# Patient Record
Sex: MALE | Race: WHITE | HISPANIC OR LATINO | Employment: OTHER | URBAN - METROPOLITAN AREA
[De-identification: names, ages, dates, MRNs, and addresses within clinical notes are randomized per-mention and may not be internally consistent; named-entity substitution may affect disease eponyms.]

---

## 2017-01-04 ENCOUNTER — ALLSCRIPTS OFFICE VISIT (OUTPATIENT)
Dept: OTHER | Facility: OTHER | Age: 82
End: 2017-01-04

## 2017-01-18 ENCOUNTER — GENERIC CONVERSION - ENCOUNTER (OUTPATIENT)
Dept: OTHER | Facility: OTHER | Age: 82
End: 2017-01-18

## 2017-01-24 ENCOUNTER — GENERIC CONVERSION - ENCOUNTER (OUTPATIENT)
Dept: OTHER | Facility: OTHER | Age: 82
End: 2017-01-24

## 2017-02-01 ENCOUNTER — GENERIC CONVERSION - ENCOUNTER (OUTPATIENT)
Dept: OTHER | Facility: OTHER | Age: 82
End: 2017-02-01

## 2017-02-24 ENCOUNTER — ALLSCRIPTS OFFICE VISIT (OUTPATIENT)
Dept: OTHER | Facility: OTHER | Age: 82
End: 2017-02-24

## 2017-03-24 ENCOUNTER — ALLSCRIPTS OFFICE VISIT (OUTPATIENT)
Dept: OTHER | Facility: OTHER | Age: 82
End: 2017-03-24

## 2017-04-27 ENCOUNTER — ALLSCRIPTS OFFICE VISIT (OUTPATIENT)
Dept: OTHER | Facility: OTHER | Age: 82
End: 2017-04-27

## 2017-06-01 ENCOUNTER — APPOINTMENT (OUTPATIENT)
Dept: LAB | Facility: CLINIC | Age: 82
End: 2017-06-01
Payer: COMMERCIAL

## 2017-06-01 DIAGNOSIS — E11.9 TYPE 2 DIABETES MELLITUS WITHOUT COMPLICATIONS (HCC): ICD-10-CM

## 2017-06-01 DIAGNOSIS — I10 ESSENTIAL (PRIMARY) HYPERTENSION: ICD-10-CM

## 2017-06-01 DIAGNOSIS — E78.5 HYPERLIPIDEMIA: ICD-10-CM

## 2017-06-01 LAB
ALBUMIN SERPL BCP-MCNC: 3.3 G/DL (ref 3.5–5)
ALP SERPL-CCNC: 92 U/L (ref 46–116)
ALT SERPL W P-5'-P-CCNC: 38 U/L (ref 12–78)
ANION GAP SERPL CALCULATED.3IONS-SCNC: 5 MMOL/L (ref 4–13)
AST SERPL W P-5'-P-CCNC: 20 U/L (ref 5–45)
BASOPHILS # BLD AUTO: 0.02 THOUSANDS/ΜL (ref 0–0.1)
BASOPHILS NFR BLD AUTO: 0 % (ref 0–1)
BILIRUB DIRECT SERPL-MCNC: 0.14 MG/DL (ref 0–0.2)
BILIRUB SERPL-MCNC: 0.49 MG/DL (ref 0.2–1)
BUN SERPL-MCNC: 22 MG/DL (ref 5–25)
CALCIUM SERPL-MCNC: 9.3 MG/DL (ref 8.3–10.1)
CHLORIDE SERPL-SCNC: 104 MMOL/L (ref 100–108)
CHOLEST SERPL-MCNC: 189 MG/DL (ref 50–200)
CO2 SERPL-SCNC: 30 MMOL/L (ref 21–32)
CREAT SERPL-MCNC: 0.95 MG/DL (ref 0.6–1.3)
CREAT UR-MCNC: 140 MG/DL
EOSINOPHIL # BLD AUTO: 0.22 THOUSAND/ΜL (ref 0–0.61)
EOSINOPHIL NFR BLD AUTO: 2 % (ref 0–6)
ERYTHROCYTE [DISTWIDTH] IN BLOOD BY AUTOMATED COUNT: 13.3 % (ref 11.6–15.1)
EST. AVERAGE GLUCOSE BLD GHB EST-MCNC: 163 MG/DL
GFR SERPL CREATININE-BSD FRML MDRD: >60 ML/MIN/1.73SQ M
GLUCOSE P FAST SERPL-MCNC: 97 MG/DL (ref 65–99)
HBA1C MFR BLD: 7.3 % (ref 4.2–6.3)
HCT VFR BLD AUTO: 44.3 % (ref 36.5–49.3)
HDLC SERPL-MCNC: 93 MG/DL (ref 40–60)
HGB BLD-MCNC: 14.8 G/DL (ref 12–17)
LDLC SERPL CALC-MCNC: 74 MG/DL (ref 0–100)
LYMPHOCYTES # BLD AUTO: 2.76 THOUSANDS/ΜL (ref 0.6–4.47)
LYMPHOCYTES NFR BLD AUTO: 30 % (ref 14–44)
MCH RBC QN AUTO: 31 PG (ref 26.8–34.3)
MCHC RBC AUTO-ENTMCNC: 33.4 G/DL (ref 31.4–37.4)
MCV RBC AUTO: 93 FL (ref 82–98)
MICROALBUMIN UR-MCNC: 693 MG/L (ref 0–20)
MICROALBUMIN/CREAT 24H UR: 495 MG/G CREATININE (ref 0–30)
MONOCYTES # BLD AUTO: 0.84 THOUSAND/ΜL (ref 0.17–1.22)
MONOCYTES NFR BLD AUTO: 9 % (ref 4–12)
NEUTROPHILS # BLD AUTO: 5.28 THOUSANDS/ΜL (ref 1.85–7.62)
NEUTS SEG NFR BLD AUTO: 59 % (ref 43–75)
NRBC BLD AUTO-RTO: 0 /100 WBCS
PLATELET # BLD AUTO: 203 THOUSANDS/UL (ref 149–390)
PMV BLD AUTO: 10.2 FL (ref 8.9–12.7)
POTASSIUM SERPL-SCNC: 3.9 MMOL/L (ref 3.5–5.3)
PROT SERPL-MCNC: 7.1 G/DL (ref 6.4–8.2)
RBC # BLD AUTO: 4.77 MILLION/UL (ref 3.88–5.62)
SODIUM SERPL-SCNC: 139 MMOL/L (ref 136–145)
TRIGL SERPL-MCNC: 112 MG/DL
WBC # BLD AUTO: 9.13 THOUSAND/UL (ref 4.31–10.16)

## 2017-06-01 PROCEDURE — 80061 LIPID PANEL: CPT

## 2017-06-01 PROCEDURE — 80048 BASIC METABOLIC PNL TOTAL CA: CPT

## 2017-06-01 PROCEDURE — 36415 COLL VENOUS BLD VENIPUNCTURE: CPT

## 2017-06-01 PROCEDURE — 82043 UR ALBUMIN QUANTITATIVE: CPT

## 2017-06-01 PROCEDURE — 85025 COMPLETE CBC W/AUTO DIFF WBC: CPT

## 2017-06-01 PROCEDURE — 82570 ASSAY OF URINE CREATININE: CPT

## 2017-06-01 PROCEDURE — 80076 HEPATIC FUNCTION PANEL: CPT

## 2017-06-01 PROCEDURE — 83036 HEMOGLOBIN GLYCOSYLATED A1C: CPT

## 2017-06-09 ENCOUNTER — ALLSCRIPTS OFFICE VISIT (OUTPATIENT)
Dept: OTHER | Facility: OTHER | Age: 82
End: 2017-06-09

## 2017-06-26 ENCOUNTER — ALLSCRIPTS OFFICE VISIT (OUTPATIENT)
Dept: OTHER | Facility: OTHER | Age: 82
End: 2017-06-26

## 2017-08-15 ENCOUNTER — ALLSCRIPTS OFFICE VISIT (OUTPATIENT)
Dept: OTHER | Facility: OTHER | Age: 82
End: 2017-08-15

## 2017-11-06 ENCOUNTER — APPOINTMENT (OUTPATIENT)
Dept: LAB | Facility: CLINIC | Age: 82
End: 2017-11-06
Payer: COMMERCIAL

## 2017-11-06 DIAGNOSIS — E78.5 HYPERLIPIDEMIA: ICD-10-CM

## 2017-11-06 DIAGNOSIS — I10 ESSENTIAL (PRIMARY) HYPERTENSION: ICD-10-CM

## 2017-11-06 DIAGNOSIS — E11.9 TYPE 2 DIABETES MELLITUS WITHOUT COMPLICATIONS (HCC): ICD-10-CM

## 2017-11-06 LAB
ALBUMIN SERPL BCP-MCNC: 3.2 G/DL (ref 3.5–5)
ALP SERPL-CCNC: 105 U/L (ref 46–116)
ALT SERPL W P-5'-P-CCNC: 32 U/L (ref 12–78)
ANION GAP SERPL CALCULATED.3IONS-SCNC: 4 MMOL/L (ref 4–13)
AST SERPL W P-5'-P-CCNC: 19 U/L (ref 5–45)
BILIRUB SERPL-MCNC: 0.48 MG/DL (ref 0.2–1)
BUN SERPL-MCNC: 23 MG/DL (ref 5–25)
CALCIUM SERPL-MCNC: 9.4 MG/DL (ref 8.3–10.1)
CHLORIDE SERPL-SCNC: 104 MMOL/L (ref 100–108)
CHOLEST SERPL-MCNC: 173 MG/DL (ref 50–200)
CO2 SERPL-SCNC: 29 MMOL/L (ref 21–32)
CREAT SERPL-MCNC: 1.02 MG/DL (ref 0.6–1.3)
ERYTHROCYTE [DISTWIDTH] IN BLOOD BY AUTOMATED COUNT: 13.1 % (ref 11.6–15.1)
EST. AVERAGE GLUCOSE BLD GHB EST-MCNC: 163 MG/DL
GFR SERPL CREATININE-BSD FRML MDRD: 69 ML/MIN/1.73SQ M
GLUCOSE P FAST SERPL-MCNC: 120 MG/DL (ref 65–99)
HBA1C MFR BLD: 7.3 % (ref 4.2–6.3)
HCT VFR BLD AUTO: 44 % (ref 36.5–49.3)
HDLC SERPL-MCNC: 80 MG/DL (ref 40–60)
HGB BLD-MCNC: 14.8 G/DL (ref 12–17)
LDLC SERPL CALC-MCNC: 62 MG/DL (ref 0–100)
MCH RBC QN AUTO: 31.3 PG (ref 26.8–34.3)
MCHC RBC AUTO-ENTMCNC: 33.6 G/DL (ref 31.4–37.4)
MCV RBC AUTO: 93 FL (ref 82–98)
PLATELET # BLD AUTO: 211 THOUSANDS/UL (ref 149–390)
PMV BLD AUTO: 9.7 FL (ref 8.9–12.7)
POTASSIUM SERPL-SCNC: 3.9 MMOL/L (ref 3.5–5.3)
PROT SERPL-MCNC: 7 G/DL (ref 6.4–8.2)
RBC # BLD AUTO: 4.73 MILLION/UL (ref 3.88–5.62)
SODIUM SERPL-SCNC: 137 MMOL/L (ref 136–145)
TRIGL SERPL-MCNC: 153 MG/DL
TSH SERPL DL<=0.05 MIU/L-ACNC: 2.36 UIU/ML (ref 0.36–3.74)
WBC # BLD AUTO: 9.82 THOUSAND/UL (ref 4.31–10.16)

## 2017-11-06 PROCEDURE — 85027 COMPLETE CBC AUTOMATED: CPT

## 2017-11-06 PROCEDURE — 80061 LIPID PANEL: CPT

## 2017-11-06 PROCEDURE — 80053 COMPREHEN METABOLIC PANEL: CPT

## 2017-11-06 PROCEDURE — 36415 COLL VENOUS BLD VENIPUNCTURE: CPT

## 2017-11-06 PROCEDURE — 84443 ASSAY THYROID STIM HORMONE: CPT

## 2017-11-06 PROCEDURE — 83036 HEMOGLOBIN GLYCOSYLATED A1C: CPT

## 2017-11-21 ENCOUNTER — ALLSCRIPTS OFFICE VISIT (OUTPATIENT)
Dept: OTHER | Facility: OTHER | Age: 82
End: 2017-11-21

## 2017-11-22 NOTE — PROGRESS NOTES
Assessment    1  Chronic bronchitis (491 9) (J42)   2  Hyperlipidemia (272 4) (E78 5)   3  Hypertension (401 9) (I10)   4  Parkinson's disease (332 0) (G20)   5  Type 2 diabetes mellitus (250 00) (E11 9)   6  Cerebrovascular disease (437 9) (I67 9)    Plan  Hyperlipidemia    · (1) LIPID PANEL, FASTING; Status:Active; Requested for:06Mar2018;   Hypertension    · (1) BASIC METABOLIC PROFILE; Status:Active; Requested for:06Mar2018;    · (1) CBC/PLT/DIFF; Status:Active; Requested for:06Mar2018;    · (1) HEPATIC FUNCTION PANEL; Status:Active; Requested for:06Mar2018;   Type 2 diabetes mellitus    · (1) HEMOGLOBIN A1C; Status:Active; Requested for:06Mar2018; Discussion/Summary  Discussion Summary:   Lab data reviewed in detail and compared prior  bronchitis/COPD appears clinically stable with daily prednisone as well as bronchodilators following closely with pulmonology, IgE levels greater than 5000  2 diabetes mellitus-stable with diet control, I have encouraged weight loss and stricter dietary compliance to try to bring the A1c below 7, consider medicating for greater than 7 5  and hyperlipidemia remained stable on present regimen  of CVA-modify risk factors as above  disease-improved on medical therapy following with Neurology next month  maintenance-flu shot today, up-to-date with pneumonia vaccination, scheduled to see Ophthalmology at [address] [facility] however will be changing providers  follow-up after labs in 4 months, sooner as needed  Chief Complaint  Chief Complaint Chronic Condition [facility]: Patient is here today for follow up of chronic conditions described in HPI  History of Present Illness  HPI: [place]elling generally well  by neuro and dx'd PD last Jan, hasn't had f/u but sched 12/18  He feels better on rx   been better, he denies cough and wheeze  Seeing pulm regularly  stable  Using fluticasone and saline irrigation, zyrtec and singulair  Allergy testing ordered by pulm    dm diet loosely  lipitor  exercising regularly, but trying to keep active  Review of Systems  Complete-Male:  Constitutional: No fever or chills, feels well, no tiredness, no recent weight gain or weight loss  Eyes: No complaints of eye pain, no red eyes, no discharge from eyes, no itchy eyes  ENT: no complaints of earache, no hearing loss, no nosebleeds, no nasal discharge, no sore throat, no hoarseness  Cardiovascular: No complaints of slow heart rate, no fast heart rate, no chest pain, no palpitations, no leg claudication, no lower extremity  Respiratory: as noted in HPI  Gastrointestinal: No complaints of abdominal pain, no constipation, no nausea or vomiting, no diarrhea or bloody stools  Genitourinary: No complaints of dysuria, no incontinence, no hesitancy, no nocturia, no genital lesion, no testicular pain  Musculoskeletal: No complaints of arthralgia, no myalgias, no joint swelling or stiffness, no limb pain or swelling  Integumentary: No complaints of skin rash or skin lesions, no itching, no skin wound, no dry skin  Neurological: as noted in HPI  Psychiatric: Is not suicidal, no sleep disturbances, no anxiety or depression, no change in personality, no emotional problems  Endocrine: No complaints of proptosis, no hot flashes, no muscle weakness, no erectile dysfunction, no deepening of the voice, no feelings of weakness  Hematologic/Lymphatic: No complaints of swollen glands, no swollen glands in the neck, does not bleed easily, no easy bruising  Active Problems  1  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)   2  Allergic rhinitis (477 9) (J30 9)   3  Aortic regurgitation (424 1) (I35 1)   4  Arm laceration (884 0) (S41 119A)   5  Asthma (493 90) (J45 909)   6  Bronchitis, chronic obstructive, with exacerbation (491 21) (J44 1)   7  Candidal intertrigo (112 3) (B37 2)   8  Cerebrovascular disease (437 9) (I67 9)   9  Chronic bronchitis (491 9) (J42)   10   Chronic obstructive pulmonary disease (496) (J44 9)   11  Cough (786 2) (R05)   12  Dermatitis (692 9) (L30 9)   13  Dysthymic disorder (300 4) (F34 1)   14  Elevated IgE level (795 79) (R76 8)   15  Fatigue (780 79) (R53 83)   16  Flu vaccine need (V04 81) (Z23)   17  Hyperlipidemia (272 4) (E78 5)   18  Hypertension (401 9) (I10)   19  Irritant dermatitis (692 9) (L24 9)   20  Need for pneumococcal vaccination (V03 82) (Z23)   21  Need for pneumococcal vaccine (V03 82) (Z23)   22  Need for vaccination (V05 9) (Z23)   23  Neurologic gait dysfunction (781 2) (R26 9)   24  No advance directives (V49 89) (Z78 9)   25  Parkinson's disease (332 0) (G20)   26  Peripheral vascular disease (443 9) (I73 9)   27  Pneumonia (486) (J18 9)   28  Preop examination (V72 84) (Z01 818)   29  Screening for neurological condition (V80 09) (Z13 89)   30  Screening for skin condition (V82 0) (Z13 89)   31  Seborrheic keratosis (702 19) (L82 1)   32  Skin rash (782 1) (R21)   33  Tinea corporis (110 5) (B35 4)   34  Tinea pedis of both feet (110 4) (B35 3)   35  Tremor (781 0) (R25 1)   36  Type 2 diabetes mellitus (250 00) (E11 9)    Past Medical History  1  History of Benign essential hypertension (401 1) (I10)   2  History of Bronchiectasis with acute exacerbation (494 1) (J47 1)   3  History of Bronchiectasis without acute exacerbation (494 0) (J47 9)   4  History of Bronchitis, chronic obstructive, with exacerbation (491 21) (J44 1)   5  History of Difficulty breathing (786 09) (R06 89)   6  History of Flu vaccine need (V04 81) (Z23)   7  History of acute bronchitis (V12 69) (Z87 09)   8  History of allergic rhinitis (V12 69) (Z87 09)   9  History of chronic obstructive lung disease (V12 69) (Z87 09)   10  History of hyperlipidemia (V12 29) (Z86 39)   11  History of hypertension (V12 59) (Z86 79)   12  History of low back pain (V13 59) (Z87 39)   13  History of Simple chronic bronchitis (491 0) (J41 0)   14   History of Type 2 Diabetes Mellitus - Uncomplicated, Controlled (250 00)  Active Problems And Past Medical History Reviewed: The active problems and past medical history were reviewed and updated today  Surgical History  1  History of Rhinologic Surgery  Surgical History Reviewed: The surgical history was reviewed and updated today  Family History  Mother    1  Family history of   Father    2  Family history of   Sister    3  Family history of chronic obstructive pulmonary disease (V17 6) (Z82 5)   4  Family history of Lung Cancer (V16 1)  Family History    5  Family history of Asthma (V17 5)  Family History Reviewed: The family history was reviewed and updated today  Social History     · Former cigarette smoker (D92 86) (J93 738)   · Former smoker (T95 02) (N53 785)   · Living Independently With Spouse   ·    · No advance directives (V49 89) (Z78 9)   · Occupation: Retired  Social History Reviewed: The social history was reviewed and updated today  Current Meds   1  Advair Diskus 250-50 MCG/DOSE Inhalation Aerosol Powder Breath Activated; INHALE 1 DOSE BY MOUTH TWICE DAILY  RINSE MOUTH AFTER USE; Therapy: 98BNO8191 to ([facility])  Requested for: 02Pdj4583; Last Rx:21Wrk6455 Ordered   2  Albuterol Sulfate (2 5 MG/3ML) 0 083% Inhalation Nebulization Solution; INHALE CONTENETS OF 1 VIAL VIA NEBULIZER EVERY 4 HOURS AS NEEDED; Therapy: 97ILK3882 to ([facility])  Requested for: 37OFY2669; Last Rx:2014 Ordered   3  Atorvastatin Calcium 10 MG Oral Tablet; take 1 tablet by mouth every day; Therapy: 47Sik4559 to (Evaluate:2018)  Requested for: 38YLU4004; Last Rx:73Ywg8169 Ordered   4  Carbidopa-Levodopa  MG Oral Tablet; TAKE 1 TABLET 3 TIMES DAILY; [place] 97Wny5114 [place]  Requested for: 65IXV3153; Last Rx:2017 Ordered   5  Clopidogrel Bisulfate 75 MG Oral Tablet (Plavix); take 1 tablet every day;  Therapy: 62CBL1797 to (Evaluate:38Gzk3067) Requested for: 20Jun2017; Last Rx:20Jun2017 Ordered   6  Fluticasone Propionate 50 MCG/ACT Nasal Suspension; instill 2 sprays into each nostril at bedtime; Therapy: 84NKN0343 to (Evaluate:11Fsi6318)  Requested for: 02ARB2821; Last Rx:09Jun2016 Ordered   7  Montelukast Sodium 10 MG Oral Tablet; Take 1 tablet by mouth at bedtime; Therapy: 08CJM0949 to (Evaluate:12Dec2017)  Requested for: 86Bcp5781; Last Rx:91Xzj5413 Ordered   8  Pramipexole Dihydrochloride 0 25 MG Oral Tablet; TAKE 1 TABLET BY MOUTH 3 TIMES A DAY; Therapy: 44MVX0228 to (Evaluate:20Mar2018)  Requested for: 79Nwa6821; Last Rx:34Oda8568 Ordered   9  PredniSONE 10 MG Oral Tablet; take 1 tablet by mouth every day; Therapy: 90Sgm2185 to (96 371510)  Requested for: 75WEH2890; Last IM:85IEN4040 Ordered   10  ProAir  (90 Base) MCG/ACT Inhalation Aerosol Solution; INHALE 2 PUFFS EVERY 4 HOURS  AS NEEDED; Therapy: 33SQZ8604 to (Last Rx:09Jun2017)  Requested for: 57KTS3102 Ordered   11  Saline 0 9 % Solution (XX); use one vial via nebulizer twice daily with albuterol; Therapy: 48XOC5199 to (Last Rx:12Jun2015)  Requested for: 12Jun2015 Ordered   12  Spiriva HandiHaler 18 MCG Inhalation Capsule; INHALE 1 CAPSULE VIA HANDIHALER ONCE DAILY  AT THE SAME TIME EVERY DAY; Therapy: 17QDF4545 to (Evaluate:11Mar2018)  Requested for: 31VFZ6663; Last Rx:11Nov2017  Ordered   13  Terbinafine HCl - 1 % External Cream; APPLY 2-3 TIMES DAILY TO AFFECTED AREA(S); Therapy: 41IBY1155 to (Last Rx:24Mar2017)  Requested for: 24Mar2017 Ordered   14  Triamcinolone Acetonide 0 5 % External Cream; APPLY SPARINGLY AND MASSAGE IN TWICE DAILY; Therapy: 12Wwp5999 to (Last Rx:54Zit7392)  Requested for: 98Ahn5759 Ordered   15  Valsartan-Hydrochlorothiazide 80-12 5 MG Oral Tablet; TAKE 1 TABLET BY MOUTH IN THE  MORNING; Therapy: 08Apr2014 to (96 464860)  Requested for: 34QNU6411; Last Rx:02Nov2017  Ordered  Medication List Reviewed:    The medication list was reviewed and updated today  Allergies  1  Penicillins  2  No Known Food Allergies   3  Seasonal    Vitals  Vital Signs    Recorded: 21Nov2017 12:40PM   Heart Rate 91   Systolic 215   Diastolic 66   Height 5 ft 2 5 in   Weight 198 lb 4 oz   BMI Calculated 35 68   BSA Calculated 1 92   O2 Saturation 95       Physical Exam   Constitutional  General appearance: No acute distress, well appearing and well nourished  Eyes  Conjunctiva and lids: No swelling, erythema, or discharge  Pupils and irises: Equal, round and reactive to light  Ears, Nose, Mouth, and Throat  External inspection of ears and nose: Normal    Oropharynx: Normal with no erythema, edema, exudate or lesions  Pulmonary  Respiratory effort: No increased work of breathing or signs of respiratory distress  Auscultation of lungs: Clear to auscultation, equal breath sounds bilaterally, no wheezes, no rales, no rhonci  -- prolonged exp  phase  Cardiovascular  Auscultation of heart: Abnormal  -- 2/6 massimo  Examination of extremities for edema and/or varicosities: Normal    Carotid pulses: Normal    Abdomen  Abdomen: Non-tender, no masses  Liver and spleen: No hepatomegaly or splenomegaly  Lymphatic  Palpation of lymph nodes in neck: No lymphadenopathy  Musculoskeletal  Gait and station: Normal    Neurologic  Cranial nerves: Cranial nerves 2-12 intact     Psychiatric  Orientation to person, place and time: Normal    Mood and affect: Normal          Results/Data  Diabetes Flow Sheet 21Nov2017 12:47PM      Test Name Result Flag Reference   Eye Exam      Last exam was 6/9/2016     (1) CBC/ PLT (NO DIFF) 96OHM8265 10:06AM Ayla Order Number: [id number]     Test Name Result Flag Reference   HEMATOCRIT 44 0 %  36 5-49 3   HEMOGLOBIN 14 8 g/dL  12 0-17 0   MCHC 33 6 g/dL  31 4-37 4   MCH 31 3 pg  26 8-34 3   MCV 93 fL  82-98   PLATELET COUNT 494 Thousands/uL  149-390   RBC COUNT 4 73 Million/uL  3 88-5 62   RDW 13 1 %  11 6-15 1   WBC COUNT 9 82 Thousand/uL  4 31-10 16   MPV 9 7 fL  8 9-12 7     (1) COMPREHENSIVE METABOLIC PANEL 12LWZ0855 29:36IV [employer] Order Number: [id number]     Test Name Result Flag Reference   SODIUM 137 mmol/L  136-145   POTASSIUM 3 9 mmol/L  3 5-5 3   CHLORIDE 104 mmol/L  100-108   CARBON DIOXIDE 29 mmol/L  21-32   ANION GAP (CALC) 4 mmol/L  4-13   BLOOD UREA NITROGEN 23 mg/dL  5-25   CREATININE 1 02 mg/dL  0 60-1 30   Standardized to IDMS reference method   CALCIUM 9 4 mg/dL  8 3-10 1   BILI, TOTAL 0 48 mg/dL  0 20-1 00   ALK PHOSPHATAS 105 U/L     ALT (SGPT) 32 U/L  12-78   Specimen collection should occur prior to Sulfasalazine and/or Sulfapyridine administration due to the potential for falsely depressed results  AST(SGOT) 19 U/L  5-45   Specimen collection should occur prior to Sulfasalazine administration due to the potential for falsely depressed results  ALBUMIN 3 2 g/dL L 3 5-5 0   TOTAL PROTEIN 7 0 g/dL  6 4-8 2   eGFR 69 ml/min/1 73sq m       National Kidney Disease Education Program recommendations are as follows: GFR calculation is accurate only with a steady state creatinine Chronic Kidney disease less than 60 ml/min/1 73 sq  meters Kidney failure less than 15 ml/min/1 73 sq  meters  GLUCOSE FASTING 120 mg/dL H 65-99   Specimen collection should occur prior to Sulfasalazine administration due to the potential for falsely depressed results  Specimen collection should occur prior to Sulfapyridine administration due to the potential for falsely elevated results  (1) HEMOGLOBIN A1C 49LKV5723 10:06AM [employer] Order Number: [id number]     Test Name Result Flag Reference   HEMOGLOBIN A1C 7 3 % H 4 2-6 3   EST  AVG   GLUCOSE 163 mg/dl       (1) LIPID PANEL, FASTING 62RGZ0053 10:06AM [employer] Order Number: [id number]     Test Name Result Flag Reference   CHOLESTEROL 173 mg/dL     HDL,DIRECT 80 mg/dL H 40-60   Specimen collection should occur prior to Metamizole administration due to the potential for falsley depressed results  LDL CHOLESTEROL CALCULATED 62 mg/dL  0-100     Triglyceride:       Normal <150 mg/dl  Borderline High 150-199 mg/dl  High 200-499 mg/dl  Very High >499 mg/dl   Cholesterol:      Desirable <200 mg/dl   Borderline High 200-239 mg/dl   High >239 mg/dl   HDL Cholesterol:      High>59 mg/dL   Low <41 mg/dL   This screening LDL is a calculated result  It does not have the accuracy of the Direct Measured LDL in the monitoring of patients with hyperlipidemia and/or statin therapy  Direct Measure LDL (GSL917) must be ordered separately in these patients  TRIGLYCERIDES 153 mg/dL H <=150   Specimen collection should occur prior to N-Acetylcysteine or Metamizole administration due to the potential for falsely depressed results  (1) TSH 00CHA8661 10:06AM Shelby Magaña Order Number: [id number]     Test Name Result Flag Reference   TSH 2 360 uIU/mL  0 358-3 740   Patients undergoing fluorescein dye angiography may retain small amounts of fluorescein in the body for 48-72 hours post procedure  Samples containing fluorescein can produce falsely depressed TSH values  If the patient had this procedure,a specimen should be resubmitted post fluorescein clearance       Future Appointments    Date/Time Provider Specialty Site   12/18/2017 09:40 AM Trang Xie MD Neurology [facility] [address] [place] [address]   12/26/2017 11:00 AM Basilio Stephens [facility] Pulmonary Medicine [address]       Signatures   Electronically signed by : Kathyleen Harada, M D ; Nov 21 2017  1:10PM EST                       (Author)

## 2017-12-18 ENCOUNTER — ALLSCRIPTS OFFICE VISIT (OUTPATIENT)
Dept: OTHER | Facility: OTHER | Age: 82
End: 2017-12-18

## 2017-12-18 DIAGNOSIS — R26.9 ABNORMALITY OF GAIT AND MOBILITY: ICD-10-CM

## 2017-12-19 NOTE — PROGRESS NOTES
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)    Plan  Neurologic gait dysfunction    · Continue with our present treatment plan ; Status:Complete;   Done: [id number]   Ordered;For:Neurologic gait dysfunction; Ordered By:Aayush Green;   · *1 - [facility]  *  Status: Active  Requested for: 40GGG6611   Ordered; For: Neurologic gait dysfunction; Ordered By: Rendall Eisenmenger Performed:  Due: 94NPZ3061  Care Summary provided  : Yes   · Follow-up visit in 3 months Evaluation and Treatment  Follow-up  Status: Complete Done: 39UGC4801   Ordered; For: Neurologic gait dysfunction; Ordered By: Rendall Eisenmenger Performed:  Due: 73NKN1575; Last Updated By: Ozzie Maldonado; 12/18/2017 10:30:11 AM  Parkinson's disease    · Carbidopa-Levodopa  MG Oral Tablet; TAKE 1 TABLET 3 TIMES DAILY   Rx By: Rendall Eisenmenger; Dispense: 30 Days ; #:90 Tablet; Refill: 6;For: Parkinson's disease; FLORENTINO = N; Verified Transmission to [facility] Last Updated By: System, SureScript[employer]; 12/18/2017 10:27:55 AM   · Pramipexole Dihydrochloride 0 5 MG Oral Tablet; TAKE 1 TABLET BY MOUTH 3TIMES DAILY   Rx By: Rendall Eisenmenger; Dispense: 30 Days ; #:90 Tablet; Refill: 6;For: Parkinson's disease; FLORENTINO = N; Verified Transmission to [facility] Last Updated By: System, SureScripts; 12/18/2017 10:27:54 AM    Discussion/Summary  Discussion Summary:   Patient with a history of Parkinson's disease, gait dysfunction is advised to increase the dose of Mirapex to 0 5 mg 3 times a day in addition to the Sinemet 25/100, 1 tablets p o  3 times a day  Patient will also be referred for a short course of physical therapy to further assist with improving his gait  Fall precautions were also discussed  Patient is advised to return back to see me in 2-3 months        Chief Complaint  Chief Complaint Free Text Note Form: Patient is here for follow up visit for his history of neurologic gait dysfunction and Parkinson's disease  History of Present Illness  HPI: Patient is here for a follow-up visit accompanied with his daughter with a history of Parkinson's disease and was last seen by me almost a year ago  He continues to have significant gait difficulty and has had recurrent falls  Patient describes tremors of both lower extremities as well as the right upper extremity, ambulates with the help of a cane and has been maintained on Sinemet 3 times a day and was last started on Mirapex 0 25 mg 3 times a day  Unfortunately was lost to follow-up  As per his daughter he also has been having difficulty with his memory but denies any confusion or hallucinations  Review of Systems  Neurological ROS:  Constitutional: no fever, no chills, no recent weight gain, no recent weight loss, no complaints of feeling tired, no changes in appetite  HEENT:  no sinus problems, not feeling congested, no blurred vision, no dryness of the eyes, no eye pain, no hearing loss, no tinnitus, no mouth sores, no sore throat, no hoarseness, no dysphagia, no masses, no bleeding  Cardiovascular:  no chest pain or pressure, no palpitations present, the heart rate was not rapid or irregular, no swelling in the arms or legs, no poor circulation  Respiratory: unusual or persistant cough  Gastrointestinal:  no nausea, no vomiting, no diarrhea, no abdominal pain, no changes in bowel habits, no melena, no loss of bowel control  Genitourinary:  no incontinence, no feelings of urinary urgency, no increase in frequency, no urinary hesitancy, no dysuria, no hematuria  Musculoskeletal: arthralgias-- and-- pain while walking  Integumentary  no masses, no rash, no skin lesions, no livedo reticularis  Psychiatric: mood swings  Endocrine   no unusual weight loss or gain, no excessive urination, no excessive thirst, no hair loss or gain, no hot or cold intolerance, no menstrual period change or irregularity, no loss of sexual ability or drive, no erection difficulty, no nipple discharge  Hematologic/Lymphatic: a tendency for easy bruising  Neurological General:  no headache, no nausea or vomiting, no lightheadedness, no convulsions, no blackouts, no syncope, no trauma, no photopsia, no increased sleepiness, no trouble falling asleep, no snoring, no awakening at night  Neurological Mental Status: confusion,-- alteration or loss of consciousness-- and-- memory problems  Neurological Cranial Nerves: taste or smell loss/changes,-- vertigo or dizziness-- and-- slurred speech  Neurological Motor findings include: tremor  Neurological Coordination: balance difficulties  Neurological Sensory:  no numbness, no pain, no tingling, does not fall when eyes closed or taking a shower  Neurological Gait: difficulty walking-- and-- has had falls  ROS Reviewed:   ROS reviewed  Active Problems  1  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)   2  Allergic rhinitis (477 9) (J30 9)   3  Aortic regurgitation (424 1) (I35 1)   4  Arm laceration (884 0) (S41 119A)   5  Asthma (493 90) (J45 909)   6  Bronchitis, chronic obstructive, with exacerbation (491 21) (J44 1)   7  Candidal intertrigo (112 3) (B37 2)   8  Cerebrovascular disease (437 9) (I67 9)   9  Chronic bronchitis (491 9) (J42)   10  Chronic obstructive pulmonary disease (496) (J44 9)   11  Cough (786 2) (R05)   12  Dermatitis (692 9) (L30 9)   13  Dysthymic disorder (300 4) (F34 1)   14  Elevated IgE level (795 79) (R76 8)   15  Fatigue (780 79) (R53 83)   16  Flu vaccine need (V04 81) (Z23)   17  Hyperlipidemia (272 4) (E78 5)   18  Hypertension (401 9) (I10)   19  Irritant dermatitis (692 9) (L24 9)   20  Need for pneumococcal vaccination (V03 82) (Z23)   21  Need for pneumococcal vaccine (V03 82) (Z23)   22  Need for vaccination (V05 9) (Z23)   23  Neurologic gait dysfunction (781 2) (R26 9)   24  No advance directives (V49 89) (Z78 9)   25   Parkinson's disease (332 0) (Red Bud Inches)   26  Peripheral vascular disease (443 9) (I73 9)   27  Pneumonia (486) (J18 9)   28  Preop examination (V72 84) (Z01 818)   29  Screening for neurological condition (V80 09) (Z13 89)   30  Screening for skin condition (V82 0) (Z13 89)   31  Seborrheic keratosis (702 19) (L82 1)   32  Skin rash (782 1) (R21)   33  Tinea corporis (110 5) (B35 4)   34  Tinea pedis of both feet (110 4) (B35 3)   35  Tremor (781 0) (R25 1)   36  Type 2 diabetes mellitus (250 00) (E11 9)    Past Medical History  1  History of Benign essential hypertension (401 1) (I10)   2  History of Bronchiectasis with acute exacerbation (494 1) (J47 1)   3  History of Bronchiectasis without acute exacerbation (494 0) (J47 9)   4  History of Bronchitis, chronic obstructive, with exacerbation (491 21) (J44 1)   5  History of Difficulty breathing (786 09) (R06 89)   6  History of Flu vaccine need (V04 81) (Z23)   7  History of acute bronchitis (V12 69) (Z87 09)   8  History of allergic rhinitis (V12 69) (Z87 09)   9  History of chronic obstructive lung disease (V12 69) (Z87 09)   10  History of hyperlipidemia (V12 29) (Z86 39)   11  History of hypertension (V12 59) (Z86 79)   12  History of low back pain (V13 59) (Z87 39)   13  History of Simple chronic bronchitis (491 0) (J41 0)   14  History of Type 2 Diabetes Mellitus - Uncomplicated, Controlled (250 00)    Surgical History  1  History of Rhinologic Surgery    Family History  Mother    1  Family history of   Father    2  Family history of   Sister    3  Family history of chronic obstructive pulmonary disease (V17 6) (Z82 5)   4  Family history of Lung Cancer (V16 1)  Family History    5  Family history of Asthma (V17 5)    Social History   · Former cigarette smoker (O60 71) (J66 303)   · Former smoker (V15 82) (P05 917)   · Living Independently With Spouse   ·    · No advance directives (V49 89) (Z78 9)   · Occupation: Retired  Social History Reviewed:  The social history was reviewed and updated today  Current Meds   1  Advair Diskus 250-50 MCG/DOSE Inhalation Aerosol Powder Breath Activated; INHALE 1 DOSE BY MOUTH TWICE DAILY  RINSE MOUTH AFTER USE; Therapy: 66HST2345 to (Maia Humphrey)  Requested for: 72Dai7247; Last Rx:75Qxo7890 Ordered   2  Albuterol Sulfate (2 5 MG/3ML) 0 083% Inhalation Nebulization Solution; INHALE CONTENETS OF 1 VIAL VIA NEBULIZER EVERY 4 HOURS AS NEEDED; Therapy: 31AJF8319 to (Genevaruth Antunez)  Requested for: 03HHI4480; Last Rx:27Oct2014 Ordered   3  Atorvastatin Calcium 10 MG Oral Tablet; take 1 tablet by mouth every day; Therapy: 75Ibt2767 to (Evaluate:27Jan2018)  Requested for: 53DJW8732; Last Rx:00Nzo7285 Ordered   4  Carbidopa-Levodopa  MG Oral Tablet; TAKE 1 TABLET 3 TIMES DAILY; Therapy: 38Dwj5439 to (Angi Hernández)  Requested for: 41DMB8598; Last Rx:04Oct2017 Ordered   5  Clopidogrel Bisulfate 75 MG Oral Tablet; take 1 tablet by mouth every day; Therapy: 13BHA0968 to (Evaluate:10Jun2018)  Requested for: 85Fgv7051; Last Rx:80Qip2297 Ordered   6  Montelukast Sodium 10 MG Oral Tablet; Take 1 tablet by mouth at bedtime; Therapy: 42ODR5726 to (Evaluate:06Jun2018)  Requested for: 38ZJW0310; Last Rx:33Awl0395 Ordered   7  Pramipexole Dihydrochloride 0 25 MG Oral Tablet; TAKE 1 TABLET BY MOUTH 3 TIMES A DAY; Therapy: 79GCV5200 to (Evaluate:20Mar2018)  Requested for: 68Hdd3815; Last Rx:25Ola9337 Ordered   8  PredniSONE 10 MG Oral Tablet; take 1 tablet by mouth every day; Therapy: 71Vlq6197 to (Evaluate:04Jun2018)  Requested for: 03VDK4005; Last Rx:92Jvl2647 Ordered   9  ProAir  (90 Base) MCG/ACT Inhalation Aerosol Solution; INHALE 2 PUFFS EVERY 4 HOURS AS NEEDED; Therapy: 82PRX8563 to (Last Rx:55Yuq9563)  Requested for: 99RZW7335 Ordered   10  Saline 0 9 % Solution (XX); use one vial via nebulizer twice daily with albuterol; Therapy: 53SVG2806 to (Last Rx:12Jun2015)  Requested for: 12Jun2015 Ordered   11   Spiriva HandiHaler 18 MCG Inhalation Capsule; INHALE 1 CAPSULE VIA HANDIHALER  ONCE DAILY AT THE SAME TIME EVERY DAY; Therapy: 02UIN2680 to (Evaluate:11Mar2018)  Requested for: 93PTD8274; Last  Rx:11Nov2017 Ordered   12  Terbinafine HCl - 1 % External Cream; APPLY 2-3 TIMES DAILY TO AFFECTED AREA(S); Therapy: 59HAG8358 to (Last Rx:24Mar2017)  Requested for: 24Mar2017 Ordered   13  Valsartan-Hydrochlorothiazide 80-12 5 MG Oral Tablet; TAKE 1 TABLET BY MOUTH IN  THE MORNING; Therapy: 02Fot4657 to [phone])  Requested for: 59YKQ1604; Last  Rx:02Nov2017 Ordered  Medication List Reviewed: The medication list was reviewed and updated today  Allergies  1  Penicillins  2  No Known Food Allergies   3  Seasonal    Vitals  Signs   Recorded: 41PLR5929 09:46AM   Heart Rate: 66  Systolic: 442  Diastolic: 68  Height: 5 ft 2 in  Weight: 196 lb   BMI Calculated: 35 85  BSA Calculated: 1 9    Physical Exam   Constitutional  General appearance: Abnormal  -- Overall bradykinesia with poor expression noted  Musculoskeletal  Gait and station: Abnormal  -- Patient ambulates with the help of a cane, and has difficulty standing from the sitting position unassisted  Muscle strength: Normal strength throughout  Muscle tone: No atrophy, abnormal movements, flaccidity, cogwheeling or spasticity  -- No cogwheeling rigidity was noted at this time  Involuntary movements: Abnormal involuntary movements were observed  -- Patient has evidence of a resting tremor in the right upper extremity  Neurologic  Orientation to person, place, and time: Normal    Language: Names objects, able to repeat phrases and speaks spontaneously     3rd, 4th, and 6th cranial nerves: Normal    7th cranial nerve: Normal    Sensation: Normal    Reflexes: Normal    Coordination: Normal    Cortical function: Normal        Future Appointments    Date/Time Provider Specialty Site   03/28/2018 01:40 PM Ana Maloney MD Neurology [facility]   12/26/2017 11:00 AM Rick Curtis [facility] Pulmonary Medicine [facility]   03/19/2018 12:30 PM KIMI Jordan  Internal Medicine [facility]     Signatures   Electronically signed by :  Josesito Caban MD; Dec 18 2017 12:06PM EST                       (Author)

## 2017-12-20 ENCOUNTER — APPOINTMENT (EMERGENCY)
Dept: RADIOLOGY | Facility: HOSPITAL | Age: 82
End: 2017-12-20
Payer: COMMERCIAL

## 2017-12-20 ENCOUNTER — HOSPITAL ENCOUNTER (EMERGENCY)
Facility: HOSPITAL | Age: 82
Discharge: HOME | End: 2017-12-20
Attending: EMERGENCY MEDICINE | Admitting: EMERGENCY MEDICINE
Payer: COMMERCIAL

## 2017-12-20 VITALS
TEMPERATURE: 98.4 F | HEIGHT: 62 IN | RESPIRATION RATE: 20 BRPM | WEIGHT: 196 LBS | OXYGEN SATURATION: 96 % | BODY MASS INDEX: 36.07 KG/M2 | HEART RATE: 73 BPM | SYSTOLIC BLOOD PRESSURE: 135 MMHG | DIASTOLIC BLOOD PRESSURE: 65 MMHG

## 2017-12-20 DIAGNOSIS — J40 BRONCHITIS: Primary | ICD-10-CM

## 2017-12-20 LAB
ALBUMIN SERPL BCP-MCNC: 2.8 G/DL (ref 3.5–5)
ALP SERPL-CCNC: 114 U/L (ref 46–116)
ALT SERPL W P-5'-P-CCNC: 12 U/L (ref 12–78)
ANION GAP SERPL CALCULATED.3IONS-SCNC: 9 MMOL/L (ref 4–13)
APTT PPP: 26 SECONDS (ref 23–35)
AST SERPL W P-5'-P-CCNC: 25 U/L (ref 5–45)
BASOPHILS # BLD AUTO: 0.03 THOUSANDS/ΜL (ref 0–0.1)
BASOPHILS NFR BLD AUTO: 0 % (ref 0–1)
BILIRUB SERPL-MCNC: 0.2 MG/DL (ref 0.2–1)
BUN SERPL-MCNC: 22 MG/DL (ref 5–25)
CALCIUM SERPL-MCNC: 9.3 MG/DL (ref 8.3–10.1)
CHLORIDE SERPL-SCNC: 104 MMOL/L (ref 100–108)
CO2 SERPL-SCNC: 26 MMOL/L (ref 21–32)
CREAT SERPL-MCNC: 1.06 MG/DL (ref 0.6–1.3)
EOSINOPHIL # BLD AUTO: 0.06 THOUSAND/ΜL (ref 0–0.61)
EOSINOPHIL NFR BLD AUTO: 1 % (ref 0–6)
ERYTHROCYTE [DISTWIDTH] IN BLOOD BY AUTOMATED COUNT: 12.7 % (ref 11.6–15.1)
GFR SERPL CREATININE-BSD FRML MDRD: 65 ML/MIN/1.73SQ M
GLUCOSE SERPL-MCNC: 156 MG/DL (ref 65–140)
HCT VFR BLD AUTO: 40.6 % (ref 36.5–49.3)
HGB BLD-MCNC: 13.5 G/DL (ref 12–17)
INR PPP: 1.06 (ref 0.86–1.16)
LACTATE SERPL-SCNC: 1.6 MMOL/L (ref 0.5–2)
LYMPHOCYTES # BLD AUTO: 1.75 THOUSANDS/ΜL (ref 0.6–4.47)
LYMPHOCYTES NFR BLD AUTO: 14 % (ref 14–44)
MCH RBC QN AUTO: 30.6 PG (ref 26.8–34.3)
MCHC RBC AUTO-ENTMCNC: 33.3 G/DL (ref 31.4–37.4)
MCV RBC AUTO: 92 FL (ref 82–98)
MONOCYTES # BLD AUTO: 0.78 THOUSAND/ΜL (ref 0.17–1.22)
MONOCYTES NFR BLD AUTO: 6 % (ref 4–12)
NEUTROPHILS # BLD AUTO: 9.53 THOUSANDS/ΜL (ref 1.85–7.62)
NEUTS SEG NFR BLD AUTO: 78 % (ref 43–75)
NRBC BLD AUTO-RTO: 0 /100 WBCS
NT-PROBNP SERPL-MCNC: 350 PG/ML
PLATELET # BLD AUTO: 335 THOUSANDS/UL (ref 149–390)
PMV BLD AUTO: 8.8 FL (ref 8.9–12.7)
POTASSIUM SERPL-SCNC: 3.7 MMOL/L (ref 3.5–5.3)
PROT SERPL-MCNC: 7.4 G/DL (ref 6.4–8.2)
PROTHROMBIN TIME: 14.1 SECONDS (ref 12.1–14.4)
RBC # BLD AUTO: 4.41 MILLION/UL (ref 3.88–5.62)
SODIUM SERPL-SCNC: 139 MMOL/L (ref 136–145)
WBC # BLD AUTO: 12.28 THOUSAND/UL (ref 4.31–10.16)

## 2017-12-20 PROCEDURE — 96365 THER/PROPH/DIAG IV INF INIT: CPT

## 2017-12-20 PROCEDURE — 99283 EMERGENCY DEPT VISIT LOW MDM: CPT

## 2017-12-20 PROCEDURE — 71020 HB CHEST X-RAY 2VW FRONTAL&LATL: CPT

## 2017-12-20 PROCEDURE — 85025 COMPLETE CBC W/AUTO DIFF WBC: CPT | Performed by: EMERGENCY MEDICINE

## 2017-12-20 PROCEDURE — 87040 BLOOD CULTURE FOR BACTERIA: CPT | Performed by: EMERGENCY MEDICINE

## 2017-12-20 PROCEDURE — 80053 COMPREHEN METABOLIC PANEL: CPT | Performed by: EMERGENCY MEDICINE

## 2017-12-20 PROCEDURE — 83605 ASSAY OF LACTIC ACID: CPT | Performed by: EMERGENCY MEDICINE

## 2017-12-20 PROCEDURE — 96361 HYDRATE IV INFUSION ADD-ON: CPT

## 2017-12-20 PROCEDURE — 36415 COLL VENOUS BLD VENIPUNCTURE: CPT | Performed by: EMERGENCY MEDICINE

## 2017-12-20 PROCEDURE — 94640 AIRWAY INHALATION TREATMENT: CPT

## 2017-12-20 PROCEDURE — 83880 ASSAY OF NATRIURETIC PEPTIDE: CPT | Performed by: EMERGENCY MEDICINE

## 2017-12-20 PROCEDURE — 96375 TX/PRO/DX INJ NEW DRUG ADDON: CPT

## 2017-12-20 PROCEDURE — 85730 THROMBOPLASTIN TIME PARTIAL: CPT | Performed by: EMERGENCY MEDICINE

## 2017-12-20 PROCEDURE — 85610 PROTHROMBIN TIME: CPT | Performed by: EMERGENCY MEDICINE

## 2017-12-20 RX ORDER — PREDNISONE 10 MG/1
TABLET ORAL
Qty: 40 TABLET | Refills: 0 | Status: SHIPPED | OUTPATIENT
Start: 2017-12-20 | End: 2018-02-08 | Stop reason: SDUPTHER

## 2017-12-20 RX ORDER — PRAMIPEXOLE DIHYDROCHLORIDE 0.5 MG/1
0.25 TABLET ORAL 3 TIMES DAILY
COMMUNITY
End: 2018-07-01 | Stop reason: SDUPTHER

## 2017-12-20 RX ORDER — SODIUM CHLORIDE 9 MG/ML
125 INJECTION, SOLUTION INTRAVENOUS CONTINUOUS
Status: DISCONTINUED | OUTPATIENT
Start: 2017-12-20 | End: 2017-12-20 | Stop reason: HOSPADM

## 2017-12-20 RX ORDER — ALBUTEROL SULFATE 2.5 MG/3ML
2.5 SOLUTION RESPIRATORY (INHALATION) EVERY 6 HOURS PRN
Qty: 75 ML | Refills: 0 | Status: SHIPPED | OUTPATIENT
Start: 2017-12-20 | End: 2018-01-19

## 2017-12-20 RX ORDER — AZITHROMYCIN 250 MG/1
500 TABLET, FILM COATED ORAL ONCE
Status: COMPLETED | OUTPATIENT
Start: 2017-12-20 | End: 2017-12-20

## 2017-12-20 RX ORDER — AZITHROMYCIN 250 MG/1
250 TABLET, FILM COATED ORAL DAILY
Qty: 4 TABLET | Refills: 0 | Status: SHIPPED | OUTPATIENT
Start: 2017-12-20 | End: 2017-12-24

## 2017-12-20 RX ORDER — ALBUTEROL SULFATE 2.5 MG/3ML
2.5 SOLUTION RESPIRATORY (INHALATION) ONCE
Status: COMPLETED | OUTPATIENT
Start: 2017-12-20 | End: 2017-12-20

## 2017-12-20 RX ORDER — METHYLPREDNISOLONE SODIUM SUCCINATE 125 MG/2ML
125 INJECTION, POWDER, LYOPHILIZED, FOR SOLUTION INTRAMUSCULAR; INTRAVENOUS ONCE
Status: COMPLETED | OUTPATIENT
Start: 2017-12-20 | End: 2017-12-20

## 2017-12-20 RX ADMIN — METHYLPREDNISOLONE SODIUM SUCCINATE 125 MG: 125 INJECTION, POWDER, FOR SOLUTION INTRAMUSCULAR; INTRAVENOUS at 21:12

## 2017-12-20 RX ADMIN — SODIUM CHLORIDE 125 ML/HR: 0.9 INJECTION, SOLUTION INTRAVENOUS at 19:50

## 2017-12-20 RX ADMIN — ALBUTEROL SULFATE 2.5 MG: 2.5 SOLUTION RESPIRATORY (INHALATION) at 19:40

## 2017-12-20 RX ADMIN — IPRATROPIUM BROMIDE 0.5 MG: 0.5 SOLUTION RESPIRATORY (INHALATION) at 19:40

## 2017-12-20 RX ADMIN — AZITHROMYCIN MONOHYDRATE 500 MG: 250 TABLET ORAL at 21:16

## 2017-12-20 RX ADMIN — CEFTRIAXONE 2000 MG: 2 INJECTION, SOLUTION INTRAVENOUS at 21:11

## 2017-12-20 NOTE — ED PROVIDER NOTES
History  Chief Complaint   Patient presents with    Cough     pt with a cough for 1 week, wants to make sure he doesnt have pneumonia      HPI  60-year-old male with a chief complaint having cough for over a week  Patient states that he denies any fever or chills however he has a persisting cough that will go away  Patient is a former smoker and quit in  and has a history of COPD and is supposed to be taking his inhalers daily does not do that regularly  Patient just started back on his inhalers today because of his shortness of breath  Patient's sister is currently being treated for pneumonia here in the department as well  Prior to Admission Medications   Prescriptions Last Dose Informant Patient Reported? Taking? albuterol (2 5 mg/3 mL) 0 083 % nebulizer solution   Yes Yes   Sig: Take 2 5 mg by nebulization every 6 (six) hours as needed for wheezing  albuterol (VENTOLIN HFA) 90 mcg/act inhaler   Yes Yes   Sig: Inhale 2 puffs every 6 (six) hours as needed for wheezing  atorvastatin (LIPITOR) 10 mg tablet   Yes Yes   Sig: Take 10 mg by mouth daily  carbidopa-levodopa (SINEMET)  mg per tablet   Yes Yes   Sig: Take 1 tablet by mouth 3 (three) times a day   clopidogrel (PLAVIX) 75 mg tablet   Yes Yes   Sig: Take 75 mg by mouth daily  fluticasone (FLONASE) 50 mcg/act nasal spray   Yes Yes   Si spray into each nostril daily  fluticasone (VERAMYST) 27 5 MCG/SPRAY nasal spray   Yes Yes   Si sprays into each nostril daily  fluticasone-salmeterol (ADVAIR) 250-50 mcg/dose inhaler   Yes Yes   Sig: Inhale 1 puff every 12 (twelve) hours  montelukast (SINGULAIR) 10 mg tablet   Yes No   Sig: Take 10 mg by mouth daily at bedtime  pramipexole (MIRAPEX) 0 5 mg tablet   Yes Yes   Sig: Take 0 25 mg by mouth 3 (three) times a day   tiotropium (SPIRIVA) 18 mcg inhalation capsule   Yes No   Sig: Place 18 mcg into inhaler and inhale daily     valsartan-hydrochlorothiazide (DIOVAN-HCT) 80-12 5 MG per tablet   Yes Yes   Sig: Take 1 tablet by mouth daily  Facility-Administered Medications: None       Past Medical History:   Diagnosis Date    COPD [facility]chronic obstructive pulmonary disease) ([facility] [address] )     Coronary artery disease     carotid stents    Cough     Hypertension     Pneumonia     TIA (transient ischemic attack)        Past Surgical History:   Procedure Laterality Date    NASAL SINUS SURGERY      SD [address] INCL FLUOR GDNCE DX W/CELL WASHG SPX N/A 7/27/2016    Procedure: BRONCHOSCOPY;  Surgeon: Cathy Ventura MD;  Location: [facility]; Service: Pulmonary       History reviewed  No pertinent family history  I have reviewed and agree with the history as documented  Social History   Substance Use Topics    Smoking status: Former Smoker    Smokeless tobacco: Never Used    Alcohol use No        Review of Systems   Constitutional: Positive for fatigue  Negative for chills and fever  HENT: Negative for congestion and rhinorrhea  Eyes: Negative for discharge and visual disturbance  Respiratory: Positive for cough, choking, shortness of breath and wheezing  Cardiovascular: Negative for chest pain and palpitations  Gastrointestinal: Negative for abdominal pain and vomiting  Endocrine: Negative for polydipsia and polyuria  Genitourinary: Negative for dysuria and hematuria  Musculoskeletal: Negative for arthralgias, gait problem and neck stiffness  Skin: Negative for rash and wound  Neurological: Negative for dizziness and headaches  Psychiatric/Behavioral: Negative for confusion and suicidal ideas         Physical Exam  ED Triage Vitals [12/20/17 1750]   Temperature Pulse Respirations Blood Pressure SpO2   98 4 °F (36 9 °C) 97 18 158/75 95 %      Temp Source Heart Rate Source Patient Position - Orthostatic VS BP Location FiO2 (%)   Oral Monitor Sitting Left arm --      Pain Score       No Pain           Orthostatic Vital Signs  Vitals:    12/20/17 1950 12/20/17 2000 12/20/17 2100 12/20/17 2130   BP: 163/67 146/63 167/75 135/65   Pulse: 74 74 83 73   Patient Position - Orthostatic VS: Lying          Physical Exam   Constitutional: He is oriented to person, place, and time  He appears well-developed and well-nourished  HENT:   Head: Normocephalic and atraumatic  Mouth/Throat: Oropharynx is clear and moist    Eyes: EOM are normal  Pupils are equal, round, and reactive to light  Neck: Normal range of motion  Neck supple  Cardiovascular: Normal rate, regular rhythm and normal heart sounds  Pulmonary/Chest: No respiratory distress  He has wheezes  He has no rales  Abdominal: Soft  Bowel sounds are normal  There is no tenderness  Musculoskeletal: Normal range of motion  Neurological: He is alert and oriented to person, place, and time  No cranial nerve deficit  He exhibits normal muscle tone  Coordination normal    Skin: Skin is warm and dry  Psychiatric: He has a normal mood and affect  Nursing note and vitals reviewed  ED Medications  Medications   albuterol inhalation solution 2 5 mg (2 5 mg Nebulization Given 12/20/17 1940)   ipratropium (ATROVENT) 0 02 % inhalation solution 0 5 mg (0 5 mg Nebulization Given 12/20/17 1940)   methylPREDNISolone sodium succinate (Solu-MEDROL) injection 125 mg (125 mg Intravenous Given 12/20/17 2112)   azithromycin (ZITHROMAX) tablet 500 mg (500 mg Oral Given 12/20/17 2116)       Diagnostic Studies  Results Reviewed     Procedure Component Value Units Date/Time    Protime-INR [[id number]]  (Normal) Collected:  12/20/17 1939    Lab Status:  Final result Specimen:  Blood from Arm, Right Updated:  12/20/17 2026     Protime 14 1 seconds      INR 1 06    APTT [[id number]]  (Normal) Collected:  12/20/17 1939    Lab Status:  Final result Specimen:  Blood from Arm, Right Updated:  12/20/17 2026     PTT 26 seconds     Narrative:          Therapeutic Heparin Range = 60-90 seconds    B-type natriuretic peptide [[id number]]  (Normal) Collected: 12/20/17 1939    Lab Status:  Final result Specimen:  Blood from Arm, Right Updated:  12/20/17 2017     NT-proBNP 350 pg/mL     Lactic acid, plasma [[id number]]  (Normal) Collected:  12/20/17 1939    Lab Status:  Final result Specimen:  Blood from Arm, Right Updated:  12/20/17 2016     LACTIC ACID 1 6 mmol/L     Narrative:         Result may be elevated if tourniquet was used during collection  Comprehensive metabolic panel [[id number]]  (Abnormal) Collected:  12/20/17 1939    Lab Status:  Final result Specimen:  Blood from Arm, Right Updated:  12/20/17 2011     Sodium 139 mmol/L      Potassium 3 7 mmol/L      Chloride 104 mmol/L      CO2 26 mmol/L      Anion Gap 9 mmol/L      BUN 22 mg/dL      Creatinine 1 06 mg/dL      Glucose 156 (H) mg/dL      Calcium 9 3 mg/dL      AST 25 U/L      ALT 12 U/L      Alkaline Phosphatase 114 U/L      Total Protein 7 4 g/dL      Albumin 2 8 (L) g/dL      Total Bilirubin 0 20 mg/dL      eGFR 65 ml/min/1 73sq m     Narrative:         National Kidney Disease Education Program recommendations are as follows:  GFR calculation is accurate only with a steady state creatinine  Chronic Kidney disease less than 60 ml/min/1 73 sq  meters  Kidney failure less than 15 ml/min/1 73 sq  meters  Blood culture #1 [[id number]] Collected:  12/20/17 1949    Lab Status:   In process Specimen:  Blood from Hand, Right Updated:  12/20/17 1955    CBC and differential [[id number]]  (Abnormal) Collected:  12/20/17 1939    Lab Status:  Final result Specimen:  Blood from Arm, Right Updated:  12/20/17 1949     WBC 12 28 (H) Thousand/uL      RBC 4 41 Million/uL      Hemoglobin 13 5 g/dL      Hematocrit 40 6 %      MCV 92 fL      MCH 30 6 pg      MCHC 33 3 g/dL      RDW 12 7 %      MPV 8 8 (L) fL      Platelets 883 Thousands/uL      nRBC 0 /100 WBCs      Neutrophils Relative 78 (H) %      Lymphocytes Relative 14 %      Monocytes Relative 6 %      Eosinophils Relative 1 %      Basophils Relative 0 %      Neutrophils Absolute 9 53 (H) Thousands/µL      Lymphocytes Absolute 1 75 Thousands/µL      Monocytes Absolute 0 78 Thousand/µL      Eosinophils Absolute 0 06 Thousand/µL      Basophils Absolute 0 03 Thousands/µL     Blood culture #2 [[id number]] Collected:  12/20/17 1939    Lab Status: In process Specimen:  Blood from Arm, Right Updated:  12/20/17 1946                 XR chest 2 views   ED Interpretation by Fadia Canseco DO (12/20 2057)   Some hazziness LL lobe      Final Result by Danisha Lizarraga DO (12/21 5617)      Right apical opacity appears chronic  There is a left basilar opacity which is nonspecific and may represent atelectasis or infection  Workstation performed: [id number]                    Procedures  Procedures       Phone Contacts  ED Phone Contact    ED Course  ED Course       I reviewed the patient's labs and x-rays with the patient  I told patient that he had a little bit of a high white count and a possible early left lower lobe pneumonia  I offered patient admission to the hospital   However, patient declined at this time and would like to try to go home on antibiotics  I gave him 1 dose of Rocephin prior to discharge IV  MDM  CritCare Time    Differential diagnosis includes:  1  Cough  2  Bronchitis  3  Rule out pneumonia  4  Exacerbation of COPD  5  Bronchospasm  6  Viral syndrome  Disposition  Final diagnoses:   Bronchitis - Possible Left Lower Lobe Pneumonia     Time reflects when diagnosis was documented in both MDM as applicable and the Disposition within this note     Time User Action Codes Description Comment    12/20/2017  9:10 PM Darinel Torres Add [J40] Bronchitis     12/20/2017  9:10 PM Darinel Torres Modify [J40] Bronchitis Possible Left Lower Lobe Pneumonia      ED Disposition     ED Disposition Condition Comment    Discharge  [address] discharge to home/self care      Condition at discharge: Good        Follow-up Information     Follow up With Specialties Details Why Contact Info    Denise Syed MD Internal Medicine In 2 days  [address],  [place] [address]  [phone]          Discharge Medication List as of 12/20/2017  9:18 PM      START taking these medications    Details   !! albuterol (2 5 mg/3 mL) 0 083 % nebulizer solution Take 3 mL by nebulization every 6 (six) hours as needed for wheezing for up to 30 days, Starting Wed 12/20/2017, Until Fri 1/19/2018, Print      azithromycin (ZITHROMAX) 250 mg tablet Take 1 tablet by mouth daily for 4 days, Starting Wed 12/20/2017, Until Sun 12/24/2017, Print      predniSONE 10 mg tablet Take 4 pills x4 days, then 3 pills x4 days, then 2 pills x4 days, and then 1 pill x4 days, Print       !! - Potential duplicate medications found  Please discuss with provider  CONTINUE these medications which have NOT CHANGED    Details   !! albuterol (2 5 mg/3 mL) 0 083 % nebulizer solution Take 2 5 mg by nebulization every 6 (six) hours as needed for wheezing , Until Discontinued, Historical Med      albuterol (VENTOLIN HFA) 90 mcg/act inhaler Inhale 2 puffs every 6 (six) hours as needed for wheezing , Until Discontinued, Historical Med      atorvastatin (LIPITOR) 10 mg tablet Take 10 mg by mouth daily  , Until Discontinued, Historical Med      carbidopa-levodopa (SINEMET)  mg per tablet Take 1 tablet by mouth 3 (three) times a day, Historical Med      clopidogrel (PLAVIX) 75 mg tablet Take 75 mg by mouth daily  , Until Discontinued, Historical Med      fluticasone (FLONASE) 50 mcg/act nasal spray 1 spray into each nostril daily  , Until Discontinued, Historical Med      fluticasone (VERAMYST) 27 5 MCG/SPRAY nasal spray 2 sprays into each nostril daily  , Until Discontinued, Historical Med      fluticasone-salmeterol (ADVAIR) 250-50 mcg/dose inhaler Inhale 1 puff every 12 (twelve) hours  , Until Discontinued, Historical Med      pramipexole (MIRAPEX) 0 5 mg tablet Take 0 25 mg by mouth 3 (three) times a day, Historical Med      valsartan-hydrochlorothiazide (DIOVAN-HCT) 80-12 5 MG per tablet Take 1 tablet by mouth daily  , Until Discontinued, Historical Med      montelukast (SINGULAIR) 10 mg tablet Take 10 mg by mouth daily at bedtime  , Until Discontinued, Historical Med      tiotropium (SPIRIVA) 18 mcg inhalation capsule Place 18 mcg into inhaler and inhale daily  , Until Discontinued, Historical Med       !! - Potential duplicate medications found  Please discuss with provider  No discharge procedures on file      ED Provider  Electronically Signed by           Brandie Bae DO  12/21/17 1784

## 2017-12-21 NOTE — DISCHARGE INSTRUCTIONS
Acute Bronchitis   WHAT YOU NEED TO KNOW:   Acute bronchitis is swelling and irritation in the air passages of your lungs  This irritation may cause you to cough or have other breathing problems  Acute bronchitis often starts because of another illness, such as a cold or the flu  The illness spreads from your nose and throat to your windpipe and airways  Bronchitis is often called a chest cold  Acute bronchitis lasts about 3 to 6 weeks and is usually not a serious illness  Your cough can last for several weeks  DISCHARGE INSTRUCTIONS:   Return to the emergency department if:   · You cough up blood  · Your lips or fingernails turn blue  · You feel like you are not getting enough air when you breathe  Contact your healthcare provider if:   · You have a fever  · Your breathing problems do not go away or get worse  · Your cough does not get better within 4 weeks  · You have questions or concerns about your condition or care  Self-care:   · Get more rest   Rest helps your body to heal  Slowly start to do more each day  Rest when you feel it is needed  · Avoid irritants in the air  Avoid chemicals, fumes, and dust  Wear a face mask if you must work around dust or fumes  Stay inside on days when air pollution levels are high  If you have allergies, stay inside when pollen counts are high  Do not use aerosol products, such as spray-on deodorant, bug spray, and hair spray  · Do not smoke or be around others who smoke  Nicotine and other chemicals in cigarettes and cigars damages the cilia that move mucus out of your lungs  Ask your healthcare provider for information if you currently smoke and need help to quit  E-cigarettes or smokeless tobacco still contain nicotine  Talk to your healthcare provider before you use these products  · Drink liquids as directed  Liquids help keep your air passages moist and help you cough up mucus   You may need to drink more liquids when you have acute bronchitis  Ask how much liquid to drink each day and which liquids are best for you  · Use a humidifier or vaporizer  Use a cool mist humidifier or a vaporizer to increase air moisture in your home  This may make it easier for you to breathe and help decrease your cough  Decrease risk for acute bronchitis:   · Get the vaccinations you need  Ask your healthcare provider if you should get vaccinated against the flu or pneumonia  · Prevent the spread of germs  You can decrease your risk of acute bronchitis and other illnesses by doing the following:     [facility] your hands often with soap and water  Carry germ-killing hand lotion or gel with you  You can use the lotion or gel to clean your hands when soap and water are not available  ¨ Do not touch your eyes, nose, or mouth unless you have washed your hands first     ¨ Always cover your mouth when you cough to prevent the spread of germs  It is best to cough into a tissue or your shirt sleeve instead of into your hand  Ask those around you cover their mouths when they cough  ¨ Try to avoid people who have a cold or the flu  If you are sick, stay away from others as much as possible  Medicines: Your healthcare provider may  give you any of the following:  · Ibuprofen or acetaminophen  are medicines that help lower your fever  They are available without a doctor's order  Ask your healthcare provider which medicine is right for you  Ask how much to take and how often to take it  Follow directions  These medicines can cause stomach bleeding if not taken correctly  Ibuprofen can cause kidney damage  Do not take ibuprofen if you have kidney disease, an ulcer, or allergies to aspirin  Acetaminophen can cause liver damage  Do not take more than 4,000 milligrams in 24 hours  · Decongestants  help loosen mucus in your lungs and make it easier to cough up  This can help you breathe easier  · Cough suppressants  decrease your urge to cough   If your cough produces mucus, do not take a cough suppressant unless your healthcare provider tells you to  Your healthcare provider may suggest that you take a cough suppressant at night so you can rest     · Inhalers  may be given  Your healthcare provider may give you one or more inhalers to help you breathe easier and cough less  An inhaler gives your medicine to open your airways  Ask your healthcare provider to show you how to use your inhaler correctly  · Take your medicine as directed  Contact your healthcare provider if you think your medicine is not helping or if you have side effects  Tell him of her if you are allergic to any medicine  Keep a list of the medicines, vitamins, and herbs you take  Include the amounts, and when and why you take them  Bring the list or the pill bottles to follow-up visits  Carry your medicine list with you in case of an emergency  Follow up with your healthcare provider as directed:  Write down questions you have so you will remember to ask them during your follow-up visits  © 2017 [address] Information is for End User's use only and may not be sold, redistributed or otherwise used for commercial purposes  All illustrations and images included in CareNotes® are the copyrighted property of [employer]  or José Antonio Rubalcava  The above information is an  only  It is not intended as medical advice for individual conditions or treatments  Talk to your doctor, nurse or pharmacist before following any medical regimen to see if it is safe and effective for you  COPD Exacerbation, Ambulatory Care   GENERAL INFORMATION:   A COPD (chronic obstructive pulmonary disease ) exacerbation  is a flare up or worsening of COPD    Common symptoms include the following:   · Shortness of breath     · A dry cough     · Coughing fits that bring up mucus from your lungs     · Wheezing and chest tightness  Seek immediate care for the following symptoms: · Confusion, dizziness, or lightheadedness    · Red, swollen, warm arm or leg    · Shortness of breath or chest pain    · Coughing up blood  Treatment for a COPD exacerbation  may include medicines to help decrease swelling and inflammation in your lungs  Medicines may also help open your airways or treat and infection  You may need pulmonary rehabilitation to help you manage your symptoms and improve your quality of life  You may need extra oxygen to help you breathe easier  Prevent another exacerbation:   · Do not smoke, and avoid others who smoke  If you smoke, it is never too late to quit  You may have fewer exacerbations  Ask for information about medicines and support programs that can help you quit  · Avoid triggers that make your symptoms worse  Cold weather and sudden temperature changes can trigger an exacerbation  Fumes from cars and chemicals, air pollution, and perfume can also increase your symptoms  · Use pursed-lip breathing when you feel short of breath  Take a deep breath in through your nose  Slowly breathe out through your mouth with your lips pursed for twice as long as you inhaled  You can also practice this breathing pattern while you bend, lift, climb stairs, or exercise  Pursed-lip breathing slows down your breathing and helps move more air in and out of your lungs  · Exercise for at least 20 minutes each day  Exercise can help increase your energy and decrease shortness of breath  Ask about the best exercise plan for you  · Prevent infections that can be dangerous when you have COPD  Get a flu vaccine every year as soon as it becomes available  Ask if you should also get other vaccines, such as those given to prevent pneumonia and tetanus  Avoid people who are sick, and wash your hands often  Follow up with your healthcare provider as directed:  Write down your questions so you remember to ask them during your visits    CARE AGREEMENT:   You have the right to help plan your care  Learn about your health condition and how it may be treated  Discuss treatment options with your caregivers to decide what care you want to receive  You always have the right to refuse treatment  The above information is an  only  It is not intended as medical advice for individual conditions or treatments  Talk to your doctor, nurse or pharmacist before following any medical regimen to see if it is safe and effective for you  © 2014 [address] is for End User's use only and may not be sold, redistributed or otherwise used for commercial purposes  All illustrations and images included in CareNotes® are the copyrighted property of [employer]  or [employer]  Acute Bronchitis   WHAT YOU NEED TO KNOW:   Acute bronchitis is swelling and irritation in the air passages of your lungs  This irritation may cause you to cough or have other breathing problems  Acute bronchitis often starts because of another illness, such as a cold or the flu  The illness spreads from your nose and throat to your windpipe and airways  Bronchitis is often called a chest cold  Acute bronchitis lasts about 3 to 6 weeks and is usually not a serious illness  Your cough can last for several weeks  DISCHARGE INSTRUCTIONS:   Return to the emergency department if:   · You cough up blood  · Your lips or fingernails turn blue  · You feel like you are not getting enough air when you breathe  Contact your healthcare provider if:   · You have a fever  · Your breathing problems do not go away or get worse  · Your cough does not get better within 4 weeks  · You have questions or concerns about your condition or care  Self-care:   · Get more rest   Rest helps your body to heal  Slowly start to do more each day  Rest when you feel it is needed  · Avoid irritants in the air    Avoid chemicals, fumes, and dust  Wear a face mask if you must work around dust or fumes  Stay inside on days when air pollution levels are high  If you have allergies, stay inside when pollen counts are high  Do not use aerosol products, such as spray-on deodorant, bug spray, and hair spray  · Do not smoke or be around others who smoke  Nicotine and other chemicals in cigarettes and cigars damages the cilia that move mucus out of your lungs  Ask your healthcare provider for information if you currently smoke and need help to quit  E-cigarettes or smokeless tobacco still contain nicotine  Talk to your healthcare provider before you use these products  · Drink liquids as directed  Liquids help keep your air passages moist and help you cough up mucus  You may need to drink more liquids when you have acute bronchitis  Ask how much liquid to drink each day and which liquids are best for you  · Use a humidifier or vaporizer  Use a cool mist humidifier or a vaporizer to increase air moisture in your home  This may make it easier for you to breathe and help decrease your cough  Decrease risk for acute bronchitis:   · Get the vaccinations you need  Ask your healthcare provider if you should get vaccinated against the flu or pneumonia  · Prevent the spread of germs  You can decrease your risk of acute bronchitis and other illnesses by doing the following:     [facility] your hands often with soap and water  Carry germ-killing hand lotion or gel with you  You can use the lotion or gel to clean your hands when soap and water are not available  ¨ Do not touch your eyes, nose, or mouth unless you have washed your hands first     ¨ Always cover your mouth when you cough to prevent the spread of germs  It is best to cough into a tissue or your shirt sleeve instead of into your hand  Ask those around you cover their mouths when they cough  ¨ Try to avoid people who have a cold or the flu  If you are sick, stay away from others as much as possible  Medicines:   Your healthcare provider may  give you any of the following:  · Ibuprofen or acetaminophen  are medicines that help lower your fever  They are available without a doctor's order  Ask your healthcare provider which medicine is right for you  Ask how much to take and how often to take it  Follow directions  These medicines can cause stomach bleeding if not taken correctly  Ibuprofen can cause kidney damage  Do not take ibuprofen if you have kidney disease, an ulcer, or allergies to aspirin  Acetaminophen can cause liver damage  Do not take more than 4,000 milligrams in 24 hours  · Decongestants  help loosen mucus in your lungs and make it easier to cough up  This can help you breathe easier  · Cough suppressants  decrease your urge to cough  If your cough produces mucus, do not take a cough suppressant unless your healthcare provider tells you to  Your healthcare provider may suggest that you take a cough suppressant at night so you can rest     · Inhalers  may be given  Your healthcare provider may give you one or more inhalers to help you breathe easier and cough less  An inhaler gives your medicine to open your airways  Ask your healthcare provider to show you how to use your inhaler correctly  · Take your medicine as directed  Contact your healthcare provider if you think your medicine is not helping or if you have side effects  Tell him of her if you are allergic to any medicine  Keep a list of the medicines, vitamins, and herbs you take  Include the amounts, and when and why you take them  Bring the list or the pill bottles to follow-up visits  Carry your medicine list with you in case of an emergency  Follow up with your healthcare provider as directed:  Write down questions you have so you will remember to ask them during your follow-up visits  © 2017 [address] Information is for End User's use only and may not be sold, redistributed or otherwise used for commercial purposes   All illustrations and images included in CareNotes® are the copyrighted property of [employer]  or José Antonio Rubalcava  The above information is an  only  It is not intended as medical advice for individual conditions or treatments  Talk to your doctor, nurse or pharmacist before following any medical regimen to see if it is safe and effective for you  Bacterial Pneumonia   WHAT YOU NEED TO KNOW:   Bacterial pneumonia is a lung infection caused by bacteria  It makes your lungs inflamed, which means they cannot work well  Bacterial pneumonia germs are easily spread when an infected person coughs, sneezes, or has close contact with others  DISCHARGE INSTRUCTIONS:   Return to the emergency department if:   · You are confused and cannot think clearly  · You are urinating less or not at all  · You cough up blood  · You have more trouble breathing, or your breathing seems faster than normal     · Your heart or pulse beats more than 100 times in 1 minute  · Your lips or fingernails turn blue  Contact your healthcare provider if:   · Your symptoms are the same or get worse 48 hours after you start antibiotics  · You cannot eat or have loss of appetite, nausea, or are vomiting  · You have questions or concerns about your condition or care  Medicines:   · Antibiotics  treat pneumonia caused by bacteria  · Acetaminophen  decreases pain and fever  It is available without a doctor's order  Ask how much to take and how often to take it  Follow directions  Read the labels of all other medicines you are using to see if they also contain acetaminophen, or ask your doctor or pharmacist  Acetaminophen can cause liver damage if not taken correctly  Do not use more than 4 grams (4,000 milligrams) total of acetaminophen in one day  · NSAIDs , such as ibuprofen, help decrease swelling, pain, and fever  This medicine is available with or without a doctor's order   NSAIDs can cause stomach bleeding or kidney problems in certain people  If you take blood thinner medicine, always ask your healthcare provider if NSAIDs are safe for you  Always read the medicine label and follow directions  · Take your medicine as directed  Contact your healthcare provider if you think your medicine is not helping or if you have side effects  Tell him or her if you are allergic to any medicine  Keep a list of the medicines, vitamins, and herbs you take  Include the amounts, and when and why you take them  Bring the list or the pill bottles to follow-up visits  Carry your medicine list with you in case of an emergency  Follow up with your healthcare provider as directed:  Write down your questions so you remember to ask them during your visits  Manage your symptoms:   · Rest as needed  Rest often while you recover  Slowly start to do more each day  · Drink liquids as directed  Ask how much liquid to drink each day and which liquids are best for you  Liquids help thin your mucus, which may make it easier for you to cough it up  · Do not smoke  Avoid secondhand smoke  Smoking increases your risk for pneumonia  Smoking also makes it harder for you to get better after you have had pneumonia  Ask your healthcare provider for information if you currently smoke and need help to quit  E-cigarettes or smokeless tobacco still contain nicotine  Talk to your healthcare provider before you use these products  · Use a cool mist humidifier  to increase air moisture in your home  This may make it easier for you to breathe and help decrease your cough  · Keep your head elevated  You may be able to breathe better if you lie down with the head of your bed up  Prevent bacterial pneumonia:   · Prevent the spread of germs  Wash your hands often with soap and water  Use gel hand cleanser when there is no soap and water available   Do not touch your eyes, nose, or mouth unless you have washed your hands first  Cover your mouth when you cough  Cough into a tissue or your shirtsleeve so you do not spread germs from your hands  If you are sick, stay away from others as much as possible  · Limit alcohol  Women should limit alcohol to 1 drink a day  Men should limit alcohol to 2 drinks a day  A drink of alcohol is 12 ounces of beer, 5 ounces of wine, or 1½ ounces of liquor  · Ask about vaccines  You may need a vaccine to help prevent pneumonia  Get an influenza (flu) vaccine every year as soon as it becomes available  © 2017 [address] [facility] Information is for End User's use only and may not be sold, redistributed or otherwise used for commercial purposes  All illustrations and images included in CareNotes® are the copyrighted property of [employer] KJ [employer]OLD[employer] , Nancy  or José Antonio Rubalcava  The above information is an  only  It is not intended as medical advice for individual conditions or treatments  Talk to your doctor, nurse or pharmacist before following any medical regimen to see if it is safe and effective for you

## 2017-12-25 LAB
BACTERIA BLD CULT: NORMAL
BACTERIA BLD CULT: NORMAL

## 2017-12-28 ENCOUNTER — GENERIC CONVERSION - ENCOUNTER (OUTPATIENT)
Dept: OTHER | Facility: OTHER | Age: 82
End: 2017-12-28

## 2018-01-03 ENCOUNTER — APPOINTMENT (OUTPATIENT)
Dept: PHYSICAL THERAPY | Facility: CLINIC | Age: 83
End: 2018-01-03
Payer: COMMERCIAL

## 2018-01-03 DIAGNOSIS — R26.9 ABNORMALITY OF GAIT AND MOBILITY: ICD-10-CM

## 2018-01-03 PROCEDURE — G8979 MOBILITY GOAL STATUS: HCPCS

## 2018-01-03 PROCEDURE — 97110 THERAPEUTIC EXERCISES: CPT

## 2018-01-03 PROCEDURE — 97162 PT EVAL MOD COMPLEX 30 MIN: CPT

## 2018-01-03 PROCEDURE — G8978 MOBILITY CURRENT STATUS: HCPCS

## 2018-01-08 ENCOUNTER — APPOINTMENT (OUTPATIENT)
Dept: PHYSICAL THERAPY | Facility: CLINIC | Age: 83
End: 2018-01-08
Payer: COMMERCIAL

## 2018-01-09 NOTE — MISCELLANEOUS
Message  Spoke with pt about his CT scan results  Told him he will likely require another bronchoscopy  He is going to make a follow up appt to discuss further treatment        Signatures   Electronically signed by : Brandon Fishman, [facility]; Jul 7 2016  3:43PM EST                       (Author)

## 2018-01-10 ENCOUNTER — GENERIC CONVERSION - ENCOUNTER (OUTPATIENT)
Dept: OTHER | Facility: OTHER | Age: 83
End: 2018-01-10

## 2018-01-11 ENCOUNTER — GENERIC CONVERSION - ENCOUNTER (OUTPATIENT)
Dept: NEUROLOGY | Facility: CLINIC | Age: 83
End: 2018-01-11

## 2018-01-11 ENCOUNTER — APPOINTMENT (OUTPATIENT)
Dept: PHYSICAL THERAPY | Facility: CLINIC | Age: 83
End: 2018-01-11
Payer: COMMERCIAL

## 2018-01-12 VITALS
WEIGHT: 198.25 LBS | SYSTOLIC BLOOD PRESSURE: 124 MMHG | OXYGEN SATURATION: 95 % | HEART RATE: 91 BPM | BODY MASS INDEX: 35.12 KG/M2 | DIASTOLIC BLOOD PRESSURE: 66 MMHG | HEIGHT: 63 IN

## 2018-01-12 VITALS
BODY MASS INDEX: 35.64 KG/M2 | WEIGHT: 198 LBS | DIASTOLIC BLOOD PRESSURE: 70 MMHG | SYSTOLIC BLOOD PRESSURE: 132 MMHG | TEMPERATURE: 98.4 F | HEART RATE: 68 BPM

## 2018-01-12 VITALS
DIASTOLIC BLOOD PRESSURE: 84 MMHG | BODY MASS INDEX: 34.59 KG/M2 | SYSTOLIC BLOOD PRESSURE: 140 MMHG | HEIGHT: 63 IN | WEIGHT: 195.25 LBS | HEART RATE: 65 BPM | OXYGEN SATURATION: 96 %

## 2018-01-14 VITALS
WEIGHT: 189 LBS | SYSTOLIC BLOOD PRESSURE: 128 MMHG | DIASTOLIC BLOOD PRESSURE: 68 MMHG | HEART RATE: 76 BPM | BODY MASS INDEX: 33.49 KG/M2 | HEIGHT: 63 IN

## 2018-01-14 VITALS
HEIGHT: 63 IN | BODY MASS INDEX: 34.55 KG/M2 | DIASTOLIC BLOOD PRESSURE: 80 MMHG | OXYGEN SATURATION: 96 % | HEART RATE: 72 BPM | WEIGHT: 195 LBS | SYSTOLIC BLOOD PRESSURE: 140 MMHG

## 2018-01-14 VITALS
DIASTOLIC BLOOD PRESSURE: 68 MMHG | HEART RATE: 77 BPM | WEIGHT: 194.38 LBS | HEIGHT: 63 IN | BODY MASS INDEX: 34.44 KG/M2 | SYSTOLIC BLOOD PRESSURE: 138 MMHG

## 2018-01-15 NOTE — MISCELLANEOUS
Message  Spoke with patient this morning about his CXR results - it does show a new infiltrate  WIll give levaquin and re-order the chest CT scan  He will follow up 1-2 days after his CT scan is done  Plan  Chronic bronchitis    · Levofloxacin 500 MG Oral Tablet; Take 1 tablet daily  Pneumonia    · * CT CHEST WO CONTRAST; Status:Need Information - Financial Authorization;   Requested [id number];     Signatures   Electronically signed by : Javon Isabel, [facility]; 2016 11:33AM EST                       (Author)

## 2018-01-16 ENCOUNTER — APPOINTMENT (OUTPATIENT)
Dept: PHYSICAL THERAPY | Facility: CLINIC | Age: 83
End: 2018-01-16
Payer: COMMERCIAL

## 2018-01-16 NOTE — RESULT NOTES
PFT Results v2:   Diagnosis/Reason For Study: COPD   Referring Provider: Dr Favio Xiong   Spirometry: Forced vital capacity: 2 59L and 92% Predicted Values  Forced expiratory volume in one second: 1 88L and 90% Predicted Value  FEV1/FVC ratio is 100% Predicted Values  Post Bronchodilator Spirometry: Forced vital capacity : 2 69L and 95% Predicted Values  Forced expiratory volume in one second : 1 97L and 95% Predicted Value  FEV1/FVC ratio is 101% Predicted Values  Lung Volumes: Total lung capacity : 4 62L and 83% Predicted Values  RV: 70% Predicted Values  RV/T% Predicted Values  DLCO:   DLCO 125% Predicted Values  PFT Interpretation:   Patient had a full lung function testing with spirometry lung volumes and DLCO  Patient gave a good effort  The flow volume curve is normal   There is no obstructive or restrictive ventilatory limitation  The lung volumes and DLCO are normal   Clinical correlation is required  Future Appointments    Date/Time Provider Specialty Site   2017 02:55 PM Abisai Vargas MD [facility]   2017 11:00 AM [facility] Pulmonary Medicine [facility]   2017 01:15 PM KIMI Murillo  Internal Medicine [facility] [place]   2017 09:30 AM KIMI Mclain   Dermatology [facility]      Electronically signed by : KIMI Reyes ; 2017  1:59PM EST                       (Author)

## 2018-01-23 VITALS
SYSTOLIC BLOOD PRESSURE: 134 MMHG | HEIGHT: 62 IN | HEART RATE: 66 BPM | BODY MASS INDEX: 36.07 KG/M2 | WEIGHT: 196 LBS | DIASTOLIC BLOOD PRESSURE: 68 MMHG

## 2018-01-24 VITALS
SYSTOLIC BLOOD PRESSURE: 136 MMHG | OXYGEN SATURATION: 97 % | WEIGHT: 198 LBS | DIASTOLIC BLOOD PRESSURE: 68 MMHG | BODY MASS INDEX: 36.44 KG/M2 | HEART RATE: 67 BPM | HEIGHT: 62 IN

## 2018-01-31 DIAGNOSIS — J18.9 PNEUMONIA: ICD-10-CM

## 2018-02-01 ENCOUNTER — HOSPITAL ENCOUNTER (OUTPATIENT)
Dept: RADIOLOGY | Facility: HOSPITAL | Age: 83
Discharge: HOME | End: 2018-02-01
Payer: COMMERCIAL

## 2018-02-01 DIAGNOSIS — J18.9 PNEUMONIA: ICD-10-CM

## 2018-02-01 PROCEDURE — 71046 X-RAY EXAM CHEST 2 VIEWS: CPT

## 2018-02-08 ENCOUNTER — OFFICE VISIT (OUTPATIENT)
Dept: PULMONOLOGY | Facility: CLINIC | Age: 83
End: 2018-02-08
Payer: COMMERCIAL

## 2018-02-08 VITALS
WEIGHT: 196 LBS | HEART RATE: 72 BPM | OXYGEN SATURATION: 96 % | DIASTOLIC BLOOD PRESSURE: 78 MMHG | HEIGHT: 64 IN | BODY MASS INDEX: 33.46 KG/M2 | SYSTOLIC BLOOD PRESSURE: 142 MMHG

## 2018-02-08 DIAGNOSIS — J47.9 BRONCHIECTASIS WITHOUT COMPLICATION (HCC): ICD-10-CM

## 2018-02-08 DIAGNOSIS — Z79.52 LONG TERM (CURRENT) USE OF SYSTEMIC STEROIDS: ICD-10-CM

## 2018-02-08 DIAGNOSIS — J45.40 MODERATE PERSISTENT EXTRINSIC ASTHMA WITHOUT COMPLICATION: Primary | ICD-10-CM

## 2018-02-08 DIAGNOSIS — B44.81 ABPA (ALLERGIC BRONCHOPULMONARY ASPERGILLOSIS) (HCC): ICD-10-CM

## 2018-02-08 PROCEDURE — 99214 OFFICE O/P EST MOD 30 MIN: CPT | Performed by: PHYSICIAN ASSISTANT

## 2018-02-08 RX ORDER — PREDNISONE 10 MG/1
TABLET ORAL
Qty: 40 TABLET | Refills: 0 | Status: SHIPPED | OUTPATIENT
Start: 2018-02-08 | End: 2018-02-08 | Stop reason: SDUPTHER

## 2018-02-08 RX ORDER — PREDNISONE 10 MG/1
TABLET ORAL
Qty: 30 TABLET | Refills: 3 | Status: SHIPPED | OUTPATIENT
Start: 2018-02-08 | End: 2018-05-30

## 2018-02-08 NOTE — PROGRESS NOTES
Assessment:    1  Moderate persistent extrinsic asthma without complication  predniSONE 10 mg tablet   2  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) ([facility])  predniSONE 10 mg tablet   3  Bronchiectasis without complication ([address] )     4  Long term (current) use of systemic steroids         Plan: 81-year-old male former smoker with significant past medical history of bronchiectasis and history of mucus plugging requiring bronchoscopy in 2013 and 2016, allergic asthma with IgE level greater than 5000, Parkinson's who presents for follow-up CXR  Patient had a recent visit to the ED 2/2 CAP and asthma exacerbation in 12/20/17  1  Allergic Asthma with IgE > 5000 in the setting of ABPA, stable - Continue Advair and Spiriva as instructed  Rescue inhaler as needed  Continue 10 mg of prednisone, can consider decrease to 7 5 mg in the future, would be helpful to repeat IgE level to assess for any improvement since being on chronic prednisone therapy? Will repeat at next visit  Continue to f/u with PCP for elevated HgbA1c being on chronic prednisone therapy, last 7 3%  Attempting to manage with diet  No evidence of peripheral eosinophils on most recent CBC  Discussed using Incentive Spirometer at home on a regular basis  Patient and daughter are in agreement  Patient denies coughing up an mucus or having any mucus congestion, no need for flutter valve at this time  2  CAP - resolved  Repeat CXR with no evidence of acute infiltrate     Subjective:     Patient ID: 81year old male  Chief Complaint: cough    HPI  81-year-old male former smoker with significant past medical history of bronchiectasis and history of mucus plugging requiring bronchoscopy in 2013 and 2016, allergic asthma with IgE level greater than 5000, Parkinson's who presents for follow-up CXR  Patient had a recent visit to the ED 2/2 CAP and asthma exacerbation in 12/20/17   He was given a course of azithromycin and prednisone with improvement in his symptoms  Overall, patient has been feeling well with his breathing  He has not had to use his rescue inhaler much  Over the last few days, his daughter has prompted him to use it more 2/2 worsening cough  Patient denies mucus production, worsening allergies, post nasal drip, cough related to food intake, sick contacts, fever, chills, or shortness of breath  Patient was diagnosed with ABPA given elevated IgE level, evidence of B/L saccular bronchiectasis with concurrent asthma  He is currently taking Advair, Spiriva, Singulair, and prednisone 10 mg daily and is stable on this regimen  Review of Systems  Review of Systems   Constitution: Negative for chills, decreased appetite, fever, malaise/fatigue and weight gain  HENT: Negative for congestion  Eyes: Negative for visual disturbance  Cardiovascular: Negative for dyspnea on exertion, leg swelling and orthopnea  Respiratory: Positive for cough  Negative for shortness of breath, sleep disturbances due to breathing, sputum production and wheezing  Hematologic/Lymphatic: Negative for adenopathy  Skin: Negative for rash  Musculoskeletal: Negative for muscle weakness  Gastrointestinal: Negative for abdominal pain, diarrhea, nausea and vomiting  Neurological: Negative for excessive daytime sleepiness  Psychiatric/Behavioral: Negative for altered mental status and hallucinations  Allergic/Immunologic: Positive for environmental allergies  Objective:  /78   Pulse 72   Ht 5' 4" (1 626 m)   Wt 88 9 kg (196 lb)   SpO2 96%   BMI 33 64 kg/m²     Physical Exam   Constitutional: He is oriented to person, place, and time  He appears well-developed and well-nourished  No distress  HENT:   Head: Normocephalic and atraumatic  Neck: Normal range of motion  Cardiovascular: Normal rate and regular rhythm  Pulmonary/Chest: Effort normal    Right Basilar inspiratory rales;  Otherwise CTA B/L with no wheezes or rhonchi   Abdominal: Soft  There is no tenderness  Musculoskeletal: Normal range of motion  He exhibits no edema or tenderness  Lymphadenopathy:     He has no cervical adenopathy  Neurological: He is alert and oriented to person, place, and time  Skin: Skin is warm and dry  He is not diaphoretic  Psychiatric: He has a normal mood and affect  His behavior is normal        Lab/Image Review: Reviewed all pertinent labs/radiology results  Past Medical History:   Diagnosis Date    COPD (chronic obstructive pulmonary disease) ([facility] )     Coronary artery disease     carotid stents    Cough     Hypertension     Pneumonia     TIA (transient ischemic attack)        Social History   Substance Use Topics    Smoking status: Former Smoker    Smokeless tobacco: Never Used    Alcohol use No       No family history on file      Patient Active Problem List   Diagnosis    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[facility])    Allergic asthma    Allergic rhinitis    Aortic regurgitation    Bronchiectasis without complication ([facility] )    Long term (current) use of systemic steroids

## 2018-02-12 DIAGNOSIS — E78.2 MIXED HYPERLIPIDEMIA: Primary | ICD-10-CM

## 2018-02-12 RX ORDER — ATORVASTATIN CALCIUM 10 MG/1
TABLET, FILM COATED ORAL
Qty: 30 TABLET | Refills: 5 | Status: SHIPPED | OUTPATIENT
Start: 2018-02-12 | End: 2018-03-20

## 2018-03-05 ENCOUNTER — TELEPHONE (OUTPATIENT)
Dept: NEUROLOGY | Facility: CLINIC | Age: 83
End: 2018-03-05

## 2018-03-06 ENCOUNTER — APPOINTMENT (OUTPATIENT)
Dept: LAB | Facility: CLINIC | Age: 83
End: 2018-03-06
Payer: COMMERCIAL

## 2018-03-06 DIAGNOSIS — E11.9 TYPE 2 DIABETES MELLITUS WITHOUT COMPLICATIONS (HCC): ICD-10-CM

## 2018-03-06 DIAGNOSIS — E78.5 HYPERLIPIDEMIA: ICD-10-CM

## 2018-03-06 DIAGNOSIS — I10 ESSENTIAL (PRIMARY) HYPERTENSION: ICD-10-CM

## 2018-03-06 LAB
ALBUMIN SERPL BCP-MCNC: 3.8 G/DL (ref 3.5–5)
ALP SERPL-CCNC: 99 U/L (ref 46–116)
ALT SERPL W P-5'-P-CCNC: 13 U/L (ref 12–78)
ANION GAP SERPL CALCULATED.3IONS-SCNC: 5 MMOL/L (ref 4–13)
AST SERPL W P-5'-P-CCNC: 28 U/L (ref 5–45)
BASOPHILS # BLD AUTO: 0.03 THOUSANDS/ΜL (ref 0–0.1)
BASOPHILS NFR BLD AUTO: 0 % (ref 0–1)
BILIRUB DIRECT SERPL-MCNC: 0.16 MG/DL (ref 0–0.2)
BILIRUB SERPL-MCNC: 0.67 MG/DL (ref 0.2–1)
BUN SERPL-MCNC: 24 MG/DL (ref 5–25)
CALCIUM SERPL-MCNC: 9.8 MG/DL (ref 8.3–10.1)
CHLORIDE SERPL-SCNC: 105 MMOL/L (ref 100–108)
CHOLEST SERPL-MCNC: 179 MG/DL (ref 50–200)
CO2 SERPL-SCNC: 29 MMOL/L (ref 21–32)
CREAT SERPL-MCNC: 1.01 MG/DL (ref 0.6–1.3)
EOSINOPHIL # BLD AUTO: 0.36 THOUSAND/ΜL (ref 0–0.61)
EOSINOPHIL NFR BLD AUTO: 4 % (ref 0–6)
ERYTHROCYTE [DISTWIDTH] IN BLOOD BY AUTOMATED COUNT: 13.5 % (ref 11.6–15.1)
EST. AVERAGE GLUCOSE BLD GHB EST-MCNC: 154 MG/DL
GFR SERPL CREATININE-BSD FRML MDRD: 69 ML/MIN/1.73SQ M
GLUCOSE P FAST SERPL-MCNC: 127 MG/DL (ref 65–99)
HBA1C MFR BLD: 7 % (ref 4.2–6.3)
HCT VFR BLD AUTO: 43.1 % (ref 36.5–49.3)
HDLC SERPL-MCNC: 83 MG/DL (ref 40–60)
HGB BLD-MCNC: 14.6 G/DL (ref 12–17)
LDLC SERPL CALC-MCNC: 62 MG/DL (ref 0–100)
LYMPHOCYTES # BLD AUTO: 2.25 THOUSANDS/ΜL (ref 0.6–4.47)
LYMPHOCYTES NFR BLD AUTO: 22 % (ref 14–44)
MCH RBC QN AUTO: 31.7 PG (ref 26.8–34.3)
MCHC RBC AUTO-ENTMCNC: 33.9 G/DL (ref 31.4–37.4)
MCV RBC AUTO: 94 FL (ref 82–98)
MONOCYTES # BLD AUTO: 0.79 THOUSAND/ΜL (ref 0.17–1.22)
MONOCYTES NFR BLD AUTO: 8 % (ref 4–12)
NEUTROPHILS # BLD AUTO: 6.75 THOUSANDS/ΜL (ref 1.85–7.62)
NEUTS SEG NFR BLD AUTO: 66 % (ref 43–75)
NRBC BLD AUTO-RTO: 0 /100 WBCS
PLATELET # BLD AUTO: 235 THOUSANDS/UL (ref 149–390)
PMV BLD AUTO: 10.1 FL (ref 8.9–12.7)
POTASSIUM SERPL-SCNC: 3.7 MMOL/L (ref 3.5–5.3)
PROT SERPL-MCNC: 7.2 G/DL (ref 6.4–8.2)
RBC # BLD AUTO: 4.61 MILLION/UL (ref 3.88–5.62)
SODIUM SERPL-SCNC: 139 MMOL/L (ref 136–145)
TRIGL SERPL-MCNC: 168 MG/DL
WBC # BLD AUTO: 10.22 THOUSAND/UL (ref 4.31–10.16)

## 2018-03-06 PROCEDURE — 36415 COLL VENOUS BLD VENIPUNCTURE: CPT

## 2018-03-06 PROCEDURE — 80061 LIPID PANEL: CPT

## 2018-03-06 PROCEDURE — 85025 COMPLETE CBC W/AUTO DIFF WBC: CPT

## 2018-03-06 PROCEDURE — 83036 HEMOGLOBIN GLYCOSYLATED A1C: CPT

## 2018-03-06 PROCEDURE — 80048 BASIC METABOLIC PNL TOTAL CA: CPT

## 2018-03-06 PROCEDURE — 80076 HEPATIC FUNCTION PANEL: CPT

## 2018-03-08 ENCOUNTER — TELEPHONE (OUTPATIENT)
Dept: NEUROLOGY | Facility: CLINIC | Age: 83
End: 2018-03-08

## 2018-03-20 ENCOUNTER — OFFICE VISIT (OUTPATIENT)
Dept: INTERNAL MEDICINE CLINIC | Facility: CLINIC | Age: 83
End: 2018-03-20
Payer: COMMERCIAL

## 2018-03-20 VITALS
WEIGHT: 191.4 LBS | DIASTOLIC BLOOD PRESSURE: 70 MMHG | BODY MASS INDEX: 32.68 KG/M2 | SYSTOLIC BLOOD PRESSURE: 124 MMHG | HEIGHT: 64 IN | HEART RATE: 80 BPM | RESPIRATION RATE: 14 BRPM

## 2018-03-20 DIAGNOSIS — E11.9 TYPE 2 DIABETES MELLITUS WITHOUT COMPLICATION, WITHOUT LONG-TERM CURRENT USE OF INSULIN (HCC): Primary | ICD-10-CM

## 2018-03-20 DIAGNOSIS — J30.89 CHRONIC NON-SEASONAL ALLERGIC RHINITIS, UNSPECIFIED TRIGGER: ICD-10-CM

## 2018-03-20 DIAGNOSIS — I10 ESSENTIAL HYPERTENSION: ICD-10-CM

## 2018-03-20 DIAGNOSIS — R26.9 NEUROLOGIC GAIT DYSFUNCTION: ICD-10-CM

## 2018-03-20 DIAGNOSIS — G20 PARKINSON'S DISEASE (HCC): ICD-10-CM

## 2018-03-20 DIAGNOSIS — E78.2 MIXED HYPERLIPIDEMIA: ICD-10-CM

## 2018-03-20 DIAGNOSIS — J47.9 BRONCHIECTASIS WITHOUT COMPLICATION (HCC): ICD-10-CM

## 2018-03-20 DIAGNOSIS — J45.40 MODERATE PERSISTENT EXTRINSIC ASTHMA WITHOUT COMPLICATION: ICD-10-CM

## 2018-03-20 PROCEDURE — 99214 OFFICE O/P EST MOD 30 MIN: CPT | Performed by: INTERNAL MEDICINE

## 2018-03-20 PROCEDURE — G0439 PPPS, SUBSEQ VISIT: HCPCS | Performed by: INTERNAL MEDICINE

## 2018-03-20 PROCEDURE — 3725F SCREEN DEPRESSION PERFORMED: CPT | Performed by: INTERNAL MEDICINE

## 2018-03-20 RX ORDER — ACETAMINOPHEN 325 MG/1
650 TABLET ORAL EVERY 6 HOURS PRN
COMMUNITY
Start: 2022-06-24

## 2018-03-20 RX ORDER — PRENATAL VIT 91/IRON/FOLIC/DHA 28-975-200
COMBINATION PACKAGE (EA) ORAL 3 TIMES DAILY
COMMUNITY
Start: 2016-12-28 | End: 2018-03-20

## 2018-03-20 RX ORDER — TRIAMCINOLONE ACETONIDE 5 MG/G
CREAM TOPICAL 2 TIMES DAILY PRN
COMMUNITY
Start: 2016-08-29 | End: 2019-04-15 | Stop reason: SDUPTHER

## 2018-03-20 NOTE — PATIENT INSTRUCTIONS
Lab data reviewed in detail and compared prior     Type 2 diabetes mellitus is stable with diet alone    Generalized weakness, falls, Parkinson's disease - I recommend getting back to physical therapy in addition to continuation of home physical therapy exercises  Stop atorvastatin for 1 month to see if this improves strength and symptoms, if so continue off of the medication indefinitely otherwise   Resume atorvastatin after 1 month        Hypertension is stable on present regimen    Hyperlipidemia is at goal -hold atorvastatin as above    Health maintenance- advanced directives discussed and 5 wishes given, immunizations up-to-date     Routine follow-up after labs in 4 months, sooner as needed

## 2018-03-20 NOTE — PROGRESS NOTES
HPI:  Moreno Maxwell is a 81 y o  male here for his Initial Wellness Visit  Patient Active Problem List   Diagnosis    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) ([facility])    Allergic asthma    Allergic rhinitis    Aortic regurgitation    [facility]    Long term (current) use of systemic steroids    Hyperlipidemia    Hypertension    Parkinson's disease ([facility] )    Neurologic gait dysfunction    Type 2 diabetes mellitus ([facility] )     Past Medical History:   Diagnosis Date    Allergic rhinitis     last assessed [id number]    Bronchitis, chronic obstructive, with exacerbation ([facility] )     last assessed [id number]    Chronic obstructive lung disease ([facility] )     last assessed [id number]    COPD (chronic obstructive pulmonary disease) ([facility] )     Coronary artery disease     carotid stents    Cough     Diabetes mellitus type 2, uncomplicated ([facility])     controlled    Hyperlipidemia     Hypertension     Pneumonia     TIA (transient ischemic attack)      Past Surgical History:   Procedure Laterality Date    NASAL SINUS SURGERY      WA BRONCHOSCOPY,DIAGNOSTIC N/A 7/27/2016    Procedure: BRONCHOSCOPY;  Surgeon: Della De Los Santos MD;  Location: [facility]; Service: Pulmonary     Family History   Problem Relation Age of Onset    COPD Sister    Juana Fallon Lung cancer Sister     Asthma Family      History   Smoking Status    Former Smoker   Smokeless Tobacco    Never Used     History   Alcohol Use No      History   Drug Use No     /70 (BP Location: Left arm, Patient Position: Sitting)   Pulse 80   Resp 14   Ht 5' 4" (1 626 m)   Wt 86 8 kg (191 lb 6 4 oz)   BMI 32 85 kg/m²       Current Outpatient Prescriptions   Medication Sig Dispense Refill    acetaminophen (TYLENOL) 325 mg tablet Take 650 mg by mouth every 6 (six) hours as needed for mild pain      albuterol (2 5 mg/3 mL) 0 083 % nebulizer solution Take 2 5 mg by nebulization every 6 (six) hours as needed for wheezing        albuterol (VENTOLIN HFA) 90 mcg/act inhaler Inhale 2 puffs every 6 (six) hours as needed for wheezing   carbidopa-levodopa (SINEMET)  mg per tablet Take 1 tablet by mouth 3 (three) times a day      clopidogrel (PLAVIX) 75 mg tablet Take 75 mg by mouth daily   fluticasone (FLONASE) 50 mcg/act nasal spray 1 spray into each nostril daily   fluticasone (VERAMYST) 27 5 MCG/SPRAY nasal spray 2 sprays into each nostril daily   fluticasone-salmeterol (ADVAIR) 250-50 mcg/dose inhaler Inhale 1 puff every 12 (twelve) hours   montelukast (SINGULAIR) 10 mg tablet Take 10 mg by mouth daily at bedtime   pramipexole (MIRAPEX) 0 5 mg tablet Take 0 25 mg by mouth 3 (three) times a day      predniSONE 10 mg tablet Take 1 tablet daily 30 tablet 3    tiotropium (SPIRIVA) 18 mcg inhalation capsule Place 18 mcg into inhaler and inhale daily   triamcinolone (KENALOG) 0 5 % cream Apply topically 2 (two) times a day      valsartan-hydrochlorothiazide (DIOVAN-HCT) 80-12 5 MG per tablet Take 1 tablet by mouth daily  No current facility-administered medications for this visit        Allergies   Allergen Reactions    Penicillins      Immunization History   Administered Date(s) Administered    Influenza 11/19/2007, 09/22/2009, 12/02/2011, 10/11/2013, 10/23/2015, 11/21/2017    Influenza Split High Dose Preservative Free IM 10/21/2014, 10/23/2015, 11/21/2017    Influenza TIV (IM) 11/19/2007, 11/10/2008, 09/22/2009, 12/02/2011, 10/11/2013    Pneumococcal Conjugate 13-Valent 04/27/2016    Pneumococcal Polysaccharide PPV23 1936, 06/09/2017    Tdap 1935, 04/27/2016    Zoster 1935, 09/23/2015       Patient Care Team:  Shamir Brown MD as PCP - General  MD Shamir Maynard MD Lenell Lew, MD Ethyl Ben, PA-C Celina Pare, PA-C Thurmond Lake, MD      Medicare Screening Tests and Risk Assessments:  AGNES Clinical     ISAR:   Previous hospitalizations?:  No       Once in a Lifetime Medicare Screening:   AAA screening performed? (if performed, please add date to Health Maintenance):  No       Medicare Screening Tests and Risk Assessment:   AAA Risk Assessment    Osteoporosis Risk Assessment    None indicated:  Yes    HIV Risk Assessment        Drug and Alcohol Use:   Tobacco use    Cigarettes:  former smoker    Quit date:  11/6/1996   Tobacco use duration    Tobacco Cessation Readiness    Alcohol use    Alcohol use:  never    Alcohol Treatment Readiness   Illicit Drug Use    Drug use:  never        Diet & Exercise:   Diet   What is your diet?:  Limited junk food   How many servings a day of the following:   Fruits and Vegetables:  1-2 Meat:  1-2   Whole Grains:  1 Simple Carbs:  1   Dairy:  1    Coffee:  2 Tea:  1   Exercise    Do you currently exercise?:  yes    Minutes per day:  5   Times per week:  2     Type of exercise:  walking   stretching         Cognitive Impairment Screening:   Cognitive Impairment Screening        Functional Ability/Level of Safety:   Hearing     Bilateral:  normal   Hearing aid:  No    Hearing Impairment Assessment    Current Activities    Status:  limited ADL's   Help needed with the folllowing:     Preparing Meals:  Yes   Managing Medications:  Yes Managing Money:  Yes   ADL    Fall Risk   Have you fallen in the last 12 months?:  Yes    How many times?:  4    Injury History       Home Safety:   Home Safety Risk Factors       Advanced Directives:   Advanced Directives    Living Will:  No Durable POA for healthcare:  No   Advanced directive:  No    Patient's End of Life Decisions        Urinary Incontinence:       Glaucoma:            Provider Screening    No data filed        No exam data present  Reviewed Updated [facility] Prior Wellness Visits:   Last Medicare wellness visit information was reviewed, patient interviewed , no change since last AWVno  Last Medicare wellness visit information was reviewed, patient interviewed and updates made to the record today no    Assessment and Plan:  1  Type 2 diabetes mellitus without complication, without long-term current use of insulin ([facility])  Hemoglobin A1c   2  Moderate persistent extrinsic asthma without complication     3  Chronic non-seasonal allergic rhinitis, unspecified trigger     4  Bronchiectasis without complication ([facility] )     5  Essential hypertension  CBC    Comprehensive metabolic panel    TSH, 3rd generation with T4 reflex   6  Park[facility]'s disease ([facility] )     7  Mixed hyperlipidemia  Lipid panel   8   Neurologic gait dysfunction         Health Maintenance Due   Topic Date Due    Depression Screening PHQ-9  02/01/1947    GLAUCOMA SCREENING 67+ YR  02/01/2003    Diabetic Foot Exam  08/29/2015    OPHTHALMOLOGY EXAM  06/09/2017

## 2018-03-20 NOTE — PROGRESS NOTES
Assessment/Plan:    No problem-specific Assessment & Plan notes found for this encounter  Diagnoses and all orders for this visit:    Type 2 diabetes mellitus without complication, without long-term current use of insulin (HCC)  -     Hemoglobin A1c; Future    Moderate persistent extrinsic asthma without complication    Chronic non-seasonal allergic rhinitis, unspecified trigger    Bronchiectasis without complication (HCC)    Essential hypertension  -     CBC; Future  -     Comprehensive metabolic panel; Future  -     TSH, 3rd generation with T4 reflex; Future    Parkinson's disease ([facility] )    Mixed hyperlipidemia  -     Lipid panel; Future    Neurologic gait dysfunction    Other orders  -     Discontinue: terbinafine (LamISIL) 1 % cream; Apply topically 3 (three) times a day  -     triamcinolone (KENALOG) 0 5 % cream; Apply topically 2 (two) times a day  -     acetaminophen (TYLENOL) 325 mg tablet; Take 650 mg by mouth every 6 (six) hours as needed for mild pain        Patient Instructions   Lab data reviewed in detail and compared prior     Type 2 diabetes mellitus is stable with diet alone    Generalized weakness, falls, Parkinson's disease - I recommend getting back to physical therapy in addition to continuation of home physical therapy exercises  Stop atorvastatin for 1 month to see if this improves strength and symptoms, if so continue off of the medication indefinitely otherwise   Resume atorvastatin after 1 month  Hypertension is stable on present regimen    Hyperlipidemia is at goal -hold atorvastatin as above    Health maintenance- advanced directives discussed and 5 wishes given, immunizations up-to-date     Routine follow-up after labs in 4 months, sooner as needed    Subjective:      Patient ID: Rooney Hatchet is a 81 y o  male  Feeling lousy  Very weak, 4 falls in past yr  Seen by Neuro in Dec, sent to PT, only went once, but doing exercises at home off and on      PD-taking rx as directed  COPD-breathing stable, taking bd's as directed  Still on prednisone daily  HTN/HPL-taking rx as directed  The following portions of the patient's history were reviewed and updated as appropriate: allergies, current medications, past family history, past medical history, past social history, past surgical history and problem list     Review of Systems   Constitutional: Negative for appetite change, chills, diaphoresis, fatigue, fever and unexpected weight change  HENT: Negative for congestion, hearing loss and rhinorrhea  Eyes: Negative for visual disturbance  Respiratory: Positive for cough and wheezing  Negative for chest tightness and shortness of breath  Cardiovascular: Negative for chest pain, palpitations and leg swelling  Gastrointestinal: Negative for abdominal pain and blood in stool  Endocrine: Negative for cold intolerance, heat intolerance, polydipsia and polyuria  Genitourinary: Negative for difficulty urinating, dysuria, frequency and urgency  Musculoskeletal: Negative for arthralgias and myalgias  Skin: Negative for rash  Neurological: Positive for tremors and weakness  Negative for dizziness, light-headedness and headaches  Hematological: Does not bruise/bleed easily  Psychiatric/Behavioral: Negative for dysphoric mood and sleep disturbance  Objective:      /70 (BP Location: Left arm, Patient Position: Sitting)   Pulse 80   Resp 14   Ht 5' 4" (1 626 m)   Wt 86 8 kg (191 lb 6 4 oz)   BMI 32 85 kg/m²          Physical Exam   Constitutional: He is oriented to person, place, and time  He appears well-developed and well-nourished  HENT:   Head: Normocephalic and atraumatic  Nose: Nose normal    Mouth/Throat: Oropharynx is clear and moist    Eyes: Conjunctivae and EOM are normal  Pupils are equal, round, and reactive to light  No scleral icterus  Neck: Normal range of motion  Neck supple  No JVD present  No tracheal deviation present  No thyromegaly present  Cardiovascular: Normal rate, regular rhythm and intact distal pulses  Exam reveals no gallop and no friction rub  No murmur heard  Pulmonary/Chest: Effort normal  No respiratory distress  He has no wheezes  He has no rales  Faint left basilar wheezes, poor air exchange, prolonged expiratory phase   Abdominal: Soft  Bowel sounds are normal    Musculoskeletal: He exhibits no edema or tenderness  Left greater than right varicose veins below the knee   Lymphadenopathy:     He has no cervical adenopathy  Neurological: He is alert and oriented to person, place, and time  No cranial nerve deficit  Skin: Skin is warm and dry  No rash noted  No erythema  Psychiatric: He has a normal mood and affect   His behavior is normal  Judgment and thought content normal

## 2018-03-30 ENCOUNTER — TELEPHONE (OUTPATIENT)
Dept: NEUROLOGY | Facility: CLINIC | Age: 83
End: 2018-03-30

## 2018-03-31 DIAGNOSIS — J44.9 CHRONIC OBSTRUCTIVE PULMONARY DISEASE, UNSPECIFIED COPD TYPE (HCC): Primary | ICD-10-CM

## 2018-04-02 ENCOUNTER — TELEPHONE (OUTPATIENT)
Dept: NEUROLOGY | Facility: CLINIC | Age: 83
End: 2018-04-02

## 2018-04-02 RX ORDER — TIOTROPIUM BROMIDE 18 UG/1
CAPSULE ORAL; RESPIRATORY (INHALATION)
Qty: 30 CAPSULE | Refills: 3 | Status: SHIPPED | OUTPATIENT
Start: 2018-04-02 | End: 2018-07-24 | Stop reason: SDUPTHER

## 2018-05-17 ENCOUNTER — OFFICE VISIT (OUTPATIENT)
Dept: NEUROLOGY | Facility: CLINIC | Age: 83
End: 2018-05-17
Payer: COMMERCIAL

## 2018-05-17 VITALS
BODY MASS INDEX: 34.73 KG/M2 | DIASTOLIC BLOOD PRESSURE: 72 MMHG | WEIGHT: 196 LBS | HEART RATE: 70 BPM | HEIGHT: 63 IN | SYSTOLIC BLOOD PRESSURE: 132 MMHG

## 2018-05-17 DIAGNOSIS — I83.813 VARICOSE VEINS OF BILATERAL LOWER EXTREMITIES WITH PAIN: ICD-10-CM

## 2018-05-17 DIAGNOSIS — G20 PARKINSON DISEASE (HCC): ICD-10-CM

## 2018-05-17 DIAGNOSIS — R26.9 NEUROLOGIC GAIT DYSFUNCTION: Primary | ICD-10-CM

## 2018-05-17 PROCEDURE — 99214 OFFICE O/P EST MOD 30 MIN: CPT | Performed by: PSYCHIATRY & NEUROLOGY

## 2018-05-17 NOTE — PROGRESS NOTES
Progress Note - Neurology   [place] [facility] 81 y o  male MRN: [id number]  Unit/Bed#:  Encounter: [id number]      Subjective:   Patient is here for a follow-up visit accompanied with his daughter, and remains on Sinemet and Mirapex for the Parkinson's disease  He has noticed significant improvement as far as his tremor is concerned but has not been ambulating much due to pain in his left leg  He describes pain below the knee along the entire leg and generally takes 2 Tylenols to get relief  Patient denies any back pain or any tingling numbness in the lower extremities  He ambulates with the help of a cane  ROS:   Review of Systems   Constitutional: Negative  HENT: Negative  Eyes: Negative  Respiratory: Negative  Cardiovascular: Negative  Gastrointestinal: Negative  Endocrine: Negative  Genitourinary: Negative  Musculoskeletal: Negative  Skin: Negative  Allergic/Immunologic: Negative  Neurological: Positive for speech difficulty  Hematological: Bruises/bleeds easily  Psychiatric/Behavioral: Positive for confusion  Vitals:   Vitals:    05/17/18 1423   BP: 132/72   Pulse: 70   ,Body mass index is 34 72 kg/m²  MEDS:      Current Outpatient Prescriptions:     acetaminophen (TYLENOL) 325 mg tablet, Take 650 mg by mouth every 6 (six) hours as needed for mild pain, Disp: , Rfl:     albuterol (2 5 mg/3 mL) 0 083 % nebulizer solution, Take 2 5 mg by nebulization every 6 (six) hours as needed for wheezing , Disp: , Rfl:     albuterol (VENTOLIN HFA) 90 mcg/act inhaler, Inhale 2 puffs every 6 (six) hours as needed for wheezing , Disp: , Rfl:     carbidopa-levodopa (SINEMET)  mg per tablet, Take 1 tablet by mouth 3 (three) times a day, Disp: , Rfl:     clopidogrel (PLAVIX) 75 mg tablet, Take 75 mg by mouth daily  , Disp: , Rfl:     fluticasone (FLONASE) 50 mcg/act nasal spray, 1 spray into each nostril daily  , Disp: , Rfl:     fluticasone (VERAMYST) 27 5 MCG/SPRAY nasal spray, 2 sprays into each nostril daily  , Disp: , Rfl:     fluticasone-salmeterol (ADVAIR) 250-50 mcg/dose inhaler, Inhale 1 puff every 12 (twelve) hours  , Disp: , Rfl:     montelukast (SINGULAIR) 10 mg tablet, Take 10 mg by mouth daily at bedtime  , Disp: , Rfl:     pramipexole (MIRAPEX) 0 5 mg tablet, Take 0 25 mg by mouth 3 (three) times a day, Disp: , Rfl:     predniSONE 10 mg tablet, Take 1 tablet daily, Disp: 30 tablet, Rfl: 3    SPIRIVA HANDIHALER 18 MCG inhalation capsule, INHALE 1 CAPSULE VIA HANDIHALER ONCE DAILY AT THE SAME TIME EVERY DAY, Disp: 30 capsule, Rfl: 3    triamcinolone (KENALOG) 0 5 % cream, Apply topically 2 (two) times a day, Disp: , Rfl:     valsartan-hydrochlorothiazide (DIOVAN-HCT) 80-12 5 MG per tablet, Take 1 tablet by mouth daily  , Disp: , Rfl:   :    Physical Exam:  General appearance: alert, appears stated age and cooperative  Head: Normocephalic, without obvious abnormality, atraumatic    Neurologic:  On neurological examination patient has evidence of mild cogwheeling rigidity affecting the right upper extremity, with overall gait slowing, no tremor was noted on today's exam   In the lower extremities the patient has no evidence of any focal motor weakness, deep tendon reflexes are hypoactive bilaterally, patient also has significant varicosities noted in the left leg as compared to the right  No sensory loss was noted in both lower extremities  There is also no evidence of any lumbosacral tenderness  Lab Results: I have personally reviewed pertinent reports  Imaging Studies: I have personally reviewed pertinent reports  Assessment:  1  Parkinson's disease with gait dysfunction  2  Left leg pain most likely secondary to varicose veins  Plan:  Patient is advised consultation with vascular surgery, he has been using Lawrence stockings with no relief of symptoms    Home exercise program is also encouraged, at this time is advised to continue present dopaminergic medications, patient denies any side effects  He will return back to see me in 4 months  Patient also has severe onychomycosis affecting both feet is advised to discuss the same with his primary physician  5/17/2018,2:31 PM    Dictation voice to text software has been used in the creation of this document  Please consider this in light of any contextual or grammatical errors

## 2018-05-25 DIAGNOSIS — J45.909 UNCOMPLICATED ASTHMA, UNSPECIFIED ASTHMA SEVERITY, UNSPECIFIED WHETHER PERSISTENT: Primary | ICD-10-CM

## 2018-05-25 RX ORDER — MONTELUKAST SODIUM 10 MG/1
TABLET ORAL
Qty: 30 TABLET | Refills: 5 | Status: SHIPPED | OUTPATIENT
Start: 2018-05-25 | End: 2018-11-13 | Stop reason: SDUPTHER

## 2018-05-30 ENCOUNTER — OFFICE VISIT (OUTPATIENT)
Dept: PULMONOLOGY | Facility: CLINIC | Age: 83
End: 2018-05-30
Payer: COMMERCIAL

## 2018-05-30 VITALS
DIASTOLIC BLOOD PRESSURE: 70 MMHG | OXYGEN SATURATION: 96 % | BODY MASS INDEX: 32.6 KG/M2 | WEIGHT: 184 LBS | HEIGHT: 63 IN | HEART RATE: 74 BPM | SYSTOLIC BLOOD PRESSURE: 120 MMHG

## 2018-05-30 DIAGNOSIS — J47.9 BRONCHIECTASIS WITHOUT COMPLICATION (HCC): ICD-10-CM

## 2018-05-30 DIAGNOSIS — B44.81 ABPA (ALLERGIC BRONCHOPULMONARY ASPERGILLOSIS) (HCC): Primary | ICD-10-CM

## 2018-05-30 DIAGNOSIS — J45.30 MILD PERSISTENT EXTRINSIC ASTHMA WITHOUT COMPLICATION: ICD-10-CM

## 2018-05-30 PROCEDURE — 99213 OFFICE O/P EST LOW 20 MIN: CPT | Performed by: PHYSICIAN ASSISTANT

## 2018-05-30 RX ORDER — PREDNISONE 2.5 MG
7 TABLET ORAL DAILY
Qty: 90 TABLET | Refills: 3 | Status: SHIPPED | OUTPATIENT
Start: 2018-05-30 | End: 2018-09-21 | Stop reason: ALTCHOICE

## 2018-05-30 NOTE — PROGRESS NOTES
Assessment:    1  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) ([facility])  predniSONE 2 5 mg tablet   2  Mild persistent extrinsic asthma without complication     3  Bronchiectasis without complication [facility])         Plan: 59-year-old male former smoker with significant past medical history of bronchiectasis and history of mucus plugging requiring bronchoscopy in 2013 and 2016, allergic asthma/ABPA with IgE level greater than 5000, Parkinson's who presents for follow-up of his asthma  Last visit to the ED 2/2 CAP and asthma exacerbation in 12/20/17  1  Allergic Asthma with IgE > 5000 in the setting of ABPA, stable - Continue Advair and Spiriva as instructed  Rescue inhaler as needed  Will attempt decrease of prednisone to 7 5 mg  Discussed contacting office with any worsening in symptoms with decrease  Will need repeat spirometry at next visit  CBC with diff with absolute eosinophil count of 360 cells/uL, WNL  Discussed using Incentive Spirometer at home on a regular basis  Patient and daughter are in agreement  Flutter valve as needed, currently denies any chest congestion of mucus production  Follow-up in 1 month or sooner if needed    Subjective:     Patient ID: Judd Beltrán is a 81 y o  male  Chief Complaint:  Left leg pain    HPI  59-year-old male former smoker with significant past medical history of bronchiectasis and history of mucus plugging requiring bronchoscopy in 2013 and 2016, allergic asthma/ABPA with IgE level greater than 5000, Parkinson's with gait dysfunction who presents for follow-up of his asthma  Last visit to the ED 2/2 CAP and asthma exacerbation in 12/20/17  Patient states his shortness of breath is well controlled  He has not had to use his rescue inhaler at all  He continues to take the Advair and the prednisone as instructed  He complains most about left lower extremity pain  He states he is afraid to walk because of the pain    He was recently referred to see vascular surgery by his neurologist as it was thought this pain is secondary to his varicosity  Most recent fall was 1-2 months ago  Review of Systems  Review of Systems   Constitution: Negative for chills, decreased appetite, fever, malaise/fatigue and weight gain  HENT: Negative for congestion  Eyes: Negative for visual disturbance  Cardiovascular: Positive for leg swelling  Negative for dyspnea on exertion and orthopnea  Respiratory: Negative for cough, shortness of breath, sleep disturbances due to breathing and wheezing  Hematologic/Lymphatic: Negative for adenopathy  Skin: Negative for rash  Musculoskeletal: Negative for muscle weakness  Leg pain   Gastrointestinal: Negative for abdominal pain, diarrhea, nausea and vomiting  Neurological: Negative for excessive daytime sleepiness  Psychiatric/Behavioral: Negative for altered mental status and hallucinations  Allergic/Immunologic: Negative for environmental allergies  Objective:  /70   Pulse 74   Ht 5' 3" (1 6 m)   Wt 83 5 kg (184 lb)   SpO2 96%   BMI 32 59 kg/m²     Physical Exam   Constitutional: He is oriented to person, place, and time  He appears well-developed and well-nourished  No distress  HENT:   Head: Normocephalic and atraumatic  Neck: Normal range of motion  Cardiovascular: Normal rate and regular rhythm  Murmur heard  Pulmonary/Chest: Effort normal and breath sounds normal    Abdominal: Soft  There is no tenderness  Musculoskeletal: Normal range of motion  He exhibits no edema or tenderness  Left lower extremity varicosities   Lymphadenopathy:     He has no cervical adenopathy  Neurological: He is alert and oriented to person, place, and time  Skin: Skin is warm and dry  He is not diaphoretic  Psychiatric: He has a normal mood and affect  His behavior is normal    Nursing note and vitals reviewed  Lab/Image Review: Reviewed all pertinent labs/radiology results       Past Medical History:   Diagnosis Date    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) ([facility])     Allergic rhinitis     last assessed 17Dec2013    Aortic regurgitation     Arm laceration     Asthma     Bronchitis, chronic obstructive, with exacerbation ([facility])     last assessed 12Jun2015    Candidal intertrigo     [facility]     last assessed 17Dec2013    COPD (chronic obstructive pulmonary disease) ([facility])     Coronary artery disease     carotid stents    Cough     CVA [facility]cerebral vascular accident) [facility]     Dermatitis     Diabetes mellitus type 2, uncomplicated ([facility] )     controlled    Dysthymic disorder     Fatigue     Hyperlipidemia     Hypertension     Neurologic gait dysfunction     Parkinson's disease [facility]     Pneumonia     PVD (peripheral vascular disease) [facility]     Seborrheic keratosis     TIA (transient ischemic attack)     Tinea corporis     Tinea pedis of both feet     Tremor        Social History   Substance Use Topics    Smoking status: Former Smoker    Smokeless tobacco: Never Used    Alcohol use No       Family History   Problem Relation Age of Onset    COPD Sister     Lung cancer Sister     Asthma Family        Patient Active Problem List   Diagnosis    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) ([facility])    Allergic asthma    Allergic rhinitis    Aortic regurgitation    Bronchiectasis without complication [facility]    Long term (current) use of systemic steroids    Hyperlipidemia    Hypertension    Parkinson's disease ([facility]    Neurologic gait dysfunction    Type 2 diabetes mellitus ([facility] )

## 2018-06-04 RX ORDER — PREDNISONE 10 MG/1
TABLET ORAL
Qty: 90 TABLET | Refills: 1 | OUTPATIENT
Start: 2018-06-04

## 2018-06-04 NOTE — TELEPHONE ENCOUNTER
Susanne Belia recently decreased his Prednisone to 7 5 mg  Can you confirm with him that he decreased it? This may be an automatic refill request for 10 mg

## 2018-06-24 DIAGNOSIS — I63.9 CEREBROVASCULAR ACCIDENT (CVA), UNSPECIFIED MECHANISM (HCC): Primary | ICD-10-CM

## 2018-06-25 RX ORDER — CLOPIDOGREL BISULFATE 75 MG/1
TABLET ORAL
Qty: 30 TABLET | Refills: 5 | Status: SHIPPED | OUTPATIENT
Start: 2018-06-25 | End: 2018-11-15 | Stop reason: SDUPTHER

## 2018-06-28 ENCOUNTER — TELEPHONE (OUTPATIENT)
Dept: INTERNAL MEDICINE CLINIC | Facility: CLINIC | Age: 83
End: 2018-06-28

## 2018-07-01 DIAGNOSIS — G20 PD (PARKINSON'S DISEASE) (HCC): Primary | ICD-10-CM

## 2018-07-02 RX ORDER — PRAMIPEXOLE DIHYDROCHLORIDE 0.5 MG/1
TABLET ORAL
Qty: 90 TABLET | Refills: 6 | Status: SHIPPED | OUTPATIENT
Start: 2018-07-02 | End: 2019-02-14 | Stop reason: SDUPTHER

## 2018-07-16 DIAGNOSIS — G20 PD (PARKINSON'S DISEASE) (HCC): Primary | ICD-10-CM

## 2018-07-20 ENCOUNTER — APPOINTMENT (OUTPATIENT)
Dept: LAB | Facility: CLINIC | Age: 83
End: 2018-07-20
Payer: COMMERCIAL

## 2018-07-20 DIAGNOSIS — E78.2 MIXED HYPERLIPIDEMIA: ICD-10-CM

## 2018-07-20 DIAGNOSIS — E11.9 TYPE 2 DIABETES MELLITUS WITHOUT COMPLICATION, WITHOUT LONG-TERM CURRENT USE OF INSULIN (HCC): ICD-10-CM

## 2018-07-20 DIAGNOSIS — I10 ESSENTIAL HYPERTENSION: ICD-10-CM

## 2018-07-20 LAB
ALBUMIN SERPL BCP-MCNC: 3.5 G/DL (ref 3.5–5)
ALP SERPL-CCNC: 104 U/L (ref 46–116)
ALT SERPL W P-5'-P-CCNC: 21 U/L (ref 12–78)
ANION GAP SERPL CALCULATED.3IONS-SCNC: 7 MMOL/L (ref 4–13)
AST SERPL W P-5'-P-CCNC: 18 U/L (ref 5–45)
BILIRUB SERPL-MCNC: 0.43 MG/DL (ref 0.2–1)
BUN SERPL-MCNC: 28 MG/DL (ref 5–25)
CALCIUM SERPL-MCNC: 9.2 MG/DL (ref 8.3–10.1)
CHLORIDE SERPL-SCNC: 106 MMOL/L (ref 100–108)
CHOLEST SERPL-MCNC: 232 MG/DL (ref 50–200)
CO2 SERPL-SCNC: 28 MMOL/L (ref 21–32)
CREAT SERPL-MCNC: 1.15 MG/DL (ref 0.6–1.3)
ERYTHROCYTE [DISTWIDTH] IN BLOOD BY AUTOMATED COUNT: 13.2 % (ref 11.6–15.1)
EST. AVERAGE GLUCOSE BLD GHB EST-MCNC: 131 MG/DL
GFR SERPL CREATININE-BSD FRML MDRD: 59 ML/MIN/1.73SQ M
GLUCOSE P FAST SERPL-MCNC: 96 MG/DL (ref 65–99)
HBA1C MFR BLD: 6.2 % (ref 4.2–6.3)
HCT VFR BLD AUTO: 44.1 % (ref 36.5–49.3)
HDLC SERPL-MCNC: 67 MG/DL (ref 40–60)
HGB BLD-MCNC: 14.4 G/DL (ref 12–17)
LDLC SERPL CALC-MCNC: 134 MG/DL (ref 0–100)
MCH RBC QN AUTO: 31.3 PG (ref 26.8–34.3)
MCHC RBC AUTO-ENTMCNC: 32.7 G/DL (ref 31.4–37.4)
MCV RBC AUTO: 96 FL (ref 82–98)
NONHDLC SERPL-MCNC: 165 MG/DL
PLATELET # BLD AUTO: 220 THOUSANDS/UL (ref 149–390)
PMV BLD AUTO: 9.8 FL (ref 8.9–12.7)
POTASSIUM SERPL-SCNC: 3.8 MMOL/L (ref 3.5–5.3)
PROT SERPL-MCNC: 7.4 G/DL (ref 6.4–8.2)
RBC # BLD AUTO: 4.6 MILLION/UL (ref 3.88–5.62)
SODIUM SERPL-SCNC: 141 MMOL/L (ref 136–145)
TRIGL SERPL-MCNC: 156 MG/DL
TSH SERPL DL<=0.05 MIU/L-ACNC: 3.27 UIU/ML (ref 0.36–3.74)
WBC # BLD AUTO: 8.1 THOUSAND/UL (ref 4.31–10.16)

## 2018-07-20 PROCEDURE — 80053 COMPREHEN METABOLIC PANEL: CPT

## 2018-07-20 PROCEDURE — 84443 ASSAY THYROID STIM HORMONE: CPT

## 2018-07-20 PROCEDURE — 85027 COMPLETE CBC AUTOMATED: CPT

## 2018-07-20 PROCEDURE — 36415 COLL VENOUS BLD VENIPUNCTURE: CPT

## 2018-07-20 PROCEDURE — 80061 LIPID PANEL: CPT

## 2018-07-20 PROCEDURE — 83036 HEMOGLOBIN GLYCOSYLATED A1C: CPT

## 2018-07-24 DIAGNOSIS — J44.9 CHRONIC OBSTRUCTIVE PULMONARY DISEASE, UNSPECIFIED COPD TYPE (HCC): ICD-10-CM

## 2018-07-24 RX ORDER — TIOTROPIUM BROMIDE 18 UG/1
CAPSULE ORAL; RESPIRATORY (INHALATION)
Qty: 30 CAPSULE | Refills: 3 | Status: SHIPPED | OUTPATIENT
Start: 2018-07-24 | End: 2018-11-15 | Stop reason: SDUPTHER

## 2018-07-31 ENCOUNTER — OFFICE VISIT (OUTPATIENT)
Dept: INTERNAL MEDICINE CLINIC | Facility: CLINIC | Age: 83
End: 2018-07-31
Payer: COMMERCIAL

## 2018-07-31 VITALS
BODY MASS INDEX: 33.31 KG/M2 | DIASTOLIC BLOOD PRESSURE: 72 MMHG | HEIGHT: 63 IN | WEIGHT: 188 LBS | SYSTOLIC BLOOD PRESSURE: 124 MMHG | RESPIRATION RATE: 16 BRPM | OXYGEN SATURATION: 97 % | HEART RATE: 91 BPM

## 2018-07-31 DIAGNOSIS — J45.30 MILD PERSISTENT EXTRINSIC ASTHMA WITHOUT COMPLICATION: ICD-10-CM

## 2018-07-31 DIAGNOSIS — R26.9 NEUROLOGIC GAIT DYSFUNCTION: ICD-10-CM

## 2018-07-31 DIAGNOSIS — I10 ESSENTIAL HYPERTENSION: ICD-10-CM

## 2018-07-31 DIAGNOSIS — G20 PARKINSON'S DISEASE (HCC): ICD-10-CM

## 2018-07-31 DIAGNOSIS — E11.9 TYPE 2 DIABETES MELLITUS WITHOUT COMPLICATION, WITHOUT LONG-TERM CURRENT USE OF INSULIN (HCC): Primary | ICD-10-CM

## 2018-07-31 DIAGNOSIS — E78.2 MIXED HYPERLIPIDEMIA: ICD-10-CM
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
## 2018-07-31 LAB
LEFT EYE DIABETIC RETINOPATHY: NORMAL
RIGHT EYE DIABETIC RETINOPATHY: NORMAL
SEVERITY (EYE EXAM): NORMAL

## 2018-07-31 PROCEDURE — 3078F DIAST BP <80 MM HG: CPT | Performed by: INTERNAL MEDICINE

## 2018-07-31 PROCEDURE — 3074F SYST BP LT 130 MM HG: CPT | Performed by: INTERNAL MEDICINE

## 2018-07-31 PROCEDURE — 99214 OFFICE O/P EST MOD 30 MIN: CPT | Performed by: INTERNAL MEDICINE

## 2018-07-31 RX ORDER — ATORVASTATIN CALCIUM 10 MG/1
10 TABLET, FILM COATED ORAL DAILY
Qty: 30 TABLET | Refills: 5 | Status: SHIPPED | OUTPATIENT
Start: 2018-07-31 | End: 2019-03-24 | Stop reason: SDUPTHER

## 2018-07-31 RX ORDER — LOSARTAN POTASSIUM AND HYDROCHLOROTHIAZIDE 12.5; 5 MG/1; MG/1
1 TABLET ORAL DAILY
Qty: 30 TABLET | Refills: 5 | Status: SHIPPED | OUTPATIENT
Start: 2018-07-31 | End: 2019-02-05

## 2018-07-31 RX ORDER — DIPYRIDAMOLE 25 MG
25 TABLET ORAL 2 TIMES DAILY
COMMUNITY
End: 2018-09-21 | Stop reason: ALTCHOICE

## 2018-07-31 NOTE — PROGRESS NOTES
Assessment/Plan:    Patient Instructions   Lab data reviewed and compared prior    Parkinson's disease with gait dysfunction and frequent falls-I have recommended 100% use of walker, patient encouraged to slow down with turns and transfers, I have recommended getting back to physical therapy    Type 2 diabetes mellitus remained stable without medication    Hypertension has been stable, change from valsartan hydrochlorothiazide to losartan hydrochlorothiazide based upon recent recall    Hyperlipidemia-patient has history of significant atherosclerotic cerebral vascular disease status post CEA and would benefit from statin  There has been no improvement in his strength or gait since discontinuing atorvastatin so will resume atorvastatin 10 mg daily  COPD with bronchiectasis and history of Aspergillus is stable under care of Pulmonary  Routine follow-up after labs in 6 months, sooner as needed  Diagnoses and all orders for this visit:    Type 2 diabetes mellitus without complication, without long-term current use of insulin ([facility])  -     Hemoglobin A1c (w/out EAG); Future    Mild persistent extrinsic asthma without complication    Essential hypertension  -     losartan-hydrochlorothiazide (HYZAAR) 50-12 5 mg per tablet; Take 1 tablet by mouth daily  -     CBC; Future  -     Comprehensive metabolic panel; Future  -     TSH, 3rd generation with Free T4 reflex; Future    Parkinson's disease [facility])  -     Ambulatory referral to Physical Therapy; Future    Mixed hyperlipidemia  -     atorvastatin (LIPITOR) 10 mg tablet; Take 1 tablet (10 mg total) by mouth daily  -     Lipid panel; Future    Neurologic gait dysfunction  -     Ambulatory referral to Physical Therapy; Future    Fall, subsequent encounter  -     Ambulatory referral to Physical Therapy; Future    Other orders  -     dipyridamole (PERSANTINE) 25 mg tablet;  Take 25 mg by mouth 2 (two) times a day         Subjective:      Patient ID: Sriram Nguyen is a 81 y o  male    Here w/ 2 dgtrs  Had a fall last weekend on stairs, fell down multiple steps w/ bruising on back and arms, no head injury  Dgtr states he goes too fast   He uses walker or cane, but not on stairs  Only did 2 sessions PT and occasional exercises at home  Several other falls since last visit  Usually about 1 x per mo  PD-seen by neuro in May, no changes in rx  HPL-stopped atorvastatin in Mar w/o any trouble  Recently moved to N[place]'s house, but bathroom and BR are upstairs  HTN-taking rx as directed, no home bp's  Was unaware of recall Valsartan  COPD has been stable, f/b pulm  It in detail and compared prior              Current Outpatient Prescriptions:     acetaminophen (TYLENOL) 325 mg tablet, Take 650 mg by mouth every 6 (six) hours as needed for mild pain, Disp: , Rfl:     albuterol (2 5 mg/3 mL) 0 083 % nebulizer solution, Take 2 5 mg by nebulization every 6 (six) hours as needed for wheezing , Disp: , Rfl:     albuterol (VENTOLIN HFA) 90 mcg/act inhaler, Inhale 2 puffs every 6 (six) hours as needed for wheezing , Disp: , Rfl:     carbidopa-levodopa (SINEMET)  mg per tablet, Take 1 tablet by mouth 3 (three) times a day, Disp: 90 tablet, Rfl: 2    clopidogrel (PLAVIX) 75 mg tablet, TAKE 1 TABLET BY MOUTH EVERY DAY, Disp: 30 tablet, Rfl: 5    dipyridamole (PERSANTINE) 25 mg tablet, Take 25 mg by mouth 2 (two) times a day, Disp: , Rfl:     fluticasone (FLONASE) 50 mcg/act nasal spray, 1 spray into each nostril daily  , Disp: , Rfl:     fluticasone (VERAMYST) 27 5 MCG/SPRAY nasal spray, 2 sprays into each nostril daily  , Disp: , Rfl:     fluticasone-salmeterol (ADVAIR) 250-50 mcg/dose inhaler, Inhale 1 puff every 12 (twelve) hours  , Disp: , Rfl:     montelukast (SINGULAIR) 10 mg tablet, TAKE 1 TABLET BY MOUTH AT BEDTIME, Disp: 30 tablet, Rfl: 5    pramipexole (MIRAPEX) 0 5 mg tablet, TAKE 1 TABLET BY MOUTH 3 TIMES DAILY, Disp: 90 tablet, Rfl: 6    predniSONE 2 5 mg tablet, Take 3 tablets (7 5 mg total) by mouth daily, Disp: 90 tablet, Rfl: 3    SPIRIVA HANDIHALER 18 MCG inhalation capsule, INHALE 1 CAPSULE VIA HANDIHALER ONCE DAILY AT THE SAME TIME EVERY DAY, Disp: 30 capsule, Rfl: 3    triamcinolone (KENALOG) 0 5 % cream, Apply topically 2 (two) times a day, Disp: , Rfl:     atorvastatin (LIPITOR) 10 mg tablet, Take 1 tablet (10 mg total) by mouth daily, Disp: 30 tablet, Rfl: 5    losartan-hydrochlorothiazide (HYZAAR) 50-12 5 mg per tablet, Take 1 tablet by mouth daily, Disp: 30 tablet, Rfl: 5    Recent Results (from the past 1008 hour(s))   CBC    Collection Time: 07/20/18  9:39 AM   Result Value Ref Range    WBC 8 10 4 31 - 10 16 Thousand/uL    RBC 4 60 3 88 - 5 62 Million/uL    Hemoglobin 14 4 12 0 - 17 0 g/dL    Hematocrit 44 1 36 5 - 49 3 %    MCV 96 82 - 98 fL    MCH 31 3 26 8 - 34 3 pg    MCHC 32 7 31 4 - 37 4 g/dL    RDW 13 2 11 6 - 15 1 %    Platelets 019 954 - 677 Thousands/uL    MPV 9 8 8 9 - 12 7 fL   Comprehensive metabolic panel    Collection Time: 07/20/18  9:39 AM   Result Value Ref Range    Sodium 141 136 - 145 mmol/L    Potassium 3 8 3 5 - 5 3 mmol/L    Chloride 106 100 - 108 mmol/L    CO2 28 21 - 32 mmol/L    Anion Gap 7 4 - 13 mmol/L    BUN 28 (H) 5 - 25 mg/dL    Creatinine 1 15 0 60 - 1 30 mg/dL    Glucose, Fasting 96 65 - 99 mg/dL    Calcium 9 2 8 3 - 10 1 mg/dL    AST 18 5 - 45 U/L    ALT 21 12 - 78 U/L    Alkaline Phosphatase 104 46 - 116 U/L    Total Protein 7 4 6 4 - 8 2 g/dL    Albumin 3 5 3 5 - 5 0 g/dL    Total Bilirubin 0 43 0 20 - 1 00 mg/dL    eGFR 59 ml/min/1 73sq m   Lipid panel    Collection Time: 07/20/18  9:39 AM   Result Value Ref Range    Cholesterol 232 (H) 50 - 200 mg/dL    Triglycerides 156 (H) <=150 mg/dL    HDL, Direct 67 (H) 40 - 60 mg/dL    LDL Calculated 134 (H) 0 - 100 mg/dL    Non-HDL-Chol (CHOL-HDL) 165 mg/dl   TSH, 3rd generation with T4 reflex    Collection Time: 07/20/18  9:39 AM   Result Value Ref Range    TSH 3RD [facility] 3 270 0 358 - 3 740 uIU/mL   Hemoglobin A1C    Collection Time: 07/20/18  9:39 AM   Result Value Ref Range    Hemoglobin A1C 6 2 4 2 - 6 3 %     mg/dl       The following portions of the patient's history were reviewed and updated as appropriate: allergies, current medications, past family history, past medical history, past social history, past surgical history and problem list      Review of Systems   Constitutional: Positive for fatigue  Negative for appetite change, chills, diaphoresis, fever and unexpected weight change  HENT: Negative for congestion, hearing loss and rhinorrhea  Eyes: Negative for visual disturbance  Respiratory: Negative for cough, chest tightness, shortness of breath and wheezing  Cardiovascular: Negative for chest pain, palpitations and leg swelling  Gastrointestinal: Negative for abdominal pain and blood in stool  Endocrine: Negative for cold intolerance, heat intolerance, polydipsia and polyuria  Genitourinary: Negative for difficulty urinating, dysuria, frequency and urgency  Musculoskeletal: Positive for arthralgias, back pain and gait problem  Negative for myalgias  Skin: Negative for rash  Neurological: Negative for dizziness, weakness, light-headedness and headaches  Hematological: Does not bruise/bleed easily  Psychiatric/Behavioral: Negative for dysphoric mood and sleep disturbance  Objective:      Vitals:    07/31/18 1107   BP: 124/72   Pulse: 91   Resp: 16   SpO2: 97%          Physical Exam   Constitutional: He is oriented to person, place, and time  He appears well-developed and well-nourished  HENT:   Head: Normocephalic and atraumatic  Nose: Nose normal    Mouth/Throat: Oropharynx is clear and moist    Eyes: Conjunctivae and EOM are normal  Pupils are equal, round, and reactive to light  No scleral icterus  Neck: Normal range of motion  Neck supple  No JVD present  No tracheal deviation present   No thyromegaly present  Cardiovascular: Normal rate, regular rhythm and intact distal pulses  Exam reveals no gallop and no friction rub  No murmur heard  Pulses:       Dorsalis pedis pulses are 1+ on the right side, and 1+ on the left side  Posterior tibial pulses are 1+ on the right side, and 1+ on the left side  Pulmonary/Chest: Effort normal and breath sounds normal  No respiratory distress  He has no wheezes  He has no rales  Musculoskeletal: He exhibits no edema or deformity  Steady gait with walker, but he turns quickly and loses balance  Feet:   Right Foot:   Skin Integrity: Positive for erythema  Negative for ulcer, skin breakdown, warmth, callus or dry skin  Left Foot:   Skin Integrity: Positive for erythema  Lymphadenopathy:     He has no cervical adenopathy  Neurological: He is alert and oriented to person, place, and time  No cranial nerve deficit  Skin: Skin is warm and dry  No rash noted  No erythema  No pallor  Well demarcated macular area on b/l feet w/ scaling     Ecchymosis L flank and b/l forearms  Psychiatric: He has a normal mood and affect  His behavior is normal  Judgment and thought content normal    Patient's shoes and socks removed  Right Foot/Ankle   Right Foot Inspection  Skin Exam: skin normal, skin intact and erythema no dry skin, no warmth, no callus, no maceration, no abnormal color, no pre-ulcer, no ulcer and no callus                          Toe Exam: ROM and strength within normal limits  Sensory       Monofilament testing: intact  Vascular  Capillary refills: < 3 seconds  The right DP pulse is 1+  The right PT pulse is 1+  Left Foot/Ankle  Left Foot Inspection  Skin Exam: skin intact and erythema                         Toe Exam: ROM and strength within normal limits                   Sensory       Monofilament: intact  Vascular  Capillary refills: < 3 seconds  The left DP pulse is 1+  The left PT pulse is 1+     Assign Risk Category:  No deformity present;  No loss of protective sensation;        Risk: 0

## 2018-07-31 NOTE — PATIENT INSTRUCTIONS
Lab data reviewed and compared prior    Parkinson's disease with gait dysfunction and frequent falls-I have recommended 100% use of walker, patient encouraged to slow down with turns and transfers, I have recommended getting back to physical therapy    Type 2 diabetes mellitus remained stable without medication    Hypertension has been stable, change from valsartan hydrochlorothiazide to losartan hydrochlorothiazide based upon recent recall    Hyperlipidemia-patient has history of significant atherosclerotic cerebral vascular disease status post CEA and would benefit from statin  There has been no improvement in his strength or gait since discontinuing atorvastatin so will resume atorvastatin 10 mg daily  COPD with bronchiectasis and history of Aspergillus is stable under care of Pulmonary  Routine follow-up after labs in 6 months, sooner as needed

## 2018-08-07 ENCOUNTER — TELEPHONE (OUTPATIENT)
Dept: INTERNAL MEDICINE CLINIC | Facility: CLINIC | Age: 83
End: 2018-08-07

## 2018-08-07 DIAGNOSIS — J41.1 MUCOPURULENT CHRONIC BRONCHITIS (HCC): Primary | ICD-10-CM

## 2018-08-07 NOTE — TELEPHONE ENCOUNTER
Pt's daughter-Carmon called-Said patient's been coughing a lot  Started up 2 days ago  She went to pull out his his old nebulizer but it was so dirty-even after cleaning it  She's asking if we could send over for a new one? They were just here on 8/1    Pharmacy is [address] [place] [address]       [facility] X[facility]#[phone]

## 2018-08-08 DIAGNOSIS — J45.40 MODERATE PERSISTENT EXTRINSIC ASTHMA WITHOUT COMPLICATION: Primary | ICD-10-CM

## 2018-08-09 DIAGNOSIS — J45.909 EXTRINSIC ASTHMA, UNSPECIFIED ASTHMA SEVERITY, UNSPECIFIED WHETHER COMPLICATED, UNSPECIFIED WHETHER PERSISTENT: Primary | ICD-10-CM

## 2018-08-09 RX ORDER — ALBUTEROL SULFATE 2.5 MG/3ML
2.5 SOLUTION RESPIRATORY (INHALATION) EVERY 4 HOURS PRN
Qty: 3 ML | Refills: 5 | Status: SHIPPED | OUTPATIENT
Start: 2018-08-09 | End: 2019-08-25 | Stop reason: SDUPTHER

## 2018-08-09 NOTE — TELEPHONE ENCOUNTER
Patients daughter is calling back pt needs the ALBUTEROL 2 5 nebulizer solution    the liquid to put into the machine        Please send to the [facility] so they can pick it up today

## 2018-08-10 ENCOUNTER — TELEPHONE (OUTPATIENT)
Dept: PULMONOLOGY | Facility: CLINIC | Age: 83
End: 2018-08-10

## 2018-08-10 DIAGNOSIS — J45.909 UNCOMPLICATED ASTHMA, UNSPECIFIED ASTHMA SEVERITY, UNSPECIFIED WHETHER PERSISTENT: Primary | ICD-10-CM

## 2018-08-10 RX ORDER — PREDNISONE 20 MG/1
40 TABLET ORAL DAILY
Qty: 10 TABLET | Refills: 0 | Status: SHIPPED | OUTPATIENT
Start: 2018-08-10 | End: 2018-08-15

## 2018-08-13 NOTE — TELEPHONE ENCOUNTER
I spoke with them on Friday, asked him to take 5 days of 40 mg then return to the 7 5 daily and follow up with us

## 2018-08-16 ENCOUNTER — OFFICE VISIT (OUTPATIENT)
Dept: PULMONOLOGY | Facility: CLINIC | Age: 83
End: 2018-08-16
Payer: COMMERCIAL

## 2018-08-16 VITALS
SYSTOLIC BLOOD PRESSURE: 120 MMHG | HEART RATE: 78 BPM | OXYGEN SATURATION: 98 % | WEIGHT: 189 LBS | HEIGHT: 63 IN | BODY MASS INDEX: 33.49 KG/M2 | DIASTOLIC BLOOD PRESSURE: 70 MMHG

## 2018-08-16 DIAGNOSIS — B44.81 ABPA (ALLERGIC BRONCHOPULMONARY ASPERGILLOSIS) (HCC): ICD-10-CM

## 2018-08-16 DIAGNOSIS — J47.0 BRONCHIECTASIS WITH ACUTE LOWER RESPIRATORY INFECTION (HCC): Primary | ICD-10-CM

## 2018-08-16 DIAGNOSIS — J45.30 MILD PERSISTENT EXTRINSIC ASTHMA WITHOUT COMPLICATION: ICD-10-CM

## 2018-08-16 PROCEDURE — 99214 OFFICE O/P EST MOD 30 MIN: CPT | Performed by: PHYSICIAN ASSISTANT

## 2018-08-16 RX ORDER — AZITHROMYCIN 250 MG/1
TABLET, FILM COATED ORAL
Qty: 6 TABLET | Refills: 0 | Status: SHIPPED | OUTPATIENT
Start: 2018-08-16 | End: 2018-08-20

## 2018-08-16 RX ORDER — GUAIFENESIN 600 MG
600 TABLET, EXTENDED RELEASE 12 HR ORAL EVERY 12 HOURS SCHEDULED
Qty: 60 TABLET | Refills: 0 | Status: SHIPPED | OUTPATIENT
Start: 2018-08-16 | End: 2018-09-15

## 2018-08-16 RX ORDER — DEXTROMETHORPHAN HYDROBROMIDE AND PROMETHAZINE HYDROCHLORIDE 15; 6.25 MG/5ML; MG/5ML
5 SYRUP ORAL 4 TIMES DAILY PRN
Qty: 118 ML | Refills: 0 | Status: SHIPPED | OUTPATIENT
Start: 2018-08-16 | End: 2018-09-21 | Stop reason: ALTCHOICE

## 2018-08-16 RX ORDER — PREDNISONE 20 MG/1
TABLET ORAL
Qty: 18 TABLET | Refills: 0 | Status: SHIPPED | OUTPATIENT
Start: 2018-08-16 | End: 2018-08-27

## 2018-08-16 NOTE — PROGRESS NOTES
Assessment:    1  Bronchiectasis with acute lower respiratory infection (HCC)  predniSONE 20 mg tablet    guaiFENesin (MUCINEX) 600 mg 12 hr tablet    promethazine-dextromethorphan (PHENERGAN-DM) 6 25-15 mg/5 mL oral syrup    XR chest pa & lateral    azithromycin (ZITHROMAX) 250 mg tablet   2  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) ([address] )     3  Mild persistent extrinsic asthma without complication           Plan:     Patient with increased shortness of breath and productive cough  He completed 5 days of 40 mg prednisone and is now back on 7 5 mg  Will give additional prednisone taper, longer taper this time  Will also give z-pack and add mucinex to help with mucous clearance  Continue with Advair as well as nebulizer 4 times per day  Will also check a CXR given worsening symptoms and lung exam     Follow up in 2 weeks or sooner if necessary  Subjective:     Patient ID: Bao Kathleen is a 81 y o  male  Chief Complaint:   Patient is an 52-year-old male former smoker with PMH of bronchiectasis and h/o mucus plugging requiring bronchoscopy in 2013 and 2016, allergic asthma/ABPA with IgE level greater than 5000, Parkinson's  He is here today with complaint of increased shortness of breath and cough productive of clear/yellow mucous  Cough is keeping him up at night  I spoke with him last week and gave him 5 days of prednisone 40 mg  He had been tapered down to 2 5 mg maintenance dose from 7 5  The following portions of the patient's history were reviewed in this encounter and updated as appropriate:   Review of Systems   Constitutional: Negative  HENT: Positive for rhinorrhea  Respiratory: Positive for cough and shortness of breath  Cardiovascular: Negative  Gastrointestinal: Negative  Genitourinary: Negative  Musculoskeletal: Positive for arthralgias  Skin: Negative  Allergic/Immunologic: Negative  Neurological: Negative  Psychiatric/Behavioral: Negative  Objective:  /70   Pulse 78   Ht 5' 3" (1 6 m)   Wt 85 7 kg (189 lb)   SpO2 98%   BMI 33 48 kg/m²     Physical Exam   Constitutional: He is oriented to person, place, and time  He appears well-developed and well-nourished  No distress  HENT:   Mouth/Throat: Oropharynx is clear and moist    Eyes: Pupils are equal, round, and reactive to light  Cardiovascular: Normal rate and regular rhythm  No murmur heard  Pulmonary/Chest: Effort normal  No respiratory distress  He has wheezes  He has rhonchi (scattered bilaterally)  He has rales in the right lower field  Abdominal: Soft  Musculoskeletal: Normal range of motion  Neurological: He is alert and oriented to person, place, and time  Skin: Skin is warm and dry  Psychiatric: He has a normal mood and affect   His behavior is normal  Judgment and thought content normal

## 2018-08-17 ENCOUNTER — APPOINTMENT (OUTPATIENT)
Dept: RADIOLOGY | Facility: CLINIC | Age: 83
End: 2018-08-17
Payer: COMMERCIAL

## 2018-08-17 DIAGNOSIS — J47.0 BRONCHIECTASIS WITH ACUTE LOWER RESPIRATORY INFECTION (HCC): ICD-10-CM

## 2018-08-17 PROCEDURE — 71046 X-RAY EXAM CHEST 2 VIEWS: CPT

## 2018-08-27 ENCOUNTER — OFFICE VISIT (OUTPATIENT)
Dept: PULMONOLOGY | Facility: CLINIC | Age: 83
End: 2018-08-27
Payer: COMMERCIAL

## 2018-08-27 VITALS
HEART RATE: 67 BPM | OXYGEN SATURATION: 96 % | HEIGHT: 63 IN | SYSTOLIC BLOOD PRESSURE: 130 MMHG | WEIGHT: 190 LBS | BODY MASS INDEX: 33.66 KG/M2 | DIASTOLIC BLOOD PRESSURE: 60 MMHG

## 2018-08-27 DIAGNOSIS — B44.81 ABPA (ALLERGIC BRONCHOPULMONARY ASPERGILLOSIS) (HCC): ICD-10-CM

## 2018-08-27 DIAGNOSIS — J45.30 MILD PERSISTENT EXTRINSIC ASTHMA WITHOUT COMPLICATION: ICD-10-CM

## 2018-08-27 DIAGNOSIS — J47.0 BRONCHIECTASIS WITH ACUTE LOWER RESPIRATORY INFECTION (HCC): Primary | ICD-10-CM

## 2018-08-27 PROCEDURE — 99213 OFFICE O/P EST LOW 20 MIN: CPT | Performed by: PHYSICIAN ASSISTANT

## 2018-08-27 RX ORDER — PREDNISONE 10 MG/1
10 TABLET ORAL DAILY
Qty: 30 TABLET | Refills: 3 | Status: SHIPPED | OUTPATIENT
Start: 2018-08-27 | End: 2019-04-09

## 2018-08-27 NOTE — PROGRESS NOTES
Assessment:    1  Bronchiectasis with acute lower respiratory infection (HCC)  predniSONE 10 mg tablet   2  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) (HCC)  predniSONE 10 mg tablet   3  Mild persistent extrinsic asthma without complication             Plan:     Patient with recent exacerbation, increase in cough and shortness of breath treated with z-pack and prednisone taper  We had been in the process of trying to taper down his maintenance steroids dose when symptoms increased  CXR showed stable findings, no infiltrates  He is feeling much improved, currently on 10 mg prednisone  Will continue him on 10 mg of prednisone daily for now, will return in 3 months for follow up, sooner if necessary  Also continue with Advair, spiriva, albuterol 4 times per day as needed  Continue mucinex as well as flutter valve as needed and IS  Subjective:     Patient ID: Bao Kathleen is a 81 y o  male  Chief Complaint:   Patient is an 60-year-old male former smoker with PMH of bronchiectasis and h/o mucus plugging requiring bronchoscopy in 2013 and 2016, allergic asthma/ABPA with IgE level greater than 5000, Parkinson's  He was seen 2 weeks ago for increased shortness of breath and cough, given prednisone taper  He is here today for follow up  He is feeling much better, cough improved significantly  Now down to 10 mg of prednisone  We had been trying to slowly taper him down on the prednisone  Review of Systems   Constitutional: Negative  HENT: Negative  Respiratory: Positive for cough  Cardiovascular: Negative  Gastrointestinal: Negative  Genitourinary: Negative  Musculoskeletal: Positive for arthralgias  Skin: Negative  Allergic/Immunologic: Negative  Neurological: Negative  Psychiatric/Behavioral: Negative  Objective:    Physical Exam   Constitutional: He is oriented to person, place, and time  He appears well-developed and well-nourished  No distress     HENT: Mouth/Throat: Oropharynx is clear and moist    Eyes: Pupils are equal, round, and reactive to light  Cardiovascular: Normal rate and regular rhythm  No murmur heard  Pulmonary/Chest: Effort normal  No respiratory distress  He has no decreased breath sounds  He has no wheezes  He has no rhonchi  He has rales in the right lower field  Abdominal: Soft  Musculoskeletal: Normal range of motion  Neurological: He is alert and oriented to person, place, and time  Skin: Skin is warm and dry  Psychiatric: He has a normal mood and affect   His behavior is normal  Judgment and thought content normal

## 2018-09-21 ENCOUNTER — OFFICE VISIT (OUTPATIENT)
Dept: NEUROLOGY | Facility: CLINIC | Age: 83
End: 2018-09-21
Payer: COMMERCIAL

## 2018-09-21 VITALS
BODY MASS INDEX: 33.49 KG/M2 | DIASTOLIC BLOOD PRESSURE: 70 MMHG | WEIGHT: 189 LBS | HEIGHT: 63 IN | HEART RATE: 78 BPM | SYSTOLIC BLOOD PRESSURE: 132 MMHG

## 2018-09-21 DIAGNOSIS — G20 PARKINSON'S DISEASE (HCC): Primary | ICD-10-CM

## 2018-09-21 DIAGNOSIS — R26.9 NEUROLOGIC GAIT DYSFUNCTION: ICD-10-CM

## 2018-09-21 PROCEDURE — 99213 OFFICE O/P EST LOW 20 MIN: CPT | Performed by: PSYCHIATRY & NEUROLOGY

## 2018-09-21 NOTE — PROGRESS NOTES
Progress Note - Neurology   [facility] 81 y o  male MRN: [id number]  Unit/Bed#:  Encounter: [id number]      Subjective:     Patient is here for a follow-up visit accompanied with his daughter with a history of Parkinson's disease, gait dysfunction, left leg pain secondary to varicosities and remains on Sinemet and Mirapex  Since his last visit he has noticed improvement as far as tremors concern as well as gait is concerned with the help of physical therapy  Patient has occasional short-term memory difficulty and continues to experience pain in his left leg which was felt to be secondary to varicosities for which he is to see vascular surgery next week  He claims his diabetes under good control  Denies any new neurological symptoms and ambulates with the help of a walker  ROS:   Review of Systems   Constitutional: Positive for fatigue  Negative for appetite change and fever  HENT: Negative  Negative for drooling, hearing loss, tinnitus, trouble swallowing and voice change  Eyes: Negative  Negative for photophobia, pain and visual disturbance  Respiratory: Negative  Negative for shortness of breath  Cardiovascular: Negative  Negative for chest pain and palpitations  Gastrointestinal: Negative  Negative for constipation, nausea and vomiting  Endocrine: Negative  Negative for cold intolerance and heat intolerance  Genitourinary: Negative  Negative for dysuria, enuresis, frequency and urgency  Musculoskeletal: Positive for gait problem  Negative for back pain, myalgias and neck pain  Skin: Negative  Negative for rash  Neurological: Positive for tremors and weakness  Negative for dizziness, seizures, syncope, facial asymmetry, speech difficulty, light-headedness, numbness and headaches  Hematological: Bruises/bleeds easily  Psychiatric/Behavioral: Positive for confusion and sleep disturbance  Negative for hallucinations         Vitals:   Vitals:    09/21/18 1147   BP: 132/70 Pulse: 78   ,Body mass index is 33 48 kg/m²  MEDS:      Current Outpatient Prescriptions:     acetaminophen (TYLENOL) 325 mg tablet, Take 650 mg by mouth every 6 (six) hours as needed for mild pain, Disp: , Rfl:     ADVAIR DISKUS 250-50 MCG/DOSE inhaler, INHALE 1 DOSE BY MOUTH TWICE DAILY   RINSE MOUTH AFTER USE, Disp: 180 each, Rfl: 1    albuterol (2 5 mg/3 mL) 0 083 % nebulizer solution, Take 1 vial (2 5 mg total) by nebulization every 4 (four) hours as needed for wheezing, Disp: 3 mL, Rfl: 5    albuterol (VENTOLIN HFA) 90 mcg/act inhaler, Inhale 2 puffs every 6 (six) hours as needed for wheezing , Disp: , Rfl:     atorvastatin (LIPITOR) 10 mg tablet, Take 1 tablet (10 mg total) by mouth daily, Disp: 30 tablet, Rfl: 5    carbidopa-levodopa (SINEMET)  mg per tablet, Take 1 tablet by mouth 3 (three) times a day, Disp: 90 tablet, Rfl: 2    clopidogrel (PLAVIX) 75 mg tablet, TAKE 1 TABLET BY MOUTH EVERY DAY, Disp: 30 tablet, Rfl: 5    losartan-hydrochlorothiazide (HYZAAR) 50-12 5 mg per tablet, Take 1 tablet by mouth daily, Disp: 30 tablet, Rfl: 5    montelukast (SINGULAIR) 10 mg tablet, TAKE 1 TABLET BY MOUTH AT BEDTIME, Disp: 30 tablet, Rfl: 5    pramipexole (MIRAPEX) 0 5 mg tablet, TAKE 1 TABLET BY MOUTH 3 TIMES DAILY, Disp: 90 tablet, Rfl: 6    predniSONE 10 mg tablet, Take 1 tablet (10 mg total) by mouth daily, Disp: 30 tablet, Rfl: 3    SPIRIVA HANDIHALER 18 MCG inhalation capsule, INHALE 1 CAPSULE VIA HANDIHALER ONCE DAILY AT THE SAME TIME EVERY DAY, Disp: 30 capsule, Rfl: 3    triamcinolone (KENALOG) 0 5 % cream, Apply topically 2 (two) times a day as needed  , Disp: , Rfl:   :    Physical Exam:  General appearance: alert, appears stated age and cooperative  Head: Normocephalic, without obvious abnormality, atraumatic    Neurologic:    His examination shows evidence of mild cogwheeling rigidity in the right upper extremity, no evidence of any resting tremor was noted on today's exam, and the rest of his cranial nerve, motor and sensory exam remain essentially unchanged with no focal deficits noted  Deep tendon reflexes are hypoactive  Patient ambulates with the help of a walker and required assistance to stand from the sitting position  Lab Results: I have personally reviewed pertinent reports  Imaging Studies: I have personally reviewed pertinent reports  Assessment:  1  Parkinson's disease  2  Gait dysfunction  Plan:  Patient is advised to continue present medications at the same dosage, written list of home exercises were provided to the patient and he is encouraged to do so on a regular basis  Patient will follow up with vascular surgery in the near future and will return back to see me in 6 months       9/21/2018,11:53 AM    Dictation voice to text software has been used in the creation of this document  Please consider this in light of any contextual or grammatical errors

## 2018-09-25 ENCOUNTER — CONSULT (OUTPATIENT)
Dept: VASCULAR SURGERY | Facility: CLINIC | Age: 83
End: 2018-09-25
Payer: COMMERCIAL

## 2018-09-25 VITALS
WEIGHT: 188 LBS | RESPIRATION RATE: 18 BRPM | SYSTOLIC BLOOD PRESSURE: 154 MMHG | DIASTOLIC BLOOD PRESSURE: 76 MMHG | BODY MASS INDEX: 32.1 KG/M2 | HEART RATE: 74 BPM | TEMPERATURE: 98.6 F | HEIGHT: 64 IN

## 2018-09-25 DIAGNOSIS — I10 ESSENTIAL HYPERTENSION: ICD-10-CM

## 2018-09-25 DIAGNOSIS — I83.813 VARICOSE VEINS OF BILATERAL LOWER EXTREMITIES WITH PAIN: Primary | ICD-10-CM

## 2018-09-25 DIAGNOSIS — I77.9 CAROTID ARTERY DISEASE, UNSPECIFIED LATERALITY (HCC): ICD-10-CM

## 2018-09-25 DIAGNOSIS — J42 CHRONIC BRONCHITIS, UNSPECIFIED CHRONIC BRONCHITIS TYPE (HCC): ICD-10-CM

## 2018-09-25 DIAGNOSIS — I73.9 PERIPHERAL VASCULAR DISEASE (HCC): ICD-10-CM

## 2018-09-25 DIAGNOSIS — E11.9 TYPE 2 DIABETES MELLITUS WITHOUT COMPLICATION, WITHOUT LONG-TERM CURRENT USE OF INSULIN (HCC): ICD-10-CM

## 2018-09-25 DIAGNOSIS — I67.9 CEREBROVASCULAR DISEASE: ICD-10-CM

## 2018-09-25 PROCEDURE — 99204 OFFICE O/P NEW MOD 45 MIN: CPT | Performed by: NURSE PRACTITIONER

## 2018-09-25 NOTE — ASSESSMENT & PLAN NOTE
Past medical history of a stroke left carotid stenting common follows with an outside vascular but expressed wishes to be seen in our office

## 2018-09-25 NOTE — ASSESSMENT & PLAN NOTE
Bilateral varicose veins with pain, heaviness, achiness, tiredness  -patient and daughter report that they were to go to an outpatient clinic tomorrow for possible laser of varicose veins, upon further discussion the would like to transfer care to our office  We discussed obtaining previous records  It does look like he has been followed at [place] [address]  and also seen by Dr Jake Peterson in 2005 for a left carotid stent   -patient does report he was patched stockings regular basis, he is not currently elevating his legs, he does get ambulate with a walker  -we discussed varicose veins and venous disease  Since patient has been wearing compression stockings I ordered a lower extremity reflux duplex as well as an outpatient visit with the vascular surgeon in the near future to discuss possible interventions    Patient and daughter will cancel appointment at the outside facility which was scheduled for tomorrow I will add a leg continue care at our office   -continue with compression stockings daily 20-30 mmHg Rx given  -elevate legs throughout the day as able  -daily skin care including moisturizer to bilateral feet and lower extremities  -continue to walk and exercise legs throughout the day  -Follow-up in 3 months or sooner after reflux studies completed with vascular surgeon

## 2018-09-25 NOTE — ASSESSMENT & PLAN NOTE
Managed by pulmonology  -chronic steroid use  -daily inhalers/nebulizers as prescribed  -denies any shortness of breath at this time

## 2018-09-25 NOTE — ASSESSMENT & PLAN NOTE
Carotid artery disease with stenting to left carotid  -patient follows at an outside vascular; would like to transfer care to this office  -patient and daughter report his last carotid duplex was about 3 months ago  -we can address his carotid artery disease at the follow-up appointment and obtain previous medical records for review     -patient denies any signs or symptoms of TIA/CVA including amaurosis fugax, extremity weakness unilaterally, vision changes, speech or swallowing difficulties  He does report that he has some difficulty getting words out but this is been present for years  He is taking Plavix and Lipitor

## 2018-09-25 NOTE — ASSESSMENT & PLAN NOTE
Lab Results   Component Value Date    HGBA1C 6 2 07/20/2018       No results for input(s): POCGLU in the last 72 hours      Blood Sugar Average: Last 72 hrs:     -diabetes has been well-controlled; management per PCP

## 2018-09-25 NOTE — ASSESSMENT & PLAN NOTE
Atherosclerotic disease  -patient recently restarted on statin therapy; defer dosing and long-term management to PCP  -Lipid panel reviewed, recommend better control of lipids if patient tolerates higher dose of statin

## 2018-09-25 NOTE — PATIENT INSTRUCTIONS
Wear compression stockings daily, during waking hours  Elevate your legs throughout the day  Continue to walk and exercise your legs  I ordered an ultrasound of your legs to check your varicose veins; once the testing is completed, you can see Dr Rocco Aponte to discuss surgical options  Varicose Veins   AMBULATORY CARE:   Varicose veins  are veins that become large, twisted, and swollen  They are common on the back of the calves, knees, and thighs  Varicose veins are caused by valves in your veins that do not work properly  This causes blood to collect and increase pressure in the veins of your legs  The increased pressure causes your veins to stretch, get larger, swell, and twist        Common symptoms include the following: Your symptoms may be worse after you stand or sit for long periods of time  You may have any of the following:  · Blue, purple, or bulging veins in your legs     · Pain, swelling, or muscle cramps in your legs    · Feeling of fatigue or heaviness in your legs    · Cramping in your legs  Seek care immediately if:   · You have a wound that does not heal or is infected  · You have an injury that has broken your skin and caused your varicose veins to bleed  · Your leg is swollen and hard  · You notice that your legs or feet are turning blue or black  · Your leg feels warm, tender, and painful  It may look swollen and red  Contact your healthcare provider if:   · You have pain in your leg that does not go away or gets worse  · You notice sudden large bruising on your legs  · You have a rash on your leg  · Your symptoms keep you from doing your daily activities  · You have questions or concerns about your condition or care  Treatment of varicose veins  aims to decrease symptoms, improve appearance, and prevent further problems  Treatment will depend on which veins are affected and how severe your condition is   You may need procedures to treat or remove your varicose veins  For example, your healthcare provider may inject a solution or use a laser to close the varicose veins  Surgery to remove long veins may also be done  Ask your healthcare provider for more information about procedures used to treat varicose veins  Manage varicose veins:   · Do not sit or stand for long periods of time  This can cause the blood to collect in your legs and make your symptoms worse  Bend or rotate your ankles several times every hour  Walk around for a few minutes every hour to get blood moving in your legs  · Do not cross your legs when you sit  This decreases blood flow to your feet and can make your symptoms worse  · Do not wear tight clothing or shoes  Do not wear high-heeled shoes  Do not wear clothes that are tight around the waist or knees  · Maintain a healthy weight  Being overweight or obese can make your varicose veins worse  Ask your healthcare provider how much you should weigh  Ask him or her to help you create a weight loss plan if you are overweight  · Wear pressure stockings as directed  The stockings are tight and put pressure on your legs  They improve blood flow and help prevent clots  · Elevate your legs  Keep them above the level of your heart for 15 to 30 minutes several times a day  You can also prop the end of your bed up slightly to elevate your legs while you sleep  This will help blood to flow back to your heart  · Get regular exercise  Talk to your healthcare provider about the best exercise plan for you  Exercise can improve blood flow to your legs and feet  Follow up with your healthcare provider as directed:  Write down your questions so you remember to ask them during your visits  © 2017 [employer] [employer] Information is for End User's use only and may not be sold, redistributed or otherwise used for commercial purposes   All illustrations and images included in CareNotes® are the copyrighted property of [employer]  or [employer]  The above information is an  only  It is not intended as medical advice for individual conditions or treatments  Talk to your doctor, nurse or pharmacist before following any medical regimen to see if it is safe and effective for you  Carotid Artery Disease   AMBULATORY CARE:   Carotid artery disease  is a condition that causes narrow or blocked carotid arteries  Your carotid arteries are the blood vessels that supply your brain with most of the blood it needs to work  You have 2 carotid arteries, one on each side of your neck  Call 911 for any of the following:   · You have any of the following signs of a stroke:      ¨ Numbness or drooping on one side of your face     ¨ Weakness in an arm or leg    ¨ Confusion or difficulty speaking    ¨ Dizziness, a severe headache, or vision loss    · You have any of the following signs of a heart attack:      ¨ Squeezing, pressure, or pain in your chest that lasts longer than 5 minutes or returns    ¨ Discomfort or pain in your back, neck, jaw, stomach, or arm     ¨ Trouble breathing    ¨ Nausea or vomiting    ¨ Lightheadedness or a sudden cold sweat, especially with chest pain or trouble breathing  Contact your healthcare provider if:   · You have questions or concerns about your condition or care  Signs and symptoms of carotid artery disease: You may have no signs or symptoms  Most commonly, carotid artery disease causes transient ischemic attacks (TIAs), or mini-strokes  You may have numbness, weakness, lack of movement, or vision or speech problems  A TIA goes away quickly and does not cause permanent damage  A TIA may be a warning sign that you are about to have a stroke  If you have any symptoms of a TIA or stroke, seek care immediately  Warning signs of a stroke: The word F A S T  can help you remember and recognize warning signs of a stroke  · F = Face:  One side of the face droops      · A = Arms:  One arm starts to drop when both arms are raised  · S = Speech:  Speech is slurred or sounds different than usual     · T = Time:  A person who is having a stroke needs to be seen immediately  A stroke is a medical emergency that needs immediate treatment  Some medicines and treatments work best if given within a few hours of a stroke  Treatment  for carotid artery disease depends on how narrow your arteries have become, your symptoms, and your general health  The goal of treatment is to lower your risk for a stroke  You may need any of the following:  · Take aspirin if directed  Your healthcare provider may suggest that you take an aspirin a day to prevent blood clots from forming in the carotid arteries  If your healthcare provider wants you to take aspirin daily, do not take acetaminophen or ibuprofen instead  · Control risk factors  High blood pressure, high cholesterol, heart disease, diabetes, and being overweight increase your risk for atherosclerosis  · Procedures can help open blocked arteries  A carotid endarterectomy is used to cut plaque out of the artery  An angioplasty is used to push the plaque against the artery wall with a balloon device  Sometimes a stent is placed during an angioplasty  A stent is a metal mesh tube that is placed in the artery to keep it open  Manage carotid artery disease:   · Eat a variety of healthy foods  Healthy foods include fruit, vegetables, whole-grain breads, low-fat dairy products, lean meat, and fish  Choose fish that are high in omega-3 fatty acids, such as salmon and fresh tuna  Ask your healthcare provider for more information on a heart healthy diet and the DASH eating plan  · Limit sodium (salt)  Sodium may increase your blood pressure  Add less table salt to your foods  Read food labels and choose foods that are low in sodium  Your healthcare provider may suggest you follow a low sodium diet  · Reach or maintain a healthy weight    Extra weight makes your heart work harder  Ask your healthcare provider how much you should weight  He can help you create a safe weight loss plan  Even a weight loss of 10% of your body weight can help your heart function better  · Exercise as directed  Exercise helps improve heart function and can help you manage your weight  Exercise can also help lower your cholesterol and blood sugar levels  Try to get at least 30 minutes of exercise at least 5 times each week  Try to be active every day  Your healthcare provider can help you create an exercise plan that works best for you  · Limit alcohol  Alcohol can increase your blood pressure and triglyceride levels  Men should limit alcohol to 2 drinks per day  Women should limit alcohol to 1 drink per day  A drink of alcohol is 12 ounces of beer, 5 ounces of wine, or 1½ ounces of liquor  · Do not smoke  Nicotine and other chemicals in cigarettes and cigars can cause heart and lung damage  Ask your healthcare provider for information if you currently smoke and need help to quit  E-cigarettes or smokeless tobacco still contain nicotine  Talk to your healthcare provider before you use these products  Follow up with your healthcare provider as directed:  Write down your questions so you remember to ask them during your visits  © 2017 [address] [employer] Information is for End User's use only and may not be sold, redistributed or otherwise used for commercial purposes  All illustrations and images included in CareNotes® are the copyrighted property of [employer]  or [employer]alc[employer]  The above information is an  only  It is not intended as medical advice for individual conditions or treatments  Talk to your doctor, nurse or pharmacist before following any medical regimen to see if it is safe and effective for you

## 2018-09-25 NOTE — PROGRESS NOTES
Assessment/Plan:    Chronic obstructive pulmonary disease ([place] [address] )  Managed by pulmonology  -chronic steroid use  -daily inhalers/nebulizers as prescribed  -denies any shortness of breath at this time    Hypertension  BP has been well controlled; management by PCP      Type 2 diabetes mellitus ([place] [address] )  Lab Results   Component Value Date    HGBA1C 6 2 07/20/2018       No results for input(s): POCGLU in the last 72 hours  Blood Sugar Average: Last 72 hrs:     -diabetes has been well-controlled; management per PCP    Peripheral vascular disease ([place] [address] )  Atherosclerotic disease  -patient recently restarted on statin therapy; defer dosing and long-term management to PCP  -Lipid panel reviewed, recommend better control of lipids if patient tolerates higher dose of statin    Cerebrovascular disease  Past medical history of a stroke left carotid stenting common follows with an outside vascular but expressed wishes to be seen in our office  Carotid artery disease (HCC)  Carotid artery disease with stenting to left carotid  -patient follows at an outside vascular; would like to transfer care to this office  -patient and daughter report his last carotid duplex was about 3 months ago  -we can address his carotid artery disease at the follow-up appointment and obtain previous medical records for review     -patient denies any signs or symptoms of TIA/CVA including amaurosis fugax, extremity weakness unilaterally, vision changes, speech or swallowing difficulties  He does report that he has some difficulty getting words out but this is been present for years  He is taking Plavix and Lipitor  Varicose 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  Bilateral varicose veins with pain, heaviness, achiness, tiredness  -patient and daughter report that they were to go to an outpatient clinic tomorrow for possible laser of varicose veins, upon further discussion the would like to transfer care to our office    We discussed obtaining previous records  It does look like he has been followed at [place] [address]  and also seen by Dr Brittney Peacock in 2005 for a left carotid stent   -patient does report he was patched stockings regular basis, he is not currently elevating his legs, he does get ambulate with a walker  -we discussed varicose veins and venous disease  Since patient has been wearing compression stockings I ordered a lower extremity reflux duplex as well as an outpatient visit with the vascular surgeon in the near future to discuss possible interventions  Patient and daughter will cancel appointment at the outside facility which was scheduled for tomorrow I will add a leg continue care at our office   -continue with compression stockings daily 20-30 mmHg Rx given  -elevate legs throughout the day as able  -daily skin care including moisturizer to bilateral feet and lower extremities  -continue to walk and exercise legs throughout the day  -Follow-up in 3 months or sooner after reflux studies completed with vascular surgeon       Diagnoses and all orders for this visit:    Varicose 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  -     Ambulatory referral to Vascular Surgery  -     VAS reflux lower limb venous duplex study with reflux assessment, complete bilateral; Future  -     Compression Stocking    Essential hypertension    Chronic bronchitis, unspecified chronic bronchitis type ([place] [address] )    Type 2 diabetes mellitus without complication, without long-term current use of insulin ([facility])    Peripheral vascular disease ([place] [address] )    Cerebrovascular disease    Carotid artery disease, unspecified laterality ([place] [address] )          Subjective:      Patient ID: Shamir Price is a 81 y o  male  Patient is new to our practice referred by Dr Green(Neurology)  He has left leg pain and swelling for years that he has been wearing compression socks for  a long time and has had no relief  He describes the pain as burning  Pain is constant   Tylenol usually does help for a short period  He has an exercise program at home that he has been trying to do regularly  Hx of Parkinsons,neurologic gait dysfunction, HTN, and diabetes  He takes Plavix and Lipitor  HPI  Mr Lolis Adorno is an 50-year-old male with a past medical history of Parkinson's, gait dysfunction, frequent falls, diabetes, hypertension, hyperlipidemia, COPD who was recently treated for exacerbation/pneumonia and steroid use  Patient is being seen in the Vascular Center today for evaluation of his varicose veins on a referral from [facility]  Patient reports that he has had Varicose veins for many years  He reports burning, pain, aching, heaviness to bilateral lower extremities  He reports bulging veins prominently to the left lower extremity  He reports discoloration and reticular/spider veins to bilateral feet  He reports wearing compression stockings on a regular basis  He does not currently elevate his legs  He is walking with a walker and exercising his legs throughout the day  Patient daughter reports that he is scheduled for a laser procedure to with varicose veins tomorrow at have a large all  However he did receive the vascular consult by Dr Pao Maher and wanted to speak to us about his varicose veins  He also follows with [facility] vascular surgery and had a left carotid stent done by Dr Sheldon Mcdonald on 04/27/2005  [facility] for some of his care as well  Does report left-sided leg weakness which has been present for many years as well as difficulty with speech and finding words  He does have a past medical history of a CVA  Is a nonsmoker  He does take Lipitor and Plavix  He was recently restarted on his Plavix by his primary doctor  Patient denies any chest pain, shortness of breath, fever, chills, wounds to bilateral lower extremities were bleeding varicose veins          The following portions of the patient's history were reviewed and updated as appropriate: allergies, current medications, past family history, past medical history, past social history, past surgical history and problem list     Review of Systems   Constitutional: Positive for fatigue  Negative for chills and fever  HENT: Negative  Eyes: Negative  Respiratory: Negative  Negative for shortness of breath  Cardiovascular: Positive for leg swelling  Negative for chest pain  Gastrointestinal: Negative  Endocrine: Negative  Genitourinary: Negative  Musculoskeletal: Positive for gait problem  Leg pain   Skin: Negative  Allergic/Immunologic: Negative  Neurological: Positive for speech difficulty and weakness  Hematological: Bruises/bleeds easily  Objective:      /76 (BP Location: Right arm, Patient Position: Sitting)   Pulse 74   Temp 98 6 °F (37 °C)   Resp 18   Ht 5' 4" (1 626 m)   Wt 85 3 kg (188 lb)   BMI 32 27 kg/m²          Physical Exam   Constitutional: He is oriented to person, place, and time  He appears well-developed and well-nourished  HENT:   Head: Normocephalic and atraumatic  Eyes: EOM are normal  Pupils are equal, round, and reactive to light  No scleral icterus  Neck: Normal range of motion  Carotid bruit is not present  No bruit appreciated on physical exam today   Cardiovascular: Normal rate and regular rhythm  Murmur heard  Pulses:       Carotid pulses are 2+ on the right side, and 2+ on the left side  Radial pulses are 2+ on the right side, and 2+ on the left side  Dorsalis pedis pulses are 2+ on the right side, and 2+ on the left side  Pulmonary/Chest: Effort normal and breath sounds normal  No respiratory distress  Abdominal: Soft  Bowel sounds are normal    Musculoskeletal: Normal range of motion  Neurological: He is alert and oriented to person, place, and time  He has normal strength  Skin: Skin is warm, dry and intact  Psychiatric: He has a normal mood and affect   His speech is normal and behavior is normal  Judgment and thought content normal  Cognition and memory are normal    No cognitive defects noted during our visit   Nursing note and vitals reviewed  Vitals:    09/25/18 0927   BP: 154/76   BP Location: Right arm   Patient Position: Sitting   Pulse: 74   Resp: 18   Temp: 98 6 °F (37 °C)   Weight: 85 3 kg (188 lb)   Height: 5' 4" (1 626 m)       Patient Active Problem List   Diagnosis    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) ([facility])    Allergic asthma    Allergic rhinitis    Aortic regurgitation    Bronchiectasis with acute lower respiratory infection ([address] )    Long term (current) use of systemic steroids    Hyperlipidemia    Hypertension    [facility]    Neurologic gait dysfunction    Type 2 diabetes mellitus (HCC)    Peripheral vascular disease ([facility])    Chronic obstructive pulmonary disease ([facility])    Cerebrovascular disease    Carotid artery disease ([facility]    Varicose 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       Past Surgical History:   Procedure Laterality Date    NASAL SINUS SURGERY      AL BRONCHOSCOPY,DIAGNOSTIC N/A 7/27/2016    Procedure: BRONCHOSCOPY;  Surgeon: Rachael Mckeon MD;  [facility];   Service: Pulmonary       Family History   Problem Relation Age of Onset    COPD Sister    [facility] Lung cancer Sister     Asthma Family        Social History     Social History    Marital status: /Civil Union     Spouse name: N/A    Number of children: N/A    Years of education: N/A     Occupational History    retired      Social History Main Topics    Smoking status: Former Smoker    Smokeless tobacco: Never Used    Alcohol use Yes      Comment: occasional    Drug use: No    Sexual activity: No     Other Topics Concern    Not on file     Social History Narrative    Lives independently with spouse    No advance directives           Allergies   Allergen Reactions    Penicillins Syncope         Current Outpatient Prescriptions:   acetaminophen (TYLENOL) 325 mg tablet, Take 650 mg by mouth every 6 (six) hours as needed for mild pain, Disp: , Rfl:     ADVAIR DISKUS 250-50 MCG/DOSE inhaler, INHALE 1 DOSE BY MOUTH TWICE DAILY   RINSE MOUTH AFTER USE, Disp: 180 each, Rfl: 1    albuterol (2 5 mg/3 mL) 0 083 % nebulizer solution, Take 1 vial (2 5 mg total) by nebulization every 4 (four) hours as needed for wheezing, Disp: 3 mL, Rfl: 5    albuterol (VENTOLIN HFA) 90 mcg/act inhaler, Inhale 2 puffs every 6 (six) hours as needed for wheezing , Disp: , Rfl:     atorvastatin (LIPITOR) 10 mg tablet, Take 1 tablet (10 mg total) by mouth daily, Disp: 30 tablet, Rfl: 5    carbidopa-levodopa (SINEMET)  mg per tablet, Take 1 tablet by mouth 3 (three) times a day, Disp: 90 tablet, Rfl: 2    clopidogrel (PLAVIX) 75 mg tablet, TAKE 1 TABLET BY MOUTH EVERY DAY, Disp: 30 tablet, Rfl: 5    losartan-hydrochlorothiazide (HYZAAR) 50-12 5 mg per tablet, Take 1 tablet by mouth daily, Disp: 30 tablet, Rfl: 5    montelukast (SINGULAIR) 10 mg tablet, TAKE 1 TABLET BY MOUTH AT BEDTIME, Disp: 30 tablet, Rfl: 5    pramipexole (MIRAPEX) 0 5 mg tablet, TAKE 1 TABLET BY MOUTH 3 TIMES DAILY, Disp: 90 tablet, Rfl: 6    predniSONE 10 mg tablet, Take 1 tablet (10 mg total) by mouth daily, Disp: 30 tablet, Rfl: 3    SPIRIVA HANDIHALER 18 MCG inhalation capsule, INHALE 1 CAPSULE VIA HANDIHALER ONCE DAILY AT THE SAME TIME EVERY DAY, Disp: 30 capsule, Rfl: 3    triamcinolone (KENALOG) 0 5 % cream, Apply topically 2 (two) times a day as needed  , Disp: , Rfl:

## 2018-09-25 NOTE — LETTER
September 25, 2018     Richard Harris MD  3 Parkinson's [address] [facility] [address]    Patient: Nilsa Martin   YOB: 1935   Date of Visit: 9/25/2018       Dear Dr Chapman Prom:    Thank you for referring Nilsa Martin to me for evaluation  Below are my notes for this consultation  If you have questions, please do not hesitate to call me  I look forward to following your patient along with you           Sincerely,        NORBERTO Casey        CC: No Recipients

## 2018-09-29 DIAGNOSIS — I10 ESSENTIAL HYPERTENSION: Primary | ICD-10-CM

## 2018-09-29 RX ORDER — LOSARTAN POTASSIUM AND HYDROCHLOROTHIAZIDE 12.5; 5 MG/1; MG/1
1 TABLET ORAL DAILY
Qty: 90 TABLET | Refills: 3 | Status: SHIPPED | OUTPATIENT
Start: 2018-09-29 | End: 2018-12-04 | Stop reason: SDUPTHER

## 2018-09-29 RX ORDER — VALSARTAN AND HYDROCHLOROTHIAZIDE 80; 12.5 MG/1; MG/1
TABLET, FILM COATED ORAL
Qty: 90 TABLET | Refills: 3 | Status: CANCELLED | OUTPATIENT
Start: 2018-09-29

## 2018-10-02 ENCOUNTER — TELEPHONE (OUTPATIENT)
Dept: INTERNAL MEDICINE CLINIC | Facility: CLINIC | Age: 83
End: 2018-10-02

## 2018-10-03 ENCOUNTER — TELEPHONE (OUTPATIENT)
Dept: INTERNAL MEDICINE CLINIC | Facility: CLINIC | Age: 83
End: 2018-10-03

## 2018-10-03 NOTE — TELEPHONE ENCOUNTER
FYI - He has been trying to reach patient for a while    And his daughter    Can only leave voice messages and never gets a call back    Re the order from Dr Tara Abbott for nebulizer & tubing   Brendan Manifold He said this order will be voided, because he's unable to reach the patient    But said the order is good for a year    So if he hears from the patient the order will still be good

## 2018-10-07 DIAGNOSIS — G20 PD (PARKINSON'S DISEASE) (HCC): ICD-10-CM

## 2018-10-14 DIAGNOSIS — J42 CHRONIC BRONCHITIS, UNSPECIFIED CHRONIC BRONCHITIS TYPE (HCC): Primary | ICD-10-CM

## 2018-10-15 RX ORDER — ALBUTEROL SULFATE 90 UG/1
2 AEROSOL, METERED RESPIRATORY (INHALATION) EVERY 6 HOURS PRN
Qty: 18 G | Refills: 3 | Status: SHIPPED | OUTPATIENT
Start: 2018-10-15 | End: 2019-08-29 | Stop reason: HOSPADM

## 2018-10-28 DIAGNOSIS — J47.0 BRONCHIECTASIS WITH ACUTE LOWER RESPIRATORY INFECTION (HCC): ICD-10-CM

## 2018-10-28 DIAGNOSIS — I10 ESSENTIAL HYPERTENSION: Primary | ICD-10-CM

## 2018-10-28 RX ORDER — VALSARTAN AND HYDROCHLOROTHIAZIDE 80; 12.5 MG/1; MG/1
TABLET, FILM COATED ORAL
Qty: 90 TABLET | Refills: 3 | Status: CANCELLED | OUTPATIENT
Start: 2018-10-28

## 2018-10-29 RX ORDER — PREDNISONE 20 MG/1
TABLET ORAL
Qty: 18 TABLET | Refills: 0 | Status: SHIPPED | OUTPATIENT
Start: 2018-10-29 | End: 2019-02-05

## 2018-10-29 RX ORDER — IRBESARTAN AND HYDROCHLOROTHIAZIDE 150; 12.5 MG/1; MG/1
1 TABLET, FILM COATED ORAL DAILY
Qty: 90 TABLET | Refills: 3 | Status: SHIPPED | OUTPATIENT
Start: 2018-10-29 | End: 2019-09-05 | Stop reason: SDUPTHER

## 2018-10-31 ENCOUNTER — TELEPHONE (OUTPATIENT)
Dept: INTERNAL MEDICINE CLINIC | Facility: CLINIC | Age: 83
End: 2018-10-31

## 2018-10-31 NOTE — TELEPHONE ENCOUNTER
Daughter, Elle Davis called- she's confused about 2 medications  She's wondering if the irbesartan-hydrochlorothiazide was to replace the losartan-hydrochlorothiazide or be in addition to  She just picked up the irbesartan and doesn't want to give it to her dad without the clarification   Please call her at #[phone]

## 2018-11-13 DIAGNOSIS — J45.909 UNCOMPLICATED ASTHMA, UNSPECIFIED ASTHMA SEVERITY, UNSPECIFIED WHETHER PERSISTENT: ICD-10-CM

## 2018-11-13 RX ORDER — MONTELUKAST SODIUM 10 MG/1
10 TABLET ORAL
Qty: 30 TABLET | Refills: 5 | Status: SHIPPED | OUTPATIENT
Start: 2018-11-13 | End: 2018-11-15 | Stop reason: SDUPTHER

## 2018-11-15 DIAGNOSIS — I63.9 CEREBROVASCULAR ACCIDENT (CVA), UNSPECIFIED MECHANISM (HCC): ICD-10-CM

## 2018-11-15 DIAGNOSIS — J44.9 CHRONIC OBSTRUCTIVE PULMONARY DISEASE, UNSPECIFIED COPD TYPE (HCC): ICD-10-CM

## 2018-11-15 DIAGNOSIS — J45.909 UNCOMPLICATED ASTHMA, UNSPECIFIED ASTHMA SEVERITY, UNSPECIFIED WHETHER PERSISTENT: ICD-10-CM

## 2018-11-15 RX ORDER — MONTELUKAST SODIUM 10 MG/1
10 TABLET ORAL
Qty: 90 TABLET | Refills: 3 | Status: SHIPPED | OUTPATIENT
Start: 2018-11-15 | End: 2019-08-09 | Stop reason: SDUPTHER

## 2018-11-15 RX ORDER — CLOPIDOGREL BISULFATE 75 MG/1
75 TABLET ORAL DAILY
Qty: 90 TABLET | Refills: 3 | Status: SHIPPED | OUTPATIENT
Start: 2018-11-15 | End: 2019-11-02 | Stop reason: SDUPTHER

## 2018-12-04 ENCOUNTER — OFFICE VISIT (OUTPATIENT)
Dept: INTERNAL MEDICINE CLINIC | Facility: CLINIC | Age: 83
End: 2018-12-04
Payer: COMMERCIAL

## 2018-12-04 ENCOUNTER — HOSPITAL ENCOUNTER (OUTPATIENT)
Dept: CT IMAGING | Facility: HOSPITAL | Age: 83
Discharge: HOME | End: 2018-12-04
Payer: COMMERCIAL

## 2018-12-04 VITALS
BODY MASS INDEX: 30.87 KG/M2 | RESPIRATION RATE: 16 BRPM | WEIGHT: 180.8 LBS | SYSTOLIC BLOOD PRESSURE: 118 MMHG | HEART RATE: 82 BPM | DIASTOLIC BLOOD PRESSURE: 68 MMHG | HEIGHT: 64 IN

## 2018-12-04 DIAGNOSIS — H11.31 CONJUNCTIVAL HEMORRHAGE, RIGHT EYE: ICD-10-CM

## 2018-12-04 DIAGNOSIS — D32.9 MENINGIOMA (HCC): Primary | ICD-10-CM

## 2018-12-04 DIAGNOSIS — S00.11XA PERIORBITAL ECCHYMOSIS OF RIGHT EYE, INITIAL ENCOUNTER: ICD-10-CM

## 2018-12-04 DIAGNOSIS — S09.90XS INJURY OF HEAD, SEQUELA: Primary | ICD-10-CM

## 2018-12-04 DIAGNOSIS — S09.90XS INJURY OF HEAD, SEQUELA: ICD-10-CM

## 2018-12-04 PROCEDURE — 70450 CT HEAD/BRAIN W/O DYE: CPT

## 2018-12-04 PROCEDURE — 3008F BODY MASS INDEX DOCD: CPT | Performed by: NURSE PRACTITIONER

## 2018-12-04 PROCEDURE — 4040F PNEUMOC VAC/ADMIN/RCVD: CPT | Performed by: NURSE PRACTITIONER

## 2018-12-04 PROCEDURE — 1160F RVW MEDS BY RX/DR IN RCRD: CPT | Performed by: NURSE PRACTITIONER

## 2018-12-04 PROCEDURE — 99213 OFFICE O/P EST LOW 20 MIN: CPT | Performed by: NURSE PRACTITIONER

## 2018-12-04 NOTE — PATIENT INSTRUCTIONS
Right holli orbital ecchymosis with conjunctival hemorrhage:  Patient denies any visual disturbance  Extraocular movement intact  Instructed patient to follow up with Ophthalmology this week  Fall with head injury:  Due to the patient being on Plavix, stat CT of the head ordered and will be completed today  Results will be called to the patient

## 2018-12-04 NOTE — PROGRESS NOTES
Assessment/Plan:    Patient Instructions   Right holli orbital ecchymosis with conjunctival hemorrhage:  Patient denies any visual disturbance  Extraocular movement intact  Instructed patient to follow up with Ophthalmology this week  Fall with head injury:  Due to the patient being on Plavix, stat CT of the head ordered and will be completed today  Results will be called to the patient  Diagnoses and all orders for this visit:    Injury of head, sequela  -     CT head wo contrast; Future    Conjunctival hemorrhage, right eye    Periorbital ecchymosis of right eye, initial encounter         Subjective:      Patient ID: Araceli Nieves is a 81 y o  male    Patient presents today with his daughter, Kirsten Mccoy,  She states that late Saturday night into Sunday morning he fell and hit his right eye on the nightstand it was not witnessed and unknown loss of conscious  However, today he reports that he does not remember the incident  His daughter came by on Sunday and he was acting appropriate but had ecchymosis under his right eye  He denies any headache or visual changes  He denies any neurological changes  Current Outpatient Prescriptions:     acetaminophen (TYLENOL) 325 mg tablet, Take 650 mg by mouth every 6 (six) hours as needed for mild pain, Disp: , Rfl:     ADVAIR DISKUS 250-50 MCG/DOSE inhaler, INHALE 1 DOSE BY MOUTH TWICE DAILY   RINSE MOUTH AFTER USE, Disp: 180 each, Rfl: 1    albuterol (2 5 mg/3 mL) 0 083 % nebulizer solution, Take 1 vial (2 5 mg total) by nebulization every 4 (four) hours as needed for wheezing, Disp: 3 mL, Rfl: 5    albuterol (VENTOLIN HFA) 90 mcg/act inhaler, Inhale 2 puffs every 6 (six) hours as needed for wheezing, Disp: 18 g, Rfl: 3    atorvastatin (LIPITOR) 10 mg tablet, Take 1 tablet (10 mg total) by mouth daily, Disp: 30 tablet, Rfl: 5    carbidopa-levodopa (SINEMET)  mg per tablet, TAKE 1 TABLET BY MOUTH THREE TIMES A DAY, Disp: 90 tablet, Rfl: 2   clopidogrel (PLAVIX) 75 mg tablet, Take 1 tablet (75 mg total) by mouth daily, Disp: 90 tablet, Rfl: 3    irbesartan-hydrochlorothiazide (AVALIDE) 150-12 5 MG per tablet, Take 1 tablet by mouth daily, Disp: 90 tablet, Rfl: 3    losartan-hydrochlorothiazide (HYZAAR) 50-12 5 mg per tablet, Take 1 tablet by mouth daily, Disp: 30 tablet, Rfl: 5    montelukast (SINGULAIR) 10 mg tablet, Take 1 tablet (10 mg total) by mouth daily at bedtime, Disp: 90 tablet, Rfl: 3    pramipexole (MIRAPEX) 0 5 mg tablet, TAKE 1 TABLET BY MOUTH 3 TIMES DAILY, Disp: 90 tablet, Rfl: 6    predniSONE 10 mg tablet, Take 1 tablet (10 mg total) by mouth daily, Disp: 30 tablet, Rfl: 3    tiotropium (SPIRIVA HANDIHALER) 18 mcg inhalation capsule, Place 1 capsule (18 mcg total) into inhaler and inhale daily Via Handihaler at the same time every day, Disp: 90 capsule, Rfl: 3    triamcinolone (KENALOG) 0 5 % cream, Apply topically 2 (two) times a day as needed  , Disp: , Rfl:     predniSONE 20 mg tablet, TAKE 2 TABS FOR 5 DAYS, 1 TAB FOR 5 DAYS, 1/2 TAB X 5 DAYS (Patient not taking: Reported on 12/4/2018), Disp: 18 tablet, Rfl: 0    No results found for this or any previous visit (from the past 1008 hour(s))  The following portions of the patient's history were reviewed and updated as appropriate: allergies, current medications, past family history, past medical history, past social history, past surgical history and problem list      Review of Systems   Eyes: Positive for discharge and redness  Negative for photophobia, pain and visual disturbance  Neurological: Negative for seizures, light-headedness and headaches  Psychiatric/Behavioral: Positive for confusion           Objective:      /68 (BP Location: Left arm, Patient Position: Sitting, Cuff Size: Standard)   Pulse 82   Resp 16   Ht 5' 4" (1 626 m)   Wt 82 kg (180 lb 12 8 oz)   BMI 31 03 kg/m²        Physical Exam   Constitutional: He appears well-developed and well-nourished  HENT:   Head: Normocephalic  Eyes: Pupils are equal, round, and reactive to light  EOM are normal  Right eye exhibits discharge (tearing)  No foreign body present in the right eye  Right conjunctiva is injected  Right conjunctiva has a hemorrhage  Left conjunctiva is not injected  Left conjunctiva has no hemorrhage  Right periorbital ecchymosis    Neurological: He is alert  He has normal strength  No cranial nerve deficit  Pt has history of impaired memory  He was unable to correctly identify the year but was able to identify the current president and the names of his three daughters  He is amnestic to the fall

## 2018-12-05 ENCOUNTER — APPOINTMENT (OUTPATIENT)
Dept: LAB | Facility: CLINIC | Age: 83
End: 2018-12-05
Payer: COMMERCIAL

## 2018-12-05 DIAGNOSIS — D32.9 MENINGIOMA (HCC): Primary | ICD-10-CM

## 2018-12-05 DIAGNOSIS — D32.9 MENINGIOMA (HCC): ICD-10-CM

## 2018-12-05 LAB
ANION GAP SERPL CALCULATED.3IONS-SCNC: 6 MMOL/L (ref 4–13)
BUN SERPL-MCNC: 34 MG/DL (ref 5–25)
CALCIUM SERPL-MCNC: 9.3 MG/DL (ref 8.3–10.1)
CHLORIDE SERPL-SCNC: 102 MMOL/L (ref 100–108)
CO2 SERPL-SCNC: 27 MMOL/L (ref 21–32)
CREAT SERPL-MCNC: 1.22 MG/DL (ref 0.6–1.3)
GFR SERPL CREATININE-BSD FRML MDRD: 55 ML/MIN/1.73SQ M
GLUCOSE SERPL-MCNC: 122 MG/DL (ref 65–140)
POTASSIUM SERPL-SCNC: 3.8 MMOL/L (ref 3.5–5.3)
SODIUM SERPL-SCNC: 135 MMOL/L (ref 136–145)

## 2018-12-05 PROCEDURE — 80048 BASIC METABOLIC PNL TOTAL CA: CPT

## 2018-12-05 PROCEDURE — 36415 COLL VENOUS BLD VENIPUNCTURE: CPT

## 2018-12-06 ENCOUNTER — HOSPITAL ENCOUNTER (OUTPATIENT)
Dept: MRI IMAGING | Facility: CLINIC | Age: 83
Discharge: HOME | End: 2018-12-06
Payer: COMMERCIAL

## 2018-12-06 DIAGNOSIS — D32.9 MENINGIOMA (HCC): ICD-10-CM

## 2018-12-06 PROCEDURE — A9585 GADOBUTROL INJECTION: HCPCS | Performed by: PSYCHIATRY & NEUROLOGY

## 2018-12-06 PROCEDURE — 70553 MRI BRAIN STEM W/O & W/DYE: CPT

## 2018-12-06 RX ADMIN — GADOBUTROL 9 ML: 604.72 INJECTION INTRAVENOUS at 13:02

## 2018-12-07 ENCOUNTER — TELEPHONE (OUTPATIENT)
Dept: NEUROLOGY | Facility: CLINIC | Age: 83
End: 2018-12-07

## 2018-12-07 DIAGNOSIS — D32.9 MENINGIOMA (HCC): Primary | ICD-10-CM

## 2018-12-07 NOTE — TELEPHONE ENCOUNTER
Called patient's daughter, explain MRI results, will set up appointment with Neurosurgery, and also explained about acute sinusitis left maxillary and sphenoid, advised ENT consultation or follow-up with PCP

## 2018-12-10 NOTE — TELEPHONE ENCOUNTER
Pt scheduled for varicose vein surgery tomorrow w/vascular  Daughter questioning if pt ok to get this done w/abnormal MRI last week    Please advise    Lola: [phone]

## 2018-12-11 ENCOUNTER — HOSPITAL ENCOUNTER (OUTPATIENT)
Dept: NON INVASIVE DIAGNOSTICS | Facility: CLINIC | Age: 83
Discharge: HOME | End: 2018-12-11
Payer: COMMERCIAL

## 2018-12-11 DIAGNOSIS — I83.813 VARICOSE VEINS OF BILATERAL LOWER EXTREMITIES WITH PAIN: ICD-10-CM

## 2018-12-11 PROCEDURE — 93970 EXTREMITY STUDY: CPT

## 2018-12-11 PROCEDURE — 93970 EXTREMITY STUDY: CPT | Performed by: SURGERY

## 2018-12-11 NOTE — TELEPHONE ENCOUNTER
US done today, they have f/u on 12/27  [facility] [place] will call on Monday if they have not heard from neurosurg

## 2018-12-26 NOTE — PROGRESS NOTES
Assessment/Plan:    H/O burning pain in leg  Constant left leg and foot burning pain for the past 4 years  Unclear if this is related to his left leg venous reflux and varicose veins  Does not seem to have any significant arterial insufficiency as he has a 2+ palpable dorsalis pedis pulse in the left foot which is warm and well perfused with brisk cap refill  I will discuss this with his neurologist Dr Zee Argueta  Is it worth trying a small dose of gabapentin to see if that helps? He also has a meningioma that is enlarging I wonder if this is causing some of his symptoms  I will also discuss this with neurosurgeon Dr Oscar Cano who will be evaluating him next month for the meningioma  I am not entirely convinced that all this pain is secondary to his varicose veins  However if there is no other cause that is found can certainly offer him left greater saphenous vein ablation and stab phlebectomy  He is quite old and frail with ambulatory dysfunction and it will take him longer than usual to recover from surgical procedure  I explained this clearly to his daughter who is agreeable to the plan  I will see him back in February after he is evaluated by Dr Oscar Cano to discuss whether we should proceed with varicose vein ablation  Diagnoses and all orders for this visit:    Varicose 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    H/O burning pain in leg               Patient ID: Keven Pang is a 81 y o  male  Patient is here to evaluate the lower extremity venous Doppler performed on December 11, 2018  He complains of varicose veins to both legs but left is worse than the right  He has had them for several years since he was young  He has been wearing compression stockings off and on  The compression stockings do not help much  He has a constant burning pain in his entire left leg and foot that is unrelenting and has not stop with elevation or compression    The pain is continuous and can wake him up at night as well  The pain is the same in the morning or the evening  He does not complain of much swelling in his leg  He has had no prior procedures for varicose veins in the past   He has had a prior carotid stent [facility] at [facility]  He denies any open wounds or sores on the foot  Patient is on  statin and Plavix  He was recently diagnosed with a meningioma that has enlarged in size and will be seeing neurosurgeon at the end of January  He has a long history of Parkinson's and also has pre diabetes  History obtained from patient and also from his daughter who accompanied him  Majority of the history provided by the daughter  The following portions of the patient's history were reviewed and updated as appropriate: allergies, current medications, past family history, past medical history, past social history, past surgical history and problem list     Review of Systems   Constitutional: Negative  HENT: Negative  Eyes: Negative  Respiratory: Negative  Cardiovascular: Positive for leg swelling  Painful veins   Gastrointestinal: Negative  Endocrine: Negative  Genitourinary: Negative  Musculoskeletal: Negative  Skin: Negative  Allergic/Immunologic: Negative  Neurological: Negative  Hematological: Negative  Psychiatric/Behavioral: Negative  I have reviewed the review of systems as entered and made appropriate changes as necessary    Objective:      /80 (BP Location: Left arm, Patient Position: Sitting, Cuff Size: Adult)   Temp 97 7 °F (36 5 °C) (Tympanic)   Ht 5' 4" (1 626 m)   BMI 32 27 kg/m²          Physical Exam   Constitutional: He is oriented to person, place, and time  He appears well-developed and well-nourished  HENT:   Head: Normocephalic and atraumatic  Right Ear: External ear normal    Left Ear: External ear normal    Eyes: Right eye exhibits no discharge  Left eye exhibits no discharge  No scleral icterus  Cardiovascular: Normal rate, regular rhythm, normal heart sounds and intact distal pulses  Pulses:       Dorsalis pedis pulses are 2+ on the right side, and 2+ on the left side  Pulmonary/Chest: Effort normal  No respiratory distress  Abdominal: Soft  Musculoskeletal: Normal range of motion  He exhibits tenderness (Left leg)  He exhibits no edema or deformity  Neurological: He is alert and oriented to person, place, and time  Coordination abnormal    Skin: Skin is warm and dry  No rash noted  No erythema  Multiple and large varicosities bilaterally but left is worse than right  No evidence of hyperpigmentation or lipodermatosclerosis  Psychiatric: He has a normal mood and affect  His behavior is normal    Nursing note and vitals reviewed

## 2018-12-27 ENCOUNTER — OFFICE VISIT (OUTPATIENT)
Dept: VASCULAR SURGERY | Facility: CLINIC | Age: 83
End: 2018-12-27
Payer: COMMERCIAL

## 2018-12-27 VITALS
DIASTOLIC BLOOD PRESSURE: 80 MMHG | HEIGHT: 64 IN | SYSTOLIC BLOOD PRESSURE: 150 MMHG | TEMPERATURE: 97.7 F | BODY MASS INDEX: 32.27 KG/M2

## 2018-12-27 DIAGNOSIS — I83.813 VARICOSE VEINS OF BILATERAL LOWER EXTREMITIES WITH PAIN: Primary | ICD-10-CM

## 2018-12-27 DIAGNOSIS — Z87.39 H/O BURNING PAIN IN LEG: Chronic | ICD-10-CM

## 2018-12-27 PROCEDURE — 99214 OFFICE O/P EST MOD 30 MIN: CPT | Performed by: SURGERY

## 2018-12-27 NOTE — LETTER
December 27, 2018     Daniel Marcelino MD  [facility] [address] [place] [address]    Patient: Sergio Patterson   YOB: 1935   Date of Visit: 12/27/2018       Dear Dr Eva Calvillo:    Thank you for referring Sergio Patterson to me for evaluation  Below are my notes for this consultation  If you have questions, please do not hesitate to call me  I look forward to following your patient along with you  Sincerely,        Juliette Cabrales MD        CC: MD Stephen Roberson MD Leverne Clipper, MD  12/27/2018 12:10 PM  Sign at close encounter  Assessment/Plan:    H/O burning pain in leg  Constant left leg and foot burning pain for the past 4 years  Unclear if this is related to his left leg venous reflux and varicose veins  Does not seem to have any significant arterial insufficiency as he has a 2+ palpable dorsalis pedis pulse in the left foot which is warm and well perfused with brisk cap refill  I will discuss this with his neurologist Dr Eva Calvillo  Is it worth trying a small dose of gabapentin to see if that helps? He also has a meningioma that is enlarging I wonder if this is causing some of his symptoms  I will also discuss this with neurosurgeon Dr Alton Gonzáles who will be evaluating him next month for the meningioma  I am not entirely convinced that all this pain is secondary to his varicose veins  However if there is no other cause that is found can certainly offer him left greater saphenous vein ablation and stab phlebectomy  He is quite old and frail with ambulatory dysfunction and it will take him longer than usual to recover from surgical procedure  I explained this clearly to his daughter who is agreeable to the plan  I will see him back in February after he is evaluated by Dr Alton Gonzáles to discuss whether we should proceed with varicose vein ablation         Diagnoses and all orders for this visit:    Varicose 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    H/O burning pain in leg               Patient ID: Zandra Kayser is a 81 y o  male  Patient is here to evaluate the lower extremity venous Doppler performed on December 11, 2018  He complains of varicose veins to both legs but left is worse than the right  He has had them for several years since he was young  He has been wearing compression stockings off and on  The compression stockings do not help much  He has a constant burning pain in his entire left leg and foot that is unrelenting and has not stop with elevation or compression  The pain is continuous and can wake him up at night as well  The pain is the same in the morning or the evening  He does not complain of much swelling in his leg  He has had no prior procedures for varicose veins in the past   He has had a prior carotid stent done at [facility]  He denies any open wounds or sores on the foot  Patient is on  statin and Plavix  He was recently diagnosed with a meningioma that has enlarged in size and will be seeing neurosurgeon at the end of January  He has a long history of Parkinson's and also has pre diabetes  History obtained from patient and also from his daughter who accompanied him  Majority of the history provided by the daughter  The following portions of the patient's history were reviewed and updated as appropriate: allergies, current medications, past family history, past medical history, past social history, past surgical history and problem list     Review of Systems   Constitutional: Negative  HENT: Negative  Eyes: Negative  Respiratory: Negative  Cardiovascular: Positive for leg swelling  Painful veins   Gastrointestinal: Negative  Endocrine: Negative  Genitourinary: Negative  Musculoskeletal: Negative  Skin: Negative  Allergic/Immunologic: Negative  Neurological: Negative  Hematological: Negative  Psychiatric/Behavioral: Negative        I have reviewed the review of systems as entered and made appropriate changes as necessary    Objective:      /80 (BP Location: Left arm, Patient Position: Sitting, Cuff Size: Adult)   Temp 97 7 °F (36 5 °C) (Tympanic)   Ht 5' 4" (1 626 m)   BMI 32 27 kg/m²           Physical Exam   Constitutional: He is oriented to person, place, and time  He appears well-developed and well-nourished  HENT:   Head: Normocephalic and atraumatic  Right Ear: External ear normal    Left Ear: External ear normal    Eyes: Right eye exhibits no discharge  Left eye exhibits no discharge  No scleral icterus  Cardiovascular: Normal rate, regular rhythm, normal heart sounds and intact distal pulses  Pulses:       Dorsalis pedis pulses are 2+ on the right side, and 2+ on the left side  Pulmonary/Chest: Effort normal  No respiratory distress  Abdominal: Soft  Musculoskeletal: Normal range of motion  He exhibits tenderness (Left leg)  He exhibits no edema or deformity  Neurological: He is alert and oriented to person, place, and time  Coordination abnormal    Skin: Skin is warm and dry  No rash noted  No erythema  Multiple and large varicosities bilaterally but left is worse than right  No evidence of hyperpigmentation or lipodermatosclerosis  Psychiatric: He has a normal mood and affect  His behavior is normal    Nursing note and vitals reviewed

## 2018-12-27 NOTE — ASSESSMENT & PLAN NOTE
Constant left leg and foot burning pain for the past 4 years  Unclear if this is related to his left leg venous reflux and varicose veins  Does not seem to have any significant arterial insufficiency as he has a 2+ palpable dorsalis pedis pulse in the left foot which is warm and well perfused with brisk cap refill  I will discuss this with his neurologist Dr Jorje Lofton  Is it worth trying a small dose of gabapentin to see if that helps? He also has a meningioma that is enlarging I wonder if this is causing some of his symptoms  I will also discuss this with neurosurgeon Dr Diego Parekh who will be evaluating him next month for the meningioma  I am not entirely convinced that all this pain is secondary to his varicose veins  However if there is no other cause that is found can certainly offer him left greater saphenous vein ablation and stab phlebectomy  He is quite old and frail with ambulatory dysfunction and it will take him longer than usual to recover from surgical procedure  I explained this clearly to his daughter who is agreeable to the plan  I will see him back in February after he is evaluated by Dr Diego Parekh to discuss whether we should proceed with varicose vein ablation  Have reviewed the MRI of the brain and lower extremity venous Doppler images today in the office and discussed findings with the patient and family

## 2019-01-15 ENCOUNTER — TELEPHONE (OUTPATIENT)
Dept: NEUROLOGY | Facility: CLINIC | Age: 84
End: 2019-01-15

## 2019-01-15 NOTE — TELEPHONE ENCOUNTER
Received a phone call from [place] with neurodiagnostics that the patient was a no-show for scheduled EEG today      [phone]

## 2019-01-30 ENCOUNTER — OFFICE VISIT (OUTPATIENT)
Dept: NEUROSURGERY | Facility: CLINIC | Age: 84
End: 2019-01-30
Payer: COMMERCIAL

## 2019-01-30 ENCOUNTER — TRANSCRIBE ORDERS (OUTPATIENT)
Dept: NEUROSURGERY | Facility: CLINIC | Age: 84
End: 2019-01-30

## 2019-01-30 VITALS
TEMPERATURE: 98.2 F | BODY MASS INDEX: 31.58 KG/M2 | SYSTOLIC BLOOD PRESSURE: 110 MMHG | RESPIRATION RATE: 18 BRPM | WEIGHT: 185 LBS | HEIGHT: 64 IN | HEART RATE: 82 BPM | DIASTOLIC BLOOD PRESSURE: 64 MMHG

## 2019-01-30 DIAGNOSIS — D32.9 MENINGIOMA (HCC): ICD-10-CM

## 2019-01-30 DIAGNOSIS — Z01.818 PRE-PROCEDURAL EXAMINATION: Primary | ICD-10-CM

## 2019-01-30 DIAGNOSIS — D33.0 BENIGN NEOPLASM OF SUPRATENTORIAL REGION OF BRAIN (HCC): ICD-10-CM

## 2019-01-30 DIAGNOSIS — R90.89 ABNORMAL FINDING ON MRI OF BRAIN: Primary | ICD-10-CM

## 2019-01-30 PROCEDURE — 99203 OFFICE O/P NEW LOW 30 MIN: CPT | Performed by: NEUROLOGICAL SURGERY

## 2019-01-30 NOTE — LETTER
January 30, 2019     Jeremy Carney MD  [facility] [address] [place] [address]    Patient: Reynaldo Harp   YOB: 1935   Date of Visit: 1/30/2019       Dear Dr Colin Gutierrez:    Thank you for referring Reynaldo Harp to me for evaluation  Below are my notes for this consultation  If you have questions, please do not hesitate to call me  I look forward to following your patient along with you  Sincerely,        Ginny Lopez MD        CC: MD Yulisa Honeycutt MD Janifer Colander, PA-C  1/30/2019  2:27 PM  Sign at close encounter  Assessment/Plan:    Very pleasant 81-year-old male, accompanied by 2 daughters and granddaughter, presents to review MRI of the brain  He has a history of a fall, December of 2018, CT head revealed extra-axial mass, left frontal parietal vertex mass most likely thought to be a noncalcified meningioma, and subsequent MRI was performed for further evaluation, of this Incidental finding  He has a history of instability with frequent falls, most likely secondary to Parkinson's disease  He has history of an MRI brain 9/30/16, he does not recall the reason for this study, and records review do not revealed a reason  He does follow regularly with Neurology for his Parkinson's management  He also is following with vascular surgery for stent and remains on Plavix  MRI brain 12/6/18 is carefully reviewed in detail by Dr Allan Herrera, and compared with prior study of 9/30/16 the meningioma retrospect to review is noted on the study however has been since significant change  There is no significant local mass effect and no inflammatory changes    The studies were reviewed in detail with the patient and family    On examination there is focal neurologic deficits appreciated there is no pronator drift, finger-nose is intact, rapid alternating movements are also intact, motor examination of the upper lower extremities is 5 x 5 for power, reflexes are intact, cranial nerves are also grossly intact sensation is intact for the upper lower extremities  Dr Barry Ashraf reviewed management recommendations in detail with the patient and family, he suggested that this could be surgically excised although he would not recommend this at this time, the 2nd option is SRS or radiosurgery to halt further growth, and the 3rd option would be continued serial follow-up, which he advises with repeat evaluation/MRI brain, in approximately 6 months  The family has elected the 3rd option and will return in approximately 6 months with MRI brain with without contrast to reassess for any change  He is currently following with Dr Freida Piper for management of varicosities is left leg at this time there is no neurosurgical contraindication to proceeding with any treatment options  He is advised to continue to follow with Dr Luh Sanchez for his Parkinson's management  These findings, impressions and recommendations are reviewed in great detail with the patient and their family, they expressed understanding and agreement, their questions were answered completely and to their satisfaction  Follow up has been scheduled  Diagnoses and all orders for this visit:    Abnormal finding on MRI of brain  -     MRI brain with and without contrast; Future    Benign neoplasm of supratentorial region of brain [facility])  -     MRI brain with and without contrast; Future    Meningioma [facility])  -     Ambulatory referral to Neurosurgery  -     MRI brain with and without contrast; Future          Return in about 6 months (around 7/30/2019) for Review MRI brain with without contrast     Subjective:      Patient ID: Darius Devine is a 81 y o  male  Very pleasant 73-year-old male, accompanied by his daughters and granddaughter presents to review MRI brain      He has history of asthma, chronic obstructive lung disease, coronary artery disease, aortic insufficiency, CVA in the past, hypertension, dyslipidemia, Parkinson's disease, diabetes mellitus (HgA1C 6 2), and peripheral vascular disease  He currently is on Plavix status post stenting, prednisone, Advair, Spiriva, albuterol, prednisone, Singulair for his COPD asthma, Hyzaar, and Avalide for hypertension, Sinemet and Mirapex for Parkinson's                   The following portions of the patient's history were reviewed and updated as appropriate: allergies, current medications, past family history, past medical history, past social history and past surgical history  Review of Systems   HENT: Negative  Eyes: Positive for visual disturbance (blurred vision)  Respiratory: Negative  Cardiovascular: Negative  Gastrointestinal: Negative  Endocrine: Negative  Genitourinary: Negative  Musculoskeletal: Negative  Skin: Negative  Allergic/Immunologic: Negative  Neurological: Positive for weakness (b/l legs)  Negative for dizziness, tremors, seizures, syncope, facial asymmetry, speech difficulty, light-headedness, numbness and headaches  Hematological: Negative  Psychiatric/Behavioral: Negative  Objective:    Physical Exam   Constitutional: He is oriented to person, place, and time  He appears well-developed and well-nourished  HENT:   Head: Normocephalic and atraumatic  Eyes: Pupils are equal, round, and reactive to light  EOM are normal    Cardiovascular: Normal rate and regular rhythm  Pulmonary/Chest: Effort normal and breath sounds normal    Neurological: He is oriented to person, place, and time  He has a normal Finger-Nose-Finger Test    Skin: Skin is warm and dry  Psychiatric: He has a normal mood and affect  Neurologic Exam     Mental Status   Oriented to person, place, and time  Level of consciousness: alert    Cranial Nerves     CN III, IV, VI   Pupils are equal, round, and reactive to light    Extraocular motions are normal      Motor Exam   Muscle bulk: normal  Overall muscle tone: normal  Right arm pronator drift: absent  Left arm pronator drift: absent    Strength   Right biceps: 5/5  Left biceps: 5/5  Right triceps: 5/5  Left triceps: 5/5  Right interossei: 5/5  Left interossei: 5/5  Right quadriceps: 5/5  Left quadriceps: 5/5  Right hamstrin/5  Left hamstrin/5    Sensory Exam   Light touch normal      Gait, Coordination, and Reflexes     Gait  Gait: shuffling and wide-based (Utilizes a wheeled walker for gait balance)    Coordination   Finger to nose coordination: normal       MRI BRAIN WITH AND WITHOUT CONTRAST   18     INDICATION: D32 9: Benign neoplasm of meninges, unspecified      COMPARISON:  2018 and MRI 2016     TECHNIQUE:  Sagittal T1, axial T2, axial FLAIR, axial T1, axial Brunson, axial diffusion  Sagittal, axial and coronal T1 postcontrast   Axial BRAVO post contrast        IV Contrast:  9 mL of Gadobutrol injection (SINGLE-DOSE)      IMAGE QUALITY:   Diagnostic      FINDINGS:     BRAIN PARENCHYMA:  There is no discrete mass, mass effect or midline shift  White matter T2/FLAIR signal abnormality in the periventricular, subcortical and deep white matter suggesting microangiopathy/leukoencephalopathy although other white matter   etiologies cannot be excluded  There is no intracranial hemorrhage  There is no evidence of acute infarction and diffusion imaging is unremarkable         There is a 2 9 x 1 9 x 1 cm dural based irregular homogeneously enhancing left frontal lesion without adjacent cerebral edema  This has the appearance of meningioma  However, this has considerably grown since the prior MRI in 2016 where it measured   subcentimeter      VENTRICLES:  Normal      SELLA AND PITUITARY GLAND:  Normal      ORBITS:  Normal      PARANASAL SINUSES:  Scattered sinus disease, left maxillary sinus air-fluid level suggesting left maxillary sinusitis   Opacification left sphenoid sinus      VASCULATURE:  Evaluation of the major intracranial vasculature demonstrates appropriate flow voids      CALVARIUM AND SKULL BASE:  Normal      EXTRACRANIAL SOFT TISSUES:  Normal      IMPRESSION:     2 9 x 1 9 x 1 cm dural based irregular homogeneously enhancing left frontal vertex lesion without adjacent cerebral edema  This has the appearance of meningioma  However, this has considerably grown since the prior MRI in 2016 where it measured   subcentimeter  Follow-up brain MRI in 6 months for reevaluation      Pansinusitis      Microangiopathy

## 2019-01-30 NOTE — PROGRESS NOTES
Assessment/Plan:    Very pleasant 71-year-old male, accompanied by 2 daughters and granddaughter, presents to review MRI of the brain  He has a history of a fall, December of 2018, CT head revealed extra-axial mass, left frontal parietal vertex mass most likely thought to be a noncalcified meningioma, and subsequent MRI was performed for further evaluation, of this Incidental finding  He has a history of instability with frequent falls, most likely secondary to Parkinson's disease  He has history of an MRI brain 9/30/16, he does not recall the reason for this study, and records review do not revealed a reason  He does follow regularly with Neurology for his Parkinson's management  He also is following with vascular surgery for stent and remains on Plavix  MRI brain 12/6/18 is carefully reviewed in detail by Dr Leron Kehr, and compared with prior study of 9/30/16 the meningioma retrospect to review is noted on the study however has been since significant change  There is no significant local mass effect and no inflammatory changes  The studies were reviewed in detail with the patient and family    On examination there is focal neurologic deficits appreciated there is no pronator drift, finger-nose is intact, rapid alternating movements are also intact, motor examination of the upper lower extremities is 5 x 5 for power, reflexes are intact, cranial nerves are also grossly intact sensation is intact for the upper lower extremities  Dr Leron Kehr reviewed management recommendations in detail with the patient and family, he suggested that this could be surgically excised although he would not recommend this at this time, the 2nd option is SRS or radiosurgery to halt further growth, and the 3rd option would be continued serial follow-up, which he advises with repeat evaluation/MRI brain, in approximately 6 months      The family has elected the 3rd option and will return in approximately 6 months with MRI brain with without contrast to reassess for any change  He is currently following with Dr Veronika Gomez for management of varicosities is left leg at this time there is no neurosurgical contraindication to proceeding with any treatment options  He is advised to continue to follow with Dr Barrie Moreira for his Parkinson's management  These findings, impressions and recommendations are reviewed in great detail with the patient and their family, they expressed understanding and agreement, their questions were answered completely and to their satisfaction  Follow up has been scheduled  Diagnoses and all orders for this visit:    Abnormal finding on MRI of brain  -     MRI brain with and without contrast; Future    Benign neoplasm of supratentorial region of brain [facility])  -     MRI brain with and without contrast; Future    Meningioma [facility])  -     Ambulatory referral to Neurosurgery  -     MRI brain with and without contrast; Future          Return in about 6 months (around 7/30/2019) for Review MRI brain with without contrast     Subjective:      Patient ID: Ericka Harris is a 81 y o  male  Very pleasant 67-year-old male, accompanied by his daughters and granddaughter presents to review MRI brain  He has history of asthma, chronic obstructive lung disease, coronary artery disease, aortic insufficiency, CVA in the past, hypertension, dyslipidemia, Parkinson's disease, diabetes mellitus (HgA1C 6 2), and peripheral vascular disease  He currently is on Plavix status post stenting, prednisone, Advair, Spiriva, albuterol, prednisone, Singulair for his COPD asthma, Hyzaar, and Avalide for hypertension, Sinemet and Mirapex for Parkinson's                   The following portions of the patient's history were reviewed and updated as appropriate: allergies, current medications, past family history, past medical history, past social history and past surgical history  Review of Systems   HENT: Negative      Eyes: Positive for visual disturbance (blurred vision)  Respiratory: Negative  Cardiovascular: Negative  Gastrointestinal: Negative  Endocrine: Negative  Genitourinary: Negative  Musculoskeletal: Negative  Skin: Negative  Allergic/Immunologic: Negative  Neurological: Positive for weakness (b/l legs)  Negative for dizziness, tremors, seizures, syncope, facial asymmetry, speech difficulty, light-headedness, numbness and headaches  Hematological: Negative  Psychiatric/Behavioral: Negative  Objective:    Physical Exam   Constitutional: He is oriented to person, place, and time  He appears well-developed and well-nourished  HENT:   Head: Normocephalic and atraumatic  Eyes: Pupils are equal, round, and reactive to light  EOM are normal    Cardiovascular: Normal rate and regular rhythm  Pulmonary/Chest: Effort normal and breath sounds normal    Neurological: He is oriented to person, place, and time  He has a normal Finger-Nose-Finger Test    Skin: Skin is warm and dry  Psychiatric: He has a normal mood and affect  Neurologic Exam     Mental Status   Oriented to person, place, and time  Level of consciousness: alert    Cranial Nerves     CN III, IV, VI   Pupils are equal, round, and reactive to light    Extraocular motions are normal      Motor Exam   Muscle bulk: normal  Overall muscle tone: normal  Right arm pronator drift: absent  Left arm pronator drift: absent    Strength   Right biceps: 5/5  Left biceps: 5/5  Right triceps: 5/5  Left triceps: 5/5  Right interossei: 5/5  Left interossei: 5/5  Right quadriceps: 5/5  Left quadriceps: 5/5  Right hamstrin/5  Left hamstrin/5    Sensory Exam   Light touch normal      Gait, Coordination, and Reflexes     Gait  Gait: shuffling and wide-based (Utilizes a wheeled walker for gait balance)    Coordination   Finger to nose coordination: normal       MRI BRAIN WITH AND WITHOUT CONTRAST   18     INDICATION: D32 9: Benign neoplasm of meninges, unspecified      COMPARISON:  December 4, 2018 and MRI September 30, 2016     TECHNIQUE:  Sagittal T1, axial T2, axial FLAIR, axial T1, axial [place], axial diffusion  Sagittal, axial and coronal T1 postcontrast   Axial BRAVO post contrast        IV Contrast:  9 mL of Gadobutrol injection (SINGLE-DOSE)      IMAGE QUALITY:   Diagnostic      FINDINGS:     BRAIN PARENCHYMA:  There is no discrete mass, mass effect or midline shift  White matter T2/FLAIR signal abnormality in the periventricular, subcortical and deep white matter suggesting microangiopathy/leukoencephalopathy although other white matter   etiologies cannot be excluded  There is no intracranial hemorrhage  There is no evidence of acute infarction and diffusion imaging is unremarkable         There is a 2 9 x 1 9 x 1 cm dural based irregular homogeneously enhancing left frontal lesion without adjacent cerebral edema  This has the appearance of meningioma  However, this has considerably grown since the prior MRI in 2016 where it measured   subcentimeter      VENTRICLES:  Normal      SELLA AND PITUITARY GLAND:  Normal      ORBITS:  Normal      PARANASAL SINUSES:  Scattered sinus disease, left maxillary sinus air-fluid level suggesting left maxillary sinusitis  Opacification left sphenoid sinus      VASCULATURE:  Evaluation of the major intracranial vasculature demonstrates appropriate flow voids      CALVARIUM AND SKULL BASE:  Normal      EXTRACRANIAL SOFT TISSUES:  Normal      IMPRESSION:     2 9 x 1 9 x 1 cm dural based irregular homogeneously enhancing left frontal vertex lesion without adjacent cerebral edema  This has the appearance of meningioma  However, this has considerably grown since the prior MRI in 2016 where it measured   subcentimeter  Follow-up brain MRI in 6 months for reevaluation      Pansinusitis      Microangiopathy

## 2019-01-30 NOTE — PATIENT INSTRUCTIONS
Continue with all usual activities without restriction  Further follow-up with Neurosurgery in approximately 6 months, Dr Shelby Griffith [facility])    MRI brain with without contrast few days prior to follow-up visit  Return sooner with any new symptoms such as one-sided slowly developing muscle weakness  Should there be any sudden new neurologic symptoms return to the emergency room immediately for stroke evaluation    Continue to follow with Neurology per their protocol  Continue to follow with vascular surgery per their protocol

## 2019-02-04 ENCOUNTER — APPOINTMENT (OUTPATIENT)
Dept: LAB | Facility: CLINIC | Age: 84
End: 2019-02-04
Payer: COMMERCIAL

## 2019-02-04 DIAGNOSIS — E78.2 MIXED HYPERLIPIDEMIA: ICD-10-CM

## 2019-02-04 DIAGNOSIS — E11.9 TYPE 2 DIABETES MELLITUS WITHOUT COMPLICATION, WITHOUT LONG-TERM CURRENT USE OF INSULIN (HCC): ICD-10-CM

## 2019-02-04 DIAGNOSIS — Z01.818 PRE-PROCEDURAL EXAMINATION: ICD-10-CM

## 2019-02-04 DIAGNOSIS — I10 ESSENTIAL HYPERTENSION: ICD-10-CM

## 2019-02-04 LAB
ALBUMIN SERPL BCP-MCNC: 3.4 G/DL (ref 3.5–5)
ALP SERPL-CCNC: 107 U/L (ref 46–116)
ALT SERPL W P-5'-P-CCNC: 19 U/L (ref 12–78)
ANION GAP SERPL CALCULATED.3IONS-SCNC: 8 MMOL/L (ref 4–13)
AST SERPL W P-5'-P-CCNC: 18 U/L (ref 5–45)
BILIRUB SERPL-MCNC: 0.51 MG/DL (ref 0.2–1)
BUN SERPL-MCNC: 27 MG/DL (ref 5–25)
CALCIUM SERPL-MCNC: 8.9 MG/DL (ref 8.3–10.1)
CHLORIDE SERPL-SCNC: 104 MMOL/L (ref 100–108)
CHOLEST SERPL-MCNC: 164 MG/DL (ref 50–200)
CO2 SERPL-SCNC: 24 MMOL/L (ref 21–32)
CREAT SERPL-MCNC: 1.09 MG/DL (ref 0.6–1.3)
ERYTHROCYTE [DISTWIDTH] IN BLOOD BY AUTOMATED COUNT: 12.9 % (ref 11.6–15.1)
EST. AVERAGE GLUCOSE BLD GHB EST-MCNC: 163 MG/DL
GFR SERPL CREATININE-BSD FRML MDRD: 62 ML/MIN/1.73SQ M
GLUCOSE SERPL-MCNC: 198 MG/DL (ref 65–140)
HBA1C MFR BLD: 7.3 % (ref 4.2–6.3)
HCT VFR BLD AUTO: 41.2 % (ref 36.5–49.3)
HDLC SERPL-MCNC: 85 MG/DL (ref 40–60)
HGB BLD-MCNC: 13.3 G/DL (ref 12–17)
LDLC SERPL CALC-MCNC: 43 MG/DL (ref 0–100)
MCH RBC QN AUTO: 30.6 PG (ref 26.8–34.3)
MCHC RBC AUTO-ENTMCNC: 32.3 G/DL (ref 31.4–37.4)
MCV RBC AUTO: 95 FL (ref 82–98)
NONHDLC SERPL-MCNC: 79 MG/DL
PLATELET # BLD AUTO: 230 THOUSANDS/UL (ref 149–390)
PMV BLD AUTO: 9.8 FL (ref 8.9–12.7)
POTASSIUM SERPL-SCNC: 3.3 MMOL/L (ref 3.5–5.3)
PROT SERPL-MCNC: 7 G/DL (ref 6.4–8.2)
RBC # BLD AUTO: 4.34 MILLION/UL (ref 3.88–5.62)
SODIUM SERPL-SCNC: 136 MMOL/L (ref 136–145)
TRIGL SERPL-MCNC: 179 MG/DL
TSH SERPL DL<=0.05 MIU/L-ACNC: 1.67 UIU/ML (ref 0.36–3.74)
WBC # BLD AUTO: 8.94 THOUSAND/UL (ref 4.31–10.16)

## 2019-02-04 PROCEDURE — 80053 COMPREHEN METABOLIC PANEL: CPT

## 2019-02-04 PROCEDURE — 85027 COMPLETE CBC AUTOMATED: CPT

## 2019-02-04 PROCEDURE — 80061 LIPID PANEL: CPT

## 2019-02-04 PROCEDURE — 83036 HEMOGLOBIN GLYCOSYLATED A1C: CPT

## 2019-02-04 PROCEDURE — 84443 ASSAY THYROID STIM HORMONE: CPT

## 2019-02-04 PROCEDURE — 36415 COLL VENOUS BLD VENIPUNCTURE: CPT

## 2019-02-05 ENCOUNTER — OFFICE VISIT (OUTPATIENT)
Dept: INTERNAL MEDICINE CLINIC | Facility: CLINIC | Age: 84
End: 2019-02-05
Payer: COMMERCIAL

## 2019-02-05 ENCOUNTER — TELEPHONE (OUTPATIENT)
Dept: INTERNAL MEDICINE CLINIC | Facility: CLINIC | Age: 84
End: 2019-02-05

## 2019-02-05 VITALS
RESPIRATION RATE: 16 BRPM | DIASTOLIC BLOOD PRESSURE: 72 MMHG | HEIGHT: 64 IN | SYSTOLIC BLOOD PRESSURE: 120 MMHG | BODY MASS INDEX: 31.14 KG/M2 | WEIGHT: 182.4 LBS | HEART RATE: 68 BPM

## 2019-02-05 DIAGNOSIS — J42 CHRONIC BRONCHITIS, UNSPECIFIED CHRONIC BRONCHITIS TYPE (HCC): ICD-10-CM

## 2019-02-05 DIAGNOSIS — E11.9 TYPE 2 DIABETES MELLITUS WITHOUT COMPLICATION, WITHOUT LONG-TERM CURRENT USE OF INSULIN (HCC): Primary | ICD-10-CM

## 2019-02-05 DIAGNOSIS — Z87.39 H/O BURNING PAIN IN LEG: Chronic | ICD-10-CM

## 2019-02-05 DIAGNOSIS — I10 ESSENTIAL HYPERTENSION: ICD-10-CM

## 2019-02-05 DIAGNOSIS — R26.9 NEUROLOGIC GAIT DYSFUNCTION: ICD-10-CM

## 2019-02-05 DIAGNOSIS — E78.2 MIXED HYPERLIPIDEMIA: ICD-10-CM

## 2019-02-05 DIAGNOSIS — G20 PARKINSON'S DISEASE (HCC): ICD-10-CM

## 2019-02-05 DIAGNOSIS — G93.9 BRAIN LESION: ICD-10-CM

## 2019-02-05 DIAGNOSIS — J45.30 MILD PERSISTENT EXTRINSIC ASTHMA WITHOUT COMPLICATION: ICD-10-CM

## 2019-02-05 PROCEDURE — 3074F SYST BP LT 130 MM HG: CPT | Performed by: INTERNAL MEDICINE

## 2019-02-05 PROCEDURE — 99214 OFFICE O/P EST MOD 30 MIN: CPT | Performed by: INTERNAL MEDICINE

## 2019-02-05 PROCEDURE — 3078F DIAST BP <80 MM HG: CPT | Performed by: INTERNAL MEDICINE

## 2019-02-05 NOTE — TELEPHONE ENCOUNTER
edvin swenson's daughter called stating that jeaneth is taking both    Irbesartan-hydrochlotothiazide (avalide) 150-12 5 mg per tablet    Atorvastatin 10 mg    She IS REQUESTING A CALL BACK TO KNOW IF IT IS OK  FOR HIM TO BE TAKING BOTH  [phone]

## 2019-02-05 NOTE — PROGRESS NOTES
Assessment/Plan:    Patient Instructions   Lab data reviewed in detail and compared prior    Type 2 diabetes mellitus-A1c up to 7 3, still no clear indication for medical therapy based upon advanced age and comorbidities, this is likely contributed to by chronic prednisone use, follow-up in 4 months    Enlarging brain lesion consistent with meningioma following with neuro surgery with 6 month follow-up MRI scheduled in June    Burning sensation to the lower extremity with gait dysfunction seeing Neurology next week for Parkinson's follow-up, I am not clear that surgery for varicose veins will have any positive effect on the symptoms, can consider trial of gabapentin, defer to Neurology  Parkinson's disease as above followed by Neurology    COPD/chronic asthma with history of Aspergillus following with Pulmonary continuing with daily prednisone and bronchodilators    Hypertension stable on present regimen, reconcile medication list at home to determine appropriate irbesartan versus losartan    Mild hypokalemia-eat a banana every day    Hyperlipidemia stable on rosuvastatin    Routine follow-up after labs in June, sooner as needed  Diagnoses and all orders for this visit:    Type 2 diabetes mellitus without complication, without long-term current use of insulin (HCC)    Mild persistent extrinsic asthma without complication    Chronic bronchitis, unspecified chronic bronchitis type ([address] )    Essential hypertension    Parkinson's disease ([address] )    H/O burning pain in leg    Mixed hyperlipidemia    Neurologic gait dysfunction    Brain lesion         Subjective:      Patient ID: Mitesh Garrett is a 81 y o  male    Patient returns for routine follow-up, he is here with his daughter, Hany Atkins  He has been evaluated by vascular surgery for a chronic left lower extremity burning pain and loss of function  He is considering surgery for ligation of varicose veins    He is also following with neuro surgery for enlarging meningioma and has opted for conservative management with serial imaging  He has a 6 month follow-up MRI ordered  He is following with Dr Josefina Macias for his Parkinson's disease next week  He notes that he feels internal shakiness though external tremor has been well controlled  No recent falls, walking w/ walker 100%  COPD-breathing has been stable on current regimen, following closely with Pulmonary    Hyperlipidemia-tolerating atorvastatin, no change in muscle strength or myalgias since starting back on medication    Hypertension-taking medication as directed, no home blood pressures          Current Outpatient Prescriptions:     acetaminophen (TYLENOL) 325 mg tablet, Take 650 mg by mouth every 6 (six) hours as needed for mild pain, Disp: , Rfl:     ADVAIR DISKUS 250-50 MCG/DOSE inhaler, INHALE 1 DOSE BY MOUTH TWICE DAILY   RINSE MOUTH AFTER USE, Disp: 180 each, Rfl: 1    albuterol (2 5 mg/3 mL) 0 083 % nebulizer solution, Take 1 vial (2 5 mg total) by nebulization every 4 (four) hours as needed for wheezing, Disp: 3 mL, Rfl: 5    albuterol (VENTOLIN HFA) 90 mcg/act inhaler, Inhale 2 puffs every 6 (six) hours as needed for wheezing, Disp: 18 g, Rfl: 3    atorvastatin (LIPITOR) 10 mg tablet, Take 1 tablet (10 mg total) by mouth daily, Disp: 30 tablet, Rfl: 5    carbidopa-levodopa (SINEMET)  mg per tablet, TAKE 1 TABLET BY MOUTH THREE TIMES A DAY, Disp: 90 tablet, Rfl: 2    clopidogrel (PLAVIX) 75 mg tablet, Take 1 tablet (75 mg total) by mouth daily, Disp: 90 tablet, Rfl: 3    irbesartan-hydrochlorothiazide (AVALIDE) 150-12 5 MG per tablet, Take 1 tablet by mouth daily, Disp: 90 tablet, Rfl: 3    montelukast (SINGULAIR) 10 mg tablet, Take 1 tablet (10 mg total) by mouth daily at bedtime, Disp: 90 tablet, Rfl: 3    pramipexole (MIRAPEX) 0 5 mg tablet, TAKE 1 TABLET BY MOUTH 3 TIMES DAILY, Disp: 90 tablet, Rfl: 6    predniSONE 10 mg tablet, Take 1 tablet (10 mg total) by mouth daily, Disp: 30 tablet, Rfl: 3    tiotropium (SPIRIVA HANDIHALER) 18 mcg inhalation capsule, Place 1 capsule (18 mcg total) into inhaler and inhale daily Via Handihaler at the same time every day, Disp: 90 capsule, Rfl: 3    triamcinolone (KENALOG) 0 5 % cream, Apply topically 2 (two) times a day as needed  , Disp: , Rfl:     Recent Results (from the past 1008 hour(s))   CBC    Collection Time: 02/04/19 11:12 AM   Result Value Ref Range    WBC 8 94 4 31 - 10 16 Thousand/uL    RBC 4 34 3 88 - 5 62 Million/uL    Hemoglobin 13 3 12 0 - 17 0 g/dL    Hematocrit 41 2 36 5 - 49 3 %    MCV 95 82 - 98 fL    MCH 30 6 26 8 - 34 3 pg    MCHC 32 3 31 4 - 37 4 g/dL    RDW 12 9 11 6 - 15 1 %    Platelets 529 893 - 991 Thousands/uL    MPV 9 8 8 9 - 12 7 fL   Comprehensive metabolic panel    Collection Time: 02/04/19 11:12 AM   Result Value Ref Range    Sodium 136 136 - 145 mmol/L    Potassium 3 3 (L) 3 5 - 5 3 mmol/L    Chloride 104 100 - 108 mmol/L    CO2 24 21 - 32 mmol/L    ANION GAP 8 4 - 13 mmol/L    BUN 27 (H) 5 - 25 mg/dL    Creatinine 1 09 0 60 - 1 30 mg/dL    Glucose 198 (H) 65 - 140 mg/dL    Calcium 8 9 8 3 - 10 1 mg/dL    AST 18 5 - 45 U/L    ALT 19 12 - 78 U/L    Alkaline Phosphatase 107 46 - 116 U/L    Total Protein 7 0 6 4 - 8 2 g/dL    Albumin 3 4 (L) 3 5 - 5 0 g/dL    Total Bilirubin 0 51 0 20 - 1 00 mg/dL    eGFR 62 ml/min/1 73sq m   Lipid panel    Collection Time: 02/04/19 11:12 AM   Result Value Ref Range    Cholesterol 164 50 - 200 mg/dL    Triglycerides 179 (H) <=150 mg/dL    HDL, Direct 85 (H) 40 - 60 mg/dL    LDL Calculated 43 0 - 100 mg/dL    Non-HDL-Chol (CHOL-HDL) 79 mg/dl   TSH, 3rd generation with Free T4 reflex    Collection Time: 02/04/19 11:12 AM   Result Value Ref Range    TSH 3RD GENERATON 1 670 0 358 - 3 740 uIU/mL   Hemoglobin A1C    Collection Time: 02/04/19 11:12 AM   Result Value Ref Range    Hemoglobin A1C 7 3 (H) 4 2 - 6 3 %     mg/dl       The following portions of the patient's history were reviewed and updated as appropriate: allergies, current medications, past family history, past medical history, past social history, past surgical history and problem list      Review of Systems   Constitutional: Negative for appetite change, chills, diaphoresis, fatigue, fever and unexpected weight change  HENT: Negative for congestion, hearing loss and rhinorrhea  Eyes: Negative for visual disturbance  Respiratory: Positive for shortness of breath  Negative for cough, chest tightness and wheezing  Cardiovascular: Negative for chest pain, palpitations and leg swelling  Gastrointestinal: Negative for abdominal pain and blood in stool  Endocrine: Negative for cold intolerance, heat intolerance, polydipsia and polyuria  Genitourinary: Negative for difficulty urinating, dysuria, frequency and urgency  Musculoskeletal: Positive for gait problem  Negative for arthralgias and myalgias  Skin: Negative for rash  Neurological: Positive for weakness  Negative for dizziness, light-headedness and headaches  Hematological: Does not bruise/bleed easily  Psychiatric/Behavioral: Negative for dysphoric mood and sleep disturbance  Objective:      Vitals:    02/05/19 1230   BP: 120/72   Pulse: 68   Resp: 16          Physical Exam   Constitutional: He is oriented to person, place, and time  He appears well-developed and well-nourished  HENT:   Head: Normocephalic and atraumatic  Nose: Nose normal    Mouth/Throat: Oropharynx is clear and moist    Eyes: Pupils are equal, round, and reactive to light  Conjunctivae and EOM are normal  No scleral icterus  Neck: Normal range of motion  Neck supple  No JVD present  No tracheal deviation present  No thyromegaly present  Cardiovascular: Normal rate, regular rhythm and intact distal pulses  Exam reveals no gallop and no friction rub  Murmur heard  Distant with 1/6 systolic murmur   Pulmonary/Chest: Effort normal  No respiratory distress   He has wheezes  He has no rales  Scattered faint wheezes and rhonchi with prolonged expiratory phase   Musculoskeletal: He exhibits no edema or deformity  Mild varicose veins, no pitting edema   Lymphadenopathy:     He has no cervical adenopathy  Neurological: He is alert and oriented to person, place, and time  No cranial nerve deficit  Skin: Skin is warm and dry  No rash noted  No erythema  No pallor  Psychiatric: He has a normal mood and affect   His behavior is normal  Judgment and thought content normal

## 2019-02-05 NOTE — PATIENT INSTRUCTIONS
Lab data reviewed in detail and compared prior    Type 2 diabetes mellitus-A1c up to 7 3, still no clear indication for medical therapy based upon advanced age and comorbidities, this is likely contributed to by chronic prednisone use, follow-up in 4 months    Enlarging brain lesion consistent with meningioma following with neuro surgery with 6 month follow-up MRI scheduled in June    Burning sensation to the lower extremity with gait dysfunction seeing Neurology next week for Parkinson's follow-up, I am not clear that surgery for varicose veins will have any positive effect on the symptoms, can consider trial of gabapentin, defer to Neurology  Parkinson's disease as above followed by Neurology    COPD/chronic asthma with history of Aspergillus following with Pulmonary continuing with daily prednisone and bronchodilators    Hypertension stable on present regimen, reconcile medication list at home to determine appropriate irbesartan versus losartan    Mild hypokalemia-eat a banana every day    Hyperlipidemia stable on rosuvastatin    Routine follow-up after labs in June, sooner as needed

## 2019-02-14 ENCOUNTER — OFFICE VISIT (OUTPATIENT)
Dept: NEUROLOGY | Facility: CLINIC | Age: 84
End: 2019-02-14
Payer: COMMERCIAL

## 2019-02-14 VITALS
HEIGHT: 64 IN | BODY MASS INDEX: 31.58 KG/M2 | HEART RATE: 68 BPM | DIASTOLIC BLOOD PRESSURE: 60 MMHG | SYSTOLIC BLOOD PRESSURE: 110 MMHG | WEIGHT: 185 LBS

## 2019-02-14 DIAGNOSIS — D32.9 MENINGIOMA (HCC): ICD-10-CM

## 2019-02-14 DIAGNOSIS — I67.9 CEREBROVASCULAR DISEASE: Primary | ICD-10-CM

## 2019-02-14 DIAGNOSIS — R26.9 NEUROLOGIC GAIT DYSFUNCTION: ICD-10-CM

## 2019-02-14 DIAGNOSIS — G20 PARKINSON'S DISEASE (HCC): ICD-10-CM

## 2019-02-14 PROCEDURE — 99214 OFFICE O/P EST MOD 30 MIN: CPT | Performed by: PSYCHIATRY & NEUROLOGY

## 2019-02-14 RX ORDER — PRAMIPEXOLE DIHYDROCHLORIDE 0.5 MG/1
0.5 TABLET ORAL 3 TIMES DAILY
Qty: 90 TABLET | Refills: 6 | Status: SHIPPED | OUTPATIENT
Start: 2019-02-14 | End: 2019-10-07 | Stop reason: SDUPTHER

## 2019-02-14 NOTE — PROGRESS NOTES
Progress Note - Neurology   [place] [facility] 81 y o  male MRN: [id number]  Unit/Bed#:  Encounter: [id number]      Subjective:   Patient is here for a follow-up visit accompanied with his daughter with a history of Parkinson's disease, gait dysfunction, varicose veins, and since his last visit was evaluated by vascular surgery and referred to Neurosurgery for meningioma and periodic monitoring of the meningioma was agreed upon  Patient continues to have difficulty with his walking however he claims with the walker he can walk better and feels imbalanced without the walker and tends to shuffle  Patient has been on Sinemet and Mirapex with significant improvement and his leg pain in the recent past has also improved  Patient's daughter was concerned about his short-term memory, and she was also concerned about maintaining him on Plavix long term  Patient has had a stent in the right carotid in 2005 and since then has been on Plavix 75 mg daily  He denies any side effects from the medications  Patient also denies any other new neurological symptoms  ROS:   Review of Systems   Constitutional: Negative  Negative for appetite change and fever  HENT: Positive for voice change  Negative for drooling, hearing loss, tinnitus and trouble swallowing  Eyes: Negative  Negative for photophobia and pain  Respiratory: Positive for shortness of breath  Negative for choking  Cardiovascular: Negative  Negative for chest pain and palpitations  Gastrointestinal: Negative  Negative for constipation, diarrhea, nausea and vomiting  Endocrine: Negative  Negative for cold intolerance and heat intolerance  Genitourinary: Negative  Negative for dysuria, frequency and urgency  Musculoskeletal: Positive for gait problem  Negative for myalgias and neck pain  Skin: Negative  Negative for rash  Neurological: Positive for tremors and weakness   Negative for dizziness, seizures, syncope, facial asymmetry, speech difficulty, light-headedness, numbness and headaches  Hematological: Negative  Does not bruise/bleed easily  Psychiatric/Behavioral: Positive for confusion and hallucinations  Negative for sleep disturbance  Vitals:   Vitals:    02/14/19 1115   BP: 110/60   BP Location: Left arm   Patient Position: Sitting   Cuff Size: Adult   Pulse: 68   Weight: 83 9 kg (185 lb)   Height: 5' 4" (1 626 m)   ,Body mass index is 31 76 kg/m²  MEDS:      Current Outpatient Medications:     acetaminophen (TYLENOL) 325 mg tablet, Take 650 mg by mouth every 6 (six) hours as needed for mild pain, Disp: , Rfl:     ADVAIR DISKUS 250-50 MCG/DOSE inhaler, INHALE 1 DOSE BY MOUTH TWICE DAILY   RINSE MOUTH AFTER USE, Disp: 180 each, Rfl: 1    albuterol (2 5 mg/3 mL) 0 083 % nebulizer solution, Take 1 vial (2 5 mg total) by nebulization every 4 (four) hours as needed for wheezing, Disp: 3 mL, Rfl: 5    albuterol (VENTOLIN HFA) 90 mcg/act inhaler, Inhale 2 puffs every 6 (six) hours as needed for wheezing, Disp: 18 g, Rfl: 3    atorvastatin (LIPITOR) 10 mg tablet, Take 1 tablet (10 mg total) by mouth daily, Disp: 30 tablet, Rfl: 5    carbidopa-levodopa (SINEMET)  mg per tablet, TAKE 1 TABLET BY MOUTH THREE TIMES A DAY, Disp: 90 tablet, Rfl: 2    clopidogrel (PLAVIX) 75 mg tablet, Take 1 tablet (75 mg total) by mouth daily, Disp: 90 tablet, Rfl: 3    irbesartan-hydrochlorothiazide (AVALIDE) 150-12 5 MG per tablet, Take 1 tablet by mouth daily, Disp: 90 tablet, Rfl: 3    montelukast (SINGULAIR) 10 mg tablet, Take 1 tablet (10 mg total) by mouth daily at bedtime, Disp: 90 tablet, Rfl: 3    pramipexole (MIRAPEX) 0 5 mg tablet, TAKE 1 TABLET BY MOUTH 3 TIMES DAILY, Disp: 90 tablet, Rfl: 6    predniSONE 10 mg tablet, Take 1 tablet (10 mg total) by mouth daily, Disp: 30 tablet, Rfl: 3    tiotropium (SPIRIVA HANDIHALER) 18 mcg inhalation capsule, Place 1 capsule (18 mcg total) into inhaler and inhale daily [address] Handihaler at the same time every day, Disp: 90 capsule, Rfl: 3    triamcinolone (KENALOG) 0 5 % cream, Apply topically 2 (two) times a day as needed  , Disp: , Rfl:   :    Physical Exam:  General appearance: alert, appears stated age and cooperative  Head: Normocephalic, without obvious abnormality, atraumatic    His examination shows no evidence of any cranial nerve deficits, on motor and sensory exam patient has no evidence of any weakness, cogwheeling rigidity or resting tremor noted, no evidence of any dysmetria was noted and his strength is adequate bilaterally with absent deep tendon reflexes bilaterally  Patient could stand with minimal assistance and ambulates with the help of a walker  No bruits were appreciable in the neck  Demetrius cognitive assessment score was 14/30  Lab Results: I have personally reviewed pertinent reports  Imaging Studies: I have personally reviewed pertinent reports  Assessment:  1  Parkinson's disease with gait dysfunction  2  History of carotid stenosis, cerebrovascular disease, status post right internal carotid stent insertion in 2005  3  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  4  Meningioma  Plan:  Patient is advised to continue Sinemet and Mirapex, structured home exercise program was recommended, on a regular basis to help with his gait since he does not wish to go for physical therapy at this time, patient will start multivitamins on a regular basis does not wish to take any further medications for his short-term memory, and in the meanwhile will repeat a carotid ultrasound with a history of carotid stenosis in the past   Patient will return back to see me in 4-5 months  2/14/2019,11:24 AM    Dictation voice to text software has been used in the creation of this document  Please consider this in light of any contextual or grammatical errors

## 2019-02-28 ENCOUNTER — HOSPITAL ENCOUNTER (OUTPATIENT)
Dept: ULTRASOUND IMAGING | Facility: CLINIC | Age: 84
Discharge: HOME | End: 2019-02-28
Payer: COMMERCIAL

## 2019-02-28 DIAGNOSIS — I67.9 CEREBROVASCULAR DISEASE: ICD-10-CM

## 2019-02-28 PROCEDURE — 93880 EXTRACRANIAL BILAT STUDY: CPT | Performed by: SURGERY

## 2019-02-28 PROCEDURE — 93880 EXTRACRANIAL BILAT STUDY: CPT

## 2019-03-18 DIAGNOSIS — J45.40 MODERATE PERSISTENT EXTRINSIC ASTHMA WITHOUT COMPLICATION: ICD-10-CM

## 2019-03-18 DIAGNOSIS — B44.81 ABPA (ALLERGIC BRONCHOPULMONARY ASPERGILLOSIS) (HCC): ICD-10-CM

## 2019-03-19 RX ORDER — PREDNISONE 10 MG/1
TABLET ORAL
Qty: 30 TABLET | Refills: 5 | Status: SHIPPED | OUTPATIENT
Start: 2019-03-19 | End: 2019-06-17 | Stop reason: SDUPTHER

## 2019-03-24 DIAGNOSIS — E78.2 MIXED HYPERLIPIDEMIA: ICD-10-CM

## 2019-03-24 RX ORDER — ATORVASTATIN CALCIUM 10 MG/1
TABLET, FILM COATED ORAL
Qty: 30 TABLET | Refills: 4 | Status: SHIPPED | OUTPATIENT
Start: 2019-03-24 | End: 2019-07-26 | Stop reason: SDUPTHER

## 2019-04-09 ENCOUNTER — TELEPHONE (OUTPATIENT)
Dept: INTERNAL MEDICINE CLINIC | Facility: CLINIC | Age: 84
End: 2019-04-09

## 2019-04-09 ENCOUNTER — OFFICE VISIT (OUTPATIENT)
Dept: INTERNAL MEDICINE CLINIC | Facility: CLINIC | Age: 84
End: 2019-04-09
Payer: COMMERCIAL

## 2019-04-09 VITALS
SYSTOLIC BLOOD PRESSURE: 138 MMHG | BODY MASS INDEX: 31.04 KG/M2 | HEIGHT: 64 IN | HEART RATE: 76 BPM | DIASTOLIC BLOOD PRESSURE: 68 MMHG | OXYGEN SATURATION: 96 % | WEIGHT: 181.8 LBS

## 2019-04-09 DIAGNOSIS — S00.11XA PERIORBITAL ECCHYMOSIS OF RIGHT EYE, INITIAL ENCOUNTER: ICD-10-CM

## 2019-04-09 DIAGNOSIS — G20 PARKINSON'S DISEASE (HCC): Primary | ICD-10-CM
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
## 2019-04-09 DIAGNOSIS — R26.9 NEUROLOGIC GAIT DYSFUNCTION: ICD-10-CM

## 2019-04-09 PROCEDURE — 99214 OFFICE O/P EST MOD 30 MIN: CPT | Performed by: INTERNAL MEDICINE

## 2019-04-09 PROCEDURE — 1160F RVW MEDS BY RX/DR IN RCRD: CPT | Performed by: INTERNAL MEDICINE

## 2019-04-12 ENCOUNTER — TELEPHONE (OUTPATIENT)
Dept: INTERNAL MEDICINE CLINIC | Facility: CLINIC | Age: 84
End: 2019-04-12

## 2019-04-15 DIAGNOSIS — J42 CHRONIC BRONCHITIS, UNSPECIFIED CHRONIC BRONCHITIS TYPE (HCC): ICD-10-CM
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
## 2019-04-15 DIAGNOSIS — L30.9 DERMATITIS: ICD-10-CM

## 2019-04-15 DIAGNOSIS — Z87.39 H/O BURNING PAIN IN LEG: Primary | Chronic | ICD-10-CM

## 2019-04-15 DIAGNOSIS — R26.9 NEUROLOGIC GAIT DYSFUNCTION: ICD-10-CM

## 2019-04-15 DIAGNOSIS — G20 PARKINSON'S DISEASE (HCC): ICD-10-CM

## 2019-04-15 RX ORDER — TRIAMCINOLONE ACETONIDE 5 MG/G
CREAM TOPICAL 2 TIMES DAILY PRN
Qty: 30 G | Refills: 1 | Status: SHIPPED | OUTPATIENT
Start: 2019-04-15 | End: 2019-11-02 | Stop reason: SDUPTHER

## 2019-04-16 ENCOUNTER — OFFICE VISIT (OUTPATIENT)
Dept: PULMONOLOGY | Facility: CLINIC | Age: 84
End: 2019-04-16
Payer: COMMERCIAL

## 2019-04-16 VITALS
HEIGHT: 64 IN | OXYGEN SATURATION: 96 % | SYSTOLIC BLOOD PRESSURE: 100 MMHG | BODY MASS INDEX: 30.9 KG/M2 | HEART RATE: 88 BPM | DIASTOLIC BLOOD PRESSURE: 60 MMHG | WEIGHT: 181 LBS

## 2019-04-16 DIAGNOSIS — B44.81 ABPA (ALLERGIC BRONCHOPULMONARY ASPERGILLOSIS) (HCC): Primary | ICD-10-CM

## 2019-04-16 DIAGNOSIS — G20 PARKINSON'S DISEASE (HCC): ICD-10-CM

## 2019-04-16 DIAGNOSIS — J45.909 ASTHMA, UNSPECIFIED ASTHMA SEVERITY, UNSPECIFIED WHETHER COMPLICATED, UNSPECIFIED WHETHER PERSISTENT: ICD-10-CM

## 2019-04-16 DIAGNOSIS — J47.9 BRONCHIECTASIS WITHOUT COMPLICATION (HCC): ICD-10-CM

## 2019-04-16 PROCEDURE — 99214 OFFICE O/P EST MOD 30 MIN: CPT | Performed by: PHYSICIAN ASSISTANT

## 2019-04-17 ENCOUNTER — TELEPHONE (OUTPATIENT)
Dept: INTERNAL MEDICINE CLINIC | Facility: CLINIC | Age: 84
End: 2019-04-17

## 2019-05-01 ENCOUNTER — TELEPHONE (OUTPATIENT)
Dept: INTERNAL MEDICINE CLINIC | Facility: CLINIC | Age: 84
End: 2019-05-01

## 2019-05-06 ENCOUNTER — OFFICE VISIT (OUTPATIENT)
Dept: INTERNAL MEDICINE CLINIC | Facility: CLINIC | Age: 84
End: 2019-05-06
Payer: COMMERCIAL

## 2019-05-06 ENCOUNTER — TELEPHONE (OUTPATIENT)
Dept: INTERNAL MEDICINE CLINIC | Facility: CLINIC | Age: 84
End: 2019-05-06

## 2019-05-06 VITALS
HEART RATE: 80 BPM | HEIGHT: 64 IN | RESPIRATION RATE: 20 BRPM | BODY MASS INDEX: 30.8 KG/M2 | WEIGHT: 180.4 LBS | SYSTOLIC BLOOD PRESSURE: 138 MMHG | DIASTOLIC BLOOD PRESSURE: 72 MMHG

## 2019-05-06 DIAGNOSIS — G20 PARKINSON'S DISEASE (HCC): Primary | ICD-10-CM

## 2019-05-06 PROCEDURE — 99213 OFFICE O/P EST LOW 20 MIN: CPT | Performed by: NURSE PRACTITIONER

## 2019-05-06 RX ORDER — PRENATAL VIT 91/IRON/FOLIC/DHA 28-975-200
COMBINATION PACKAGE (EA) ORAL 2 TIMES DAILY PRN
COMMUNITY
End: 2020-09-15

## 2019-05-07 DIAGNOSIS — G20 PARKINSON'S DISEASE (HCC): Primary | ICD-10-CM

## 2019-05-07 RX ORDER — RIVASTIGMINE TARTRATE 1.5 MG/1
1.5 CAPSULE ORAL 2 TIMES DAILY
Qty: 60 CAPSULE | Refills: 0 | Status: SHIPPED | OUTPATIENT
Start: 2019-05-07 | End: 2019-05-31 | Stop reason: SDUPTHER

## 2019-05-15 ENCOUNTER — OFFICE VISIT (OUTPATIENT)
Dept: DERMATOLOGY | Facility: CLINIC | Age: 84
End: 2019-05-15
Payer: COMMERCIAL

## 2019-05-15 DIAGNOSIS — R21 RASH: Primary | ICD-10-CM

## 2019-05-15 DIAGNOSIS — Z13.89 SCREENING FOR SKIN CONDITION: ICD-10-CM

## 2019-05-15 DIAGNOSIS — L82.1 SEBORRHEIC KERATOSIS: ICD-10-CM

## 2019-05-15 PROCEDURE — 88305 TISSUE EXAM BY PATHOLOGIST: CPT | Performed by: PATHOLOGY

## 2019-05-15 PROCEDURE — 11102 TANGNTL BX SKIN SINGLE LES: CPT | Performed by: DERMATOLOGY

## 2019-05-15 PROCEDURE — 88312 SPECIAL STAINS GROUP 1: CPT | Performed by: PATHOLOGY

## 2019-05-15 PROCEDURE — 2022F DILAT RTA XM EVC RTNOPTHY: CPT | Performed by: DERMATOLOGY

## 2019-05-15 PROCEDURE — 1160F RVW MEDS BY RX/DR IN RCRD: CPT | Performed by: DERMATOLOGY

## 2019-05-15 PROCEDURE — 99203 OFFICE O/P NEW LOW 30 MIN: CPT | Performed by: DERMATOLOGY

## 2019-05-15 PROCEDURE — 3045F PR MOST RECENT HEMOGLOBIN A1C LEVEL 7.0-9.0%: CPT | Performed by: DERMATOLOGY

## 2019-05-20 DIAGNOSIS — R21 RASH: Primary | ICD-10-CM

## 2019-05-31 DIAGNOSIS — G20 PARKINSON'S DISEASE (HCC): ICD-10-CM

## 2019-05-31 RX ORDER — RIVASTIGMINE TARTRATE 1.5 MG/1
1.5 CAPSULE ORAL 2 TIMES DAILY
Qty: 60 CAPSULE | Refills: 3 | Status: SHIPPED | OUTPATIENT
Start: 2019-05-31 | End: 2019-10-03 | Stop reason: SDUPTHER

## 2019-06-17 ENCOUNTER — TELEPHONE (OUTPATIENT)
Dept: INTERNAL MEDICINE CLINIC | Facility: CLINIC | Age: 84
End: 2019-06-17

## 2019-06-17 DIAGNOSIS — J45.40 MODERATE PERSISTENT EXTRINSIC ASTHMA WITHOUT COMPLICATION: ICD-10-CM

## 2019-06-17 DIAGNOSIS — B44.81 ABPA (ALLERGIC BRONCHOPULMONARY ASPERGILLOSIS) (HCC): ICD-10-CM

## 2019-06-17 RX ORDER — PREDNISONE 10 MG/1
TABLET ORAL
Qty: 30 TABLET | Refills: 5 | Status: SHIPPED | OUTPATIENT
Start: 2019-06-17 | End: 2020-01-03 | Stop reason: SDUPTHER

## 2019-06-18 ENCOUNTER — APPOINTMENT (OUTPATIENT)
Dept: LAB | Facility: CLINIC | Age: 84
End: 2019-06-18
Payer: COMMERCIAL

## 2019-06-18 DIAGNOSIS — R21 RASH: ICD-10-CM

## 2019-06-18 DIAGNOSIS — E11.9 TYPE 2 DIABETES MELLITUS WITHOUT COMPLICATION, WITHOUT LONG-TERM CURRENT USE OF INSULIN (HCC): ICD-10-CM

## 2019-06-18 DIAGNOSIS — E78.2 MIXED HYPERLIPIDEMIA: ICD-10-CM

## 2019-06-18 DIAGNOSIS — I10 ESSENTIAL HYPERTENSION: ICD-10-CM

## 2019-06-18 LAB
ALBUMIN SERPL BCP-MCNC: 3.2 G/DL (ref 3.5–5)
ALP SERPL-CCNC: 100 U/L (ref 46–116)
ALT SERPL W P-5'-P-CCNC: 20 U/L (ref 12–78)
ANION GAP SERPL CALCULATED.3IONS-SCNC: 8 MMOL/L (ref 4–13)
AST SERPL W P-5'-P-CCNC: 18 U/L (ref 5–45)
BASOPHILS # BLD AUTO: 0.04 THOUSANDS/ΜL (ref 0–0.1)
BASOPHILS NFR BLD AUTO: 1 % (ref 0–1)
BILIRUB SERPL-MCNC: 0.44 MG/DL (ref 0.2–1)
BUN SERPL-MCNC: 30 MG/DL (ref 5–25)
CALCIUM SERPL-MCNC: 9.1 MG/DL (ref 8.3–10.1)
CHLORIDE SERPL-SCNC: 103 MMOL/L (ref 100–108)
CHOLEST SERPL-MCNC: 168 MG/DL (ref 50–200)
CO2 SERPL-SCNC: 24 MMOL/L (ref 21–32)
CREAT SERPL-MCNC: 1.24 MG/DL (ref 0.6–1.3)
EOSINOPHIL # BLD AUTO: 0.26 THOUSAND/ΜL (ref 0–0.61)
EOSINOPHIL NFR BLD AUTO: 3 % (ref 0–6)
ERYTHROCYTE [DISTWIDTH] IN BLOOD BY AUTOMATED COUNT: 12.9 % (ref 11.6–15.1)
EST. AVERAGE GLUCOSE BLD GHB EST-MCNC: 148 MG/DL
GFR SERPL CREATININE-BSD FRML MDRD: 53 ML/MIN/1.73SQ M
GLUCOSE P FAST SERPL-MCNC: 218 MG/DL (ref 65–99)
HBA1C MFR BLD: 6.8 % (ref 4.2–6.3)
HCT VFR BLD AUTO: 40.6 % (ref 36.5–49.3)
HDLC SERPL-MCNC: 74 MG/DL (ref 40–60)
HGB BLD-MCNC: 13.1 G/DL (ref 12–17)
IMM GRANULOCYTES # BLD AUTO: 0.05 THOUSAND/UL (ref 0–0.2)
IMM GRANULOCYTES NFR BLD AUTO: 1 % (ref 0–2)
LDLC SERPL CALC-MCNC: 59 MG/DL (ref 0–100)
LYMPHOCYTES # BLD AUTO: 1.33 THOUSANDS/ΜL (ref 0.6–4.47)
LYMPHOCYTES NFR BLD AUTO: 15 % (ref 14–44)
MCH RBC QN AUTO: 30.9 PG (ref 26.8–34.3)
MCHC RBC AUTO-ENTMCNC: 32.3 G/DL (ref 31.4–37.4)
MCV RBC AUTO: 96 FL (ref 82–98)
MONOCYTES # BLD AUTO: 0.55 THOUSAND/ΜL (ref 0.17–1.22)
MONOCYTES NFR BLD AUTO: 6 % (ref 4–12)
NEUTROPHILS # BLD AUTO: 6.38 THOUSANDS/ΜL (ref 1.85–7.62)
NEUTS SEG NFR BLD AUTO: 74 % (ref 43–75)
NONHDLC SERPL-MCNC: 94 MG/DL
NRBC BLD AUTO-RTO: 0 /100 WBCS
PLATELET # BLD AUTO: 236 THOUSANDS/UL (ref 149–390)
PMV BLD AUTO: 9.7 FL (ref 8.9–12.7)
POTASSIUM SERPL-SCNC: 3.6 MMOL/L (ref 3.5–5.3)
PROT SERPL-MCNC: 6.8 G/DL (ref 6.4–8.2)
RBC # BLD AUTO: 4.24 MILLION/UL (ref 3.88–5.62)
SODIUM SERPL-SCNC: 135 MMOL/L (ref 136–145)
TRIGL SERPL-MCNC: 174 MG/DL
TSH SERPL DL<=0.05 MIU/L-ACNC: 2.24 UIU/ML (ref 0.36–3.74)
WBC # BLD AUTO: 8.61 THOUSAND/UL (ref 4.31–10.16)

## 2019-06-18 PROCEDURE — 84443 ASSAY THYROID STIM HORMONE: CPT

## 2019-06-18 PROCEDURE — 83036 HEMOGLOBIN GLYCOSYLATED A1C: CPT

## 2019-06-18 PROCEDURE — 36415 COLL VENOUS BLD VENIPUNCTURE: CPT

## 2019-06-18 PROCEDURE — 85025 COMPLETE CBC W/AUTO DIFF WBC: CPT

## 2019-06-18 PROCEDURE — 80053 COMPREHEN METABOLIC PANEL: CPT

## 2019-06-18 PROCEDURE — 80061 LIPID PANEL: CPT

## 2019-06-18 PROCEDURE — 86618 LYME DISEASE ANTIBODY: CPT

## 2019-06-19 ENCOUNTER — OFFICE VISIT (OUTPATIENT)
Dept: INTERNAL MEDICINE CLINIC | Facility: CLINIC | Age: 84
End: 2019-06-19
Payer: COMMERCIAL

## 2019-06-19 VITALS
BODY MASS INDEX: 30.35 KG/M2 | RESPIRATION RATE: 14 BRPM | HEART RATE: 80 BPM | WEIGHT: 177.8 LBS | DIASTOLIC BLOOD PRESSURE: 68 MMHG | HEIGHT: 64 IN | SYSTOLIC BLOOD PRESSURE: 116 MMHG

## 2019-06-19 DIAGNOSIS — J45.30 MILD PERSISTENT EXTRINSIC ASTHMA WITHOUT COMPLICATION: ICD-10-CM

## 2019-06-19 DIAGNOSIS — R26.9 NEUROLOGIC GAIT DYSFUNCTION: ICD-10-CM

## 2019-06-19 DIAGNOSIS — E11.9 TYPE 2 DIABETES MELLITUS WITHOUT COMPLICATION, WITHOUT LONG-TERM CURRENT USE OF INSULIN (HCC): Primary | ICD-10-CM

## 2019-06-19 DIAGNOSIS — I10 ESSENTIAL HYPERTENSION: ICD-10-CM

## 2019-06-19 DIAGNOSIS — B44.81 ABPA (ALLERGIC BRONCHOPULMONARY ASPERGILLOSIS) (HCC): ICD-10-CM

## 2019-06-19 DIAGNOSIS — Z79.52 LONG TERM (CURRENT) USE OF SYSTEMIC STEROIDS: ICD-10-CM

## 2019-06-19 DIAGNOSIS — G20 PARKINSON'S DISEASE (HCC): ICD-10-CM

## 2019-06-19 DIAGNOSIS — E78.2 MIXED HYPERLIPIDEMIA: ICD-10-CM

## 2019-06-19 DIAGNOSIS — I67.9 CEREBROVASCULAR DISEASE: ICD-10-CM

## 2019-06-19 PROBLEM — S00.11XA PERIORBITAL ECCHYMOSIS OF RIGHT EYE: Status: RESOLVED | Noted: 2018-12-04 | Resolved: 2019-06-19

## 2019-06-19 PROBLEM — H11.31 CONJUNCTIVAL HEMORRHAGE, RIGHT EYE: Status: RESOLVED | Noted: 2018-12-04 | Resolved: 2019-06-19

## 2019-06-19 LAB
LEFT EYE DIABETIC RETINOPATHY: NORMAL
LEFT EYE IMAGE QUALITY: NORMAL
LEFT EYE MACULAR EDEMA: NORMAL
LEFT EYE OTHER RETINOPATHY: NORMAL
RIGHT EYE DIABETIC RETINOPATHY: NORMAL
RIGHT EYE IMAGE QUALITY: NORMAL
RIGHT EYE MACULAR EDEMA: NORMAL
RIGHT EYE OTHER RETINOPATHY: NORMAL
SEVERITY (EYE EXAM): NORMAL

## 2019-06-19 PROCEDURE — 99214 OFFICE O/P EST MOD 30 MIN: CPT | Performed by: INTERNAL MEDICINE

## 2019-06-19 PROCEDURE — 3078F DIAST BP <80 MM HG: CPT | Performed by: INTERNAL MEDICINE

## 2019-06-19 PROCEDURE — 3074F SYST BP LT 130 MM HG: CPT | Performed by: INTERNAL MEDICINE

## 2019-06-19 PROCEDURE — 92250 FUNDUS PHOTOGRAPHY W/I&R: CPT | Performed by: INTERNAL MEDICINE

## 2019-06-19 PROCEDURE — 1036F TOBACCO NON-USER: CPT | Performed by: INTERNAL MEDICINE

## 2019-06-20 LAB
B BURGDOR IGG SER IA-ACNC: 0.27
B BURGDOR IGM SER IA-ACNC: 0.21

## 2019-07-26 DIAGNOSIS — E78.2 MIXED HYPERLIPIDEMIA: ICD-10-CM

## 2019-07-26 RX ORDER — ATORVASTATIN CALCIUM 10 MG/1
TABLET, FILM COATED ORAL
Qty: 90 TABLET | Refills: 1 | Status: SHIPPED | OUTPATIENT
Start: 2019-07-26 | End: 2019-08-29 | Stop reason: HOSPADM

## 2019-08-09 DIAGNOSIS — J45.909 UNCOMPLICATED ASTHMA, UNSPECIFIED ASTHMA SEVERITY, UNSPECIFIED WHETHER PERSISTENT: ICD-10-CM

## 2019-08-09 RX ORDER — MONTELUKAST SODIUM 10 MG/1
10 TABLET ORAL
Qty: 90 TABLET | Refills: 3 | Status: SHIPPED | OUTPATIENT
Start: 2019-08-09 | End: 2020-10-19 | Stop reason: SDUPTHER

## 2019-08-15 ENCOUNTER — OFFICE VISIT (OUTPATIENT)
Dept: INTERNAL MEDICINE CLINIC | Facility: CLINIC | Age: 84
End: 2019-08-15
Payer: COMMERCIAL

## 2019-08-15 ENCOUNTER — TELEPHONE (OUTPATIENT)
Dept: NEUROLOGY | Facility: CLINIC | Age: 84
End: 2019-08-15

## 2019-08-15 VITALS
BODY MASS INDEX: 30.08 KG/M2 | SYSTOLIC BLOOD PRESSURE: 104 MMHG | HEIGHT: 64 IN | DIASTOLIC BLOOD PRESSURE: 60 MMHG | HEART RATE: 76 BPM | WEIGHT: 176.2 LBS | RESPIRATION RATE: 14 BRPM

## 2019-08-15 DIAGNOSIS — R26.9 NEUROLOGIC GAIT DYSFUNCTION: Primary | ICD-10-CM
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
## 2019-08-15 PROCEDURE — G0439 PPPS, SUBSEQ VISIT: HCPCS | Performed by: NURSE PRACTITIONER

## 2019-08-15 PROCEDURE — 1170F FXNL STATUS ASSESSED: CPT | Performed by: NURSE PRACTITIONER

## 2019-08-15 PROCEDURE — 1125F AMNT PAIN NOTED PAIN PRSNT: CPT | Performed by: NURSE PRACTITIONER

## 2019-08-15 NOTE — TELEPHONE ENCOUNTER
Pt's daughter calls in to state they are having an issue filling pramipexole at pharmacy  Spoke with [facility]  Refill was processed  They will get ready for pt

## 2019-08-15 NOTE — PROGRESS NOTES
Assessment and Plan:     Problem List Items Addressed This Visit     None         History of Present Illness:     Patient presents for Medicare Annual Wellness visit    Patient Care Team:  Estela Villegas MD as PCP - General  MD Estela Mirza MD Markham Leitz, MD Jud Bristle, PA-C Delwin Churches, PA-C Valora Salmons, MD     Problem List:     Patient Active Problem List   Diagnosis    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) ([facility] )    Allergic asthma    Allergic rhinitis    Aortic regurgitation    Bronchiectasis with acute lower respiratory infection [facility]    Long term (current) use of systemic steroids    Hyperlipidemia    Hypertension    Parkinson's disease ([facility]    Neurologic gait dysfunction    Type 2 diabetes mellitus ([facility]    Peripheral vascular disease ([facility]    Chronic obstructive pulmonary disease ([facility] )    Cerebrovascular disease    Carotid artery disease ([facility] )    Varicose 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    H/O burning pain in leg    Brain lesion      Past Medical and Surgical History:     Past Medical History:   Diagnosis Date    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) ([facility] )     Allergic rhinitis     last assessed [id number]    Aortic regurgitation     Arm laceration     Asthma     Bronchitis, chronic obstructive, with exacerbation ([facility] )     last assessed [id number]    Candidal intertrigo     Chronic obstructive lung disease ([facility] )     last assessed [id number]    COPD (chronic obstructive pulmonary disease) ([facility] )     Coronary artery disease     carotid stents    Cough     CVA (cerebral vascular accident) ([facility] )     Dermatitis     Diabetes mellitus type 2, uncomplicated ([facility] )     controlled    Dysthymic disorder     Fatigue     Hyperlipidemia     Hypertension     Neurologic gait dysfunction     Parkinson's disease ([facility] )     Pneumonia     PVD (peripheral vascular disease) ([facility] )     Seborrheic keratosis     TIA (transient ischemic attack)     Tinea corporis     Tinea pedis of both feet     Tremor      Past Surgical History:   Procedure Laterality Date    NASAL SINUS SURGERY      [place] BRONCHOSCOPY,DIAGNOSTIC N/A 2016    Procedure: BRONCHOSCOPY;  Surgeon: Gera Yoder MD;  Location: [facility];   Service: Pulmonary      Family History:     Family History   Problem Relation Age of Onset    No Known Problems Mother         Old Age   Bg [place] COPD Sister    Bg [place] Lung cancer Sister     Asthma Family       Social History:     Social History     Tobacco Use   Smoking Status Former Smoker    Packs/day: 1 00    Years: 3 00    Pack years: 3 00    Types: Cigarettes    Last attempt to quit:     Years since quittin 6   Smokeless Tobacco Never Used     Social History     Substance and Sexual Activity   Alcohol Use Yes    Frequency: Monthly or less    Drinks per session: 1 or 2    Binge frequency: Never    Comment: rarely     Social History     Substance and Sexual Activity   Drug Use No      Medications and Allergies:     Current Outpatient Medications   Medication Sig Dispense Refill    acetaminophen (TYLENOL) 325 mg tablet Take 650 mg by mouth every 6 (six) hours as needed for mild pain      albuterol (2 5 mg/3 mL) 0 083 % nebulizer solution Take 1 vial (2 5 mg total) by nebulization every 4 (four) hours as needed for wheezing 3 mL 5    albuterol (VENTOLIN HFA) 90 mcg/act inhaler Inhale 2 puffs every 6 (six) hours as needed for wheezing 18 g 3    atorvastatin (LIPITOR) 10 mg tablet TAKE 1 TABLET ONCE DAILY 90 tablet 1    carbidopa-levodopa (SINEMET)  mg per tablet Take 1 tablet by mouth 3 (three) times a day 90 tablet 6    clopidogrel (PLAVIX) 75 mg tablet Take 1 tablet (75 mg total) by mouth daily 90 tablet 3    fluticasone-salmeterol (ADVAIR DISKUS) 250-50 mcg/dose inhaler Inhale 1 puff 2 (two) times a day Rinse mouth after use 1 Inhaler 5    irbesartan-hydrochlorothiazide (AVALIDE) 150-12 5 MG per tablet Take 1 tablet by mouth daily 90 tablet 3    montelukast (SINGULAIR) 10 mg tablet Take 1 tablet (10 mg total) by mouth daily at bedtime 90 tablet 3    pramipexole (MIRAPEX) 0 5 mg tablet Take 1 tablet (0 5 mg total) by mouth 3 (three) times a day 90 tablet 6    predniSONE 10 mg tablet TAKE 1 TABLET BY MOUTH EVERY DAY 30 tablet 5    rivastigmine (EXELON) 1 5 mg capsule Take 1 capsule (1 5 mg total) by mouth 2 (two) times a day 60 capsule 3    terbinafine (LamISIL) 1 % cream Apply topically 2 (two) times a day      tiotropium (SPIRIVA HANDIHALER) 18 mcg inhalation capsule Place 1 capsule (18 mcg total) into inhaler and inhale daily [address] at the same time every day 90 capsule 3    triamcinolone (KENALOG) 0 5 % cream Apply topically 2 (two) times a day as needed for rash 30 g 1     No current facility-administered medications for this visit  Allergies   Allergen Reactions    Penicillins Syncope      Immunizations:     Immunization History   Administered Date(s) Administered    INFLUENZA 11/19/2007, 09/22/2009, 12/02/2011, 10/11/2013, 10/23/2015, 11/21/2017    Influenza Split High Dose Preservative Free IM 10/21/2014, 10/23/2015, 11/21/2017    Influenza TIV (IM) 11/19/2007, 11/10/2008, 09/22/2009, 12/02/2011, 10/11/2013    Pneumococcal Conjugate 13-Valent 04/27/2016    Pneumococcal Polysaccharide PPV23 1935, 06/09/2017    Tdap 1935, 04/27/2016    Zoster 1935, 09/23/2015      Medicare Screening Tests and Risk Assessments:     Michelle Dial is here for his Subsequent Wellness visit  Health Risk Assessment:  Patient rates overall health as fair  Patient feels that their physical health rating is Slightly worse  Eyesight was rated as Same  Hearing was rated as Same  Patient feels that their emotional and mental health rating is Slightly worse  Pain experienced by patient in the last 7 days has been None   Patient states that he has experienced no weight loss or gain in last 6 months  Emotional/Mental Health:  Patient has not been feeling nervous/anxious  PHQ-9 Depression Screening:    Frequency of the following problems over the past two weeks:      1  Little interest or pleasure in doing things: 3 - nearly every day      2  Feeling down, depressed, or hopeless: 0 - not at all      3  Trouble falling or staying asleep, or sleeping too much: 3 - nearly every day      4  Feeling tired or having little energy: 0 - not at all      5  Poor appetite or overeatin - not at all      6  Feeling bad about yourself - or that you are a failure or have let yourself or your family down: 0 - not at all      7  Trouble concentrating on things, such as reading the newspaper or watching television: 3 - nearly every day      8  Moving or speaking so slowly that other people could have noticed  Or the opposite - being so fidgety or restless that you have been moving around a lot more than usual: 3 - nearly every day      9  Thoughts that you would be better off dead, or of hurting yourself in some way: 0 - not at all  PHQ-2 Score: 3  PHQ-9 Score: 12    Broken Bones/Falls: Fall Risk Assessment:    In the past year, patient has experienced: History of falling in past year    Number of falls: 2 or more    Injured during fall: Yes     Patient feels unsteady when standing or walking     Patient is worried about falling     Bladder/Bowel:  Patient has leaked urine accidently in the last six months  Patient reports no loss of bowel control  Immunizations:  Patient has had a flu vaccination within the last year  Patient has received a pneumonia shot  Patient has received a shingles shot  Patient has received tetanus/diphtheria shot  Date of tetanus/diphtheria shot: 2016    Home Safety:  Patient has trouble with stairs inside or outside of their home  Patient currently reports that there are no safety hazards present in home, working smoke alarms, working carbon monoxide detectors  Preventative Screenings:   no prostate cancer screen performed, no colon cancer screen completed, cholesterol screen completed, 2019  glaucoma eye exam completed, 2019      Nutrition:  Current diet: Regular with servings of the following:    Medications:  Patient is currently taking over-the-counter supplements  List of OTC medications includes: melatonin 3 mg  Patient is able to manage medications  Lifestyle Choices:  Patient reports no tobacco use  Patient has smoked or used tobacco in the past   Patient has stopped his tobacco use  Tobacco use quit date:   Patient reports alcohol use  Alcohol use per week: very rarely  Patient does not drive a vehicle  Patient wears seat belt  Current level of exercise of physical activity described by patient as: none  Activities of Daily Living:  Can get out of bed by his or her self, able to dress self, unable to make own meals, unable to do own shopping, unable to bathe self, unable to do laundry/housekeeping, unable to manage own money and other related tasks    Previous Hospitalizations:  No hospitalization or ED visit in past 12 months        Advanced Directives:  Patient has decided on a power of   Patient has spoken to designated power of   Patient has not completed advanced directive    Additional Comments: Daughters:    Deisi Valencia: [phone]  Kelsea Rojas: [address] [place] [address] 951: [phone]    Preventative Screening/Counseling:      Cardiovascular:      General: Screening Current          Diabetes:      General: Screening Current          Colorectal Cancer:      General: Screening Not Indicated          Prostate Cancer:      General: Screening Not Indicated          Osteoporosis:      General: Screening Not Indicated          AAA:      General: Screening Not Indicated          Glaucoma:      General: Screening Current          HIV:      General: Screening Not Indicated          Hepatitis C:      General: Screening Not Indicated        Advanced Directives:   5 wishes given

## 2019-08-17 ENCOUNTER — HOSPITAL ENCOUNTER (OUTPATIENT)
Dept: MRI IMAGING | Facility: HOSPITAL | Age: 84
Discharge: HOME | End: 2019-08-17
Payer: COMMERCIAL

## 2019-08-17 DIAGNOSIS — D33.0 BENIGN NEOPLASM OF SUPRATENTORIAL REGION OF BRAIN (HCC): ICD-10-CM

## 2019-08-17 DIAGNOSIS — R90.89 ABNORMAL FINDING ON MRI OF BRAIN: ICD-10-CM

## 2019-08-17 DIAGNOSIS — D32.9 MENINGIOMA (HCC): ICD-10-CM

## 2019-08-17 PROCEDURE — 70553 MRI BRAIN STEM W/O & W/DYE: CPT

## 2019-08-17 PROCEDURE — A9585 GADOBUTROL INJECTION: HCPCS | Performed by: INTERNAL MEDICINE

## 2019-08-17 RX ADMIN — GADOBUTROL 7 ML: 604.72 INJECTION INTRAVENOUS at 13:33

## 2019-08-21 ENCOUNTER — OFFICE VISIT (OUTPATIENT)
Dept: NEUROSURGERY | Facility: CLINIC | Age: 84
End: 2019-08-21
Payer: COMMERCIAL

## 2019-08-21 VITALS
RESPIRATION RATE: 16 BRPM | BODY MASS INDEX: 30.24 KG/M2 | SYSTOLIC BLOOD PRESSURE: 118 MMHG | HEIGHT: 64 IN | TEMPERATURE: 97.9 F | HEART RATE: 78 BPM | DIASTOLIC BLOOD PRESSURE: 82 MMHG

## 2019-08-21 DIAGNOSIS — R90.89 ABNORMAL FINDING ON MRI OF BRAIN: Primary | ICD-10-CM

## 2019-08-21 DIAGNOSIS — D32.9 MENINGIOMA (HCC): ICD-10-CM

## 2019-08-21 DIAGNOSIS — D33.0 BENIGN NEOPLASM OF SUPRATENTORIAL REGION OF BRAIN (HCC): ICD-10-CM

## 2019-08-21 DIAGNOSIS — G20 PARKINSON'S DISEASE (HCC): ICD-10-CM

## 2019-08-21 PROCEDURE — 99213 OFFICE O/P EST LOW 20 MIN: CPT | Performed by: PHYSICIAN ASSISTANT

## 2019-08-21 NOTE — PATIENT INSTRUCTIONS
Discuss treatment options with the family,     1 these would be craniotomy (surgical resection)    2  SRS (stereotactic radiosurgery) single visit to plan then single visit for treatment  Follow-up with Neurosurgery in 6 months, Dr Rodriguez/Patrick([facility])    MRI brain with without contrast week or so prior to follow-up visit  Return sooner should any new symptoms develop such as weakness of the left leg and left arm, seizures, headaches, decreased level of consciousness  Call with any questions relative these issues

## 2019-08-21 NOTE — LETTER
August 21, 2019     Qing Spangler MD  [address],  [address] [address]    Patient: Dannie Nye   YOB: 1935   Date of Visit: 8/21/2019       Dear Dr Wilson Arreola: Thank you for referring Dannie Nye to me for evaluation  Below are my notes for this consultation  If you have questions, please do not hesitate to call me  I look forward to following your patient along with you  Sincerely,        Desiree Hernandez MD, PhD        CC: MD Francesca Craven PA-C  8/21/2019 11:51 AM  Sign at close encounter  Patient ID: Dannie Nye is a 81 y o  male  Diagnoses and all orders for this visit:    Abnormal finding on MRI of brain  -     MRI brain with and without contrast; Future    Benign neoplasm of supratentorial region of brain ([facility] )  -     MRI brain with and without contrast; Future    Meningioma ([facility] )  -     MRI brain with and without contrast; Future    Parkinson's disease (HCC)          Assessment/Plan:    Very pleasant 26-year-old male accompanied by his son Le Friend and daughter Evelin Moses, returns for six-month follow-up, arrives by wheelchair, to review updated MRI of the brain  He has a known meningioma  This was an incidental finding on MRI of the brain, following a fall December of 2018  He has a history of Parkinson's disease, followed by Neurology, Dr Michelle Huber, he had MRI for initial workup of this 2016, in retrospect the meningioma was present at that time and was subcentimeter in size  Family only reports new hallucinations, they report his Parkinson's is relatively stable although he has been showing progressively worsening difficulty with ambulation  They notice no specific weakness to 1 side of the body or the other    He has no history of headaches or history of seizures    There is no reported bowel or bladder incontinence, no complaints of motor or sensory difficulties in the upper or lower extremities, he does continue to ambulate with the assistance of a single-point cane, and uses a wheelchair for long distance travel  He has had updated MRI of the brain, 8/17/19, the study was carefully reviewed in detail by Dr Maicol Dawson, and compared with prior study of 12/6/18, there has been some increase in size of the meningioma, measured by Radiology, was:  9 x 2 9 x 1 0 cm, and currently: 2 0 x 3 2 x 1 2 cm  There is no specific vasogenic edema noted on FLAIR study, unchanged from prior study  The study was reviewed in detail with the patient and family members  On examination today:  He is awake alert and oriented and responds appropriately to questioning  Motor exam of the upper and lower extremities is 5 x 5 for power, there is no cogwheel motion at the wrists, reflexes are intact and symmetric, there is no pronator drift, finger-nose is intact, gait was not evaluated as the patient was in a wheelchair  Cranial nerves were grossly intact  Options for management were reviewed again in detail, these included surgical resection, SRS treatment and continued observation with serial imaging  At this time the to family members suggested they would like to discuss these options with other family members, they did want to continue with six-month follow-up with MRI  They both reported they would call should they have any additional questions or have any change in management such as proceeding with SRS  Family members can be reached:  daughter Yasir Morales (H [phone]), son Italo Luo (E [phone])    They do understand should there be any new symptoms, headache, decreased level consciousness, muscle weakness in the upper lower extremities on the left, seizures or other neurologic changes return sooner for reassessment      These findings, impressions and recommendations are reviewed in great detail with the patient and his son and daughter, they expressed understanding and agreement, their questions were answered completely and to their satisfaction  Follow up has been scheduled  Return in about 6 months (around 2/21/2020) for Review MRI brain with without contrast     Chief Complaint  Six-month follow-up, abnormal finding on MRI brain, meningioma, review updated study    Very pleasant 81-year-old male, returns for six-month follow-up to review MRI brain, history of meningioma  He has a history of:     Parkinson's disease, is currently managed with Sinemet, Exelon and Mirapex, followed by Dr Anabella MOSES, and is currently managed with albuterol, Spiriva and Singulair, he is followed by Dr Lenetta Dancer, and DARSHANA Flor  Diabetes mellitus type 2 for which he is followed by his primary care provider Dr Gallo Shafer, , most recent hemoglobin A1c, 7 2%, 2/4/19  History of CVA, remains on Plavix  Hypertension and dyslipidemia managed by his primary care provider, treated with Lipitor and Avalide    In addition he has vascular disease including aortic regurgitation, peripheral vascular disease, and carotid artery disease  The following portions of the patient's history were reviewed and updated as appropriate: allergies, current medications, past family history, past medical history, past social history and past surgical history  Review of Systems   Constitutional: Negative  HENT: Negative  Eyes: Positive for visual disturbance (blurred vision)  Respiratory: Negative  Cardiovascular: Negative  Gastrointestinal: Negative  Endocrine: Negative  Genitourinary: Negative  Musculoskeletal: Negative  Skin: Negative  Allergic/Immunologic: Negative  Neurological: Positive for weakness (b/l legs)  Negative for dizziness, tremors, seizures, syncope, facial asymmetry, speech difficulty, light-headedness, numbness and headaches  Hematological: Negative  Psychiatric/Behavioral: Positive for confusion, decreased concentration and hallucinations     All other systems reviewed and are negative  Objective:    Physical Exam   Constitutional: He is oriented to person, place, and time  He appears well-developed and well-nourished  HENT:   Head: Normocephalic and atraumatic  Eyes: Pupils are equal, round, and reactive to light  EOM are normal    Cardiovascular: Normal rate  Pulmonary/Chest: Effort normal and breath sounds normal    Neurological: He is alert and oriented to person, place, and time  Skin: Skin is warm and dry  Psychiatric: He has a normal mood and affect  Vitals reviewed  Neurologic Exam     Mental Status   Oriented to person, place, and time  Cranial Nerves     CN III, IV, VI   Pupils are equal, round, and reactive to light  Extraocular motions are normal           MRI BRAIN WITH AND WITHOUT CONTRAST   8/17/19     INDICATION: R90 89: Other abnormal findings on diagnostic imaging of central nervous system  D33 0: Benign neoplasm of brain, supratentorial  D32 9: Benign neoplasm of meninges, unspecified      COMPARISON:  MRI from 12/6/2018     TECHNIQUE:  Sagittal T1, axial T2, axial FLAIR, axial T1, axial [place], axial diffusion  Sagittal, axial T1 postcontrast   Axial bravo postcontrast with coronal reconstructions           IV Contrast:  7 mL of gadobutrol injection (MULTI-DOSE)      IMAGE QUALITY:   Diagnostic      FINDINGS:     BRAIN PARENCHYMA:  There is redemonstration of an avidly enhancing lesion along the high left frontal vertex compatible with a meningioma  The meningioma measures approximately 2 0 x 3 2 x 1 2 cm (transverse by anteroposterior by craniocaudad   dimensions)  This appears to have slightly increased in size since the prior examination where it measured approximately 1 9 x 2 9 x 1 0 cm when measured in the same planes  There is no definite associated edema      There is age-related involutional change  There is scattered chronic microvascular ischemic change    There is no evidence of recent infarction         VENTRICLES: Normal      SELLA AND PITUITARY GLAND:  Normal      ORBITS:  Normal      PARANASAL SINUSES:  There is paranasal sinus mucosal thickening and partial opacification  There is mucosal thickening of the right frontal sinus      VASCULATURE:  Evaluation of the major intracranial vasculature demonstrates appropriate flow voids      CALVARIUM AND SKULL BASE:  Normal      EXTRACRANIAL SOFT TISSUES:  Normal      IMPRESSION:     Redemonstration of an avidly enhancing meningioma along the high left frontal vertex, slightly increased in size since the prior examination from 12/6/2018  No definite underlying vasogenic edema is identified      Scattered periventricular, subcortical and deep white matter chronic microvascular ischemic change

## 2019-08-25 DIAGNOSIS — J45.909 EXTRINSIC ASTHMA, UNSPECIFIED ASTHMA SEVERITY, UNSPECIFIED WHETHER COMPLICATED, UNSPECIFIED WHETHER PERSISTENT: ICD-10-CM

## 2019-08-26 ENCOUNTER — HOSPITAL ENCOUNTER (INPATIENT)
Facility: HOSPITAL | Age: 84
LOS: 2 days | Discharge: NURSING, REHAB OR LONG-TERM CARE | DRG: 057 | End: 2019-08-29
Attending: EMERGENCY MEDICINE | Admitting: FAMILY MEDICINE
Payer: COMMERCIAL

## 2019-08-26 ENCOUNTER — APPOINTMENT (EMERGENCY)
Dept: CT IMAGING | Facility: HOSPITAL | Age: 84
DRG: 057 | End: 2019-08-26
Payer: COMMERCIAL

## 2019-08-26 ENCOUNTER — TELEPHONE (OUTPATIENT)
Dept: INTERNAL MEDICINE CLINIC | Facility: CLINIC | Age: 84
End: 2019-08-26

## 2019-08-26 ENCOUNTER — APPOINTMENT (EMERGENCY)
Dept: RADIOLOGY | Facility: HOSPITAL | Age: 84
DRG: 057 | End: 2019-08-26
Payer: COMMERCIAL

## 2019-08-26 DIAGNOSIS — G20 PARKINSON'S DISEASE (HCC): ICD-10-CM

## 2019-08-26 DIAGNOSIS — I67.9 CEREBROVASCULAR DISEASE: ICD-10-CM

## 2019-08-26 DIAGNOSIS — E11.9 TYPE 2 DIABETES MELLITUS WITHOUT COMPLICATION, WITHOUT LONG-TERM CURRENT USE OF INSULIN (HCC): ICD-10-CM

## 2019-08-26 DIAGNOSIS — J42 CHRONIC BRONCHITIS, UNSPECIFIED CHRONIC BRONCHITIS TYPE (HCC): ICD-10-CM

## 2019-08-26 DIAGNOSIS — R29.6 FREQUENT FALLS: ICD-10-CM

## 2019-08-26 DIAGNOSIS — R33.9 URINARY RETENTION: ICD-10-CM

## 2019-08-26 DIAGNOSIS — R26.9 NEUROLOGIC GAIT DYSFUNCTION: ICD-10-CM

## 2019-08-26 DIAGNOSIS — N17.9 AKI (ACUTE KIDNEY INJURY) (HCC): Primary | ICD-10-CM

## 2019-08-26 DIAGNOSIS — G93.9 BRAIN LESION: ICD-10-CM

## 2019-08-26 PROBLEM — I10 ESSENTIAL HYPERTENSION: Status: ACTIVE | Noted: 2019-08-26

## 2019-08-26 PROBLEM — R26.2 AMBULATORY DYSFUNCTION: Status: ACTIVE | Noted: 2019-08-26

## 2019-08-26 PROBLEM — R31.0 GROSS HEMATURIA: Status: ACTIVE | Noted: 2019-08-26

## 2019-08-26 PROBLEM — E87.6 HYPOKALEMIA: Status: ACTIVE | Noted: 2019-08-26

## 2019-08-26 LAB
ANION GAP SERPL CALCULATED.3IONS-SCNC: 9 MMOL/L (ref 4–13)
APTT PPP: 28 SECONDS (ref 23–37)
BACTERIA UR QL AUTO: ABNORMAL /HPF
BASOPHILS # BLD AUTO: 0.04 THOUSANDS/ΜL (ref 0–0.1)
BASOPHILS NFR BLD AUTO: 0 % (ref 0–1)
BILIRUB UR QL STRIP: NEGATIVE
BUN SERPL-MCNC: 40 MG/DL (ref 5–25)
CALCIUM SERPL-MCNC: 9.5 MG/DL (ref 8.3–10.1)
CHLORIDE SERPL-SCNC: 101 MMOL/L (ref 100–108)
CK MB SERPL-MCNC: 1 % (ref 0–2.5)
CK MB SERPL-MCNC: 4.8 NG/ML (ref 0–5)
CK SERPL-CCNC: 497 U/L (ref 39–308)
CLARITY UR: CLEAR
CO2 SERPL-SCNC: 28 MMOL/L (ref 21–32)
COLOR UR: YELLOW
CREAT SERPL-MCNC: 1.59 MG/DL (ref 0.6–1.3)
EOSINOPHIL # BLD AUTO: 0.08 THOUSAND/ΜL (ref 0–0.61)
EOSINOPHIL NFR BLD AUTO: 1 % (ref 0–6)
ERYTHROCYTE [DISTWIDTH] IN BLOOD BY AUTOMATED COUNT: 12.7 % (ref 11.6–15.1)
GFR SERPL CREATININE-BSD FRML MDRD: 40 ML/MIN/1.73SQ M
GLUCOSE SERPL-MCNC: 156 MG/DL (ref 65–140)
GLUCOSE SERPL-MCNC: 213 MG/DL (ref 65–140)
GLUCOSE SERPL-MCNC: 231 MG/DL (ref 65–140)
GLUCOSE UR STRIP-MCNC: ABNORMAL MG/DL
HCT VFR BLD AUTO: 41.8 % (ref 36.5–49.3)
HGB BLD-MCNC: 13.4 G/DL (ref 12–17)
HGB UR QL STRIP.AUTO: ABNORMAL
IMM GRANULOCYTES # BLD AUTO: 0.05 THOUSAND/UL (ref 0–0.2)
IMM GRANULOCYTES NFR BLD AUTO: 1 % (ref 0–2)
INR PPP: 1.13 (ref 0.84–1.19)
KETONES UR STRIP-MCNC: NEGATIVE MG/DL
LEUKOCYTE ESTERASE UR QL STRIP: NEGATIVE
LYMPHOCYTES # BLD AUTO: 0.78 THOUSANDS/ΜL (ref 0.6–4.47)
LYMPHOCYTES NFR BLD AUTO: 7 % (ref 14–44)
MCH RBC QN AUTO: 30.9 PG (ref 26.8–34.3)
MCHC RBC AUTO-ENTMCNC: 32.1 G/DL (ref 31.4–37.4)
MCV RBC AUTO: 96 FL (ref 82–98)
MONOCYTES # BLD AUTO: 0.94 THOUSAND/ΜL (ref 0.17–1.22)
MONOCYTES NFR BLD AUTO: 9 % (ref 4–12)
NEUTROPHILS # BLD AUTO: 8.62 THOUSANDS/ΜL (ref 1.85–7.62)
NEUTS SEG NFR BLD AUTO: 82 % (ref 43–75)
NITRITE UR QL STRIP: NEGATIVE
NON-SQ EPI CELLS URNS QL MICRO: ABNORMAL /HPF
NRBC BLD AUTO-RTO: 0 /100 WBCS
PH UR STRIP.AUTO: 5.5 [PH]
PLATELET # BLD AUTO: 229 THOUSANDS/UL (ref 149–390)
PMV BLD AUTO: 9.6 FL (ref 8.9–12.7)
POTASSIUM SERPL-SCNC: 3.4 MMOL/L (ref 3.5–5.3)
PROT UR STRIP-MCNC: ABNORMAL MG/DL
PROTHROMBIN TIME: 14.6 SECONDS (ref 11.6–14.5)
RBC # BLD AUTO: 4.34 MILLION/UL (ref 3.88–5.62)
RBC #/AREA URNS AUTO: ABNORMAL /HPF
SODIUM SERPL-SCNC: 138 MMOL/L (ref 136–145)
SP GR UR STRIP.AUTO: >=1.03 (ref 1–1.03)
UROBILINOGEN UR QL STRIP.AUTO: 0.2 E.U./DL
WBC # BLD AUTO: 10.51 THOUSAND/UL (ref 4.31–10.16)
WBC #/AREA URNS AUTO: ABNORMAL /HPF

## 2019-08-26 PROCEDURE — 80048 BASIC METABOLIC PNL TOTAL CA: CPT | Performed by: EMERGENCY MEDICINE

## 2019-08-26 PROCEDURE — 82550 ASSAY OF CK (CPK): CPT | Performed by: EMERGENCY MEDICINE

## 2019-08-26 PROCEDURE — 85730 THROMBOPLASTIN TIME PARTIAL: CPT | Performed by: EMERGENCY MEDICINE

## 2019-08-26 PROCEDURE — 36415 COLL VENOUS BLD VENIPUNCTURE: CPT | Performed by: EMERGENCY MEDICINE

## 2019-08-26 PROCEDURE — 99284 EMERGENCY DEPT VISIT MOD MDM: CPT | Performed by: EMERGENCY MEDICINE

## 2019-08-26 PROCEDURE — 0T9B70Z DRAINAGE OF BLADDER WITH DRAINAGE DEVICE, VIA NATURAL OR ARTIFICIAL OPENING: ICD-10-PCS | Performed by: INTERNAL MEDICINE

## 2019-08-26 PROCEDURE — 70450 CT HEAD/BRAIN W/O DYE: CPT

## 2019-08-26 PROCEDURE — 81001 URINALYSIS AUTO W/SCOPE: CPT | Performed by: EMERGENCY MEDICINE

## 2019-08-26 PROCEDURE — 71046 X-RAY EXAM CHEST 2 VIEWS: CPT

## 2019-08-26 PROCEDURE — 85610 PROTHROMBIN TIME: CPT | Performed by: EMERGENCY MEDICINE

## 2019-08-26 PROCEDURE — 82553 CREATINE MB FRACTION: CPT | Performed by: EMERGENCY MEDICINE

## 2019-08-26 PROCEDURE — 99220 PR INITIAL OBSERVATION CARE/DAY 70 MINUTES: CPT | Performed by: FAMILY MEDICINE

## 2019-08-26 PROCEDURE — 93005 ELECTROCARDIOGRAM TRACING: CPT

## 2019-08-26 PROCEDURE — 99285 EMERGENCY DEPT VISIT HI MDM: CPT

## 2019-08-26 PROCEDURE — 85025 COMPLETE CBC W/AUTO DIFF WBC: CPT | Performed by: EMERGENCY MEDICINE

## 2019-08-26 PROCEDURE — 82948 REAGENT STRIP/BLOOD GLUCOSE: CPT

## 2019-08-26 RX ORDER — ALBUTEROL SULFATE 90 UG/1
2 AEROSOL, METERED RESPIRATORY (INHALATION) EVERY 6 HOURS PRN
Status: DISCONTINUED | OUTPATIENT
Start: 2019-08-26 | End: 2019-08-29 | Stop reason: HOSPADM

## 2019-08-26 RX ORDER — RIVASTIGMINE TARTRATE 1.5 MG/1
1.5 CAPSULE ORAL 2 TIMES DAILY
Status: DISCONTINUED | OUTPATIENT
Start: 2019-08-26 | End: 2019-08-29 | Stop reason: HOSPADM

## 2019-08-26 RX ORDER — PRAMIPEXOLE DIHYDROCHLORIDE 0.5 MG/1
0.5 TABLET ORAL 3 TIMES DAILY
Status: DISCONTINUED | OUTPATIENT
Start: 2019-08-26 | End: 2019-08-29 | Stop reason: HOSPADM

## 2019-08-26 RX ORDER — FLUTICASONE FUROATE AND VILANTEROL 200; 25 UG/1; UG/1
1 POWDER RESPIRATORY (INHALATION) DAILY
Status: DISCONTINUED | OUTPATIENT
Start: 2019-08-27 | End: 2019-08-29 | Stop reason: HOSPADM

## 2019-08-26 RX ORDER — HYDROMORPHONE HCL/PF 1 MG/ML
0.5 SYRINGE (ML) INJECTION EVERY 4 HOURS PRN
Status: DISCONTINUED | OUTPATIENT
Start: 2019-08-26 | End: 2019-08-29 | Stop reason: HOSPADM

## 2019-08-26 RX ORDER — ACETAMINOPHEN 325 MG/1
975 TABLET ORAL ONCE
Status: COMPLETED | OUTPATIENT
Start: 2019-08-26 | End: 2019-08-26

## 2019-08-26 RX ORDER — ALBUTEROL SULFATE 2.5 MG/3ML
2.5 SOLUTION RESPIRATORY (INHALATION) EVERY 4 HOURS PRN
Qty: 75 ML | Refills: 5 | Status: SHIPPED | OUTPATIENT
Start: 2019-08-26 | End: 2019-08-29 | Stop reason: HOSPADM

## 2019-08-26 RX ORDER — LANOLIN ALCOHOL/MO/W.PET/CERES
6 CREAM (GRAM) TOPICAL
Status: DISCONTINUED | OUTPATIENT
Start: 2019-08-26 | End: 2019-08-27

## 2019-08-26 RX ORDER — POTASSIUM CHLORIDE 20 MEQ/1
20 TABLET, EXTENDED RELEASE ORAL ONCE
Status: COMPLETED | OUTPATIENT
Start: 2019-08-26 | End: 2019-08-26

## 2019-08-26 RX ORDER — ALBUTEROL SULFATE 2.5 MG/3ML
2.5 SOLUTION RESPIRATORY (INHALATION) EVERY 4 HOURS PRN
Status: DISCONTINUED | OUTPATIENT
Start: 2019-08-26 | End: 2019-08-27

## 2019-08-26 RX ORDER — PREDNISONE 10 MG/1
10 TABLET ORAL DAILY
Status: DISCONTINUED | OUTPATIENT
Start: 2019-08-27 | End: 2019-08-29 | Stop reason: HOSPADM

## 2019-08-26 RX ORDER — ATORVASTATIN CALCIUM 10 MG/1
10 TABLET, FILM COATED ORAL DAILY
Status: DISCONTINUED | OUTPATIENT
Start: 2019-08-27 | End: 2019-08-27

## 2019-08-26 RX ORDER — CLOPIDOGREL BISULFATE 75 MG/1
75 TABLET ORAL DAILY
Status: DISCONTINUED | OUTPATIENT
Start: 2019-08-27 | End: 2019-08-29 | Stop reason: HOSPADM

## 2019-08-26 RX ORDER — ONDANSETRON 2 MG/ML
4 INJECTION INTRAMUSCULAR; INTRAVENOUS EVERY 6 HOURS PRN
Status: DISCONTINUED | OUTPATIENT
Start: 2019-08-26 | End: 2019-08-29 | Stop reason: HOSPADM

## 2019-08-26 RX ORDER — CHOLECALCIFEROL (VITAMIN D3) 125 MCG
CAPSULE ORAL
COMMUNITY
End: 2020-09-15

## 2019-08-26 RX ORDER — MONTELUKAST SODIUM 10 MG/1
10 TABLET ORAL
Status: DISCONTINUED | OUTPATIENT
Start: 2019-08-26 | End: 2019-08-29 | Stop reason: HOSPADM

## 2019-08-26 RX ORDER — ACETAMINOPHEN 325 MG/1
650 TABLET ORAL EVERY 6 HOURS PRN
Status: DISCONTINUED | OUTPATIENT
Start: 2019-08-26 | End: 2019-08-29 | Stop reason: HOSPADM

## 2019-08-26 RX ORDER — SODIUM CHLORIDE 9 MG/ML
100 INJECTION, SOLUTION INTRAVENOUS CONTINUOUS
Status: DISCONTINUED | OUTPATIENT
Start: 2019-08-26 | End: 2019-08-28

## 2019-08-26 RX ADMIN — ACETAMINOPHEN 650 MG: 325 TABLET, FILM COATED ORAL at 22:04

## 2019-08-26 RX ADMIN — RIVASTIGMINE TARTRATE 1.5 MG: 1.5 CAPSULE ORAL at 18:50

## 2019-08-26 RX ADMIN — HYDROMORPHONE HYDROCHLORIDE 0.5 MG: 1 INJECTION, SOLUTION INTRAMUSCULAR; INTRAVENOUS; SUBCUTANEOUS at 17:11

## 2019-08-26 RX ADMIN — ACETAMINOPHEN 975 MG: 325 TABLET, FILM COATED ORAL at 13:45

## 2019-08-26 RX ADMIN — POTASSIUM CHLORIDE 20 MEQ: 1500 TABLET, EXTENDED RELEASE ORAL at 18:50

## 2019-08-26 RX ADMIN — MONTELUKAST SODIUM 10 MG: 10 TABLET, FILM COATED ORAL at 21:55

## 2019-08-26 RX ADMIN — PRAMIPEXOLE DIHYDROCHLORIDE 0.5 MG: 0.5 TABLET ORAL at 21:55

## 2019-08-26 RX ADMIN — MELATONIN 6 MG: at 22:06

## 2019-08-26 RX ADMIN — INSULIN LISPRO 2 UNITS: 100 INJECTION, SOLUTION INTRAVENOUS; SUBCUTANEOUS at 18:50

## 2019-08-26 RX ADMIN — CARBIDOPA AND LEVODOPA 1 TABLET: 25; 100 TABLET ORAL at 21:55

## 2019-08-26 RX ADMIN — SODIUM CHLORIDE 100 ML/HR: 0.9 INJECTION, SOLUTION INTRAVENOUS at 18:48

## 2019-08-26 NOTE — TELEPHONE ENCOUNTER
Patients daughter called, patient fell it was hard to get him up at first they finally got him up  They are not sure how long he was down for, they believe he hit his head  They wanted patient to come in for an appointment however I advised to take the patient to the ER

## 2019-08-26 NOTE — H&P
H&P- Ricardo Champ 1935, 81 y o  male MRN: [id number]    Unit/Bed#: DRAGAN Encounter: [id number]    Primary Care Provider: Piper Jason MD   Date and time admitted to hospital: 8/26/2019 12:22 PM        * Ambulatory dysfunction  Assessment & Plan  Patient brought in by his daughters due to patient falling down and being found down on the floor for at least greater than 3 hours  Possibly related to patient's Parkinson's disease  Will continue patient's [facility]  [place] fall precautions  Get PT/OT consult  Acute kidney injury [facility])  Assessment & Plan  Patient presented with an elevated creatinine level of 1 59  Patient's baseline creatinine ranges from 1 0 to 1 2  Most likely cause is postobstructive due to the fact that patient had difficulty voiding on presentation  Post void residual of the bladder scan measured 1000 mL of urine  This was removed via straight cath  Whiteside catheter has been placed  Continue hydration with normal saline  Nephrology consult placed  Nephrotoxin medications-irbesartan/hydrochlorothiazide held  Follow-up renal function on BMP daily  Hypokalemia  Assessment & Plan  Replete with oral potassium chloride and recheck in a m  Type 2 diabetes mellitus ([facility] [address] )  Assessment & Plan  Lab Results   Component Value Date    HGBA1C 6 8 (H) 06/18/2019       No results for input(s): POCGLU in the last 72 hours  Blood Sugar Average: Last 72 hrs:   start patient on sliding scale insulin, diabetic diet, Accu-Cheks q a c  And HS  Chronic obstructive pulmonary disease (HCC)  Assessment & Plan  Currently stable with no acute exacerbation  Resume Breo Ellipta, Spiriva, albuterol nebs, albuterol HFA p r n, singulair  Essential hypertension  Assessment & Plan  Patient's blood pressures are currently soft and low normal   Irbesartan-HCTZ is currently on hold due to ANGELA and low normal BP      Gross hematuria  Assessment & Plan  Most likely traumatic in the setting of straight catheterization and Whiteside catheter placement with noticeable clot after initial placement of Whiteside catheter  Will continue patient on Plavix and monitor hematuria via Whiteside catheter  If hematuria does not resolve and hemoglobin/hematocrit drops, would consider Urology consult  VTE Prophylaxis: Pharmacologic VTE Prophylaxis contraindicated due to Hematuria  / sequential compression device   Code Status:  DNI  POLST: There is no POLST form on file for this patient (pre-hospital)  Discussion with family:  Yes with patient and his 2 daughters at bedside    Anticipated Length of Stay:  Patient will be admitted on an Observation basis with an anticipated length of stay of  approximately 2 midnights  Justification for Hospital Stay:  Ambulatory dysfunction status post fall with acute kidney injury and hypokalemia    Total Time for Visit, including Counseling / Coordination of Care: 30 minutes  Greater than 50% of this total time spent on direct patient counseling and coordination of care  Chief Complaint:   Frequent falling episodes    History of Present Illness:    Ghislaine Anderson is a 81 y o  male patient with past medical history of Parkinson's disease, COPD, coronary artery disease status post stent placement, essential hypertension, type 2 diabetes mellitus who was brought in by his daughters Deisi Valencia and Falguni Dougherty because he fell this morning and was only found down on the floor after about 3 hours of being down  They also state that he has been having problems with falling lately with lots of unwitnessed falls  According to the patient's daughters, they all live in the same [address] [place] [address] and the patient and his wife live together in the same unit but in separate rooms  The daughters who live in [place] as well, also go regularly to take care of the parents by cooking, cleaning, checking on them, administering the medications    They state that this morning the patient's wife was expecting him to come out for his regular morning coffee but when he did not show up, she went to the room and found him down on the floor  She presumed he must have been down on the floor for at least greater than 3 hours  Upon evaluation in the ED CT of the head was negative for any acute findings  He was found to have acute kidney injury with some mild hypokalemia  He was also noted to have urinary retention with a postvoid residual after bladder scan showing 1000 mL of urine which was removed by straight cath  The patient also had 2 more episodes of urinary incontinence where he wet himself in the ED  Hematuria was noticed upon placement of Whiteside catheter in the ED and patient is currently draining red colored urine through the Whiteside catheter  Review of Systems:    Review of Systems   Constitutional: Negative  HENT: Negative  Eyes: Negative  Respiratory: Negative  Cardiovascular: Negative  Gastrointestinal: Negative  Genitourinary: Positive for difficulty urinating, hematuria and urgency  Musculoskeletal: Negative  Skin: Negative  Neurological: Negative  Hematological: Negative          Past Medical and Surgical History:     Past Medical History:   Diagnosis Date    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) ([facility])     Allergic rhinitis     last assessed [id number]    Aortic regurgitation     Arm laceration     Asthma     Bronchitis, chronic obstructive, with exacerbation ([facility])     last assessed [id number]    Candidal intertrigo     Chronic obstructive lung disease ([facility] [address] )     last assessed [id number]    COPD (chronic obstructive pulmonary disease) ([facility])     Coronary artery disease     carotid stents    Cough     CVA (cerebral vascular accident) ([facility] [address] )     Dermatitis     Diabetes mellitus type 2, uncomplicated ([facility] [address] )     controlled    Dysthymic disorder     Fatigue     Hyperlipidemia     Hypertension     Neurologic gait dysfunction     Parkinson's disease ([facility] [address] )     Pneumonia  PVD (peripheral vascular disease) (HCC)     Seborrheic keratosis     TIA (transient ischemic attack)     Tinea corporis     Tinea pedis of both feet     Tremor        Past Surgical History:   Procedure Laterality Date    NASAL SINUS SURGERY      UT BRONCHOSCOPY,DIAGNOSTIC N/A 7/27/2016    Procedure: BRONCHOSCOPY;  Surgeon: Lisset Pichardo MD;  Location: [facility]; Service: Pulmonary       Meds/Allergies:    Prior to Admission medications    Medication Sig Start Date End Date Taking?  Authorizing Provider   acetaminophen (TYLENOL) 325 mg tablet Take 650 mg by mouth every 6 (six) hours as needed for mild pain   Yes Historical Provider, MD   albuterol (2 5 mg/3 mL) 0 083 % nebulizer solution TAKE 1 VIAL (2 5 MG TOTAL) BY NEBULIZATION EVERY 4 (FOUR) HOURS AS NEEDED FOR WHEEZING 8/26/19  Yes Debbie Orellana MD   albuterol (VENTOLIN HFA) 90 mcg/act inhaler Inhale 2 puffs every 6 (six) hours as needed for wheezing 10/15/18  Yes Debbie Orellana MD   atorvastatin (LIPITOR) 10 mg tablet TAKE 1 TABLET ONCE DAILY 7/26/19  Yes NORBERTO Ravi   carbidopa-levodopa (SINEMET)  mg per tablet Take 1 tablet by mouth 3 (three) times a day 2/14/19  Yes Kyara Domingo MD   clopidogrel (PLAVIX) 75 mg tablet Take 1 tablet (75 mg total) by mouth daily 11/15/18  Yes Debbie Orellana MD   fluticasone-salmeterol (ADVAIR DISKUS) 250-50 mcg/dose inhaler Inhale 1 puff 2 (two) times a day Rinse mouth after use 6/17/19  Yes Chance Irving PA-C   irbesartan-hydrochlorothiazide (AVALIDE) 150-12 5 MG per tablet Take 1 tablet by mouth daily 10/29/18  Yes Debbie Orellana MD   Melatonin 5 MG TABS Take by mouth daily at bedtime as needed   Yes Historical Provider, MD   montelukast (SINGULAIR) 10 mg tablet Take 1 tablet (10 mg total) by mouth daily at bedtime 8/9/19  Yes Chance Irving PA-C   pramipexole (MIRAPEX) 0 5 mg tablet Take 1 tablet (0 5 mg total) by mouth 3 (three) times a day 2/14/19  Yes Kyara Domingo MD   predniSONE 10 mg tablet TAKE 1 TABLET BY MOUTH EVERY DAY 19  Yes Erendira Abdullahi PA-C   rivastigmine (EXELON) 1 5 mg capsule Take 1 capsule (1 5 mg total) by mouth 2 (two) times a day 19  Yes Yokasta Motley MD   terbinafine (LamISIL) 1 % cream Apply topically 2 (two) times a day   Yes Kathryn Barahona MD   tiotropium (SPIRIVA HANDIHALER) 18 mcg inhalation capsule Place 1 capsule (18 mcg total) into inhaler and inhale daily Via Handihaler at the same time every day 11/15/18  Yes Denise Syed MD   triamcinolone (KENALOG) 0 5 % cream Apply topically 2 (two) times a day as needed for rash 4/15/19  Yes Denise Syed MD     I have reviewed home medications using allscripts  Allergies:    Allergies   Allergen Reactions    Penicillins Syncope       Social History:     Marital Status: /Civil Union   Occupation:  None  Patient Pre-hospital Living Situation:  Lives at home  Patient Pre-hospital Level of Mobility:  Ambulatory  Patient Pre-hospital Diet Restrictions:  Diabetic  Substance Use History:   Social History     Substance and Sexual Activity   Alcohol Use Yes    Frequency: Monthly or less    Drinks per session: 1 or 2    Binge frequency: Never    Comment: rarely     Social History     Tobacco Use   Smoking Status Former Smoker    Packs/day: 1 00    Years: 3 00    Pack years: 3 00    Types: Cigarettes    Last attempt to quit: Shayy Prieto Years since quittin 6   Smokeless Tobacco Never Used     Social History     Substance and Sexual Activity   Drug Use No       Family History:    Family History   Problem Relation Age of Onset    No Known Problems Mother         Old Age    COPD Sister     Lung cancer Sister     Asthma Family        Physical Exam:     Vitals:   Blood Pressure: 104/52 (19 1630)  Pulse: 70 (19 1630)  Temperature: 98 6 °F (37 °C) (19 1221)  Temp Source: Oral (19 1221)  Respirations: 20 (19 1630)  Weight - Scale: 82 3 kg (181 lb 7 oz) (19 1240)  SpO2: 95 % (08/26/19 1630)    Physical Exam   Constitutional: He is oriented to person, place, and time  He appears well-developed and well-nourished  No distress  HENT:   Head: Normocephalic and atraumatic  Eyes: Pupils are equal, round, and reactive to light  EOM are normal  No scleral icterus  Neck: Normal range of motion  Neck supple  No JVD present  Cardiovascular: Normal rate, regular rhythm and normal heart sounds  No murmur heard  Pulmonary/Chest: Effort normal and breath sounds normal  He has no wheezes  He has no rales  Abdominal: Soft  Bowel sounds are normal  He exhibits no distension  There is no tenderness  There is no rebound and no guarding  Genitourinary:   Genitourinary Comments: Gross hematuria noted in Whiteside catheter bag   Musculoskeletal: He exhibits no edema or tenderness  Neurological: He is alert and oriented to person, place, and time  Skin: Skin is warm and dry  He is not diaphoretic  Psychiatric: He has a normal mood and affect  His behavior is normal          Additional Data:     Lab Results: I have personally reviewed pertinent reports  Results from last 7 days   Lab Units 08/26/19  1327   WBC Thousand/uL 10 51*   HEMOGLOBIN g/dL 13 4   HEMATOCRIT % 41 8   PLATELETS Thousands/uL 229   NEUTROS PCT % 82*   LYMPHS PCT % 7*   MONOS PCT % 9   EOS PCT % 1     Results from last 7 days   Lab Units 08/26/19  1327   SODIUM mmol/L 138   POTASSIUM mmol/L 3 4*   CHLORIDE mmol/L 101   CO2 mmol/L 28   BUN mg/dL 40*   CREATININE mg/dL 1 59*   ANION GAP mmol/L 9   CALCIUM mg/dL 9 5   GLUCOSE RANDOM mg/dL 156*     Results from last 7 days   Lab Units 08/26/19  1327   INR  1 13                   Imaging: I have personally reviewed pertinent reports  XR chest 2 views   Final Result by Idalmis Hernandez MD (08/26 1601)      Stable interstitial prominence bilaterally without new focal airspace consolidation              Workstation performed: [id number]         TRAUMA - CT head wo contrast   Final Result by Earlene Manrique MD (08/26 1128)      No evidence of acute intracranial process or significant interval change  Chronic microangiopathy  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

## 2019-08-26 NOTE — ASSESSMENT & PLAN NOTE
Currently stable with no acute exacerbation  Resume Breo Ellipta, Spiriva, albuterol nebs, albuterol HFA p r n, singulair

## 2019-08-26 NOTE — TELEPHONE ENCOUNTER
Milla from [place] [address] called,   after multiple attempts to contact patient and his daughter Elio Rodriguez thinks that they are not interested in the physical therapy-know one is getting back to them regarding an appt    Any questions call Milla # [phone]

## 2019-08-26 NOTE — ED NOTES
1  CC; fall, pt admitted for urinary problem, not trauma related    2  Admission related to injury? No     3  Orientation status alert and oriented, disoriented to situation/forgetful     4  Abnormal labs/abnormal focused assessment/abnormal vitals; hematuria, incontinence, urine retention     5  Medications/drips none    6  Last time narcotics given/ pain meds: 0 5 mg Dilaudid at 1711    7  IV lines/drains/ etc 20 g RAC nsl     8  Isolation status none    9  Skin wnl     10  Ambulation status; assist x 2, high fall risk    11   ED phone # [phone]         [facility]  08/26/19 0526

## 2019-08-26 NOTE — ASSESSMENT & PLAN NOTE
Patient presented with an elevated creatinine level of 1 59  Patient's baseline creatinine ranges from 1 0 to 1 2  Most likely cause is postobstructive due to the fact that patient had difficulty voiding on presentation  Post void residual of the bladder scan measured 1000 mL of urine  This was removed via straight cath  Whiteside catheter has been placed  Continue hydration with normal saline  Nephrology consult placed  Nephrotoxin medications-irbesartan/hydrochlorothiazide held  Follow-up renal function on BMP daily

## 2019-08-26 NOTE — PLAN OF CARE
Problem: Potential for Falls  Goal: Patient will remain free of falls  Description  INTERVENTIONS:  - Assess patient frequently for physical needs  -  Identify cognitive and physical deficits and behaviors that affect risk of falls    -  Falls fall precautions as indicated by assessment   - Educate patient/family on patient safety including physical limitations  - Instruct patient to call for assistance with activity based on assessment  - Modify environment to reduce risk of injury  - Consider OT/PT consult to assist with strengthening/mobility  Outcome: Progressing     Problem: PAIN - ADULT  Goal: Verbalizes/displays adequate comfort level or baseline comfort level  Description  Interventions:  - Encourage patient to monitor pain and request assistance  - Assess pain using appropriate pain scale  - Administer analgesics based on type and severity of pain and evaluate response  - Implement non-pharmacological measures as appropriate and evaluate response  - Consider cultural and social influences on pain and pain management  - Notify physician/advanced practitioner if interventions unsuccessful or patient reports new pain  Outcome: Progressing     Problem: INFECTION - ADULT  Goal: Absence or prevention of progression during hospitalization  Description  INTERVENTIONS:  - Assess and monitor for signs and symptoms of infection  - Monitor lab/diagnostic results  - Monitor all insertion sites, i e  indwelling lines, tubes, and drains  - Monitor endotracheal if appropriate and nasal secretions for changes in amount and color  - Falls appropriate cooling/warming therapies per order  - Administer medications as ordered  - Instruct and encourage patient and family to use good hand hygiene technique  - Identify and instruct in appropriate isolation precautions for identified infection/condition  Outcome: Progressing     Problem: SAFETY ADULT  Goal: Maintain or return to baseline ADL function  Description  INTERVENTIONS:  -  Assess patient's ability to carry out ADLs; assess patient's baseline for ADL function and identify physical deficits which impact ability to perform ADLs (bathing, care of mouth/teeth, toileting, grooming, dressing, etc )  - Assess/evaluate cause of self-care deficits   - Assess range of motion  - Assess patient's mobility; develop plan if impaired  - Assess patient's need for assistive devices and provide as appropriate  - Encourage maximum independence but intervene and supervise when necessary  - Involve family in performance of ADLs  - Assess for home care needs following discharge   - Consider OT consult to assist with ADL evaluation and planning for discharge  - Provide patient education as appropriate  Outcome: Progressing  Goal: Maintain or return mobility status to optimal level  Description  INTERVENTIONS:  - Assess patient's baseline mobility status (ambulation, transfers, stairs, etc )    - Identify cognitive and physical deficits and behaviors that affect mobility  - Identify mobility aids required to assist with transfers and/or ambulation (gait belt, sit-to-stand, lift, walker, cane, etc )  - [place] fall precautions as indicated by assessment  - Record patient progress and toleration of activity level on Mobility SBAR; progress patient to next Phase/Stage  - Instruct patient to call for assistance with activity based on assessment  - Consider rehabilitation consult to assist with strengthening/weightbearing, etc   Outcome: Progressing     Problem: DISCHARGE PLANNING  Goal: Discharge to home or other facility with appropriate resources  Description  INTERVENTIONS:  - Identify barriers to discharge w/patient and caregiver  - Arrange for needed discharge resources and transportation as appropriate  - Identify discharge learning needs (meds, wound care, etc )  - Arrange for interpretive services to assist at discharge as needed  - Refer to Case Management Department for coordinating discharge planning if the patient needs post-hospital services based on physician/advanced practitioner order or complex needs related to functional status, cognitive ability, or social support system  Outcome: Progressing     Problem: Knowledge Deficit  Goal: Patient/family/caregiver demonstrates understanding of disease process, treatment plan, medications, and discharge instructions  Description  Complete learning assessment and assess knowledge base    Interventions:  - Provide teaching at level of understanding  - Provide teaching via preferred learning methods  Outcome: Progressing

## 2019-08-26 NOTE — ASSESSMENT & PLAN NOTE
Patient brought in by his daughters due to patient falling down and being found down on the floor for at least greater than 3 hours  Possibly related to patient's Parkinson's disease  Will continue patient's Sinemet  [place] fall precautions  Get PT/OT consult

## 2019-08-26 NOTE — TELEPHONE ENCOUNTER
CALLED DAUGHTER GAVIN  AND THEY STATED THEY HAVE NOT RECEIVED ANY CALLS FROM PHYSICAL THERAPY AND GAVE THEM ST  [id number] [facility] NUMBER TO CALL AND STATE THEY WILL CALL WHEN THEY [address]

## 2019-08-26 NOTE — TELEPHONE ENCOUNTER
Just this am I got a message from Home PT stating that pt/family wasn't returning their calls, so he hasn't had to PT that I ordered    Please encourage them to contact home health to arrange PT>

## 2019-08-26 NOTE — ASSESSMENT & PLAN NOTE
Lab Results   Component Value Date    HGBA1C 6 8 (H) 06/18/2019       No results for input(s): POCGLU in the last 72 hours  Blood Sugar Average: Last 72 hrs:   start patient on sliding scale insulin, diabetic diet, Accu-Cheks q a c  And HS

## 2019-08-26 NOTE — ED PROVIDER NOTES
History  Chief Complaint   Patient presents with    Fall     fell this morning, been having problems with falling lately, unwitnessed and has a red spot on back of head, hx of brain tumor     HPI  Driss Loge on let side, left arm  Moved by family   Unknown how long he was there     Prior to Admission Medications   Prescriptions Last Dose Informant Patient Reported? Taking?    Melatonin 5 MG TABS   Yes Yes   Sig: Take by mouth daily at bedtime as needed   acetaminophen (TYLENOL) 325 mg tablet  Child Yes Yes   Sig: Take 650 mg by mouth every 6 (six) hours as needed for mild pain   albuterol (2 5 mg/3 mL) 0 083 % nebulizer solution   No Yes   Sig: TAKE 1 VIAL (2 5 MG TOTAL) BY NEBULIZATION EVERY 4 (FOUR) HOURS AS NEEDED FOR WHEEZING   albuterol (VENTOLIN HFA) 90 mcg/act inhaler  Child No Yes   Sig: Inhale 2 puffs every 6 (six) hours as needed for wheezing   atorvastatin (LIPITOR) 10 mg tablet  Child No Yes   Sig: TAKE 1 TABLET ONCE DAILY   carbidopa-levodopa (SINEMET)  mg per tablet  Child No Yes   Sig: Take 1 tablet by mouth 3 (three) times a day   clopidogrel (PLAVIX) 75 mg tablet  Child No Yes   Sig: Take 1 tablet (75 mg total) by mouth daily   fluticasone-salmeterol (ADVAIR DISKUS) 250-50 mcg/dose inhaler  Child No Yes   Sig: Inhale 1 puff 2 (two) times a day Rinse mouth after use   irbesartan-hydrochlorothiazide (AVALIDE) 150-12 5 MG per tablet  Child No Yes   Sig: Take 1 tablet by mouth daily   montelukast (SINGULAIR) 10 mg tablet  Child No Yes   Sig: Take 1 tablet (10 mg total) by mouth daily at bedtime   pramipexole (MIRAPEX) 0 5 mg tablet  Child No Yes   Sig: Take 1 tablet (0 5 mg total) by mouth 3 (three) times a day   predniSONE 10 mg tablet  Child No Yes   Sig: TAKE 1 TABLET BY MOUTH EVERY DAY   rivastigmine (EXELON) 1 5 mg capsule  Child No Yes   Sig: Take 1 capsule (1 5 mg total) by mouth 2 (two) times a day   terbinafine (LamISIL) 1 % cream  Child Yes Yes   Sig: Apply topically 2 (two) times a day tiotropium (SPIRIVA HANDIHALER) 18 mcg inhalation capsule  Child No Yes   Sig: Place 1 capsule (18 mcg total) into inhaler and inhale daily Via Handihaler at the same time every day   triamcinolone (KENALOG) 0 5 % cream  Child No Yes   Sig: Apply topically 2 (two) times a day as needed for rash      Facility-Administered Medications: None       Past Medical History:   Diagnosis Date    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[facility]     Allergic rhinitis     last assessed 17Dec2013    Aortic regurgitation     Arm laceration     Asthma     Bronchitis, chronic obstructive, [facility] exacerbation ([facility] )     last assessed 12Jun2015    Candidal intertrigo     Chronic obstructive lung disease ([place] [address] )     last assessed 17Dec2013    COPD [facility]chronic obstructive pulmonary disease) [facility]     Coronary artery disease     carotid stents    Cough     CVA (cerebral vascular accident) ([place] [address] )     Dermatitis     Diabetes mellitus type 2, uncomplicated ([place] [address] )     controlled    Dysthymic disorder     Fatigue     Hyperlipidemia     Hypertension     Neurologic gait dysfunction     Parkinson's disease ([place] [address] )     Pneumonia     PVD (peripheral vascular disease) ([place] [address] )     Seborrheic keratosis     TIA (transient ischemic attack)     Tinea corporis     Tinea pedis of both feet     Tremor        Past Surgical History:   Procedure Laterality Date    NASAL SINUS SURGERY      AR BRONCHOSCOPY,DIAGNOSTIC N/A 7/27/2016    Procedure: BRONCHOSCOPY;  Surgeon: Favio Xiong MD;  [facility]; Service: Pulmonary       Family History   Problem Relation Age of Onset    No Known Problems Mother         Old Age    COPD Sister     Lung cancer Sister     Asthma Family      I have reviewed and agree with the history as documented      Social History     Tobacco Use    Smoking status: Former Smoker     Packs/day: 1 00     Years: 3 00     Pack years: 3 00     Types: Cigarettes     Last attempt to quit: 1996     Years since quittin 6    Smokeless tobacco: Never Used   Substance Use Topics    Alcohol use: Yes     Frequency: Monthly or less     Drinks per session: 1 or 2     Binge frequency: Never     Comment: rarely    Drug use: No        Review of Systems    Physical Exam  Physical Exam    Vital Signs  ED Triage Vitals [19 1221]   Temperature Pulse Respirations Blood Pressure SpO2   98 6 °F (37 °C) 81 15 128/61 97 %      Temp Source Heart Rate Source Patient Position - Orthostatic VS BP Location FiO2 (%)   Oral Monitor Sitting Left arm --      Pain Score       --           Vitals:    19 1221 19 1230   BP: 128/61 98/51   Pulse: 81 72   Patient Position - Orthostatic VS: Sitting Lying         Visual Acuity  Visual Acuity      Most Recent Value   L Pupil Size (mm)  2   R Pupil Size (mm)  2          ED Medications  Medications - No data to display    Diagnostic Studies  Results Reviewed     None                 TRAUMA - CT head wo contrast    (Results Pending)              Procedures  Procedures       ED Course           Identification of Seniors at Risk      Most Recent Value   (ISAR) Identification of Seniors at Risk   Before the illness or injury that brought you to the Emergency, did you need someone to help you on a regular basis? 0 Filed at: 2019 1222   In the last 24 hours, have you needed more help than usual?     Have you been hospitalized for one or more nights during the past 6 months?    In general, do you see well?    In general, do you have serious problems with your memory?    Do you take more than three different medications every day?    ISAR Score  0 Filed at: 2019 1222                          MDM    Disposition  Final diagnoses:   None     ED Disposition     None      Follow-up Information    None         Patient's Medications   Discharge Prescriptions    No medications on file     No discharge procedures on file      ED Provider  Electronically Signed by

## 2019-08-26 NOTE — ASSESSMENT & PLAN NOTE
Patient's blood pressures are currently soft and low normal   Irbesartan-HCTZ is currently on hold due to ANGELA and low normal BP

## 2019-08-27 ENCOUNTER — APPOINTMENT (INPATIENT)
Dept: ULTRASOUND IMAGING | Facility: HOSPITAL | Age: 84
DRG: 057 | End: 2019-08-27
Payer: COMMERCIAL

## 2019-08-27 LAB
ANION GAP SERPL CALCULATED.3IONS-SCNC: 8 MMOL/L (ref 4–13)
APTT PPP: 32 SECONDS (ref 23–37)
ATRIAL RATE: 75 BPM
BASOPHILS # BLD AUTO: 0.03 THOUSANDS/ΜL (ref 0–0.1)
BASOPHILS NFR BLD AUTO: 0 % (ref 0–1)
BUN SERPL-MCNC: 33 MG/DL (ref 5–25)
CALCIUM SERPL-MCNC: 8.6 MG/DL (ref 8.3–10.1)
CHLORIDE SERPL-SCNC: 102 MMOL/L (ref 100–108)
CK MB SERPL-MCNC: 4.4 NG/ML (ref 0–5)
CK MB SERPL-MCNC: <1 % (ref 0–2.5)
CK SERPL-CCNC: 563 U/L (ref 39–308)
CO2 SERPL-SCNC: 25 MMOL/L (ref 21–32)
CREAT SERPL-MCNC: 1.14 MG/DL (ref 0.6–1.3)
EOSINOPHIL # BLD AUTO: 0.34 THOUSAND/ΜL (ref 0–0.61)
EOSINOPHIL NFR BLD AUTO: 4 % (ref 0–6)
ERYTHROCYTE [DISTWIDTH] IN BLOOD BY AUTOMATED COUNT: 12.6 % (ref 11.6–15.1)
GFR SERPL CREATININE-BSD FRML MDRD: 59 ML/MIN/1.73SQ M
GLUCOSE P FAST SERPL-MCNC: 111 MG/DL (ref 65–99)
GLUCOSE SERPL-MCNC: 111 MG/DL (ref 65–140)
GLUCOSE SERPL-MCNC: 111 MG/DL (ref 65–140)
GLUCOSE SERPL-MCNC: 124 MG/DL (ref 65–140)
GLUCOSE SERPL-MCNC: 148 MG/DL (ref 65–140)
GLUCOSE SERPL-MCNC: 330 MG/DL (ref 65–140)
HCT VFR BLD AUTO: 38 % (ref 36.5–49.3)
HGB BLD-MCNC: 12.4 G/DL (ref 12–17)
IMM GRANULOCYTES # BLD AUTO: 0.06 THOUSAND/UL (ref 0–0.2)
IMM GRANULOCYTES NFR BLD AUTO: 1 % (ref 0–2)
INR PPP: 1.11 (ref 0.84–1.19)
LYMPHOCYTES # BLD AUTO: 1.32 THOUSANDS/ΜL (ref 0.6–4.47)
LYMPHOCYTES NFR BLD AUTO: 13 % (ref 14–44)
MAGNESIUM SERPL-MCNC: 2 MG/DL (ref 1.6–2.6)
MCH RBC QN AUTO: 30.9 PG (ref 26.8–34.3)
MCHC RBC AUTO-ENTMCNC: 32.6 G/DL (ref 31.4–37.4)
MCV RBC AUTO: 95 FL (ref 82–98)
MONOCYTES # BLD AUTO: 1.22 THOUSAND/ΜL (ref 0.17–1.22)
MONOCYTES NFR BLD AUTO: 12 % (ref 4–12)
NEUTROPHILS # BLD AUTO: 6.87 THOUSANDS/ΜL (ref 1.85–7.62)
NEUTS SEG NFR BLD AUTO: 70 % (ref 43–75)
NRBC BLD AUTO-RTO: 0 /100 WBCS
P AXIS: 51 DEGREES
PHOSPHATE SERPL-MCNC: 2.1 MG/DL (ref 2.3–4.1)
PLATELET # BLD AUTO: 206 THOUSANDS/UL (ref 149–390)
PMV BLD AUTO: 9.2 FL (ref 8.9–12.7)
POTASSIUM SERPL-SCNC: 3.9 MMOL/L (ref 3.5–5.3)
PR INTERVAL: 158 MS
PROTHROMBIN TIME: 14.4 SECONDS (ref 11.6–14.5)
QRS AXIS: -25 DEGREES
QRSD INTERVAL: 110 MS
QT INTERVAL: 410 MS
QTC INTERVAL: 457 MS
RBC # BLD AUTO: 4.01 MILLION/UL (ref 3.88–5.62)
SODIUM SERPL-SCNC: 135 MMOL/L (ref 136–145)
T WAVE AXIS: -3 DEGREES
VENTRICULAR RATE: 75 BPM
WBC # BLD AUTO: 9.84 THOUSAND/UL (ref 4.31–10.16)

## 2019-08-27 PROCEDURE — 94760 N-INVAS EAR/PLS OXIMETRY 1: CPT

## 2019-08-27 PROCEDURE — 76770 US EXAM ABDO BACK WALL COMP: CPT

## 2019-08-27 PROCEDURE — G8979 MOBILITY GOAL STATUS: HCPCS

## 2019-08-27 PROCEDURE — 93010 ELECTROCARDIOGRAM REPORT: CPT | Performed by: INTERNAL MEDICINE

## 2019-08-27 PROCEDURE — 83735 ASSAY OF MAGNESIUM: CPT | Performed by: FAMILY MEDICINE

## 2019-08-27 PROCEDURE — 82550 ASSAY OF CK (CPK): CPT | Performed by: FAMILY MEDICINE

## 2019-08-27 PROCEDURE — 99223 1ST HOSP IP/OBS HIGH 75: CPT | Performed by: INTERNAL MEDICINE

## 2019-08-27 PROCEDURE — G8987 SELF CARE CURRENT STATUS: HCPCS

## 2019-08-27 PROCEDURE — 85730 THROMBOPLASTIN TIME PARTIAL: CPT | Performed by: FAMILY MEDICINE

## 2019-08-27 PROCEDURE — 82553 CREATINE MB FRACTION: CPT | Performed by: FAMILY MEDICINE

## 2019-08-27 PROCEDURE — 80048 BASIC METABOLIC PNL TOTAL CA: CPT | Performed by: FAMILY MEDICINE

## 2019-08-27 PROCEDURE — 97167 OT EVAL HIGH COMPLEX 60 MIN: CPT

## 2019-08-27 PROCEDURE — G8978 MOBILITY CURRENT STATUS: HCPCS

## 2019-08-27 PROCEDURE — 99223 1ST HOSP IP/OBS HIGH 75: CPT | Performed by: NURSE PRACTITIONER

## 2019-08-27 PROCEDURE — 85610 PROTHROMBIN TIME: CPT | Performed by: FAMILY MEDICINE

## 2019-08-27 PROCEDURE — 99223 1ST HOSP IP/OBS HIGH 75: CPT | Performed by: PHYSICIAN ASSISTANT

## 2019-08-27 PROCEDURE — 97163 PT EVAL HIGH COMPLEX 45 MIN: CPT

## 2019-08-27 PROCEDURE — 85025 COMPLETE CBC W/AUTO DIFF WBC: CPT | Performed by: FAMILY MEDICINE

## 2019-08-27 PROCEDURE — 94640 AIRWAY INHALATION TREATMENT: CPT

## 2019-08-27 PROCEDURE — 84100 ASSAY OF PHOSPHORUS: CPT | Performed by: FAMILY MEDICINE

## 2019-08-27 PROCEDURE — 82948 REAGENT STRIP/BLOOD GLUCOSE: CPT

## 2019-08-27 PROCEDURE — 99232 SBSQ HOSP IP/OBS MODERATE 35: CPT | Performed by: INTERNAL MEDICINE

## 2019-08-27 PROCEDURE — G8988 SELF CARE GOAL STATUS: HCPCS

## 2019-08-27 RX ORDER — TAMSULOSIN HYDROCHLORIDE 0.4 MG/1
0.4 CAPSULE ORAL
Status: DISCONTINUED | OUTPATIENT
Start: 2019-08-27 | End: 2019-08-29 | Stop reason: HOSPADM

## 2019-08-27 RX ORDER — GUAIFENESIN 600 MG
600 TABLET, EXTENDED RELEASE 12 HR ORAL EVERY 12 HOURS SCHEDULED
Status: DISCONTINUED | OUTPATIENT
Start: 2019-08-27 | End: 2019-08-29 | Stop reason: HOSPADM

## 2019-08-27 RX ORDER — LANOLIN ALCOHOL/MO/W.PET/CERES
9 CREAM (GRAM) TOPICAL
Status: DISCONTINUED | OUTPATIENT
Start: 2019-08-27 | End: 2019-08-29 | Stop reason: HOSPADM

## 2019-08-27 RX ORDER — HEPARIN SODIUM 5000 [USP'U]/ML
5000 INJECTION, SOLUTION INTRAVENOUS; SUBCUTANEOUS EVERY 8 HOURS SCHEDULED
Status: DISCONTINUED | OUTPATIENT
Start: 2019-08-27 | End: 2019-08-29 | Stop reason: HOSPADM

## 2019-08-27 RX ORDER — SODIUM CHLORIDE FOR INHALATION 0.9 %
3 VIAL, NEBULIZER (ML) INHALATION
Status: DISCONTINUED | OUTPATIENT
Start: 2019-08-27 | End: 2019-08-29 | Stop reason: HOSPADM

## 2019-08-27 RX ORDER — LEVALBUTEROL 1.25 MG/.5ML
1.25 SOLUTION, CONCENTRATE RESPIRATORY (INHALATION)
Status: DISCONTINUED | OUTPATIENT
Start: 2019-08-27 | End: 2019-08-29 | Stop reason: HOSPADM

## 2019-08-27 RX ORDER — ALBUTEROL SULFATE 2.5 MG/3ML
2.5 SOLUTION RESPIRATORY (INHALATION) EVERY 6 HOURS
Status: DISCONTINUED | OUTPATIENT
Start: 2019-08-27 | End: 2019-08-27

## 2019-08-27 RX ADMIN — TAMSULOSIN HYDROCHLORIDE 0.4 MG: 0.4 CAPSULE ORAL at 18:41

## 2019-08-27 RX ADMIN — HEPARIN SODIUM 5000 UNITS: 5000 INJECTION INTRAVENOUS; SUBCUTANEOUS at 21:15

## 2019-08-27 RX ADMIN — ALBUTEROL SULFATE 2 PUFF: 90 AEROSOL, METERED RESPIRATORY (INHALATION) at 05:27

## 2019-08-27 RX ADMIN — RIVASTIGMINE TARTRATE 1.5 MG: 1.5 CAPSULE ORAL at 09:12

## 2019-08-27 RX ADMIN — PREDNISONE 10 MG: 10 TABLET ORAL at 09:13

## 2019-08-27 RX ADMIN — FLUTICASONE FUROATE AND VILANTEROL TRIFENATATE 1 PUFF: 200; 25 POWDER RESPIRATORY (INHALATION) at 09:18

## 2019-08-27 RX ADMIN — CARBIDOPA AND LEVODOPA 1 TABLET: 25; 100 TABLET ORAL at 09:13

## 2019-08-27 RX ADMIN — SODIUM CHLORIDE 100 ML/HR: 0.9 INJECTION, SOLUTION INTRAVENOUS at 05:24

## 2019-08-27 RX ADMIN — DIBASIC SODIUM PHOSPHATE, MONOBASIC POTASSIUM PHOSPHATE AND MONOBASIC SODIUM PHOSPHATE 2 TABLET: 852; 155; 130 TABLET ORAL at 09:50

## 2019-08-27 RX ADMIN — GUAIFENESIN 600 MG: 600 TABLET ORAL at 21:15

## 2019-08-27 RX ADMIN — MONTELUKAST SODIUM 10 MG: 10 TABLET, FILM COATED ORAL at 21:15

## 2019-08-27 RX ADMIN — LEVALBUTEROL HYDROCHLORIDE 1.25 MG: 1.25 SOLUTION, CONCENTRATE RESPIRATORY (INHALATION) at 14:02

## 2019-08-27 RX ADMIN — RIVASTIGMINE TARTRATE 1.5 MG: 1.5 CAPSULE ORAL at 18:41

## 2019-08-27 RX ADMIN — CARBIDOPA AND LEVODOPA 1 TABLET: 25; 100 TABLET ORAL at 18:41

## 2019-08-27 RX ADMIN — ISODIUM CHLORIDE 3 ML: 0.03 SOLUTION RESPIRATORY (INHALATION) at 14:02

## 2019-08-27 RX ADMIN — PRAMIPEXOLE DIHYDROCHLORIDE 0.5 MG: 0.5 TABLET ORAL at 18:40

## 2019-08-27 RX ADMIN — MELATONIN 9 MG: at 21:15

## 2019-08-27 RX ADMIN — ALBUTEROL SULFATE 2.5 MG: 2.5 SOLUTION RESPIRATORY (INHALATION) at 09:44

## 2019-08-27 RX ADMIN — HEPARIN SODIUM 5000 UNITS: 5000 INJECTION INTRAVENOUS; SUBCUTANEOUS at 08:45

## 2019-08-27 RX ADMIN — ISODIUM CHLORIDE 3 ML: 0.03 SOLUTION RESPIRATORY (INHALATION) at 19:28

## 2019-08-27 RX ADMIN — GUAIFENESIN 600 MG: 600 TABLET ORAL at 05:33

## 2019-08-27 RX ADMIN — HEPARIN SODIUM 5000 UNITS: 5000 INJECTION INTRAVENOUS; SUBCUTANEOUS at 13:43

## 2019-08-27 RX ADMIN — INSULIN LISPRO 5 UNITS: 100 INJECTION, SOLUTION INTRAVENOUS; SUBCUTANEOUS at 18:40

## 2019-08-27 RX ADMIN — LEVALBUTEROL HYDROCHLORIDE 1.25 MG: 1.25 SOLUTION, CONCENTRATE RESPIRATORY (INHALATION) at 19:28

## 2019-08-27 RX ADMIN — TIOTROPIUM BROMIDE 18 MCG: 18 CAPSULE ORAL; RESPIRATORY (INHALATION) at 09:18

## 2019-08-27 RX ADMIN — PRAMIPEXOLE DIHYDROCHLORIDE 0.5 MG: 0.5 TABLET ORAL at 21:15

## 2019-08-27 RX ADMIN — PRAMIPEXOLE DIHYDROCHLORIDE 0.5 MG: 0.5 TABLET ORAL at 09:13

## 2019-08-27 RX ADMIN — CARBIDOPA AND LEVODOPA 1 TABLET: 25; 100 TABLET ORAL at 21:15

## 2019-08-27 RX ADMIN — CLOPIDOGREL BISULFATE 75 MG: 75 TABLET ORAL at 09:13

## 2019-08-27 NOTE — OCCUPATIONAL THERAPY NOTE
Occupational Therapy Evaluation      Nilsa Martin    8/27/2019    Patient Active Problem List   Diagnosis    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) [facility])    Allergic asthma    Allergic rhinitis    Aortic regurgitation    [facility]ctasis with acute lower respiratory infection [facility]    Long term (current) use of systemic steroids    Hyperlipidemia    Hypertension    Parkinson's disease ([facility]    Neurologic gait dysfunction    Type 2 diabetes mellitus ([facility]    Peripheral vascular disease [facility]    Chronic obstructive pulmonary disease ([facility]    Cerebrovascular disease    Carotid artery disease ([facility]    Varicose 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    H/O burning pain in leg    Brain lesion    Ambulatory dysfunction    Acute kidney injury ([facility]    Hypokalemia    Essential hypertension    Gross hematuria       Past Medical History:   Diagnosis Date    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) ([facility])     Allergic rhinitis     last assessed [id number]    Aortic regurgitation     Arm laceration     Asthma     Bronchitis, chronic obstructive, with exacerbation ([facility] )     last assessed 12Jun2015    Candidal intertrigo     Chronic obstructive lung disease ([facility] )     last assessed [id number]    COPD (chronic obstructive pulmonary disease) ([facility])     Coronary artery disease     carotid stents    Cough     CVA (cerebral vascular accident) ([facility]     Dermatitis     Diabetes mellitus type 2, uncomplicated ([facility] )     controlled    Dysthymic disorder     Fatigue     Hyperlipidemia     Hypertension     Neurologic gait dysfunction     Parkinson's disease ([address] )     Pneumonia     PVD (peripheral vascular disease) ([facility]     Seborrheic keratosis     TIA (transient ischemic attack)     Tinea corporis     Tinea pedis of both feet     Tremor        Past Surgical History:   Procedure Laterality Date    NASAL SINUS SURGERY      MA BRONCHOSCOPY,DIAGNOSTIC N/A 7/27/2016 Procedure: BRONCHOSCOPY;  Surgeon: Osmar Card MD;  [facility]: [facility]; Service: Pulmonary      08/27/19 1121   Note Type   Note type Eval/Treat   Restrictions/Precautions   Weight Bearing Precautions Per Order No   Braces or Orthoses   (none per pt)   Other Precautions Chair Alarm; Bed Alarm;Multiple lines; Fall Risk   Pain Assessment   Pain Assessment No/denies pain   Pain Score No Pain   Home Living   Type of [address] One level;Stairs to enter with rails; Performs ADLs on one level  ([address])   Bathroom Shower/Tub Walk-in shower   Home Equipment Walker   Additional Comments pt reports he uses walker all the time at baseline   Prior Function   Level of [place] Independent with ADLs and functional mobility  (independent ambulator with RW)   Lives With [employer] Help From Family  (h/o OP PT which was last year per pt)   ADL Assistance Independent  (per pt)   IADLs Needs assistance  (wife performs at baseline)   Falls in the last 6 months 1 to 4  (4 falls per pt- denies any injuries)   Vocational Retired   Comments (-) , dtr assists with transportation   Lifestyle   Autonomy Pt reports PLOF was Ind with ADLs, A with IADLS, and not a    Reciprocal Relationships spouse and dtr   Psychosocial   Psychosocial (WDL) WDL   ADL   Eating Assistance 5  Supervision/Setup   Eating Deficit Setup   Grooming Assistance 5  Supervision/Setup   Grooming Deficit Setup   UB Bathing Assistance 4  Minimal Assistance   UB Bathing Deficit Increased time to complete;Supervision/safety;Verbal cueing;Steadying;Setup   LB Bathing Assistance 3  Moderate Assistance   LB Bathing Deficit Increased time to complete;Supervision/safety;Verbal cueing;Steadying;Setup   UB Dressing Assistance 4  Minimal Assistance   UB Dressing Deficit Supervision/safety; Increased time to complete;Verbal cueing;Setup;Steadying   LB Dressing Assistance 3  Moderate Assistance   LB Dressing Deficit Increased time to complete;Supervision/safety;Verbal cueing;Steadying;Setup   Toileting Assistance  3  Moderate Assistance   Toileting Deficit Increased time to complete;Supervison/safety;Verbal cueing;Steadying;Setup   Functional Assistance 3  Moderate Assistance   Functional Deficit Increased time to complete;Supervision/safety;Verbal cueing;Steadying;Setup   Bed Mobility   Additional Comments pt received OOB to recliner upon arrival   Transfers   Sit to Stand 4  Minimal assistance   Additional items Assist x 1; Increased time required;Verbal cues   Stand to Sit 4  Minimal assistance   Additional items Assist x 1; Increased time required;Verbal cues   Functional Mobility   Functional Mobility 4  Minimal assistance   Additional items Rolling walker   Balance   Static Sitting Fair +   Dynamic Sitting Fair   Static Standing Fair -   Dynamic Standing Poor +   Ambulatory Poor +   Activity Tolerance   Activity Tolerance Patient limited by fatigue   Medical Staff Made Aware PT Cristy Huitron   Nurse Made Aware GLEN Maynard verbalized pt approrpiate to see, made aware of session outcome/recs   RUE Assessment   RUE Assessment WFL   LUE Assessment   LUE Assessment WFL   Hand Function   Gross Motor Coordination Functional   Fine Motor Coordination Functional   Sensation   Light Touch No apparent deficits   Sharp/Dull No apparent deficits   Stereognosis No apparent deficits   Proprioception   Proprioception No apparent deficits   Vision - Complex Assessment   Ocular Range of Motion [facility]   Perception   Inattention/Neglect Appears intact   Cognition   Overall Cognitive Status WFL   Arousal/Participation Cooperative; Alert   Attention Attends with cues to redirect  (easily distracted by TV)   Orientation Level Oriented X4   Memory Within functional limits   Following Commands Follows multistep commands inconsistently   Comments Pt was agreeable to OT eval   Assessment   Limitation Decreased ADL status; Decreased UE strength;Decreased endurance;Decreased high-level ADLs; Decreased self-care trans;Decreased Safe judgement during ADL   Prognosis Good   Assessment Pt is a 81 y o  male seen for OT evaluation s/p admit to [facility] on 8/26/2019 w/ Ambulatory dysfunction  Comorbidities affecting pt's functional performance at time of assessment include:ANGELA, CAD, COPD, DM, HTN, Parkinsons and PVD   Orders placed for OT evaluation and treatment and "up with A" order  Performed at least two patient identifiers during session including name and wristband  Personal factors affecting pt at time of IE include:steps to enter environment, difficulty performing ADLS, difficulty performing IADLS , limited insight into deficits, decreased initiation and engagement , financial barriers, health management  and environment  Prior to admission, pt reports Ind with ADLs, A with IADLs, and (-) driving  Upon evaluation: Pt requires min A with UB ADLs, mod A with LB ADLs, min a with xfers and min A with functional mobility 2* the following deficits impacting occupational performance: weakness, decreased dynamic sit/ stand balance, decreased activity tolerance, decreased standing tolerance time for self care and functional mobility, impaired interpersonal skills, environmental deficits, decreased mobilty and requiring external assistance to complete transitional movements  Pt to benefit from continued skilled OT tx while in the hospital to address deficits as defined above and maximize level of functional independence w ADL's and functional mobility  Occupational Performance areas to address include: bathing/shower, toilet hygiene, dressing, medication management, socialization, health maintenance, functional mobility, community mobility, clothing management and household maintenance  From OT standpoint, recommendation at time of d/c would be STR  Plan   Treatment Interventions ADL retraining;Functional transfer training; Activityengagement; Energy conservation;Cardiac education;Continued evaluation; Compensatory technique education;Equipment evaluation/education;Patient/family training; Endurance training   Goal Expiration Date 09/09/19   OT Frequency 3-5x/wk   Recommendation   OT Discharge Recommendation Short Term Rehab   OT - OK to Discharge Yes  (when medically cleared and agreeable to STR)   Barthel Index   Feeding 5   Bathing 0   Grooming Score 0   Dressing Score 5   Bladder Score 0   Bowels Score 10   Toilet Use Score 5   Transfers (Bed/Chair) Score 10   Mobility (Level Surface) Score 0   Stairs Score 0   Barthel Index Score 35   Modified Suad Scale   Modified Suad Scale 4     Occupational Therapy goals: In 7-14 days:    1 - Patient will verbalize and demonstrate use of energy conservation/ deep breathing technique and work simplification skills during functional activity with no verbal cues  2 - Patient will verbalize and demonstrate good body mechanics and joint protection techniques during  ADLs/ IADLs with no verbal cues  3 - Patient will increase OOB/ sitting tolerance to 2-4 hours per day for increased participation in self care and leisure tasks with no s/s of exertion  4 - Patient will increase standing tolerance time to 5  minutes with unilateral UE support to complete sink level ADLs@ mod I level  5 - Patient will increase sitting tolerance at edge of bed to 20 minutes to complete UB ADLs @ set up assist level  6 - Patient will transfer bed to Chair / toilet at Set up assist level with AD as indicated  7 - Patient will complete UB ADLs with set up assist     8 - Patient will complete LB ADLs with min assist with the use of adaptive equipment  9 - Patient will complete toileting hygiene with set up assist/ supervision for thoroughness      10 - Patient/ Family  will demonstrate competency [facility]

## 2019-08-27 NOTE — PHYSICAL THERAPY NOTE
Physical Therapy Evaluation     Patient's Name: Judd Beltrán    Admitting Diagnosis  Urinary retention [R33 9]  Head injury [S09 90XA]  ANGELA (acute kidney injury) ([facility] ) [N17 9]  Frequent falls [R29 6]    Problem List  Patient Active Problem List   Diagnosis    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) ([email or web address]    Allergic asthma    Allergic rhinitis    Aortic regurgitation    Bronchiectasis with acute lower respiratory infection ([email or web address] )    Long term (current) use of systemic steroids    Hyperlipidemia    Hypertension    Parkinson's disease ([email or web address] )    Neurologic gait dysfunction    Type 2 diabetes mellitus ([email or web address] )    Peripheral vascular disease ([email or web address] )    Chronic obstructive pulmonary disease ([email or web address] )    Cerebrovascular disease    Carotid artery disease ([email or web address] )    Varicose 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    H/O burning pain in leg    Brain lesion    Ambulatory dysfunction    Acute kidney injury ([email or web address] )    Hypokalemia    Essential hypertension    Gross hematuria       Past Medical History  Past Medical History:   Diagnosis Date    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) ([facility])     Allergic rhinitis     last assessed [id number]    Aortic regurgitation     Arm laceration     Asthma     Bronchitis, chronic obstructive, with exacerbation ([email or web address] )     last assessed 12Jun2015    Candidal intertrigo     Chronic obstructive lung disease ([email or web address] )     last assessed [id number]    COPD (chronic obstructive pulmonary disease) ([facility])     Coronary artery disease     carotid stents    Cough     CVA [facility]cerebral vascular accident) ([facility]     Dermatitis     Diabetes mellitus type 2, uncomplicated ([email or web address] )     controlled    Dysthymic disorder     Fatigue     Hyperlipidemia     Hypertension     Neurologic gait dysfunction     Parkinson's disease ([email or web address] )     Pneumonia     PVD (peripheral vascular disease) ([email or web address] )     Seborrheic keratosis     TIA (transient ischemic attack)     Tinea corporis     Tinea pedis of both feet     Tremor        Past Surgical History  Past Surgical History:   Procedure Laterality Date    NASAL SINUS SURGERY      MD BRONCHOSCOPY,DIAGNOSTIC N/A 7/27/2016    Procedure: BRONCHOSCOPY;  Surgeon: Osmar Card MD;  Location: [facility]; Service: Pulmonary          08/27/19 1122   Note Type   Note type Eval only   Pain Assessment   Pain Assessment No/denies pain   Pain Score No Pain   Effect of Pain on Daily Activities pt reporting pain to L leg prior to mobility, no pain reported with standing & ambulating  pt denying any pain post session  RN [facility] notified of such  Home Living   Type of [address] One level;Stairs to enter with rails; Performs ADLs on one level  (10 [address] Down)   Bathroom Shower/Tub Walk-in shower   Home Equipment Walker   Additional Comments pt reports he uses walker all the time at baseline   Prior Function   Level of [place] Independent with ADLs and functional mobility  (independent ambulator with RW)   Lives With [employer] Help From Family  (h/o OP PT which was last year per pt)   ADL Assistance Independent  (per pt)   IADLs Needs assistance  (wife performs at baseline)   Falls in the last 6 months 1 to 4  (4 falls per pt- denies any injuries)   Vocational Retired   Comments (-) , dtr assists with transportation   Restrictions/Precautions   [place] Precautions Per Order No   Braces or Orthoses   (none per pt)   Other Precautions Chair Alarm; Bed Alarm;Multiple lines; Fall Risk   General   Family/Caregiver Present No   Cognition   Overall Cognitive Status WFL   Arousal/Participation Alert   Orientation Level Oriented X4   Memory Within functional limits   Following Commands Follows all commands and directions without difficulty   Comments pt agreeable to PT evaluation   RUE Assessment   RUE Assessment   (defer to OT eval for comments)   LUE Assessment   LUE Assessment   (defer to OT eval for comments)   RLE Assessment RLE Assessment X  (grossly assessed c functional mobility: 3+/5)   LLE Assessment   LLE Assessment X  (grossly assessed c functional mobility: 3+/5)   Coordination   Movements are Fluid and Coordinated 0   Sensation WFL   Light Touch   RLE Light Touch Grossly intact   LLE Light Touch Grossly intact   Bed Mobility   Additional Comments pt received OOB to recliner upon arrival   Transfers   Sit to Stand 4  Minimal assistance   Additional items Assist x 1; Increased time required;Verbal cues   Stand to Sit 4  Minimal assistance   Additional items Assist x 1; Increased time required;Verbal cues   Ambulation/Elevation   Gait pattern Decreased foot clearance; Short stride; Step to;Shuffling; Forward Flexion   Gait Assistance 4  Minimal assist   Additional items Assist x 1;Verbal cues   Assistive Device Rolling walker   Distance 10'   Stair Management Assistance Not tested   Balance   Static Sitting Fair +   Dynamic Sitting Fair   Static Standing Fair -   Dynamic Standing Poor +   Ambulatory Poor +   Endurance Deficit   Endurance Deficit Yes   Activity Tolerance   Activity Tolerance Patient limited by fatigue   Medical Staff Made Aware OT [facility]   Nurse Made Aware GLEN Kingsley verbalized pt approrpiate to see, made aware of session outcome/recs  Assessment   Prognosis Good   Problem List Decreased strength;Decreased endurance; Impaired balance;Decreased mobility   Assessment Pt is 81 y o  male seen for PT evaluation on 8/27/2019 s/p admit to [facility] on 8/26/2019 w/ Ambulatory dysfunction  Pt presents after being found on the floor for about 3 hours  PT consulted to assess pt's functional mobility and d/c needs  Order placed for PT eval and tx, w/ up w/ A order  Performed at least 2 patient identifiers during session: Name and wristband   Comorbidities affecting pt's physical performance at time of assessment include: ABPA, allergic rhinitis, aortic regurgitation, arm laceration, asthma, bronchitis, candidal intertrigo, COPD, CAD, cough, CVA, dermatitis, DM type 2, dysthymic disorder, fatigue, HLD, HTN, neurologic gait dysfunction, Parkinson's disease, pneumonia, PVD, TIA, tremor  PTA, pt was independent w/ all functional mobility w/ walker, ambulates community distances and elevations, has 10 [address], lives w/ spouse and dtr in 1 level home and retired  Personal factors affecting pt at time of IE include: inaccessible home environment, ambulating w/ assistive device, stairs to enter home, inability to ambulate household distances, inability to navigate level surfaces w/o external assistance, limited home support, positive fall history and decreased initiation and engagement  Please find objective findings from PT assessment regarding body systems outlined above with impairments and limitations including weakness, impaired balance, decreased endurance, gait deviations, decreased activity tolerance and fall risk, as well as mobility assessment (need for min assist w/ all phases of mobility when usually ambulating independently and need for cueing for mobility technique)  The following objective measures performed on IE also reveal limitations: Barthel Index: 35/100 and Modified [place]: 4 (moderate/severe disability)  Pt's clinical presentation is currently unstable/unpredictable seen in pt's presentation of need for input for task focus and mobility technique, on telemetry monitoring and ongoing medical assessment  Pt to benefit from continued PT tx to address deficits as defined above and maximize level of functional independent mobility and consistency  From PT/mobility standpoint, recommendation at time of d/c would be STR pending progress in order to facilitate return to PLOF     Barriers to Discharge Inaccessible home environment;Decreased caregiver support   Goals   Patient Goals to get stronger   STG Expiration Date 09/06/19   Short Term Goal #1 In 7-10 days: Increase bilateral LE strength 1/2 grade to facilitate independent mobility, Perform all bed mobility tasks modified independent to decrease caregiver burden, Perform all transfers with SBA to improve independence, Ambulate > 100 ft  with least restrictive assistive device with SBA w/o LOB and w/ normalized gait pattern 100% of the time, Increase all balance 1/2 grade to decrease risk for falls, Improve Barthel Index score to 50 or greater to facilitate independence, PT provider will perform functional balance assessment to determine fall risk and PT to see and establish goals for stairs when appropriate   Treatment Day 0   Plan   Treatment/Interventions Functional transfer training;LE strengthening/ROM; Therapeutic exercise; Endurance training;Patient/family training;Equipment eval/education; Bed mobility;Gait training;Spoke to nursing;OT   PT Frequency   (3-5x/wk)   Recommendation   Recommendation Short-term skilled PT   Equipment Recommended Walker  (RW)   PT - OK to Discharge Yes  (when medically cleared if to STR)   Modified Suad Scale   Modified Suad Scale 4   Barthel Index   Feeding 5   Bathing 0   Grooming Score 0   Dressing Score 5   Bladder Score 0   Bowels Score 10   Toilet Use Score 5   Transfers (Bed/Chair) Score 10   Mobility (Level Surface) Score 0   Stairs Score 0   Barthel Index Score 35         Vera Caballero, PT, DPT

## 2019-08-27 NOTE — RESPIRATORY THERAPY NOTE
RT Protocol Note  Allison Alcala 81 y o  male MRN: [id number]  Unit/Bed#: -01 Encounter: [id number]    Assessment    Principal Problem:    Ambulatory dysfunction  Active Problems:    Parkinson's disease ([place] [address] )    Neurologic gait dysfunction    Type 2 diabetes mellitus ([place] [address] )    Chronic obstructive pulmonary disease ([place] [address] )    Cerebrovascular disease    Brain lesion    Acute kidney injury ([place] [address] )    Hypokalemia    Essential hypertension    Gross hematuria      Home Pulmonary Medications:  Nebulizer/inhalers       Past Medical History:   Diagnosis Date    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[facility]     Allergic rhinitis     last assessed 17Dec2013    Aortic regurgitation     Arm laceration     Asthma     Bronchitis, chronic obstructive, with exacerbation ([facility])     last assessed 12Jun2015    Candidal intertrigo     Chronic obstructive lung disease ([place] [address] )     last assessed 17Dec2013    COPD [facility]ch[facility] obstructive pulmonary disease) [facility])     Coronary artery disease     carotid stents    Cough     CVA (cerebral vascular accident) ([place] [address] )     Dermatitis     Diabetes mellitus type 2, uncomplicated ([place] [address] )     controlled    Dysthymic disorder     Fatigue     Hyperlipidemia     Hypertension     Neurologic gait dysfunction     Parkinson's disease ([place] [address] )     Pneumonia     PVD (peripheral vascular disease) ([place] [address] )     Seborrheic keratosis     TIA (transient ischemic attack)     Tinea corporis     Tinea pedis of both feet     Tremor      Social History     Socioeconomic History    Marital status: /Civil Union     Spouse name: None    Number of children: None    Years of education: None    Highest education level: None   Occupational History    Occupation: retired   Social Needs    Financial resource strain: None    Food insecurity:     Worry: None     Inability: None    Transportation needs:     Medical: None     Non-medical: None   Tobacco Use    Smoking status: Former Smoker Packs/day: 1 00     Years: 3 00     Pack years: 3 00     Types: Cigarettes     Last attempt to quit:      Years since quittin 6    Smokeless tobacco: Never Used   Substance and Sexual Activity    Alcohol use: Yes     Frequency: Monthly or less     Drinks per session: 1 or 2     Binge frequency: Never     Comment: rarely    Drug use: No    Sexual activity: Never   Lifestyle    Physical activity:     Days per week: 0 days     Minutes per session: 0 min    Stress: Not at all   Relationships    Social connections:     Talks on phone: None     Gets together: None     Attends Mandaen service: None     Active member of club or organization: None     Attends meetings of clubs or organizations: None     Relationship status: None    Intimate partner violence:     Fear of current or ex partner: None     Emotionally abused: None     Physically abused: None     Forced sexual activity: None   Other Topics Concern    None   Social History Narrative    Lives independently with spouse    No advance directives       Subjective    Subjective Data: awake and  alert    Objective    Physical Exam:   Assessment Type: Post-treatment  General Appearance: Awake, Alert  Respiratory Pattern: Normal, Spontaneous  Chest Assessment: Chest expansion symmetrical  Bilateral Breath Sounds: Diminished, Scattered, Coarse  Cough: Productive  O2 Device: room air    Vitals:  Blood pressure 121/88, pulse 84, temperature 97 9 °F (36 6 °C), temperature source Oral, resp  rate 18, height 5' 4" (1 626 m), weight 82 3 kg (181 lb 7 oz), SpO2 99 %  Imaging and other studies: I have personally reviewed pertinent reports  O2 Device: room air     Plan    Respiratory Plan: Home Bronchodilator Patient pathway        Resp Comments: respiratory protococl completed at this time pateint awake and alert with noted congested cough more prodcutive post aerosol therapy   pateint has good congested cough producing thick light grey secretions aerosol therapy will be continued TID and PRN as needed

## 2019-08-27 NOTE — UTILIZATION REVIEW
Initial Clinical Review    Admission: Date/Time/Statement: OBS  8/26  1619 converted to IP in 8/27 @ 28-81-33-70 for continued work up of falls and urinary retention     Admitting Physician MARKOS FRENCH    Level of Care Med Surg    Estimated length of stay More than 2 Midnights    Certification I certify that inpatient services are medically necessary for this patient for a duration of greater than two midnights  See H&P and MD Progress Notes for additional information about the patient's course of treatment  ED Arrival Information     Expected Arrival Acuity Means of Arrival Escorted By Service Admission Type    - 8/26/2019 12:16 Emergent Wheelchair Self General Medicine Emergency    Arrival Complaint    Fall - Head bruising         Chief Complaint   Patient presents with    Fall     fell this morning, been having problems with falling lately, unwitnessed and has a red spot on back of head, hx of brain tumor     Assessment/Plan: 82 yo male to ED from home after a fall , on floor for 3 hours   Inc unwitnessed falls lately   Pt has been experiencing more freq falls recently   Hx of parkinson's plan to cont sinemet , fall precautions and PT OT eval   Admitted OBS status on 8/26  ANGELA creat  1 59 baseline 1 0-1 2 most likely postobstructive pt had difficultly voiding on arrival   Post void residual 1000 ml , rollins placed  Will cont IVF and consult nephrology        8/27 IM note   Gait dysfunction Likely r/t Parkinson's coupled w/ urinary retention and obstructive uropathy vs neurogenic bladder - consulted neuro   ANGELA nephrology consult pending , cont gentle IVF  F/u BMP in am   COPD at baseline cont prednisone , bronchodilators and Singulair   DM contributed to by prednisone , cont SSI   Meningioma high L frontal vertex slightly inc bw Dec and August - neuro consult   8/27 Nephrology consult   ANGELA etiology likely obstructive uropathy from either BPH vs neurogenic bladder from Parkinson's disease   Baseline creat 1 0-1 1 worsening creat of 1 5, rollins placed , urology consult pending   HTN on ARB and HCTZ       8/27 Neuro consult   amb dysfunction , suspect poly factorial,likely neuropathy secondary to diabetes, deconditioning  Recommend PT OT   L frontal meningioma family elected for serial imaging and plan to repeat MRI w/ in 6 months   DM amb dysfunction   Hx R ICA stent 2/2019 Carotid doppler < 50 %  Cont plavix  Urinary retention recom   Cont rollins   8/27 Urology consult    parkinsonism with progressive physical debilitation/declined  Retention is high volume with associated long-standing urinary incontinence  Maintain rollins , add flomax, inc ambulation , wean narcotics , hydrate and treat constipation  Void trial in office when stronger       ED Triage Vitals   Temperature Pulse Respirations Blood Pressure SpO2   08/26/19 1221 08/26/19 1221 08/26/19 1221 08/26/19 1221 08/26/19 1221   98 6 °F (37 °C) 81 15 128/61 97 %      Temp Source Heart Rate Source Patient Position - Orthostatic VS BP Location FiO2 (%)   08/26/19 1221 08/26/19 1221 08/26/19 1221 08/26/19 1221 --   Oral Monitor Sitting Left arm       Pain Score       08/26/19 1830       No Pain        Wt Readings from Last 1 Encounters:   08/26/19 82 3 kg (181 lb 7 oz)     Additional Vital Signs:   08/26/19 22:56:48  97 9 °F (36 6 °C)  70  19  113/58  76  95 %       08/26/19 2204              None (Room air)     08/26/19 1830            96 %  None (Room air)     08/26/19 17:39:57  97 6 °F (36 4 °C)  74  18  128/73  91  97 %       08/26/19 1630    70  20  104/52    95 %    Lying   08/26/19 1600    70  20  99/55    95 %    Lying   08/26/19 1530    77  18  101/50    95 %    Sitting   08/26/19 1430    70  20  120/59    94 %    Lying   08/26/19 1330    71  20  112/54    93 %    Lying   08/26/19 1230    72  22  98/51    94 %    Lying       Pertinent Labs/Diagnostic Test Results:   8/26 CT head   No evidence of acute intracranial process or significant interval change  Chronic microangiopathy     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 with gas fluid level in the right maxillary sinus unchanged since August 17, 2019 8/26 CXR - Stable interstitial prominence bilaterally without new focal airspace consolidation    8/27 MRI brain - pending     Results from last 7 days   Lab Units 08/27/19  0445 08/26/19  1327   WBC Thousand/uL 9 84 10 51*   HEMOGLOBIN g/dL 12 4 13 4   HEMATOCRIT % 38 0 41 8   PLATELETS Thousands/uL 206 229   NEUTROS ABS Thousands/µL 6 87 8 62*     Results from last 7 days   Lab Units 08/27/19  0445 08/26/19  1327   SODIUM mmol/L 135* 138   POTASSIUM mmol/L 3 9 3 4*   CHLORIDE mmol/L 102 101   CO2 mmol/L 25 28   ANION GAP mmol/L 8 9   BUN mg/dL 33* 40*   CREATININE mg/dL 1 14 1 59*   EGFR ml/min/1 73sq m 59 40   CALCIUM mg/dL 8 6 9 5   MAGNESIUM mg/dL 2 0  --    PHOSPHORUS mg/dL 2 1*  --      Results from last 7 days   Lab Units 08/27/19  0612 08/26/19 2028 08/26/19  1747   POC GLUCOSE mg/dl 148* 231* 213*     Results from last 7 days   Lab Units 08/27/19  0445 08/26/19  1327   GLUCOSE RANDOM mg/dL 111 156*     Results from last 7 days   Lab Units 08/27/19 0445 08/26/19  1327   CK TOTAL U/L 563* 497*   CK MB INDEX % <1 0 1 0   CK MB ng/mL 4 4 4 8     Results from last 7 days   Lab Units 08/27/19  0445 08/26/19  1327   PROTIME seconds 14 4 14 6*   INR  1 11 1 13   PTT seconds 32 28     Results from last 7 days   Lab Units 08/26/19  1519   CLARITY UA  Clear   COLOR UA  Yellow   SPEC GRAV UA  >=1 030   PH UA  5 5   GLUCOSE UA mg/dl 100 (1/10%)*   KETONES UA mg/dl Negative   BLOOD UA  Moderate*   PROTEIN UA mg/dl 30 (1+)*   NITRITE UA  Negative   BILIRUBIN UA  Negative   UROBILINOGEN UA E U /dl 0 2   LEUKOCYTES UA  Negative   WBC UA /hpf None Seen   RBC UA /hpf 4-10*   BACTERIA UA /hpf Occasional   EPITHELIAL CELLS WET PREP /hpf Occasional       ED Treatment:   Medication Administration from 08/26/2019 1216 to 08/26/2019 1731       Date/Time Order Dose Route Action     08/26/2019 1345 acetaminophen (TYLENOL) tablet 975 mg 975 mg Oral Given     08/26/2019 1711 HYDROmorphone (DILAUDID) injection 0 5 mg 0 5 mg Intravenous Given        Past Medical History:   Diagnosis Date    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) [facility]     Allergic rhinitis     last assessed [id number]    Aortic regurgitation     Arm laceration     Asthma     Bronchitis, chronic obstructive, with exacerbation ([facility] )     last assessed 12Jun2015    Candidal intertrigo     Chronic obstructive lung disease ([facility] )     last assessed [id number]    COPD (chronic obstructive pulmonary disease) ([facility])     Coronary artery disease     carotid stents    Cough     CVA (cerebral vascular accident) ([place] [address] )     Dermatitis     Diabetes mellitus type 2, uncomplicated ([place] [address] )     controlled    Dysthymic disorder     Fatigue     Hyperlipidemia     Hypertension     Neurologic gait dysfunction     Parkinson's disease ([facility] )     Pneumonia     PVD (peripheral vascular disease) ([place] [address] )     Seborrheic keratosis     TIA (transient ischemic attack)     Tinea corporis     Tinea pedis of both feet     Tremor      Present on Admission:   Ambulatory dysfunction   Acute kidney injury ([facility] )   Hypokalemia   Type 2 diabetes mellitus ([facility] )   Chronic obstructive pulmonary disease ([facility] )   Essential hypertension   Gross hematuria      Admitting Diagnosis: Urinary retention [R33 9]  Head injury [S09 90XA]  ANGELA (acute kidney injury) ([place] [address] ) [N17 9]  Frequent falls [R29 6]  Age/Sex: 81 y o  male  Admission Orders:    Current Facility-Administered Medications:  acetaminophen 650 mg Oral Q6H PRN     albuterol 2 puff Inhalation Q6H PRN     albuterol 2 5 mg Nebulization Q4H PRN     atorvastatin 10 mg Oral Daily     carbidopa-levodopa 1 tablet Oral TID     clopidogrel 75 mg Oral Daily     fluticasone-vilanterol 1 puff Inhalation Daily     guaiFENesin 600 mg Oral Q12H [address] HYDROmorphone 0 5 mg Intravenous Q4H PRN x1    insulin lispro 1-6 Units Subcutaneous TID AC     melatonin 6 mg Oral HS PRN     montelukast 10 mg Oral HS     ondansetron 4 mg Intravenous Q6H PRN     pramipexole 0 5 mg Oral TID     predniSONE 10 mg Oral Daily     rivastigmine 1 5 mg Oral BID     sodium chloride 100 mL/hr Intravenous Continuous     tiotropium 18 mcg Inhalation Daily         Reg diet   Straight cath   OT PT eval   SCD  Fingerstick ac and hs   Neuro and urology consult   IP CONSULT TO NEPHROLOGY  IP CONSULT TO CASE MANAGEMENT    Network Utilization Review Department  Phone: [phone]; Fax [phone]  [email or web address]  ATTENTION: Please call with any questions or concerns to [phone]  and carefully listen to the prompts so that you are directed to the right person  Send all requests for admission clinical reviews, approved or denied determinations and any other requests to fax [phone]   All voicemails are confidential

## 2019-08-27 NOTE — PLAN OF CARE
Problem: PHYSICAL THERAPY ADULT  Goal: Performs mobility at highest level of function for planned discharge setting  See evaluation for individualized goals  Description  Treatment/Interventions: Functional transfer training, LE strengthening/ROM, Therapeutic exercise, Endurance training, Patient/family training, Equipment eval/education, Bed mobility, Gait training, Spoke to nursing, OT  Equipment Recommended: Walker(RW)       See flowsheet documentation for full assessment, interventions and recommendations  Note:   Prognosis: Good  Problem List: Decreased strength, Decreased endurance, Impaired balance, Decreased mobility  Assessment: Pt is 81 y o  male seen for PT evaluation on 8/27/2019 s/p admit to [facility] on 8/26/2019 w/ Ambulatory dysfunction  Pt presents after being found on the floor for about 3 hours  PT consulted to assess pt's functional mobility and d/c needs  Order placed for PT eval and tx, w/ up w/ A order  Performed at least 2 patient identifiers during session: Name and wristband  Comorbidities affecting pt's physical performance at time of assessment include: ABPA, allergic rhinitis, aortic regurgitation, arm laceration, asthma, bronchitis, candidal intertrigo, COPD, CAD, cough, CVA, dermatitis, DM type 2, dysthymic disorder, fatigue, HLD, HTN, neurologic gait dysfunction, Parkinson's disease, pneumonia, PVD, TIA, tremor  PTA, pt was independent w/ all functional mobility w/ walker, ambulates community distances and elevations, has 10 GREYSON, lives w/ spouse and dtr in 1 level home and retired  Personal factors affecting pt at time of IE include: inaccessible home environment, ambulating w/ assistive device, stairs to enter home, inability to ambulate household distances, inability to navigate level surfaces w/o external assistance, limited home support, positive fall history and decreased initiation and engagement   Please find objective findings from PT assessment regarding body systems outlined above with impairments and limitations including weakness, impaired balance, decreased endurance, gait deviations, decreased activity tolerance and fall risk, as well as mobility assessment (need for min assist w/ all phases of mobility when usually ambulating independently and need for cueing for mobility technique)  The following objective measures performed on IE also reveal limitations: Barthel Index: 35/100 and Modified Pike: 4 (moderate/severe disability)  Pt's clinical presentation is currently unstable/unpredictable seen in pt's presentation of need for input for task focus and mobility technique, on telemetry monitoring and ongoing medical assessment  Pt to benefit from continued PT tx to address deficits as defined above and maximize level of functional independent mobility and consistency  From PT/mobility standpoint, recommendation at time of d/c would be STR pending progress in order to facilitate return to PLOF  Barriers to Discharge: Inaccessible home environment, Decreased caregiver support     Recommendation: Short-term skilled PT     PT - OK to Discharge: Yes(when medically cleared if to STR)    See flowsheet documentation for full assessment

## 2019-08-27 NOTE — CONSULTS
CONSULT    Patient Name: Rebeca Lynch  Patient MRN: [id number]  Date of Service: 8/27/2019   Date / Time Note Created: 8/27/2019 9:55 AM   Referring Provider:  Debbie Velázquez    Provider Creating Note: NORBERTO Dash    PCP: Jigna Xiong  Attending Provider:  Jigna Xiong MD    Reason for Consult: Urinary Retention    HPI --Rebeca Lynch is an 64-year-old  male with history of parkinsonism admitted for ambulatory dysfunction  Patient denies prior urologic evaluation, consultation or  surgical manipulation in office complaints of persistent urinary leakage and urinary urgency at home without frequent UTIs, dysuria or gross hematuria  During hospitalization Whiteside catheter inserted for volumes exceeding 1 L  Patient denies any associated suprapubic or flank pain  Renal function within normal limits  Recent urinalysis negative for infection but positive for moderate amounts of blood  Whiteside catheter remains in-situ and patent for grossly clear yellow urine   imaging unavailable  Review of MA are does not reveal prior chronic alpha blockade administration or other urologic medications  Urologic consultation requested for further management recommendations  Source:chart review and the patient  Of note, interview conducted in Sinhala as Antwan Qureshi is [employer] fluent       Patient Active Problem List   Diagnosis    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) (HCC)    Allergic asthma    Allergic rhinitis    Aortic regurgitation    Bronchiectasis with acute lower respiratory infection ([address] )    Long term (current) use of systemic steroids    Hyperlipidemia    Hypertension    Parkinson's disease ([address] )    Neurologic gait dysfunction    Type 2 diabetes mellitus ([address] )    Peripheral vascular disease (HCC)    Chronic obstructive pulmonary disease (HCC)    Cerebrovascular disease    Carotid artery disease ([address] )    Varicose 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    H/O burning pain in leg    Brain lesion    Ambulatory dysfunction    Acute kidney injury ([facility]    Hypokalemia    Essential hypertension    Gross hematuria         Impressions  Acute Urinary Retention (Multi-factorial) secondary to history of parkinsonism with progressive physical debilitation/declined  Retention is high volume with associated long-standing urinary incontinence  Likely unrecognized for some time  Recommendations  1  Maintain rollins catheter  2  Do not remove  Not Nsg managed   3  Will add Flomax   4  In interim, increase ambulation, wean narcotics as applicable, hydrate and treat constipation  5  Void trial  when patient is clinically stronger in office  6  Patient will require rollins catheter at discharge  Our service will contact patient for office follow-up  Patient may require urodynamic studies              Past Medical History:   Diagnosis Date    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) ([facility])     Allergic rhinitis     last assessed [id number]    Aortic regurgitation     Arm laceration     Asthma     Bronchitis, chronic obstructive, with exacerbation ([facility])     last assessed [id number]    Candidal intertrigo     Chronic obstructive lung disease ([facility] )     last assessed [id number]    COPD (chronic obstructive pulmonary disease) ([facility])     Coronary artery disease     carotid stents    Cough     CVA (cerebral vascular accident) ([facility]     Dermatitis     Diabetes mellitus type 2, uncomplicated ([address] )     controlled    Dysthymic disorder     Fatigue     Hyperlipidemia     Hypertension     Neurologic gait dysfunction     Parkinson's disease ([facility]     Pneumonia     PVD (peripheral vascular disease) ([facility]     Seborrheic keratosis     TIA (transient ischemic attack)     Tinea corporis     Tinea pedis of both feet     Tremor        Past Surgical History:   Procedure Laterality Date    NASAL SINUS SURGERY      OH BRONCHOSCOPY,DIAGNOSTIC N/A 7/27/2016 Procedure: BRONCHOSCOPY;  Surgeon: Rachael Mckeon MD;  Location: [facility];   Service: Pulmonary       Family History   Problem Relation Age of Onset    No Known Problems Mother         Old Age   [de-identified] COPD Sister     Lung cancer Sister     Asthma Family        Social History     Socioeconomic History    Marital status: /Civil Union     Spouse name: Not on file    Number of children: Not on file    Years of education: Not on file    Highest education level: Not on file   Occupational History    Occupation: retired   Social Needs    Financial resource strain: Not on file    Food insecurity:     Worry: Not on file     Inability: Not on file   [place] needs:     Medical: Not on file     Non-medical: Not on file   Tobacco Use    Smoking status: Former Smoker     Packs/day: 1 00     Years: 3 00     Pack years: 3 00     Types: Cigarettes     Last attempt to quit:      Years since quittin 6    Smokeless tobacco: Never Used   Substance and Sexual Activity    Alcohol use: Yes     Frequency: Monthly or less     Drinks per session: 1 or 2     Binge frequency: Never     Comment: rarely    Drug use: No    Sexual activity: Never   Lifestyle    Physical activity:     Days per week: 0 days     Minutes per session: 0 min    Stress: Not at all   Relationships    Social connections:     Talks on phone: Not on file     Gets together: Not on file     Attends Zoroastrianism service: Not on file     Active member of club or organization: Not on file     Attends meetings of clubs or organizations: Not on file     Relationship status: Not on file    Intimate partner violence:     Fear of current or ex partner: Not on file     Emotionally abused: Not on file     Physically abused: Not on file     Forced sexual activity: Not on file   Other Topics Concern    Not on file   Social History Narrative    Lives independently with spouse    No advance directives       Allergies   Allergen Reactions    Penicillins Syncope       Review of Systems  Review of Systems - History obtained from chart review and the patient  Patient denies fever, chills, weight loss, anorexia, chest pain, shortness of breath, abdominal pain, flank, testicular or groin pain, gross hematuria, dysuria, pelvic/suprapubic pain, hematuria, testicular or scrotal swelling,  Endorses chronic urinary urgency, nocturia and incontinence  Chart Review   Allergies, current medications, history, problem list    Vital Signs & Physical Exam  General appearance: alert and oriented, in no acute distress, appears stated age, cooperative, no distress and slowed mentation  Head: Normocephalic, without obvious abnormality, atraumatic  Neck: no adenopathy, no carotid bruit, no JVD, supple, symmetrical, trachea midline and thyroid not enlarged, symmetric, no tenderness/mass/nodules  Lungs: diminished breath sounds  Heart: regular rate and rhythm, S1, S2 normal, no murmur, click, rub or gallop  Abdomen: soft, non-tender; bowel sounds normal; no masses,  no organomegaly  Extremities: extremities normal, warm and well-perfused; no cyanosis, clubbing, or edema  Pulses: 2+ and symmetric  Neurologic: Grossly normal  Whiteside patent for grossly clear yellow urine     Laboratory Studies  Lab Results   Component Value Date    HGBA1C 6 8 (H) 06/18/2019    HGBA1C 6 2 (H) 12/10/2015     12/10/2015    K 3 9 08/27/2019    K 4 3 12/10/2015     08/27/2019     (H) 12/10/2015    CO2 25 08/27/2019    CO2 27 12/10/2015    GLUCOSE 94 12/10/2015    CREATININE 1 14 08/27/2019    CREATININE 0 98 12/10/2015    BUN 33 (H) 08/27/2019    BUN 21 12/10/2015    MG 2 0 08/27/2019    MG 2 1 08/20/2015    PHOS 2 1 (L) 08/27/2019               Imaging and Other Studies  )Xr Chest 2 Views    Result Date: 8/26/2019  Narrative: CHEST INDICATION:   cough,  COMPARISON:  Chest x-ray 8/17/2018 EXAM PERFORMED/VIEWS:  XR CHEST PA & LATERAL FINDINGS: Cardiomediastinal silhouette appears unremarkable  There is stable interstitial prominence bilaterally without new focal airspace consolidation  Osseous structures appear within normal limits for patient age  Impression: Stable interstitial prominence bilaterally without new focal airspace consolidation  Workstation performed: [id number]     Trauma - Ct Head Wo Contrast    Result Date: 8/26/2019  Narrative: CT BRAIN - WITHOUT CONTRAST INDICATION:   Headache, chronic, new features or neuro deficit fall, head trauma  COMPARISON:  CT head 12/4/2018 and MRI brain 8/17/2019 TECHNIQUE:  CT examination of the brain was performed  In addition to axial images, coronal 2D reformatted images were created and submitted for interpretation  Radiation dose length product (DLP) for this visit:  880 mGy-cm   This examination, like all CT scans performed in the [facility], was performed utilizing techniques to minimize radiation dose exposure, including the use of iterative reconstruction and automated exposure control  IMAGE QUALITY:  Diagnostic  FINDINGS: PARENCHYMA:  No intracranial mass, mass effect or midline shift  No CT signs of acute infarction  No acute parenchymal hemorrhage  Periventricular white matter hypodensity is a nonspecific finding likely representing chronic microangiopathy  Calcification bilateral cavernous internal carotid arteries  VENTRICLES AND EXTRA-AXIAL SPACES:  No acute hydrocephalus  Mild prominence of CSF spaces diffusely attributed to generalized volume loss  3 3 x 1 cm left frontal meningioma enlarged since December 4, 2018 and unchanged since August 17, 2019  VISUALIZED ORBITS AND PARANASAL SINUSES:  Diffuse chronic paranasal sinusitis unchanged since August 17, 2019 progressive since December 4, 2018  Orbits unremarkable  CALVARIUM AND EXTRACRANIAL SOFT TISSUES:  Normal      Impression: No evidence of acute intracranial process or significant interval change  Chronic microangiopathy   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 with gas fluid level in the right maxillary sinus unchanged since August 17, 2019  3 3 cm left frontal extra-axial meningioma unchanged since August 17, 2019  Workstation performed: [id number]     Mri Brain With And Without Contrast    Result Date: 8/19/2019  Narrative: MRI BRAIN WITH AND WITHOUT CONTRAST INDICATION: R90 89: Other abnormal findings on diagnostic imaging of central nervous system D33 0: Benign neoplasm of brain, supratentorial D32 9: Benign neoplasm of meninges, unspecified  COMPARISON:  MRI from 12/6/2018 TECHNIQUE: Sagittal T1, axial T2, axial FLAIR, axial T1, axial [place], axial diffusion  Sagittal, axial T1 postcontrast   Axial bravo postcontrast with coronal reconstructions  IV Contrast:  7 mL of gadobutrol injection (MULTI-DOSE)  IMAGE QUALITY:   Diagnostic  FINDINGS: BRAIN PARENCHYMA:  There is redemonstration of an avidly enhancing lesion along the high left frontal vertex compatible with a meningioma  The meningioma measures approximately 2 0 x 3 2 x 1 2 cm (transverse by anteroposterior by craniocaudad dimensions)  This appears to have slightly increased in size since the prior examination where it measured approximately 1 9 x 2 9 x 1 0 cm when measured in the same planes  There is no definite associated edema  There is age-related involutional change  There is scattered chronic microvascular ischemic change  There is no evidence of recent infarction  VENTRICLES:  Normal  SELLA AND PITUITARY GLAND:  Normal  ORBITS:  Normal  PARANASAL SINUSES:  There is paranasal sinus mucosal thickening and partial opacification  There is mucosal thickening of the right frontal sinus  VASCULATURE:  Evaluation of the major intracranial vasculature demonstrates appropriate flow voids   CALVARIUM AND SKULL BASE:  Normal  EXTRACRANIAL SOFT TISSUES:  Normal      Impression: Redemonstration of an avidly enhancing meningioma along the high left frontal vertex, slightly increased in size since the prior examination from 12/6/2018  No definite underlying vasogenic edema is identified  Scattered periventricular, subcortical and deep white matter chronic microvascular ischemic change  Workstation performed: [id number]         Medications   Scheduled Meds:  Current Facility-Administered Medications:  acetaminophen 650 mg Oral Q6H PRN Sara Lang MD    albuterol 2 puff Inhalation Q6H PRN Sara Lang MD    carbidopa-levodopa 1 tablet Oral TID Sara Lang MD    clopidogrel 75 mg Oral Daily Saul Anderson MD    fluticasone-vilanterol 1 puff Inhalation Daily Sara Lang MD    guaiFENesin 600 mg Oral Q12H [address] Saige Martínez PA-C    heparin (porcine) 5,000 Units Subcutaneous Q8H 1610 Andrzej Núñez MD    HYDROmorphone 0 5 mg Intravenous Q4H PRN Sara Lang MD    insulin lispro 1-6 Units Subcutaneous TID AC Saul Anderson MD    levalbuterol 1 25 mg Nebulization TID Oneil Frankel, MD    melatonin 6 mg Oral HS PRN Sara Lang MD    montelukast 10 mg Oral HS Saul Anderson MD    ondansetron 4 mg Intravenous Q6H PRN Sara Lang MD    pramipexole 0 5 mg Oral TID Sara Lang MD    predniSONE 10 mg Oral Daily Saul Anderson MD    rivastigmine 1 5 mg Oral BID Sara Lang MD    sodium chloride 100 mL/hr Intravenous Continuous Sara Lang MD Last Rate: 100 mL/hr (08/27/19 0524)   sodium chloride 3 mL Nebulization TID Oneil Frankel, MD    tamsulosin 0 4 mg Oral Daily With Theresia Rubinstein, CRNP    tiotropium 18 mcg Inhalation Daily Sara Lang MD      Continuous Infusions:  sodium chloride 100 mL/hr Last Rate: 100 mL/hr (08/27/19 0524)     PRN Meds:   acetaminophen    albuterol    HYDROmorphone    melatonin    ondansetron      Total time spent with patient 25 minutes, >50% spent counseling and/or coordination of care           )NORBERTO Pino

## 2019-08-27 NOTE — CONSULTS
NEPHROLOGY CONSULTATION NOTE    Patient: Bao Kathleen               Sex: male          DOA: 8/26/2019 12:22 PM   YOB: 1935        Age:  81 y o         LOS:  LOS: 0 days     REFERRING PHYSICIAN: Dr George Mercer / CONSULTATION:  Alba Rossi / SERVICE: 8/27/2019    ADMISSION DIAGNOSIS: Ambulatory dysfunction     CHIEF COMPLAINT     I fell down    HPI     This is a 81 y o  M with PMH of HTN, Parkinson's disease, urinary incontinence, CAD, COPD who presents to the hospital brought by daughters after being noted to be on the floor  Patient was admitted for ambulatory dysfunction  Renal consult requested as patient found to have a S Cr of 1 5 on admission  He was also noted to be incontinent of urine and bladder scan revealed patient retaining 1 L of urine in the bladder  A rollins catheter was then placed  Repeat labs performed revealed improvement in serum creatinine            PAST MEDICAL HISTORY     Past Medical History:   Diagnosis Date    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) ([facility])     Allergic rhinitis     last assessed [id number]    Aortic regurgitation     Arm laceration     Asthma     Bronchitis, chronic obstructive, with exacerbation ([facility])     last assessed [id number]    Candidal intertrigo     Chronic obstructive lung disease ([facility] [address] )     last assessed [id number]    COPD (chronic obstructive pulmonary disease) ([facility])     Coronary artery disease     carotid stents    Cough     CVA (cerebral vascular accident) ([facility] [address] )     Dermatitis     Diabetes mellitus type 2, uncomplicated ([facility] [address] )     controlled    Dysthymic disorder     Fatigue     Hyperlipidemia     Hypertension     Neurologic gait dysfunction     Parkinson's disease ([facility] [address] )     Pneumonia     PVD (peripheral vascular disease) ([facility] [address] [facility]     Seborrheic keratosis     TIA (transient ischemic attack)     Tinea corporis     Tinea pedis of both feet     Tremor        PAST SURGICAL HISTORY Past Surgical History:   Procedure Laterality Date    NASAL SINUS SURGERY      UT BRONCHOSCOPY,DIAGNOSTIC N/A 2016    Procedure: BRONCHOSCOPY;  Surgeon: Rosa Gutierrez MD;  Location: [facility];   Service: Pulmonary       ALLERGIES     Allergies   Allergen Reactions    Penicillins Syncope       SOCIAL HISTORY     Social History     Substance and Sexual Activity   Alcohol Use Yes    Frequency: Monthly or less    Drinks per session: 1 or 2    Binge frequency: Never    Comment: rarely     Social History     Substance and Sexual Activity   Drug Use No     Social History     Tobacco Use   Smoking Status Former Smoker    Packs/day: 1 00    Years: 3     Pack years: 3 00    Types: Cigarettes    Last attempt to quit: Francisco Cain Years since quittin 6   Smokeless Tobacco Never Used       FAMILY HISTORY     Family History   Problem Relation Age of Onset    No Known Problems Mother         Old Age    COPD Sister     Lung cancer Sister     Asthma Family        CURRENT MEDICATIONS       Current Facility-Administered Medications:     acetaminophen (TYLENOL) tablet 650 mg, 650 mg, Oral, Q6H PRN, Christine Saab MD, 650 mg at 19 2204    albuterol (PROVENTIL HFA,VENTOLIN HFA) inhaler 2 puff, 2 puff, Inhalation, Q6H PRN, Christine Saab MD, 2 puff at 19 0527    albuterol inhalation solution 2 5 mg, 2 5 mg, Nebulization, Q6H, Macho Schneider MD    carbidopa-levodopa (SINEMET)  mg per tablet 1 tablet, 1 tablet, Oral, TID, Christine Saab MD, 1 tablet at 19 09    clopidogrel (PLAVIX) tablet 75 mg, 75 mg, Oral, Daily, Saul Anderson MD, 75 mg at 19 0913    fluticasone-vilanterol (BREO ELLIPTA) 200-25 MCG/INH inhaler 1 puff, 1 puff, Inhalation, Daily, Saul Anderson MD, 1 puff at 19 0918    guaiFENesin (MUCINEX) 12 hr tablet 600 mg, 600 mg, Oral, Q12H [address], Saige Martínez PA-C, 600 mg at 19 0533    heparin (porcine) subcutaneous injection 5,000 Units, 5,000 Units, Subcutaneous, Q8H [address], Adan Morillo MD    HYDROmorphone (DILAUDID) injection 0 5 mg, 0 5 mg, Intravenous, Q4H PRN, Ziggy Moreland MD, 0 5 mg at 08/26/19 1711    insulin lispro (HumaLOG) 100 units/mL subcutaneous injection 1-6 Units, 1-6 Units, Subcutaneous, TID AC, 2 Units at 08/26/19 1850 **AND** Fingerstick Glucose (POCT), , , TID AC, Saul Anderson MD    melatonin tablet 6 mg, 6 mg, Oral, HS PRN, Ziggy Moreland MD, 6 mg at 08/26/19 2206    montelukast (SINGULAIR) tablet 10 mg, 10 mg, Oral, HS, Saul Anderson MD, 10 mg at 08/26/19 2155    ondansetron (ZOFRAN) injection 4 mg, 4 mg, Intravenous, Q6H PRN, Ziggy Moreland MD    pramipexole (MIRAPEX) tablet 0 5 mg, 0 5 mg, Oral, TID, Saul Anderson MD, 0 5 mg at 08/27/19 0913    predniSONE tablet 10 mg, 10 mg, Oral, Daily, Saul Anderson MD, 10 mg at 08/27/19 0913    rivastigmine (EXELON) capsule 1 5 mg, 1 5 mg, Oral, BID, Saul Anderson MD, 1 5 mg at 08/27/19 0912    sodium chloride 0 9 % infusion, 100 mL/hr, Intravenous, Continuous, Saul Anderson MD, Last Rate: 100 mL/hr at 08/27/19 0524, 100 mL/hr at 08/27/19 0524    tiotropium (SPIRIVA) capsule for inhaler 18 mcg, 18 mcg, Inhalation, Daily, Ziggy Moreland MD, 18 mcg at 08/27/19 8990    REVIEW OF SYSTEMS     Review of Systems   Constitutional: Negative  HENT: Negative  Eyes: Negative  Respiratory: Negative  Cardiovascular: Negative  Gastrointestinal: Negative  Endocrine: Negative  Genitourinary: Positive for difficulty urinating  Musculoskeletal: Negative  Skin: Negative  Allergic/Immunologic: Negative  Neurological: Negative  Hematological: Negative  All other systems reviewed and are negative  OBJECTIVE     Current Weight: Weight - Scale: 82 3 kg (181 lb 7 oz)  Vitals:    08/27/19 0700   BP: 121/88   Pulse: 78   Resp: 18   Temp: 97 9 °F (36 6 °C)   SpO2: 95%     Body mass index is 31 14 kg/m²      Intake/Output Summary (Last 24 hours) at 8/27/2019 0921  Last data filed at 8/27/2019 0835  Gross per 24 hour   Intake 900 ml   Output 3700 ml   Net -2800 ml       PHYSICAL EXAMINATION     Physical Exam   Constitutional: He is oriented to person, place, and time  He appears well-developed and well-nourished  HENT:   Head: Normocephalic and atraumatic  Eyes: Pupils are equal, round, and reactive to light  Neck: Neck supple  Cardiovascular: Normal rate and regular rhythm  Murmur heard  Pulmonary/Chest: Effort normal    Abdominal: Soft  Bowel sounds are normal    Genitourinary:   Genitourinary Comments: Whiteside catheter in place   Musculoskeletal: Normal range of motion  Neurological: He is alert and oriented to person, place, and time  Skin: Skin is warm  Psychiatric: He has a normal mood and affect  LAB RESULTS        Results from last 7 days   Lab Units 08/27/19  0445 08/26/19  1327   WBC Thousand/uL 9 84 10 51*   HEMOGLOBIN g/dL 12 4 13 4   HEMATOCRIT % 38 0 41 8   PLATELETS Thousands/uL 206 229   POTASSIUM mmol/L 3 9 3 4*   CHLORIDE mmol/L 102 101   CO2 mmol/L 25 28   BUN mg/dL 33* 40*   CREATININE mg/dL 1 14 1 59*   EGFR ml/min/1 73sq m 59 40   CALCIUM mg/dL 8 6 9 5   MAGNESIUM mg/dL 2 0  --    PHOSPHORUS mg/dL 2 1*  --        I have personally reviewed the old medical records and patient's previously known baseline creatinine level is ~ 1 1    RADIOLOGY RESULTS     No results found for this or any previous visit  Results for orders placed during the hospital encounter of 08/26/19   XR chest 2 views    Narrative CHEST     INDICATION:   cough,  COMPARISON:  Chest x-ray 8/17/2018    EXAM PERFORMED/VIEWS:  XR CHEST PA & LATERAL      FINDINGS:    Cardiomediastinal silhouette appears unremarkable  There is stable interstitial prominence bilaterally without new focal airspace consolidation  Osseous structures appear within normal limits for patient age        Impression Stable interstitial prominence bilaterally without new focal airspace consolidation  Workstation performed: [id number]       Results for orders placed during the hospital encounter of 07/01/16   CT chest wo contrast    Narrative CT CHEST WITHOUT IV CONTRAST    INDICATION:  Follow-up pneumonia  Cough  COMPARISON: 9/6/2013    TECHNIQUE: CT examination of the chest was performed without intravenous contrast   Axial, sagittal and coronal reformatted images were submitted for interpretation  Coronal thick section MIP (maximal intensity projection) images were also created  This examination, like all CT scans performed in the [facility], was performed utilizing techniques to minimize radiation dose exposure, including the use of iterative reconstruction and automated exposure control  FINDINGS:    LUNGS:  There is no substantial change in dilated bronchi with mucoid impaction at the right apex and right middle lobe  There is also saccular bronchiectasis without mucoid impaction in the lingula, and bilateral lower lobes  No acute infiltrates  PLEURA:  Unremarkable  HEART/GREAT VESSELS:  Normal heart size  Coronary artery calcifications noted  Thoracic aorta within normal limits for patient's age        MEDIASTINUM AND JAMAL:  Unremarkable  CHEST WALL AND LOWER NECK:  Unremarkable  VISUALIZED STRUCTURES IN THE UPPER ABDOMEN:  Unremarkable  OSSEOUS STRUCTURES:  No acute fracture  No destructive osseous lesion  Impression Saccular bronchiectasis throughout both lungs, with mucoid impaction in the right apex and right middle lobe, again suggestive of allergic bronchopulmonary aspergillosis  No acute infiltrates  Workstation performed: [id number]           PLAN / RECOMMENDATIONS      81 M with PMH of HTN, CAD, Parkinson's disease, urinary incontinence who presents after found on floor by the family       1) Acute kidney injury: Etiology likely obstructive uropathy from either BPH Vs neurogenic bladder from having h/o Parkinson's disease   - Baseline S Cr of 1 0-1 1  - Presented with a worsening creatinine of 1 5  - After rollins catheter placed, noted improvement in S Cr and adequate urine output  - Urology consult pending    2) Hypophosphatemia: Replace with Neutra phos packet  3) HTN: Noted to be on ARB and HCTZ at home  Held during this admission due to # 1  BP are within target range  4) DM-2: Blood sugars are stable while on SC insulin  Thank you for the consultation to participate in patient's care  I have personally discussed my plan with the referring physician       Leigha Caceres MD    8/27/2019

## 2019-08-27 NOTE — PROGRESS NOTES
Progress Note - Pablo Moctezuma 81 y o  male MRN: [id number]  Unit/Bed#: -01 Encounter: [id number]    Subjective:   Patient feels well this morning  He is accompanied by daughters jose and Abbie  They note he has had urinary incontinence for quite some time noting that he has always leaking on the floor  They note that he did receive some home physical therapy and was scheduled to start again but they been having a hard time communicating with the therapist due to supposed lack of cell coverage  Patient offers no acute complaints this morning  He denies any headache or blurred vision  He denies any chest pain  He states his breathing is at baseline, he has minimal nonproductive cough  He denies any abdominal pain, nausea or vomiting  He denies history of dysuria or fevers  He denies any melena or hematochezia  All other ROS are negative  Objective:   Vitals: Blood pressure 113/58, pulse 70, temperature 97 9 °F (36 6 °C), resp  rate 19, height 5' 4" (1 626 m), weight 82 3 kg (181 lb 7 oz), SpO2 95 %  ,Body mass index is 31 14 kg/m²    SPO2 RA Rest      ED to Hosp-Admission (Current) from 8/26/2019 in [facility] 2nd Floor Med Surg Unit   SpO2  95 %   SpO2 Activity  At Rest   O2 Device  None (Room air)   O2 Flow Rate          I&O:     Intake/Output Summary (Last 24 hours) at 8/27/2019 0826  Last data filed at 8/27/2019 7360  Gross per 24 hour   Intake 900 ml   Output 2150 ml   Net -1250 ml         Physical Exam:       General Appearance:    Alert, cooperative, no distress   Head:    Normocephalic, without obvious abnormality, atraumatic   Eyes:    PERRL, conjunctiva/corneas clear, EOM's intact       Nose:   Moist mucous membranes, no drainage or sinus tenderness   Throat:   No tenderness, no exudates   Neck:   Supple, symmetrical, trachea midline, no JVD   Lungs:     Scattered rhonchi with prolonged expiratory phase, respirations unlabored   Heart:    Regular rate and rhythm, S1 and S2 normal, 2/6 systolic murmur, no rub   or gallop   Abdomen: Soft, non-tender, positive bowel sounds, no masses, no organomegaly   Extremities:  No pedal edema, calf tenderness  Distal pulses palpable  Neurologic:     CNII-XII intact        Invasive Devices     Peripheral Intravenous Line            Peripheral IV 08/26/19 Right Antecubital less than 1 day          Drain            Urethral Catheter 16 Fr  less than 1 day                      Social History  reviewed  Family History   Problem Relation Age of Onset    No Known Problems Mother         Old Age    COPD Sister     Lung cancer Sister     Asthma Family     reviewed    Meds:  Current Facility-Administered Medications   Medication Dose Route Frequency Provider Last Rate Last Dose    acetaminophen (TYLENOL) tablet 650 mg  650 mg Oral Q6H PRN Christine Saab MD   650 mg at 08/26/19 2204    albuterol (PROVENTIL HFA,VENTOLIN HFA) inhaler 2 puff  2 puff Inhalation Q6H PRN Christine Saab MD   2 puff at 08/27/19 0527    albuterol inhalation solution 2 5 mg  2 5 mg Nebulization Q4H PRN Christine Saab MD        carbidopa-levodopa (SINEMET)  mg per tablet 1 tablet  1 tablet Oral TID Christine Saab MD   1 tablet at 08/26/19 2155    clopidogrel (PLAVIX) tablet 75 mg  75 mg Oral Daily Saul Anderson MD        fluticasone-vilanterol (BREO ELLIPTA) 200-25 MCG/INH inhaler 1 puff  1 puff Inhalation Daily Christine Saab MD        guaiFENesin (MUCINEX) 12 hr tablet 600 mg  600 mg Oral Q12H [address] Saige Martínez PA-C   600 mg at 08/27/19 0533    HYDROmorphone (DILAUDID) injection 0 5 mg  0 5 mg Intravenous Q4H PRN Christine Saab MD   0 5 mg at 08/26/19 1711    insulin lispro (HumaLOG) 100 units/mL subcutaneous injection 1-6 Units  1-6 Units Subcutaneous TID AC Christine Saab MD   2 Units at 08/26/19 1850    melatonin tablet 6 mg  6 mg Oral HS PRN Christine Saab MD   6 mg at 08/26/19 2206    montelukast (SINGULAIR) tablet 10 mg  10 mg Oral HS Saul Anderson MD   10 mg at 08/26/19 2155    ondansetron (ZOFRAN) injection 4 mg  4 mg Intravenous Q6H PRN Ghazala Christianson MD        pramipexole (MIRAPEX) tablet 0 5 mg  0 5 mg Oral TID Ghazala Christianson MD   0 5 mg at 08/26/19 2155    predniSONE tablet 10 mg  10 mg Oral Daily Ghazala Christianson MD        rivastigmine (EXELON) capsule 1 5 mg  1 5 mg Oral BID Ghazala Christianson MD   1 5 mg at 08/26/19 1850    sodium chloride 0 9 % infusion  100 mL/hr Intravenous Continuous Ghazala Christianson  mL/hr at 08/27/19 0524 100 mL/hr at 08/27/19 0524    tiotropium (SPIRIVA) capsule for inhaler 18 mcg  18 mcg Inhalation Daily Ghazala Christianson MD          Medications Prior to Admission   Medication    acetaminophen (TYLENOL) 325 mg tablet    albuterol (2 5 mg/3 mL) 0 083 % nebulizer solution    albuterol (VENTOLIN HFA) 90 mcg/act inhaler    atorvastatin (LIPITOR) 10 mg tablet    carbidopa-levodopa (SINEMET)  mg per tablet    clopidogrel (PLAVIX) 75 mg tablet    fluticasone-salmeterol (ADVAIR DISKUS) 250-50 mcg/dose inhaler    irbesartan-hydrochlorothiazide (AVALIDE) 150-12 5 MG per tablet    Melatonin 5 MG TABS    montelukast (SINGULAIR) 10 mg tablet    pramipexole (MIRAPEX) 0 5 mg tablet    predniSONE 10 mg tablet    rivastigmine (EXELON) 1 5 mg capsule    terbinafine (LamISIL) 1 % cream    tiotropium (SPIRIVA HANDIHALER) 18 mcg inhalation capsule    triamcinolone (KENALOG) 0 5 % cream       Labs:  Results from last 7 days   Lab Units 08/27/19  0445 08/26/19  1327   WBC Thousand/uL 9 84 10 51*   HEMOGLOBIN g/dL 12 4 13 4   HEMATOCRIT % 38 0 41 8   PLATELETS Thousands/uL 206 229   NEUTROS PCT % 70 82*   LYMPHS PCT % 13* 7*   MONOS PCT % 12 9   EOS PCT % 4 1     Results from last 7 days   Lab Units 08/27/19  0445 08/26/19  1327   POTASSIUM mmol/L 3 9 3 4*   CHLORIDE mmol/L 102 101   CO2 mmol/L 25 28   BUN mg/dL 33* 40*   CREATININE mg/dL 1 14 1 59*   CALCIUM mg/dL 8 6 9 5     Lab Results   Component Value Date    CKMB 4 4 08/27/2019    CKMB 4 8 08/26/2019    CKTOTAL 563 (H) 08/27/2019    CKTOTAL 497 (H) 08/26/2019     Results from last 7 days   Lab Units 08/27/19  0445 08/26/19  1327   INR  1 11 1 13     Lab Results   Component Value Date    BLOODCX No Growth After 5 Days  12/20/2017    BLOODCX No Growth After 5 Days  12/20/2017       Imaging:  No results found for this or any previous visit  Results for orders placed during the hospital encounter of 08/26/19   XR chest 2 views    Narrative CHEST     INDICATION:   cough,  COMPARISON:  Chest x-ray 8/17/2018    EXAM PERFORMED/VIEWS:  XR CHEST PA & LATERAL      FINDINGS:    Cardiomediastinal silhouette appears unremarkable  There is stable interstitial prominence bilaterally without new focal airspace consolidation  Osseous structures appear within normal limits for patient age  Impression Stable interstitial prominence bilaterally without new focal airspace consolidation  Workstation performed: [id number]         VTE Pharmacologic Prophylaxis: Heparin      Code Status:   Level 3 - DNAR and DNI    Assessment:  Principal Problem:    Ambulatory dysfunction  Active Problems:    Parkinson's disease ([address] )    Neurologic gait dysfunction    Type 2 diabetes mellitus (HCC)    Chronic obstructive pulmonary disease (HCC)    Cerebrovascular disease    Brain lesion    Acute kidney injury ([address] )    Hypokalemia    Essential hypertension    Gross hematuria  Resolved Problems:    * No resolved hospital problems  *      Plan:  Gait dysfunction likely related to Parkinson's disease coupled with urinary retention and obstructive uropathy versus neurogenic bladder-consult Neurology  Continue outpatient regimen for Parkinson's for now  Await PT eval   Patient would likely benefit from arm inpatient short-term rehab  Daughters are agreeable, patient would like to go home      Acute kidney injury-improved with hydration and Whiteside catheter-consult Urology, Nephrology consultation pending  Continue gentle hydration  Follow-up basic metabolic panel in a m  COPD appears to be at baseline, continue prednisone, bronchodilators and Singulair      Gross hematuria upon straight cath-urine is now clear, urology consultation as above    Type 2 diabetes mellitus contributed to by prednisone is stable with outpatient diet control, continue sliding scale in the hospital     Meningioma high left frontal vertex slightly increased between December and August-neurology consultation as above    Disposition-patient is not stable for discharge today he has been transitioned to inpatient status pending workup as outlined above    Alem Porter MD  8/27/2019,8:26 AM

## 2019-08-27 NOTE — SOCIAL WORK
Cm met with patient at bedside, patient alert and oriented during interview  Patient reports residing in a private home with his wife Ilya Painting, home is two levels  Patient mentioned using a rolling walker at home and reports having several falls in the past month  Patient stated he does not have vna at home, no home and follows up with Dr Greg Molina for primary care  Patient reports filling his prescriptions at [facility] [address] with no co-payment barriers and takes his medications as prescribed  Patient reports his daughter is his Alix Ibarra is his POA she resides in [facility]  Cm reviewed pt recommendations, patient he is agreeable to going to [facility] and asked cm to call his daughter Alix Ibarra  Cm contacted patient daughter Alix Ibarra, who is aware of patient [employer] and only two facilities in [facility] are able to accept  Alix Ibarra is agreeable to referrals at [facility] and [facility] at [place] and stated she would like to speak to her sisters to make a final determination  Daughter informed of assigned cm billie Daniels also informed of this  CM reviewed discharge planning process including the following: identifying help at home, patient preference for discharge planning needs, pharmacy preference, and availability of treatment team to discuss questions or concerns patient and/or family may have regarding understanding medications and recognizing signs and symptoms once discharged  CM also encouraged patient to follow up with all recommended appointments after discharge  Patient advised of importance for patient and family to participate in managing patients medical well being

## 2019-08-27 NOTE — PLAN OF CARE
Problem: Potential for Falls  Goal: Patient will remain free of falls  Description  INTERVENTIONS:  - Assess patient frequently for physical needs  -  Identify cognitive and physical deficits and behaviors that affect risk of falls    -  [place] fall precautions as indicated by assessment   - Educate patient/family on patient safety including physical limitations  - Instruct patient to call for assistance with activity based on assessment  - Modify environment to reduce risk of injury  - Consider OT/PT consult to assist with strengthening/mobility  Outcome: Progressing     Problem: PAIN - ADULT  Goal: Verbalizes/displays adequate comfort level or baseline comfort level  Description  Interventions:  - Encourage patient to monitor pain and request assistance  - Assess pain using appropriate pain scale  - Administer analgesics based on type and severity of pain and evaluate response  - Implement non-pharmacological measures as appropriate and evaluate response  - Consider cultural and social influences on pain and pain management  - Notify physician/advanced practitioner if interventions unsuccessful or patient reports new pain  Outcome: Progressing     Problem: INFECTION - ADULT  Goal: Absence or prevention of progression during hospitalization  Description  INTERVENTIONS:  - Assess and monitor for signs and symptoms of infection  - Monitor lab/diagnostic results  - Monitor all insertion sites, i e  indwelling lines, tubes, and drains  - Monitor endotracheal if appropriate and nasal secretions for changes in amount and color  - [place] appropriate cooling/warming therapies per order  - Administer medications as ordered  - Instruct and encourage patient and family to use good hand hygiene technique  - Identify and instruct in appropriate isolation precautions for identified infection/condition  Outcome: Progressing     Problem: SAFETY ADULT  Goal: Maintain or return to baseline ADL function  Description  INTERVENTIONS:  -  Assess patient's ability to carry out ADLs; assess patient's baseline for ADL function and identify physical deficits which impact ability to perform ADLs (bathing, care of mouth/teeth, toileting, grooming, dressing, etc )  - Assess/evaluate cause of self-care deficits   - Assess range of motion  - Assess patient's mobility; develop plan if impaired  - Assess patient's need for assistive devices and provide as appropriate  - Encourage maximum independence but intervene and supervise when necessary  - Involve family in performance of ADLs  - Assess for home care needs following discharge   - Consider OT consult to assist with ADL evaluation and planning for discharge  - Provide patient education as appropriate  Outcome: Progressing  Goal: Maintain or return mobility status to optimal level  Description  INTERVENTIONS:  - Assess patient's baseline mobility status (ambulation, transfers, stairs, etc )    - Identify cognitive and physical deficits and behaviors that affect mobility  - Identify mobility aids required to assist with transfers and/or ambulation (gait belt, sit-to-stand, lift, walker, cane, etc )  - [place] fall precautions as indicated by assessment  - Record patient progress and toleration of activity level on Mobility SBAR; progress patient to next Phase/Stage  - Instruct patient to call for assistance with activity based on assessment  - Consider rehabilitation consult to assist with strengthening/weightbearing, etc   Outcome: Progressing     Problem: DISCHARGE PLANNING  Goal: Discharge to home or other facility with appropriate resources  Description  INTERVENTIONS:  - Identify barriers to discharge w/patient and caregiver  - Arrange for needed discharge resources and transportation as appropriate  - Identify discharge learning needs (meds, wound care, etc )  - Arrange for interpretive services to assist at discharge as needed  - Refer to Case Management Department for coordinating discharge planning if the patient needs post-hospital services based on physician/advanced practitioner order or complex needs related to functional status, cognitive ability, or social support system  Outcome: Progressing     Problem: Knowledge Deficit  Goal: Patient/family/caregiver demonstrates understanding of disease process, treatment plan, medications, and discharge instructions  Description  Complete learning assessment and assess knowledge base    Interventions:  - Provide teaching at level of understanding  - Provide teaching via preferred learning methods  Outcome: Progressing     Problem: Prexisting or High Potential for Compromised Skin Integrity  Goal: Skin integrity is maintained or improved  Description  INTERVENTIONS:  - Identify patients at risk for skin breakdown  - Assess and monitor skin integrity  - Assess and monitor nutrition and hydration status  - Monitor labs   - Assess for incontinence   - Turn and reposition patient  - Assist with mobility/ambulation  - Relieve pressure over bony prominences  - Avoid friction and shearing  - Provide appropriate hygiene as needed including keeping skin clean and dry  - Evaluate need for skin moisturizer/barrier cream  - Collaborate with interdisciplinary team   - Patient/family teaching  - Consider wound care consult   Outcome: Progressing     Problem: GENITOURINARY - ADULT  Goal: Maintains or returns to baseline urinary function  Description  INTERVENTIONS:  - Assess urinary function  - Encourage oral fluids to ensure adequate hydration if ordered  - Administer IV fluids as ordered to ensure adequate hydration  - Administer ordered medications as needed  - Offer frequent toileting  - Follow urinary retention protocol if ordered  Outcome: Progressing  Goal: Absence of urinary retention  Description  INTERVENTIONS:  - Assess patient's ability to void and empty bladder  - Monitor I/O  - Bladder scan as needed  - Discuss with physician/AP medications to alleviate retention as needed  - Discuss catheterization for long term situations as appropriate   Outcome: Progressing  Goal: Urinary catheter remains patent  Description  INTERVENTIONS:  - Assess patency of urinary catheter  - If patient has a chronic rollins, consider changing catheter if non-functioning  - Follow guidelines for intermittent irrigation of non-functioning urinary catheter  Outcome: Progressing

## 2019-08-27 NOTE — CONSULTS
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, deconditioning     - Recommend PT/OT evals and patient will likely benefit from rehab  - Would continue on Sinemet 25/100 mg 1 tab TID and Mirapex 0 5 mg TID  - Recommend outpatient follow-up with dr Pao Maher  2  L frontal meningioma    - Patient was seen by neurosurgery team on August 21, 2019 and at that time potential treatment options were discussed including surgical resection, SRS treatment and continued observation with serial imaging  At that time patient's family elected to continue with serial imaging and plan for repeat MRI brain with and without contrast in 6 months  3  DM2    - Patient with some neuropathy on exam, suspect that this may be contributing to ambulatory dysfunction as well  - Hemoglobin A1c from June 2019 6 8      4  HTN    - Goal normotension    - Antihypertensives as per medicine team      5  COPD    - Management as per medicine team      6  History of R ICA stent   - Carotid dopplers completed February 2019 and noted <50% stenosis in B/L ICA, plaque homogenous and smooth, vertebral artery flow antegrade B/L, no significant subclavian artery disease  Stent identified in R ICA  - Would continue on Plavix 75 mg QD      7  Urinary retention    - Seen by urology and felt to be multifactorial in setting of Parkinson's disease with progressive physical debilitation     - Recommended continue with Whiteside catheter     - Patient may require urodynamic studies       History of Present Illness     Reason for Consult / Principal Problem: Parkinson's disease/neurologic gait dysfunction/cerebrovascular disease/brain lesion    HPI: Angi Nichols is a 81 y o   male with PMH Parkinson's disease, L frontal meningioma, CAD s/p PCI, DM2, COPD, HTN who presents with ambulatory dysfunction and a fall  To review, patient is followed by Dr Lorie Kendrick as an outpatient and was most recently seen by him in February 2019  At that time he was recommended to participate in structured home exercise program and continue on Sinemet 25/100 mg 1 tab three times a day and Mirapex 0 5 mg TID  He was also recommended to have carotid ultrasound for follow-up given prior history of R ICA stent  He also follows with neurosurgery team for known history of L frontal meningioma and was seen most recently on August 21, 2019 and at that time was decided to continue with serial imaging to follow  He was noted to have increase in size of meningioma at that time  He notes that he has been having difficulty with ambulation recently and feels that his knees become weak and then he will fall  He does not think he becomes dizzy or has any other symptoms before the falls but feels he becomes weak and will then fall  He denies any pain after his most recent fall, specifically denies pain in his back, neck, extremities  He notes that he has not done PT in about 1 year and has a history of Parkinson's disease and takes medications for this  He was noted in the ED to have urinary incontinence and also urinary retention  A Whiteside catheter was placed  Urology saw patient this AM and noted long standing urinary incontinence and felt that urinary retention was likely also longstanding and unrecognized  They recommended that he continue with Whiteside catheter at discharge  Inpatient consult to Neurology  Consult performed by: Melissa De La O PA-C  Consult ordered by: Macho Schneider MD          Review of Systems   Constitutional: Negative for chills, fatigue and fever  HENT: Negative for trouble swallowing  Eyes: Negative for visual disturbance  Respiratory: Negative for shortness of breath  Cardiovascular: Negative for chest pain  Gastrointestinal: Negative for abdominal pain, nausea and vomiting  Genitourinary: Positive for difficulty urinating  Musculoskeletal: Positive for gait problem  Negative for back pain and neck pain  Skin: Negative for rash  Neurological: Negative for dizziness, facial asymmetry, speech difficulty, weakness, light-headedness, numbness and headaches  Psychiatric/Behavioral: Negative for confusion  Historical Information   Past Medical History:   Diagnosis Date    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) ([facility]     Allergic rhinitis     last assessed 17Dec2013    Aortic regurgitation     Arm laceration     Asthma     Bronchitis, chronic obstructive, with exacerbation ([facility])     last assessed 12Jun2015    Candidal intertrigo     Chronic obstructive lung disease ([facility] )     last assessed 17Dec2013    COPD (chronic obstructive pulmonary disease) ([facility])     Coronary artery disease     carotid stents    Cough     CVA (cerebral vascular accident) ([facility] )     Dermatitis     Diabetes mellitus type 2, uncomplicated ([place] [address] )     controlled    Dysthymic disorder     Fatigue     Hyperlipidemia     Hypertension     Neurologic gait dysfunction     Parkinson's disease ([facility] )     Pneumonia     PVD (peripheral vascular disease) ([place] [address] )     Seborrheic keratosis     TIA (transient ischemic attack)     Tinea corporis     Tinea pedis of both feet     Tremor      Past Surgical History:   Procedure Laterality Date    NASAL SINUS SURGERY      [place] BRONCHOSCOPY,DIAGNOSTIC N/A 7/27/2016    Procedure: BRONCHOSCOPY;  Surgeon: Scooter Henry MD;  Location: [facility];   Service: Pulmonary     Social History   Social History     Substance and Sexual Activity   Alcohol Use Yes    Frequency: Monthly or less    Drinks per session: 1 or 2    Binge frequency: Never    Comment: rarely     Social History     Substance and Sexual Activity   Drug Use No     Social History     Tobacco Use   Smoking Status Former Smoker    Packs/day: 1 00    Years: 3 00    Pack years: 3 00    Types: Cigarettes    Last attempt to quit: [employer] Years since quittin 6   Smokeless Tobacco Never Used     Family History:   Family History   Problem Relation Age of Onset    No Known Problems Mother         Old Age    COPD Sister     Lung cancer Sister     Asthma Family        Review of previous medical records was completed  Please see HPI  Meds/Allergies   Scheduled Meds:  Current Facility-Administered Medications:  acetaminophen 650 mg Oral Q6H PRN Prema Benítez MD    albuterol 2 puff Inhalation Q6H PRN Prema Benítez MD    carbidopa-levodopa 1 tablet Oral TID Prema Benítez MD    clopidogrel 75 mg Oral Daily Saul Anderson MD    fluticasone-vilanterol 1 puff Inhalation Daily Prema Benítez MD    guaiFENesin 600 mg Oral Q12H [address] Saige Martínez PA-C    heparin (porcine) 5,000 Units Subcutaneous Q8H 1610 Andrzej Núñez MD    HYDROmorphone 0 5 mg Intravenous Q4H PRN Prema Benítez MD    insulin lispro 1-6 Units Subcutaneous TID AC Saul Anderson MD    levalbuterol 1 25 mg Nebulization TID Kaci Silva MD    melatonin 6 mg Oral HS PRN Prema Benítez MD    montelukast 10 mg Oral HS Saul Anderson MD    ondansetron 4 mg Intravenous Q6H PRN Prema Benítez MD    pramipexole 0 5 mg Oral TID Prema Benítez MD    predniSONE 10 mg Oral Daily Saul Anderson MD    rivastigmine 1 5 mg Oral BID Prema Benítez MD    sodium chloride 100 mL/hr Intravenous Continuous Prema Benítez MD Last Rate: 100 mL/hr (19)   sodium chloride 3 mL Nebulization TID Kaci Silva MD    tamsulosin 0 4 mg Oral Daily With NORBERTO Blake    tiotropium 18 mcg Inhalation Daily Prema Benítez MD      Continuous Infusions:  sodium chloride 100 mL/hr Last Rate: 100 mL/hr (19)     PRN Meds:   acetaminophen    albuterol    HYDROmorphone    melatonin    ondansetron      Allergies   Allergen Reactions    Penicillins Syncope       Objective   Vitals:Blood pressure 121/88, pulse 78, temperature 97 9 °F (36 6 °C), temperature source Oral, resp  rate 18, height 5' 4" (1 626 m), weight 82 3 kg (181 lb 7 oz), SpO2 95 %  ,Body mass index is 31 14 kg/m²  Intake/Output Summary (Last 24 hours) at 8/27/2019 0944  Last data filed at 8/27/2019 0835  Gross per 24 hour   Intake 900 ml   Output 3700 ml   Net -2800 ml       Invasive Devices: Invasive Devices     Peripheral Intravenous Line            Peripheral IV 08/26/19 Right Antecubital less than 1 day          Drain            Urethral Catheter 16 Fr  less than 1 day                Physical Exam   Constitutional: He is oriented to person, place, and time  He appears well-developed and well-nourished  No distress  HENT:   Head: Normocephalic and atraumatic  Eyes: Pupils are equal, round, and reactive to light  Conjunctivae and EOM are normal  Right eye exhibits no discharge  Left eye exhibits no discharge  No scleral icterus  Neck: Normal range of motion  Neck supple  Cardiovascular: Normal rate and regular rhythm  Pulmonary/Chest: Effort normal and breath sounds normal  No respiratory distress  Abdominal: Soft  He exhibits no distension  There is no tenderness  Musculoskeletal: Normal range of motion  He exhibits no edema or deformity  Neurological: He is alert and oriented to person, place, and time  He has a normal Finger-Nose-Finger Test    Reflex Scores:       Bicep reflexes are 1+ on the right side and 1+ on the left side  Brachioradialis reflexes are 1+ on the right side and 1+ on the left side  Patellar reflexes are 1+ on the right side and 1+ on the left side  Achilles reflexes are 1+ on the right side and 1+ on the left side  Skin: Skin is warm and dry  He is not diaphoretic  No erythema  No pallor     Psychiatric: He has a normal mood and affect  His behavior is normal      Neurologic Exam     Mental Status   Oriented to person, place, and time  Oriented to person  Oriented to place  Oriented to time  Registration: recalls 3 of 3 objects  Recall of objects at 5 minutes: Unable to recall objects with cues  Attention: normal  Concentration: normal    Speech: (Hypophonia noted  )  Level of consciousness: alert  Knowledge: good  Normal comprehension  Able to spell word "world" forward but not backward  Cranial Nerves     CN II   Right visual field deficit: none  Left visual field deficit: none     CN III, IV, VI   Pupils are equal, round, and reactive to light  Extraocular motions are normal    Right pupil: Size: 3 mm  Shape: regular  Reactivity: brisk  Consensual response: intact  Left pupil: Size: 3 mm  Shape: regular  Reactivity: brisk  Consensual response: intact  Nystagmus: none   Diplopia: none  Ophthalmoparesis: none  Upgaze: normal  Downgaze: normal  Conjugate gaze: present    CN V   Right facial sensation deficit: none  Left facial sensation deficit: none    CN VII   Right facial weakness: none  Left facial weakness: none    CN VIII   Hearing: intact    CN IX, X   Palate: symmetric    CN XI   Right sternocleidomastoid strength: normal  Left sternocleidomastoid strength: normal    CN XII   Tongue: not atrophic  Fasciculations: absent  Tongue deviation: none    Motor Exam   Muscle bulk: normal  Overall muscle tone: normal  Right arm tone: cogwheel rigidity and increased  Left arm tone: increased  Right arm pronator drift: absent  Left arm pronator drift: absent  Right leg tone: normal  Left leg tone: normalMotor strength 5/5 B/L UE and LE  No bradykinesia noted with hand  or finger taps          Sensory Exam   Right arm light touch: normal  Left arm light touch: normal  Right leg light touch: normal  Left leg light touch: normal  Right arm vibration: normal  Left arm vibration: normal  Right leg vibration: decreased from ankle  Left leg vibration: decreased from ankle  Right arm pinprick: normal  Left arm pinprick: normal  Right leg pinprick: decreased from ankle  Left leg pinprick: decreased from ankle    Gait, Coordination, and Reflexes     Coordination   Finger to nose coordination: normal    Tremor   Resting tremor: absent  Intention tremor: absent  Action tremor: absent    Reflexes   Right brachioradialis: 1+  Left brachioradialis: 1+  Right biceps: 1+  Left biceps: 1+  Right patellar: 1+  Left patellar: 1+  Right achilles: 1+  Left achilles: 1+  Right plantar: normal  Left plantar: normalGait deferred secondary to fall risk          Lab Results:   Recent Results (from the past 24 hour(s))   CBC and differential    Collection Time: 08/26/19  1:27 PM   Result Value Ref Range    WBC 10 51 (H) 4 31 - 10 16 Thousand/uL    RBC 4 34 3 88 - 5 62 Million/uL    Hemoglobin 13 4 12 0 - 17 0 g/dL    Hematocrit 41 8 36 5 - 49 3 %    MCV 96 82 - 98 fL    MCH 30 9 26 8 - 34 3 pg    MCHC 32 1 31 4 - 37 4 g/dL    RDW 12 7 11 6 - 15 1 %    MPV 9 6 8 9 - 12 7 fL    Platelets 931 401 - 000 Thousands/uL    nRBC 0 /100 WBCs    Neutrophils Relative 82 (H) 43 - 75 %    Immat GRANS % 1 0 - 2 %    Lymphocytes Relative 7 (L) 14 - 44 %    Monocytes Relative 9 4 - 12 %    Eosinophils Relative 1 0 - 6 %    Basophils Relative 0 0 - 1 %    Neutrophils Absolute 8 62 (H) 1 85 - 7 62 Thousands/µL    Immature Grans Absolute 0 05 0 00 - 0 20 Thousand/uL    Lymphocytes Absolute 0 78 0 60 - 4 47 Thousands/µL    Monocytes Absolute 0 94 0 17 - 1 22 Thousand/µL    Eosinophils Absolute 0 08 0 00 - 0 61 Thousand/µL    Basophils Absolute 0 04 0 00 - 0 10 Thousands/µL   Basic metabolic panel    Collection Time: 08/26/19  1:27 PM   Result Value Ref Range    Sodium 138 136 - 145 mmol/L    Potassium 3 4 (L) 3 5 - 5 3 mmol/L    Chloride 101 100 - 108 mmol/L    CO2 28 21 - 32 mmol/L    ANION GAP 9 4 - 13 mmol/L    BUN 40 (H) 5 - 25 mg/dL    Creatinine 1 59 (H) 0 60 - 1 30 mg/dL    Glucose 156 (H) 65 - 140 mg/dL    Calcium 9 5 8 3 - 10 1 mg/dL    eGFR 40 ml/min/1 73sq m   APTT    Collection Time: 08/26/19  1:27 PM   Result Value Ref Range    PTT 28 23 - 37 seconds   Protime-INR    Collection Time: 08/26/19  1:27 PM   Result Value Ref Range    Protime 14 6 (H) 11 6 - 14 5 seconds    INR 1 13 0 84 - 1 19   CK (with reflex to MB)    Collection Time: 08/26/19  1:27 PM   Result Value Ref Range    Total  (H) 39 - 308 U/L   CKMB    Collection Time: 08/26/19  1:27 PM   Result Value Ref Range    CK-MB Index 1 0 0 0 - 2 5 %    CK-MB 4 8 0 0 - 5 0 ng/mL   UA w Reflex to Microscopic w Reflex to Culture    Collection Time: 08/26/19  3:19 PM   Result Value Ref Range    Color, UA Yellow     Clarity, UA Clear     Specific Gravity, UA >=1 030 1 003 - 1 030    pH, UA 5 5 4 5, 5 0, 5 5, 6 0, 6 5, 7 0, 7 5, 8 0    Leukocytes, UA Negative Negative    Nitrite, UA Negative Negative    Protein, UA 30 (1+) (A) Negative mg/dl    Glucose,  (1/10%) (A) Negative mg/dl    Ketones, UA Negative Negative mg/dl    Urobilinogen, UA 0 2 0 2, 1 0 E U /dl E U /dl    Bilirubin, UA Negative Negative    Blood, UA Moderate (A) Negative   Urine Microscopic    Collection Time: 08/26/19  3:19 PM   Result Value Ref Range    RBC, UA 4-10 (A) None Seen, 0-5 /hpf    WBC, UA None Seen None Seen, 0-5, 5-55, 5-65 /hpf    Epithelial Cells Occasional None Seen, Occasional /hpf    Bacteria, UA Occasional None Seen, Occasional /hpf   Fingerstick Glucose (POCT)    Collection Time: 08/26/19  5:47 PM   Result Value Ref Range    POC Glucose 213 (H) 65 - 140 mg/dl   Fingerstick Glucose (POCT)    Collection Time: 08/26/19  8:28 PM   Result Value Ref Range    POC Glucose 231 (H) 65 - 140 mg/dl   Basic metabolic panel    Collection Time: 08/27/19  4:45 AM   Result Value Ref Range    Sodium 135 (L) 136 - 145 mmol/L    Potassium 3 9 3 5 - 5 3 mmol/L    Chloride 102 100 - 108 mmol/L    CO2 25 21 - 32 mmol/L    ANION GAP 8 4 - 13 mmol/L    BUN 33 (H) 5 - 25 mg/dL    Creatinine 1 14 0 60 - 1 30 mg/dL    Glucose 111 65 - 140 mg/dL    Glucose, Fasting 111 (H) 65 - 99 mg/dL    Calcium 8 6 8 3 - 10 1 mg/dL    eGFR 59 ml/min/1 73sq m   Magnesium    Collection Time: 08/27/19  4:45 AM   Result Value Ref Range    Magnesium 2 0 1 6 - 2 6 mg/dL   Phosphorus    Collection Time: 08/27/19  4:45 AM   Result Value Ref Range    Phosphorus 2 1 (L) 2 3 - 4 1 mg/dL   CBC and differential    Collection Time: 08/27/19  4:45 AM   Result Value Ref Range    WBC 9 84 4 31 - 10 16 Thousand/uL    RBC 4 01 3 88 - 5 62 Million/uL    Hemoglobin 12 4 12 0 - 17 0 g/dL    Hematocrit 38 0 36 5 - 49 3 %    MCV 95 82 - 98 fL    MCH 30 9 26 8 - 34 3 pg    MCHC 32 6 31 4 - 37 4 g/dL    RDW 12 6 11 6 - 15 1 %    MPV 9 2 8 9 - 12 7 fL    Platelets 053 886 - 795 Thousands/uL    nRBC 0 /100 WBCs    Neutrophils Relative 70 43 - 75 %    Immat GRANS % 1 0 - 2 %    Lymphocytes Relative 13 (L) 14 - 44 %    Monocytes Relative 12 4 - 12 %    Eosinophils Relative 4 0 - 6 %    Basophils Relative 0 0 - 1 %    Neutrophils Absolute 6 87 1 85 - 7 62 Thousands/µL    Immature Grans Absolute 0 06 0 00 - 0 20 Thousand/uL    Lymphocytes Absolute 1 32 0 60 - 4 47 Thousands/µL    Monocytes Absolute 1 22 0 17 - 1 22 Thousand/µL    Eosinophils Absolute 0 34 0 00 - 0 61 Thousand/µL    Basophils Absolute 0 03 0 00 - 0 10 Thousands/µL   CK (with reflex to MB)    Collection Time: 08/27/19  4:45 AM   Result Value Ref Range    Total  (H) 39 - 308 U/L   Protime-INR    Collection Time: 08/27/19  4:45 AM   Result Value Ref Range    Protime 14 4 11 6 - 14 5 seconds    INR 1 11 0 84 - 1 19   APTT    Collection Time: 08/27/19  4:45 AM   Result Value Ref Range    PTT 32 23 - 37 seconds   CKMB    Collection Time: 08/27/19  4:45 AM   Result Value Ref Range    CK-MB Index <1 0 0 0 - 2 5 %    CK-MB 4 4 0 0 - 5 0 ng/mL   Fingerstick Glucose (POCT)    Collection Time: 08/27/19  6:12 AM   Result Value Ref Range    POC Glucose 148 (H) 65 - 140 mg/dl       Imaging Studies: I have personally reviewed pertinent reports  and I have personally reviewed pertinent films in PACS  CTH- No evidence of acute intracranial process or significant interval change  Chronic microangiopathy  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

## 2019-08-28 LAB
ANION GAP SERPL CALCULATED.3IONS-SCNC: 7 MMOL/L (ref 4–13)
BASOPHILS # BLD AUTO: 0.02 THOUSANDS/ΜL (ref 0–0.1)
BASOPHILS NFR BLD AUTO: 0 % (ref 0–1)
BUN SERPL-MCNC: 19 MG/DL (ref 5–25)
CALCIUM SERPL-MCNC: 8.3 MG/DL (ref 8.3–10.1)
CHLORIDE SERPL-SCNC: 105 MMOL/L (ref 100–108)
CO2 SERPL-SCNC: 24 MMOL/L (ref 21–32)
CREAT SERPL-MCNC: 0.95 MG/DL (ref 0.6–1.3)
EOSINOPHIL # BLD AUTO: 0.22 THOUSAND/ΜL (ref 0–0.61)
EOSINOPHIL NFR BLD AUTO: 3 % (ref 0–6)
ERYTHROCYTE [DISTWIDTH] IN BLOOD BY AUTOMATED COUNT: 12.7 % (ref 11.6–15.1)
GFR SERPL CREATININE-BSD FRML MDRD: 74 ML/MIN/1.73SQ M
GLUCOSE SERPL-MCNC: 100 MG/DL (ref 65–140)
GLUCOSE SERPL-MCNC: 135 MG/DL (ref 65–140)
GLUCOSE SERPL-MCNC: 167 MG/DL (ref 65–140)
GLUCOSE SERPL-MCNC: 201 MG/DL (ref 65–140)
GLUCOSE SERPL-MCNC: 99 MG/DL (ref 65–140)
HCT VFR BLD AUTO: 36.9 % (ref 36.5–49.3)
HGB BLD-MCNC: 12.1 G/DL (ref 12–17)
IMM GRANULOCYTES # BLD AUTO: 0.03 THOUSAND/UL (ref 0–0.2)
IMM GRANULOCYTES NFR BLD AUTO: 0 % (ref 0–2)
LYMPHOCYTES # BLD AUTO: 1.02 THOUSANDS/ΜL (ref 0.6–4.47)
LYMPHOCYTES NFR BLD AUTO: 12 % (ref 14–44)
MCH RBC QN AUTO: 31.3 PG (ref 26.8–34.3)
MCHC RBC AUTO-ENTMCNC: 32.8 G/DL (ref 31.4–37.4)
MCV RBC AUTO: 95 FL (ref 82–98)
MONOCYTES # BLD AUTO: 0.76 THOUSAND/ΜL (ref 0.17–1.22)
MONOCYTES NFR BLD AUTO: 9 % (ref 4–12)
NEUTROPHILS # BLD AUTO: 6.22 THOUSANDS/ΜL (ref 1.85–7.62)
NEUTS SEG NFR BLD AUTO: 76 % (ref 43–75)
NRBC BLD AUTO-RTO: 0 /100 WBCS
PLATELET # BLD AUTO: 191 THOUSANDS/UL (ref 149–390)
PMV BLD AUTO: 9.2 FL (ref 8.9–12.7)
POTASSIUM SERPL-SCNC: 3.9 MMOL/L (ref 3.5–5.3)
RBC # BLD AUTO: 3.87 MILLION/UL (ref 3.88–5.62)
SODIUM SERPL-SCNC: 136 MMOL/L (ref 136–145)
WBC # BLD AUTO: 8.27 THOUSAND/UL (ref 4.31–10.16)

## 2019-08-28 PROCEDURE — 99239 HOSP IP/OBS DSCHRG MGMT >30: CPT | Performed by: INTERNAL MEDICINE

## 2019-08-28 PROCEDURE — 94640 AIRWAY INHALATION TREATMENT: CPT

## 2019-08-28 PROCEDURE — 94760 N-INVAS EAR/PLS OXIMETRY 1: CPT

## 2019-08-28 PROCEDURE — 99232 SBSQ HOSP IP/OBS MODERATE 35: CPT | Performed by: INTERNAL MEDICINE

## 2019-08-28 PROCEDURE — 94664 DEMO&/EVAL PT USE INHALER: CPT

## 2019-08-28 PROCEDURE — 85025 COMPLETE CBC W/AUTO DIFF WBC: CPT | Performed by: INTERNAL MEDICINE

## 2019-08-28 PROCEDURE — 82948 REAGENT STRIP/BLOOD GLUCOSE: CPT

## 2019-08-28 PROCEDURE — 80048 BASIC METABOLIC PNL TOTAL CA: CPT | Performed by: INTERNAL MEDICINE

## 2019-08-28 RX ORDER — LEVALBUTEROL 1.25 MG/.5ML
1.25 SOLUTION, CONCENTRATE RESPIRATORY (INHALATION)
Start: 2019-08-28 | End: 2022-03-31 | Stop reason: HOSPADM

## 2019-08-28 RX ORDER — TAMSULOSIN HYDROCHLORIDE 0.4 MG/1
0.4 CAPSULE ORAL
Start: 2019-08-28 | End: 2019-11-05

## 2019-08-28 RX ORDER — GUAIFENESIN 600 MG
600 TABLET, EXTENDED RELEASE 12 HR ORAL EVERY 12 HOURS SCHEDULED
Start: 2019-08-28 | End: 2020-04-16 | Stop reason: ALTCHOICE

## 2019-08-28 RX ADMIN — HEPARIN SODIUM 5000 UNITS: 5000 INJECTION INTRAVENOUS; SUBCUTANEOUS at 13:55

## 2019-08-28 RX ADMIN — LEVALBUTEROL HYDROCHLORIDE 1.25 MG: 1.25 SOLUTION, CONCENTRATE RESPIRATORY (INHALATION) at 13:05

## 2019-08-28 RX ADMIN — CARBIDOPA AND LEVODOPA 1 TABLET: 25; 100 TABLET ORAL at 21:06

## 2019-08-28 RX ADMIN — ISODIUM CHLORIDE 3 ML: 0.03 SOLUTION RESPIRATORY (INHALATION) at 19:50

## 2019-08-28 RX ADMIN — PRAMIPEXOLE DIHYDROCHLORIDE 0.5 MG: 0.5 TABLET ORAL at 17:09

## 2019-08-28 RX ADMIN — LEVALBUTEROL HYDROCHLORIDE 1.25 MG: 1.25 SOLUTION, CONCENTRATE RESPIRATORY (INHALATION) at 07:15

## 2019-08-28 RX ADMIN — FLUTICASONE FUROATE AND VILANTEROL TRIFENATATE 1 PUFF: 200; 25 POWDER RESPIRATORY (INHALATION) at 09:02

## 2019-08-28 RX ADMIN — TIOTROPIUM BROMIDE 18 MCG: 18 CAPSULE ORAL; RESPIRATORY (INHALATION) at 09:02

## 2019-08-28 RX ADMIN — MONTELUKAST SODIUM 10 MG: 10 TABLET, FILM COATED ORAL at 21:06

## 2019-08-28 RX ADMIN — RIVASTIGMINE TARTRATE 1.5 MG: 1.5 CAPSULE ORAL at 17:09

## 2019-08-28 RX ADMIN — RIVASTIGMINE TARTRATE 1.5 MG: 1.5 CAPSULE ORAL at 09:01

## 2019-08-28 RX ADMIN — PREDNISONE 10 MG: 10 TABLET ORAL at 09:01

## 2019-08-28 RX ADMIN — MELATONIN 9 MG: at 21:06

## 2019-08-28 RX ADMIN — PRAMIPEXOLE DIHYDROCHLORIDE 0.5 MG: 0.5 TABLET ORAL at 09:01

## 2019-08-28 RX ADMIN — INSULIN LISPRO 2 UNITS: 100 INJECTION, SOLUTION INTRAVENOUS; SUBCUTANEOUS at 17:10

## 2019-08-28 RX ADMIN — GUAIFENESIN 600 MG: 600 TABLET ORAL at 21:06

## 2019-08-28 RX ADMIN — CARBIDOPA AND LEVODOPA 1 TABLET: 25; 100 TABLET ORAL at 09:01

## 2019-08-28 RX ADMIN — TAMSULOSIN HYDROCHLORIDE 0.4 MG: 0.4 CAPSULE ORAL at 17:09

## 2019-08-28 RX ADMIN — GUAIFENESIN 600 MG: 600 TABLET ORAL at 09:01

## 2019-08-28 RX ADMIN — HEPARIN SODIUM 5000 UNITS: 5000 INJECTION INTRAVENOUS; SUBCUTANEOUS at 21:06

## 2019-08-28 RX ADMIN — ISODIUM CHLORIDE 3 ML: 0.03 SOLUTION RESPIRATORY (INHALATION) at 07:15

## 2019-08-28 RX ADMIN — CARBIDOPA AND LEVODOPA 1 TABLET: 25; 100 TABLET ORAL at 17:10

## 2019-08-28 RX ADMIN — LEVALBUTEROL HYDROCHLORIDE 1.25 MG: 1.25 SOLUTION, CONCENTRATE RESPIRATORY (INHALATION) at 19:49

## 2019-08-28 RX ADMIN — HEPARIN SODIUM 5000 UNITS: 5000 INJECTION INTRAVENOUS; SUBCUTANEOUS at 05:45

## 2019-08-28 RX ADMIN — ISODIUM CHLORIDE 3 ML: 0.03 SOLUTION RESPIRATORY (INHALATION) at 13:05

## 2019-08-28 RX ADMIN — PRAMIPEXOLE DIHYDROCHLORIDE 0.5 MG: 0.5 TABLET ORAL at 21:06

## 2019-08-28 RX ADMIN — CLOPIDOGREL BISULFATE 75 MG: 75 TABLET ORAL at 09:01

## 2019-08-28 RX ADMIN — INSULIN LISPRO 1 UNITS: 100 INJECTION, SOLUTION INTRAVENOUS; SUBCUTANEOUS at 13:56

## 2019-08-28 NOTE — DISCHARGE SUMMARY
Discharge Summary - Caro Petersen 81 y o  male MRN: [id number]  Unit/Bed#: -01 Encounter: [id number]    Admission Date:    8/26/2019   Discharge Date:   8/29/19  Admitting Diagnosis:   Urinary retention [R33 9]  Head injury [S09 90XA]  ANGELA (acute kidney injury) ([address] ) [N17 9]  Frequent falls [R29 6]  Admitting Provider:   Caroline Lucas MD  Discharge Provider:   Denise Syed MD     Primary Care Physician at Discharge:   Denise Syed MD,[phone]    HPI:   Caro Petersen is a 81 y o  male patient with past medical history of Parkinson's disease, COPD, coronary artery disease status post stent placement, essential hypertension, type 2 diabetes mellitus who was brought in by his daughters Reema Ellis and Lola because he fell this morning and was only found down on the floor after about 3 hours of being down  They also state that he has been having problems with falling lately with lots of unwitnessed falls  According to the patient's daughters, they all live in the same [address] [place] [address] and the patient and his wife live together in the same unit but in separate rooms  The daughters who live in [place] as well, also go regularly to take care of the parents by cooking, cleaning, checking on them, administering the medications  They state that this morning the patient's wife was expecting him to come out for his regular morning coffee but when he did not show up, she went to the room and found him down on the floor  She presumed he must have been down on the floor for at least greater than 3 hours  Upon evaluation in the ED CT of the head was negative for any acute findings  He was found to have acute kidney injury with some mild hypokalemia  He was also noted to have urinary retention with a postvoid residual after bladder scan showing 1000 mL of urine which was removed by straight cath  The patient also had 2 more episodes of urinary incontinence where he wet himself in the ED    Hematuria was noticed upon placement of Whiteside catheter in the ED and patient is currently draining red colored urine through the Whiteside catheter  Procedures Performed:   No orders of the defined types were placed in this encounter  Hospital Course:   Gait dysfunction status post fall complicated by mild rhabdomyolysis due to patient being down greater than 3 hours-this is likely related to underlying Parkinson's disease coupled with urinary retention secondary to obstructive uropathy versus neurogenic bladder  Patient was evaluated by Neurology who felt his outpatient dosing of Sinemet was appropriate  CT of the brain shows stable high left frontal vertex meningioma which was not felt to be contributory to his fall  He was evaluated by Physical therapy and felt to be appropriate for inpatient rehab which is being arranged in local skilled nursing facility  Orthostatic vital signs were completed prior to discharge and there was no orthostatic drop in blood pressure maintained about 836 systolic  Acute kidney injury secondary to obstructive uropathy versus neurogenic bladder-kidney function returned to baseline with hydration and placement of Whiteside catheter  Patient was evaluated by Urology who recommends continuation of Whiteside catheter with outpatient voiding trial     COPD-patient is at baseline-continue oxygen support, bronchodilators and daily prednisone    Gross hematuria found on admission upon straight cath-urine remained clear throughout the hospitalization  Patient will be followed up outpatient by Urology as above  Type 2 diabetes mellitus has been stable under diet control in the outpatient setting with hemoglobin A1c 6 8  This is contributed to by chronic prednisone use  He was placed on sliding scale during the hospitalization which can be continued throughout the short-term rehab          Current Facility-Administered Medications:     acetaminophen (TYLENOL) tablet 650 mg, 650 mg, Oral, Q6H PRN, Siomara Asif MD, 650 mg at 08/26/19 2204    albuterol (PROVENTIL HFA,VENTOLIN HFA) inhaler 2 puff, 2 puff, Inhalation, Q6H PRN, Siomara Asif MD, 2 puff at 08/27/19 0527    carbidopa-levodopa (SINEMET)  mg per tablet 1 tablet, 1 tablet, Oral, TID, Siomara Asif MD, 1 tablet at 08/28/19 0901    clopidogrel (PLAVIX) tablet 75 mg, 75 mg, Oral, Daily, Siomara Asif MD, 75 mg at 08/28/19 0901    fluticasone-vilanterol (BREO ELLIPTA) 200-25 MCG/INH inhaler 1 puff, 1 puff, Inhalation, Daily, Siomara Asif MD, 1 puff at 08/28/19 0902    guaiFENesin (MUCINEX) 12 hr tablet 600 mg, 600 mg, Oral, Q12H [facility], Saige Martínez PA-C, 600 mg at 08/28/19 0901    heparin (porcine) subcutaneous injection 5,000 Units, 5,000 Units, Subcutaneous, Q8H [facility], Yaritza Johnson MD, 5,000 Units at 08/28/19 0545    HYDROmorphone (DILAUDID) injection 0 5 mg, 0 5 mg, Intravenous, Q4H PRN, Siomara Asif MD, 0 5 mg at 08/26/19 1711    insulin lispro (HumaLOG) 100 units/mL subcutaneous injection 1-6 Units, 1-6 Units, Subcutaneous, TID AC, 5 Units at 08/27/19 1840 **AND** Fingerstick Glucose (POCT), , , TID AC, Siomara Asif MD    levalbuterol (XOPENEX) inhalation solution 1 25 mg, 1 25 mg, Nebulization, TID, Yaritza Johnson MD, 1 25 mg at 08/28/19 0715    melatonin tablet 9 mg, 9 mg, Oral, HS PRN, Kelvin Thrasher PA-C, 9 mg at 08/27/19 2115    montelukast (SINGULAIR) tablet 10 mg, 10 mg, Oral, HS, Okwu Ikediobi, MD, 10 mg at 08/27/19 2115    ondansetron (ZOFRAN) injection 4 mg, 4 mg, Intravenous, Q6H PRN, Siomara Asif MD    pramipexole (MIRAPEX) tablet 0 5 mg, 0 5 mg, Oral, TID, Saul Anderson MD, 0 5 mg at 08/28/19 0901    predniSONE tablet 10 mg, 10 mg, Oral, Daily, Saul Anderson MD, 10 mg at 08/28/19 0901    rivastigmine (EXELON) capsule 1 5 mg, 1 5 mg, Oral, BID, Saul Anderson MD, 1 5 mg at 08/28/19 0901    sodium chloride 0 9 % infusion, 100 mL/hr, Intravenous, Continuous, Siomara Asif MD, Last Rate: 100 mL/hr at 08/27/19 0524, 100 mL/hr at 08/27/19 0524    sodium chloride 0 9 % inhalation solution 3 mL, 3 mL, Nebulization, TID, Anika Sandhu MD, 3 mL at 08/28/19 0715    tamsulosin (FLOMAX) capsule 0 4 mg, 0 4 mg, Oral, Daily With Dinner, Prince Kumar, CRNP, 0 4 mg at 08/27/19 1841    tiotropium (SPIRIVA) capsule for inhaler 18 mcg, 18 mcg, Inhalation, Daily, Tamara Gunter MD, 18 mcg at 08/28/19 6800      Consulting Providers   Nephrology, Urology, Neurology    Significant Findings, Care, Treatment and Services Provided:   CT brain:IMPRESSION:     No evidence of acute intracranial process or significant interval change      Chronic microangiopathy      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 with gas fluid level in the right maxillary sinus unchanged since August 17, 2019      3 3 cm left frontal extra-axial meningioma unchanged since August 17, 2019         Chest x-ray:IMPRESSION:     Stable interstitial prominence bilaterally without new focal airspace consolidation    Ultrasound kidney and bladder:IMPRESSION:        1  Simple cyst within the right kidney  No hydronephrosis  2  Whiteside catheter in place decompressing the bladder and limiting its evaluation  No gross abnormality          Complications:   None  Physical Exam:  General Appearance:    Alert, cooperative, no distress   Head:    Normocephalic, without obvious abnormality, atraumatic   Eyes:    PERRL, conjunctiva/corneas clear, EOM's intact       Nose:   Moist mucous membranes, no drainage or sinus tenderness   Throat:   No tenderness, no exudates   Neck:   Supple, symmetrical, trachea midline, no JVD   Lungs:     Coarse wheezes predominantly at the bases bilaterally with prolonged expiratory phase, respirations unlabored   Heart:    Regular rate and rhythm, S1 and S2 normal, no murmur, rub   or gallop   Abdomen: Soft, non-tender, positive bowel sounds, no masses, no organomegaly   Extremities:  No pedal edema, calf tenderness  Distal pulses palpable  Neurologic:     CNII-XII intact  Labs:   Lab Results   Component Value Date    WBC 8 27 08/28/2019    WBC 9 45 12/10/2015    RBC 3 87 (L) 08/28/2019    RBC 4 52 12/10/2015    HGB 12 1 08/28/2019    HGB 13 7 12/10/2015    HCT 36 9 08/28/2019    HCT 41 6 12/10/2015    MCV 95 08/28/2019    MCV 92 12/10/2015    MCH 31 3 08/28/2019    MCH 30 3 12/10/2015    RDW 12 7 08/28/2019    RDW 13 4 12/10/2015     08/28/2019     12/10/2015     Lab Results   Component Value Date    CREATININE 0 95 08/28/2019    CREATININE 0 98 12/10/2015    BUN 19 08/28/2019    BUN 21 12/10/2015     12/10/2015    K 3 9 08/28/2019    K 4 3 12/10/2015     08/28/2019     (H) 12/10/2015    CO2 24 08/28/2019    CO2 27 12/10/2015    GLUCOSE 94 12/10/2015    PROT 8 0 12/10/2015    ALKPHOS 100 06/18/2019    ALKPHOS 104 12/10/2015    ALT 20 06/18/2019    ALT 34 12/10/2015    AST 18 06/18/2019    AST 22 12/10/2015    BILIDIR 0 16 03/06/2018    BILIDIR 0 13 12/10/2015       Treatments:  steroids: prednisone and respiratory therapy: Xopenex nebulizer    Discharge Diagnosis:   Principal Problem:    Ambulatory dysfunction  Active Problems:    Parkinson's disease ([facility] )    Neurologic gait dysfunction    Type 2 diabetes mellitus (HCC)    Chronic obstructive pulmonary disease (HCC)    Cerebrovascular disease    Brain lesion    Acute kidney injury ([facility] [address] )    Hypokalemia    Essential hypertension    Gross hematuria  Resolved Problems:    * No resolved hospital problems  *      Condition at Discharge:   Good     Code Status: Level 3 - DNAR and DNI  Advance Directive and Living Will: <no information>  Power of :    POLST:      Discharge instructions/Information to patient and family:   See after visit summary for information provided to patient and family  Provisions for Follow-Up Care:  See after visit summary for information related to follow-up care and any pertinent home health orders  Disposition:   Skilled nursing facility for subacute rehab    Planned Readmission:   No    Discharge Statement   I spent 33 minutes discharging the patient  This time was spent on the day of discharge  I had direct contact with the patient on the day of discharge  Additional documentation is required if more than 30 minutes were spent on discharge  Greater than 50% of the total time was spent examining patient, answering all patient questions, arranging and discussing plan of care with patient as well as directly providing post-discharge instructions  Additional time then spent on discharge activities  Discharge Medications:  See after visit summary for reconciled discharge medications provided to patient and family        Jaja Abreu MD  4/29/8264,4:62 AM

## 2019-08-28 NOTE — PROGRESS NOTES
NEPHROLOGY PROGRESS NOTE    Patient: Nancy Belle               Sex: male          DOA: 8/26/2019 12:22 PM   YOB: 1935        Age:  81 y o         LOS:  LOS: 1 day   8/28/2019    REASON FOR THE CONSULTATION:      Acute kidney injury    SUBJECTIVE     Patient is sitting up in chair with daughter next bedside and offers no complaints  He was seen in consultation by Urology who started him on Flomax      CURRENT MEDICATIONS       Current Facility-Administered Medications:     acetaminophen (TYLENOL) tablet 650 mg, 650 mg, Oral, Q6H PRN, Alma Reyes MD, 650 mg at 08/26/19 2204    albuterol (PROVENTIL HFA,VENTOLIN HFA) inhaler 2 puff, 2 puff, Inhalation, Q6H PRN, Alma Reyes MD, 2 puff at 08/27/19 0527    carbidopa-levodopa (SINEMET)  mg per tablet 1 tablet, 1 tablet, Oral, TID, Alma Reyes MD, 1 tablet at 08/28/19 0901    clopidogrel (PLAVIX) tablet 75 mg, 75 mg, Oral, Daily, Saul Anderson MD, 75 mg at 08/28/19 0901    fluticasone-vilanterol (BREO ELLIPTA) 200-25 MCG/INH inhaler 1 puff, 1 puff, Inhalation, Daily, Alma Reyes MD, 1 puff at 08/28/19 0902    guaiFENesin (MUCINEX) 12 hr tablet 600 mg, 600 mg, Oral, Q12H [facility], Saige Martínez PA-C, 600 mg at 08/28/19 0901    heparin (porcine) subcutaneous injection 5,000 Units, 5,000 Units, Subcutaneous, Q8H [facility], Zoey Allen MD, 5,000 Units at 08/28/19 0545    HYDROmorphone (DILAUDID) injection 0 5 mg, 0 5 mg, Intravenous, Q4H PRN, Alma Reyes MD, 0 5 mg at 08/26/19 1711    insulin lispro (HumaLOG) 100 units/mL subcutaneous injection 1-6 Units, 1-6 Units, Subcutaneous, TID AC, 5 Units at 08/27/19 1840 **AND** Fingerstick Glucose (POCT), , , TID AC, Alma Reyes MD    levalbuterol (XOPENEX) inhalation solution 1 25 mg, 1 25 mg, Nebulization, TID, Zoey Allen MD, 1 25 mg at 08/28/19 0715    melatonin tablet 9 mg, 9 mg, Oral, HS PRN, Stephy Thrasher PA-C, 9 mg at 08/27/19 2115    montelukast (SINGULAIR) tablet 10 mg, 10 mg, Oral, HS, Saul Anderson MD, 10 mg at 08/27/19 2115    ondansetron (ZOFRAN) injection 4 mg, 4 mg, Intravenous, Q6H PRN, Ann-Marie Calderon MD    pramipexole (MIRAPEX) tablet 0 5 mg, 0 5 mg, Oral, TID, Saul Anderson MD, 0 5 mg at 08/28/19 0901    predniSONE tablet 10 mg, 10 mg, Oral, Daily, Saul Anderson MD, 10 mg at 08/28/19 0901    rivastigmine (EXELON) capsule 1 5 mg, 1 5 mg, Oral, BID, Saul Anderson MD, 1 5 mg at 08/28/19 0901    sodium chloride 0 9 % infusion, 100 mL/hr, Intravenous, Continuous, Saul Anderson MD, Last Rate: 100 mL/hr at 08/27/19 0524, 100 mL/hr at 08/27/19 0524    sodium chloride 0 9 % inhalation solution 3 mL, 3 mL, Nebulization, TID, Migue Rodríguez MD, 3 mL at 08/28/19 0715    tamsulosin (FLOMAX) capsule 0 4 mg, 0 4 mg, Oral, Daily With Dinner, [place], CRNP, 0 4 mg at 08/27/19 1841    tiotropium (SPIRIVA) capsule for inhaler 18 mcg, 18 mcg, Inhalation, Daily, Ann-Marie Calderon MD, 18 mcg at 08/28/19 2628    REVIEW OF SYSTEMS     Review of Systems   Constitutional: Positive for fatigue  HENT: Negative  Eyes: Negative  Respiratory: Negative  Cardiovascular: Negative  Gastrointestinal: Negative  Endocrine: Negative  Genitourinary: Negative  Musculoskeletal: Negative  Skin: Negative  Allergic/Immunologic: Negative  Neurological: Negative  Hematological: Negative  All other systems reviewed and are negative  OBJECTIVE     Current Weight: Weight - Scale: 82 3 kg (181 lb 7 oz)  Vitals:    08/28/19 0831   BP: 131/65   Pulse: (!) 41   Resp:    Temp:    SpO2: (!) 83%     Body mass index is 31 14 kg/m²  Intake/Output Summary (Last 24 hours) at 8/28/2019 3978  Last data filed at 8/28/2019 0900  Gross per 24 hour   Intake 900 ml   Output 2200 ml   Net -1300 ml       PHYSICAL EXAMINATION     Physical Exam   Constitutional: He is oriented to person, place, and time  He appears well-developed and well-nourished     HENT:   Head: Normocephalic and atraumatic  Eyes: Pupils are equal, round, and reactive to light  Neck: Neck supple  Cardiovascular: Normal rate, regular rhythm and normal heart sounds  Pulmonary/Chest: Effort normal    Abdominal: Soft  Bowel sounds are normal    Genitourinary:   Genitourinary Comments: Whiteside catheter in place   Musculoskeletal: Normal range of motion  Neurological: He is alert and oriented to person, place, and time  Skin: Skin is warm  Psychiatric: He has a normal mood and affect  LAB RESULTS     Results from last 7 days   Lab Units 08/28/19  0540 08/27/19  0445 08/26/19  1327   WBC Thousand/uL 8 27 9 84 10 51*   HEMOGLOBIN g/dL 12 1 12 4 13 4   HEMATOCRIT % 36 9 38 0 41 8   PLATELETS Thousands/uL 191 206 229   POTASSIUM mmol/L 3 9 3 9 3 4*   CHLORIDE mmol/L 105 102 101   CO2 mmol/L 24 25 28   BUN mg/dL 19 33* 40*   CREATININE mg/dL 0 95 1 14 1 59*   EGFR ml/min/1 73sq m 74 59 40   CALCIUM mg/dL 8 3 8 6 9 5   MAGNESIUM mg/dL  --  2 0  --    PHOSPHORUS mg/dL  --  2 1*  --            RADIOLOGY RESULTS      No results found for this or any previous visit  Results for orders placed during the hospital encounter of 08/26/19   XR chest 2 views    Narrative CHEST     INDICATION:   cough,  COMPARISON:  Chest x-ray 8/17/2018    EXAM PERFORMED/VIEWS:  XR CHEST PA & LATERAL      FINDINGS:    Cardiomediastinal silhouette appears unremarkable  There is stable interstitial prominence bilaterally without new focal airspace consolidation  Osseous structures appear within normal limits for patient age  Impression Stable interstitial prominence bilaterally without new focal airspace consolidation  Workstation performed: [id number]         ASSESSMENT/PLAN     21-year-old male with past medical history of hypertension, urinary incontinence, Parkinson's disease who presented with a fall      1  Acute kidney injury:  Etiology of acute kidney injury was thought to be secondary to either neurogenic bladder versus obstructive uropathy secondary to prostatomegaly   -baseline serum creatinine 1 1   -presented with serum creatinine 1 5 which improved to baseline upon insertion of Whiteside catheter   -will discontinue IV fluids  2  Urinary retention:  Either secondary to neurogenic bladder emanating from Parkinson's disease versus BPH patient is to retain his Whiteside catheter upon discharge and follow up with Urology as recommended  3  Hypertension:  Patient had been kept off his blood pressure medication during this hospitalization  His blood pressures have been mostly within normal limits  Therefore recommend discontinuing his blood pressure medication upon discharge  Will sign off at this time    Call as needed    Lucy Torres MD  Nephrology  8/28/2019

## 2019-08-28 NOTE — SOCIAL WORK
LOS 1  GMLOS 3 9 CM spoke to Emely who states she has chosen [facility]  [facility] [place] [facility]  [facility] obtained auth# [id number]  Patient has stretcher transport at [facility] today with PMR to the [facility]  Oterorobbie Urbinas was informed of transport time and she informs she will meet patient at [facility]  CM informed of IMM and IMM is in the chart  CM obtained [facility] transport auth# [id number]    Patient, Otero Yecenia, nurse and SLIM notified

## 2019-08-28 NOTE — TRANSPORTATION MEDICAL NECESSITY
Section I - General Information    Name of Patient: Allison Alcala                 : 1935    Medicare #: [id number]  Transport Date: 19 (PCS is valid for round trips on this date and for all repetitive trips in the 60-day range as noted below )  Origin: [address]: [facility]  Is the pt's stay covered under Medicare Part A (PPS/DRG)   [x]     Closest appropriate facility? If no, why is transport to more distant facility required? Yes  If hospice pt, is this transport related to pt's terminal illness? No       Section II - Medical Necessity Questionnaire  Ambulance transportation is medically necessary only if other means of transport are contraindicated or would be potentially harmful to the patient  To meet this requirement, the patient must either be "bed confined" or suffer from a condition such that transport by means other than ambulance is contraindicated by the patient's condition  The following questions must be answered by the medical professional signing below for this form to be valid:    1)  Describe the MEDICAL CONDITION (physical and/or mental) of this patient AT [address] [place] [address] that requires the patient to be transported in an ambulance and why transport by other means is contraindicated by the patient's condition:ambulatory dysfunction, UTI, frequent falls head injury  High fall risk  2) Is the patient "bed confined" as defined below? No  To be "be confined" the patient must satisfy all three of the following conditions: (1) unable to get up from bed without Assistance; AND (2) unable to ambulate; AND (3) unable to sit in a chair or wheelchair  3) Can this patient safely be transported by car or wheelchair van (i e , seated during transport without a medical attendant or monitoring)?    No    4) In addition to completing questions 1-3 above, please check any of the following conditions that apply*:   *Note: supporting documentation for any boxes checked must be maintained in the patient's medical records  If hosp-hosp transfer, describe services needed at 2nd facility not available at 1st facility? Medical attendant required   Unable to tolerate seated position for time needed to transport       Section III - Signature of Physician or Healthcare Professional  I certify that the above information is true and correct based on my evaluation of this patient, and represent that the patient requires transport by ambulance and that other forms of transport are contraindicated  I understand that this information will be used by the Centers for Medicare and Medicaid Services (CMS) to support the determination of medical necessity for ambulance services, and I represent that I have personal knowledge of the patient's condition at time of transport  []  If this box is checked, I also certify that the patient is physically or mentally incapable of signing the ambulance service's claim and that the institution with which I am affiliated has furnished care, services, or assistance to the patient  My signature below is made on behalf of the patient pursuant to 42 CFR §424 36(b)(4)  In accordance with 42 CFR §424 37, the specific reason(s) that the patient is physically or mentally incapable of signing the claim form is as follows: N/A  Signature of Physician* or Healthcare Professional______________________________________________________________  Signature Date 08/28/19 (For scheduled repetitive transports, this form is not valid for transports performed more than 60 days after this date)    Printed Name & Credentials of Physician or Healthcare Professional (MD, DO, RN, etc )_TRINO Schaeffer_  *Form must be signed by patient's attending physician for scheduled, repetitive transports   For non-repetitive, unscheduled ambulance transports, if unable to obtain the signature of the attending physician, any of the following may sign (choose appropriate option below)  [] Physician Assistant []  Clinical Nurse Specialist []  Registered Nurse  []  Nurse Practitioner  [x] Discharge Planner

## 2019-08-29 ENCOUNTER — TRANSITIONAL CARE MANAGEMENT (OUTPATIENT)
Dept: INTERNAL MEDICINE CLINIC | Facility: CLINIC | Age: 84
End: 2019-08-29

## 2019-08-29 VITALS
OXYGEN SATURATION: 97 % | DIASTOLIC BLOOD PRESSURE: 55 MMHG | WEIGHT: 181.44 LBS | RESPIRATION RATE: 20 BRPM | TEMPERATURE: 97.3 F | HEART RATE: 74 BPM | SYSTOLIC BLOOD PRESSURE: 114 MMHG | HEIGHT: 64 IN | BODY MASS INDEX: 30.98 KG/M2

## 2019-08-29 LAB — GLUCOSE SERPL-MCNC: 97 MG/DL (ref 65–140)

## 2019-08-29 PROCEDURE — 94760 N-INVAS EAR/PLS OXIMETRY 1: CPT

## 2019-08-29 PROCEDURE — 94640 AIRWAY INHALATION TREATMENT: CPT

## 2019-08-29 PROCEDURE — 99232 SBSQ HOSP IP/OBS MODERATE 35: CPT | Performed by: INTERNAL MEDICINE

## 2019-08-29 PROCEDURE — 82948 REAGENT STRIP/BLOOD GLUCOSE: CPT

## 2019-08-29 RX ADMIN — ISODIUM CHLORIDE 3 ML: 0.03 SOLUTION RESPIRATORY (INHALATION) at 07:15

## 2019-08-29 RX ADMIN — PRAMIPEXOLE DIHYDROCHLORIDE 0.5 MG: 0.5 TABLET ORAL at 09:31

## 2019-08-29 RX ADMIN — RIVASTIGMINE TARTRATE 1.5 MG: 1.5 CAPSULE ORAL at 09:30

## 2019-08-29 RX ADMIN — GUAIFENESIN 600 MG: 600 TABLET ORAL at 09:31

## 2019-08-29 RX ADMIN — PREDNISONE 10 MG: 10 TABLET ORAL at 09:31

## 2019-08-29 RX ADMIN — CLOPIDOGREL BISULFATE 75 MG: 75 TABLET ORAL at 09:30

## 2019-08-29 RX ADMIN — HEPARIN SODIUM 5000 UNITS: 5000 INJECTION INTRAVENOUS; SUBCUTANEOUS at 06:22

## 2019-08-29 RX ADMIN — CARBIDOPA AND LEVODOPA 1 TABLET: 25; 100 TABLET ORAL at 09:30

## 2019-08-29 RX ADMIN — LEVALBUTEROL HYDROCHLORIDE 1.25 MG: 1.25 SOLUTION, CONCENTRATE RESPIRATORY (INHALATION) at 07:15

## 2019-08-29 NOTE — PROGRESS NOTES
Progress Note - Savage Mcdaniels 81 y o  male MRN: [id number]  Unit/Bed#: -01 Encounter: [id number]    Subjective:   Feeling well, no new complaints, he continues with minimally productive cough without chest pain, shortness of breath, fevers or chills  Discharge was canceled yesterday because transportation was delayed  All other ROS are negative  Objective:   Vitals: Blood pressure 114/55, pulse 74, temperature (!) 97 3 °F (36 3 °C), resp  rate 20, height 5' 4" (1 626 m), weight 82 3 kg (181 lb 7 oz), SpO2 97 %  ,Body mass index is 31 14 kg/m²  SPO2 RA Rest      ED to Hosp-Admission (Current) from 8/26/2019 in [address] [place] [address] 2nd Floor Med Surg Unit   SpO2  97 %   SpO2 Activity  At Rest   O2 Device  None (Room air)   O2 Flow Rate          I&O:     Intake/Output Summary (Last 24 hours) at 8/29/2019 0853  Last data filed at 8/29/2019 0616  Gross per 24 hour   Intake 450 ml   Output 4300 ml   Net -3850 ml         Physical Exam:       General Appearance:    Alert, cooperative, no distress   Head:    Normocephalic, without obvious abnormality, atraumatic   Eyes:    PERRL, conjunctiva/corneas clear, EOM's intact       Nose:   Moist mucous membranes, no drainage or sinus tenderness   Throat:   No tenderness, no exudates   Neck:   Supple, symmetrical, trachea midline, no JVD   Lungs:     Faint right basilar crackles, scattered rhonchi predominantly at bases, prolonged expiratory phase respirations unlabored   Heart:    Regular rate and rhythm, S1 and S2 normal, no murmur, rub   or gallop   Abdomen: Soft, non-tender, positive bowel sounds, no masses, no organomegaly   Extremities:  No pedal edema, calf tenderness  Distal pulses palpable  Neurologic:     CNII-XII intact        Invasive Devices     Drain            Urethral Catheter 16 Fr  2 days                      Social History  reviewed  Family History   Problem Relation Age of Onset    No Known Problems Mother         Old Age    COPD Sister    Shiva Aragon Lung cancer Sister     Asthma Family     reviewed    Meds:  Current Facility-Administered Medications   Medication Dose Route Frequency Provider Last Rate Last Dose    acetaminophen (TYLENOL) tablet 650 mg  650 mg Oral Q6H PRN Ernestina Wills MD   650 mg at 08/26/19 2204    albuterol (PROVENTIL HFA,VENTOLIN HFA) inhaler 2 puff  2 puff Inhalation Q6H PRN Ernestina Wills MD   2 puff at 08/27/19 0527    carbidopa-levodopa (SINEMET)  mg per tablet 1 tablet  1 tablet Oral TID Ernestina Wills MD   1 tablet at 08/28/19 2106    clopidogrel (PLAVIX) tablet 75 mg  75 mg Oral Daily Ernestina Wills MD   75 mg at 08/28/19 0901    fluticasone-vilanterol (BREO ELLIPTA) 200-25 MCG/INH inhaler 1 puff  1 puff Inhalation Daily Ernestina Wills MD   1 puff at 08/28/19 0902    guaiFENesin (MUCINEX) 12 hr tablet 600 mg  600 mg Oral Q12H [address] MICHELLE Mcconnell   600 mg at 08/28/19 2106    heparin (porcine) subcutaneous injection 5,000 Units  5,000 Units Subcutaneous [facility] Mingo Russell MD   5,000 Units at 08/29/19 0622    HYDROmorphone (DILAUDID) injection 0 5 mg  0 5 mg Intravenous Q4H PRN Ernestina Wills MD   0 5 mg at 08/26/19 1711    insulin lispro (HumaLOG) 100 units/mL subcutaneous injection 1-6 Units  1-6 Units Subcutaneous TID AC Ernestina Wills MD   2 Units at 08/28/19 1710    levalbuterol (Hulen Balm) inhalation solution 1 25 mg  1 25 mg Nebulization TID Mingo Russell MD   1 25 mg at 08/29/19 0715    melatonin tablet 9 mg  9 mg Oral HS PRN Fidel Mon PA-C   9 mg at 08/28/19 2106    montelukast (SINGULAIR) tablet 10 mg  10 mg Oral HS Saul Anderson MD   10 mg at 08/28/19 2106    ondansetron (ZOFRAN) injection 4 mg  4 mg Intravenous Q6H PRN Ernestina Wills MD        pramipexole (MIRAPEX) tablet 0 5 mg  0 5 mg Oral TID Ernestina Wills MD   0 5 mg at 08/28/19 2106    predniSONE tablet 10 mg  10 mg Oral Daily Ernestina Wills MD   10 mg at 08/28/19 0901    rivastigmine (EXELON) capsule 1 5 mg  1 5 mg Oral BID Florinda Durham MD   1 5 mg at 08/28/19 1709    sodium chloride 0 9 % inhalation solution 3 mL  3 mL Nebulization TID Ashanti Contreras MD   3 mL at 08/29/19 0715    tamsulosin (FLOMAX) capsule 0 4 mg  0 4 mg Oral Daily With Dinner NORBERTO Ventura   0 4 mg at 08/28/19 1709    tiotropium (SPIRIVA) capsule for inhaler 18 mcg  18 mcg Inhalation Daily Florinda Durham MD   18 mcg at 08/28/19 0902      Medications Prior to Admission   Medication    acetaminophen (TYLENOL) 325 mg tablet    albuterol (2 5 mg/3 mL) 0 083 % nebulizer solution    albuterol (VENTOLIN HFA) 90 mcg/act inhaler    atorvastatin (LIPITOR) 10 mg tablet    carbidopa-levodopa (SINEMET)  mg per tablet    clopidogrel (PLAVIX) 75 mg tablet    fluticasone-salmeterol (ADVAIR DISKUS) 250-50 mcg/dose inhaler    irbesartan-hydrochlorothiazide (AVALIDE) 150-12 5 MG per tablet    Melatonin 5 MG TABS    montelukast (SINGULAIR) 10 mg tablet    pramipexole (MIRAPEX) 0 5 mg tablet    predniSONE 10 mg tablet    rivastigmine (EXELON) 1 5 mg capsule    terbinafine (LamISIL) 1 % cream    tiotropium (SPIRIVA HANDIHALER) 18 mcg inhalation capsule    triamcinolone (KENALOG) 0 5 % cream       Labs:  Results from last 7 days   Lab Units 08/28/19  0540 08/27/19  0445 08/26/19  1327   WBC Thousand/uL 8 27 9 84 10 51*   HEMOGLOBIN g/dL 12 1 12 4 13 4   HEMATOCRIT % 36 9 38 0 41 8   PLATELETS Thousands/uL 191 206 229   NEUTROS PCT % 76* 70 82*   LYMPHS PCT % 12* 13* 7*   MONOS PCT % 9 12 9   EOS PCT % 3 4 1     Results from last 7 days   Lab Units 08/28/19  0540 08/27/19  0445 08/26/19  1327   POTASSIUM mmol/L 3 9 3 9 3 4*   CHLORIDE mmol/L 105 102 101   CO2 mmol/L 24 25 28   BUN mg/dL 19 33* 40*   CREATININE mg/dL 0 95 1 14 1 59*   CALCIUM mg/dL 8 3 8 6 9 5     Lab Results   Component Value Date    CKMB 4 4 08/27/2019    [facility] 4 8 08/26/2019    CKTOTAL 563 (H) 08/27/2019    CKTOTAL 497 (H) 08/26/2019     Results from last 7 days   Lab Units 08/27/19  0445 08/26/19  1327   INR  1 11 1 13     Lab Results   Component Value Date    BLOODCX No Growth After 5 Days  12/20/2017    BLOODCX No Growth After 5 Days  12/20/2017       Imaging:  No results found for this or any previous visit  Results for orders placed during the hospital encounter of 08/26/19   XR chest 2 views    Narrative CHEST     INDICATION:   cough,  COMPARISON:  Chest x-ray 8/17/2018    EXAM PERFORMED/VIEWS:  XR CHEST PA & LATERAL      FINDINGS:    Cardiomediastinal silhouette appears unremarkable  There is stable interstitial prominence bilaterally without new focal airspace consolidation  Osseous structures appear within normal limits for patient age  Impression Stable interstitial prominence bilaterally without new focal airspace consolidation  Workstation performed: [id number]         VTE Pharmacologic Prophylaxis: Heparin      Code Status:   Level 3 - DNAR and DNI    Assessment:  Principal Problem:    Ambulatory dysfunction  Active Problems:    Parkinson's disease ([address] )    Neurologic gait dysfunction    Type 2 diabetes mellitus (HCC)    Chronic obstructive pulmonary disease (HCC)    Cerebrovascular disease    Brain lesion    Acute kidney injury ([address] )    Hypokalemia    Essential hypertension    Gross hematuria  Resolved Problems:    * No resolved hospital problems  *      Plan:  Patient remains medically stable for discharge to subacute rehab  Please see discharge summary that was completed August 28th for full details of this admission  There have been no changes over night      Chloe Harris MD  8/29/2019,8:53 AM

## 2019-08-29 NOTE — SOCIAL WORK
LOS 2  GMLOS 3 9  CM received report this AM that  Patient refused to go to [place] at [facility] after transportation came passed scheduled transport time of 6PM   Per [facility] patient is cleared for discharge today  CM scheduled stretcher transport for 10:30AM today  Patient is informed of transport time of 10:30AM today  CM spoke to dtr-Nisreen who was informed of transport time for 10:30AM today  Nurse and [facility] notified

## 2019-08-30 ENCOUNTER — TELEPHONE (OUTPATIENT)
Dept: UROLOGY | Facility: MEDICAL CENTER | Age: 84
End: 2019-08-30

## 2019-08-30 NOTE — TELEPHONE ENCOUNTER
Spoke with Veronica Hill and scheduled for 9/13/19 at 10:30am Yonatan Ford in [facility]

## 2019-08-30 NOTE — TELEPHONE ENCOUNTER
[facility]  Seen in hospital in [facility]  Needs 4 week follow up appointment in [facility]  None found prior to end of October    Please call to assist   Finesse Conway or Zenaida at [address] [facility] can be reached at [phone]

## 2019-08-30 NOTE — UTILIZATION REVIEW
Notification of Discharge  This is a Notification of Discharge from our facility [address]  Please be advised that this patient has been discharge from our facility  Below you will find the admission and discharge date and time including the patients disposition  PRESENTATION DATE: 8/26/2019 12:22 PM  OBS ADMISSION DATE: 08/26/2019  IP ADMISSION DATE: 8/27/19 0823   DISCHARGE DATE: 8/29/2019 11:24 AM  DISPOSITION: Non [facility] SN[facility]/TCU/SNU Non [facility] SNF/TCU/SNU   Admission Orders listed below:  Admission Orders (From admission, onward)     Ordered        08/27/19 0823  Inpatient Admission  Once         08/26/19 1618  Place in Observation (expected length of stay for this patient is less than two midnights)  Once                   Please contact the UR Department if additional information is required to close this patient's authorization/case  [address]  Utilization Review Department  Phone: [phone]; Fax [phone]  [email or web address]  ATTENTION: Please call with any questions or concerns to [phone]  and carefully listen to the prompts so that you are directed to the right person  Send all requests for admission clinical reviews, approved or denied determinations and any other requests to fax [phone]   All voicemails are confidential

## 2019-09-03 NOTE — ED PROVIDER NOTES
H&P Exam - Trauma   Trinda Jerel 81 y o  male MRN: [id number]  Unit/Bed#: /-01 Encounter: [id number]    Assessment/Plan   Trauma Alert: Trauma Acuity: Trauma Evaluation  Model of Arrival:   via    Trauma Team: Current Providers  Attending Provider: Jennyfer Palomo MD  Attending Provider: Evelina Scherer MD  Attending Provider: Den Roman MD  Attending Provider: Ahmet Guaman MD  Registered Nurse: Debbie Wiseman RN  ED Technician: Margaret Haynes  Registered Nurse: Terell Strong RN  Consulting Physician: Liberty Plaza MD  Patient Care Technician: Kenyon Spears  Registered Nurse: Adrianne Hutton RN  Registered Nurse: Harris Enamorado RN  Unit Clerk: Kayla Gutiérrez  Respiratory Therapist: Rayna Morrell, RT  Patient Care Technician: Kenyon Spears  Patient Care Assistant: Ivonne Cates  Patient Care Assistant: Ivonne Cates  Respiratory Therapist: RT Carolyn  Registered Nurse: Terell Strong RN  Unit Clerk: Holly Johnson  Patient Care Technician: Kenyon Spears  Physical Therapist: Monica Dacosta, PT  Consulting Physician: Michelle Marsh, [address]  Consulting Physician: Chin Rinaldi MD  Consulting Physician: Dutch Goodwin MD  Patient Care Assistant: Sonal Ngo  Unit Clerk: Henry Lance  Registered Nurse: Malissa Ward RN  Patient Care Assistant: Ivonne Cates  Patient Care Assistant:  Ivonne Cates  Graduate Nurse: Ben Borjas  Unit Clerk: Henry Lance  Registered Nurse: Travon Hook RN  Unit Clerk: Nilesh Salomon  Patient Care Assistant: Nery Hernandez  Respiratory Therapist: Tasia Romero, RT  Patient Care Assistant: Nilesh aSlomon  Patient Care Technician: Kenyon Spears  Unit Clerk: Kayla Gutiérrez  Graduate Nurse: Ben Borjas  Unit Clerk: Kayla Gutiérrez  Registered Nurse: Malissa Ward RN  Patient Care Assistant: Patricia Wick  Patient Care Assistant: [place] Amaro  Respiratory Therapist: Tasia Romero, RT  Registered Nurse: Radha Baldwin, RN  Patient Care Assistant: Timi Gagnon  Registered Nurse: Sesar Torre, RN  Unit Clerk: Rudolph Thompson  Physical Therapist: Noel Gutierrez PT  Consultants: None    Trauma Active Problems:   Fall    Trauma Plan:   [address]    Chief Complaint:   Chief Complaint   Patient presents with    Fall     fell this morning, been having problems with falling lately, unwitnessed and has a red spot on back of head, hx of brain tumor       History of Present Illness   HPI:  Wero Bautista is a 81 y o  male who presents for evaluation following fall  Patient states that he fell while in the bathroom this morning and that he was unable to get up  He denies any head injury or loss of consciousness  He believes he was on the floor for about 3 hours before family found him  Family is unsure if patient's history is accurate and they are concerned he may have been there for longer than 3 hours  They believe he was changing his pants and fell after losing his balance, as they found him with new pants partly on  They state that patient has issues with his memory and that they have instructed him not to change without help due to his instability  Daughters live in the same neighborhood as the patient and act as his primary care takers  On ROS daughters mention that patient does have occasional urinary incontinence  On presentation he is unable to void, though bladder scan reveals >1000 mL  He denies abdominal pain  Mechanism:Details of Incident: fall, unwitnessed, found on floor  unknown length of time  Injury Date: 08/26/19        HPI  Review of Systems   Constitutional: Negative for chills and fever  Respiratory: Negative for shortness of breath  Gastrointestinal: Negative for abdominal pain, nausea and vomiting  Genitourinary: Positive for difficulty urinating  Musculoskeletal: Positive for gait problem  Negative for arthralgias, joint swelling and neck pain  Skin: Negative for rash and wound  Allergic/Immunologic: Negative for immunocompromised state  Neurological: Negative for syncope, light-headedness and headaches  Psychiatric/Behavioral: The patient is not nervous/anxious  All other systems reviewed and are negative        Historical Information     Immunizations:   Immunization History   Administered Date(s) Administered    INFLUENZA 11/19/2007, 09/22/2009, 12/02/2011, 10/11/2013, 10/23/2015, 11/21/2017    Influenza Split High Dose Preservative Free IM 10/21/2014, 10/23/2015, 11/21/2017    Influenza TIV (IM) 11/19/2007, 11/10/2008, 09/22/2009, 12/02/2011, 10/11/2013    Pneumococcal Conjugate 13-Valent 04/27/2016    Pneumococcal Polysaccharide PPV23 1936, 06/09/2017    Tdap 1935, 04/27/2016    Zoster 1935, 09/23/2015       Past Medical History:   Diagnosis Date    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) ([facility])     Allergic rhinitis     last assessed 17Dec2013    Aortic regurgitation     Arm laceration     Asthma     Bronchitis, chronic obstructive, with exacerbation ([facility] )     last assessed 12Jun2015    Candidal intertrigo     Chronic obstructive lung disease ([facility] )     last assessed 17Dec2013    COPD (chronic obstructive pulmonary disease) ([facility])     Coronary artery disease     carotid stents    Cough     CVA (cerebral vascular accident) ([facility] )     Dermatitis     Diabetes mellitus type 2, uncomplicated ([address] )     controlled    Dysthymic disorder     Fatigue     Hyperlipidemia     Hypertension     Neurologic gait dysfunction     Parkinson's disease ([address] )     Pneumonia     PVD (peripheral vascular disease) ([facility]     Seborrheic keratosis     TIA (transient ischemic attack)     Tinea corporis     Tinea pedis of both feet     Tremor      Family History   Problem Relation Age of Onset    No Known Problems Mother         Old Age    COPD Sister     Lung cancer Sister     Asthma Family      Past Surgical History:   Procedure Laterality Date    NASAL SINUS SURGERY      MI BRONCHOSCOPY,DIAGNOSTIC N/A 2016    Procedure: BRONCHOSCOPY;  Surgeon: Martha Styles MD;  [facility]; Service: Pulmonary     Social History     Socioeconomic History    Marital status: /Civil Union     Spouse name: None    Number of children: None    Years of education: None    Highest education level: None   Occupational History    Occupation: retired   Social Needs    Financial resource strain: None    Food insecurity:     Worry: None     Inability: None    Transportation needs:     Medical: None     Non-medical: None   Tobacco Use    Smoking status: Former Smoker     Packs/day: 1      Years: 3 00     Pack years: 3 00     Types: Cigarettes     Last attempt to quit:      Years since quittin 6    Smokeless tobacco: Never Used   Substance and Sexual Activity    Alcohol use: Yes     Frequency: Monthly or less     Drinks per session: 1 or 2     Binge frequency: Never     Comment: rarely    Drug use: No    Sexual activity: Never   Lifestyle    Physical activity:     Days per week: 0 days     Minutes per session: 0 min    Stress: Not at all   Relationships    Social connections:     Talks on phone: None     Gets together: None     Attends [employer] service: None     Active member of club or organization: None     Attends meetings of clubs or organizations: None     Relationship status: None    Intimate partner violence:     Fear of current or ex partner: None     Emotionally abused: None     Physically abused: None     Forced sexual activity: None   Other Topics Concern    None   Social History Narrative    Lives independently with spouse    No advance directives       Family History: non-contributory    Meds/Allergies   Prior to Admission Medications   Prescriptions Last Dose Informant Patient Reported? Taking?    Melatonin 5 MG TABS   Yes Yes   Sig: Take by mouth daily at bedtime as needed   acetaminophen (TYLENOL) 325 mg tablet  Child Yes Yes   Sig: Take 650 mg by mouth every 6 (six) hours as needed for mild pain   albuterol (2 5 mg/3 mL) 0 083 % nebulizer solution   No Yes   Sig: TAKE 1 VIAL (2 5 MG TOTAL) BY NEBULIZATION EVERY 4 (FOUR) HOURS AS NEEDED FOR WHEEZING   albuterol (VENTOLIN HFA) 90 mcg/act inhaler  Child No Yes   Sig: Inhale 2 puffs every 6 (six) hours as needed for wheezing   atorvastatin (LIPITOR) 10 mg tablet  Child No Yes   Sig: TAKE 1 TABLET ONCE DAILY   carbidopa-levodopa (SINEMET)  mg per tablet  Child No Yes   Sig: Take 1 tablet by mouth 3 (three) times a day   clopidogrel (PLAVIX) 75 mg tablet  Child No Yes   Sig: Take 1 tablet (75 mg total) by mouth daily   fluticasone-salmeterol (ADVAIR DISKUS) 250-50 mcg/dose inhaler  Child No Yes   Sig: Inhale 1 puff 2 (two) times a day Rinse mouth after use   irbesartan-hydrochlorothiazide (AVALIDE) 150-12 5 MG per tablet  Child No Yes   Sig: Take 1 tablet by mouth daily   montelukast (SINGULAIR) 10 mg tablet  Child No Yes   Sig: Take 1 tablet (10 mg total) by mouth daily at bedtime   pramipexole (MIRAPEX) 0 5 mg tablet  Child No Yes   Sig: Take 1 tablet (0 5 mg total) by mouth 3 (three) times a day   predniSONE 10 mg tablet  Child No Yes   Sig: TAKE 1 TABLET BY MOUTH EVERY DAY   rivastigmine (EXELON) 1 5 mg capsule  Child No Yes   Sig: Take 1 capsule (1 5 mg total) by mouth 2 (two) times a day   terbinafine (LamISIL) 1 % cream  Child Yes Yes   Sig: Apply topically 2 (two) times a day   tiotropium (SPIRIVA HANDIHALER) 18 mcg inhalation capsule  Child No Yes   Sig: Place 1 capsule (18 mcg total) into inhaler and inhale daily [address] at the same time every day   triamcinolone (KENALOG) 0 5 % cream  Child No Yes   Sig: Apply topically 2 (two) times a day as needed for rash      Facility-Administered Medications: None       Allergies   Allergen Reactions    Penicillins Syncope       PHYSICAL EXAM    PE limited by: not limited    Objective   Vitals:   First set: Temperature: 98 6 °F (37 °C) (08/26/19 1221)  Pulse: 81 (08/26/19 1221)  Respirations: 15 (08/26/19 1221)  Blood Pressure: 128/61 (08/26/19 1221)  SpO2: 97 % (08/26/19 1221)    Primary Survey:   (A) Airway: intact  (B) Breathing: clear bilaterally  (C) Circulation: Pulses:   normal  (D) Disabliity:  GCS Total:  15  (E) Expose:  Completed    Secondary Survey: (Click on Physical Exam tab above)  Physical Exam   Constitutional: He appears well-nourished  No distress  HENT:   Head: Normocephalic and atraumatic  Eyes: EOM are normal    Neck: Normal range of motion  Neck supple  Cardiovascular: Normal rate and regular rhythm  Pulmonary/Chest: Effort normal and breath sounds normal  No respiratory distress  Abdominal: Soft  He exhibits no distension  There is no tenderness  Musculoskeletal: Normal range of motion  Neurological: He is alert  Not initially oriented to time  However, 1 hour after corrected patient recalls accurate date  Skin: Skin is warm and dry  He is not diaphoretic  Psychiatric: He has a normal mood and affect  His behavior is normal    Nursing note and vitals reviewed        Invasive Devices     Drain            Urethral Catheter 16 Fr  7 days                Lab Results:   Results Reviewed     Procedure Component Value Units Date/Time    Urine Microscopic [[id number]]  (Abnormal) Collected:  08/26/19 1519    Lab Status:  Final result Specimen:  Urine, Straight Cath Updated:  08/26/19 1546     RBC, UA 4-10 /hpf      WBC, UA None Seen /hpf      Epithelial Cells Occasional /hpf      Bacteria, UA Occasional /hpf     UA w Reflex to Microscopic w Reflex to Culture [[id number]]  (Abnormal) Collected:  08/26/19 1519    Lab Status:  Final result Specimen:  Urine, Straight Cath Updated:  08/26/19 1536     Color, UA Yellow     Clarity, UA Clear     Specific Gravity, UA >=1 030     pH, UA 5 5     Leukocytes, UA Negative     Nitrite, UA Negative     Protein, UA 30 (1+) mg/dl      Glucose,  (1/10%) mg/dl Ketones, UA Negative mg/dl      Urobilinogen, UA 0 2 E U /dl      Bilirubin, UA Negative     Blood, UA Moderate    CKMB [[id number]]  (Normal) Collected:  08/26/19 1327    Lab Status:  Final result Specimen:  Blood from Arm, Right Updated:  08/26/19 1430     CK-MB Index 1 0 %      CK-MB 4 8 ng/mL     Basic metabolic panel [[id number]]  (Abnormal) Collected:  08/26/19 1327    Lab Status:  Final result Specimen:  Blood from Arm, Right Updated:  08/26/19 1429     Sodium 138 mmol/L      Potassium 3 4 mmol/L      Chloride 101 mmol/L      CO2 28 mmol/L      ANION GAP 9 mmol/L      BUN 40 mg/dL      Creatinine 1 59 mg/dL      Glucose 156 mg/dL      Calcium 9 5 mg/dL      eGFR 40 ml/min/1 73sq m     Narrative:       [place] guidelines for Chronic Kidney Disease (CKD):     Stage 1 with normal or high GFR (GFR > 90 mL/min/1 73 square meters)    Stage 2 Mild CKD (GFR = 60-89 mL/min/1 73 square meters)    Stage 3A Moderate CKD (GFR = 45-59 mL/min/1 73 square meters)    Stage 3B Moderate CKD (GFR = 30-44 mL/min/1 73 square meters)    Stage 4 Severe CKD (GFR = 15-29 mL/min/1 73 square meters)    Stage 5 End Stage CKD (GFR <15 mL/min/1 73 square meters)  Note: GFR calculation is accurate only with a steady state creatinine    CK (with reflex to MB) [[id number]]  (Abnormal) Collected:  08/26/19 1327    Lab Status:  Final result Specimen:  Blood from Arm, Right Updated:  08/26/19 1429     Total  U/L     APTT [[id number]]  (Normal) Collected:  08/26/19 1327    Lab Status:  Final result Specimen:  Blood from Arm, Right Updated:  08/26/19 1406     PTT 28 seconds     Protime-INR [[id number]]  (Abnormal) Collected:  08/26/19 1327    Lab Status:  Final result Specimen:  Blood from Arm, Right Updated:  08/26/19 1406     Protime 14 6 seconds      INR 1 13    CBC and differential [[id number]]  (Abnormal) Collected:  08/26/19 1327    Lab Status:  Final result Specimen:  Blood from Arm, Right Updated:  08/26/19 1347     WBC 10 51 Thousand/uL      RBC 4 34 Million/uL      Hemoglobin 13 4 g/dL      Hematocrit 41 8 %      MCV 96 fL      MCH 30 9 pg      MCHC 32 1 g/dL      RDW 12 7 %      MPV 9 6 fL      Platelets 037 Thousands/uL      nRBC 0 /100 WBCs      Neutrophils Relative 82 %      Immat GRANS % 1 %      Lymphocytes Relative 7 %      Monocytes Relative 9 %      Eosinophils Relative 1 %      Basophils Relative 0 %      Neutrophils Absolute 8 62 Thousands/µL      Immature Grans Absolute 0 05 Thousand/uL      Lymphocytes Absolute 0 78 Thousands/µL      Monocytes Absolute 0 94 Thousand/µL      Eosinophils Absolute 0 08 Thousand/µL      Basophils Absolute 0 04 Thousands/µL                  Imaging Studies:   Direct to CT: No  US kidney and bladder   Final Result by Jim Salguero MD (08/27 1937)         1  Simple cyst within the right kidney  No hydronephrosis  2  [place] catheter in place decompressing the bladder and limiting its evaluation  No gross abnormality  Workstation performed: [id number]         XR chest 2 views   Final Result by Giovanny Hughes MD (08/26 1601)      Stable interstitial prominence bilaterally without new focal airspace consolidation  Workstation performed: [id number]         TRAUMA - CT head wo contrast   Final Result by Margaret Abreu MD (08/26 1334)      No evidence of acute intracranial process or significant interval change  Chronic microangiopathy  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 with gas fluid level in the right maxillary sinus unchanged since August 17, 2019  3 3 cm left frontal extra-axial meningioma unchanged since August 17, 2019                    Workstation performed: [id number]             Other Studies: none    Code Status: Prior  Advance Directive and Living Will:      Power of :    POLST:      Procedures  Procedures         ED Course         MDM    Disposition  [facility] Transfer: No  Final diagnoses:   ANGELA (acute kidney injury) ([place] [address] )   Urinary retention   Frequent falls     Time reflects when diagnosis was documented in both MDM as applicable and the Disposition within this note     Time User Action Codes Description Comment    8/26/2019  4:17 PM Zwiebel Yajaira Add [N17 9] ANGELA (acute kidney injury) ([facility] )     8/26/2019  4:17 PM Zwiebel Yajaira Add [R33 9] Urinary retention     8/26/2019  4:17 PM Zwiebel Yajaira Add [R29 6] Frequent falls     8/27/2019  8:25 AM Degler, Brendolyn Money A Add [G20] Parkinson's disease ([place] [address] )     8/27/2019  8:25 AM Degler, Brendolyn Money A Add [R26 9] Neurologic gait dysfunction     8/27/2019  8:25 AM Degler, Brendolyn Money A Add [I73 9] Peripheral vascular disease ([place] [address] )     8/27/2019  8:25 AM Degler, Brendolyn Money A Remove [I73 9] Peripheral vascular disease ([place] [address] )     8/27/2019  8:25 AM Degler, Brendolyn Money A Add [I67 9] Cerebrovascular disease     8/27/2019  8:25 AM Degler, Brendolyn Money A Add [G93 9] Brain lesion     8/28/2019  1:48 PM Degler, Brendolyn Money A Add [E11 9] Type 2 diabetes mellitus without complication, without long-term current use of insulin ([place] [address] )     8/28/2019  1:48 PM Degler, Brendolyn Money A Add [J42] Chronic bronchitis, unspecified chronic bronchitis type [facility])       ED Disposition     ED Disposition Condition Date/Time Comment    Admit Stable Mon Aug 26, 2019  4:17 PM Case was discussed with Dr Kevin Dunlap and the patient's admission status was agreed to be Admission Status: observation status to the service of Dr Kevin Dunlap MD Documentation      Most Recent Value   Accepting Facility Name, [address] [place] [address] Drive at [facility] by [employer] #)  PMR      RN Documentation      Most [address] [facility] [address] Name, [address] [place] [address] Drive at [facility] by [facility] #)  PMR      Follow-up Information     Follow up With Specialties Details Why Contact Info Additional [address] [facility] [address] [place] [facility] Schedule an appointment as soon as possible for a visit in 4 week(s) Urology--our service will contact patient/caregiver with hospital follow-up appointment date and time   [address]  [facility] For [facility]rology [facility], [address],  [address], [facility], [place], [address], MD Internal Medicine, Palliative Care Schedule an appointment as soon as possible for a visit in 1 week(s)  [facility] [address], [address] [address]  [phone]           Discharge Medication List as of 8/29/2019 10:25 AM      START taking these medications    Details   guaiFENesin (MUCINEX) 600 mg 12 hr tablet Take 1 tablet (600 mg total) by mouth every 12 (twelve) hours, Starting Wed 8/28/2019, No Print      insulin lispro (HumaLOG) 100 units/mL injection Inject 1-6 Units under the skin 3 (three) times a day before meals, Starting Wed 8/28/2019, No Print      levalbuterol (XOPENEX) 1 25 mg/0 5 mL nebulizer solution Take 0 5 mL (1 25 mg total) by nebulization 3 (three) times a day, Starting Wed 8/28/2019, No Print      tamsulosin (FLOMAX) 0 4 mg Take 1 capsule (0 4 mg total) by mouth daily with dinner, Starting Wed 8/28/2019, No Print         CONTINUE these medications which have NOT CHANGED    Details   acetaminophen (TYLENOL) 325 mg tablet Take 650 mg by mouth every 6 (six) hours as needed for mild pain, Historical Med      carbidopa-levodopa (SINEMET)  mg per tablet Take 1 tablet by mouth 3 (three) times a day, Starting Thu 2/14/2019, Normal      clopidogrel (PLAVIX) 75 mg tablet Take 1 tablet (75 mg total) by mouth daily, Starting Thu 11/15/2018, Normal      fluticasone-salmeterol (ADVAIR DISKUS) 250-50 mcg/dose inhaler Inhale 1 puff 2 (two) times a day Rinse mouth after use, Starting Mon 6/17/2019, Normal      irbesartan-hydrochlorothiazide (AVALIDE) 150-12 5 MG per tablet Take 1 tablet by mouth daily, Starting Mon 10/29/2018, Normal      Melatonin 5 MG TABS Take by mouth daily at bedtime as needed, Historical Med      montelukast (SINGULAIR) 10 mg tablet Take 1 tablet (10 mg total) by mouth daily at bedtime, Starting Fri 8/9/2019, Normal      pramipexole (MIRAPEX) 0 5 mg tablet Take 1 tablet (0 5 mg total) by mouth 3 (three) times a day, Starting Thu 2/14/2019, Normal      predniSONE 10 mg tablet TAKE 1 TABLET BY MOUTH EVERY DAY, Normal      rivastigmine (EXELON) 1 5 mg capsule Take 1 capsule (1 5 mg total) by mouth 2 (two) times a day, Starting Fri 5/31/2019, Normal      terbinafine (LamISIL) 1 % cream Apply topically 2 (two) times a day, Historical Med      tiotropium (SPIRIVA HANDIHALER) 18 mcg inhalation capsule Place 1 capsule (18 mcg total) into inhaler and inhale daily Via Handihaler at the same time every day, Starting Thu 11/15/2018, Normal      triamcinolone (KENALOG) 0 5 % cream Apply topically 2 (two) times a day as needed for rash, Starting Mon 4/15/2019, Normal         STOP taking these medications       albuterol (2 5 mg/3 mL) 0 083 % nebulizer solution Comments:   Reason for Stopping:         albuterol (VENTOLIN HFA) 90 mcg/act inhaler Comments:   Reason for Stopping:         atorvastatin (LIPITOR) 10 mg tablet Comments:   Reason for Stopping:             Outpatient Discharge Orders   Discharge Diet     Discharge Condition:  Improving     Patient Aware of Diagnosis: Yes     Free of Communicable Disease:   Yes     Physical Therapy Eval And Treat     Occupational Therapy Eval and Treat     Activity:  As Tolerated     No dressing needed         ED Provider  Electronically Signed by         Teresa James MD  09/02/19 [id number]

## 2019-09-05 DIAGNOSIS — I10 ESSENTIAL HYPERTENSION: ICD-10-CM

## 2019-09-05 RX ORDER — IRBESARTAN AND HYDROCHLOROTHIAZIDE 150; 12.5 MG/1; MG/1
TABLET, FILM COATED ORAL
Qty: 90 TABLET | Refills: 3 | Status: SHIPPED | OUTPATIENT
Start: 2019-09-05 | End: 2020-05-28 | Stop reason: SDUPTHER

## 2019-09-12 NOTE — PROGRESS NOTES
There are no diagnoses linked to this encounter  Assessment and plan:       1  Urinary retention  - Patient was found down with a retained volume of 1 L  This was drained with straight catheterization in the hospital and the patient subsequently had episodes of incontinence  Whiteside catheter was placed during his hospitalization which he continues to have today  - Catheter draining well with clear yellow urine   - We discussed having a void trial performed  Patient was started on tamsulosin during his hospitalization  The family is aware that this can cause lightheadedness and lead to falls  Patient does have a diagnosis of Parkinson's and does have a tendency to fall at baseline  We discussed continuing this medication until voiding trial and cystoscopy to increase his chances of passing a voiding trial   We also discussed that this medication is probably not a good long-term solution for him  - Plan will be for the patient to have voiding trial performed next week by nursing staff at our office   - If PVR >250mL, Whiteside catheter should be replaced and patient should follow up in one month for voiding trial and cystoscopy  If PVR <150mL, patient should return in one month for PVR and cystoscopy  If PVR >150 mL but <250 mL, he should return in one week for repeat PVR  - We discussed the Urolift as a minimally invasive bladder outlet obstruction procedure that would be recommended over the TURP for a patient with Parkinson's disease  The patient is family realize that he needs to undergo further workup to determine whether he would be a good candidate for this procedure  When he returns for cystoscopy and transrectal ultrasound, further discussion can be had of long-term management          Dayanna Hennessy PA-C      Chief Complaint     Chief Complaint   Patient presents with    Urinary Retention    Gross Hematuria         History of Present Illness     Bao Kathleen is a 81 y o  male patient establishing care with [facility] after being seen in consultation on 08/27/2019 for urinary retention  He was found down in his living facility and found to have urinary retention of greater than 1 L of urine  This was removed by straight catheterization and patient had 2 episodes of urinary incontinence in the emergency department  Whiteside catheter was placed and patient initially had hematuria through the Whiteside catheter  Patient is accompanied by 3 of his daughters at today's visit  They are very knowledgeable about him as the patient cannot answer all of the questions  They report that he has been more tired than normal and asked if he was on any sleeping medications  I am not able to identify any medications for sleep on his current last     Patient does have the diagnosis of Parkinson's disease as well as a brain tumor  He complains today of "something in his head" but recognizes that I am not the physician to talk to him about this  He also complains of a cough  Laboratory     Lab Results   Component Value Date    CREATININE 0 95 08/28/2019       No results found for: PSA    No results found for this or any previous visit (from the past 1 hour(s))  Review of Systems     Review of Systems   Constitutional: Negative for chills and fever  Eyes: Negative  Respiratory: Positive for cough (productive)  Negative for shortness of breath  Cardiovascular: Negative for chest pain  Endocrine: Negative  Genitourinary: Whiteside catheter in place   Musculoskeletal:        Patient has Parkinson's                 Allergies     Allergies   Allergen Reactions    Penicillins Syncope       Physical Exam     Physical Exam   Constitutional: He is oriented to person, place, and time  He appears well-developed and well-nourished  No distress  HENT:   Head: Normocephalic and atraumatic     Right Ear: External ear normal    Left Ear: External ear normal    Nose: Nose normal  Eyes: Right eye exhibits no discharge  Left eye exhibits no discharge  No scleral icterus  Cardiovascular: Normal rate  Pulmonary/Chest: Effort normal    Genitourinary:   Genitourinary Comments: Catheter draining well with clear yellow urine  Minimal to moderate sediment present in the tube  Musculoskeletal:   Patient is in wheelchair   Neurological: He is alert and oriented to person, place, and time  Skin: Skin is warm and dry  He is not diaphoretic  Psychiatric: He has a normal mood and affect  His behavior is normal  Judgment and thought content normal    Nursing note and vitals reviewed          Vital Signs     Vitals:    09/13/19 1006   BP: 116/58   BP Location: Left arm   Patient Position: Sitting   Cuff Size: Standard   Weight: 82 1 kg (181 lb)   Height: 5' 4" (1 626 m)         Current Medications       Current Outpatient Medications:     acetaminophen (TYLENOL) 325 mg tablet, Take 650 mg by mouth every 6 (six) hours as needed for mild pain, Disp: , Rfl:     carbidopa-levodopa (SINEMET)  mg per tablet, Take 1 tablet by mouth 3 (three) times a day, Disp: 90 tablet, Rfl: 6    clopidogrel (PLAVIX) 75 mg tablet, Take 1 tablet (75 mg total) by mouth daily, Disp: 90 tablet, Rfl: 3    fluticasone-salmeterol (ADVAIR DISKUS) 250-50 mcg/dose inhaler, Inhale 1 puff 2 (two) times a day Rinse mouth after use, Disp: 1 Inhaler, Rfl: 5    guaiFENesin (MUCINEX) 600 mg 12 hr tablet, Take 1 tablet (600 mg total) by mouth every 12 (twelve) hours, Disp: , Rfl:     insulin lispro (HumaLOG) 100 units/mL injection, Inject 1-6 Units under the skin 3 (three) times a day before meals, Disp: , Rfl:     irbesartan-hydrochlorothiazide (AVALIDE) 150-12 5 MG per tablet, TAKE 1 TABLET BY MOUTH EVERY DAY, Disp: 90 tablet, Rfl: 3    levalbuterol (XOPENEX) 1 25 mg/0 5 mL nebulizer solution, Take 0 5 mL (1 25 mg total) by nebulization 3 (three) times a day, Disp: , Rfl:     Melatonin 5 MG TABS, Take by mouth daily at bedtime as needed, Disp: , Rfl:     montelukast (SINGULAIR) 10 mg tablet, Take 1 tablet (10 mg total) by mouth daily at bedtime, Disp: 90 tablet, Rfl: 3    pramipexole (MIRAPEX) 0 5 mg tablet, Take 1 tablet (0 5 mg total) by mouth 3 (three) times a day, Disp: 90 tablet, Rfl: 6    predniSONE 10 mg tablet, TAKE 1 TABLET BY MOUTH EVERY DAY, Disp: 30 tablet, Rfl: 5    rivastigmine (EXELON) 1 5 mg capsule, Take 1 capsule (1 5 mg total) by mouth 2 (two) times a day, Disp: 60 capsule, Rfl: 3    tamsulosin (FLOMAX) 0 4 mg, Take 1 capsule (0 4 mg total) by mouth daily with dinner, Disp: , Rfl:     terbinafine (LamISIL) 1 % cream, Apply topically 2 (two) times a day, Disp: , Rfl:     tiotropium (SPIRIVA HANDIHALER) 18 mcg inhalation capsule, Place 1 capsule (18 mcg total) into inhaler and inhale daily [address] at the same time every day, Disp: 90 capsule, Rfl: 3    triamcinolone (KENALOG) 0 5 % cream, Apply topically 2 (two) times a day as needed for rash, Disp: 30 g, Rfl: 1      Active Problems     Patient Active Problem List   Diagnosis    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) ([facility]    Allergic asthma    Allergic rhinitis    Aortic regurgitation    Bronchiectasis with acute lower respiratory infection ([facility]    Long term (current) use of systemic steroids    Hyperlipidemia    Hypertension    Parkinson's disease ([address]    Neurologic gait dysfunction    Type 2 diabetes mellitus ([facility]    Peripheral vascular disease ([address]    Chronic obstructive pulmonary disease ([address]    Cerebrovascular disease    Carotid artery disease ([address]    Varicose 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    H/O burning pain in leg    Brain lesion    Ambulatory dysfunction    Acute kidney injury ([address]    Hypokalemia    Essential hypertension    Gross hematuria         Past Medical History     Past Medical History:   Diagnosis Date    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) ([facility]     Allergic rhinitis     last assessed [id number]    Aortic regurgitation     Arm laceration     Asthma     Bronchitis, chronic obstructive, with exacerbation (HCC)     last assessed [id number]    Candidal intertrigo     Chronic obstructive lung disease ([facility] )     last assessed [id number]    COPD (chronic obstructive pulmonary disease) [facility]     Coronary artery disease     carotid stents    Cough     CVA (cerebral vascular accident) ([facility] )     Dermatitis     Diabetes mellitus type 2, uncomplicated ([place] [address] )     controlled    Dysthymic disorder     Fatigue     Hyperlipidemia     Hypertension     Neurologic gait dysfunction     Parkinson's disease ([facility] )     Pneumonia     PVD (peripheral vascular disease) ([place] [address] )     Seborrheic keratosis     TIA (transient ischemic attack)     Tinea corporis     Tinea pedis of both feet     Tremor          Surgical History     Past Surgical History:   Procedure Laterality Date    NASAL SINUS SURGERY      ME BRONCHOSCOPY,DIAGNOSTIC N/A 7/27/2016    Procedure: BRONCHOSCOPY;  Surgeon: Preston Duarte MD;  Location[facility];   Service: Pulmonary         Family History     Family History   Problem Relation Age of Onset    No Known Problems Mother         Old Age    COPD Sister     Lung cancer Sister     Asthma Family          Social History     Social History       Radiology

## 2019-09-13 ENCOUNTER — OFFICE VISIT (OUTPATIENT)
Dept: UROLOGY | Facility: CLINIC | Age: 84
End: 2019-09-13
Payer: COMMERCIAL

## 2019-09-13 VITALS
WEIGHT: 181 LBS | HEIGHT: 64 IN | DIASTOLIC BLOOD PRESSURE: 58 MMHG | SYSTOLIC BLOOD PRESSURE: 116 MMHG | BODY MASS INDEX: 30.9 KG/M2

## 2019-09-13 DIAGNOSIS — R33.9 URINARY RETENTION: Primary | ICD-10-CM

## 2019-09-13 PROCEDURE — 99204 OFFICE O/P NEW MOD 45 MIN: CPT | Performed by: PHYSICIAN ASSISTANT

## 2019-09-16 ENCOUNTER — TELEPHONE (OUTPATIENT)
Dept: UROLOGY | Facility: MEDICAL CENTER | Age: 84
End: 2019-09-16

## 2019-09-16 NOTE — TELEPHONE ENCOUNTER
Called and spoke to Desiree Hernandez  Made her aware that rollins catheter can be removed tomorrow AM around 8:00am  They are aware that morning appointment is being canceled and that patient can follow up as scheduled for his PM appointment for PVR/possible rollins insertion

## 2019-09-16 NOTE — TELEPHONE ENCOUNTER
Patient to be seen at [facility] office with Nursing staff  Scheduled at 9:30 and 3pm   Staff at [address] [place] [address] would like a call to confirm the catheter removal - by which staff is it to be removed  Please call to advise  They can be reached at [phone]- ask for [facility] or Anai Jimenez    Thank you

## 2019-09-17 ENCOUNTER — PROCEDURE VISIT (OUTPATIENT)
Dept: UROLOGY | Facility: CLINIC | Age: 84
End: 2019-09-17
Payer: COMMERCIAL

## 2019-09-17 VITALS — BODY MASS INDEX: 30.9 KG/M2 | WEIGHT: 181 LBS | HEIGHT: 64 IN

## 2019-09-17 DIAGNOSIS — R33.9 URINARY RETENTION: Primary | ICD-10-CM

## 2019-09-17 LAB — POST-VOID RESIDUAL VOLUME, ML POC: 175 ML

## 2019-09-17 PROCEDURE — 51798 US URINE CAPACITY MEASURE: CPT | Performed by: UROLOGY

## 2019-09-17 NOTE — PROGRESS NOTES
9/17/2019    Zach Martinez  1935  [id number]    Diagnosis  Chief Complaint     Urinary Retention          Patient presents for void trial managed by Dr Espinoza Wadsworth  Follow up for cysto with Franchesca Kirkland as scheduled    Procedure Rollins removal/voiding trial    Nursing home was given verbal orders to remove catheter today 9/17/19 at 8:00am  Patient came to the office with daughter/someone from nursing home who both reported that rollins was removed yesterday despite having orders to remove catheter today  Patient returned this afternoon  Patient states able to void  Patient voided 250 ml while in office  Bladder ultrasound performed and PVR measured 175ml      Recent Results (from the past 1 hour(s))   POCT Measure PVR    Collection Time: 09/17/19  3:00 PM   Result Value Ref Range    POST-VOID RESIDUAL VOLUME, ML  mL         Dagmar Sutton RN

## 2019-09-23 ENCOUNTER — TELEPHONE (OUTPATIENT)
Dept: INTERNAL MEDICINE CLINIC | Facility: CLINIC | Age: 84
End: 2019-09-23

## 2019-09-23 DIAGNOSIS — R30.0 DYSURIA: Primary | ICD-10-CM

## 2019-09-23 NOTE — TELEPHONE ENCOUNTER
He should be taking MiraLax every day with liquid in the morning and milk of magnesium every night until he moves his bowels  Then as needed  See if we can get him in this week  I will order a urine

## 2019-09-23 NOTE — TELEPHONE ENCOUNTER
Adam Johnson- a nurse  from [facility]  She's been working with the patient and his daughter, Kelsea Rojas since the patient has been in the hospital than [facility] then discharged home  Patient hasn't had a bowel movement since Thursday and is having burning with urination, urgency and now incontinence  - these are the reasons he was hospitalized for  He had a rollins removed around the 17th  He has home health coming in but no nursing so they will not collect a urine sample  He didn't come home with a stool softer on his med list      He does have an up coming appt on 10/4  Listed as Wellness/ Nursing home discharge  Adam Johnson doesn't think it's soon enough with his issues developing again  Please advise and call Kelsea Rojas- the daughter         Kylee: Jose Juan Campbell #[phone]

## 2019-09-26 ENCOUNTER — TELEPHONE (OUTPATIENT)
Dept: INTERNAL MEDICINE CLINIC | Facility: CLINIC | Age: 84
End: 2019-09-26

## 2019-09-26 ENCOUNTER — OFFICE VISIT (OUTPATIENT)
Dept: INTERNAL MEDICINE CLINIC | Facility: CLINIC | Age: 84
End: 2019-09-26
Payer: COMMERCIAL

## 2019-09-26 ENCOUNTER — APPOINTMENT (OUTPATIENT)
Dept: LAB | Facility: CLINIC | Age: 84
End: 2019-09-26
Payer: COMMERCIAL

## 2019-09-26 VITALS
HEIGHT: 64 IN | TEMPERATURE: 98.3 F | WEIGHT: 174 LBS | HEART RATE: 91 BPM | DIASTOLIC BLOOD PRESSURE: 56 MMHG | OXYGEN SATURATION: 96 % | BODY MASS INDEX: 29.71 KG/M2 | SYSTOLIC BLOOD PRESSURE: 114 MMHG

## 2019-09-26 DIAGNOSIS — I10 ESSENTIAL HYPERTENSION: ICD-10-CM

## 2019-09-26 DIAGNOSIS — N17.9 ACUTE KIDNEY INJURY (HCC): ICD-10-CM

## 2019-09-26 DIAGNOSIS — Z23 ENCOUNTER FOR IMMUNIZATION: ICD-10-CM

## 2019-09-26 DIAGNOSIS — R31.0 GROSS HEMATURIA: ICD-10-CM

## 2019-09-26 DIAGNOSIS — I67.9 CEREBROVASCULAR DISEASE: ICD-10-CM

## 2019-09-26 DIAGNOSIS — E11.9 TYPE 2 DIABETES MELLITUS WITHOUT COMPLICATION, WITHOUT LONG-TERM CURRENT USE OF INSULIN (HCC): ICD-10-CM

## 2019-09-26 DIAGNOSIS — J42 CHRONIC BRONCHITIS, UNSPECIFIED CHRONIC BRONCHITIS TYPE (HCC): ICD-10-CM

## 2019-09-26 DIAGNOSIS — G20 PARKINSON'S DISEASE (HCC): Primary | ICD-10-CM

## 2019-09-26 DIAGNOSIS — R26.9 NEUROLOGIC GAIT DYSFUNCTION: ICD-10-CM

## 2019-09-26 DIAGNOSIS — D72.829 LEUKOCYTOSIS, UNSPECIFIED TYPE: Primary | ICD-10-CM

## 2019-09-26 LAB
ALBUMIN SERPL BCP-MCNC: 2.9 G/DL (ref 3.5–5)
ALP SERPL-CCNC: 106 U/L (ref 46–116)
ALT SERPL W P-5'-P-CCNC: 21 U/L (ref 12–78)
ANION GAP SERPL CALCULATED.3IONS-SCNC: 7 MMOL/L (ref 4–13)
AST SERPL W P-5'-P-CCNC: 18 U/L (ref 5–45)
BASOPHILS # BLD MANUAL: 0 THOUSAND/UL (ref 0–0.1)
BASOPHILS NFR MAR MANUAL: 0 % (ref 0–1)
BILIRUB SERPL-MCNC: 0.26 MG/DL (ref 0.2–1)
BUN SERPL-MCNC: 31 MG/DL (ref 5–25)
CALCIUM SERPL-MCNC: 9.3 MG/DL (ref 8.3–10.1)
CHLORIDE SERPL-SCNC: 108 MMOL/L (ref 100–108)
CO2 SERPL-SCNC: 25 MMOL/L (ref 21–32)
CREAT SERPL-MCNC: 1.25 MG/DL (ref 0.6–1.3)
EOSINOPHIL # BLD MANUAL: 0.15 THOUSAND/UL (ref 0–0.4)
EOSINOPHIL NFR BLD MANUAL: 1 % (ref 0–6)
ERYTHROCYTE [DISTWIDTH] IN BLOOD BY AUTOMATED COUNT: 12.5 % (ref 11.6–15.1)
GFR SERPL CREATININE-BSD FRML MDRD: 53 ML/MIN/1.73SQ M
GIANT PLATELETS BLD QL SMEAR: PRESENT
GLUCOSE SERPL-MCNC: 142 MG/DL (ref 65–140)
HCT VFR BLD AUTO: 40.3 % (ref 36.5–49.3)
HGB BLD-MCNC: 12.8 G/DL (ref 12–17)
LYMPHOCYTES # BLD AUTO: 0.31 THOUSAND/UL (ref 0.6–4.47)
LYMPHOCYTES # BLD AUTO: 2 % (ref 14–44)
MCH RBC QN AUTO: 30.1 PG (ref 26.8–34.3)
MCHC RBC AUTO-ENTMCNC: 31.8 G/DL (ref 31.4–37.4)
MCV RBC AUTO: 95 FL (ref 82–98)
MONOCYTES # BLD AUTO: 0.77 THOUSAND/UL (ref 0–1.22)
MONOCYTES NFR BLD: 5 % (ref 4–12)
NEUTROPHILS # BLD MANUAL: 14.24 THOUSAND/UL (ref 1.85–7.62)
NEUTS BAND NFR BLD MANUAL: 1 % (ref 0–8)
NEUTS SEG NFR BLD AUTO: 91 % (ref 43–75)
NRBC BLD AUTO-RTO: 0 /100 WBCS
PLATELET # BLD AUTO: 334 THOUSANDS/UL (ref 149–390)
PLATELET BLD QL SMEAR: ADEQUATE
PMV BLD AUTO: 9.2 FL (ref 8.9–12.7)
POTASSIUM SERPL-SCNC: 4.2 MMOL/L (ref 3.5–5.3)
PROT SERPL-MCNC: 8 G/DL (ref 6.4–8.2)
RBC # BLD AUTO: 4.25 MILLION/UL (ref 3.88–5.62)
SODIUM SERPL-SCNC: 140 MMOL/L (ref 136–145)
WBC # BLD AUTO: 15.48 THOUSAND/UL (ref 4.31–10.16)

## 2019-09-26 PROCEDURE — 85027 COMPLETE CBC AUTOMATED: CPT

## 2019-09-26 PROCEDURE — 80053 COMPREHEN METABOLIC PANEL: CPT

## 2019-09-26 PROCEDURE — 36415 COLL VENOUS BLD VENIPUNCTURE: CPT

## 2019-09-26 PROCEDURE — 3078F DIAST BP <80 MM HG: CPT | Performed by: INTERNAL MEDICINE

## 2019-09-26 PROCEDURE — 3074F SYST BP LT 130 MM HG: CPT | Performed by: INTERNAL MEDICINE

## 2019-09-26 PROCEDURE — 85007 BL SMEAR W/DIFF WBC COUNT: CPT

## 2019-09-26 PROCEDURE — 90662 IIV NO PRSV INCREASED AG IM: CPT | Performed by: INTERNAL MEDICINE

## 2019-09-26 PROCEDURE — 99214 OFFICE O/P EST MOD 30 MIN: CPT | Performed by: INTERNAL MEDICINE

## 2019-09-26 PROCEDURE — G0008 ADMIN INFLUENZA VIRUS VAC: HCPCS | Performed by: INTERNAL MEDICINE

## 2019-09-26 NOTE — PROGRESS NOTES
Assessment/Plan:    Diagnoses and all orders for this visit:    Parkinson's disease ([facility] )    Acute kidney injury ([facility] )  -     CBC and differential; Future  -     Comprehensive metabolic panel; Future    Gross hematuria    Neurologic gait dysfunction    Cerebrovascular disease    Essential hypertension    Chronic bronchitis, unspecified chronic bronchitis type ([facility] )    Type 2 diabetes mellitus without complication, without long-term current use of insulin ([facility] )         Patient Instructions   Patient appears medically stable on present regimen, will recheck labs to follow-up kidney function and white blood cell count  100% use of walker for ambulation and do not walk unassisted stressed with patient  Continued home physical therapy  Family working on getting in-home assistance beyond that as well  Diabetes is under good control with A1c 7 1 with diet alone  Routine follow-up after labs next month, sooner as needed      Subjective:      Patient ID: Molly Jeffries is a 81 y o  male    Patient presents for hospital follow-up  He was admitted with acute kidney injury related to neurogenic bladder and obstructive uropathy with gross hematuria in addition to progressive gait dysfunction related to underlying Parkinson's disease and multiple comorbidities  He convalesced at [facility] at stress out until last week  Since discharge she has had 2 falls related to bathing when he would get up on his own without assist   There was no injury  He has home nursing and PT ordered but PT has not yet made it to the home  He had labs done while at the facility notable for leukocytosis 14,000  Patient is not having fevers chills or cough  He was seen by Urology on the 17th for voiding trial   Catheter was removed at 8:00 a m  And he did well, he has had some continued urinary incontinence, wearing depends, no further hematuria  Urology follow-up scheduled for cystoscopy          Current Outpatient Medications:    acetaminophen (TYLENOL) 325 mg tablet, Take 650 mg by mouth every 6 (six) hours as needed for mild pain, Disp: , Rfl:     carbidopa-levodopa (SINEMET)  mg per tablet, Take 1 tablet by mouth 3 (three) times a day, Disp: 90 tablet, Rfl: 6    clopidogrel (PLAVIX) 75 mg tablet, Take 1 tablet (75 mg total) by mouth daily, Disp: 90 tablet, Rfl: 3    fluticasone-salmeterol (ADVAIR DISKUS) 250-50 mcg/dose inhaler, Inhale 1 puff 2 (two) times a day Rinse mouth after use, Disp: 1 Inhaler, Rfl: 5    guaiFENesin (MUCINEX) 600 mg 12 hr tablet, Take 1 tablet (600 mg total) by mouth every 12 (twelve) hours, Disp: , Rfl:     irbesartan-hydrochlorothiazide (AVALIDE) 150-12 5 MG per tablet, TAKE 1 TABLET BY MOUTH EVERY DAY, Disp: 90 tablet, Rfl: 3    levalbuterol (XOPENEX) 1 25 mg/0 5 mL nebulizer solution, Take 0 5 mL (1 25 mg total) by nebulization 3 (three) times a day, Disp: , Rfl:     Melatonin 5 MG TABS, Take by mouth daily at bedtime as needed, Disp: , Rfl:     montelukast (SINGULAIR) 10 mg tablet, Take 1 tablet (10 mg total) by mouth daily at bedtime, Disp: 90 tablet, Rfl: 3    predniSONE 10 mg tablet, TAKE 1 TABLET BY MOUTH EVERY DAY, Disp: 30 tablet, Rfl: 5    tamsulosin (FLOMAX) 0 4 mg, Take 1 capsule (0 4 mg total) by mouth daily with dinner, Disp: , Rfl:     terbinafine (LamISIL) 1 % cream, Apply topically 2 (two) times a day, Disp: , Rfl:     tiotropium (SPIRIVA HANDIHALER) 18 mcg inhalation capsule, Place 1 capsule (18 mcg total) into inhaler and inhale daily Via Handihaler at the same time every day, Disp: 90 capsule, Rfl: 3    triamcinolone (KENALOG) 0 5 % cream, Apply topically 2 (two) times a day as needed for rash, Disp: 30 g, Rfl: 1    insulin lispro (HumaLOG) 100 units/mL injection, Inject 1-6 Units under the skin 3 (three) times a day before meals (Patient not taking: Reported on 9/26/2019), Disp: , Rfl:     pramipexole (MIRAPEX) 0 5 mg tablet, Take 1 tablet (0 5 mg total) by mouth 3 (three) times a day, Disp: 90 tablet, Rfl: 6    rivastigmine (EXELON) 1 5 mg capsule, Take 1 capsule (1 5 mg total) by mouth 2 (two) times a day, Disp: 60 capsule, Rfl: 3    Recent Results (from the past 1008 hour(s))   ECG 12 lead    Collection Time: 08/26/19 12:37 PM   Result Value Ref Range    Ventricular Rate 75 BPM    Atrial Rate 75 BPM    TN Interval 158 ms    QRSD Interval 110 ms    QT Interval 410 ms    QTC Interval 457 ms    P Axis 51 degrees    QRS Axis -25 degrees    T Wave Axis -3 degrees   CBC and differential    Collection Time: 08/26/19  1:27 PM   Result Value Ref Range    WBC 10 51 (H) 4 31 - 10 16 Thousand/uL    RBC 4 34 3 88 - 5 62 Million/uL    Hemoglobin 13 4 12 0 - 17 0 g/dL    Hematocrit 41 8 36 5 - 49 3 %    MCV 96 82 - 98 fL    MCH 30 9 26 8 - 34 3 pg    MCHC 32 1 31 4 - 37 4 g/dL    RDW 12 7 11 6 - 15 1 %    MPV 9 6 8 9 - 12 7 fL    Platelets 793 928 - 860 Thousands/uL    nRBC 0 /100 WBCs    Neutrophils Relative 82 (H) 43 - 75 %    Immat GRANS % 1 0 - 2 %    Lymphocytes Relative 7 (L) 14 - 44 %    Monocytes Relative 9 4 - 12 %    Eosinophils Relative 1 0 - 6 %    Basophils Relative 0 0 - 1 %    Neutrophils Absolute 8 62 (H) 1 85 - 7 62 Thousands/µL    Immature Grans Absolute 0 05 0 00 - 0 20 Thousand/uL    Lymphocytes Absolute 0 78 0 60 - 4 47 Thousands/µL    Monocytes Absolute 0 94 0 17 - 1 22 Thousand/µL    Eosinophils Absolute 0 08 0 00 - 0 61 Thousand/µL    Basophils Absolute 0 04 0 00 - 0 10 Thousands/µL   Basic metabolic panel    Collection Time: 08/26/19  1:27 PM   Result Value Ref Range    Sodium 138 136 - 145 mmol/L    Potassium 3 4 (L) 3 5 - 5 3 mmol/L    Chloride 101 100 - 108 mmol/L    CO2 28 21 - 32 mmol/L    ANION GAP 9 4 - 13 mmol/L    BUN 40 (H) 5 - 25 mg/dL    Creatinine 1 59 (H) 0 60 - 1 30 mg/dL    Glucose 156 (H) 65 - 140 mg/dL    Calcium 9 5 8 3 - 10 1 mg/dL    eGFR 40 ml/min/1 73sq m   APTT    Collection Time: 08/26/19  1:27 PM   Result Value Ref Range    PTT 28 23 - 37 seconds   Protime-INR    Collection Time: 08/26/19  1:27 PM   Result Value Ref Range    Protime 14 6 (H) 11 6 - 14 5 seconds    INR 1 13 0 84 - 1 19   CK (with reflex to MB)    Collection Time: 08/26/19  1:27 PM   Result Value Ref Range    Total  (H) 39 - 308 U/L   CKMB    Collection Time: 08/26/19  1:27 PM   Result Value Ref Range    CK-MB Index 1 0 0 0 - 2 5 %    CK-MB 4 8 0 0 - 5 0 ng/mL   UA w Reflex to Microscopic w Reflex to Culture    Collection Time: 08/26/19  3:19 PM   Result Value Ref Range    Color, UA Yellow     Clarity, UA Clear     Specific Gravity, UA >=1 030 1 003 - 1 030    pH, UA 5 5 4 5, 5 0, 5 5, 6 0, 6 5, 7 0, 7 5, 8 0    Leukocytes, UA Negative Negative    Nitrite, UA Negative Negative    Protein, UA 30 (1+) (A) Negative mg/dl    Glucose,  (1/10%) (A) Negative mg/dl    Ketones, UA Negative Negative mg/dl    Urobilinogen, UA 0 2 0 2, 1 0 E U /dl E U /dl    Bilirubin, UA Negative Negative    Blood, UA Moderate (A) Negative   Urine Microscopic    Collection Time: 08/26/19  3:19 PM   Result Value Ref Range    RBC, UA 4-10 (A) None Seen, 0-5 /hpf    WBC, UA None Seen None Seen, 0-5, 5-55, 5-65 /hpf    Epithelial Cells Occasional None Seen, Occasional /hpf    Bacteria, UA Occasional None Seen, Occasional /hpf   Fingerstick Glucose (POCT)    Collection Time: 08/26/19  5:47 PM   Result Value Ref Range    POC Glucose 213 (H) 65 - 140 mg/dl   Fingerstick Glucose (POCT)    Collection Time: 08/26/19  8:28 PM   Result Value Ref Range    POC Glucose 231 (H) 65 - 140 mg/dl   Basic metabolic panel    Collection Time: 08/27/19  4:45 AM   Result Value Ref Range    Sodium 135 (L) 136 - 145 mmol/L    Potassium 3 9 3 5 - 5 3 mmol/L    Chloride 102 100 - 108 mmol/L    CO2 25 21 - 32 mmol/L    ANION GAP 8 4 - 13 mmol/L    BUN 33 (H) 5 - 25 mg/dL    Creatinine 1 14 0 60 - 1 30 mg/dL    Glucose 111 65 - 140 mg/dL    Glucose, Fasting 111 (H) 65 - 99 mg/dL    Calcium 8 6 8 3 - 10 1 mg/dL    eGFR 59 ml/min/1 73sq m   Magnesium    Collection Time: 08/27/19  4:45 AM   Result Value Ref Range    Magnesium 2 0 1 6 - 2 6 mg/dL   Phosphorus    Collection Time: 08/27/19  4:45 AM   Result Value Ref Range    Phosphorus 2 1 (L) 2 3 - 4 1 mg/dL   CBC and differential    Collection Time: 08/27/19  4:45 AM   Result Value Ref Range    WBC 9 84 4 31 - 10 16 Thousand/uL    RBC 4 01 3 88 - 5 62 Million/uL    Hemoglobin 12 4 12 0 - 17 0 g/dL    Hematocrit 38 0 36 5 - 49 3 %    MCV 95 82 - 98 fL    MCH 30 9 26 8 - 34 3 pg    MCHC 32 6 31 4 - 37 4 g/dL    RDW 12 6 11 6 - 15 1 %    MPV 9 2 8 9 - 12 7 fL    Platelets 324 631 - 643 Thousands/uL    nRBC 0 /100 WBCs    Neutrophils Relative 70 43 - 75 %    Immat GRANS % 1 0 - 2 %    Lymphocytes Relative 13 (L) 14 - 44 %    Monocytes Relative 12 4 - 12 %    Eosinophils Relative 4 0 - 6 %    Basophils Relative 0 0 - 1 %    Neutrophils Absolute 6 87 1 85 - 7 62 Thousands/µL    Immature Grans Absolute 0 06 0 00 - 0 20 Thousand/uL    Lymphocytes Absolute 1 32 0 60 - 4 47 Thousands/µL    Monocytes Absolute 1 22 0 17 - 1 22 Thousand/µL    Eosinophils Absolute 0 34 0 00 - 0 61 Thousand/µL    Basophils Absolute 0 03 0 00 - 0 10 Thousands/µL   CK (with reflex to MB)    Collection Time: 08/27/19  4:45 AM   Result Value Ref Range    Total  (H) 39 - 308 U/L   Protime-INR    Collection Time: 08/27/19  4:45 AM   Result Value Ref Range    Protime 14 4 11 6 - 14 5 seconds    INR 1 11 0 84 - 1 19   APTT    Collection Time: 08/27/19  4:45 AM   Result Value Ref Range    PTT 32 23 - 37 seconds   CKMB    Collection Time: 08/27/19  4:45 AM   Result Value Ref Range    CK-MB Index <1 0 0 0 - 2 5 %    CK-MB 4 4 0 0 - 5 0 ng/mL   Fingerstick Glucose (POCT)    Collection Time: 08/27/19  6:12 AM   Result Value Ref Range    POC Glucose 148 (H) 65 - 140 mg/dl   Fingerstick Glucose (POCT)    Collection Time: 08/27/19 11:20 AM   Result Value Ref Range    POC Glucose 124 65 - 140 mg/dl   Fingerstick Glucose (POCT) Collection Time: 08/27/19  4:51 PM   Result Value Ref Range    POC Glucose 330 (H) 65 - 140 mg/dl   Fingerstick Glucose (POCT)    Collection Time: 08/27/19  9:20 PM   Result Value Ref Range    POC Glucose 111 65 - 140 mg/dl   Basic metabolic panel    Collection Time: 08/28/19  5:40 AM   Result Value Ref Range    Sodium 136 136 - 145 mmol/L    Potassium 3 9 3 5 - 5 3 mmol/L    Chloride 105 100 - 108 mmol/L    CO2 24 21 - 32 mmol/L    ANION GAP 7 4 - 13 mmol/L    BUN 19 5 - 25 mg/dL    Creatinine 0 95 0 60 - 1 30 mg/dL    Glucose 100 65 - 140 mg/dL    Calcium 8 3 8 3 - 10 1 mg/dL    eGFR 74 ml/min/1 73sq m   CBC and differential    Collection Time: 08/28/19  5:40 AM   Result Value Ref Range    WBC 8 27 4 31 - 10 16 Thousand/uL    RBC 3 87 (L) 3 88 - 5 62 Million/uL    Hemoglobin 12 1 12 0 - 17 0 g/dL    Hematocrit 36 9 36 5 - 49 3 %    MCV 95 82 - 98 fL    MCH 31 3 26 8 - 34 3 pg    MCHC 32 8 31 4 - 37 4 g/dL    RDW 12 7 11 6 - 15 1 %    MPV 9 2 8 9 - 12 7 fL    Platelets 070 090 - 386 Thousands/uL    nRBC 0 /100 WBCs    Neutrophils Relative 76 (H) 43 - 75 %    Immat GRANS % 0 0 - 2 %    Lymphocytes Relative 12 (L) 14 - 44 %    Monocytes Relative 9 4 - 12 %    Eosinophils Relative 3 0 - 6 %    Basophils Relative 0 0 - 1 %    Neutrophils Absolute 6 22 1 85 - 7 62 Thousands/µL    Immature Grans Absolute 0 03 0 00 - 0 20 Thousand/uL    Lymphocytes Absolute 1 02 0 60 - 4 47 Thousands/µL    Monocytes Absolute 0 76 0 17 - 1 22 Thousand/µL    Eosinophils Absolute 0 22 0 00 - 0 61 Thousand/µL    Basophils Absolute 0 02 0 00 - 0 10 Thousands/µL   Fingerstick Glucose (POCT)    Collection Time: 08/28/19  6:12 AM   Result Value Ref Range    POC Glucose 99 65 - 140 mg/dl   Fingerstick Glucose (POCT)    Collection Time: 08/28/19 10:57 AM   Result Value Ref Range    POC Glucose 167 (H) 65 - 140 mg/dl   Fingerstick Glucose (POCT)    Collection Time: 08/28/19  3:19 PM   Result Value Ref Range    POC Glucose 201 (H) 65 - 140 mg/dl Fingerstick Glucose (POCT)    Collection Time: 08/28/19  8:57 PM   Result Value Ref Range    POC Glucose 135 65 - 140 mg/dl   Fingerstick Glucose (POCT)    Collection Time: 08/29/19  5:55 AM   Result Value Ref Range    POC Glucose 97 65 - 140 mg/dl   POCT Measure PVR    Collection Time: 09/17/19  3:00 PM   Result Value Ref Range    POST-VOID RESIDUAL VOLUME, ML  mL       The following portions of the patient's history were reviewed and updated as appropriate: allergies, current medications, past family history, past medical history, past social history, past surgical history and problem list      Review of Systems   Constitutional: Negative for appetite change, chills, diaphoresis, fatigue, fever and unexpected weight change  HENT: Negative for congestion, hearing loss and rhinorrhea  Eyes: Negative for visual disturbance  Respiratory: Negative for cough, chest tightness, shortness of breath and wheezing  Cardiovascular: Negative for chest pain, palpitations and leg swelling  Gastrointestinal: Negative for abdominal pain and blood in stool  Endocrine: Negative for cold intolerance, heat intolerance, polydipsia and polyuria  Genitourinary: Negative for difficulty urinating, dysuria, frequency and urgency  Musculoskeletal: Positive for gait problem  Negative for arthralgias and myalgias  Skin: Negative for rash  Neurological: Negative for dizziness, weakness, light-headedness and headaches  Hematological: Does not bruise/bleed easily  Psychiatric/Behavioral: Negative for dysphoric mood and sleep disturbance  Objective:      Vitals:    09/26/19 1224   BP: 114/56   Pulse: 91   Temp: 98 3 °F (36 8 °C)   SpO2: 96%          Physical Exam   Constitutional: He is oriented to person, place, and time  He appears well-developed and well-nourished  HENT:   Head: Normocephalic and atraumatic     Nose: Nose normal    Mouth/Throat: Oropharynx is clear and moist    Eyes: Pupils are equal, round, and reactive to light  Conjunctivae and EOM are normal  No scleral icterus  Neck: Normal range of motion  Neck supple  No JVD present  No tracheal deviation present  No thyromegaly present  Cardiovascular: Normal rate, regular rhythm and intact distal pulses  Exam reveals no gallop and no friction rub  Murmur heard  Pulmonary/Chest: Effort normal and breath sounds normal  No respiratory distress  He has no wheezes  He has no rales  Musculoskeletal: He exhibits no edema or deformity  Lymphadenopathy:     He has no cervical adenopathy  Neurological: He is alert and oriented to person, place, and time  No cranial nerve deficit  Skin: Skin is warm and dry  No rash noted  No erythema  No pallor  Psychiatric: He has a normal mood and affect   His behavior is normal  Judgment and thought content normal

## 2019-09-26 NOTE — PATIENT INSTRUCTIONS
Patient appears medically stable on present regimen, will recheck labs to follow-up kidney function and white blood cell count  100% use of walker for ambulation and do not walk unassisted stressed with patient  Continued home physical therapy  Family working on getting in-home assistance beyond that as well  Diabetes is under good control with A1c 7 1 with diet alone      Routine follow-up after labs next month, sooner as needed

## 2019-09-26 NOTE — TELEPHONE ENCOUNTER
----- Message from Preston Arauz MD sent at 9/26/2019  5:32 PM EDT -----  Notify-wbc is still elevated, we need urine and blood test + chest xray    Go to ER for fever or change in mental status

## 2019-09-27 ENCOUNTER — TELEPHONE (OUTPATIENT)
Dept: INTERNAL MEDICINE CLINIC | Facility: CLINIC | Age: 84
End: 2019-09-27

## 2019-09-27 ENCOUNTER — APPOINTMENT (OUTPATIENT)
Dept: LAB | Facility: HOSPITAL | Age: 84
End: 2019-09-27
Attending: INTERNAL MEDICINE
Payer: COMMERCIAL

## 2019-09-27 ENCOUNTER — HOSPITAL ENCOUNTER (OUTPATIENT)
Dept: RADIOLOGY | Facility: HOSPITAL | Age: 84
Discharge: HOME | End: 2019-09-27
Attending: INTERNAL MEDICINE
Payer: COMMERCIAL

## 2019-09-27 DIAGNOSIS — D72.829 LEUKOCYTOSIS, UNSPECIFIED TYPE: ICD-10-CM

## 2019-09-27 LAB
BACTERIA UR QL AUTO: ABNORMAL /HPF
BASOPHILS # BLD AUTO: 0.04 THOUSANDS/ΜL (ref 0–0.1)
BASOPHILS NFR BLD AUTO: 0 % (ref 0–1)
BILIRUB UR QL STRIP: NEGATIVE
CLARITY UR: ABNORMAL
COLOR UR: YELLOW
EOSINOPHIL # BLD AUTO: 0.08 THOUSAND/ΜL (ref 0–0.61)
EOSINOPHIL NFR BLD AUTO: 1 % (ref 0–6)
ERYTHROCYTE [DISTWIDTH] IN BLOOD BY AUTOMATED COUNT: 12.4 % (ref 11.6–15.1)
GLUCOSE UR STRIP-MCNC: NEGATIVE MG/DL
HCT VFR BLD AUTO: 38.9 % (ref 36.5–49.3)
HGB BLD-MCNC: 12.6 G/DL (ref 12–17)
HGB UR QL STRIP.AUTO: NEGATIVE
IMM GRANULOCYTES # BLD AUTO: 0.22 THOUSAND/UL (ref 0–0.2)
IMM GRANULOCYTES NFR BLD AUTO: 2 % (ref 0–2)
KETONES UR STRIP-MCNC: NEGATIVE MG/DL
LEUKOCYTE ESTERASE UR QL STRIP: ABNORMAL
LYMPHOCYTES # BLD AUTO: 0.75 THOUSANDS/ΜL (ref 0.6–4.47)
LYMPHOCYTES NFR BLD AUTO: 5 % (ref 14–44)
MCH RBC QN AUTO: 30.4 PG (ref 26.8–34.3)
MCHC RBC AUTO-ENTMCNC: 32.4 G/DL (ref 31.4–37.4)
MCV RBC AUTO: 94 FL (ref 82–98)
MONOCYTES # BLD AUTO: 0.7 THOUSAND/ΜL (ref 0.17–1.22)
MONOCYTES NFR BLD AUTO: 5 % (ref 4–12)
NEUTROPHILS # BLD AUTO: 12.98 THOUSANDS/ΜL (ref 1.85–7.62)
NEUTS SEG NFR BLD AUTO: 87 % (ref 43–75)
NITRITE UR QL STRIP: NEGATIVE
NON-SQ EPI CELLS URNS QL MICRO: ABNORMAL /HPF
NRBC BLD AUTO-RTO: 0 /100 WBCS
PH UR STRIP.AUTO: 5.5 [PH]
PLATELET # BLD AUTO: 312 THOUSANDS/UL (ref 149–390)
PMV BLD AUTO: 8.8 FL (ref 8.9–12.7)
PROT UR STRIP-MCNC: ABNORMAL MG/DL
RBC # BLD AUTO: 4.14 MILLION/UL (ref 3.88–5.62)
RBC #/AREA URNS AUTO: ABNORMAL /HPF
SP GR UR STRIP.AUTO: 1.01 (ref 1–1.03)
UROBILINOGEN UR QL STRIP.AUTO: 0.2 E.U./DL
WBC # BLD AUTO: 14.77 THOUSAND/UL (ref 4.31–10.16)
WBC #/AREA URNS AUTO: ABNORMAL /HPF

## 2019-09-27 PROCEDURE — 87086 URINE CULTURE/COLONY COUNT: CPT | Performed by: INTERNAL MEDICINE

## 2019-09-27 PROCEDURE — 87186 SC STD MICRODIL/AGAR DIL: CPT | Performed by: INTERNAL MEDICINE

## 2019-09-27 PROCEDURE — 85025 COMPLETE CBC W/AUTO DIFF WBC: CPT

## 2019-09-27 PROCEDURE — 71046 X-RAY EXAM CHEST 2 VIEWS: CPT

## 2019-09-27 PROCEDURE — 36415 COLL VENOUS BLD VENIPUNCTURE: CPT

## 2019-09-27 PROCEDURE — 87077 CULTURE AEROBIC IDENTIFY: CPT | Performed by: INTERNAL MEDICINE

## 2019-09-27 PROCEDURE — 87040 BLOOD CULTURE FOR BACTERIA: CPT

## 2019-09-27 PROCEDURE — 81001 URINALYSIS AUTO W/SCOPE: CPT | Performed by: INTERNAL MEDICINE

## 2019-09-27 NOTE — TELEPHONE ENCOUNTER
STAT test was ordered by Dr Promise Spencer today  The results are in [address] from [place] [address] Radiology called to let Dr Promise Spencer know they are in his chart

## 2019-09-29 DIAGNOSIS — N41.0 ACUTE PROSTATITIS: Primary | ICD-10-CM

## 2019-09-29 LAB — BACTERIA UR CULT: ABNORMAL

## 2019-09-29 RX ORDER — CIPROFLOXACIN 500 MG/1
500 TABLET, FILM COATED ORAL EVERY 12 HOURS SCHEDULED
Qty: 28 TABLET | Refills: 0 | Status: SHIPPED | OUTPATIENT
Start: 2019-09-29 | End: 2019-10-13

## 2019-09-30 ENCOUNTER — TELEPHONE (OUTPATIENT)
Dept: INTERNAL MEDICINE CLINIC | Facility: CLINIC | Age: 84
End: 2019-09-30

## 2019-09-30 NOTE — TELEPHONE ENCOUNTER
----- Message from Kaci Silva MD sent at 9/29/2019 11:21 AM EDT -----  Notify-he has uti/prostate infection  Needs 2 wks abx

## 2019-10-02 LAB — BACTERIA BLD CULT: NORMAL

## 2019-10-03 ENCOUNTER — OFFICE VISIT (OUTPATIENT)
Dept: NEUROLOGY | Facility: CLINIC | Age: 84
End: 2019-10-03
Payer: COMMERCIAL

## 2019-10-03 VITALS
HEART RATE: 64 BPM | BODY MASS INDEX: 28.68 KG/M2 | HEIGHT: 64 IN | DIASTOLIC BLOOD PRESSURE: 60 MMHG | WEIGHT: 168 LBS | SYSTOLIC BLOOD PRESSURE: 112 MMHG

## 2019-10-03 DIAGNOSIS — G20 DEMENTIA DUE TO PARKINSON'S DISEASE WITHOUT BEHAVIORAL DISTURBANCE (HCC): ICD-10-CM

## 2019-10-03 DIAGNOSIS — F39 MOOD DISORDER (HCC): ICD-10-CM

## 2019-10-03 DIAGNOSIS — G20 PARKINSON'S DISEASE (HCC): ICD-10-CM

## 2019-10-03 DIAGNOSIS — F02.80 DEMENTIA DUE TO PARKINSON'S DISEASE WITHOUT BEHAVIORAL DISTURBANCE (HCC): ICD-10-CM

## 2019-10-03 DIAGNOSIS — R26.9 NEUROLOGIC GAIT DYSFUNCTION: Primary | ICD-10-CM

## 2019-10-03 PROCEDURE — 99214 OFFICE O/P EST MOD 30 MIN: CPT | Performed by: PSYCHIATRY & NEUROLOGY

## 2019-10-03 RX ORDER — ALBUTEROL SULFATE 2.5 MG/3ML
SOLUTION RESPIRATORY (INHALATION) EVERY 4 HOURS PRN
Refills: 5 | COMMUNITY
Start: 2019-09-13 | End: 2020-05-16

## 2019-10-03 RX ORDER — RIVASTIGMINE TARTRATE 3 MG/1
3 CAPSULE ORAL 2 TIMES DAILY
Qty: 60 CAPSULE | Refills: 2 | Status: SHIPPED | OUTPATIENT
Start: 2019-10-03 | End: 2020-01-03 | Stop reason: SDUPTHER

## 2019-10-03 NOTE — PROGRESS NOTES
Progress Note - Neurology   [place] [facility] 81 y o  male MRN: [id number]  Unit/Bed#:  Encounter: [id number]      Subjective:   Patient is here for a follow-up visit with worsening gait dysfunction, recurrent falls, Parkinson's disease, dementia, cerebrovascular disease and left frontal meningioma  Since his last visit in February the patient was hospitalized with ANGELA, during which time was also evaluated by Neurology, patient was maintained on Sinemet, Mirapex and Plavix and also had a follow-up MRI of the brain at the request of Neurosurgery which showed slight increase in the size of the left frontal meningioma but no signs of any mass effect or edema  Subsequent to his hospitalization he was transferred to a skilled nursing facility for rehab and currently has been getting home physical therapy  Patient has difficulty climbing steps, also has worsening cognitive dysfunction, does not feel good, and has had 1 fall in the recent past related to not using his walker  As per the family he has been unhappy and recently was also started on Cipro for UTI  Patient denies any other new neurological symptoms  No incontinence reported  ROS:   Review of Systems   Constitutional: Negative  Negative for appetite change and fever  HENT: Positive for drooling and voice change  Negative for hearing loss, tinnitus and trouble swallowing  Eyes: Negative  Negative for photophobia and pain  Respiratory: Negative  Negative for shortness of breath  Cardiovascular: Negative  Negative for palpitations  Gastrointestinal: Negative  Negative for constipation, diarrhea, nausea and vomiting  Endocrine: Negative  Negative for cold intolerance and heat intolerance  Genitourinary: Positive for enuresis and urgency  Negative for dysuria and frequency  Musculoskeletal: Positive for gait problem  Negative for back pain, myalgias and neck pain  Skin: Negative  Negative for rash     Neurological: Positive for tremors and weakness  Negative for dizziness, seizures, syncope, facial asymmetry, speech difficulty, light-headedness, numbness and headaches  Hematological: Negative  Does not bruise/bleed easily  Psychiatric/Behavioral: Positive for confusion, hallucinations and sleep disturbance  Mood swings       Vitals: There were no vitals filed for this visit  ,There is no height or weight on file to calculate BMI      MEDS:      Current Outpatient Medications:     acetaminophen (TYLENOL) 325 mg tablet, Take 650 mg by mouth every 6 (six) hours as needed for mild pain, Disp: , Rfl:     albuterol (2 5 mg/3 mL) 0 083 % nebulizer solution, every 4 (four) hours as needed, Disp: , Rfl: 5    carbidopa-levodopa (SINEMET)  mg per tablet, Take 1 tablet by mouth 3 (three) times a day, Disp: 90 tablet, Rfl: 6    ciprofloxacin (CIPRO) 500 mg tablet, Take 1 tablet (500 mg total) by mouth every 12 (twelve) hours for 14 days, Disp: 28 tablet, Rfl: 0    clopidogrel (PLAVIX) 75 mg tablet, Take 1 tablet (75 mg total) by mouth daily, Disp: 90 tablet, Rfl: 3    fluticasone-salmeterol (ADVAIR DISKUS) 250-50 mcg/dose inhaler, Inhale 1 puff 2 (two) times a day Rinse mouth after use, Disp: 1 Inhaler, Rfl: 5    guaiFENesin (MUCINEX) 600 mg 12 hr tablet, Take 1 tablet (600 mg total) by mouth every 12 (twelve) hours, Disp: , Rfl:     insulin lispro (HumaLOG) 100 units/mL injection, Inject 1-6 Units under the skin 3 (three) times a day before meals (Patient not taking: Reported on 9/26/2019), Disp: , Rfl:     irbesartan-hydrochlorothiazide (AVALIDE) 150-12 5 MG per tablet, TAKE 1 TABLET BY MOUTH EVERY DAY, Disp: 90 tablet, Rfl: 3    levalbuterol (XOPENEX) 1 25 mg/0 5 mL nebulizer solution, Take 0 5 mL (1 25 mg total) by nebulization 3 (three) times a day, Disp: , Rfl:     Melatonin 5 MG TABS, Take by mouth daily at bedtime as needed, Disp: , Rfl:     montelukast (SINGULAIR) 10 mg tablet, Take 1 tablet (10 mg total) by mouth daily at bedtime, Disp: 90 tablet, Rfl: 3    pramipexole (MIRAPEX) 0 5 mg tablet, Take 1 tablet (0 5 mg total) by mouth 3 (three) times a day, Disp: 90 tablet, Rfl: 6    predniSONE 10 mg tablet, TAKE 1 TABLET BY MOUTH EVERY DAY, Disp: 30 tablet, Rfl: 5    rivastigmine (EXELON) 3 mg capsule, Take 1 capsule (3 mg total) by mouth 2 (two) times a day, Disp: 60 capsule, Rfl: 2    tamsulosin (FLOMAX) 0 4 mg, Take 1 capsule (0 4 mg total) by mouth daily with dinner, Disp: , Rfl:     terbinafine (LamISIL) 1 % cream, Apply topically 2 (two) times a day, Disp: , Rfl:     tiotropium (SPIRIVA HANDIHALER) 18 mcg inhalation capsule, Place 1 capsule (18 mcg total) into inhaler and inhale daily Via Handihaler at the same time every day, Disp: 90 capsule, Rfl: 3    triamcinolone (KENALOG) 0 5 % cream, Apply topically 2 (two) times a day as needed for rash, Disp: 30 g, Rfl: 1  :    Physical Exam:  General appearance: alert, appears stated age and cooperative  Head: Normocephalic, without obvious abnormality, atraumatic    [place] cognitive score is 10/30 as compared to 14/30, 6 months ago  He seems alert awake and oriented, seems apathetic, his speech is fluent, no new cranial nerve deficit was noted, mild cogwheeling rigidity noted in the right upper extremity with no resting tremor noted on today's exam   No new focal motor weakness noted, deep tendon reflexes are hypoactive and no sensory loss noted to pinprick and light touch  He does tend to extinguish to double simultaneous stimuli  No evidence of any dysmetria, patient could stand with minimal assistance and ambulates with a walker using proper dynamics  No bruits were appreciable in the neck  Lab Results: I have personally reviewed pertinent reports  Imaging Studies: I have personally reviewed pertinent reports  Assessment:  1  Gait dysfunction  2  Parkinson's disease  3  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  4  Suspect mood disorder  Plan:   At this time after lengthy discussion with the patient's family and the patient he is advised to continue Sinemet, continue Mirapex and Plavix, patient also is on Exelon capsules 1 5 mg twice a day which she will increase to 3 mg twice a day once he completes his course of Cipro  Mental stimulating exercises were discussed but the patient seems disinterested, and does not participate  Will also consider starting him on sertraline in a small dose to help with his mood in the near future  Caregiver burden was discussed since the family does feel extremely stressed at this time caring for both the parents  Different resources was suggested including office of aging  Patient will return back to see me in 3 months  10/3/2019,12:16 PM    Dictation voice to text software has been used in the creation of this document  Please consider this in light of any contextual or grammatical errors

## 2019-10-07 DIAGNOSIS — G20 PARKINSON'S DISEASE (HCC): ICD-10-CM

## 2019-10-07 RX ORDER — PRAMIPEXOLE DIHYDROCHLORIDE 0.5 MG/1
0.5 TABLET ORAL 3 TIMES DAILY
Qty: 90 TABLET | Refills: 6 | Status: SHIPPED | OUTPATIENT
Start: 2019-10-07 | End: 2020-03-23

## 2019-10-07 NOTE — TELEPHONE ENCOUNTER
Medication refill check list    Correct patient? yes   Correct medication name, dose, and pill size? yes   Correct provider? yes   Last and Next appt  scheduled? Yes, last date 10/3/19 & next date 1/3/2020   Right pharmacy listed? yes   Correct quantity for 30 or 90 days? Yes, 90 day supply  Is the patient out of refills? When was it last prescribed? Yes, last date 2/14/19   Directions match what the patient says they are taking?  yes   Enough refills? (none for controlled substances, 1 year for routine medications) no

## 2019-10-18 ENCOUNTER — TELEPHONE (OUTPATIENT)
Dept: INTERNAL MEDICINE CLINIC | Facility: CLINIC | Age: 84
End: 2019-10-18

## 2019-10-18 NOTE — TELEPHONE ENCOUNTER
DEXTER FROM [facility] CALLED AND SAID PATIENT HAS BEEN DISCHARGED FROM AGENCY AS OF TODAY  JUST WANTED YOU TO KNOW  DEXTER'S #   [phone]

## 2019-10-22 ENCOUNTER — APPOINTMENT (OUTPATIENT)
Dept: LAB | Facility: CLINIC | Age: 84
End: 2019-10-22
Payer: COMMERCIAL

## 2019-10-22 DIAGNOSIS — E78.2 MIXED HYPERLIPIDEMIA: ICD-10-CM

## 2019-10-22 DIAGNOSIS — E11.9 TYPE 2 DIABETES MELLITUS WITHOUT COMPLICATION, WITHOUT LONG-TERM CURRENT USE OF INSULIN (HCC): ICD-10-CM

## 2019-10-22 DIAGNOSIS — I10 ESSENTIAL HYPERTENSION: ICD-10-CM

## 2019-10-22 LAB
ALBUMIN SERPL BCP-MCNC: 3.6 G/DL (ref 3.5–5)
ALP SERPL-CCNC: 87 U/L (ref 46–116)
ALT SERPL W P-5'-P-CCNC: 15 U/L (ref 12–78)
ANION GAP SERPL CALCULATED.3IONS-SCNC: 8 MMOL/L (ref 4–13)
AST SERPL W P-5'-P-CCNC: 16 U/L (ref 5–45)
BASOPHILS # BLD AUTO: 0.04 THOUSANDS/ΜL (ref 0–0.1)
BASOPHILS NFR BLD AUTO: 1 % (ref 0–1)
BILIRUB SERPL-MCNC: 0.53 MG/DL (ref 0.2–1)
BUN SERPL-MCNC: 26 MG/DL (ref 5–25)
CALCIUM SERPL-MCNC: 10 MG/DL (ref 8.3–10.1)
CHLORIDE SERPL-SCNC: 106 MMOL/L (ref 100–108)
CHOLEST SERPL-MCNC: 184 MG/DL (ref 50–200)
CO2 SERPL-SCNC: 27 MMOL/L (ref 21–32)
CREAT SERPL-MCNC: 1.13 MG/DL (ref 0.6–1.3)
EOSINOPHIL # BLD AUTO: 0.17 THOUSAND/ΜL (ref 0–0.61)
EOSINOPHIL NFR BLD AUTO: 2 % (ref 0–6)
ERYTHROCYTE [DISTWIDTH] IN BLOOD BY AUTOMATED COUNT: 13.2 % (ref 11.6–15.1)
EST. AVERAGE GLUCOSE BLD GHB EST-MCNC: 148 MG/DL
GFR SERPL CREATININE-BSD FRML MDRD: 60 ML/MIN/1.73SQ M
GLUCOSE P FAST SERPL-MCNC: 92 MG/DL (ref 65–99)
HBA1C MFR BLD: 6.8 % (ref 4.2–6.3)
HCT VFR BLD AUTO: 40.9 % (ref 36.5–49.3)
HDLC SERPL-MCNC: 105 MG/DL
HGB BLD-MCNC: 13 G/DL (ref 12–17)
IMM GRANULOCYTES # BLD AUTO: 0.05 THOUSAND/UL (ref 0–0.2)
IMM GRANULOCYTES NFR BLD AUTO: 1 % (ref 0–2)
LDLC SERPL CALC-MCNC: 57 MG/DL (ref 0–100)
LYMPHOCYTES # BLD AUTO: 2 THOUSANDS/ΜL (ref 0.6–4.47)
LYMPHOCYTES NFR BLD AUTO: 24 % (ref 14–44)
MCH RBC QN AUTO: 30.1 PG (ref 26.8–34.3)
MCHC RBC AUTO-ENTMCNC: 31.8 G/DL (ref 31.4–37.4)
MCV RBC AUTO: 95 FL (ref 82–98)
MONOCYTES # BLD AUTO: 0.76 THOUSAND/ΜL (ref 0.17–1.22)
MONOCYTES NFR BLD AUTO: 9 % (ref 4–12)
NEUTROPHILS # BLD AUTO: 5.45 THOUSANDS/ΜL (ref 1.85–7.62)
NEUTS SEG NFR BLD AUTO: 63 % (ref 43–75)
NONHDLC SERPL-MCNC: 79 MG/DL
NRBC BLD AUTO-RTO: 0 /100 WBCS
PLATELET # BLD AUTO: 245 THOUSANDS/UL (ref 149–390)
PMV BLD AUTO: 9.6 FL (ref 8.9–12.7)
POTASSIUM SERPL-SCNC: 4.2 MMOL/L (ref 3.5–5.3)
PROT SERPL-MCNC: 7.2 G/DL (ref 6.4–8.2)
RBC # BLD AUTO: 4.32 MILLION/UL (ref 3.88–5.62)
SODIUM SERPL-SCNC: 141 MMOL/L (ref 136–145)
TRIGL SERPL-MCNC: 108 MG/DL
WBC # BLD AUTO: 8.47 THOUSAND/UL (ref 4.31–10.16)

## 2019-10-22 PROCEDURE — 80053 COMPREHEN METABOLIC PANEL: CPT

## 2019-10-22 PROCEDURE — 85025 COMPLETE CBC W/AUTO DIFF WBC: CPT

## 2019-10-22 PROCEDURE — 80061 LIPID PANEL: CPT

## 2019-10-22 PROCEDURE — 83036 HEMOGLOBIN GLYCOSYLATED A1C: CPT

## 2019-10-22 PROCEDURE — 36415 COLL VENOUS BLD VENIPUNCTURE: CPT

## 2019-10-23 ENCOUNTER — TELEPHONE (OUTPATIENT)
Dept: INTERNAL MEDICINE CLINIC | Facility: CLINIC | Age: 84
End: 2019-10-23

## 2019-10-23 DIAGNOSIS — G20 PARKINSON'S DISEASE (HCC): Primary | ICD-10-CM

## 2019-10-23 NOTE — TELEPHONE ENCOUNTER
She said pt's daughter is requesting an RX for a rolator to be sent to [facility]         Rolator w/basket & seat

## 2019-10-24 ENCOUNTER — APPOINTMENT (OUTPATIENT)
Dept: LAB | Facility: CLINIC | Age: 84
End: 2019-10-24
Payer: COMMERCIAL

## 2019-10-24 LAB
CREAT UR-MCNC: 98.3 MG/DL
MICROALBUMIN UR-MCNC: 195 MG/L (ref 0–20)
MICROALBUMIN/CREAT 24H UR: 198 MG/G CREATININE (ref 0–30)

## 2019-10-24 PROCEDURE — 82570 ASSAY OF URINE CREATININE: CPT

## 2019-10-24 PROCEDURE — 82043 UR ALBUMIN QUANTITATIVE: CPT

## 2019-10-30 ENCOUNTER — OFFICE VISIT (OUTPATIENT)
Dept: INTERNAL MEDICINE CLINIC | Facility: CLINIC | Age: 84
End: 2019-10-30
Payer: COMMERCIAL

## 2019-10-30 VITALS
HEART RATE: 96 BPM | WEIGHT: 168.6 LBS | HEIGHT: 64 IN | BODY MASS INDEX: 28.79 KG/M2 | SYSTOLIC BLOOD PRESSURE: 148 MMHG | RESPIRATION RATE: 12 BRPM | DIASTOLIC BLOOD PRESSURE: 52 MMHG

## 2019-10-30 DIAGNOSIS — G20 PARKINSON'S DISEASE (HCC): ICD-10-CM

## 2019-10-30 DIAGNOSIS — E78.2 MIXED HYPERLIPIDEMIA: ICD-10-CM

## 2019-10-30 DIAGNOSIS — J42 CHRONIC BRONCHITIS, UNSPECIFIED CHRONIC BRONCHITIS TYPE (HCC): ICD-10-CM

## 2019-10-30 DIAGNOSIS — E11.9 TYPE 2 DIABETES MELLITUS WITHOUT COMPLICATION, WITHOUT LONG-TERM CURRENT USE OF INSULIN (HCC): Primary | ICD-10-CM

## 2019-10-30 DIAGNOSIS — R26.9 NEUROLOGIC GAIT DYSFUNCTION: ICD-10-CM

## 2019-10-30 DIAGNOSIS — J45.30 MILD PERSISTENT EXTRINSIC ASTHMA WITHOUT COMPLICATION: ICD-10-CM

## 2019-10-30 DIAGNOSIS — I10 ESSENTIAL HYPERTENSION: ICD-10-CM

## 2019-10-30 PROBLEM — E87.6 HYPOKALEMIA: Status: RESOLVED | Noted: 2019-08-26 | Resolved: 2019-10-30

## 2019-10-30 PROCEDURE — 99214 OFFICE O/P EST MOD 30 MIN: CPT | Performed by: INTERNAL MEDICINE

## 2019-10-30 RX ORDER — ATORVASTATIN CALCIUM 10 MG/1
10 TABLET, FILM COATED ORAL DAILY
Refills: 1 | COMMUNITY
Start: 2019-10-23 | End: 2020-01-14

## 2019-10-30 NOTE — PROGRESS NOTES
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    Chronic bronchitis, unspecified chronic bronchitis type (HCC)    Essential hypertension  -     CBC and differential; Future  -     Comprehensive metabolic panel; Future    Parkinson's disease ([facility] [address] )    Mixed hyperlipidemia  -     Lipid panel; Future    Neurologic gait dysfunction    Other orders  -     atorvastatin (LIPITOR) 10 mg tablet; Take 10 mg by mouth daily         Patient Instructions   Lab data reviewed in detail and compared prior    Type 2 diabetes mellitus is under excellent control with diet alone with A1c 6 8    Hypertension and hyperlipidemia stable on present regimen    Parkinson's disease with neurologic gait dysfunction following with Neurology, continue present regimen, continue using rolling walker and home safety precautions reinforced  We discussed consideration for medical alert though patient is accompanied by a family member 25 hours a day in the home  Asthma/COPD is stable on present regimen following with Pulmonary with history of Aspergillus    Health maintenance up-to-date    Routine follow-up after labs in 4 months, sooner as needed  Subjective:      Patient ID: Rolande Libman is a 81 y o  male    Here w/ dgtr to f/u labs and mmp  Had another fall 3 days ago, was able to get up by himself  He has bruises on back  dgtr notes that he likes to walk in socks  He is done w/ PT  Using rolling walker in home and out  DM-watching carbs  HTN/HPL-taking rx as directed  PD-taking rx as directed, f/b Dr Chris Guaman  Asthma/copd-taking prednisone and BD's as directed  Dementia-stable w/ exelon          Current Outpatient Medications:     acetaminophen (TYLENOL) 325 mg tablet, Take 650 mg by mouth every 6 (six) hours as needed for mild pain, Disp: , Rfl:     albuterol (2 5 mg/3 mL) 0 083 % nebulizer solution, every 4 (four) hours as needed, Disp: , Rfl: 5    atorvastatin (LIPITOR) 10 mg tablet, Take 10 mg by mouth daily, Disp: , Rfl: 1    carbidopa-levodopa (SINEMET)  mg per tablet, Take 1 tablet by mouth 3 (three) times a day, Disp: 90 tablet, Rfl: 6    clopidogrel (PLAVIX) 75 mg tablet, Take 1 tablet (75 mg total) by mouth daily, Disp: 90 tablet, Rfl: 3    fluticasone-salmeterol (ADVAIR DISKUS) 250-50 mcg/dose inhaler, Inhale 1 puff 2 (two) times a day Rinse mouth after use, Disp: 1 Inhaler, Rfl: 5    guaiFENesin (MUCINEX) 600 mg 12 hr tablet, Take 1 tablet (600 mg total) by mouth every 12 (twelve) hours (Patient taking differently: Take 600 mg by mouth 2 (two) times a day as needed ), Disp: , Rfl:     irbesartan-hydrochlorothiazide (AVALIDE) 150-12 5 MG per tablet, TAKE 1 TABLET BY MOUTH EVERY DAY, Disp: 90 tablet, Rfl: 3    levalbuterol (XOPENEX) 1 25 mg/0 5 mL nebulizer solution, Take 0 5 mL (1 25 mg total) by nebulization 3 (three) times a day, Disp: , Rfl:     Melatonin 5 MG TABS, Take by mouth daily at bedtime as needed, Disp: , Rfl:     Misc   Devices (ROLLATOR) MISC, Dispense 1 rolling walker with seat and basket, Disp: 1 each, Rfl: 0    montelukast (SINGULAIR) 10 mg tablet, Take 1 tablet (10 mg total) by mouth daily at bedtime, Disp: 90 tablet, Rfl: 3    pramipexole (MIRAPEX) 0 5 mg tablet, Take 1 tablet (0 5 mg total) by mouth 3 (three) times a day, Disp: 90 tablet, Rfl: 6    predniSONE 10 mg tablet, TAKE 1 TABLET BY MOUTH EVERY DAY, Disp: 30 tablet, Rfl: 5    rivastigmine (EXELON) 3 mg capsule, Take 1 capsule (3 mg total) by mouth 2 (two) times a day, Disp: 60 capsule, Rfl: 2    tamsulosin (FLOMAX) 0 4 mg, Take 1 capsule (0 4 mg total) by mouth daily with dinner, Disp: , Rfl:     terbinafine (LamISIL) 1 % cream, Apply topically 2 (two) times a day as needed , Disp: , Rfl:     tiotropium (Carpenter Angry) 18 mcg inhalation capsule, Place 1 capsule (18 mcg total) into inhaler and inhale daily [address] at the same time every day, Disp: 90 capsule, Rfl: 3    triamcinolone (KENALOG) 0 5 % cream, Apply topically 2 (two) times a day as needed for rash, Disp: 30 g, Rfl: 1    Recent Results (from the past 1008 hour(s))   CBC and differential    Collection Time: 09/26/19  1:07 PM   Result Value Ref Range    WBC 15 48 (H) 4 31 - 10 16 Thousand/uL    RBC 4 25 3 88 - 5 62 Million/uL    Hemoglobin 12 8 12 0 - 17 0 g/dL    Hematocrit 40 3 36 5 - 49 3 %    MCV 95 82 - 98 fL    MCH 30 1 26 8 - 34 3 pg    MCHC 31 8 31 4 - 37 4 g/dL    RDW 12 5 11 6 - 15 1 %    MPV 9 2 8 9 - 12 7 fL    Platelets 277 164 - 287 Thousands/uL    nRBC 0 /100 WBCs   Comprehensive metabolic panel    Collection Time: 09/26/19  1:07 PM   Result Value Ref Range    Sodium 140 136 - 145 mmol/L    Potassium 4 2 3 5 - 5 3 mmol/L    Chloride 108 100 - 108 mmol/L    CO2 25 21 - 32 mmol/L    ANION GAP 7 4 - 13 mmol/L    BUN 31 (H) 5 - 25 mg/dL    Creatinine 1 25 0 60 - 1 30 mg/dL    Glucose 142 (H) 65 - 140 mg/dL    Calcium 9 3 8 3 - 10 1 mg/dL    AST 18 5 - 45 U/L    ALT 21 12 - 78 U/L    Alkaline Phosphatase 106 46 - 116 U/L    Total Protein 8 0 6 4 - 8 2 g/dL    Albumin 2 9 (L) 3 5 - 5 0 g/dL    Total Bilirubin 0 26 0 20 - 1 00 mg/dL    eGFR 53 ml/min/1 73sq m   Manual Differential(PHLEBS Do Not Order)    Collection Time: 09/26/19  1:07 PM   Result Value Ref Range    Segmented % 91 (H) 43 - 75 %    Bands % 1 0 - 8 %    Lymphocytes % 2 (L) 14 - 44 %    Monocytes % 5 4 - 12 %    Eosinophils, % 1 0 - 6 %    Basophils % 0 0 - 1 %    Absolute Neutrophils 14 24 (H) 1 85 - 7 62 Thousand/uL    Lymphocytes Absolute 0 31 (L) 0 60 - 4 47 Thousand/uL    Monocytes Absolute 0 77 0 00 - 1 22 Thousand/uL    Eosinophils Absolute 0 15 0 00 - 0 40 Thousand/uL    Basophils Absolute 0 00 0 00 - 0 10 Thousand/uL    Total Counted      Platelet Estimate Adequate Adequate    Giant PLTs Present    UA w Reflex to Microscopic w Reflex to Culture    Collection Time: 09/27/19 12:59 PM   Result Value Ref Range    Color, UA Yellow     Clarity, UA Slightly Cloudy     Specific [place], UA 1 015 1 003 - 1 030    pH, UA 5 5 4 5, 5 0, 5 5, 6 0, 6 5, 7 0, 7 5, 8 0    Leukocytes, UA Small (A) Negative    Nitrite, UA Negative Negative    Protein, UA 30 (1+) (A) Negative mg/dl    Glucose, UA Negative Negative mg/dl    Ketones, UA Negative Negative mg/dl    Urobilinogen, UA 0 2 0 2, 1 0 E U /dl E U /dl    Bilirubin, UA Negative Negative    Blood, UA Negative Negative   CBC and differential    Collection Time: 09/27/19 12:59 PM   Result Value Ref Range    WBC 14 77 (H) 4 31 - 10 16 Thousand/uL    RBC 4 14 3 88 - 5 62 Million/uL    Hemoglobin 12 6 12 0 - 17 0 g/dL    Hematocrit 38 9 36 5 - 49 3 %    MCV 94 82 - 98 fL    MCH 30 4 26 8 - 34 3 pg    MCHC 32 4 31 4 - 37 4 g/dL    RDW 12 4 11 6 - 15 1 %    MPV 8 8 (L) 8 9 - 12 7 fL    Platelets 510 618 - 737 Thousands/uL    nRBC 0 /100 WBCs    Neutrophils Relative 87 (H) 43 - 75 %    Immat GRANS % 2 0 - 2 %    Lymphocytes Relative 5 (L) 14 - 44 %    Monocytes Relative 5 4 - 12 %    Eosinophils Relative 1 0 - 6 %    Basophils Relative 0 0 - 1 %    Neutrophils Absolute 12 98 (H) 1 85 - 7 62 Thousands/µL    Immature Grans Absolute 0 22 (H) 0 00 - 0 20 Thousand/uL    Lymphocytes Absolute 0 75 0 60 - 4 47 Thousands/µL    Monocytes Absolute 0 70 0 17 - 1 22 Thousand/µL    Eosinophils Absolute 0 08 0 00 - 0 61 Thousand/µL    Basophils Absolute 0 04 0 00 - 0 10 Thousands/µL   Blood culture    Collection Time: 09/27/19 12:59 PM   Result Value Ref Range    Blood Culture No Growth After 5 Days      Urine Microscopic    Collection Time: 09/27/19 12:59 PM   Result Value Ref Range    RBC, UA None Seen None Seen, 0-5 /hpf    WBC, UA 30-50 (A) None Seen, 0-5, 5-55, 5-65 /hpf    Epithelial Cells Occasional None Seen, Occasional /hpf    Bacteria, UA Moderate (A) None Seen, Occasional /hpf Urine culture    Collection Time: 09/27/19 12:59 PM   Result Value Ref Range    Urine Culture >100,000 cfu/ml Escherichia coli (A)        Susceptibility    Escherichia coli - NEISHA     ZID Performed Yes       Ampicillin ($$) <=8 00 Susceptible ug/ml     Aztreonam ($$$)  <=4 Susceptible ug/ml     Cefazolin ($) <=2 00 Susceptible ug/ml     Ciprofloxacin ($)  <=1 00 Susceptible ug/ml     Gentamicin ($$) <=1 Susceptible ug/ml     Levofloxacin ($) <=0 25 Susceptible ug/ml     Nitrofurantoin <=32 Susceptible ug/ml     Tetracycline <=4 Susceptible ug/ml     Tobramycin ($) <=1 Susceptible ug/ml     Trimethoprim + Sulfamethoxazole ($$$) <=2/38 Susceptible ug/ml   CBC and differential    Collection Time: 10/22/19 10:41 AM   Result Value Ref Range    WBC 8 47 4 31 - 10 16 Thousand/uL    RBC 4 32 3 88 - 5 62 Million/uL    Hemoglobin 13 0 12 0 - 17 0 g/dL    Hematocrit 40 9 36 5 - 49 3 %    MCV 95 82 - 98 fL    MCH 30 1 26 8 - 34 3 pg    MCHC 31 8 31 4 - 37 4 g/dL    RDW 13 2 11 6 - 15 1 %    MPV 9 6 8 9 - 12 7 fL    Platelets 372 043 - 667 Thousands/uL    nRBC 0 /100 WBCs    Neutrophils Relative 63 43 - 75 %    Immat GRANS % 1 0 - 2 %    Lymphocytes Relative 24 14 - 44 %    Monocytes Relative 9 4 - 12 %    Eosinophils Relative 2 0 - 6 %    Basophils Relative 1 0 - 1 %    Neutrophils Absolute 5 45 1 85 - 7 62 Thousands/µL    Immature Grans Absolute 0 05 0 00 - 0 20 Thousand/uL    Lymphocytes Absolute 2 00 0 60 - 4 47 Thousands/µL    Monocytes Absolute 0 76 0 17 - 1 22 Thousand/µL    Eosinophils Absolute 0 17 0 00 - 0 61 Thousand/µL    Basophils Absolute 0 04 0 00 - 0 10 Thousands/µL   Comprehensive metabolic panel    Collection Time: 10/22/19 10:41 AM   Result Value Ref Range    Sodium 141 136 - 145 mmol/L    Potassium 4 2 3 5 - 5 3 mmol/L    Chloride 106 100 - 108 mmol/L    CO2 27 21 - 32 mmol/L    ANION GAP 8 4 - 13 mmol/L    BUN 26 (H) 5 - 25 mg/dL    Creatinine 1 13 0 60 - 1 30 mg/dL    Glucose, Fasting 92 65 - 99 mg/dL Calcium 10 0 8 3 - 10 1 mg/dL    AST 16 5 - 45 U/L    ALT 15 12 - 78 U/L    Alkaline Phosphatase 87 46 - 116 U/L    Total Protein 7 2 6 4 - 8 2 g/dL    Albumin 3 6 3 5 - 5 0 g/dL    Total Bilirubin 0 53 0 20 - 1 00 mg/dL    eGFR 60 ml/min/1 73sq m   Lipid panel    Collection Time: 10/22/19 10:41 AM   Result Value Ref Range    Cholesterol 184 50 - 200 mg/dL    Triglycerides 108 <=150 mg/dL    HDL, Direct 105 >=40 mg/dL    LDL Calculated 57 0 - 100 mg/dL    Non-HDL-Chol (CHOL-HDL) 79 mg/dl   Hemoglobin A1C    Collection Time: 10/22/19 10:41 AM   Result Value Ref Range    Hemoglobin A1C 6 8 (H) 4 2 - 6 3 %     mg/dl   Microalbumin / creatinine urine ratio    Collection Time: 10/24/19 10:44 AM   Result Value Ref Range    Creatinine, Ur 98 3 mg/dL    Microalbum  ,U,Random 195 0 (H) 0 0 - 20 0 mg/L    Microalb Creat Ratio 198 (H) 0 - 30 mg/g creatinine       The following portions of the patient's history were reviewed and updated as appropriate: allergies, current medications, past family history, past medical history, past social history, past surgical history and problem list      Review of Systems   Constitutional: Negative for appetite change, chills, diaphoresis, fatigue, fever and unexpected weight change  HENT: Negative for congestion, hearing loss and rhinorrhea  Eyes: Negative for visual disturbance  Respiratory: Negative for cough, chest tightness, shortness of breath and wheezing  Cardiovascular: Negative for chest pain, palpitations and leg swelling  Gastrointestinal: Negative for abdominal pain and blood in stool  Endocrine: Negative for cold intolerance, heat intolerance, polydipsia and polyuria  Genitourinary: Negative for difficulty urinating, dysuria, frequency and urgency  Musculoskeletal: Positive for gait problem  Negative for arthralgias and myalgias  Skin: Negative for rash  Neurological: Negative for dizziness, weakness, light-headedness and headaches     Hematological: Does not bruise/bleed easily  Psychiatric/Behavioral: Negative for dysphoric mood and sleep disturbance  Objective:      Vitals:    10/30/19 1311   BP: 148/52   Pulse: 96   Resp: 12          Physical Exam   Constitutional: He is oriented to person, place, and time  He appears well-developed and well-nourished  HENT:   Head: Normocephalic and atraumatic  Nose: Nose normal    Mouth/Throat: Oropharynx is clear and moist    Eyes: Pupils are equal, round, and reactive to light  Conjunctivae and EOM are normal  No scleral icterus  Neck: Normal range of motion  Neck supple  No JVD present  No tracheal deviation present  No thyromegaly present  Cardiovascular: Normal rate, regular rhythm and intact distal pulses  Exam reveals no gallop and no friction rub  No murmur heard  Pulmonary/Chest: Effort normal and breath sounds normal  No respiratory distress  He has no wheezes  He has no rales  Musculoskeletal: He exhibits no edema or deformity  Lymphadenopathy:     He has no cervical adenopathy  Neurological: He is alert and oriented to person, place, and time  No cranial nerve deficit  Skin: Skin is warm and dry  No rash noted  No erythema  No pallor  Psychiatric: He has a normal mood and affect   His behavior is normal  Judgment and thought content normal

## 2019-10-30 NOTE — PATIENT INSTRUCTIONS
Lab data reviewed in detail and compared prior    Type 2 diabetes mellitus is under excellent control with diet alone with A1c 6 8    Hypertension and hyperlipidemia stable on present regimen    Parkinson's disease with neurologic gait dysfunction following with Neurology, continue present regimen, continue using rolling walker and home safety precautions reinforced  We discussed consideration for medical alert though patient is accompanied by a family member 25 hours a day in the home  Asthma/COPD is stable on present regimen following with Pulmonary with history of Aspergillus    Health maintenance up-to-date    Routine follow-up after labs in 4 months, sooner as needed

## 2019-11-02 DIAGNOSIS — J42 CHRONIC BRONCHITIS, UNSPECIFIED CHRONIC BRONCHITIS TYPE (HCC): ICD-10-CM

## 2019-11-02 DIAGNOSIS — I63.9 CEREBROVASCULAR ACCIDENT (CVA), UNSPECIFIED MECHANISM (HCC): ICD-10-CM

## 2019-11-02 DIAGNOSIS — L30.9 DERMATITIS: ICD-10-CM

## 2019-11-02 DIAGNOSIS — J44.9 CHRONIC OBSTRUCTIVE PULMONARY DISEASE, UNSPECIFIED COPD TYPE (HCC): ICD-10-CM

## 2019-11-03 RX ORDER — ALBUTEROL SULFATE 90 UG/1
AEROSOL, METERED RESPIRATORY (INHALATION)
Qty: 18 INHALER | Refills: 3 | Status: SHIPPED | OUTPATIENT
Start: 2019-11-03 | End: 2020-10-06 | Stop reason: SDUPTHER

## 2019-11-03 RX ORDER — TIOTROPIUM BROMIDE 18 UG/1
CAPSULE ORAL; RESPIRATORY (INHALATION)
Qty: 90 CAPSULE | Refills: 3 | Status: SHIPPED | OUTPATIENT
Start: 2019-11-03 | End: 2020-06-02 | Stop reason: SDUPTHER

## 2019-11-03 RX ORDER — CLOPIDOGREL BISULFATE 75 MG/1
TABLET ORAL
Qty: 90 TABLET | Refills: 3 | Status: SHIPPED | OUTPATIENT
Start: 2019-11-03 | End: 2020-03-02

## 2019-11-03 RX ORDER — TRIAMCINOLONE ACETONIDE 5 MG/G
CREAM TOPICAL 2 TIMES DAILY PRN
Qty: 30 G | Refills: 1 | Status: SHIPPED | OUTPATIENT
Start: 2019-11-03 | End: 2020-01-14

## 2019-11-05 ENCOUNTER — PROCEDURE VISIT (OUTPATIENT)
Dept: UROLOGY | Facility: CLINIC | Age: 84
End: 2019-11-05
Payer: COMMERCIAL

## 2019-11-05 VITALS
HEIGHT: 64 IN | DIASTOLIC BLOOD PRESSURE: 64 MMHG | SYSTOLIC BLOOD PRESSURE: 122 MMHG | HEART RATE: 72 BPM | WEIGHT: 167 LBS | BODY MASS INDEX: 28.51 KG/M2

## 2019-11-05 DIAGNOSIS — R33.9 URINARY RETENTION: Primary | ICD-10-CM

## 2019-11-05 LAB — POST-VOID RESIDUAL VOLUME, ML POC: 56 ML

## 2019-11-05 PROCEDURE — 87086 URINE CULTURE/COLONY COUNT: CPT | Performed by: UROLOGY

## 2019-11-05 PROCEDURE — 87186 SC STD MICRODIL/AGAR DIL: CPT | Performed by: UROLOGY

## 2019-11-05 PROCEDURE — 52000 CYSTOURETHROSCOPY: CPT | Performed by: UROLOGY

## 2019-11-05 PROCEDURE — 87077 CULTURE AEROBIC IDENTIFY: CPT | Performed by: UROLOGY

## 2019-11-05 PROCEDURE — 51798 US URINE CAPACITY MEASURE: CPT | Performed by: UROLOGY

## 2019-11-05 PROCEDURE — 99214 OFFICE O/P EST MOD 30 MIN: CPT | Performed by: UROLOGY

## 2019-11-05 PROCEDURE — 76872 US TRANSRECTAL: CPT | Performed by: UROLOGY

## 2019-11-05 RX ORDER — FINASTERIDE 5 MG/1
5 TABLET, FILM COATED ORAL DAILY
Qty: 30 TABLET | Refills: 6 | Status: SHIPPED | OUTPATIENT
Start: 2019-11-05 | End: 2020-04-29 | Stop reason: SDUPTHER

## 2019-11-05 NOTE — PROGRESS NOTES
Cystoscopy  Date/Time: 11/5/2019 11:55 AM  Performed by: Adele Galeas MD  Authorized by: Adele Galeas MD     Procedure details: cystoscopy    Patient tolerance: Patient tolerated the procedure well with no immediate complications    Additional Procedure Details:   Cystoscopy procedure note:    Risk and benefits of flexible cystoscopy were discussed  Informed consent was obtained  Urine dip was adequate for cystoscopy  The patient was placed in the supine position  His genitalia was prepped with Betadine and draped in a sterile fashion  Viscous 2% lidocaine jelly was instilled into the urethra and allowed dwell time for local anesthesia  Flexible cystoscopy was then performed using a 16F scope  The distal urethra and prostatic urethra were evaluated and were normal in course and caliber  Coaptation of the bilateral prostatic lobes was noted  There is a very minimal intravesical component on retroflexion of the scope  Once inside the bladder, it was carefully inspected in a 360 degree fashion  There was no evidence of mucosal abnormalities, lesions, diverticula or stones  There was some mild trabeculation  Patient did have some particulate urine at the dependent portion of the bladder  Both ureteral orifices in their orthotopic location were visualized with clear efflux of urine  Retroflexion for evaluation of the bladder neck was normal      Overall this was a negative cystoscopy  Patient was turned the left lateral decubitus position and prostate measured approximately 36 g  There was less than 1 cm intravesical protrusion measured on ultrasound

## 2019-11-05 NOTE — LETTER
November 5, 2019     Anival Singh MD  [address], [place] [place] [address]    Patient: Hector Sandoval   YOB: 1935   Date of Visit: 11/5/2019       Dear Dr Roselia Huerta: Thank you for referring Hector Sandoval to me for evaluation  Below are my notes for this consultation  If you have questions, please do not hesitate to call me  I look forward to following your patient along with you  Sincerely,        Nona Guerrero MD        CC: No Recipients  Nona Guerrero MD  11/5/2019 11:55 AM  Sign at close encounter  Cystoscopy  Date/Time: 11/5/2019 11:55 AM  Performed by: Nona Guerrero MD  Authorized by: Nona Guerrero MD     Procedure details: cystoscopy    Patient tolerance: Patient tolerated the procedure well with no immediate complications    Additional Procedure Details:   Cystoscopy procedure note:    Risk and benefits of flexible cystoscopy were discussed  Informed consent was obtained  Urine dip was adequate for cystoscopy  The patient was placed in the supine position  His genitalia was prepped with Betadine and draped in a sterile fashion  Viscous 2% lidocaine jelly was instilled into the urethra and allowed dwell time for local anesthesia  Flexible cystoscopy was then performed using a 16F scope  The distal urethra and prostatic urethra were evaluated and were normal in course and caliber  Coaptation of the bilateral prostatic lobes was noted  There is a very minimal intravesical component on retroflexion of the scope  Once inside the bladder, it was carefully inspected in a 360 degree fashion  There was no evidence of mucosal abnormalities, lesions, diverticula or stones  There was some mild trabeculation  Patient did have some particulate urine at the dependent portion of the bladder  Both ureteral orifices in their orthotopic location were visualized with clear efflux of urine   Retroflexion for evaluation of the bladder neck was normal      Overall this was a negative cystoscopy  Patient was turned the left lateral decubitus position and prostate measured approximately 36 g  There was less than 1 cm intravesical protrusion measured on ultrasound  Sharda Thompson MD  11/5/2019 11:54 AM  Sign at close encounter    Grisel Grimes is a 81 y o  male with a complaint of   Chief Complaint   Patient presents with    Cystoscopy     Urinary retention - Patient unable to give urine sample today/ PVR today is 56       History of Present Illness:     81 y o  male with a history of hospitalization after being found down  Patient has a neurologic history of Parkinson's disease  During his hospital stay, the patient had 1 L urinary retention that he remained catheterized for period attempted trial of void was performed after his hospital stay   And he was able to void 250 cc in the office with a residual of 175 cc  Given the patient's intolerance of Flomax due to dizziness lightheadedness and falls, this is not and agreeable medication for the patient to remain on  After discussion with the family, they are interested in potential surgical management of his outlet and so he returns today for cystoscopy  He is accompanied by his daughter  He has been voiding reasonably well at home  Postvoid residual was checked the start of the visit was around 50 cc      Past Medical History:     Past Medical History:   Diagnosis Date    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     Allergic rhinitis     last assessed [id number]    Aortic regurgitation     Arm laceration     Asthma     Bronchitis, chronic obstructive, with exacerbation ([facility])     last assessed 12Jun2015    Candidal intertrigo     Chronic obstructive lung disease ([facility] [address] )     last assessed [id number]    COPD [facility]chronic obstructive pulmonary disease) [facility]     Coronary artery disease     carotid stents    Cough     CVA (cerebral vascular accident) ([place] [address] )     Dermatitis     Diabetes mellitus type 2, uncomplicated ([place] [address] )     controlled    Dysthymic disorder     Fatigue     Hyperlipidemia     Hypertension     Neurologic gait dysfunction     Parkinson's disease ([place] [address] )     Pneumonia     PVD (peripheral vascular disease) ([place] [address] )     Seborrheic keratosis     TIA (transient ischemic attack)     Tinea corporis     Tinea pedis of both feet     Tremor        PAST SURGICAL HISTORY:     Past Surgical History:   Procedure Laterality Date    NASAL SINUS SURGERY      [place] BRONCHOSCOPY,DIAGNOSTIC N/A 7/27/2016    Procedure: BRONCHOSCOPY;  Surgeon: Randall Olivares MD;  [facility];   Service: Pulmonary       CURRENT MEDICATIONS:     Current Outpatient Medications   Medication Sig Dispense Refill    acetaminophen (TYLENOL) 325 mg tablet Take 650 mg by mouth every 6 (six) hours as needed for mild pain      albuterol (2 5 mg/3 mL) 0 083 % nebulizer solution every 4 (four) hours as needed  5    albuterol (PROVENTIL HFA,VENTOLIN HFA) 90 mcg/act inhaler TAKE 2 PUFFS BY MOUTH EVERY 6 HOURS AS NEEDED FOR WHEEZE 18 Inhaler 3    atorvastatin (LIPITOR) 10 mg tablet Take 10 mg by mouth daily  1    carbidopa-levodopa (SINEMET)  mg per tablet Take 1 tablet by mouth 3 (three) times a day 90 tablet 6    clopidogrel (PLAVIX) 75 mg tablet TAKE 1 TABLET BY MOUTH EVERY DAY 90 tablet 3    fluticasone-salmeterol (ADVAIR DISKUS) 250-50 mcg/dose inhaler Inhale 1 puff 2 (two) times a day Rinse mouth after use 1 Inhaler 5    guaiFENesin (MUCINEX) 600 mg 12 hr tablet Take 1 tablet (600 mg total) by mouth every 12 (twelve) hours (Patient taking differently: Take 600 mg by mouth 2 (two) times a day as needed )      irbesartan-hydrochlorothiazide (AVALIDE) 150-12 5 MG per tablet TAKE 1 TABLET BY MOUTH EVERY DAY 90 tablet 3    levalbuterol (XOPENEX) 1 25 mg/0 5 mL nebulizer solution Take 0 5 mL (1 25 mg total) by nebulization 3 (three) times a day      Melatonin 5 MG TABS Take by mouth daily at bedtime as needed      Misc  Devices (ROLLATOR) MISC Dispense 1 rolling walker with seat and basket 1 each 0    montelukast (SINGULAIR) 10 mg tablet Take 1 tablet (10 mg total) by mouth daily at bedtime 90 tablet 3    pramipexole (MIRAPEX) 0 5 mg tablet Take 1 tablet (0 5 mg total) by mouth 3 (three) times a day 90 tablet 6    predniSONE 10 mg tablet TAKE 1 TABLET BY MOUTH EVERY DAY 30 tablet 5    rivastigmine (EXELON) 3 mg capsule Take 1 capsule (3 mg total) by mouth 2 (two) times a day 60 capsule 2    SPIRIVA HANDIHALER 18 MCG inhalation capsule PLACE 1 CAPSULE INTO INHALER AND INHALE DAILY VIA HANDIHALER AT THE SAME TIME EVERY DAY 90 capsule 3    terbinafine (LamISIL) 1 % cream Apply topically 2 (two) times a day as needed       triamcinolone (KENALOG) 0 5 % cream APPLY TOPICALLY 2 (TWO) TIMES A DAY AS NEEDED FOR RASH 30 g 1    finasteride (PROSCAR) 5 mg tablet Take 1 tablet (5 mg total) by mouth daily for 30 doses 30 tablet 6     No current facility-administered medications for this visit          ALLERGIES:     Allergies   Allergen Reactions    Penicillins Syncope       SOCIAL HISTORY:     Social History     Socioeconomic History    Marital status: /Civil Union     Spouse name: None    Number of children: None    Years of education: None    Highest education level: None   Occupational History    Occupation: retired   Social Needs    Financial resource strain: None    Food insecurity:     Worry: None     Inability: None    Transportation needs:     Medical: None     Non-medical: None   Tobacco Use    Smoking status: Former Smoker     Packs/day: 1 00     Years: 3 00     Pack years: 3 00     Types: Cigarettes     Last attempt to quit:      Years since quittin 8    Smokeless tobacco: Never Used   Substance and Sexual Activity    Alcohol use: Not Currently     Comment: rarely    Drug use: No    Sexual activity: Not Currently   Lifestyle    Physical activity:     Days per week: 0 days     Minutes per session: 0 min    Stress: Not at all   Relationships    Social connections:     Talks on phone: None     Gets together: None     Attends Restorationist service: None     Active member of club or organization: None     Attends meetings of clubs or organizations: None     Relationship status: None    Intimate partner violence:     Fear of current or ex partner: None     Emotionally abused: None     Physically abused: None     Forced sexual activity: None   Other Topics Concern    None   Social History Narrative    Lives independently with spouse    No advance directives       SOCIAL HISTORY:     Family History   Problem Relation Age of Onset    No Known Problems Mother         Old Age    COPD Sister     Lung cancer Sister     Asthma Family        REVIEW OF SYSTEMS:     Review of Systems   Constitutional: Negative  Respiratory: Negative  Cardiovascular: Negative  Gastrointestinal: Negative  Genitourinary: Negative for difficulty urinating and frequency  Musculoskeletal: Negative  Skin: Negative  Neurological: Positive for dizziness, tremors and light-headedness  Psychiatric/Behavioral: Negative  PHYSICAL EXAM:     /64 (BP Location: Left arm, Patient Position: Sitting, Cuff Size: Adult)   Pulse 72   Ht 5' 4" (1 626 m)   Wt 75 8 kg (167 lb)   BMI 28 67 kg/m²      General:    Elderly male in no acute distress  They have a normal affect  Minimal tremor  HEENT:  Normocephalic, atraumatic  Neck is supple without any palpable lymphadenopathy  Cardiovascular:  Patient has normal palpable distal radial pulses  There is no significant peripheral edema  No JVD is noted  Respiratory:  Patient has unlabored respirations  There is no audible wheeze or rhonchi  Abdomen:   Abdomen is soft and nontender  There is no tympany    Inguinal and umbilical hernia are not appreciated  Genitourinary:  Uncircumcised phallus, orthotopic meatus, testicles smooth and descended, rectal exam is mildly enlarged    Musculoskeletal:  Patient does not have significant CVA tenderness in the  flank with palpation or percussion  They full range of motion in all 4 extremities  Strength in all 4 extremities appears congruent  Patient is able to ambulate without assistance or difficulty  Dermatologic:  Patient has no skin abnormalities or rashes  LABS:     CBC:   Lab Results   Component Value Date    WBC 8 47 10/22/2019    HGB 13 0 10/22/2019    HCT 40 9 10/22/2019    MCV 95 10/22/2019     10/22/2019       BMP:   Lab Results   Component Value Date    GLUCOSE 94 12/10/2015    CALCIUM 10 0 10/22/2019     12/10/2015    K 4 2 10/22/2019    CO2 27 10/22/2019     10/22/2019    BUN 26 (H) 10/22/2019    CREATININE 1 13 10/22/2019       IMAGIN/27/19  RENAL ULTRASOUND     INDICATION:   BLADDER OUTLET OBSTRUCTION, NORMAL RENAL FUNCTION  Bladder outlet obstruction, normal renal function      COMPARISON: None     TECHNIQUE:   Ultrasound of the retroperitoneum was performed with a curvilinear transducer utilizing volumetric sweeps and still imaging techniques       FINDINGS:     KIDNEYS:  Symmetric and normal size  Right kidney:  12 3 x 6 5 cm  Left kidney:  13 1 x 6 9 cm      Right kidney  Normal echogenicity and contour  No suspicious masses detected  1 1 x 0 6 x 1 0 cm exophytic simple cyst is present  No hydronephrosis  No shadowing calculi  No perinephric fluid collections      Left kidney  Normal echogenicity and contour  No suspicious masses detected  No hydronephrosis  No shadowing calculi  No perinephric fluid collections      URETERS:  Nonvisualized      BLADDER:   A rollins catheter is in place, decompressing the bladder and limiting its evaluation      IMPRESSION:        1  Simple cyst within the right kidney  No hydronephrosis    2  Rollins catheter in place decompressing the bladder and limiting its evaluation  No gross abnormality  PROCEDURE:     SEE NOTE    ASSESSMENT:     81 y o  male with prior urinary retention and lower urinary tract symptoms    PLAN:      I recommended stoppage of the patient's Flomax  This could potentially be contributing to his dizziness lightheadedness and falls  I have recommended Proscar 5 mg to help with prostate obstruction  Patient's daughter understands this medication can take time to work  The patient's prostate is enlarged with coaptation on cystoscopy but is not significantly sized to prevent minimally invasive treatments  If the patient does not have benefit from the Proscar or develops recurrent retention, would consider UroLift implantation  Information regarding this procedure has been given to the patient and his daughter  The patient will return to our office in 4-6 weeks for a postvoid residual value  He will return to our office in 6 months to continue assess his symptoms  Should his symptoms progress in the interim, surgical option would be considered

## 2019-11-05 NOTE — PROGRESS NOTES
UROLOGY NEW CONSULT NOTE     CHIEF COMPLAINT   Adriana Otto is a 81 y o  male with a complaint of   Chief Complaint   Patient presents with    Cystoscopy     Urinary retention - Patient unable to give urine sample today/ PVR today is 56       History of Present Illness:     81 y o  male with a history of hospitalization after being found down  Patient has a neurologic history of Parkinson's disease  During his hospital stay, the patient had 1 L urinary retention that he remained catheterized for period attempted trial of void was performed after his hospital stay   And he was able to void 250 cc in the office with a residual of 175 cc  Given the patient's intolerance of Flomax due to dizziness lightheadedness and falls, this is not and agreeable medication for the patient to remain on  After discussion with the family, they are interested in potential surgical management of his outlet and so he returns today for cystoscopy  He is accompanied by his daughter  He has been voiding reasonably well at home  Postvoid residual was checked the start of the visit was around 50 cc      Past Medical History:     Past Medical History:   Diagnosis Date    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) ([facility])     Allergic rhinitis     last assessed 17Dec2013    Aortic regurgitation     Arm laceration     Asthma     Bronchitis, chronic obstructive, with exacerbation ([facility])     last assessed 12Jun2015    Candidal intertrigo     Chronic obstructive lung disease ([facility] [address] )     last assessed 17Dec2013    COPD (chronic obstructive pulmonary disease) ([facility])     Coronary artery disease     carotid stents    Cough     CVA (cerebral vascular accident) ([facility] [address] )     Dermatitis     Diabetes mellitus type 2, uncomplicated ([facility] [address] )     controlled    Dysthymic disorder     Fatigue     Hyperlipidemia     Hypertension     Neurologic gait dysfunction     Parkinson's disease ([facility] [address] )     Pneumonia     PVD (peripheral vascular disease) ([address] )     Seborrheic keratosis     TIA (transient ischemic attack)     Tinea corporis     Tinea pedis of both feet     Tremor        PAST SURGICAL HISTORY:     Past Surgical History:   Procedure Laterality Date    NASAL SINUS SURGERY      [place] BRONCHOSCOPY,DIAGNOSTIC N/A 7/27/2016    Procedure: BRONCHOSCOPY;  Surgeon: Sandra Bright MD;  Location[facility]; Service: Pulmonary       CURRENT MEDICATIONS:     Current Outpatient Medications   Medication Sig Dispense Refill    acetaminophen (TYLENOL) 325 mg tablet Take 650 mg by mouth every 6 (six) hours as needed for mild pain      albuterol (2 5 mg/3 mL) 0 083 % nebulizer solution every 4 (four) hours as needed  5    albuterol (PROVENTIL HFA,VENTOLIN HFA) 90 mcg/act inhaler TAKE 2 PUFFS BY MOUTH EVERY 6 HOURS AS NEEDED FOR WHEEZE 18 Inhaler 3    atorvastatin (LIPITOR) 10 mg tablet Take 10 mg by mouth daily  1    carbidopa-levodopa (SINEMET)  mg per tablet Take 1 tablet by mouth 3 (three) times a day 90 tablet 6    clopidogrel (PLAVIX) 75 mg tablet TAKE 1 TABLET BY MOUTH EVERY DAY 90 tablet 3    fluticasone-salmeterol (ADVAIR DISKUS) 250-50 mcg/dose inhaler Inhale 1 puff 2 (two) times a day Rinse mouth after use 1 Inhaler 5    guaiFENesin (MUCINEX) 600 mg 12 hr tablet Take 1 tablet (600 mg total) by mouth every 12 (twelve) hours (Patient taking differently: Take 600 mg by mouth 2 (two) times a day as needed )      irbesartan-hydrochlorothiazide (AVALIDE) 150-12 5 MG per tablet TAKE 1 TABLET BY MOUTH EVERY DAY 90 tablet 3    levalbuterol (XOPENEX) 1 25 mg/0 5 mL nebulizer solution Take 0 5 mL (1 25 mg total) by nebulization 3 (three) times a day      Melatonin 5 MG TABS Take by mouth daily at bedtime as needed      Misc   Devices (ROLLATOR) MISC Dispense 1 rolling walker with seat and basket 1 each 0    montelukast (SINGULAIR) 10 mg tablet Take 1 tablet (10 mg total) by mouth daily at bedtime 90 tablet 3    pramipexole (MIRAPEX) 0 5 mg tablet Take 1 tablet (0 5 mg total) by mouth 3 (three) times a day 90 tablet 6    predniSONE 10 mg tablet TAKE 1 TABLET BY MOUTH EVERY DAY 30 tablet 5    rivastigmine (EXELON) 3 mg capsule Take 1 capsule (3 mg total) by mouth 2 (two) times a day 60 capsule 2    SPIRIVA HANDIHALER 18 MCG inhalation capsule PLACE 1 CAPSULE INTO INHALER AND INHALE DAILY VIA HANDIHALER AT THE SAME TIME EVERY DAY 90 capsule 3    terbinafine (LamISIL) 1 % cream Apply topically 2 (two) times a day as needed       triamcinolone (KENALOG) 0 5 % cream APPLY TOPICALLY 2 (TWO) TIMES A DAY AS NEEDED FOR RASH 30 g 1    finasteride (PROSCAR) 5 mg tablet Take 1 tablet (5 mg total) by mouth daily for 30 doses 30 tablet 6     No current facility-administered medications for this visit          ALLERGIES:     Allergies   Allergen Reactions    Penicillins Syncope       SOCIAL HISTORY:     Social History     Socioeconomic History    Marital status: /Civil Union     Spouse name: None    Number of children: None    Years of education: None    Highest education level: None   Occupational History    Occupation: retired   Social Needs    Financial resource strain: None    Food insecurity:     Worry: None     Inability: None    Transportation needs:     Medical: None     Non-medical: None   Tobacco Use    Smoking status: Former Smoker     Packs/day: 1 00     Years: 3 00     Pack years: 3 00     Types: Cigarettes     Last attempt to quit:      Years since quittin 8    Smokeless tobacco: Never Used   Substance and Sexual Activity    Alcohol use: Not Currently     Comment: rarely    Drug use: No    Sexual activity: Not Currently   Lifestyle    Physical activity:     Days per week: 0 days     Minutes per session: 0 min    Stress: Not at all   Relationships    Social connections:     Talks on phone: None     Gets together: None     Attends Sabianism service: None     Active member of club or organization: None     Attends meetings of clubs or organizations: None     Relationship status: None    Intimate partner violence:     Fear of current or ex partner: None     Emotionally abused: None     Physically abused: None     Forced sexual activity: None   Other Topics Concern    None   Social History Narrative    Lives independently with spouse    No advance directives       SOCIAL HISTORY:     Family History   Problem Relation Age of Onset    No Known Problems Mother         Old Age    COPD Sister     Lung cancer Sister     Asthma Family        REVIEW OF SYSTEMS:     Review of Systems   Constitutional: Negative  Respiratory: Negative  Cardiovascular: Negative  Gastrointestinal: Negative  Genitourinary: Negative for difficulty urinating and frequency  Musculoskeletal: Negative  Skin: Negative  Neurological: Positive for dizziness, tremors and light-headedness  Psychiatric/Behavioral: Negative  PHYSICAL EXAM:     /64 (BP Location: Left arm, Patient Position: Sitting, Cuff Size: Adult)   Pulse 72   Ht 5' 4" (1 626 m)   Wt 75 8 kg (167 lb)   BMI 28 67 kg/m²     General:    Elderly male in no acute distress  They have a normal affect  Minimal tremor  HEENT:  Normocephalic, atraumatic  Neck is supple without any palpable lymphadenopathy  Cardiovascular:  Patient has normal palpable distal radial pulses  There is no significant peripheral edema  No JVD is noted  Respiratory:  Patient has unlabored respirations  There is no audible wheeze or rhonchi  Abdomen:   Abdomen is soft and nontender  There is no tympany  Inguinal and umbilical hernia are not appreciated  Genitourinary:  Uncircumcised phallus, orthotopic meatus, testicles smooth and descended, rectal exam is mildly enlarged    Musculoskeletal:  Patient does not have significant CVA tenderness in the  flank with palpation or percussion  They full range of motion in all 4 extremities  Strength in all 4 extremities appears congruent    Patient is able to ambulate without assistance or difficulty  Dermatologic:  Patient has no skin abnormalities or rashes  LABS:     CBC:   Lab Results   Component Value Date    WBC 8 47 10/22/2019    HGB 13 0 10/22/2019    HCT 40 9 10/22/2019    MCV 95 10/22/2019     10/22/2019       BMP:   Lab Results   Component Value Date    GLUCOSE 94 12/10/2015    CALCIUM 10 0 10/22/2019     12/10/2015    K 4 2 10/22/2019    CO2 27 10/22/2019     10/22/2019    BUN 26 (H) 10/22/2019    CREATININE 1 13 10/22/2019       IMAGIN/27/19  RENAL ULTRASOUND     INDICATION:   BLADDER OUTLET OBSTRUCTION, NORMAL RENAL FUNCTION  Bladder outlet obstruction, normal renal function      COMPARISON: None     TECHNIQUE:   Ultrasound of the retroperitoneum was performed with a curvilinear transducer utilizing volumetric sweeps and still imaging techniques       FINDINGS:     KIDNEYS:  Symmetric and normal size  Right kidney:  12 3 x 6 5 cm  Left kidney:  13 1 x 6 9 cm      Right kidney  Normal echogenicity and contour  No suspicious masses detected  1 1 x 0 6 x 1 0 cm exophytic simple cyst is present  No hydronephrosis  No shadowing calculi  No perinephric fluid collections      Left kidney  Normal echogenicity and contour  No suspicious masses detected  No hydronephrosis  No shadowing calculi  No perinephric fluid collections      URETERS:  Nonvisualized      BLADDER:   A rollins catheter is in place, decompressing the bladder and limiting its evaluation      IMPRESSION:        1  Simple cyst within the right kidney  No hydronephrosis  2  Rollins catheter in place decompressing the bladder and limiting its evaluation  No gross abnormality  PROCEDURE:     SEE NOTE    ASSESSMENT:     81 y o  male with prior urinary retention and lower urinary tract symptoms    PLAN:      I recommended stoppage of the patient's Flomax  This could potentially be contributing to his dizziness lightheadedness and falls    I have recommended Proscar 5 mg to help with prostate obstruction  Patient's daughter understands this medication can take time to work  The patient's prostate is enlarged with coaptation on cystoscopy but is not significantly sized to prevent minimally invasive treatments  If the patient does not have benefit from the Proscar or develops recurrent retention, would consider UroLift implantation  Information regarding this procedure has been given to the patient and his daughter  The patient will return to our office in 4-6 weeks for a postvoid residual value  He will return to our office in 6 months to continue assess his symptoms  Should his symptoms progress in the interim, surgical option would be considered

## 2019-11-07 LAB — BACTERIA UR CULT: ABNORMAL

## 2019-11-11 ENCOUNTER — TELEPHONE (OUTPATIENT)
Dept: UROLOGY | Facility: CLINIC | Age: 84
End: 2019-11-11

## 2019-11-11 RX ORDER — NITROFURANTOIN 25; 75 MG/1; MG/1
100 CAPSULE ORAL 2 TIMES DAILY
Qty: 10 CAPSULE | Refills: 0 | Status: SHIPPED | OUTPATIENT
Start: 2019-11-11 | End: 2019-11-16

## 2019-11-11 NOTE — TELEPHONE ENCOUNTER
Called and spoke to patient's daughter  Explained to daughter that an antibiotic has been sent to the pharmacy  Patient's daughter understood and will call the office with any questions or concerns         ----- Message from Martínez Alejandra MD sent at 11/11/2019  7:34 AM EST -----  Let's please treat the patient with 5 days of MAcrobid

## 2019-11-26 DIAGNOSIS — B44.81 ABPA (ALLERGIC BRONCHOPULMONARY ASPERGILLOSIS) (HCC): ICD-10-CM

## 2019-11-26 DIAGNOSIS — J45.40 MODERATE PERSISTENT EXTRINSIC ASTHMA WITHOUT COMPLICATION: ICD-10-CM

## 2019-11-26 RX ORDER — PREDNISONE 10 MG/1
TABLET ORAL
Qty: 30 TABLET | Refills: 5 | Status: SHIPPED | OUTPATIENT
Start: 2019-11-26 | End: 2020-05-08

## 2019-12-16 ENCOUNTER — TELEPHONE (OUTPATIENT)
Dept: UROLOGY | Facility: AMBULATORY SURGERY CENTER | Age: 84
End: 2019-12-16

## 2019-12-16 NOTE — TELEPHONE ENCOUNTER
Called and left message for patiens daughter to call the office back to reschedule appointment  Office number was left in the message

## 2019-12-16 NOTE — TELEPHONE ENCOUNTER
Patient's daughter called to cancel nurse visit for today  She would like to get a call back to reschedule

## 2019-12-17 NOTE — TELEPHONE ENCOUNTER
Called and spoke to daughter Dahlia Holter Dahlia Holter reports that patient is doing great and does not have any difficulties urinating at this time  Patient rescheduled for 1/3/2020 at 9:00 for nurse visit

## 2019-12-24 ENCOUNTER — TELEPHONE (OUTPATIENT)
Dept: NEUROSURGERY | Facility: CLINIC | Age: 84
End: 2019-12-24

## 2019-12-24 NOTE — TELEPHONE ENCOUNTER
Tried to call patient, no answer, LMOM     Need to reschedule appointment-  Provider out of office 2/14/2020     MRI scheduled for 2/12, trying to reschedule patient for [facility] appt with ED and HDM 2/21/2020     Provided office number and asked patient to return call to reschedule

## 2019-12-31 LAB
LEFT EYE DIABETIC RETINOPATHY: NORMAL
RIGHT EYE DIABETIC RETINOPATHY: NORMAL

## 2020-01-02 ENCOUNTER — TELEPHONE (OUTPATIENT)
Dept: UROLOGY | Facility: MEDICAL CENTER | Age: 85
End: 2020-01-02

## 2020-01-02 NOTE — TELEPHONE ENCOUNTER
Called and spoke to patients daughter  Appointment rescheduled for Monday 1/6/20 at 2:30 at the [facility] office  Daughter is aware of time and location of appointment

## 2020-01-02 NOTE — TELEPHONE ENCOUNTER
Patient for RN at [facility] scheduled for 9am tomorrow   Patient's daughter, Kat Jaimes, is requesting appointment to be changed to Monday 1/6 but later in am if at all possible  If not Monday- Tuesday after 9am please   Family has multiple appointments tomorrow which interfere  Can be reached at [phone]  Thank you

## 2020-01-03 ENCOUNTER — OFFICE VISIT (OUTPATIENT)
Dept: NEUROLOGY | Facility: CLINIC | Age: 85
End: 2020-01-03
Payer: COMMERCIAL

## 2020-01-03 VITALS
HEART RATE: 72 BPM | DIASTOLIC BLOOD PRESSURE: 64 MMHG | HEIGHT: 64 IN | WEIGHT: 161 LBS | SYSTOLIC BLOOD PRESSURE: 94 MMHG | BODY MASS INDEX: 27.49 KG/M2

## 2020-01-03 DIAGNOSIS — F02.80 DEMENTIA DUE TO PARKINSON'S DISEASE WITHOUT BEHAVIORAL DISTURBANCE (HCC): ICD-10-CM

## 2020-01-03 DIAGNOSIS — R26.9 NEUROLOGIC GAIT DYSFUNCTION: Primary | ICD-10-CM

## 2020-01-03 DIAGNOSIS — G20 DEMENTIA DUE TO PARKINSON'S DISEASE WITHOUT BEHAVIORAL DISTURBANCE (HCC): ICD-10-CM

## 2020-01-03 DIAGNOSIS — G20 PARKINSON'S DISEASE (HCC): ICD-10-CM

## 2020-01-03 PROCEDURE — 99214 OFFICE O/P EST MOD 30 MIN: CPT | Performed by: PSYCHIATRY & NEUROLOGY

## 2020-01-03 RX ORDER — RIVASTIGMINE TARTRATE 4.5 MG/1
4.5 CAPSULE ORAL 2 TIMES DAILY
Qty: 180 CAPSULE | Refills: 1 | Status: SHIPPED | OUTPATIENT
Start: 2020-01-03 | End: 2020-12-14

## 2020-01-03 RX ORDER — RIVASTIGMINE TARTRATE 4.5 MG/1
4.5 CAPSULE ORAL 2 TIMES DAILY
Qty: 60 CAPSULE | Refills: 5 | Status: SHIPPED | OUTPATIENT
Start: 2020-01-03 | End: 2020-01-03 | Stop reason: SDUPTHER

## 2020-01-03 NOTE — PROGRESS NOTES
Progress Note - Neurology   [facility] 81 y o  male MRN: [id number]  Unit/Bed#:  Encounter: [id number]      Subjective:   Patient is here for a follow-up visit accompanied with his daughter with gait dysfunction, memory loss, and Parkinson's disease, and since his last visit the dosage of Exelon was increased to 3 mg twice a day  Patient has noted to be confused with significant short-term memory difficulty and has been ambulating better with the help of a walker but unfortunately suffered a fall yesterday while turning with no major injuries  Patient remains on Sinemet 25/100, 1 tablets 3 times a day and Mirapex 0 5 mg 3 times a day  As per his daughter there have been no other fall since his last visit his blood sugars under good control, and patient denies any other central neurological symptoms except for appearing confused at times and denies any worsening gait difficulty  ROS:   Review of Systems   Constitutional: Negative  Negative for appetite change and fever  HENT: Negative  Negative for hearing loss, tinnitus, trouble swallowing and voice change  Eyes: Negative  Negative for photophobia and pain  Respiratory: Negative  Negative for shortness of breath  Cardiovascular: Negative  Negative for palpitations  Gastrointestinal: Negative  Negative for nausea and vomiting  Endocrine: Negative  Negative for cold intolerance and heat intolerance  Genitourinary: Negative  Negative for dysuria, frequency and urgency  Musculoskeletal: Positive for gait problem  Negative for back pain, myalgias and neck pain  Skin: Negative  Negative for rash  Neurological: Positive for weakness  Negative for dizziness, tremors, seizures, syncope, facial asymmetry, speech difficulty, light-headedness, numbness and headaches  Hematological: Negative  Does not bruise/bleed easily  Psychiatric/Behavioral: Positive for confusion and sleep disturbance  Negative for agitation and hallucinations  MA review of systems was reviewed by myself  Vitals:   Vitals:    01/03/20 1114   BP: 94/64   BP Location: Left arm   Patient Position: Sitting   Cuff Size: Adult   Pulse: 72   Weight: 73 kg (161 lb)   Height: 5' 4" (1 626 m)   ,Body mass index is 27 64 kg/m²  MEDS:      Current Outpatient Medications:     acetaminophen (TYLENOL) 325 mg tablet, Take 650 mg by mouth every 6 (six) hours as needed for mild pain, Disp: , Rfl:     albuterol (2 5 mg/3 mL) 0 083 % nebulizer solution, every 4 (four) hours as needed, Disp: , Rfl: 5    albuterol (PROVENTIL HFA,VENTOLIN HFA) 90 mcg/act inhaler, TAKE 2 PUFFS BY MOUTH EVERY 6 HOURS AS NEEDED FOR WHEEZE, Disp: 18 Inhaler, Rfl: 3    atorvastatin (LIPITOR) 10 mg tablet, Take 10 mg by mouth daily, Disp: , Rfl: 1    carbidopa-levodopa (SINEMET)  mg per tablet, Take 1 tablet by mouth 3 (three) times a day, Disp: 90 tablet, Rfl: 6    clopidogrel (PLAVIX) 75 mg tablet, TAKE 1 TABLET BY MOUTH EVERY DAY, Disp: 90 tablet, Rfl: 3    fluticasone-salmeterol (ADVAIR DISKUS) 250-50 mcg/dose inhaler, Inhale 1 puff 2 (two) times a day Rinse mouth after use, Disp: 1 Inhaler, Rfl: 5    guaiFENesin (MUCINEX) 600 mg 12 hr tablet, Take 1 tablet (600 mg total) by mouth every 12 (twelve) hours (Patient taking differently: Take 600 mg by mouth 2 (two) times a day as needed ), Disp: , Rfl:     irbesartan-hydrochlorothiazide (AVALIDE) 150-12 5 MG per tablet, TAKE 1 TABLET BY MOUTH EVERY DAY, Disp: 90 tablet, Rfl: 3    levalbuterol (XOPENEX) 1 25 mg/0 5 mL nebulizer solution, Take 0 5 mL (1 25 mg total) by nebulization 3 (three) times a day, Disp: , Rfl:     Melatonin 5 MG TABS, Take by mouth daily at bedtime as needed, Disp: , Rfl:     Misc   Devices (ROLLATOR) MISC, Dispense 1 rolling walker with seat and basket, Disp: 1 each, Rfl: 0    montelukast (SINGULAIR) 10 mg tablet, Take 1 tablet (10 mg total) by mouth daily at bedtime, Disp: 90 tablet, Rfl: 3    predniSONE 10 mg tablet, TAKE 1 TABLET BY MOUTH EVERY DAY, Disp: 30 tablet, Rfl: 5    rivastigmine (EXELON) 3 mg capsule, Take 1 capsule (3 mg total) by mouth 2 (two) times a day, Disp: 60 capsule, Rfl: 2    SPIRIVA HANDIHALER 18 MCG inhalation capsule, PLACE 1 CAPSULE INTO INHALER AND INHALE DAILY VIA HANDIHALER AT THE SAME TIME EVERY DAY, Disp: 90 capsule, Rfl: 3    finasteride (PROSCAR) 5 mg tablet, Take 1 tablet (5 mg total) by mouth daily for 30 doses, Disp: 30 tablet, Rfl: 6    pramipexole (MIRAPEX) 0 5 mg tablet, Take 1 tablet (0 5 mg total) by mouth 3 (three) times a day, Disp: 90 tablet, Rfl: 6    terbinafine (LamISIL) 1 % cream, Apply topically 2 (two) times a day as needed , Disp: , Rfl:     triamcinolone (KENALOG) 0 5 % cream, APPLY TOPICALLY 2 (TWO) TIMES A DAY AS NEEDED FOR RASH (Patient not taking: Reported on 1/3/2020), Disp: 30 g, Rfl: 1  :    Physical Exam:  General appearance: alert, appears stated age and cooperative  Head: Normocephalic, without obvious abnormality, atraumatic    On neurological examination patient is alert awake oriented, short-term recall was 2/3, speech is fluent with mild hypophonia, no cranial nerve abnormalities were noted and on motor and sensory exam patient has mild cogwheeling rigidity noted bilaterally, no resting tremor was noted, no evidence of any new focal weakness in the upper or lower extremities, deep tendon reflexes are trace bilaterally, and there is no evidence of any dysmetria but overall bradykinesia is noted  He ambulates with the help of a walker and required minimal assistance to stand from the sitting position  Lab Results: I have personally reviewed pertinent reports  Imaging Studies: I have personally reviewed pertinent reports  Assessment:  1  Parkinson's disease with dementia and gait dysfunction    Plan:  Patient is now advised to increase the dose of Exelon to 4 5 mg twice a day    Will continue with Sinemet and Mirapex at the present dosage as well as Plavix 75 mg daily  Patient was advised about the possible side effects of Exelon including diarrhea and will call us if he has any side effects  He will return back to see me in 3 months in the meanwhile is encouraged home exercises regularly as well as using proper dynamics while ambulating especially with turns  1/3/2020,11:20 AM    Dictation voice to text software has been used in the creation of this document  Please consider this in light of any contextual or grammatical errors

## 2020-01-06 ENCOUNTER — PROCEDURE VISIT (OUTPATIENT)
Dept: UROLOGY | Facility: CLINIC | Age: 85
End: 2020-01-06
Payer: COMMERCIAL

## 2020-01-06 VITALS
SYSTOLIC BLOOD PRESSURE: 122 MMHG | HEIGHT: 64 IN | HEART RATE: 78 BPM | BODY MASS INDEX: 28.51 KG/M2 | WEIGHT: 167 LBS | DIASTOLIC BLOOD PRESSURE: 62 MMHG

## 2020-01-06 DIAGNOSIS — R33.9 URINARY RETENTION: Primary | ICD-10-CM

## 2020-01-06 LAB — POST-VOID RESIDUAL VOLUME, ML POC: 264 ML

## 2020-01-06 PROCEDURE — 51798 US URINE CAPACITY MEASURE: CPT

## 2020-01-06 PROCEDURE — 99213 OFFICE O/P EST LOW 20 MIN: CPT | Performed by: UROLOGY

## 2020-01-06 NOTE — PROGRESS NOTES
1/6/2020    Zach Martinez  1935  [id number]    Diagnosis  Chief Complaint     Urinary Retention          Patient presents for PVR managed by Dr Ariana Nogueira  Follow up as scheduled in may for 6 month follow up    Procedure Whiteside removal/voiding trial    Patient returned for 6 week follow up with RN for PVR  Patient states able to void  Patient unable to void in the office office  Bladder ultrasound performed and PVR measured 264ml  Spoke with Edenilson Matos PA-C who is ok with PVR number at this time as patient has been urinating well over the past 6 weeks       Recent Results (from the past 1 hour(s))   POCT Measure PVR    Collection Time: 01/06/20  2:42 PM   Result Value Ref Range    POST-VOID RESIDUAL VOLUME, ML  mL         Vitals:    01/06/20 1440   BP: 122/62   BP Location: Right arm   Patient Position: Sitting   Cuff Size: Standard   Pulse: 78   Weight: 75 8 kg (167 lb)   Height: 5' 4" (1 626 m)           Tonie Haney RN

## 2020-01-13 DIAGNOSIS — G20 PARKINSON'S DISEASE (HCC): ICD-10-CM

## 2020-01-14 DIAGNOSIS — J45.40 MODERATE PERSISTENT EXTRINSIC ASTHMA WITHOUT COMPLICATION: ICD-10-CM

## 2020-01-14 DIAGNOSIS — L30.9 DERMATITIS: ICD-10-CM

## 2020-01-14 DIAGNOSIS — I63.9 CEREBROVASCULAR ACCIDENT (CVA), UNSPECIFIED MECHANISM (HCC): Primary | ICD-10-CM

## 2020-01-14 DIAGNOSIS — G20 PARKINSON'S DISEASE (HCC): ICD-10-CM

## 2020-01-14 RX ORDER — ATORVASTATIN CALCIUM 10 MG/1
TABLET, FILM COATED ORAL
Qty: 90 TABLET | Refills: 0 | Status: SHIPPED | OUTPATIENT
Start: 2020-01-14 | End: 2020-04-06

## 2020-01-14 RX ORDER — TRIAMCINOLONE ACETONIDE 5 MG/G
CREAM TOPICAL 2 TIMES DAILY PRN
Qty: 30 G | Refills: 0 | Status: SHIPPED | OUTPATIENT
Start: 2020-01-14 | End: 2020-02-26

## 2020-01-14 NOTE — TELEPHONE ENCOUNTER
I am  refusing this as patient is on a higher dose in the prescription was filled in recently by Dr Chris Novak

## 2020-01-16 RX ORDER — RIVASTIGMINE TARTRATE 3 MG/1
CAPSULE ORAL
Qty: 180 CAPSULE | Refills: 0 | Status: SHIPPED | OUTPATIENT
Start: 2020-01-16 | End: 2020-03-02

## 2020-02-12 ENCOUNTER — HOSPITAL ENCOUNTER (OUTPATIENT)
Dept: MRI IMAGING | Facility: HOSPITAL | Age: 85
Discharge: HOME | End: 2020-02-12
Payer: COMMERCIAL

## 2020-02-12 DIAGNOSIS — D33.0 BENIGN NEOPLASM OF SUPRATENTORIAL REGION OF BRAIN (HCC): ICD-10-CM

## 2020-02-12 DIAGNOSIS — D32.9 MENINGIOMA (HCC): ICD-10-CM

## 2020-02-12 DIAGNOSIS — R90.89 ABNORMAL FINDING ON MRI OF BRAIN: ICD-10-CM

## 2020-02-12 PROCEDURE — 70553 MRI BRAIN STEM W/O & W/DYE: CPT

## 2020-02-12 PROCEDURE — A9585 GADOBUTROL INJECTION: HCPCS | Performed by: PHYSICIAN ASSISTANT

## 2020-02-12 RX ADMIN — GADOBUTROL 7 ML: 604.72 INJECTION INTRAVENOUS at 10:55

## 2020-02-14 ENCOUNTER — TELEPHONE (OUTPATIENT)
Dept: OTHER | Facility: OTHER | Age: 85
End: 2020-02-14

## 2020-02-14 NOTE — TELEPHONE ENCOUNTER
Daughter called in and unable to bring father to appointment due to not being able to walk down a flight of stairs today  Daughter thinks they need a appointment later in the day

## 2020-02-26 ENCOUNTER — TELEPHONE (OUTPATIENT)
Dept: INTERNAL MEDICINE CLINIC | Facility: CLINIC | Age: 85
End: 2020-02-26

## 2020-02-26 DIAGNOSIS — L30.9 DERMATITIS: ICD-10-CM

## 2020-02-26 RX ORDER — TRIAMCINOLONE ACETONIDE 5 MG/G
CREAM TOPICAL 2 TIMES DAILY PRN
Qty: 30 G | Refills: 0 | Status: SHIPPED | OUTPATIENT
Start: 2020-02-26 | End: 2020-04-15

## 2020-02-28 ENCOUNTER — APPOINTMENT (OUTPATIENT)
Dept: LAB | Facility: CLINIC | Age: 85
End: 2020-02-28
Payer: COMMERCIAL

## 2020-02-28 DIAGNOSIS — E11.9 TYPE 2 DIABETES MELLITUS WITHOUT COMPLICATION, WITHOUT LONG-TERM CURRENT USE OF INSULIN (HCC): ICD-10-CM

## 2020-02-28 DIAGNOSIS — I10 ESSENTIAL HYPERTENSION: ICD-10-CM

## 2020-02-28 DIAGNOSIS — E78.2 MIXED HYPERLIPIDEMIA: ICD-10-CM

## 2020-02-28 LAB
ALBUMIN SERPL BCP-MCNC: 3.6 G/DL (ref 3.5–5)
ALP SERPL-CCNC: 120 U/L (ref 46–116)
ALT SERPL W P-5'-P-CCNC: 25 U/L (ref 12–78)
ANION GAP SERPL CALCULATED.3IONS-SCNC: 5 MMOL/L (ref 4–13)
AST SERPL W P-5'-P-CCNC: 18 U/L (ref 5–45)
BASOPHILS # BLD AUTO: 0.06 THOUSANDS/ΜL (ref 0–0.1)
BASOPHILS NFR BLD AUTO: 1 % (ref 0–1)
BILIRUB SERPL-MCNC: 0.52 MG/DL (ref 0.2–1)
BUN SERPL-MCNC: 37 MG/DL (ref 5–25)
CALCIUM SERPL-MCNC: 9.4 MG/DL (ref 8.3–10.1)
CHLORIDE SERPL-SCNC: 108 MMOL/L (ref 100–108)
CHOLEST SERPL-MCNC: 181 MG/DL (ref 50–200)
CO2 SERPL-SCNC: 24 MMOL/L (ref 21–32)
CREAT SERPL-MCNC: 1.19 MG/DL (ref 0.6–1.3)
EOSINOPHIL # BLD AUTO: 0.24 THOUSAND/ΜL (ref 0–0.61)
EOSINOPHIL NFR BLD AUTO: 3 % (ref 0–6)
ERYTHROCYTE [DISTWIDTH] IN BLOOD BY AUTOMATED COUNT: 13.4 % (ref 11.6–15.1)
EST. AVERAGE GLUCOSE BLD GHB EST-MCNC: 146 MG/DL
GFR SERPL CREATININE-BSD FRML MDRD: 56 ML/MIN/1.73SQ M
GLUCOSE P FAST SERPL-MCNC: 101 MG/DL (ref 65–99)
HBA1C MFR BLD: 6.7 %
HCT VFR BLD AUTO: 43.7 % (ref 36.5–49.3)
HDLC SERPL-MCNC: 101 MG/DL
HGB BLD-MCNC: 14.2 G/DL (ref 12–17)
IMM GRANULOCYTES # BLD AUTO: 0.06 THOUSAND/UL (ref 0–0.2)
IMM GRANULOCYTES NFR BLD AUTO: 1 % (ref 0–2)
LDLC SERPL CALC-MCNC: 58 MG/DL (ref 0–100)
LYMPHOCYTES # BLD AUTO: 2.18 THOUSANDS/ΜL (ref 0.6–4.47)
LYMPHOCYTES NFR BLD AUTO: 25 % (ref 14–44)
MCH RBC QN AUTO: 30.8 PG (ref 26.8–34.3)
MCHC RBC AUTO-ENTMCNC: 32.5 G/DL (ref 31.4–37.4)
MCV RBC AUTO: 95 FL (ref 82–98)
MONOCYTES # BLD AUTO: 0.86 THOUSAND/ΜL (ref 0.17–1.22)
MONOCYTES NFR BLD AUTO: 10 % (ref 4–12)
NEUTROPHILS # BLD AUTO: 5.47 THOUSANDS/ΜL (ref 1.85–7.62)
NEUTS SEG NFR BLD AUTO: 60 % (ref 43–75)
NONHDLC SERPL-MCNC: 80 MG/DL
NRBC BLD AUTO-RTO: 0 /100 WBCS
PLATELET # BLD AUTO: 263 THOUSANDS/UL (ref 149–390)
PMV BLD AUTO: 9.6 FL (ref 8.9–12.7)
POTASSIUM SERPL-SCNC: 4.1 MMOL/L (ref 3.5–5.3)
PROT SERPL-MCNC: 7.2 G/DL (ref 6.4–8.2)
RBC # BLD AUTO: 4.61 MILLION/UL (ref 3.88–5.62)
SODIUM SERPL-SCNC: 137 MMOL/L (ref 136–145)
TRIGL SERPL-MCNC: 112 MG/DL
WBC # BLD AUTO: 8.87 THOUSAND/UL (ref 4.31–10.16)

## 2020-02-28 PROCEDURE — 36415 COLL VENOUS BLD VENIPUNCTURE: CPT

## 2020-02-28 PROCEDURE — 83036 HEMOGLOBIN GLYCOSYLATED A1C: CPT

## 2020-02-28 PROCEDURE — 80053 COMPREHEN METABOLIC PANEL: CPT

## 2020-02-28 PROCEDURE — 80061 LIPID PANEL: CPT

## 2020-02-28 PROCEDURE — 85025 COMPLETE CBC W/AUTO DIFF WBC: CPT

## 2020-03-02 ENCOUNTER — TELEPHONE (OUTPATIENT)
Dept: INTERNAL MEDICINE CLINIC | Facility: CLINIC | Age: 85
End: 2020-03-02

## 2020-03-02 ENCOUNTER — OFFICE VISIT (OUTPATIENT)
Dept: INTERNAL MEDICINE CLINIC | Facility: CLINIC | Age: 85
End: 2020-03-02
Payer: COMMERCIAL

## 2020-03-02 VITALS
RESPIRATION RATE: 16 BRPM | SYSTOLIC BLOOD PRESSURE: 106 MMHG | BODY MASS INDEX: 28.31 KG/M2 | HEIGHT: 63 IN | WEIGHT: 159.8 LBS | HEART RATE: 80 BPM | DIASTOLIC BLOOD PRESSURE: 60 MMHG

## 2020-03-02 DIAGNOSIS — G93.9 BRAIN LESION: ICD-10-CM

## 2020-03-02 DIAGNOSIS — S06.5X9A SUBDURAL HEMATOMA (HCC): ICD-10-CM

## 2020-03-02 DIAGNOSIS — E78.2 MIXED HYPERLIPIDEMIA: ICD-10-CM

## 2020-03-02 DIAGNOSIS — I35.1 NONRHEUMATIC AORTIC VALVE INSUFFICIENCY: ICD-10-CM
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
## 2020-03-02 DIAGNOSIS — I73.9 PERIPHERAL VASCULAR DISEASE (HCC): ICD-10-CM

## 2020-03-02 DIAGNOSIS — B44.81 ABPA (ALLERGIC BRONCHOPULMONARY ASPERGILLOSIS) (HCC): ICD-10-CM

## 2020-03-02 DIAGNOSIS — E11.9 TYPE 2 DIABETES MELLITUS WITHOUT COMPLICATION, WITHOUT LONG-TERM CURRENT USE OF INSULIN (HCC): Primary | ICD-10-CM

## 2020-03-02 DIAGNOSIS — Z79.52 LONG TERM (CURRENT) USE OF SYSTEMIC STEROIDS: ICD-10-CM

## 2020-03-02 DIAGNOSIS — J42 CHRONIC BRONCHITIS, UNSPECIFIED CHRONIC BRONCHITIS TYPE (HCC): ICD-10-CM

## 2020-03-02 DIAGNOSIS — G20 PARKINSON'S DISEASE (HCC): ICD-10-CM

## 2020-03-02 DIAGNOSIS — I67.9 CEREBROVASCULAR DISEASE: ICD-10-CM

## 2020-03-02 DIAGNOSIS — R26.9 NEUROLOGIC GAIT DYSFUNCTION: ICD-10-CM

## 2020-03-02 DIAGNOSIS — J45.30 MILD PERSISTENT EXTRINSIC ASTHMA WITHOUT COMPLICATION: ICD-10-CM

## 2020-03-02 PROCEDURE — 1036F TOBACCO NON-USER: CPT | Performed by: INTERNAL MEDICINE

## 2020-03-02 PROCEDURE — 3078F DIAST BP <80 MM HG: CPT | Performed by: INTERNAL MEDICINE

## 2020-03-02 PROCEDURE — 1160F RVW MEDS BY RX/DR IN RCRD: CPT | Performed by: INTERNAL MEDICINE

## 2020-03-02 PROCEDURE — 2022F DILAT RTA XM EVC RTNOPTHY: CPT | Performed by: INTERNAL MEDICINE

## 2020-03-02 PROCEDURE — 1100F PTFALLS ASSESS-DOCD GE2>/YR: CPT | Performed by: INTERNAL MEDICINE

## 2020-03-02 PROCEDURE — 3044F HG A1C LEVEL LT 7.0%: CPT | Performed by: INTERNAL MEDICINE

## 2020-03-02 PROCEDURE — 3074F SYST BP LT 130 MM HG: CPT | Performed by: INTERNAL MEDICINE

## 2020-03-02 PROCEDURE — 3288F FALL RISK ASSESSMENT DOCD: CPT | Performed by: INTERNAL MEDICINE

## 2020-03-02 PROCEDURE — 4040F PNEUMOC VAC/ADMIN/RCVD: CPT | Performed by: INTERNAL MEDICINE

## 2020-03-02 PROCEDURE — 3008F BODY MASS INDEX DOCD: CPT | Performed by: INTERNAL MEDICINE

## 2020-03-02 PROCEDURE — 3066F NEPHROPATHY DOC TX: CPT | Performed by: INTERNAL MEDICINE

## 2020-03-02 PROCEDURE — 99214 OFFICE O/P EST MOD 30 MIN: CPT | Performed by: INTERNAL MEDICINE

## 2020-03-02 NOTE — TELEPHONE ENCOUNTER
She rec'ed order for home care for patient  For Phy therapy, occupational therapy, nursing &        Reason given    For strengthening and therapeutic exercises, which is phy therapy    She is asking what needs patient has requiring nursing &     Occupational therapy ? ??

## 2020-03-02 NOTE — TELEPHONE ENCOUNTER
He has Parkinson's disease, dementia, meningioma, frequent falls with subdural hematoma with history of stroke  Family is doing their best to care for him but he is failing

## 2020-03-02 NOTE — PROGRESS NOTES
Assessment/Plan:    Diagnoses and all orders for this visit:    Type 2 diabetes mellitus without complication, without long-term current use of insulin ([facility])  -     CBC and differential; Future  -     Comprehensive metabolic panel; Future  -     Hemoglobin A1C; Future    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) ([facility])    Mild persistent extrinsic asthma without complication    Chronic bronchitis, unspecified chronic bronchitis type ([place] [address] )    Nonrheumatic aortic valve insufficiency    Peripheral vascular disease ([place] [address] )    Cerebrovascular disease  -     Ambulatory Referral to Home Health; Future    Parkinson's disease ([place] [address] )    Long term (current) use of systemic steroids    Mixed hyperlipidemia  -     Lipid panel; Future  -     TSH, 3rd generation with Free T4 reflex; Future    Neurologic gait dysfunction  -     Ambulatory Referral to Home Health; Future  -     Wheelchair    Brain lesion    Subdural hematoma ([place] [address] )  -     Ambulatory Referral to Home Health; Future  -     Wheelchair    Fall, initial encounter  -     Ambulatory Referral to [address] [place] [address] Antwan Peralta; Future  -     Wheelchair         Patient Instructions   Lab data reviewed in detail and compared prior    Frequent falls with injury with MRI showing evidence of prior subdural hematoma  Patient is on Plavix for history of previous CVA  We discussed overall risks and benefits and I am in favor of discontinuation of Plavix  Patient is scheduled to see neuro surgery next week to follow-up MRI which does also indicate some increase in size of meningioma  I have ordered home care for in-house physical therapy, occupational therapy and nursing  Also standard wheelchair has been ordered  Continue with rolling walker, consider increasing height      Parkinson's disease is under care of Neurology and has been stable    Diabetes is under excellent control with diet alone    COPD, asthma, history of Aspergillus stable on bronchodilators and daily steroids    Hyperlipidemia stable on atorvastatin    Hypertension stable on present regimen    MCi under care of Neurology on Exelon    Routine follow-up after labs in 6 months, sooner as needed  Subjective:      Patient ID: Ruba Ott is a 81 y o  male    Here w/ dgtrs to f/u labs and mmp  Having multiple falls, most at night trying to get to br  Wearing disposable briefs, but won't go in it  Dgtr wants w/c  He has hit his head  He completed PT 6 mos ago  Using rolling walker in home and out  DM-watching carbs  HTN/HPL-taking rx as directed  PD-taking rx as directed, f/b Dr Luly Laughlin  Asthma/copd-taking prednisone and BD's as directed  Dementia-stable w/ exelon  Current Outpatient Medications:     acetaminophen (TYLENOL) 325 mg tablet, Take 650 mg by mouth every 6 (six) hours as needed for mild pain, Disp: , Rfl:     albuterol (2 5 mg/3 mL) 0 083 % nebulizer solution, every 4 (four) hours as needed, Disp: , Rfl: 5    albuterol (PROVENTIL HFA,VENTOLIN HFA) 90 mcg/act inhaler, TAKE 2 PUFFS BY MOUTH EVERY 6 HOURS AS NEEDED FOR WHEEZE, Disp: 18 Inhaler, Rfl: 3    atorvastatin (LIPITOR) 10 mg tablet, TAKE 1 TABLET BY MOUTH EVERY DAY, Disp: 90 tablet, Rfl: 0    carbidopa-levodopa (SINEMET)  mg per tablet, Take 1 tablet by mouth 3 (three) times a day, Disp: 90 tablet, Rfl: 3    guaiFENesin (MUCINEX) 600 mg 12 hr tablet, Take 1 tablet (600 mg total) by mouth every 12 (twelve) hours (Patient taking differently: Take 600 mg by mouth 2 (two) times a day as needed ), Disp: , Rfl:     irbesartan-hydrochlorothiazide (AVALIDE) 150-12 5 MG per tablet, TAKE 1 TABLET BY MOUTH EVERY DAY, Disp: 90 tablet, Rfl: 3    levalbuterol (XOPENEX) 1 25 mg/0 5 mL nebulizer solution, Take 0 5 mL (1 25 mg total) by nebulization 3 (three) times a day, Disp: , Rfl:     Melatonin 5 MG TABS, Take by mouth daily at bedtime as needed, Disp: , Rfl:     Misc   Devices (ROLLATOR) MISC, Dispense 1 rolling walker with seat and basket, Disp: 1 each, Rfl: 0    montelukast (SINGULAIR) 10 mg tablet, Take 1 tablet (10 mg total) by mouth daily at bedtime, Disp: 90 tablet, Rfl: 3    pramipexole (MIRAPEX) 0 5 mg tablet, Take 1 tablet (0 5 mg total) by mouth 3 (three) times a day, Disp: 90 tablet, Rfl: 6    predniSONE 10 mg tablet, TAKE 1 TABLET BY MOUTH EVERY DAY, Disp: 30 tablet, Rfl: 5    rivastigmine (EXELON) 4 5 MG capsule, Take 1 capsule (4 5 mg total) by mouth 2 (two) times a day, Disp: 180 capsule, Rfl: 1    SPIRIVA HANDIHALER 18 MCG inhalation capsule, PLACE 1 CAPSULE INTO INHALER AND INHALE DAILY VIA HANDIHALER AT THE SAME TIME EVERY DAY, Disp: 90 capsule, Rfl: 3    terbinafine (LamISIL) 1 % cream, Apply topically 2 (two) times a day as needed , Disp: , Rfl:     triamcinolone (KENALOG) 0 5 % cream, APPLY TOPICALLY 2 (TWO) TIMES A DAY AS NEEDED FOR RASH, Disp: 30 g, Rfl: 0    WIXELA INHUB 250-50 MCG/DOSE inhaler, INAHLE1 PUFF BY MOUTH TWICE DAILY *RINSE MOUTH AFTER USING, Disp: 180 each, Rfl: 3    finasteride (PROSCAR) 5 mg tablet, Take 1 tablet (5 mg total) by mouth daily for 30 doses, Disp: 30 tablet, Rfl: 6    Recent Results (from the past 1008 hour(s))   CBC and differential    Collection Time: 02/28/20 10:21 AM   Result Value Ref Range    WBC 8 87 4 31 - 10 16 Thousand/uL    RBC 4 61 3 88 - 5 62 Million/uL    Hemoglobin 14 2 12 0 - 17 0 g/dL    Hematocrit 43 7 36 5 - 49 3 %    MCV 95 82 - 98 fL    MCH 30 8 26 8 - 34 3 pg    MCHC 32 5 31 4 - 37 4 g/dL    RDW 13 4 11 6 - 15 1 %    MPV 9 6 8 9 - 12 7 fL    Platelets 974 844 - 841 Thousands/uL    nRBC 0 /100 WBCs    Neutrophils Relative 60 43 - 75 %    Immat GRANS % 1 0 - 2 %    Lymphocytes Relative 25 14 - 44 %    Monocytes Relative 10 4 - 12 %    Eosinophils Relative 3 0 - 6 %    Basophils Relative 1 0 - 1 %    Neutrophils Absolute 5 47 1 85 - 7 62 Thousands/µL    Immature Grans Absolute 0 06 0 00 - 0 20 Thousand/uL    Lymphocytes Absolute 2 18 0 60 - 4 47 Thousands/µL    Monocytes Absolute 0  86 0 17 - 1 22 Thousand/µL    Eosinophils Absolute 0 24 0 00 - 0 61 Thousand/µL    Basophils Absolute 0 06 0 00 - 0 10 Thousands/µL   Comprehensive metabolic panel    Collection Time: 02/28/20 10:21 AM   Result Value Ref Range    Sodium 137 136 - 145 mmol/L    Potassium 4 1 3 5 - 5 3 mmol/L    Chloride 108 100 - 108 mmol/L    CO2 24 21 - 32 mmol/L    ANION GAP 5 4 - 13 mmol/L    BUN 37 (H) 5 - 25 mg/dL    Creatinine 1 19 0 60 - 1 30 mg/dL    Glucose, Fasting 101 (H) 65 - 99 mg/dL    Calcium 9 4 8 3 - 10 1 mg/dL    AST 18 5 - 45 U/L    ALT 25 12 - 78 U/L    Alkaline Phosphatase 120 (H) 46 - 116 U/L    Total Protein 7 2 6 4 - 8 2 g/dL    Albumin 3 6 3 5 - 5 0 g/dL    Total Bilirubin 0 52 0 20 - 1 00 mg/dL    eGFR 56 ml/min/1 73sq m   Lipid panel    Collection Time: 02/28/20 10:21 AM   Result Value Ref Range    Cholesterol 181 50 - 200 mg/dL    Triglycerides 112 <=150 mg/dL    HDL, Direct 101 >=40 mg/dL    LDL Calculated 58 0 - 100 mg/dL    Non-HDL-Chol (CHOL-HDL) 80 mg/dl   Hemoglobin A1C    Collection Time: 02/28/20 10:21 AM   Result Value Ref Range    Hemoglobin A1C 6 7 (H) Normal 3 8-5 6%; PreDiabetic 5 7-6 4%; Diabetic >=6 5%; Glycemic control for adults with diabetes <7 0% %     mg/dl       The following portions of the patient's history were reviewed and updated as appropriate: allergies, current medications, past family history, past medical history, past social history, past surgical history and problem list      Review of Systems   Constitutional: Negative for appetite change, chills, diaphoresis, fatigue, fever and unexpected weight change  HENT: Negative for congestion, hearing loss and rhinorrhea  Eyes: Negative for visual disturbance  Respiratory: Positive for cough  Negative for chest tightness, shortness of breath and wheezing  Cardiovascular: Negative for chest pain, palpitations and leg swelling  Gastrointestinal: Negative for abdominal pain and blood in stool     Endocrine: Negative for cold intolerance, heat intolerance, polydipsia and polyuria  Genitourinary: Negative for difficulty urinating, dysuria, frequency and urgency  Musculoskeletal: Positive for arthralgias and gait problem  Negative for myalgias  Skin: Negative for rash  Neurological: Negative for dizziness, weakness, light-headedness and headaches  Hematological: Does not bruise/bleed easily  Psychiatric/Behavioral: Negative for dysphoric mood and sleep disturbance  Objective:      Vitals:    03/02/20 1304   BP: 106/60   Pulse: 80   Resp: 16          Physical Exam   Constitutional: He is oriented to person, place, and time  He appears well-developed and well-nourished  HENT:   Head: Normocephalic and atraumatic  Nose: Nose normal    Mouth/Throat: Oropharynx is clear and moist    Eyes: Pupils are equal, round, and reactive to light  Conjunctivae and EOM are normal  No scleral icterus  Neck: Normal range of motion  Neck supple  No JVD present  No tracheal deviation present  No thyromegaly present  Cardiovascular: Normal rate, regular rhythm and intact distal pulses  Exam reveals no gallop and no friction rub  Murmur heard  Pulmonary/Chest: Effort normal and breath sounds normal  No respiratory distress  He has no wheezes  He has no rales  Musculoskeletal: He exhibits no edema or deformity  Lymphadenopathy:     He has no cervical adenopathy  Neurological: He is alert and oriented to person, place, and time  No cranial nerve deficit  Skin: Skin is warm and dry  No rash noted  No erythema  No pallor  Psychiatric: He has a normal mood and affect   His behavior is normal  Judgment and thought content normal

## 2020-03-02 NOTE — PATIENT INSTRUCTIONS
Lab data reviewed in detail and compared prior    Frequent falls with injury with MRI showing evidence of prior subdural hematoma  Patient is on Plavix for history of previous CVA  We discussed overall risks and benefits and I am in favor of discontinuation of Plavix  Patient is scheduled to see neuro surgery next week to follow-up MRI which does also indicate some increase in size of meningioma  I have ordered home care for in-house physical therapy, occupational therapy and nursing  Also standard wheelchair has been ordered  Continue with rolling walker, consider increasing height  Parkinson's disease is under care of Neurology and has been stable    Diabetes is under excellent control with diet alone    COPD, asthma, history of Aspergillus stable on bronchodilators and daily steroids    Hyperlipidemia stable on atorvastatin    Hypertension stable on present regimen    MCi under care of Neurology on Exelon    Routine follow-up after labs in 6 months, sooner as needed

## 2020-03-06 ENCOUNTER — TELEPHONE (OUTPATIENT)
Dept: INTERNAL MEDICINE CLINIC | Facility: CLINIC | Age: 85
End: 2020-03-06

## 2020-03-06 NOTE — TELEPHONE ENCOUNTER
Ananda Quinones from [facility] called stating that they have the referral for home PT for [facility] and  they are sending the PT out on wed 3/11

## 2020-03-09 ENCOUNTER — OFFICE VISIT (OUTPATIENT)
Dept: NEUROSURGERY | Facility: CLINIC | Age: 85
End: 2020-03-09
Payer: COMMERCIAL

## 2020-03-09 VITALS
DIASTOLIC BLOOD PRESSURE: 64 MMHG | TEMPERATURE: 98.3 F | SYSTOLIC BLOOD PRESSURE: 118 MMHG | HEIGHT: 63 IN | HEART RATE: 68 BPM | BODY MASS INDEX: 28.31 KG/M2 | RESPIRATION RATE: 16 BRPM

## 2020-03-09 DIAGNOSIS — D33.0 BENIGN NEOPLASM OF SUPRATENTORIAL REGION OF BRAIN (HCC): ICD-10-CM

## 2020-03-09 DIAGNOSIS — R93.0 ABNORMAL MRI OF HEAD: Primary | ICD-10-CM

## 2020-03-09 DIAGNOSIS — D32.9 MENINGIOMA (HCC): ICD-10-CM

## 2020-03-09 PROCEDURE — 2022F DILAT RTA XM EVC RTNOPTHY: CPT | Performed by: PHYSICIAN ASSISTANT

## 2020-03-09 PROCEDURE — 3078F DIAST BP <80 MM HG: CPT | Performed by: PHYSICIAN ASSISTANT

## 2020-03-09 PROCEDURE — 1036F TOBACCO NON-USER: CPT | Performed by: PHYSICIAN ASSISTANT

## 2020-03-09 PROCEDURE — 1160F RVW MEDS BY RX/DR IN RCRD: CPT | Performed by: PHYSICIAN ASSISTANT

## 2020-03-09 PROCEDURE — 3044F HG A1C LEVEL LT 7.0%: CPT | Performed by: PHYSICIAN ASSISTANT

## 2020-03-09 PROCEDURE — 4040F PNEUMOC VAC/ADMIN/RCVD: CPT | Performed by: PHYSICIAN ASSISTANT

## 2020-03-09 PROCEDURE — 99213 OFFICE O/P EST LOW 20 MIN: CPT | Performed by: PHYSICIAN ASSISTANT

## 2020-03-09 PROCEDURE — 3074F SYST BP LT 130 MM HG: CPT | Performed by: PHYSICIAN ASSISTANT

## 2020-03-09 PROCEDURE — 3066F NEPHROPATHY DOC TX: CPT | Performed by: PHYSICIAN ASSISTANT

## 2020-03-09 NOTE — PROGRESS NOTES
Patient ID: Sylvie Camacho is a 81 y o  male  Diagnoses and all orders for this visit:    Abnormal MRI of head  -     MRI brain with and without contrast; Future    Meningioma (HCC)  -     MRI brain with and without contrast; Future    Benign neoplasm of supratentorial region of brain ([address] )  -     MRI brain with and without contrast; Future          Assessment/Plan:    Very pleasant 18-year-old male, arrives by wheelchair, accompanied by his daughter and her   Returns for six-month follow-up, history of a meningioma  The only observation the daughter makes is that he requires a 2 assist transfer, secondary to weakness in his legs, right greater than left  Otherwise he has no other complaints reported  He has known Parkinson's disease, has had history of hallucinations however his daughter reports they do not seem to be an issue over the last several months as he had made no comments to that effect  He continues to follow with Dr Leung Monday, neurology relative to his Parkinson's  He has had MRI of his brain 2/12/20  The study was carefully reviewed in detail compared with prior study 8/17/19 and 12/6/18, when the studies were compared, there is a trace of increase in size however there continues to be no local edema or mass effect  MRI FLAIR sequence is unremarkable  The studies were reviewed in detail with the patient and his daughter and son-in-law  On examination today he is awake, alert and oriented x3, motor examination of the upper lower extremities is 5 x 5 for power, reflexes were intact and symmetric sensation is also grossly intact  Cranial nerves are grossly intact  Gait and balance were not evaluated as he was in a wheelchair  At this juncture the family would like to continue with serial observation, the options of surgical excision and SRS were again reviewed      Further follow-up is planned in approximately 6 months per NCCN guidelines/protocol with MRI of the brain with without contrast and clinical visit to follow  His daughter reports he had been on Plavix however this was discontinued secondary to his falls, at this juncture from a neurosurgical perspective medications such as aspirin, NSAIDs or other blood thinners/anti-platelet agents are discouraged, particularly with fall risk, the risks versus the benefits must be carefully weighed should you wish to restart medicines of this nature, this is adiscussion to have with the PCP  Return sooner should there be any other changes  These findings, impressions and recommendations are reviewed in great detail with the patient and  his daughter and son-in-law, they expressed understanding and agreement, their questions were answered completely and to their satisfaction  Return in about 6 months (around 9/9/2020) for Review MRI brain with without contrast     Chief Complaint  Offers no specific complaints today  HPI       The following portions of the patient's history were reviewed and updated as appropriate: allergies, current medications, past family history, past medical history, past social history and past surgical history  Review of Systems   Constitutional: Negative  HENT: Negative  Eyes: Negative for visual disturbance  Respiratory: Negative  Cardiovascular: Negative  Gastrointestinal: Negative  Endocrine: Negative  Genitourinary: Negative  Musculoskeletal: Negative  Skin: Negative  Allergic/Immunologic: Negative  Neurological: Positive for weakness (b/l legs)  Negative for dizziness, tremors, seizures, syncope, facial asymmetry, speech difficulty, light-headedness, numbness and headaches  Hematological: Negative  Psychiatric/Behavioral: Positive for confusion (forgetful at times), decreased concentration and hallucinations  All other systems reviewed and are negative        Objective:    Physical Exam   Constitutional: He is oriented to person, place, and time  He appears well-developed and well-nourished  HENT:   Head: Normocephalic and atraumatic  Eyes: Pupils are equal, round, and reactive to light  EOM are normal    Cardiovascular: Normal rate  Pulmonary/Chest: Effort normal and breath sounds normal    Neurological: He is alert and oriented to person, place, and time  Skin: Skin is warm and dry  Psychiatric: He has a normal mood and affect  Vitals reviewed  Neurologic Exam     Mental Status   Oriented to person, place, and time  Cranial Nerves     CN III, IV, VI   Pupils are equal, round, and reactive to light  Extraocular motions are normal         MRI BRAIN WITH AND WITHOUT CONTRAST  2/12/20     INDICATION: R90 89: Other abnormal findings on diagnostic imaging of central nervous system  D33 0: Benign neoplasm of brain, supratentorial  D32 9: Benign neoplasm of meninges, unspecified      COMPARISON:  CT dated 8/26/2019  MRI dated 8/17/2019      TECHNIQUE:  Sagittal T1, axial T2, axial FLAIR, axial T1, axial Bradley, axial diffusion  Sagittal, axial T1 postcontrast   Axial bravo postcontrast with coronal reconstructions           IV Contrast:  7 mL of gadobutrol injection (MULTI-DOSE)      IMAGE QUALITY:   Diagnostic      FINDINGS:     BRAIN PARENCHYMA:  Once again identified is a dural based, extra-axial, left posterior frontal vertex meningioma  This measures 2 3 x 3 6 x 1 6 cm and has slightly increased in size compared to the prior examination  There is no edema within the   underlying frontal lobe despite mild mass effect      Scattered white matter hyperintensities on T2 and FLAIR imaging within the cerebral hemispheres consistent with mild chronic microangiopathy      There is a small, thin 2 to 3 mm subdural collection within the right hemisphere which is hyperintense on T2 and FLAIR imaging and appears new compared to prior MRI    This demonstrates enhancement after administration of contrast without mass effect upon   the adjacent brain parenchyma      Stable mild diffuse cerebral volume loss        VENTRICLES:  Ventricles and extra-axial CSF spaces are prominent commensurate with the degree of volume loss  No hydrocephalus  No acute extra-axial hemorrhage      SELLA AND PITUITARY GLAND:  Normal      ORBITS:  Normal      PARANASAL SINUSES:  Moderate diffuse paranasal sinus opacification including air-fluid levels within the maxillary sinuses bilaterally, possibly representing an acute component of sinusitis      VASCULATURE:  Evaluation of the major intracranial vasculature demonstrates appropriate flow voids      CALVARIUM AND SKULL BASE:  Normal      EXTRACRANIAL SOFT TISSUES:  Normal      IMPRESSION:     Left frontal vertex meningioma is slightly increased in size compared to the prior examination      There is a new small less than 5 mm thick subdural collection within the right hemisphere demonstrating enhancement of the collection and dural   This was not present on MRI of the brain dated 8/17/2019 suggesting subacute subdural hematoma      Mild cerebral volume loss and scattered white matter change consistent with chronic microangiopathy      Diffuse paranasal sinus disease including air-fluid levels within the maxillary sinuses possibly representing an acute component of sinusitis

## 2020-03-11 ENCOUNTER — TELEPHONE (OUTPATIENT)
Dept: INTERNAL MEDICINE CLINIC | Facility: CLINIC | Age: 85
End: 2020-03-11

## 2020-03-11 NOTE — TELEPHONE ENCOUNTER
Milla from [place] [address] [facility] called wanting to clarify some prescriptions they have on their med list      Levalbuterol nebulizer solution is listed as current however the family stated he has never had this medication  Acetaminophen 325 listed on med list, pt has a 500mg bottle, taking 1000mg every 6 hours PRN     Mucinex 600mg tablets listed on med list, pt has 400mg liquid, taking twice a day PRN      Please call both the pt's daughter and [facility]     Daughter Alireza Ferrera- [address] [phone]

## 2020-03-12 NOTE — TELEPHONE ENCOUNTER
Patient daughter called back   She will call us back to find out what nebulizer the patient is using

## 2020-03-22 DIAGNOSIS — G20 PARKINSON'S DISEASE (HCC): ICD-10-CM

## 2020-03-23 RX ORDER — PRAMIPEXOLE DIHYDROCHLORIDE 0.5 MG/1
0.5 TABLET ORAL 3 TIMES DAILY
Qty: 270 TABLET | Refills: 2 | Status: SHIPPED | OUTPATIENT
Start: 2020-03-23 | End: 2020-11-16

## 2020-03-30 ENCOUNTER — TELEPHONE (OUTPATIENT)
Dept: PULMONOLOGY | Facility: CLINIC | Age: 85
End: 2020-03-30

## 2020-03-30 DIAGNOSIS — J40 BRONCHITIS: Primary | ICD-10-CM

## 2020-03-30 RX ORDER — PREDNISONE 20 MG/1
TABLET ORAL
Qty: 25 TABLET | Refills: 0 | Status: SHIPPED | OUTPATIENT
Start: 2020-03-30 | End: 2020-09-15

## 2020-03-30 RX ORDER — AZITHROMYCIN 250 MG/1
TABLET, FILM COATED ORAL
Qty: 6 TABLET | Refills: 0 | Status: SHIPPED | OUTPATIENT
Start: 2020-03-30 | End: 2020-04-03

## 2020-03-30 NOTE — TELEPHONE ENCOUNTER
Spoke with the patient's daughter, he still having cough with green productive sputum she states, no hemoptysis no fevers, no sick contacts he has not been going out for the past 3-4 weeks he states  Will send in Z-Jayjay and prednisone tapering down dose if any worsening or developing any fevers will go into the ER right away    Understands and verbalizes

## 2020-04-06 DIAGNOSIS — I63.9 CEREBROVASCULAR ACCIDENT (CVA), UNSPECIFIED MECHANISM (HCC): ICD-10-CM

## 2020-04-06 RX ORDER — ATORVASTATIN CALCIUM 10 MG/1
TABLET, FILM COATED ORAL
Qty: 90 TABLET | Refills: 0 | Status: SHIPPED | OUTPATIENT
Start: 2020-04-06 | End: 2020-06-26

## 2020-04-15 DIAGNOSIS — L30.9 DERMATITIS: ICD-10-CM

## 2020-04-15 RX ORDER — TRIAMCINOLONE ACETONIDE 5 MG/G
CREAM TOPICAL 2 TIMES DAILY PRN
Qty: 30 G | Refills: 0 | Status: SHIPPED | OUTPATIENT
Start: 2020-04-15 | End: 2020-05-31

## 2020-04-16 ENCOUNTER — TELEMEDICINE (OUTPATIENT)
Dept: NEUROLOGY | Facility: CLINIC | Age: 85
End: 2020-04-16
Payer: COMMERCIAL

## 2020-04-16 DIAGNOSIS — G20 PARKINSON'S DISEASE (HCC): Primary | ICD-10-CM

## 2020-04-16 DIAGNOSIS — D32.9 MENINGIOMA (HCC): ICD-10-CM

## 2020-04-16 DIAGNOSIS — R26.9 NEUROLOGIC GAIT DYSFUNCTION: ICD-10-CM

## 2020-04-16 PROCEDURE — 1160F RVW MEDS BY RX/DR IN RCRD: CPT | Performed by: PSYCHIATRY & NEUROLOGY

## 2020-04-16 PROCEDURE — 99214 OFFICE O/P EST MOD 30 MIN: CPT | Performed by: PSYCHIATRY & NEUROLOGY

## 2020-04-16 RX ORDER — FINASTERIDE 5 MG/1
5 TABLET, FILM COATED ORAL EVERY MORNING
COMMUNITY
End: 2020-04-29 | Stop reason: SDUPTHER

## 2020-04-29 DIAGNOSIS — R33.9 URINARY RETENTION: Primary | ICD-10-CM

## 2020-04-29 RX ORDER — FINASTERIDE 5 MG/1
5 TABLET, FILM COATED ORAL EVERY MORNING
Qty: 90 TABLET | Refills: 0 | Status: SHIPPED | OUTPATIENT
Start: 2020-04-29 | End: 2020-08-05 | Stop reason: SDUPTHER

## 2020-05-01 ENCOUNTER — TELEPHONE (OUTPATIENT)
Dept: UROLOGY | Facility: MEDICAL CENTER | Age: 85
End: 2020-05-01

## 2020-05-04 DIAGNOSIS — J40 BRONCHITIS: ICD-10-CM

## 2020-05-07 DIAGNOSIS — G20 PARKINSON'S DISEASE (HCC): ICD-10-CM

## 2020-05-08 DIAGNOSIS — B44.81 ABPA (ALLERGIC BRONCHOPULMONARY ASPERGILLOSIS) (HCC): ICD-10-CM

## 2020-05-08 DIAGNOSIS — J45.40 MODERATE PERSISTENT EXTRINSIC ASTHMA WITHOUT COMPLICATION: ICD-10-CM

## 2020-05-08 RX ORDER — PREDNISONE 10 MG/1
TABLET ORAL
Qty: 50 TABLET | Refills: 0 | OUTPATIENT
Start: 2020-05-08

## 2020-05-08 RX ORDER — PREDNISONE 10 MG/1
TABLET ORAL
Qty: 30 TABLET | Refills: 5 | Status: SHIPPED | OUTPATIENT
Start: 2020-05-08 | End: 2020-10-30 | Stop reason: HOSPADM

## 2020-05-15 DIAGNOSIS — J42 CHRONIC BRONCHITIS, UNSPECIFIED CHRONIC BRONCHITIS TYPE (HCC): Primary | ICD-10-CM

## 2020-05-16 RX ORDER — ALBUTEROL SULFATE 2.5 MG/3ML
SOLUTION RESPIRATORY (INHALATION)
Qty: 75 ML | Refills: 5 | Status: SHIPPED | OUTPATIENT
Start: 2020-05-16 | End: 2020-07-31

## 2020-05-28 DIAGNOSIS — I10 ESSENTIAL HYPERTENSION: ICD-10-CM

## 2020-05-28 RX ORDER — IRBESARTAN AND HYDROCHLOROTHIAZIDE 150; 12.5 MG/1; MG/1
1 TABLET, FILM COATED ORAL DAILY
Qty: 90 TABLET | Refills: 2 | Status: SHIPPED | OUTPATIENT
Start: 2020-05-28 | End: 2021-01-14

## 2020-05-30 DIAGNOSIS — L30.9 DERMATITIS: ICD-10-CM

## 2020-05-30 DIAGNOSIS — G20 PARKINSON'S DISEASE (HCC): ICD-10-CM

## 2020-05-31 RX ORDER — TRIAMCINOLONE ACETONIDE 5 MG/G
CREAM TOPICAL
Qty: 30 G | Refills: 0 | Status: SHIPPED | OUTPATIENT
Start: 2020-05-31 | End: 2020-06-26

## 2020-06-02 ENCOUNTER — TELEPHONE (OUTPATIENT)
Dept: INTERNAL MEDICINE CLINIC | Facility: CLINIC | Age: 85
End: 2020-06-02

## 2020-06-02 DIAGNOSIS — J44.9 CHRONIC OBSTRUCTIVE PULMONARY DISEASE, UNSPECIFIED COPD TYPE (HCC): ICD-10-CM

## 2020-06-02 RX ORDER — TIOTROPIUM BROMIDE 18 UG/1
CAPSULE ORAL; RESPIRATORY (INHALATION)
Qty: 90 CAPSULE | Refills: 3 | Status: SHIPPED | OUTPATIENT
Start: 2020-06-02 | End: 2020-09-21 | Stop reason: SDUPTHER

## 2020-06-24 DIAGNOSIS — G20 PARKINSON'S DISEASE (HCC): ICD-10-CM

## 2020-06-26 DIAGNOSIS — I63.9 CEREBROVASCULAR ACCIDENT (CVA), UNSPECIFIED MECHANISM (HCC): ICD-10-CM

## 2020-06-26 DIAGNOSIS — L30.9 DERMATITIS: ICD-10-CM

## 2020-06-26 RX ORDER — ATORVASTATIN CALCIUM 10 MG/1
TABLET, FILM COATED ORAL
Qty: 90 TABLET | Refills: 0 | Status: SHIPPED | OUTPATIENT
Start: 2020-06-26 | End: 2020-09-11

## 2020-06-26 RX ORDER — TRIAMCINOLONE ACETONIDE 5 MG/G
CREAM TOPICAL
Qty: 30 G | Refills: 0 | Status: SHIPPED | OUTPATIENT
Start: 2020-06-26 | End: 2020-07-24

## 2020-07-23 DIAGNOSIS — G20 PARKINSON'S DISEASE (HCC): ICD-10-CM

## 2020-07-23 DIAGNOSIS — L30.9 DERMATITIS: ICD-10-CM

## 2020-07-24 RX ORDER — TRIAMCINOLONE ACETONIDE 5 MG/G
CREAM TOPICAL
Qty: 30 G | Refills: 0 | Status: SHIPPED | OUTPATIENT
Start: 2020-07-24 | End: 2020-08-05

## 2020-07-31 DIAGNOSIS — J42 CHRONIC BRONCHITIS, UNSPECIFIED CHRONIC BRONCHITIS TYPE (HCC): ICD-10-CM

## 2020-07-31 RX ORDER — ALBUTEROL SULFATE 2.5 MG/3ML
SOLUTION RESPIRATORY (INHALATION)
Qty: 75 ML | Refills: 5 | Status: SHIPPED | OUTPATIENT
Start: 2020-07-31 | End: 2020-10-07

## 2020-08-05 DIAGNOSIS — R33.9 URINARY RETENTION: ICD-10-CM

## 2020-08-05 DIAGNOSIS — L30.9 DERMATITIS: ICD-10-CM

## 2020-08-05 RX ORDER — TRIAMCINOLONE ACETONIDE 5 MG/G
CREAM TOPICAL
Qty: 30 G | Refills: 0 | Status: SHIPPED | OUTPATIENT
Start: 2020-08-05 | End: 2020-09-11

## 2020-08-06 RX ORDER — FINASTERIDE 5 MG/1
5 TABLET, FILM COATED ORAL EVERY MORNING
Qty: 90 TABLET | Refills: 0 | Status: ON HOLD | OUTPATIENT
Start: 2020-08-06 | End: 2020-10-29 | Stop reason: SDUPTHER

## 2020-08-13 ENCOUNTER — TELEMEDICINE (OUTPATIENT)
Dept: INTERNAL MEDICINE CLINIC | Facility: CLINIC | Age: 85
End: 2020-08-13
Payer: COMMERCIAL

## 2020-08-13 ENCOUNTER — TELEPHONE (OUTPATIENT)
Dept: INTERNAL MEDICINE CLINIC | Facility: CLINIC | Age: 85
End: 2020-08-13

## 2020-08-13 DIAGNOSIS — J42 CHRONIC BRONCHITIS, UNSPECIFIED CHRONIC BRONCHITIS TYPE (HCC): ICD-10-CM

## 2020-08-13 DIAGNOSIS — R29.6 FREQUENT FALLS: ICD-10-CM

## 2020-08-13 DIAGNOSIS — L30.4 INTERTRIGO: Primary | ICD-10-CM

## 2020-08-13 DIAGNOSIS — G20 PARKINSON'S DISEASE (HCC): ICD-10-CM

## 2020-08-13 DIAGNOSIS — R26.9 NEUROLOGIC GAIT DYSFUNCTION: ICD-10-CM

## 2020-08-13 PROCEDURE — 99214 OFFICE O/P EST MOD 30 MIN: CPT | Performed by: INTERNAL MEDICINE

## 2020-08-13 PROCEDURE — 3074F SYST BP LT 130 MM HG: CPT | Performed by: INTERNAL MEDICINE

## 2020-08-13 PROCEDURE — 1160F RVW MEDS BY RX/DR IN RCRD: CPT | Performed by: INTERNAL MEDICINE

## 2020-08-13 PROCEDURE — 3044F HG A1C LEVEL LT 7.0%: CPT | Performed by: INTERNAL MEDICINE

## 2020-08-13 PROCEDURE — 1036F TOBACCO NON-USER: CPT | Performed by: INTERNAL MEDICINE

## 2020-08-13 PROCEDURE — 3066F NEPHROPATHY DOC TX: CPT | Performed by: INTERNAL MEDICINE

## 2020-08-13 PROCEDURE — 4040F PNEUMOC VAC/ADMIN/RCVD: CPT | Performed by: INTERNAL MEDICINE

## 2020-08-13 PROCEDURE — 3078F DIAST BP <80 MM HG: CPT | Performed by: INTERNAL MEDICINE

## 2020-08-13 PROCEDURE — 2022F DILAT RTA XM EVC RTNOPTHY: CPT | Performed by: INTERNAL MEDICINE

## 2020-08-13 RX ORDER — NYSTATIN 100000 U/G
CREAM TOPICAL
Qty: 80 G | Refills: 3 | Status: SHIPPED | OUTPATIENT
Start: 2020-08-13 | End: 2022-07-11

## 2020-08-13 NOTE — TELEPHONE ENCOUNTER
----- Message from Jennifer Hutchinson MD sent at 8/13/2020  8:06 AM EDT -----  Regarding: FW: FW: Non-Urgent Medical Question  Contact: [phone]  Please set up a virtual visit to address needs    ----- Message -----  From: Rajani Smith  Sent: 8/13/2020   7:37 AM EDT  To: Jennifer Hutchinson MD  Subject: FW: FW: Non-Urgent Medical Question                ----- Message -----  From: Enrike Mayberry  Sent: 8/12/2020  11:56 PM EDT  To: [facility]  Subject: RE: FW: Non-Urgent Medical Question              No pt now  But we have taken every precautions with him  He isn't sleeping much, and losing his balance  It's also getting harder for him to get up from a chair

## 2020-08-13 NOTE — PROGRESS NOTES
Virtual Regular Visit      Assessment/Plan:    Problem List Items Addressed This Visit        Respiratory    Chronic obstructive pulmonary disease ([address] )    Relevant Orders    Ambulatory Referral to [address] [place] [address] Road and Auditory    Parkinson's disease [facility])    Relevant Orders    Ambulatory Referral to [facility] Antwan Peralta       Other    Neurologic gait dysfunction    Relevant Orders    Ambulatory Referral to [address] [place] [address] Antwan Peralta      Other Visit Diagnoses     Intertrigo    -  Primary    Relevant Medications    nystatin (MYCOSTATIN) cream    Other Relevant Orders    Ambulatory Referral to [address] [place] [address] Antwan Peralta    Frequent falls        Relevant Orders    Ambulatory Referral to Home Health          BMI Counseling: There is no height or weight on file to calculate BMI  The BMI is above normal  Nutrition recommendations include decreasing portion sizes and limiting drinks that contain sugar  Exercise recommendations include exercising 3-5 times per week  No pharmacotherapy was ordered  Reason for visit is No chief complaint on file  Encounter provider Doris Patton MD    Provider located at [address] [place] [address]      Recent Visits  No visits were found meeting these conditions  Showing recent visits within past 7 days and meeting all other requirements     Today's Visits  Date Type Provider Dept   08/13/20 Telemedicine Doris Patton MD [address]   08/13/20 Telephone Gela Farris 7 today's visits and meeting all other requirements     Future Appointments  No visits were found meeting these conditions  Showing future appointments within next 150 days and meeting all other requirements        The patient was identified by name and date of birth   Reynaldo Harp was informed that this is a telemedicine visit and that the visit is being conducted through [address] [place] [address] and patient was informed that this is not a secure, HIPAA-complaint platform  He agrees to proceed     My office door was closed  No one else was in the room  He acknowledged consent and understanding of privacy and security of the video platform  The patient has agreed to participate and understands they can discontinue the visit at any time  Patient is aware this is a billable service  Elder Evelia is a 81 y o  male  For virtual follow-up   This is a virtual follow-up to address decline  Patient has had frequent falls and rash     Patient last went through physical therapy about 6 months ago which was helpful  He has progressively declined since then  He has gait dysfunction related to underlying Parkinson's disease  The falls occur predominantly at nighttime when he gets out of bed to use the bathroom  He does wear an adult diaper to sleep with and his daughter notes that he rarely actually goes to the bathroom but falls on his way getting there  He inconsistently uses the rolling walker inside the bathroom  His typical rolling walker does not fit in the bathroom any as another 1 set up in there  They do have bars around the tub and shower  History is given by patient as well as daughters both Kristine Gooden and jose  He has recurrent rash on his back, groin, armpits and abdomen  They have been using clobetasol which has improved the rash significantly  COPD has been stable, he is taking inhalers as directed         Past Medical History:   Diagnosis Date    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[facility]     Allergic rhinitis     last assessed [id number]    Aortic regurgitation     Arm laceration     Asthma     Bronchitis, chronic obstructive, with exacerbation ([facility])     last assessed [id number]    Candidal intertrigo     Chronic obstructive lung disease ([facility] )     last assessed [id number]    COPD (chronic obstructive pulmonary disease) [facility]     Coronary artery disease     carotid stents    Cough     CVA (cerebral vascular accident) ([place] [address] )     Dermatitis     Diabetes mellitus type 2, uncomplicated ([place] [address] )     controlled    Dysthymic disorder     Fatigue     Hyperlipidemia     Hypertension     Neurologic gait dysfunction     Parkinson's disease ([place] [address] )     Pneumonia     PVD (peripheral vascular disease) ([place] [address] )     Seborrheic keratosis     TIA (transient ischemic attack)     Tinea corporis     Tinea pedis of both feet     Tremor        Past Surgical History:   Procedure Laterality Date    NASAL SINUS SURGERY      IN BRONCHOSCOPY,DIAGNOSTIC N/A 7/27/2016    Procedure: BRONCHOSCOPY;  Surgeon: Carmen Haq MD;  Location: [facility];   Service: Pulmonary       Current Outpatient Medications   Medication Sig Dispense Refill    acetaminophen (TYLENOL) 325 mg tablet Take 650 mg by mouth every 6 (six) hours as needed for mild pain      albuterol (2 5 mg/3 mL) 0 083 % nebulizer solution TAKE 1 VIAL (2 5 MG TOTAL) BY NEBULIZATION EVERY 4 (FOUR) HOURS AS NEEDED FOR WHEEZING 75 mL 5    albuterol (PROVENTIL HFA,VENTOLIN HFA) 90 mcg/act inhaler TAKE 2 PUFFS BY MOUTH EVERY 6 HOURS AS NEEDED FOR WHEEZE 18 Inhaler 3    atorvastatin (LIPITOR) 10 mg tablet TAKE 1 TABLET BY MOUTH EVERY DAY 90 tablet 0    carbidopa-levodopa (SINEMET)  mg per tablet TAKE 1 TABLET BY MOUTH THREE TIMES A DAY 90 tablet 0    finasteride (PROSCAR) 5 mg tablet Take 1 tablet (5 mg total) by mouth every morning 90 tablet 0    irbesartan-hydrochlorothiazide (AVALIDE) 150-12 5 MG per tablet Take 1 tablet by mouth daily 90 tablet 2    levalbuterol (XOPENEX) 1 25 mg/0 5 mL nebulizer solution Take 0 5 mL (1 25 mg total) by nebulization 3 (three) times a day      montelukast (SINGULAIR) 10 mg tablet Take 1 tablet (10 mg total) by mouth daily at bedtime 90 tablet 3    pramipexole (MIRAPEX) 0 5 mg tablet TAKE 1 TABLET (0 5 MG TOTAL) BY MOUTH 3 (THREE) TIMES A  tablet 2    predniSONE 10 mg tablet TAKE 1 TABLET BY MOUTH EVERY DAY 30 tablet 5    predniSONE 20 mg tablet Use 2 tablets for 1 week, 1 tablet for 1 week and half a tablet for 1 week  25 tablet 0    rivastigmine (EXELON) 4 5 MG capsule Take 1 capsule (4 5 mg total) by mouth 2 (two) times a day 180 capsule 1    SPIRIVA HANDIHALER 18 MCG inhalation capsule PLACE 1 CAPSULE INTO INHALER AND INHALE DAILY VIA HANDIHALER AT THE SAME TIME EVERY DAY 90 capsule 3    tiotropium (Spiriva HandiHaler) 18 mcg inhalation capsule Place 1 capsule (18 mcg total) into inhaler and inhale daily 90 capsule 3    triamcinolone (KENALOG) 0 5 % cream APPLY TO AFFECTED AREA(S) OF THE RASH TWICE DAILY AS NEEDED 30 g 0    WIXELA INHUB 250-50 MCG/DOSE inhaler INAHLE1 PUFF BY MOUTH TWICE DAILY *RINSE MOUTH AFTER USING 180 each 3    Melatonin 5 MG TABS Take by mouth daily at bedtime as needed      Misc  Devices (ROLLATOR) MISC Dispense 1 rolling walker with seat and basket 1 each 0    nystatin (MYCOSTATIN) cream Apply to intertriginous areas 2-3 times daily as needed 80 g 3    terbinafine (LamISIL) 1 % cream Apply topically 2 (two) times a day as needed        No current facility-administered medications for this visit  Allergies   Allergen Reactions    Penicillins Syncope       Review of Systems   Constitutional: Negative for appetite change, chills, diaphoresis, fatigue, fever and unexpected weight change  HENT: Negative for congestion, hearing loss and rhinorrhea  Eyes: Negative for visual disturbance  Respiratory: Positive for shortness of breath  Negative for cough, chest tightness and wheezing  Cardiovascular: Negative for chest pain, palpitations and leg swelling  Gastrointestinal: Negative for abdominal pain and blood in stool  Endocrine: Negative for cold intolerance, heat intolerance, polydipsia and polyuria  Genitourinary: Negative for difficulty urinating, dysuria, frequency and urgency  Musculoskeletal: Positive for gait problem   Negative for arthralgias and myalgias  Skin: Positive for rash  Neurological: Negative for dizziness, weakness, light-headedness and headaches  Hematological: Does not bruise/bleed easily  Psychiatric/Behavioral: Negative for dysphoric mood and sleep disturbance  Video Exam    There were no vitals filed for this visit  Physical Exam  Constitutional:       Appearance: He is well-developed  HENT:      Head: Normocephalic and atraumatic  Pulmonary:      Effort: Pulmonary effort is normal    Skin:     Comments:   Well-demarcated macular scaly rashes involving axilla, mid back, right lower abdomen and groin folds   Neurological:      Mental Status: He is alert and oriented to person, place, and time  Psychiatric:         Behavior: Behavior normal  Behavior is cooperative  Thought Content: Thought content normal          Judgment: Judgment normal           I spent  22 minutes directly with the patient during this visit      [address] [place] [address] acknowledges that he has consented to an online visit or consultation  He understands that the online visit is based solely on information provided by him, and that, in the absence of a face-to-face physical evaluation by the physician, the diagnosis he receives is both limited and provisional in terms of accuracy and completeness  This is not intended to replace a full medical face-to-face evaluation by the physician  [employer] understands and accepts these terms

## 2020-08-13 NOTE — PATIENT INSTRUCTIONS
Frequent falls with history of Parkinson's disease and mild cognitive impairment -I did reiterate patient the importance of staying in bed at nighttime or asking for assistance  Using walker 100 percent of the time when ambulating  I have ordered home physical therapy  May need to change dosing of Sinemet for longer-acting HS dose    Rash consistent with intertrigo, rashes or similar also on the back and flank  I have advised transitioning from steroid cream to nystatin cream, okay to use, may shin steroid and nystatin for acute inflammation but then discontinue the steroid        COPD clinically stable

## 2020-08-24 DIAGNOSIS — G20 PARKINSON'S DISEASE (HCC): ICD-10-CM

## 2020-08-27 ENCOUNTER — TELEPHONE (OUTPATIENT)
Dept: INTERNAL MEDICINE CLINIC | Facility: CLINIC | Age: 85
End: 2020-08-27

## 2020-08-27 NOTE — TELEPHONE ENCOUNTER
Adam called from [facility] regarding this patient  The family notified her that the patient fell again last night  The patient has a skin tear on left forearm  She is putting a Dry dressing on it  The rash that Dr Christen Pardo is aware of it has spread to   His back, armpit, groin and buttock area  What Dr Rodrigues prescribed to the patient the family stated it is not helping  Also the patient is not sleeping well and would like something else to be prescribed is possible          [phone]

## 2020-08-27 NOTE — TELEPHONE ENCOUNTER
Did they tried the triamcinolone for only the nystatin? Kick Counts in Pregnancy   AMBULATORY CARE:   Kick counts  measure how much your baby is moving in your womb  A kick from your baby can be felt as a twist, turn, swish, roll, or jab  It is common to feel your baby kicking at 26 to 28 weeks of pregnancy  You may feel your baby kick as early as 20 weeks of pregnancy  Seek care immediately if:   · You feel your baby kick less as the day goes on      · You do not feel any kicks in a day  Contact your healthcare provider if:   · You feel a change in the number of kicks or movements of your baby  · You feel fewer than 10 kicks within 2 hours after counting twice  · You have questions or concerns about your baby's movements  Why measure kick counts:  Your baby's movement may provide information about your baby's health  He may move less, or not at all, if there are problems  He may move less if he does not have enough room to grow in your uterus (womb)  He may also move less if he is not getting enough oxygen or nutrition from the placenta  Tell your healthcare provider as soon as you feel a change in your baby's movements  Problems that are found earlier are easier to treat  When to measure kick counts:   · Measure kick counts at the same time every day  · Measure kick counts when your baby is awake and most active  Your baby may be most active in the evening  · Measure kick counts after a meal or snack  Your baby may be more active after you eat  Wait 2 hours after you drink liquids that contain caffeine  Caffeine can make your baby more active than usual     · You should not smoke while you are pregnant  Smoking increases the risk of health problems for you and for your baby during your pregnancy  If you do smoke, wait 2 hours to measure kick counts  Nicotine can make your baby more active than usual   How to measure kick counts:  Check that your baby is awake before you measure kick counts   You can wake up your baby by lightly pushing on your belly, walking, or drinking something cold  Your healthcare provider may tell you different ways to measure kick counts  He may tell you to do the following:  · Use a chart or clock to keep track of the time you start and finish counting  · Sit in a chair or lie on your left side  · Place your hands on the largest part of your belly  · Count until you reach 10 kicks  Write down how much time it takes to count 10 kicks  · It may take 30 minutes to 2 hours to count 10 kicks  It should not take more than 2 hours to count 10 kicks  · If you do not feel 10 kicks within 2 hours, wait 1 hour and count again  Your baby can sleep for up to 40 minutes at one time  Follow up with your healthcare provider as directed:  Write down your questions so you remember to ask them during your visits  © 2017 [address] Information is for End User's use only and may not be sold, redistributed or otherwise used for commercial purposes  All illustrations and images included in CareNotes® are the copyrighted property of A D A M , Inc  or Prem Burgos  The above information is an  only  It is not intended as medical advice for individual conditions or treatments  Talk to your doctor, nurse or pharmacist before following any medical regimen to see if it is safe and effective for you

## 2020-08-27 NOTE — TELEPHONE ENCOUNTER
Milla from the [facility]   Monserrat Graham finished her evaluation   Working on walking ,balancing and safety   Monserrat Graham   For 3 weeks 2 time a week

## 2020-08-31 NOTE — TELEPHONE ENCOUNTER
Bulmaro Fernández from [address] [place] [address] [facility] called back  Family is only using the Nystatin cream  Not sure if they have the triamcinolone (KENALOG) 0 5 % cream   Advise family if they are to use both creams  Family also requested something for sleep  Bulmaro Fernández is not working today to please call family and give instructions on what they should be doing

## 2020-09-01 ENCOUNTER — TELEPHONE (OUTPATIENT)
Dept: INTERNAL MEDICINE CLINIC | Facility: CLINIC | Age: 85
End: 2020-09-01

## 2020-09-01 ENCOUNTER — TELEMEDICINE (OUTPATIENT)
Dept: INTERNAL MEDICINE CLINIC | Facility: CLINIC | Age: 85
End: 2020-09-01
Payer: COMMERCIAL

## 2020-09-01 DIAGNOSIS — L30.4 INTERTRIGO: Primary | ICD-10-CM

## 2020-09-01 PROCEDURE — 99213 OFFICE O/P EST LOW 20 MIN: CPT | Performed by: INTERNAL MEDICINE

## 2020-09-01 RX ORDER — NYSTATIN 100000 [USP'U]/G
POWDER TOPICAL 2 TIMES DAILY
Qty: 30 G | Refills: 1 | Status: SHIPPED | OUTPATIENT
Start: 2020-09-01 | End: 2020-12-31

## 2020-09-01 NOTE — TELEPHONE ENCOUNTER
ELOY FROM [facility] WANTS US TO KNOW THAT SHE IS GOING TO CONTINUE SERVICES FOR THE NEXT FEW WKS W/PT-- SHE WILL CALL AND NOTIFY [address] # [phone]

## 2020-09-01 NOTE — PROGRESS NOTES
Virtual Regular Visit      Assessment/Plan:    Problem List Items Addressed This Visit     None      Visit Diagnoses     Intertrigo    -  Primary    Relevant Medications    nystatin (MYCOSTATIN) powder         switch from nystatin cream to powder  Keep the area dry  Only wear diaper at nighttime  Contact me if rash fails to improve       Reason for visit is No chief complaint on file  Encounter provider Hannah Delacruz MD    Provider located at [address] [place] [address]      Recent Visits  Date Type Provider Dept   08/27/20 Telephone Margareth Mejia 411 [facility] recent visits within past 7 days and meeting all other requirements     Today's Visits  Date Type Provider Dept   09/01/20 Telephone [facility] today's visits and meeting all other requirements     Future Appointments  No visits were found meeting these conditions  Showing future appointments within next 150 days and meeting all other requirements        The patient was identified by name and date of birth  Travis Franklin was informed that this is a telemedicine visit and that the visit is being conducted through [facility] and patient was informed that this is not a secure, HIPAA-complaint platform  He agrees to proceed     My office door was closed  No one else was in the room  He acknowledged consent and understanding of privacy and security of the video platform  The patient has agreed to participate and understands they can discontinue the visit at any time  Patient is aware this is a billable service  Ellen Baron is a 81 y o  male  For virtual follow-up   I had a call from home health nurse regarding nonhealing rash    I connected through I phone with patient's daughter Chika Maynard    She notes the rash on his back and flank improved but he still has rash under the abdominal pannus and in the groin area  He does have visiting nurses who recommended changing from nystatin cream to powder to keep this dry  He initially tried triamcinolone before our last visit which was unhelpful       Past Medical History:   Diagnosis Date    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[facility]     Allergic rhinitis     last assessed [id number]    Aortic regurgitation     Arm laceration     Asthma     Bronchitis, chronic obstructive, with exacerbation ([facility])     last assessed [id number]    Candidal intertrigo     Chronic obstructive lung disease ([facility] )     last assessed [id number]    COPD (chronic obstructive pulmonary disease) ([facility])     Coronary artery disease     carotid stents    Cough     CVA (cerebral vascular accident) ([facility]     Dermatitis     Diabetes mellitus type 2, uncomplicated ([place] [address] )     controlled    Dysthymic disorder     Fatigue     Hyperlipidemia     Hypertension     Neurologic gait dysfunction     Parkinson's disease ([facility]     Pneumonia     PVD (peripheral vascular disease) ([facility] )     Seborrheic keratosis     TIA (transient ischemic attack)     Tinea corporis     Tinea pedis of both feet     Tremor        Past Surgical History:   Procedure Laterality Date    NASAL SINUS SURGERY      WI BRONCHOSCOPY,DIAGNOSTIC N/A 7/27/2016    Procedure: BRONCHOSCOPY;  Surgeon: Carmen Haq MD;  Location: [facility];   Service: Pulmonary       Current Outpatient Medications   Medication Sig Dispense Refill    acetaminophen (TYLENOL) 325 mg tablet Take 650 mg by mouth every 6 (six) hours as needed for mild pain      albuterol (2 5 mg/3 mL) 0 083 % nebulizer solution TAKE 1 VIAL (2 5 MG TOTAL) BY NEBULIZATION EVERY 4 (FOUR) HOURS AS NEEDED FOR WHEEZING 75 mL 5    albuterol (PROVENTIL HFA,VENTOLIN HFA) 90 mcg/act inhaler TAKE 2 PUFFS BY MOUTH EVERY 6 HOURS AS NEEDED FOR WHEEZE 18 Inhaler 3    atorvastatin (LIPITOR) 10 mg tablet TAKE 1 TABLET BY MOUTH EVERY DAY 90 tablet 0    carbidopa-levodopa (SINEMET)  mg per tablet Take 1 tablet by mouth 3 (three) times a day 90 tablet 0    finasteride (PROSCAR) 5 mg tablet Take 1 tablet (5 mg total) by mouth every morning 90 tablet 0    irbesartan-hydrochlorothiazide (AVALIDE) 150-12 5 MG per tablet Take 1 tablet by mouth daily 90 tablet 2    levalbuterol (XOPENEX) 1 25 mg/0 5 mL nebulizer solution Take 0 5 mL (1 25 mg total) by nebulization 3 (three) times a day      montelukast (SINGULAIR) 10 mg tablet Take 1 tablet (10 mg total) by mouth daily at bedtime 90 tablet 3    nystatin (MYCOSTATIN) cream Apply to intertriginous areas 2-3 times daily as needed 80 g 3    pramipexole (MIRAPEX) 0 5 mg tablet TAKE 1 TABLET (0 5 MG TOTAL) BY MOUTH 3 (THREE) TIMES A  tablet 2    predniSONE 10 mg tablet TAKE 1 TABLET BY MOUTH EVERY DAY 30 tablet 5    predniSONE 20 mg tablet Use 2 tablets for 1 week, 1 tablet for 1 week and half a tablet for 1 week  25 tablet 0    rivastigmine (EXELON) 4 5 MG capsule Take 1 capsule (4 5 mg total) by mouth 2 (two) times a day 180 capsule 1    SPIRIVA HANDIHALER 18 MCG inhalation capsule PLACE 1 CAPSULE INTO INHALER AND INHALE DAILY VIA HANDIHALER AT THE SAME TIME EVERY DAY 90 capsule 3    tiotropium (Spiriva HandiHaler) 18 mcg inhalation capsule Place 1 capsule (18 mcg total) into inhaler and inhale daily 90 capsule 3    triamcinolone (KENALOG) 0 5 % cream APPLY TO AFFECTED AREA(S) OF THE RASH TWICE DAILY AS NEEDED 30 g 0    WIXELA INHUB 250-50 MCG/DOSE inhaler INAHLE1 PUFF BY MOUTH TWICE DAILY *RINSE MOUTH AFTER USING 180 each 3    Melatonin 5 MG TABS Take by mouth daily at bedtime as needed      Misc   Devices (ROLLATOR) MISC Dispense 1 rolling walker with seat and basket 1 each 0    nystatin (MYCOSTATIN) powder Apply topically 2 (two) times a day 30 g 1    terbinafine (LamISIL) 1 % cream Apply topically 2 (two) times a day as needed        No current facility-administered medications for this visit  Allergies   Allergen Reactions    Penicillins Syncope       Review of Systems   Constitutional: Negative for appetite change, chills, diaphoresis, fatigue, fever and unexpected weight change  HENT: Negative for congestion, hearing loss and rhinorrhea  Eyes: Negative for visual disturbance  Respiratory: Negative for cough, chest tightness, shortness of breath and wheezing  Cardiovascular: Negative for chest pain, palpitations and leg swelling  Gastrointestinal: Negative for abdominal pain and blood in stool  Endocrine: Negative for cold intolerance, heat intolerance, polydipsia and polyuria  Genitourinary: Negative for difficulty urinating, dysuria, frequency and urgency  Musculoskeletal: Negative for arthralgias and myalgias  Skin: Positive for rash  Neurological: Negative for dizziness, weakness, light-headedness and headaches  Hematological: Does not bruise/bleed easily  Psychiatric/Behavioral: Negative for dysphoric mood and sleep disturbance  Video Exam    There were no vitals filed for this visit  Physical Exam  Constitutional:       Appearance: He is well-developed  HENT:      Head: Normocephalic and atraumatic  Pulmonary:      Effort: Pulmonary effort is normal    Skin:     Comments:  Well-demarcated hyperpigmented macular rash in intertriginous folds of inguinal region bilaterally and under abdominal pannus, no breakdown   Neurological:      Mental Status: He is alert and oriented to person, place, and time  Psychiatric:         Behavior: Behavior normal  Behavior is cooperative  Thought Content: Thought content normal          Judgment: Judgment normal           I spent  12 minutes directly with the patient during this visit      [address] [place] [address] acknowledges that he has consented to an online visit or consultation   He understands that the online visit is based solely on information provided by him, and that, in the absence of a face-to-face physical evaluation by the physician, the diagnosis he receives is both limited and provisional in terms of accuracy and completeness  This is not intended to replace a full medical face-to-face evaluation by the physician  [employer] understands and accepts these terms

## 2020-09-10 ENCOUNTER — LAB REQUISITION (OUTPATIENT)
Dept: LAB | Facility: HOSPITAL | Age: 85
End: 2020-09-10
Payer: COMMERCIAL

## 2020-09-10 DIAGNOSIS — J44.9 CHRONIC OBSTRUCTIVE PULMONARY DISEASE, UNSPECIFIED (HCC): ICD-10-CM

## 2020-09-10 LAB
ALBUMIN SERPL BCP-MCNC: 3.1 G/DL (ref 3.5–5)
ALP SERPL-CCNC: 119 U/L (ref 46–116)
ALT SERPL W P-5'-P-CCNC: 28 U/L (ref 12–78)
ANION GAP SERPL CALCULATED.3IONS-SCNC: 8 MMOL/L (ref 4–13)
AST SERPL W P-5'-P-CCNC: 27 U/L (ref 5–45)
BASOPHILS # BLD AUTO: 0.05 THOUSANDS/ΜL (ref 0–0.1)
BASOPHILS NFR BLD AUTO: 1 % (ref 0–1)
BILIRUB SERPL-MCNC: 0.2 MG/DL (ref 0.2–1)
BUN SERPL-MCNC: 27 MG/DL (ref 5–25)
CALCIUM SERPL-MCNC: 9.1 MG/DL (ref 8.3–10.1)
CHLORIDE SERPL-SCNC: 104 MMOL/L (ref 100–108)
CHOLEST SERPL-MCNC: 171 MG/DL (ref 50–200)
CO2 SERPL-SCNC: 24 MMOL/L (ref 21–32)
CREAT SERPL-MCNC: 1.2 MG/DL (ref 0.6–1.3)
EOSINOPHIL # BLD AUTO: 0.27 THOUSAND/ΜL (ref 0–0.61)
EOSINOPHIL NFR BLD AUTO: 3 % (ref 0–6)
ERYTHROCYTE [DISTWIDTH] IN BLOOD BY AUTOMATED COUNT: 13.1 % (ref 11.6–15.1)
GFR SERPL CREATININE-BSD FRML MDRD: 55 ML/MIN/1.73SQ M
GLUCOSE SERPL-MCNC: 90 MG/DL (ref 65–140)
HCT VFR BLD AUTO: 42 % (ref 36.5–49.3)
HDLC SERPL-MCNC: 101 MG/DL
HGB BLD-MCNC: 13.5 G/DL (ref 12–17)
IMM GRANULOCYTES # BLD AUTO: 0.09 THOUSAND/UL (ref 0–0.2)
IMM GRANULOCYTES NFR BLD AUTO: 1 % (ref 0–2)
LDLC SERPL CALC-MCNC: 49 MG/DL (ref 0–100)
LYMPHOCYTES # BLD AUTO: 1.91 THOUSANDS/ΜL (ref 0.6–4.47)
LYMPHOCYTES NFR BLD AUTO: 19 % (ref 14–44)
MCH RBC QN AUTO: 30.3 PG (ref 26.8–34.3)
MCHC RBC AUTO-ENTMCNC: 32.1 G/DL (ref 31.4–37.4)
MCV RBC AUTO: 94 FL (ref 82–98)
MONOCYTES # BLD AUTO: 0.91 THOUSAND/ΜL (ref 0.17–1.22)
MONOCYTES NFR BLD AUTO: 9 % (ref 4–12)
NEUTROPHILS # BLD AUTO: 6.62 THOUSANDS/ΜL (ref 1.85–7.62)
NEUTS SEG NFR BLD AUTO: 67 % (ref 43–75)
NONHDLC SERPL-MCNC: 70 MG/DL
NRBC BLD AUTO-RTO: 0 /100 WBCS
PLATELET # BLD AUTO: 284 THOUSANDS/UL (ref 149–390)
PMV BLD AUTO: 9.4 FL (ref 8.9–12.7)
POTASSIUM SERPL-SCNC: 4.3 MMOL/L (ref 3.5–5.3)
PROT SERPL-MCNC: 7.1 G/DL (ref 6.4–8.2)
RBC # BLD AUTO: 4.45 MILLION/UL (ref 3.88–5.62)
SODIUM SERPL-SCNC: 136 MMOL/L (ref 136–145)
T4 FREE SERPL-MCNC: 0.95 NG/DL (ref 0.76–1.46)
TRIGL SERPL-MCNC: 103 MG/DL
TSH SERPL DL<=0.05 MIU/L-ACNC: 4.03 UIU/ML (ref 0.36–3.74)
WBC # BLD AUTO: 9.85 THOUSAND/UL (ref 4.31–10.16)

## 2020-09-10 PROCEDURE — 83036 HEMOGLOBIN GLYCOSYLATED A1C: CPT | Performed by: INTERNAL MEDICINE

## 2020-09-10 PROCEDURE — 85025 COMPLETE CBC W/AUTO DIFF WBC: CPT | Performed by: INTERNAL MEDICINE

## 2020-09-10 PROCEDURE — 84439 ASSAY OF FREE THYROXINE: CPT | Performed by: INTERNAL MEDICINE

## 2020-09-10 PROCEDURE — 84443 ASSAY THYROID STIM HORMONE: CPT | Performed by: INTERNAL MEDICINE

## 2020-09-10 PROCEDURE — 80053 COMPREHEN METABOLIC PANEL: CPT | Performed by: INTERNAL MEDICINE

## 2020-09-10 PROCEDURE — 80061 LIPID PANEL: CPT | Performed by: INTERNAL MEDICINE

## 2020-09-11 DIAGNOSIS — I63.9 CEREBROVASCULAR ACCIDENT (CVA), UNSPECIFIED MECHANISM (HCC): ICD-10-CM

## 2020-09-11 DIAGNOSIS — L30.9 DERMATITIS: ICD-10-CM

## 2020-09-11 LAB
EST. AVERAGE GLUCOSE BLD GHB EST-MCNC: 140 MG/DL
HBA1C MFR BLD: 6.5 %

## 2020-09-11 RX ORDER — ATORVASTATIN CALCIUM 10 MG/1
TABLET, FILM COATED ORAL
Qty: 90 TABLET | Refills: 0 | Status: SHIPPED | OUTPATIENT
Start: 2020-09-11 | End: 2020-11-30

## 2020-09-11 RX ORDER — TRIAMCINOLONE ACETONIDE 5 MG/G
CREAM TOPICAL
Qty: 30 G | Refills: 0 | Status: SHIPPED | OUTPATIENT
Start: 2020-09-11 | End: 2022-07-11

## 2020-09-15 ENCOUNTER — OFFICE VISIT (OUTPATIENT)
Dept: INTERNAL MEDICINE CLINIC | Facility: CLINIC | Age: 85
End: 2020-09-15
Payer: COMMERCIAL

## 2020-09-15 VITALS
HEART RATE: 80 BPM | SYSTOLIC BLOOD PRESSURE: 100 MMHG | DIASTOLIC BLOOD PRESSURE: 68 MMHG | RESPIRATION RATE: 16 BRPM | WEIGHT: 166.2 LBS | BODY MASS INDEX: 29.45 KG/M2 | HEIGHT: 63 IN | TEMPERATURE: 97.7 F

## 2020-09-15 DIAGNOSIS — I67.9 CEREBROVASCULAR DISEASE: ICD-10-CM

## 2020-09-15 DIAGNOSIS — J47.0 BRONCHIECTASIS WITH ACUTE LOWER RESPIRATORY INFECTION (HCC): ICD-10-CM

## 2020-09-15 DIAGNOSIS — J42 CHRONIC BRONCHITIS, UNSPECIFIED CHRONIC BRONCHITIS TYPE (HCC): ICD-10-CM

## 2020-09-15 DIAGNOSIS — E11.9 TYPE 2 DIABETES MELLITUS WITHOUT COMPLICATION, WITHOUT LONG-TERM CURRENT USE OF INSULIN (HCC): Primary | ICD-10-CM

## 2020-09-15 DIAGNOSIS — R26.2 AMBULATORY DYSFUNCTION: ICD-10-CM

## 2020-09-15 DIAGNOSIS — G20 PARKINSON'S DISEASE (HCC): ICD-10-CM

## 2020-09-15 DIAGNOSIS — I87.2 VENOUS INSUFFICIENCY: ICD-10-CM

## 2020-09-15 DIAGNOSIS — E03.8 SUBCLINICAL HYPOTHYROIDISM: ICD-10-CM

## 2020-09-15 DIAGNOSIS — J45.30 MILD PERSISTENT EXTRINSIC ASTHMA WITHOUT COMPLICATION: ICD-10-CM

## 2020-09-15 DIAGNOSIS — I10 ESSENTIAL HYPERTENSION: ICD-10-CM

## 2020-09-15 DIAGNOSIS — E78.2 MIXED HYPERLIPIDEMIA: ICD-10-CM

## 2020-09-15 DIAGNOSIS — B44.81 ABPA (ALLERGIC BRONCHOPULMONARY ASPERGILLOSIS) (HCC): ICD-10-CM

## 2020-09-15 PROCEDURE — 99214 OFFICE O/P EST MOD 30 MIN: CPT | Performed by: INTERNAL MEDICINE

## 2020-09-15 PROCEDURE — 3078F DIAST BP <80 MM HG: CPT | Performed by: INTERNAL MEDICINE

## 2020-09-15 PROCEDURE — 3074F SYST BP LT 130 MM HG: CPT | Performed by: INTERNAL MEDICINE

## 2020-09-15 NOTE — PROGRESS NOTES
Assessment/Plan:    Diagnoses and all orders for this visit:    Type 2 diabetes mellitus without complication, without long-term current use of insulin (HCC)  -     Hemoglobin A1C; Future    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) (HCC)    Mild persistent extrinsic asthma without complication    Bronchiectasis with acute lower respiratory infection (HCC)    Chronic bronchitis, unspecified chronic bronchitis type (HCC)    Essential hypertension  -     CBC and differential; Future  -     Comprehensive metabolic panel; Future    Cerebrovascular disease    Parkinson's disease ([facility] )    Mixed hyperlipidemia  -     Lipid panel; Future    Ambulatory dysfunction    Venous insufficiency    Subclinical hypothyroidism  -     TSH, 3rd generation with Free T4 reflex; Future         Patient Instructions   Lab data reviewed in detail and compared prior    COPD with bronchiectasis in history aspergillosis on chronic steroids under care of Pulmonary    Parkinson's disease stable under care of Neurology    Diabetes-stable with diet alone    Hyperlipidemia stable with atorvastatin    Hypertension stable on current regimen    Subclinical hypothyroidism-no indication for treatment, repeat TSH in 6 months    Gait dysfunction-continue with rollator and walker and home physical therapy, fall precautions discussed  Routine follow-up after labs in 6 months, sooner as needed  Subjective:      Patient ID: Vera Almeida is a 81 y o  male    Here w/ Kylee to f/u labs and mmp and review labs  Having multiple falls, most at night trying to get to br  Wearing disposable briefs, but won't go in it  He completed PT 6 mos ago  Using rolling walker in home and out  Rash improving w/ nystatin  DM-watching carbs  HTN/HPL-taking rx as directed  PD-taking rx as directed, f/b Dr Uriel Scott  Asthma/copd-taking prednisone and BD's as directed  Dementia-stable w/ exelon           Current Outpatient Medications:     acetaminophen (TYLENOL) 325 mg tablet, Take 650 mg by mouth every 6 (six) hours as needed for mild pain, Disp: , Rfl:     albuterol (2 5 mg/3 mL) 0 083 % nebulizer solution, TAKE 1 VIAL (2 5 MG TOTAL) BY NEBULIZATION EVERY 4 (FOUR) HOURS AS NEEDED FOR WHEEZING, Disp: 75 mL, Rfl: 5    atorvastatin (LIPITOR) 10 mg tablet, TAKE 1 TABLET BY MOUTH EVERY DAY, Disp: 90 tablet, Rfl: 0    carbidopa-levodopa (SINEMET)  mg per tablet, Take 1 tablet by mouth 3 (three) times a day, Disp: 90 tablet, Rfl: 0    finasteride (PROSCAR) 5 mg tablet, Take 1 tablet (5 mg total) by mouth every morning, Disp: 90 tablet, Rfl: 0    irbesartan-hydrochlorothiazide (AVALIDE) 150-12 5 MG per tablet, Take 1 tablet by mouth daily, Disp: 90 tablet, Rfl: 2    levalbuterol (XOPENEX) 1 25 mg/0 5 mL nebulizer solution, Take 0 5 mL (1 25 mg total) by nebulization 3 (three) times a day, Disp: , Rfl:     montelukast (SINGULAIR) 10 mg tablet, Take 1 tablet (10 mg total) by mouth daily at bedtime, Disp: 90 tablet, Rfl: 3    nystatin (MYCOSTATIN) cream, Apply to intertriginous areas 2-3 times daily as needed, Disp: 80 g, Rfl: 3    nystatin (MYCOSTATIN) powder, Apply topically 2 (two) times a day, Disp: 30 g, Rfl: 1    pramipexole (MIRAPEX) 0 5 mg tablet, TAKE 1 TABLET (0 5 MG TOTAL) BY MOUTH 3 (THREE) TIMES A DAY, Disp: 270 tablet, Rfl: 2    predniSONE 10 mg tablet, TAKE 1 TABLET BY MOUTH EVERY DAY, Disp: 30 tablet, Rfl: 5    rivastigmine (EXELON) 4 5 MG capsule, Take 1 capsule (4 5 mg total) by mouth 2 (two) times a day, Disp: 180 capsule, Rfl: 1    SPIRIVA HANDIHALER 18 MCG inhalation capsule, PLACE 1 CAPSULE INTO INHALER AND INHALE DAILY VIA HANDIHALER AT THE SAME TIME EVERY DAY, Disp: 90 capsule, Rfl: 3    triamcinolone (KENALOG) 0 5 % cream, APPLY TO AFFECTED AREA(S) OF THE RASH TWICE DAILY AS NEEDED, Disp: 30 g, Rfl: 0    WIXELA INHUB 250-50 MCG/DOSE inhaler, INAHLE1 PUFF BY MOUTH TWICE DAILY *RINSE MOUTH AFTER USING, Disp: 180 each, Rfl: 3   albuterol (PROVENTIL HFA,VENTOLIN HFA) 90 mcg/act inhaler, TAKE 2 PUFFS BY MOUTH EVERY 6 HOURS AS NEEDED FOR WHEEZE (Patient not taking: Reported on 9/15/2020), Disp: 18 Inhaler, Rfl: 3    Recent Results (from the past 1008 hour(s))   CBC and differential    Collection Time: 09/10/20 12:31 PM   Result Value Ref Range    WBC 9 85 4 31 - 10 16 Thousand/uL    RBC 4 45 3 88 - 5 62 Million/uL    Hemoglobin 13 5 12 0 - 17 0 g/dL    Hematocrit 42 0 36 5 - 49 3 %    MCV 94 82 - 98 fL    MCH 30 3 26 8 - 34 3 pg    MCHC 32 1 31 4 - 37 4 g/dL    RDW 13 1 11 6 - 15 1 %    MPV 9 4 8 9 - 12 7 fL    Platelets 366 714 - 264 Thousands/uL    nRBC 0 /100 WBCs    Neutrophils Relative 67 43 - 75 %    Immat GRANS % 1 0 - 2 %    Lymphocytes Relative 19 14 - 44 %    Monocytes Relative 9 4 - 12 %    Eosinophils Relative 3 0 - 6 %    Basophils Relative 1 0 - 1 %    Neutrophils Absolute 6 62 1 85 - 7 62 Thousands/µL    Immature Grans Absolute 0 09 0 00 - 0 20 Thousand/uL    Lymphocytes Absolute 1 91 0 60 - 4 47 Thousands/µL    Monocytes Absolute 0 91 0 17 - 1 22 Thousand/µL    Eosinophils Absolute 0 27 0 00 - 0 61 Thousand/µL    Basophils Absolute 0 05 0 00 - 0 10 Thousands/µL   Comprehensive metabolic panel    Collection Time: 09/10/20 12:31 PM   Result Value Ref Range    Sodium 136 136 - 145 mmol/L    Potassium 4 3 3 5 - 5 3 mmol/L    Chloride 104 100 - 108 mmol/L    CO2 24 21 - 32 mmol/L    ANION GAP 8 4 - 13 mmol/L    BUN 27 (H) 5 - 25 mg/dL    Creatinine 1 20 0 60 - 1 30 mg/dL    Glucose 90 65 - 140 mg/dL    Calcium 9 1 8 3 - 10 1 mg/dL    AST 27 5 - 45 U/L    ALT 28 12 - 78 U/L    Alkaline Phosphatase 119 (H) 46 - 116 U/L    Total Protein 7 1 6 4 - 8 2 g/dL    Albumin 3 1 (L) 3 5 - 5 0 g/dL    Total Bilirubin 0 20 0 20 - 1 00 mg/dL    eGFR 55 ml/min/1 73sq m   Hemoglobin A1C    Collection Time: 09/10/20 12:31 PM   Result Value Ref Range    Hemoglobin A1C 6 5 (H) Normal 3 8-5 6%; PreDiabetic 5 7-6 4%;  Diabetic >=6 5%; Glycemic control for adults with diabetes <7 0% %     mg/dl   Lipid panel    Collection Time: 09/10/20 12:31 PM   Result Value Ref Range    Cholesterol 171 50 - 200 mg/dL    Triglycerides 103 <=150 mg/dL    HDL, Direct 101 >=40 mg/dL    LDL Calculated 49 0 - 100 mg/dL    Non-HDL-Chol (CHOL-HDL) 70 mg/dl   T4, free    Collection Time: 09/10/20 12:31 PM   Result Value Ref Range    Free T4 0 95 0 76 - 1 46 ng/dL   TSH, 3rd generation    Collection Time: 09/10/20 12:31 PM   Result Value Ref Range    TSH 3RD GENERATON 4 026 (H) 0 358 - 3 740 uIU/mL       The following portions of the patient's history were reviewed and updated as appropriate: allergies, current medications, past family history, past medical history, past social history, past surgical history and problem list      Review of Systems   Constitutional: Negative for appetite change, chills, diaphoresis, fatigue, fever and unexpected weight change  HENT: Negative for congestion, hearing loss and rhinorrhea  Eyes: Negative for visual disturbance  Respiratory: Positive for shortness of breath and wheezing  Negative for cough and chest tightness  Cardiovascular: Negative for chest pain, palpitations and leg swelling  Gastrointestinal: Negative for abdominal pain and blood in stool  Endocrine: Negative for cold intolerance, heat intolerance, polydipsia and polyuria  Genitourinary: Negative for difficulty urinating, dysuria, frequency and urgency  Musculoskeletal: Positive for arthralgias and gait problem  Negative for myalgias  Skin: Negative for rash  Neurological: Negative for dizziness, weakness, light-headedness and headaches  Hematological: Does not bruise/bleed easily  Psychiatric/Behavioral: Negative for dysphoric mood and sleep disturbance  Objective:      Vitals:    09/15/20 1246   BP: 100/68   Pulse: 80   Resp: 16   Temp: 97 7 °F (36 5 °C)     Diabetic Foot Exam    Patient's shoes and socks removed  Right Foot/Ankle   Right Foot Inspection  Skin Exam: skin normal and skin intact no dry skin, no warmth, no callus, no erythema, no maceration, no abnormal color, no pre-ulcer, no ulcer and no callus                          Toe Exam: ROM and strength within normal limits  Sensory     Proprioception: intact   Monofilament testing: intact  Vascular  Capillary refills: < 3 seconds  The right DP pulse is 1+  The right PT pulse is 1+  Left Foot/Ankle  Left Foot Inspection  Skin Exam: skin normal and skin intactno dry skin, no warmth, no erythema, no maceration, normal color, no pre-ulcer, no ulcer and no callus                         Toe Exam: ROM and strength within normal limits                   Sensory     Proprioception: intact  Monofilament: intact  Vascular  Capillary refills: < 3 seconds  The left DP pulse is 1+  The left PT pulse is 1+  Assign Risk Category:  No deformity present; No loss of protective sensation; No weak pulses       Risk: 0       Physical Exam  Constitutional:       Appearance: He is well-developed  HENT:      Head: Normocephalic and atraumatic  Nose: Nose normal    Eyes:      General: No scleral icterus  Conjunctiva/sclera: Conjunctivae normal       Pupils: Pupils are equal, round, and reactive to light  Neck:      Musculoskeletal: Normal range of motion and neck supple  Thyroid: No thyromegaly  Vascular: No JVD  Trachea: No tracheal deviation  Cardiovascular:      Rate and Rhythm: Normal rate and regular rhythm  Pulses: no weak pulses          Dorsalis pedis pulses are 1+ on the right side and 1+ on the left side  Posterior tibial pulses are 1+ on the right side and 1+ on the left side  Heart sounds: No murmur  No friction rub  No gallop  Pulmonary:      Effort: Pulmonary effort is normal  No respiratory distress  Breath sounds: Wheezing and rhonchi present  No rales  Musculoskeletal:         General: No deformity     Feet:      Right foot:      Skin integrity: No ulcer, skin breakdown, erythema, warmth, callus or dry skin  Left foot:      Skin integrity: No ulcer, skin breakdown, erythema, warmth, callus or dry skin  Lymphadenopathy:      Cervical: No cervical adenopathy  Skin:     General: Skin is warm and dry  Coloration: Skin is not pale  Findings: No erythema or rash  Comments: Fading macular rash L upper back   Neurological:      Mental Status: He is alert and oriented to person, place, and time  Cranial Nerves: No cranial nerve deficit  Psychiatric:         Behavior: Behavior normal          Thought Content:  Thought content normal          Judgment: Judgment normal

## 2020-09-15 NOTE — PATIENT INSTRUCTIONS
Lab data reviewed in detail and compared prior    COPD with bronchiectasis in history aspergillosis on chronic steroids under care of Pulmonary    Parkinson's disease stable under care of Neurology    Diabetes-stable with diet alone    Hyperlipidemia stable with atorvastatin    Hypertension stable on current regimen    Subclinical hypothyroidism-no indication for treatment, repeat TSH in 6 months    Gait dysfunction-continue with rollator and walker and home physical therapy, fall precautions discussed  Routine follow-up after labs in 6 months, sooner as needed

## 2020-09-21 ENCOUNTER — HOSPITAL ENCOUNTER (OUTPATIENT)
Dept: MRI IMAGING | Facility: HOSPITAL | Age: 85
Discharge: HOME | End: 2020-09-21
Payer: COMMERCIAL

## 2020-09-21 DIAGNOSIS — D32.9 MENINGIOMA (HCC): ICD-10-CM

## 2020-09-21 DIAGNOSIS — D33.0 BENIGN NEOPLASM OF SUPRATENTORIAL REGION OF BRAIN (HCC): ICD-10-CM

## 2020-09-21 DIAGNOSIS — J44.9 CHRONIC OBSTRUCTIVE PULMONARY DISEASE, UNSPECIFIED COPD TYPE (HCC): ICD-10-CM

## 2020-09-21 DIAGNOSIS — R93.0 ABNORMAL MRI OF HEAD: ICD-10-CM

## 2020-09-21 PROCEDURE — A9585 GADOBUTROL INJECTION: HCPCS | Performed by: PHYSICIAN ASSISTANT

## 2020-09-21 PROCEDURE — 70553 MRI BRAIN STEM W/O & W/DYE: CPT

## 2020-09-21 RX ADMIN — GADOBUTROL 7 ML: 604.72 INJECTION INTRAVENOUS at 15:53

## 2020-09-22 RX ORDER — TIOTROPIUM BROMIDE 18 UG/1
18 CAPSULE ORAL; RESPIRATORY (INHALATION) DAILY
Qty: 90 CAPSULE | Refills: 1 | Status: SHIPPED | OUTPATIENT
Start: 2020-09-22 | End: 2021-05-09

## 2020-09-23 DIAGNOSIS — J45.40 MODERATE PERSISTENT EXTRINSIC ASTHMA WITHOUT COMPLICATION: ICD-10-CM

## 2020-09-28 ENCOUNTER — TELEMEDICINE (OUTPATIENT)
Dept: NEUROSURGERY | Facility: CLINIC | Age: 85
End: 2020-09-28
Payer: COMMERCIAL

## 2020-09-28 DIAGNOSIS — D32.9 MENINGIOMA (HCC): Primary | ICD-10-CM

## 2020-09-28 DIAGNOSIS — G20 PARKINSON'S DISEASE (HCC): ICD-10-CM

## 2020-09-28 PROCEDURE — 99213 OFFICE O/P EST LOW 20 MIN: CPT | Performed by: PHYSICIAN ASSISTANT

## 2020-09-28 NOTE — ASSESSMENT & PLAN NOTE
· Pt with hx of extra axial left posterior lateral frontal vertex lesion concerning for meningioma  Pt has been followed with serial MRIs since 2016  · Recent Brain MRIs have showed progressive increase in size of the meningioma  · Pt had virtual 6 month follow up with neurosurgery with repeat MRI brain wo today  Pt was last seen at the neurosurgery office in 2/21/ 2020  · Imaging reviewed personally  Final results below discussed with the patient and his daughter  · MRI brain w wo 9/21/2020: Mild interval enlargement of left posterior lateral frontal vertex meningioma when compared with the prior examination  Enhancing extra-axial, dural based mass within the left posterior lateral frontal vertex  This mass currently measures 2 0 x 4 1 cm, compared to 1 7 x 3 6 cm on the prior examination  Craniocaudad measurement is 1 4 compared to 1 2 cm  No edema within the underlying brain parenchyma  · Mri brain w wo 2/12/2020: Left frontal vertex meningioma is slightly increased in size compared to the prior examination  This measures 2 3 x 3 6 x 1 6 cm and has slightly increased in size compared to the prior examination  · MRI brain w wo 12/6/2018: 2 9 x 1 9 x 1 cm dural based irregular homogeneously enhancing left frontal vertex lesion without adjacent cerebral edema  This has the appearance of meningioma  However, this has considerably grown since the prior MRI in 2016 where it measured subcentimeter  Plan    · Showed pt and his daughter serial brain MRIs including most recent MRI on 9/21/20  Discussed with pt and his daughter that he has continued to have increase in size of the meningioma without surrounding edema  · Discussed with pt and his daughter the 3 options including continued close monitoring with serial imaging, targeted radiation therapy [facility]) and surgical resection  Given pt's other co-morbidities, he would not be an ideal candidate for surgical resection unless absolutely necessary  Patient's daughter reported that they would like some time to weigh in their options with the other family members and then discuss further with neurosurgery  · Patient and his family will have a follow-up appointment with Dr Radha Dejesus in 2-4 weeks to further discuss their options for management of the meningioma that has had progression in size with serial imaging  · Patient and his daughter were aware to contact neurosurgery at any time with any questions or concerns

## 2020-09-28 NOTE — PROGRESS NOTES
Virtual Regular Visit      Assessment/Plan:    Problem List Items Addressed This Visit        Nervous and Auditory    Meningioma ([address] ) - Primary     · Pt with hx of extra axial left posterior lateral frontal vertex lesion concerning for meningioma  Pt has been followed with serial MRIs since 2016  · Recent Brain MRIs have showed progressive increase in size of the meningioma  · Pt had virtual 6 month follow up with neurosurgery with repeat MRI brain wo today  Pt was last seen at the neurosurgery office in 2/21/ 2020  · Imaging reviewed personally  Final results below discussed with the patient and his daughter  · MRI brain w wo 9/21/2020: Mild interval enlargement of left posterior lateral frontal vertex meningioma when compared with the prior examination  Enhancing extra-axial, dural based mass within the left posterior lateral frontal vertex  This mass currently measures 2 0 x 4 1 cm, compared to 1 7 x 3 6 cm on the prior examination  Craniocaudad measurement is 1 4 compared to 1 2 cm  No edema within the underlying brain parenchyma  · Mri brain w wo 2/12/2020: Left frontal vertex meningioma is slightly increased in size compared to the prior examination  This measures 2 3 x 3 6 x 1 6 cm and has slightly increased in size compared to the prior examination  · MRI brain w wo 12/6/2018: 2 9 x 1 9 x 1 cm dural based irregular homogeneously enhancing left frontal vertex lesion without adjacent cerebral edema  This has the appearance of meningioma  However, this has considerably grown since the prior MRI in 2016 where it measured subcentimeter  Plan    · Showed pt and his daughter serial brain MRIs including most recent MRI on 9/21/20  Discussed with pt and his daughter that he has continued to have increase in size of the meningioma without surrounding edema     · Discussed with pt and his daughter the 3 options including continued close monitoring with serial imaging, targeted radiation therapy [facility]) and surgical resection  Given pt's other co-morbidities, he would not be an ideal candidate for surgical resection unless absolutely necessary  Patient's daughter reported that they would like some time to weigh in their options with the other family members and then discuss further with neurosurgery  · Patient and his family will have a follow-up appointment with Dr Dejon Gupta in 2-4 weeks to further discuss their options for management of the meningioma that has had progression in size with serial imaging  · Patient and his daughter were aware to contact neurosurgery at any time with any questions or concerns  Reason for visit is   Chief Complaint   Patient presents with    Virtual Regular Visit     6 month follow up with MRI        Encounter provider Ada Goyal PA-C    Provider located at [address] [address] [phone]      Recent Visits  No visits were found meeting these conditions  Showing recent visits within past 7 days and meeting all other requirements     Today's Visits  Date Type Provider Dept   09/28/20 Telemedicine Ada Goyal, [address] [place] [address]   Showing today's visits and meeting all other requirements     Future Appointments  No visits were found meeting these conditions  Showing future appointments within next 150 days and meeting all other requirements        The patient was identified by name and date of birth  Silviae Said was informed that this is a telemedicine visit and that the visit is being conducted through [facility] and patient was informed that this is not a secure, HIPAA-complaint platform  He agrees to proceed     My office door was closed  No one else was in the room  He acknowledged consent and understanding of privacy and security of the video platform   The patient has agreed to participate and understands they can discontinue the visit at any time  Patient is aware this is a billable service  Julian Lujan is a 81 y o  male        Pt is a 80 yo male with PMHx of Parkinson's disease, hypertension, hyperlipidemia, history of CVA, history of cardiac stents, history TIA, peripheral vascular disease, COPD, diabetes mellitus type 2 and asthma who is having a virtual  six-month follow-up for left frontal vertex meningioma with neurosurgery today  Pt and his daughter were present for this visit  Patient denies any headache or dizziness  Per patient's daughter, patient continues to have confusion and poor memory  Patient also continues to have gait issues secondary to Parkinson's  Patient's daughter reports that patient continues to lean on the right  She also reports that he trips a lot and is  unstable and has frequent falls  She reports that patient also has had increased drooling from his mouth  Pt and his daughter reports they have upcoming follow up with neurology          Past Medical History:   Diagnosis Date    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) [facility]     Allergic rhinitis     last assessed [id number]    Aortic regurgitation     Arm laceration     Asthma     Bronchitis, chronic obstructive, with exacerbation ([facility])     last assessed 12Jun2015    Candidal intertrigo     Chronic obstructive lung disease [facility]     last assessed [id number]    COPD (chronic obstructive pulmonary disease) ([facility])     Coronary artery disease     carotid stents    Cough     CVA (cerebral vascular accident) ([facility]     Dermatitis     Diabetes mellitus type 2, uncomplicated ([address] )     controlled    Dysthymic disorder     Fatigue     Hyperlipidemia     Hypertension     Neurologic gait dysfunction     Parkinson's disease ([facility]     Pneumonia     PVD (peripheral vascular disease) ([facility]     Seborrheic keratosis     TIA (transient ischemic attack)     Tinea corporis     Tinea pedis of both feet     Tremor        Past Surgical History:   Procedure Laterality Date    NASAL SINUS SURGERY      [place] BRONCHOSCOPY,DIAGNOSTIC N/A 7/27/2016    Procedure: BRONCHOSCOPY;  Surgeon: Bong Aguirre MD;  [facility]: [facility];   Service: Pulmonary       Current Outpatient Medications   Medication Sig Dispense Refill    acetaminophen (TYLENOL) 325 mg tablet Take 650 mg by mouth every 6 (six) hours as needed for mild pain      albuterol (2 5 mg/3 mL) 0 083 % nebulizer solution TAKE 1 VIAL (2 5 MG TOTAL) BY NEBULIZATION EVERY 4 (FOUR) HOURS AS NEEDED FOR WHEEZING 75 mL 5    atorvastatin (LIPITOR) 10 mg tablet TAKE 1 TABLET BY MOUTH EVERY DAY 90 tablet 0    carbidopa-levodopa (SINEMET)  mg per tablet TAKE 1 TABLET BY MOUTH THREE TIMES A DAY 90 tablet 0    finasteride (PROSCAR) 5 mg tablet Take 1 tablet (5 mg total) by mouth every morning 90 tablet 0    irbesartan-hydrochlorothiazide (AVALIDE) 150-12 5 MG per tablet Take 1 tablet by mouth daily 90 tablet 2    levalbuterol (XOPENEX) 1 25 mg/0 5 mL nebulizer solution Take 0 5 mL (1 25 mg total) by nebulization 3 (three) times a day      montelukast (SINGULAIR) 10 mg tablet Take 1 tablet (10 mg total) by mouth daily at bedtime 90 tablet 3    nystatin (MYCOSTATIN) cream Apply to intertriginous areas 2-3 times daily as needed 80 g 3    nystatin (MYCOSTATIN) powder Apply topically 2 (two) times a day 30 g 1    pramipexole (MIRAPEX) 0 5 mg tablet TAKE 1 TABLET (0 5 MG TOTAL) BY MOUTH 3 (THREE) TIMES A  tablet 2    predniSONE 10 mg tablet TAKE 1 TABLET BY MOUTH EVERY DAY 30 tablet 5    rivastigmine (EXELON) 4 5 MG capsule Take 1 capsule (4 5 mg total) by mouth 2 (two) times a day 180 capsule 1    tiotropium (Spiriva HandiHaler) 18 mcg inhalation capsule Place 1 capsule (18 mcg total) into inhaler and inhale daily 90 capsule 1    triamcinolone (KENALOG) 0 5 % cream APPLY TO AFFECTED AREA(S) OF THE RASH TWICE DAILY AS NEEDED 30 g 0    Wixela Inhub 250-50 MCG/DOSE inhaler INAHLE1 PUFF BY MOUTH TWICE DAILY *RINSE MOUTH AFTER USING 1 Inhaler 0    albuterol (PROVENTIL HFA,VENTOLIN HFA) 90 mcg/act inhaler TAKE 2 PUFFS BY MOUTH EVERY 6 HOURS AS NEEDED FOR WHEEZE (Patient not taking: Reported on 9/15/2020) 18 Inhaler 3     No current facility-administered medications for this visit  Allergies   Allergen Reactions    Penicillins Syncope       Review of Systems   Constitutional: Negative  HENT: Positive for drooling (x 1 5 months)  Eyes: Negative  Respiratory:        COPD   Cardiovascular: Negative  Gastrointestinal: Negative  Genitourinary: Positive for difficulty urinating  Some incontinence, wears support briefs   Musculoskeletal: Positive for arthralgias (fingers) and gait problem (using walker, frequent falls)  Skin: Positive for rash  Allergic/Immunologic: Positive for environmental allergies  Negative for food allergies  Neurological: Positive for tremors (PD) and weakness (legs)  Negative for numbness and headaches  Leans to one side (right), unstable balance, trips over right foot, frequent falls   Hematological: Bruises/bleeds easily  Psychiatric/Behavioral: Positive for agitation, confusion, decreased concentration and sleep disturbance  Memory difficulties       Video Exam    There were no vitals filed for this visit  Physical Exam  Constitutional:       General: He is not in acute distress  Appearance: He is not ill-appearing  HENT:      Head: Normocephalic and atraumatic  Nose: No congestion  Eyes:      Extraocular Movements: Extraocular movements intact  Pulmonary:      Effort: Pulmonary effort is normal       Breath sounds: No wheezing  Neurological:      Mental Status: He is alert  I spent 20  minutes directly with the patient during this visit      [address] [place] [address] acknowledges that he has consented to an online visit or consultation   He understands that the online visit is based solely on information provided by him, and that, in the absence of a face-to-face physical evaluation by the physician, the diagnosis he receives is both limited and provisional in terms of accuracy and completeness  This is not intended to replace a full medical face-to-face evaluation by the physician  [employer] understands and accepts these terms

## 2020-09-30 ENCOUNTER — TELEPHONE (OUTPATIENT)
Dept: INTERNAL MEDICINE CLINIC | Facility: CLINIC | Age: 85
End: 2020-09-30

## 2020-10-06 ENCOUNTER — TELEPHONE (OUTPATIENT)
Dept: INTERNAL MEDICINE CLINIC | Facility: CLINIC | Age: 85
End: 2020-10-06

## 2020-10-06 ENCOUNTER — TELEMEDICINE (OUTPATIENT)
Dept: INTERNAL MEDICINE CLINIC | Facility: CLINIC | Age: 85
End: 2020-10-06
Payer: COMMERCIAL

## 2020-10-06 DIAGNOSIS — J42 CHRONIC BRONCHITIS, UNSPECIFIED CHRONIC BRONCHITIS TYPE (HCC): ICD-10-CM

## 2020-10-06 DIAGNOSIS — G20 DEMENTIA DUE TO PARKINSON'S DISEASE WITH BEHAVIORAL DISTURBANCE (HCC): Primary | ICD-10-CM

## 2020-10-06 DIAGNOSIS — F02.81 DEMENTIA DUE TO PARKINSON'S DISEASE WITH BEHAVIORAL DISTURBANCE (HCC): Primary | ICD-10-CM

## 2020-10-06 PROCEDURE — 99213 OFFICE O/P EST LOW 20 MIN: CPT | Performed by: INTERNAL MEDICINE

## 2020-10-06 RX ORDER — QUETIAPINE FUMARATE 25 MG/1
25 TABLET, FILM COATED ORAL
Qty: 30 TABLET | Refills: 5 | Status: SHIPPED | OUTPATIENT
Start: 2020-10-06 | End: 2021-02-02

## 2020-10-06 RX ORDER — ALBUTEROL SULFATE 90 UG/1
2 AEROSOL, METERED RESPIRATORY (INHALATION) EVERY 4 HOURS PRN
Qty: 18 INHALER | Refills: 10 | Status: SHIPPED | OUTPATIENT
Start: 2020-10-06 | End: 2020-11-16

## 2020-10-07 DIAGNOSIS — J42 CHRONIC BRONCHITIS, UNSPECIFIED CHRONIC BRONCHITIS TYPE (HCC): ICD-10-CM

## 2020-10-07 RX ORDER — ALBUTEROL SULFATE 2.5 MG/3ML
SOLUTION RESPIRATORY (INHALATION)
Qty: 75 ML | Refills: 5 | Status: SHIPPED | OUTPATIENT
Start: 2020-10-07 | End: 2020-12-08

## 2020-10-09 ENCOUNTER — TELEMEDICINE (OUTPATIENT)
Dept: INTERNAL MEDICINE CLINIC | Facility: CLINIC | Age: 85
End: 2020-10-09
Payer: COMMERCIAL

## 2020-10-09 DIAGNOSIS — F02.81 DEMENTIA DUE TO PARKINSON'S DISEASE WITH BEHAVIORAL DISTURBANCE (HCC): ICD-10-CM

## 2020-10-09 DIAGNOSIS — G20 DEMENTIA DUE TO PARKINSON'S DISEASE WITH BEHAVIORAL DISTURBANCE (HCC): ICD-10-CM

## 2020-10-09 DIAGNOSIS — G20 PARKINSON'S DISEASE (HCC): Primary | ICD-10-CM

## 2020-10-09 PROBLEM — F02.818 DEMENTIA DUE TO PARKINSON'S DISEASE WITH BEHAVIORAL DISTURBANCE (HCC): Status: ACTIVE | Noted: 2020-10-09

## 2020-10-09 PROCEDURE — 99212 OFFICE O/P EST SF 10 MIN: CPT | Performed by: INTERNAL MEDICINE

## 2020-10-09 PROCEDURE — 1160F RVW MEDS BY RX/DR IN RCRD: CPT | Performed by: INTERNAL MEDICINE

## 2020-10-16 ENCOUNTER — TELEMEDICINE (OUTPATIENT)
Dept: NEUROSURGERY | Facility: CLINIC | Age: 85
End: 2020-10-16
Payer: COMMERCIAL

## 2020-10-16 VITALS — BODY MASS INDEX: 29.44 KG/M2 | HEIGHT: 63 IN

## 2020-10-16 DIAGNOSIS — F02.81 DEMENTIA DUE TO PARKINSON'S DISEASE WITH BEHAVIORAL DISTURBANCE (HCC): ICD-10-CM

## 2020-10-16 DIAGNOSIS — E11.9 TYPE 2 DIABETES MELLITUS WITHOUT COMPLICATION, WITHOUT LONG-TERM CURRENT USE OF INSULIN (HCC): ICD-10-CM

## 2020-10-16 DIAGNOSIS — J42 CHRONIC BRONCHITIS, UNSPECIFIED CHRONIC BRONCHITIS TYPE (HCC): ICD-10-CM

## 2020-10-16 DIAGNOSIS — G20 DEMENTIA DUE TO PARKINSON'S DISEASE WITH BEHAVIORAL DISTURBANCE (HCC): ICD-10-CM

## 2020-10-16 DIAGNOSIS — D32.9 MENINGIOMA (HCC): Primary | ICD-10-CM

## 2020-10-16 DIAGNOSIS — G20 PARKINSON'S DISEASE (HCC): ICD-10-CM

## 2020-10-16 PROCEDURE — 1160F RVW MEDS BY RX/DR IN RCRD: CPT | Performed by: NEUROLOGICAL SURGERY

## 2020-10-16 PROCEDURE — 99212 OFFICE O/P EST SF 10 MIN: CPT | Performed by: NEUROLOGICAL SURGERY

## 2020-10-16 PROCEDURE — 1036F TOBACCO NON-USER: CPT | Performed by: NEUROLOGICAL SURGERY

## 2020-10-19 DIAGNOSIS — J45.909 UNCOMPLICATED ASTHMA, UNSPECIFIED ASTHMA SEVERITY, UNSPECIFIED WHETHER PERSISTENT: ICD-10-CM

## 2020-10-19 RX ORDER — MONTELUKAST SODIUM 10 MG/1
10 TABLET ORAL
Qty: 90 TABLET | Refills: 3 | Status: SHIPPED | OUTPATIENT
Start: 2020-10-19 | End: 2021-04-06

## 2020-10-21 DIAGNOSIS — G20 PARKINSON'S DISEASE (HCC): ICD-10-CM

## 2020-10-26 ENCOUNTER — HOSPITAL ENCOUNTER (INPATIENT)
Facility: HOSPITAL | Age: 85
LOS: 4 days | Discharge: HOME HEALTH | DRG: 192 | End: 2020-10-30
Attending: EMERGENCY MEDICINE | Admitting: INTERNAL MEDICINE
Payer: COMMERCIAL

## 2020-10-26 ENCOUNTER — TELEPHONE (OUTPATIENT)
Dept: INTERNAL MEDICINE CLINIC | Facility: CLINIC | Age: 85
End: 2020-10-26

## 2020-10-26 ENCOUNTER — APPOINTMENT (EMERGENCY)
Dept: CT IMAGING | Facility: HOSPITAL | Age: 85
DRG: 192 | End: 2020-10-26
Payer: COMMERCIAL

## 2020-10-26 DIAGNOSIS — J42 CHRONIC BRONCHITIS, UNSPECIFIED CHRONIC BRONCHITIS TYPE (HCC): ICD-10-CM

## 2020-10-26 DIAGNOSIS — J18.9 PNEUMONIA: Primary | ICD-10-CM

## 2020-10-26 LAB
ALBUMIN SERPL BCP-MCNC: 2.9 G/DL (ref 3.5–5)
ALP SERPL-CCNC: 102 U/L (ref 46–116)
ALT SERPL W P-5'-P-CCNC: 13 U/L (ref 12–78)
ANION GAP SERPL CALCULATED.3IONS-SCNC: 8 MMOL/L (ref 4–13)
APTT PPP: 29 SECONDS (ref 23–37)
AST SERPL W P-5'-P-CCNC: 22 U/L (ref 5–45)
BASOPHILS # BLD AUTO: 0.02 THOUSANDS/ΜL (ref 0–0.1)
BASOPHILS NFR BLD AUTO: 0 % (ref 0–1)
BILIRUB SERPL-MCNC: 0.3 MG/DL (ref 0.2–1)
BUN SERPL-MCNC: 35 MG/DL (ref 5–25)
CALCIUM ALBUM COR SERPL-MCNC: 10.1 MG/DL (ref 8.3–10.1)
CALCIUM SERPL-MCNC: 9.2 MG/DL (ref 8.3–10.1)
CHLORIDE SERPL-SCNC: 105 MMOL/L (ref 100–108)
CO2 SERPL-SCNC: 24 MMOL/L (ref 21–32)
CREAT SERPL-MCNC: 1.11 MG/DL (ref 0.6–1.3)
EOSINOPHIL # BLD AUTO: 0.05 THOUSAND/ΜL (ref 0–0.61)
EOSINOPHIL NFR BLD AUTO: 1 % (ref 0–6)
ERYTHROCYTE [DISTWIDTH] IN BLOOD BY AUTOMATED COUNT: 13.4 % (ref 11.6–15.1)
GFR SERPL CREATININE-BSD FRML MDRD: 61 ML/MIN/1.73SQ M
GLUCOSE SERPL-MCNC: 145 MG/DL (ref 65–140)
GLUCOSE SERPL-MCNC: 159 MG/DL (ref 65–140)
HCT VFR BLD AUTO: 37.8 % (ref 36.5–49.3)
HGB BLD-MCNC: 12.3 G/DL (ref 12–17)
IMM GRANULOCYTES # BLD AUTO: 0.05 THOUSAND/UL (ref 0–0.2)
IMM GRANULOCYTES NFR BLD AUTO: 1 % (ref 0–2)
INR PPP: 1.05 (ref 0.84–1.19)
LYMPHOCYTES # BLD AUTO: 0.55 THOUSANDS/ΜL (ref 0.6–4.47)
LYMPHOCYTES NFR BLD AUTO: 5 % (ref 14–44)
MCH RBC QN AUTO: 30.9 PG (ref 26.8–34.3)
MCHC RBC AUTO-ENTMCNC: 32.5 G/DL (ref 31.4–37.4)
MCV RBC AUTO: 95 FL (ref 82–98)
MONOCYTES # BLD AUTO: 0.71 THOUSAND/ΜL (ref 0.17–1.22)
MONOCYTES NFR BLD AUTO: 7 % (ref 4–12)
NEUTROPHILS # BLD AUTO: 9.05 THOUSANDS/ΜL (ref 1.85–7.62)
NEUTS SEG NFR BLD AUTO: 86 % (ref 43–75)
NRBC BLD AUTO-RTO: 0 /100 WBCS
PLATELET # BLD AUTO: 199 THOUSANDS/UL (ref 149–390)
PMV BLD AUTO: 9 FL (ref 8.9–12.7)
POTASSIUM SERPL-SCNC: 4.2 MMOL/L (ref 3.5–5.3)
PROT SERPL-MCNC: 6.7 G/DL (ref 6.4–8.2)
PROTHROMBIN TIME: 14 SECONDS (ref 11.6–14.5)
RBC # BLD AUTO: 3.98 MILLION/UL (ref 3.88–5.62)
SARS-COV-2 RNA RESP QL NAA+PROBE: NEGATIVE
SODIUM SERPL-SCNC: 137 MMOL/L (ref 136–145)
WBC # BLD AUTO: 10.43 THOUSAND/UL (ref 4.31–10.16)

## 2020-10-26 PROCEDURE — 84145 PROCALCITONIN (PCT): CPT | Performed by: INTERNAL MEDICINE

## 2020-10-26 PROCEDURE — 82948 REAGENT STRIP/BLOOD GLUCOSE: CPT

## 2020-10-26 PROCEDURE — 85730 THROMBOPLASTIN TIME PARTIAL: CPT | Performed by: EMERGENCY MEDICINE

## 2020-10-26 PROCEDURE — 99285 EMERGENCY DEPT VISIT HI MDM: CPT

## 2020-10-26 PROCEDURE — 36415 COLL VENOUS BLD VENIPUNCTURE: CPT | Performed by: EMERGENCY MEDICINE

## 2020-10-26 PROCEDURE — G1004 CDSM NDSC: HCPCS

## 2020-10-26 PROCEDURE — 74177 CT ABD & PELVIS W/CONTRAST: CPT

## 2020-10-26 PROCEDURE — 87040 BLOOD CULTURE FOR BACTERIA: CPT | Performed by: EMERGENCY MEDICINE

## 2020-10-26 PROCEDURE — 87635 SARS-COV-2 COVID-19 AMP PRB: CPT | Performed by: EMERGENCY MEDICINE

## 2020-10-26 PROCEDURE — 94760 N-INVAS EAR/PLS OXIMETRY 1: CPT

## 2020-10-26 PROCEDURE — 99285 EMERGENCY DEPT VISIT HI MDM: CPT | Performed by: EMERGENCY MEDICINE

## 2020-10-26 PROCEDURE — 94640 AIRWAY INHALATION TREATMENT: CPT

## 2020-10-26 PROCEDURE — 71260 CT THORAX DX C+: CPT

## 2020-10-26 PROCEDURE — 85610 PROTHROMBIN TIME: CPT | Performed by: EMERGENCY MEDICINE

## 2020-10-26 PROCEDURE — 99223 1ST HOSP IP/OBS HIGH 75: CPT | Performed by: INTERNAL MEDICINE

## 2020-10-26 PROCEDURE — 96374 THER/PROPH/DIAG INJ IV PUSH: CPT

## 2020-10-26 PROCEDURE — 80053 COMPREHEN METABOLIC PANEL: CPT | Performed by: EMERGENCY MEDICINE

## 2020-10-26 PROCEDURE — 85025 COMPLETE CBC W/AUTO DIFF WBC: CPT | Performed by: EMERGENCY MEDICINE

## 2020-10-26 PROCEDURE — 70450 CT HEAD/BRAIN W/O DYE: CPT

## 2020-10-26 RX ORDER — ALBUTEROL SULFATE 90 UG/1
2 AEROSOL, METERED RESPIRATORY (INHALATION) EVERY 4 HOURS PRN
Status: DISCONTINUED | OUTPATIENT
Start: 2020-10-26 | End: 2020-10-30 | Stop reason: HOSPADM

## 2020-10-26 RX ORDER — HEPARIN SODIUM 5000 [USP'U]/ML
5000 INJECTION, SOLUTION INTRAVENOUS; SUBCUTANEOUS EVERY 8 HOURS SCHEDULED
Status: DISCONTINUED | OUTPATIENT
Start: 2020-10-26 | End: 2020-10-30 | Stop reason: HOSPADM

## 2020-10-26 RX ORDER — QUETIAPINE FUMARATE 25 MG/1
25 TABLET, FILM COATED ORAL
Status: DISCONTINUED | OUTPATIENT
Start: 2020-10-26 | End: 2020-10-30 | Stop reason: HOSPADM

## 2020-10-26 RX ORDER — GUAIFENESIN 600 MG
600 TABLET, EXTENDED RELEASE 12 HR ORAL EVERY 12 HOURS SCHEDULED
Status: DISCONTINUED | OUTPATIENT
Start: 2020-10-26 | End: 2020-10-30 | Stop reason: HOSPADM

## 2020-10-26 RX ORDER — PREDNISONE 10 MG/1
10 TABLET ORAL DAILY
Status: DISCONTINUED | OUTPATIENT
Start: 2020-10-27 | End: 2020-10-28

## 2020-10-26 RX ORDER — ACETAMINOPHEN 325 MG/1
650 TABLET ORAL EVERY 6 HOURS PRN
Status: DISCONTINUED | OUTPATIENT
Start: 2020-10-26 | End: 2020-10-27

## 2020-10-26 RX ORDER — MONTELUKAST SODIUM 10 MG/1
10 TABLET ORAL
Status: DISCONTINUED | OUTPATIENT
Start: 2020-10-26 | End: 2020-10-30 | Stop reason: HOSPADM

## 2020-10-26 RX ORDER — SODIUM CHLORIDE FOR INHALATION 0.9 %
3 VIAL, NEBULIZER (ML) INHALATION
Status: DISCONTINUED | OUTPATIENT
Start: 2020-10-27 | End: 2020-10-28

## 2020-10-26 RX ORDER — RIVASTIGMINE TARTRATE 1.5 MG/1
4.5 CAPSULE ORAL 2 TIMES DAILY
Status: DISCONTINUED | OUTPATIENT
Start: 2020-10-26 | End: 2020-10-30 | Stop reason: HOSPADM

## 2020-10-26 RX ORDER — FINASTERIDE 5 MG/1
5 TABLET, FILM COATED ORAL EVERY MORNING
Status: DISCONTINUED | OUTPATIENT
Start: 2020-10-27 | End: 2020-10-30 | Stop reason: HOSPADM

## 2020-10-26 RX ORDER — FLUTICASONE FUROATE AND VILANTEROL 200; 25 UG/1; UG/1
1 POWDER RESPIRATORY (INHALATION)
Status: DISCONTINUED | OUTPATIENT
Start: 2020-10-27 | End: 2020-10-30 | Stop reason: HOSPADM

## 2020-10-26 RX ORDER — ATORVASTATIN CALCIUM 10 MG/1
10 TABLET, FILM COATED ORAL DAILY
Status: DISCONTINUED | OUTPATIENT
Start: 2020-10-27 | End: 2020-10-30 | Stop reason: HOSPADM

## 2020-10-26 RX ORDER — NYSTATIN 100000 [USP'U]/G
POWDER TOPICAL 2 TIMES DAILY
Status: DISCONTINUED | OUTPATIENT
Start: 2020-10-26 | End: 2020-10-30 | Stop reason: HOSPADM

## 2020-10-26 RX ORDER — LEVALBUTEROL 1.25 MG/.5ML
1.25 SOLUTION, CONCENTRATE RESPIRATORY (INHALATION)
Status: DISCONTINUED | OUTPATIENT
Start: 2020-10-26 | End: 2020-10-28

## 2020-10-26 RX ORDER — SIMETHICONE 80 MG
80 TABLET,CHEWABLE ORAL 4 TIMES DAILY PRN
Status: DISCONTINUED | OUTPATIENT
Start: 2020-10-26 | End: 2020-10-30 | Stop reason: HOSPADM

## 2020-10-26 RX ORDER — PRAMIPEXOLE DIHYDROCHLORIDE 0.5 MG/1
0.5 TABLET ORAL 3 TIMES DAILY
Status: DISCONTINUED | OUTPATIENT
Start: 2020-10-26 | End: 2020-10-30 | Stop reason: HOSPADM

## 2020-10-26 RX ORDER — LEVALBUTEROL 1.25 MG/.5ML
1.25 SOLUTION, CONCENTRATE RESPIRATORY (INHALATION) EVERY 4 HOURS PRN
Status: DISCONTINUED | OUTPATIENT
Start: 2020-10-26 | End: 2020-10-26

## 2020-10-26 RX ORDER — KETOROLAC TROMETHAMINE 30 MG/ML
15 INJECTION, SOLUTION INTRAMUSCULAR; INTRAVENOUS ONCE
Status: COMPLETED | OUTPATIENT
Start: 2020-10-26 | End: 2020-10-26

## 2020-10-26 RX ORDER — SODIUM CHLORIDE FOR INHALATION 0.9 %
VIAL, NEBULIZER (ML) INHALATION
Status: COMPLETED
Start: 2020-10-26 | End: 2020-10-26

## 2020-10-26 RX ORDER — ONDANSETRON 2 MG/ML
4 INJECTION INTRAMUSCULAR; INTRAVENOUS EVERY 6 HOURS PRN
Status: DISCONTINUED | OUTPATIENT
Start: 2020-10-26 | End: 2020-10-30 | Stop reason: HOSPADM

## 2020-10-26 RX ADMIN — HEPARIN SODIUM 5000 UNITS: 5000 INJECTION INTRAVENOUS; SUBCUTANEOUS at 21:07

## 2020-10-26 RX ADMIN — QUETIAPINE FUMARATE 25 MG: 25 TABLET ORAL at 21:07

## 2020-10-26 RX ADMIN — KETOROLAC TROMETHAMINE 15 MG: 30 INJECTION, SOLUTION INTRAMUSCULAR at 14:31

## 2020-10-26 RX ADMIN — LEVALBUTEROL HYDROCHLORIDE 1.25 MG: 1.25 SOLUTION, CONCENTRATE RESPIRATORY (INHALATION) at 21:41

## 2020-10-26 RX ADMIN — CEFTRIAXONE SODIUM 1000 MG: 10 INJECTION, POWDER, FOR SOLUTION INTRAVENOUS at 18:18

## 2020-10-26 RX ADMIN — GUAIFENESIN 600 MG: 600 TABLET ORAL at 20:53

## 2020-10-26 RX ADMIN — PRAMIPEXOLE DIHYDROCHLORIDE 0.5 MG: 0.5 TABLET ORAL at 20:52

## 2020-10-26 RX ADMIN — IOHEXOL 100 ML: 350 INJECTION, SOLUTION INTRAVENOUS at 15:02

## 2020-10-26 RX ADMIN — METRONIDAZOLE 500 MG: 500 INJECTION, SOLUTION INTRAVENOUS at 20:53

## 2020-10-26 RX ADMIN — CARBIDOPA AND LEVODOPA 1 TABLET: 25; 100 TABLET ORAL at 20:53

## 2020-10-26 RX ADMIN — ISODIUM CHLORIDE 3 ML: 0.03 SOLUTION RESPIRATORY (INHALATION) at 21:41

## 2020-10-26 RX ADMIN — MONTELUKAST SODIUM 10 MG: 10 TABLET, FILM COATED ORAL at 21:07

## 2020-10-26 RX ADMIN — RIVASTIGMINE TARTRATE 4.5 MG: 1.5 CAPSULE ORAL at 21:07

## 2020-10-27 LAB
ALBUMIN SERPL BCP-MCNC: 2.8 G/DL (ref 3.5–5)
ALP SERPL-CCNC: 84 U/L (ref 46–116)
ALT SERPL W P-5'-P-CCNC: 12 U/L (ref 12–78)
ANION GAP SERPL CALCULATED.3IONS-SCNC: 8 MMOL/L (ref 4–13)
AST SERPL W P-5'-P-CCNC: 17 U/L (ref 5–45)
BILIRUB SERPL-MCNC: 0.4 MG/DL (ref 0.2–1)
BUN SERPL-MCNC: 34 MG/DL (ref 5–25)
CALCIUM ALBUM COR SERPL-MCNC: 9.9 MG/DL (ref 8.3–10.1)
CALCIUM SERPL-MCNC: 8.9 MG/DL (ref 8.3–10.1)
CHLORIDE SERPL-SCNC: 109 MMOL/L (ref 100–108)
CO2 SERPL-SCNC: 24 MMOL/L (ref 21–32)
CREAT SERPL-MCNC: 1.16 MG/DL (ref 0.6–1.3)
ERYTHROCYTE [DISTWIDTH] IN BLOOD BY AUTOMATED COUNT: 13.3 % (ref 11.6–15.1)
GFR SERPL CREATININE-BSD FRML MDRD: 58 ML/MIN/1.73SQ M
GLUCOSE SERPL-MCNC: 91 MG/DL (ref 65–140)
HCT VFR BLD AUTO: 37.8 % (ref 36.5–49.3)
HGB BLD-MCNC: 12.2 G/DL (ref 12–17)
MCH RBC QN AUTO: 30.4 PG (ref 26.8–34.3)
MCHC RBC AUTO-ENTMCNC: 32.3 G/DL (ref 31.4–37.4)
MCV RBC AUTO: 94 FL (ref 82–98)
PLATELET # BLD AUTO: 188 THOUSANDS/UL (ref 149–390)
PMV BLD AUTO: 9.3 FL (ref 8.9–12.7)
POTASSIUM SERPL-SCNC: 3.9 MMOL/L (ref 3.5–5.3)
PROCALCITONIN SERPL-MCNC: <0.05 NG/ML
PROCALCITONIN SERPL-MCNC: <0.05 NG/ML
PROT SERPL-MCNC: 6.3 G/DL (ref 6.4–8.2)
RBC # BLD AUTO: 4.01 MILLION/UL (ref 3.88–5.62)
SODIUM SERPL-SCNC: 141 MMOL/L (ref 136–145)
WBC # BLD AUTO: 9.22 THOUSAND/UL (ref 4.31–10.16)

## 2020-10-27 PROCEDURE — 85027 COMPLETE CBC AUTOMATED: CPT | Performed by: INTERNAL MEDICINE

## 2020-10-27 PROCEDURE — 97163 PT EVAL HIGH COMPLEX 45 MIN: CPT

## 2020-10-27 PROCEDURE — 94640 AIRWAY INHALATION TREATMENT: CPT

## 2020-10-27 PROCEDURE — 84145 PROCALCITONIN (PCT): CPT | Performed by: INTERNAL MEDICINE

## 2020-10-27 PROCEDURE — 87077 CULTURE AEROBIC IDENTIFY: CPT | Performed by: INTERNAL MEDICINE

## 2020-10-27 PROCEDURE — 87070 CULTURE OTHR SPECIMN AEROBIC: CPT | Performed by: INTERNAL MEDICINE

## 2020-10-27 PROCEDURE — 87147 CULTURE TYPE IMMUNOLOGIC: CPT | Performed by: INTERNAL MEDICINE

## 2020-10-27 PROCEDURE — 87186 SC STD MICRODIL/AGAR DIL: CPT | Performed by: INTERNAL MEDICINE

## 2020-10-27 PROCEDURE — 99232 SBSQ HOSP IP/OBS MODERATE 35: CPT | Performed by: INTERNAL MEDICINE

## 2020-10-27 PROCEDURE — 87205 SMEAR GRAM STAIN: CPT | Performed by: INTERNAL MEDICINE

## 2020-10-27 PROCEDURE — 94760 N-INVAS EAR/PLS OXIMETRY 1: CPT

## 2020-10-27 PROCEDURE — 80053 COMPREHEN METABOLIC PANEL: CPT | Performed by: INTERNAL MEDICINE

## 2020-10-27 RX ORDER — POLYETHYLENE GLYCOL 3350 17 G/17G
17 POWDER, FOR SOLUTION ORAL DAILY
Status: DISCONTINUED | OUTPATIENT
Start: 2020-10-27 | End: 2020-10-30 | Stop reason: HOSPADM

## 2020-10-27 RX ORDER — HYDROCHLOROTHIAZIDE 12.5 MG/1
12.5 TABLET ORAL DAILY
Status: DISCONTINUED | OUTPATIENT
Start: 2020-10-27 | End: 2020-10-30 | Stop reason: HOSPADM

## 2020-10-27 RX ORDER — ACETAMINOPHEN 325 MG/1
975 TABLET ORAL EVERY 8 HOURS SCHEDULED
Status: DISCONTINUED | OUTPATIENT
Start: 2020-10-27 | End: 2020-10-30 | Stop reason: HOSPADM

## 2020-10-27 RX ORDER — LIDOCAINE 50 MG/G
2 PATCH TOPICAL DAILY
Status: DISCONTINUED | OUTPATIENT
Start: 2020-10-27 | End: 2020-10-30 | Stop reason: HOSPADM

## 2020-10-27 RX ORDER — LOSARTAN POTASSIUM 50 MG/1
50 TABLET ORAL DAILY
Status: DISCONTINUED | OUTPATIENT
Start: 2020-10-27 | End: 2020-10-30 | Stop reason: HOSPADM

## 2020-10-27 RX ADMIN — LEVALBUTEROL HYDROCHLORIDE 1.25 MG: 1.25 SOLUTION, CONCENTRATE RESPIRATORY (INHALATION) at 07:40

## 2020-10-27 RX ADMIN — LOSARTAN POTASSIUM 50 MG: 50 TABLET, FILM COATED ORAL at 09:34

## 2020-10-27 RX ADMIN — FINASTERIDE 5 MG: 5 TABLET, FILM COATED ORAL at 09:34

## 2020-10-27 RX ADMIN — NYSTATIN: 100000 POWDER TOPICAL at 10:41

## 2020-10-27 RX ADMIN — LEVALBUTEROL HYDROCHLORIDE 1.25 MG: 1.25 SOLUTION, CONCENTRATE RESPIRATORY (INHALATION) at 13:30

## 2020-10-27 RX ADMIN — CEFTRIAXONE 1000 MG: 1 INJECTION, POWDER, FOR SOLUTION INTRAMUSCULAR; INTRAVENOUS at 17:16

## 2020-10-27 RX ADMIN — HEPARIN SODIUM 5000 UNITS: 5000 INJECTION INTRAVENOUS; SUBCUTANEOUS at 23:29

## 2020-10-27 RX ADMIN — RIVASTIGMINE TARTRATE 4.5 MG: 1.5 CAPSULE ORAL at 09:30

## 2020-10-27 RX ADMIN — HEPARIN SODIUM 5000 UNITS: 5000 INJECTION INTRAVENOUS; SUBCUTANEOUS at 13:18

## 2020-10-27 RX ADMIN — CARBIDOPA AND LEVODOPA 1 TABLET: 25; 100 TABLET ORAL at 09:33

## 2020-10-27 RX ADMIN — TIOTROPIUM BROMIDE 18 MCG: 18 CAPSULE ORAL; RESPIRATORY (INHALATION) at 09:38

## 2020-10-27 RX ADMIN — PRAMIPEXOLE DIHYDROCHLORIDE 0.5 MG: 0.5 TABLET ORAL at 23:28

## 2020-10-27 RX ADMIN — PREDNISONE 10 MG: 10 TABLET ORAL at 09:29

## 2020-10-27 RX ADMIN — QUETIAPINE FUMARATE 25 MG: 25 TABLET ORAL at 23:29

## 2020-10-27 RX ADMIN — METRONIDAZOLE 500 MG: 500 INJECTION, SOLUTION INTRAVENOUS at 05:33

## 2020-10-27 RX ADMIN — ISODIUM CHLORIDE 3 ML: 0.03 SOLUTION RESPIRATORY (INHALATION) at 19:40

## 2020-10-27 RX ADMIN — CARBIDOPA AND LEVODOPA 1 TABLET: 25; 100 TABLET ORAL at 17:16

## 2020-10-27 RX ADMIN — ISODIUM CHLORIDE 3 ML: 0.03 SOLUTION RESPIRATORY (INHALATION) at 07:40

## 2020-10-27 RX ADMIN — GUAIFENESIN 600 MG: 600 TABLET ORAL at 23:29

## 2020-10-27 RX ADMIN — HYDROCHLOROTHIAZIDE 12.5 MG: 12.5 TABLET ORAL at 09:31

## 2020-10-27 RX ADMIN — POLYETHYLENE GLYCOL 3350 17 G: 17 POWDER, FOR SOLUTION ORAL at 09:28

## 2020-10-27 RX ADMIN — FLUTICASONE FUROATE AND VILANTEROL TRIFENATATE 1 PUFF: 200; 25 POWDER RESPIRATORY (INHALATION) at 09:37

## 2020-10-27 RX ADMIN — LEVALBUTEROL HYDROCHLORIDE 1.25 MG: 1.25 SOLUTION, CONCENTRATE RESPIRATORY (INHALATION) at 19:39

## 2020-10-27 RX ADMIN — MONTELUKAST SODIUM 10 MG: 10 TABLET, FILM COATED ORAL at 23:29

## 2020-10-27 RX ADMIN — ISODIUM CHLORIDE 3 ML: 0.03 SOLUTION RESPIRATORY (INHALATION) at 13:32

## 2020-10-27 RX ADMIN — GUAIFENESIN 600 MG: 600 TABLET ORAL at 09:34

## 2020-10-27 RX ADMIN — ACETAMINOPHEN 975 MG: 325 TABLET, FILM COATED ORAL at 23:28

## 2020-10-27 RX ADMIN — RIVASTIGMINE TARTRATE 4.5 MG: 1.5 CAPSULE ORAL at 17:15

## 2020-10-27 RX ADMIN — ACETAMINOPHEN 975 MG: 325 TABLET, FILM COATED ORAL at 13:17

## 2020-10-27 RX ADMIN — ATORVASTATIN CALCIUM 10 MG: 10 TABLET, FILM COATED ORAL at 09:34

## 2020-10-27 RX ADMIN — LIDOCAINE 5% 1 PATCH: 700 PATCH TOPICAL at 13:19

## 2020-10-27 RX ADMIN — AZITHROMYCIN 500 MG: 500 INJECTION, POWDER, LYOPHILIZED, FOR SOLUTION INTRAVENOUS at 22:48

## 2020-10-27 RX ADMIN — PRAMIPEXOLE DIHYDROCHLORIDE 0.5 MG: 0.5 TABLET ORAL at 17:16

## 2020-10-27 RX ADMIN — CARBIDOPA AND LEVODOPA 1 TABLET: 25; 100 TABLET ORAL at 23:29

## 2020-10-27 RX ADMIN — HEPARIN SODIUM 5000 UNITS: 5000 INJECTION INTRAVENOUS; SUBCUTANEOUS at 05:49

## 2020-10-27 RX ADMIN — PRAMIPEXOLE DIHYDROCHLORIDE 0.5 MG: 0.5 TABLET ORAL at 09:36
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
## 2020-10-28 LAB
ANION GAP SERPL CALCULATED.3IONS-SCNC: 9 MMOL/L (ref 4–13)
BASOPHILS # BLD AUTO: 0.03 THOUSANDS/ΜL (ref 0–0.1)
BASOPHILS NFR BLD AUTO: 0 % (ref 0–1)
BUN SERPL-MCNC: 33 MG/DL (ref 5–25)
CALCIUM SERPL-MCNC: 8.9 MG/DL (ref 8.3–10.1)
CHLORIDE SERPL-SCNC: 109 MMOL/L (ref 100–108)
CO2 SERPL-SCNC: 23 MMOL/L (ref 21–32)
CREAT SERPL-MCNC: 1.22 MG/DL (ref 0.6–1.3)
EOSINOPHIL # BLD AUTO: 0.26 THOUSAND/ΜL (ref 0–0.61)
EOSINOPHIL NFR BLD AUTO: 3 % (ref 0–6)
ERYTHROCYTE [DISTWIDTH] IN BLOOD BY AUTOMATED COUNT: 13.4 % (ref 11.6–15.1)
GFR SERPL CREATININE-BSD FRML MDRD: 54 ML/MIN/1.73SQ M
GLUCOSE SERPL-MCNC: 89 MG/DL (ref 65–140)
HCT VFR BLD AUTO: 37.4 % (ref 36.5–49.3)
HGB BLD-MCNC: 11.9 G/DL (ref 12–17)
IMM GRANULOCYTES # BLD AUTO: 0.05 THOUSAND/UL (ref 0–0.2)
IMM GRANULOCYTES NFR BLD AUTO: 1 % (ref 0–2)
LYMPHOCYTES # BLD AUTO: 1.32 THOUSANDS/ΜL (ref 0.6–4.47)
LYMPHOCYTES NFR BLD AUTO: 17 % (ref 14–44)
MAGNESIUM SERPL-MCNC: 2.2 MG/DL (ref 1.6–2.6)
MCH RBC QN AUTO: 30.4 PG (ref 26.8–34.3)
MCHC RBC AUTO-ENTMCNC: 31.8 G/DL (ref 31.4–37.4)
MCV RBC AUTO: 95 FL (ref 82–98)
MONOCYTES # BLD AUTO: 0.78 THOUSAND/ΜL (ref 0.17–1.22)
MONOCYTES NFR BLD AUTO: 10 % (ref 4–12)
NEUTROPHILS # BLD AUTO: 5.38 THOUSANDS/ΜL (ref 1.85–7.62)
NEUTS SEG NFR BLD AUTO: 69 % (ref 43–75)
NRBC BLD AUTO-RTO: 0 /100 WBCS
PLATELET # BLD AUTO: 186 THOUSANDS/UL (ref 149–390)
PMV BLD AUTO: 9.1 FL (ref 8.9–12.7)
POTASSIUM SERPL-SCNC: 4 MMOL/L (ref 3.5–5.3)
RBC # BLD AUTO: 3.92 MILLION/UL (ref 3.88–5.62)
SODIUM SERPL-SCNC: 141 MMOL/L (ref 136–145)
WBC # BLD AUTO: 7.82 THOUSAND/UL (ref 4.31–10.16)

## 2020-10-28 PROCEDURE — 97110 THERAPEUTIC EXERCISES: CPT

## 2020-10-28 PROCEDURE — 94640 AIRWAY INHALATION TREATMENT: CPT

## 2020-10-28 PROCEDURE — 80048 BASIC METABOLIC PNL TOTAL CA: CPT | Performed by: INTERNAL MEDICINE

## 2020-10-28 PROCEDURE — 85025 COMPLETE CBC W/AUTO DIFF WBC: CPT | Performed by: INTERNAL MEDICINE

## 2020-10-28 PROCEDURE — 97116 GAIT TRAINING THERAPY: CPT

## 2020-10-28 PROCEDURE — 94760 N-INVAS EAR/PLS OXIMETRY 1: CPT

## 2020-10-28 PROCEDURE — 83735 ASSAY OF MAGNESIUM: CPT | Performed by: INTERNAL MEDICINE

## 2020-10-28 PROCEDURE — 99232 SBSQ HOSP IP/OBS MODERATE 35: CPT | Performed by: INTERNAL MEDICINE

## 2020-10-28 PROCEDURE — 92610 EVALUATE SWALLOWING FUNCTION: CPT

## 2020-10-28 PROCEDURE — 92526 ORAL FUNCTION THERAPY: CPT

## 2020-10-28 RX ORDER — OXYCODONE HYDROCHLORIDE 5 MG/1
5 TABLET ORAL
Status: DISCONTINUED | OUTPATIENT
Start: 2020-10-28 | End: 2020-10-30 | Stop reason: HOSPADM

## 2020-10-28 RX ORDER — OXYCODONE HYDROCHLORIDE 5 MG/1
2.5 TABLET ORAL
Status: DISCONTINUED | OUTPATIENT
Start: 2020-10-28 | End: 2020-10-30 | Stop reason: HOSPADM

## 2020-10-28 RX ORDER — PREDNISONE 20 MG/1
40 TABLET ORAL DAILY
Status: DISCONTINUED | OUTPATIENT
Start: 2020-10-28 | End: 2020-10-30 | Stop reason: HOSPADM

## 2020-10-28 RX ADMIN — POLYETHYLENE GLYCOL 3350 17 G: 17 POWDER, FOR SOLUTION ORAL at 09:23

## 2020-10-28 RX ADMIN — PRAMIPEXOLE DIHYDROCHLORIDE 0.5 MG: 0.5 TABLET ORAL at 09:20

## 2020-10-28 RX ADMIN — NYSTATIN: 100000 POWDER TOPICAL at 18:26

## 2020-10-28 RX ADMIN — QUETIAPINE FUMARATE 25 MG: 25 TABLET ORAL at 21:46

## 2020-10-28 RX ADMIN — PRAMIPEXOLE DIHYDROCHLORIDE 0.5 MG: 0.5 TABLET ORAL at 15:15

## 2020-10-28 RX ADMIN — LIDOCAINE 5% 2 PATCH: 700 PATCH TOPICAL at 09:17

## 2020-10-28 RX ADMIN — RIVASTIGMINE TARTRATE 4.5 MG: 1.5 CAPSULE ORAL at 16:25

## 2020-10-28 RX ADMIN — HYDROCHLOROTHIAZIDE 12.5 MG: 12.5 TABLET ORAL at 09:22

## 2020-10-28 RX ADMIN — HEPARIN SODIUM 5000 UNITS: 5000 INJECTION INTRAVENOUS; SUBCUTANEOUS at 21:47

## 2020-10-28 RX ADMIN — LOSARTAN POTASSIUM 50 MG: 50 TABLET, FILM COATED ORAL at 09:22

## 2020-10-28 RX ADMIN — FLUTICASONE FUROATE AND VILANTEROL TRIFENATATE 1 PUFF: 200; 25 POWDER RESPIRATORY (INHALATION) at 09:18

## 2020-10-28 RX ADMIN — LEVALBUTEROL HYDROCHLORIDE 1.25 MG: 1.25 SOLUTION, CONCENTRATE RESPIRATORY (INHALATION) at 13:44

## 2020-10-28 RX ADMIN — ACETAMINOPHEN 975 MG: 325 TABLET, FILM COATED ORAL at 15:15

## 2020-10-28 RX ADMIN — GUAIFENESIN 600 MG: 600 TABLET ORAL at 21:46

## 2020-10-28 RX ADMIN — ISODIUM CHLORIDE 3 ML: 0.03 SOLUTION RESPIRATORY (INHALATION) at 08:46

## 2020-10-28 RX ADMIN — HEPARIN SODIUM 5000 UNITS: 5000 INJECTION INTRAVENOUS; SUBCUTANEOUS at 15:14

## 2020-10-28 RX ADMIN — PRAMIPEXOLE DIHYDROCHLORIDE 0.5 MG: 0.5 TABLET ORAL at 21:45

## 2020-10-28 RX ADMIN — CARBIDOPA AND LEVODOPA 1 TABLET: 25; 100 TABLET ORAL at 21:46

## 2020-10-28 RX ADMIN — NYSTATIN: 100000 POWDER TOPICAL at 09:23

## 2020-10-28 RX ADMIN — ACETAMINOPHEN 975 MG: 325 TABLET, FILM COATED ORAL at 05:37

## 2020-10-28 RX ADMIN — GUAIFENESIN 600 MG: 600 TABLET ORAL at 09:20

## 2020-10-28 RX ADMIN — CARBIDOPA AND LEVODOPA 1 TABLET: 25; 100 TABLET ORAL at 09:22

## 2020-10-28 RX ADMIN — FINASTERIDE 5 MG: 5 TABLET, FILM COATED ORAL at 09:20

## 2020-10-28 RX ADMIN — CEFTRIAXONE 1000 MG: 1 INJECTION, POWDER, FOR SOLUTION INTRAMUSCULAR; INTRAVENOUS at 18:25

## 2020-10-28 RX ADMIN — OXYCODONE HYDROCHLORIDE 5 MG: 5 TABLET ORAL at 16:25

## 2020-10-28 RX ADMIN — ATORVASTATIN CALCIUM 10 MG: 10 TABLET, FILM COATED ORAL at 09:20

## 2020-10-28 RX ADMIN — OXYCODONE HYDROCHLORIDE 5 MG: 5 TABLET ORAL at 09:23

## 2020-10-28 RX ADMIN — HEPARIN SODIUM 5000 UNITS: 5000 INJECTION INTRAVENOUS; SUBCUTANEOUS at 05:38

## 2020-10-28 RX ADMIN — MONTELUKAST SODIUM 10 MG: 10 TABLET, FILM COATED ORAL at 21:46

## 2020-10-28 RX ADMIN — CARBIDOPA AND LEVODOPA 1 TABLET: 25; 100 TABLET ORAL at 15:14

## 2020-10-28 RX ADMIN — OXYCODONE HYDROCHLORIDE 5 MG: 5 TABLET ORAL at 12:21

## 2020-10-28 RX ADMIN — AZITHROMYCIN 500 MG: 500 INJECTION, POWDER, LYOPHILIZED, FOR SOLUTION INTRAVENOUS at 21:56

## 2020-10-28 RX ADMIN — ACETAMINOPHEN 975 MG: 325 TABLET, FILM COATED ORAL at 21:45

## 2020-10-28 RX ADMIN — LEVALBUTEROL HYDROCHLORIDE 1.25 MG: 1.25 SOLUTION, CONCENTRATE RESPIRATORY (INHALATION) at 08:46

## 2020-10-28 RX ADMIN — RIVASTIGMINE TARTRATE 4.5 MG: 1.5 CAPSULE ORAL at 09:19

## 2020-10-28 RX ADMIN — TIOTROPIUM BROMIDE 18 MCG: 18 CAPSULE ORAL; RESPIRATORY (INHALATION) at 09:23

## 2020-10-28 RX ADMIN — PREDNISONE 40 MG: 20 TABLET ORAL at 09:24

## 2020-10-28 RX ADMIN — ISODIUM CHLORIDE 3 ML: 0.03 SOLUTION RESPIRATORY (INHALATION) at 13:44

## 2020-10-29 DIAGNOSIS — R33.9 URINARY RETENTION: ICD-10-CM

## 2020-10-29 LAB
ANION GAP SERPL CALCULATED.3IONS-SCNC: 8 MMOL/L (ref 4–13)
BACTERIA SPT RESP CULT: ABNORMAL
BUN SERPL-MCNC: 34 MG/DL (ref 5–25)
CALCIUM SERPL-MCNC: 8.9 MG/DL (ref 8.3–10.1)
CHLORIDE SERPL-SCNC: 106 MMOL/L (ref 100–108)
CO2 SERPL-SCNC: 23 MMOL/L (ref 21–32)
CREAT SERPL-MCNC: 1.29 MG/DL (ref 0.6–1.3)
GFR SERPL CREATININE-BSD FRML MDRD: 51 ML/MIN/1.73SQ M
GLUCOSE SERPL-MCNC: 89 MG/DL (ref 65–140)
GRAM STN SPEC: ABNORMAL
POTASSIUM SERPL-SCNC: 4.4 MMOL/L (ref 3.5–5.3)
SODIUM SERPL-SCNC: 137 MMOL/L (ref 136–145)

## 2020-10-29 PROCEDURE — 99232 SBSQ HOSP IP/OBS MODERATE 35: CPT | Performed by: INTERNAL MEDICINE

## 2020-10-29 PROCEDURE — 97116 GAIT TRAINING THERAPY: CPT

## 2020-10-29 PROCEDURE — 80048 BASIC METABOLIC PNL TOTAL CA: CPT | Performed by: INTERNAL MEDICINE

## 2020-10-29 PROCEDURE — 97110 THERAPEUTIC EXERCISES: CPT

## 2020-10-29 PROCEDURE — 97167 OT EVAL HIGH COMPLEX 60 MIN: CPT

## 2020-10-29 RX ORDER — FINASTERIDE 5 MG/1
5 TABLET, FILM COATED ORAL EVERY MORNING
Qty: 90 TABLET | Refills: 0 | Status: SHIPPED | OUTPATIENT
Start: 2020-10-29 | End: 2021-05-08 | Stop reason: SDUPTHER

## 2020-10-29 RX ORDER — DOCUSATE SODIUM 100 MG/1
100 CAPSULE, LIQUID FILLED ORAL 2 TIMES DAILY
Status: DISCONTINUED | OUTPATIENT
Start: 2020-10-29 | End: 2020-10-30 | Stop reason: HOSPADM

## 2020-10-29 RX ADMIN — PRAMIPEXOLE DIHYDROCHLORIDE 0.5 MG: 0.5 TABLET ORAL at 21:47

## 2020-10-29 RX ADMIN — CARBIDOPA AND LEVODOPA 1 TABLET: 25; 100 TABLET ORAL at 08:16

## 2020-10-29 RX ADMIN — OXYCODONE HYDROCHLORIDE 5 MG: 5 TABLET ORAL at 16:14

## 2020-10-29 RX ADMIN — PRAMIPEXOLE DIHYDROCHLORIDE 0.5 MG: 0.5 TABLET ORAL at 08:13

## 2020-10-29 RX ADMIN — QUETIAPINE FUMARATE 25 MG: 25 TABLET ORAL at 21:47

## 2020-10-29 RX ADMIN — CARBIDOPA AND LEVODOPA 1 TABLET: 25; 100 TABLET ORAL at 16:13

## 2020-10-29 RX ADMIN — HYDROCHLOROTHIAZIDE 12.5 MG: 12.5 TABLET ORAL at 08:08

## 2020-10-29 RX ADMIN — ACETAMINOPHEN 975 MG: 325 TABLET, FILM COATED ORAL at 14:22

## 2020-10-29 RX ADMIN — ATORVASTATIN CALCIUM 10 MG: 10 TABLET, FILM COATED ORAL at 08:08

## 2020-10-29 RX ADMIN — POLYETHYLENE GLYCOL 3350 17 G: 17 POWDER, FOR SOLUTION ORAL at 08:14

## 2020-10-29 RX ADMIN — NYSTATIN: 100000 POWDER TOPICAL at 08:14

## 2020-10-29 RX ADMIN — FLUTICASONE FUROATE AND VILANTEROL TRIFENATATE 1 PUFF: 200; 25 POWDER RESPIRATORY (INHALATION) at 08:20

## 2020-10-29 RX ADMIN — ACETAMINOPHEN 975 MG: 325 TABLET, FILM COATED ORAL at 06:53

## 2020-10-29 RX ADMIN — DOCUSATE SODIUM 100 MG: 100 CAPSULE, LIQUID FILLED ORAL at 14:13

## 2020-10-29 RX ADMIN — LIDOCAINE 5% 2 PATCH: 700 PATCH TOPICAL at 08:07

## 2020-10-29 RX ADMIN — GUAIFENESIN 600 MG: 600 TABLET ORAL at 21:46

## 2020-10-29 RX ADMIN — PRAMIPEXOLE DIHYDROCHLORIDE 0.5 MG: 0.5 TABLET ORAL at 16:13

## 2020-10-29 RX ADMIN — CEFTRIAXONE 1000 MG: 1 INJECTION, POWDER, FOR SOLUTION INTRAMUSCULAR; INTRAVENOUS at 17:49

## 2020-10-29 RX ADMIN — TIOTROPIUM BROMIDE 18 MCG: 18 CAPSULE ORAL; RESPIRATORY (INHALATION) at 08:21

## 2020-10-29 RX ADMIN — FINASTERIDE 5 MG: 5 TABLET, FILM COATED ORAL at 08:08

## 2020-10-29 RX ADMIN — OXYCODONE HYDROCHLORIDE 5 MG: 5 TABLET ORAL at 08:09

## 2020-10-29 RX ADMIN — LOSARTAN POTASSIUM 50 MG: 50 TABLET, FILM COATED ORAL at 08:08

## 2020-10-29 RX ADMIN — RIVASTIGMINE TARTRATE 4.5 MG: 1.5 CAPSULE ORAL at 17:50

## 2020-10-29 RX ADMIN — DOCUSATE SODIUM 100 MG: 100 CAPSULE, LIQUID FILLED ORAL at 17:50

## 2020-10-29 RX ADMIN — CARBIDOPA AND LEVODOPA 1 TABLET: 25; 100 TABLET ORAL at 21:46

## 2020-10-29 RX ADMIN — GUAIFENESIN 600 MG: 600 TABLET ORAL at 08:09

## 2020-10-29 RX ADMIN — RIVASTIGMINE TARTRATE 4.5 MG: 1.5 CAPSULE ORAL at 08:14

## 2020-10-29 RX ADMIN — PREDNISONE 40 MG: 20 TABLET ORAL at 08:09

## 2020-10-29 RX ADMIN — GLYCERIN 1 SUPPOSITORY: 2 SUPPOSITORY RECTAL at 14:14

## 2020-10-29 RX ADMIN — HEPARIN SODIUM 5000 UNITS: 5000 INJECTION INTRAVENOUS; SUBCUTANEOUS at 06:53

## 2020-10-29 RX ADMIN — AZITHROMYCIN 500 MG: 500 INJECTION, POWDER, LYOPHILIZED, FOR SOLUTION INTRAVENOUS at 21:46

## 2020-10-29 RX ADMIN — ACETAMINOPHEN 975 MG: 325 TABLET, FILM COATED ORAL at 21:47

## 2020-10-29 RX ADMIN — MONTELUKAST SODIUM 10 MG: 10 TABLET, FILM COATED ORAL at 21:47

## 2020-10-29 RX ADMIN — HEPARIN SODIUM 5000 UNITS: 5000 INJECTION INTRAVENOUS; SUBCUTANEOUS at 21:46

## 2020-10-29 RX ADMIN — NYSTATIN: 100000 POWDER TOPICAL at 17:51

## 2020-10-29 RX ADMIN — HEPARIN SODIUM 5000 UNITS: 5000 INJECTION INTRAVENOUS; SUBCUTANEOUS at 14:22

## 2020-10-30 VITALS
SYSTOLIC BLOOD PRESSURE: 149 MMHG | TEMPERATURE: 98 F | OXYGEN SATURATION: 97 % | HEIGHT: 63 IN | RESPIRATION RATE: 18 BRPM | HEART RATE: 54 BPM | DIASTOLIC BLOOD PRESSURE: 63 MMHG | WEIGHT: 156.53 LBS | BODY MASS INDEX: 27.73 KG/M2

## 2020-10-30 PROCEDURE — 92526 ORAL FUNCTION THERAPY: CPT

## 2020-10-30 PROCEDURE — 99239 HOSP IP/OBS DSCHRG MGMT >30: CPT | Performed by: INTERNAL MEDICINE

## 2020-10-30 PROCEDURE — 97110 THERAPEUTIC EXERCISES: CPT

## 2020-10-30 PROCEDURE — 97116 GAIT TRAINING THERAPY: CPT

## 2020-10-30 RX ORDER — PREDNISONE 10 MG/1
TABLET ORAL
Qty: 48 TABLET | Refills: 0 | Status: SHIPPED | OUTPATIENT
Start: 2020-10-31 | End: 2020-11-09 | Stop reason: CLARIF

## 2020-10-30 RX ORDER — AZITHROMYCIN 500 MG/1
500 TABLET, FILM COATED ORAL EVERY 24 HOURS
Status: DISCONTINUED | OUTPATIENT
Start: 2020-10-30 | End: 2020-10-30

## 2020-10-30 RX ORDER — AZITHROMYCIN 500 MG/1
500 TABLET, FILM COATED ORAL EVERY 24 HOURS
Status: COMPLETED | OUTPATIENT
Start: 2020-10-30 | End: 2020-10-30

## 2020-10-30 RX ADMIN — PREDNISONE 40 MG: 20 TABLET ORAL at 08:44

## 2020-10-30 RX ADMIN — CARBIDOPA AND LEVODOPA 1 TABLET: 25; 100 TABLET ORAL at 08:44

## 2020-10-30 RX ADMIN — POLYETHYLENE GLYCOL 3350 17 G: 17 POWDER, FOR SOLUTION ORAL at 08:44

## 2020-10-30 RX ADMIN — TIOTROPIUM BROMIDE 18 MCG: 18 CAPSULE ORAL; RESPIRATORY (INHALATION) at 08:46

## 2020-10-30 RX ADMIN — ACETAMINOPHEN 975 MG: 325 TABLET, FILM COATED ORAL at 05:45

## 2020-10-30 RX ADMIN — PRAMIPEXOLE DIHYDROCHLORIDE 0.5 MG: 0.5 TABLET ORAL at 08:44

## 2020-10-30 RX ADMIN — NYSTATIN: 100000 POWDER TOPICAL at 08:46

## 2020-10-30 RX ADMIN — RIVASTIGMINE TARTRATE 4.5 MG: 1.5 CAPSULE ORAL at 08:52

## 2020-10-30 RX ADMIN — FLUTICASONE FUROATE AND VILANTEROL TRIFENATATE 1 PUFF: 200; 25 POWDER RESPIRATORY (INHALATION) at 08:45

## 2020-10-30 RX ADMIN — CEFTRIAXONE 1000 MG: 1 INJECTION, POWDER, FOR SOLUTION INTRAMUSCULAR; INTRAVENOUS at 13:17

## 2020-10-30 RX ADMIN — HYDROCHLOROTHIAZIDE 12.5 MG: 12.5 TABLET ORAL at 08:44

## 2020-10-30 RX ADMIN — GUAIFENESIN 600 MG: 600 TABLET ORAL at 08:44

## 2020-10-30 RX ADMIN — LIDOCAINE 5% 2 PATCH: 700 PATCH TOPICAL at 08:44

## 2020-10-30 RX ADMIN — DOCUSATE SODIUM 100 MG: 100 CAPSULE, LIQUID FILLED ORAL at 08:44

## 2020-10-30 RX ADMIN — ATORVASTATIN CALCIUM 10 MG: 10 TABLET, FILM COATED ORAL at 08:44

## 2020-10-30 RX ADMIN — HEPARIN SODIUM 5000 UNITS: 5000 INJECTION INTRAVENOUS; SUBCUTANEOUS at 05:45

## 2020-10-30 RX ADMIN — AZITHROMYCIN 500 MG: 500 TABLET, FILM COATED ORAL at 12:29

## 2020-10-30 RX ADMIN — FINASTERIDE 5 MG: 5 TABLET, FILM COATED ORAL at 08:44

## 2020-10-30 RX ADMIN — LOSARTAN POTASSIUM 50 MG: 50 TABLET, FILM COATED ORAL at 08:44

## 2020-11-01 LAB
BACTERIA BLD CULT: NORMAL
BACTERIA BLD CULT: NORMAL

## 2020-11-02 ENCOUNTER — TRANSITIONAL CARE MANAGEMENT (OUTPATIENT)
Dept: INTERNAL MEDICINE CLINIC | Facility: CLINIC | Age: 85
End: 2020-11-02

## 2020-11-09 ENCOUNTER — OFFICE VISIT (OUTPATIENT)
Dept: INTERNAL MEDICINE CLINIC | Facility: CLINIC | Age: 85
End: 2020-11-09
Payer: COMMERCIAL

## 2020-11-09 VITALS
HEART RATE: 74 BPM | DIASTOLIC BLOOD PRESSURE: 60 MMHG | WEIGHT: 169 LBS | RESPIRATION RATE: 16 BRPM | TEMPERATURE: 97.2 F | BODY MASS INDEX: 29.95 KG/M2 | SYSTOLIC BLOOD PRESSURE: 102 MMHG | HEIGHT: 63 IN

## 2020-11-09 DIAGNOSIS — H60.92 OTITIS EXTERNA OF LEFT EAR, UNSPECIFIED CHRONICITY, UNSPECIFIED TYPE: ICD-10-CM

## 2020-11-09 DIAGNOSIS — Z23 ENCOUNTER FOR IMMUNIZATION: Primary | ICD-10-CM

## 2020-11-09 PROCEDURE — 1111F DSCHRG MED/CURRENT MED MERGE: CPT | Performed by: NURSE PRACTITIONER

## 2020-11-09 PROCEDURE — 69210 REMOVE IMPACTED EAR WAX UNI: CPT | Performed by: NURSE PRACTITIONER

## 2020-11-09 PROCEDURE — 90662 IIV NO PRSV INCREASED AG IM: CPT | Performed by: NURSE PRACTITIONER

## 2020-11-09 PROCEDURE — 99495 TRANSJ CARE MGMT MOD F2F 14D: CPT | Performed by: NURSE PRACTITIONER

## 2020-11-09 PROCEDURE — G0008 ADMIN INFLUENZA VIRUS VAC: HCPCS | Performed by: NURSE PRACTITIONER

## 2020-11-13 DIAGNOSIS — G20 PARKINSON'S DISEASE (HCC): ICD-10-CM

## 2020-11-16 DIAGNOSIS — J42 CHRONIC BRONCHITIS, UNSPECIFIED CHRONIC BRONCHITIS TYPE (HCC): ICD-10-CM

## 2020-11-16 DIAGNOSIS — G20 PARKINSON'S DISEASE (HCC): ICD-10-CM

## 2020-11-16 RX ORDER — ALBUTEROL SULFATE 90 UG/1
AEROSOL, METERED RESPIRATORY (INHALATION)
Qty: 18 INHALER | Refills: 3 | Status: SHIPPED | OUTPATIENT
Start: 2020-11-16 | End: 2021-03-03

## 2020-11-16 RX ORDER — PRAMIPEXOLE DIHYDROCHLORIDE 0.5 MG/1
0.5 TABLET ORAL 3 TIMES DAILY
Qty: 270 TABLET | Refills: 0 | Status: SHIPPED | OUTPATIENT
Start: 2020-11-16 | End: 2021-01-28

## 2020-11-28 DIAGNOSIS — I63.9 CEREBROVASCULAR ACCIDENT (CVA), UNSPECIFIED MECHANISM (HCC): ICD-10-CM

## 2020-11-30 RX ORDER — ATORVASTATIN CALCIUM 10 MG/1
TABLET, FILM COATED ORAL
Qty: 90 TABLET | Refills: 0 | Status: SHIPPED | OUTPATIENT
Start: 2020-11-30 | End: 2021-02-22

## 2020-12-07 DIAGNOSIS — J42 CHRONIC BRONCHITIS, UNSPECIFIED CHRONIC BRONCHITIS TYPE (HCC): ICD-10-CM

## 2020-12-08 RX ORDER — ALBUTEROL SULFATE 2.5 MG/3ML
SOLUTION RESPIRATORY (INHALATION)
Qty: 75 ML | Refills: 5 | Status: SHIPPED | OUTPATIENT
Start: 2020-12-08 | End: 2021-03-17

## 2020-12-09 DIAGNOSIS — G20 PARKINSON'S DISEASE (HCC): ICD-10-CM

## 2020-12-11 DIAGNOSIS — G20 PARKINSON'S DISEASE (HCC): ICD-10-CM

## 2020-12-14 RX ORDER — RIVASTIGMINE TARTRATE 4.5 MG/1
CAPSULE ORAL
Qty: 180 CAPSULE | Refills: 1 | Status: SHIPPED | OUTPATIENT
Start: 2020-12-14 | End: 2021-06-09

## 2020-12-15 DIAGNOSIS — B44.81 ABPA (ALLERGIC BRONCHOPULMONARY ASPERGILLOSIS) (HCC): Primary | ICD-10-CM

## 2020-12-16 RX ORDER — PREDNISONE 10 MG/1
TABLET ORAL
COMMUNITY
Start: 2019-09-28 | End: 2020-12-16 | Stop reason: SDUPTHER

## 2020-12-16 RX ORDER — PREDNISONE 10 MG/1
10 TABLET ORAL DAILY
Qty: 30 TABLET | Refills: 0 | Status: SHIPPED | OUTPATIENT
Start: 2020-12-16 | End: 2021-04-26 | Stop reason: SDUPTHER

## 2020-12-31 ENCOUNTER — HOSPITAL ENCOUNTER (EMERGENCY)
Facility: HOSPITAL | Age: 85
Discharge: HOME | End: 2020-12-31
Attending: EMERGENCY MEDICINE
Payer: COMMERCIAL

## 2020-12-31 ENCOUNTER — TELEMEDICINE (OUTPATIENT)
Dept: INTERNAL MEDICINE CLINIC | Facility: CLINIC | Age: 85
End: 2020-12-31
Payer: COMMERCIAL

## 2020-12-31 ENCOUNTER — TELEPHONE (OUTPATIENT)
Dept: PULMONOLOGY | Facility: CLINIC | Age: 85
End: 2020-12-31

## 2020-12-31 VITALS
RESPIRATION RATE: 18 BRPM | TEMPERATURE: 97.7 F | SYSTOLIC BLOOD PRESSURE: 150 MMHG | HEART RATE: 72 BPM | DIASTOLIC BLOOD PRESSURE: 70 MMHG | OXYGEN SATURATION: 97 % | WEIGHT: 169.09 LBS | BODY MASS INDEX: 29.95 KG/M2

## 2020-12-31 DIAGNOSIS — S61.219A FINGER LACERATION: Primary | ICD-10-CM

## 2020-12-31 DIAGNOSIS — L30.4 INTERTRIGO: ICD-10-CM

## 2020-12-31 DIAGNOSIS — T14.8XXA OPEN WOUND OF SKIN: Primary | ICD-10-CM

## 2020-12-31 DIAGNOSIS — J40 BRONCHITIS: Primary | ICD-10-CM

## 2020-12-31 PROCEDURE — 12002 RPR S/N/AX/GEN/TRNK2.6-7.5CM: CPT | Performed by: EMERGENCY MEDICINE

## 2020-12-31 PROCEDURE — 99282 EMERGENCY DEPT VISIT SF MDM: CPT | Performed by: EMERGENCY MEDICINE

## 2020-12-31 PROCEDURE — 99282 EMERGENCY DEPT VISIT SF MDM: CPT

## 2020-12-31 PROCEDURE — 99213 OFFICE O/P EST LOW 20 MIN: CPT | Performed by: NURSE PRACTITIONER

## 2020-12-31 RX ORDER — PREDNISONE 20 MG/1
TABLET ORAL
Qty: 18 TABLET | Refills: 0 | Status: SHIPPED | OUTPATIENT
Start: 2020-12-31 | End: 2021-03-03

## 2020-12-31 RX ORDER — AZITHROMYCIN 250 MG/1
TABLET, FILM COATED ORAL
Qty: 6 TABLET | Refills: 0 | Status: SHIPPED | OUTPATIENT
Start: 2020-12-31 | End: 2021-01-04

## 2020-12-31 RX ORDER — LIDOCAINE HYDROCHLORIDE 10 MG/ML
10 INJECTION, SOLUTION EPIDURAL; INFILTRATION; INTRACAUDAL; PERINEURAL ONCE
Status: COMPLETED | OUTPATIENT
Start: 2020-12-31 | End: 2020-12-31

## 2020-12-31 RX ORDER — GINSENG 100 MG
1 CAPSULE ORAL ONCE
Status: COMPLETED | OUTPATIENT
Start: 2020-12-31 | End: 2020-12-31

## 2020-12-31 RX ORDER — NYSTATIN 100000 [USP'U]/G
POWDER TOPICAL
Qty: 30 G | Refills: 1 | Status: SHIPPED | OUTPATIENT
Start: 2020-12-31 | End: 2021-04-06 | Stop reason: SDUPTHER

## 2020-12-31 RX ADMIN — LIDOCAINE HYDROCHLORIDE 10 ML: 10 INJECTION, SOLUTION EPIDURAL; INFILTRATION; INTRACAUDAL; PERINEURAL at 15:05

## 2020-12-31 RX ADMIN — BACITRACIN ZINC 1 SMALL APPLICATION: 500 OINTMENT TOPICAL at 15:57

## 2020-12-31 NOTE — ED NOTES
Pt wishes to stay in the wheelchair because pt is comfortable how he is, offered to help get to stretcher        Timi Johnston  12/31/20 0109

## 2020-12-31 NOTE — ED PROVIDER NOTES
History  Chief Complaint   Patient presents with    Finger Laceration     Pt lost his balance in bathroom this morning and cut his left pinky finger on the door frame  Bleeding controlled  Did not hit head or LOC  HPI  80 yo M presents with left fifth finger laceration  This occurred this morning when he grabbed the doorframe when he lost balance  Did not fall or hit head  Tetanus up to date  No weakness or numbness  9 sutures  Prior to Admission Medications   Prescriptions Last Dose Informant Patient Reported? Taking?    QUEtiapine (SEROquel) 25 mg tablet  Child No No   Sig: Take 1 tablet (25 mg total) by mouth daily at bedtime   Wixela Inhub 250-50 MCG/DOSE inhaler  Child No No   Sig: INAHLE1 PUFF BY MOUTH TWICE DAILY *RINSE MOUTH AFTER USING   acetaminophen (TYLENOL) 325 mg tablet  Child Yes No   Sig: Take 650 mg by mouth every 6 (six) hours as needed for mild pain   albuterol (2 5 mg/3 mL) 0 083 % nebulizer solution   No No   Sig: TAKE 1 VIAL (2 5 MG TOTAL) BY NEBULIZATION EVERY 4 (FOUR) HOURS AS NEEDED FOR WHEEZING   albuterol (PROVENTIL HFA,VENTOLIN HFA) 90 mcg/act inhaler   No No   Sig: TAKE 2 PUFFS BY MOUTH EVERY 6 HOURS AS NEEDED FOR WHEEZE   atorvastatin (LIPITOR) 10 mg tablet   No No   Sig: TAKE 1 TABLET BY MOUTH EVERY DAY   azithromycin (ZITHROMAX) 250 mg tablet   No No   Sig: Take 2 tablets today then 1 tablet daily x 4 days   carbidopa-levodopa (SINEMET)  mg per tablet   No No   Sig: TAKE 1 TABLET BY MOUTH THREE TIMES A DAY   finasteride (PROSCAR) 5 mg tablet  Child No No   Sig: Take 1 tablet (5 mg total) by mouth every morning   irbesartan-hydrochlorothiazide (AVALIDE) 150-12 5 MG per tablet  Child No No   Sig: Take 1 tablet by mouth daily   levalbuterol (XOPENEX) 1 25 mg/0 5 mL nebulizer solution  Child No No   Sig: Take 0 5 mL (1 25 mg total) by nebulization 3 (three) times a day   montelukast (SINGULAIR) 10 mg tablet  Child No No   Sig: Take 1 tablet (10 mg total) by mouth daily at bedtime   Patient taking differently: Take 10 mg by mouth 3 (three) times a day    neomycin-polymyxin-hydrocortisone (CORTISPORIN) otic solution   No No   Sig: Instill 3 drops three times a day for 5 days     nystatin (MYCOSTATIN) cream  Child No No   Sig: Apply to intertriginous areas 2-3 times daily as needed   nystatin (MYCOSTATIN) powder  Child No No   Sig: Apply topically 2 (two) times a day   Patient taking differently: Apply topically as needed    pramipexole (MIRAPEX) 0 5 mg tablet   No No   Sig: TAKE 1 TABLET (0 5 MG TOTAL) BY MOUTH 3 (THREE) TIMES A DAY   predniSONE 10 mg tablet   No No   Sig: Take 1 tablet (10 mg total) by mouth daily   predniSONE 20 mg tablet   No No   Sig: Use 2 tablets for 5 days 1 tablet for 5 days and half a tablet for 5 days   rivastigmine (EXELON) 4 5 MG capsule   No No   Sig: TAKE 1 CAPSULE BY MOUTH TWICE DAILY   tiotropium (Spiriva HandiHaler) 18 mcg inhalation capsule  Child No No   Sig: Place 1 capsule (18 mcg total) into inhaler and inhale daily   triamcinolone (KENALOG) 0 5 % cream  Child No No   Sig: APPLY TO AFFECTED AREA(S) OF THE RASH TWICE DAILY AS NEEDED      Facility-Administered Medications: None       Past Medical History:   Diagnosis Date    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) [facility]     Allergic rhinitis     last assessed [id number]    Aortic regurgitation     Arm laceration     Asthma     Bronchitis, chronic obstructive, with exacerbation ([facility])     last assessed 12Jun2015    Candidal intertrigo     Chronic obstructive lung disease ([facility])     last assessed [id number]    COPD (chronic obstructive pulmonary disease) ([facility])     Coronary artery disease     carotid stents    Cough     [facility]re[facility]) [facility]     Dermatitis     Diabetes mellitus type 2, uncomplicated ([facility] )     controlled    Dysthymic disorder     Fatigue     Hyperlipidemia     Hypertension     Neurologic gait dysfunction     Parkinson's disease ([facility]     Pneumonia  PVD (peripheral vascular disease) (H[facility])     Seborrheic keratosis     TIA (transient ischemic attack)     Tinea corporis     Tinea pedis of both feet     Tremor        Past Surgical History:   Procedure Laterality Date    NASAL SINUS SURGERY      [place] BRONCHOSCOPY,DIAGNOSTIC N/A 2016    Procedure: BRONCHOSCOPY;  Surgeon: José Luna MD;  [facility]; Service: Pulmonary       Family History   Problem Relation Age of Onset    No Known Problems Mother         Old Age    COPD Sister     Lung cancer Sister     Asthma Family      I have reviewed and agree with the history as documented  E-Cigarette/Vaping    E-Cigarette Use Never User      E-Cigarette/Vaping Substances    Nicotine No     THC No     CBD No     Flavoring No     Other No     Unknown No      Social History     Tobacco Use    Smoking status: Former Smoker     Packs/day: 1 00     Years: 3 00     Pack years: 3 00     Types: Cigarettes     Start date:      Quit date:      Years since quittin 0    Smokeless tobacco: Never Used   Substance Use Topics    Alcohol use: Not Currently    Drug use: No       Review of Systems   Constitutional: Negative for chills and fever  HENT: Negative for dental problem and ear pain  Eyes: Negative for pain and redness  Respiratory: Negative for cough and shortness of breath  Cardiovascular: Negative for chest pain and palpitations  Gastrointestinal: Negative for abdominal pain and nausea  Endocrine: Negative for polydipsia and polyphagia  Genitourinary: Negative for dysuria and frequency  Musculoskeletal: Negative for arthralgias and joint swelling  Skin: Positive for wound  Negative for color change and rash  Neurological: Negative for dizziness and headaches  Psychiatric/Behavioral: Negative for behavioral problems and confusion  All other systems reviewed and are negative  Physical Exam  Physical Exam  Vitals signs and nursing note reviewed  Constitutional:       General: He is not in acute distress  Appearance: He is well-developed  He is not diaphoretic  HENT:      Head: Atraumatic  Right Ear: External ear normal       Left Ear: External ear normal       Nose: Nose normal    Eyes:      Conjunctiva/sclera: Conjunctivae normal       Pupils: Pupils are equal, round, and reactive to light  Neck:      Musculoskeletal: Normal range of motion and neck supple  Vascular: No JVD  Cardiovascular:      Rate and Rhythm: Normal rate and regular rhythm  Heart sounds: Normal heart sounds  No murmur  Pulmonary:      Effort: Pulmonary effort is normal  No respiratory distress  Breath sounds: Normal breath sounds  No wheezing  Abdominal:      General: Bowel sounds are normal  There is no distension  Palpations: Abdomen is soft  Tenderness: There is no abdominal tenderness  Musculoskeletal: Normal range of motion  Comments: L finger with 3 5 cm laceration over pad of finger, radial pulse 2+, normal strength and sensation   Skin:     General: Skin is warm and dry  Capillary Refill: Capillary refill takes less than 2 seconds  Neurological:      Mental Status: He is alert and oriented to person, place, and time  Cranial Nerves: No cranial nerve deficit     Psychiatric:         Behavior: Behavior normal          Vital Signs  ED Triage Vitals [12/31/20 1419]   Temperature Pulse Respirations Blood Pressure SpO2   97 7 °F (36 5 °C) 72 18 150/70 97 %      Temp Source Heart Rate Source Patient Position - Orthostatic VS BP Location FiO2 (%)   Oral Monitor -- Left arm --      Pain Score       --           Vitals:    12/31/20 1419   BP: 150/70   Pulse: 72         Visual Acuity      ED Medications  Medications   bacitracin topical ointment 1 small application (has no administration in time range)   lidocaine (PF) (XYLOCAINE-MPF) 1 % injection 10 mL (10 mL Infiltration Given by Other 12/31/20 1505)       Diagnostic Studies  Results Reviewed     None                 No orders to display              Procedures  Laceration repair    Date/Time: 12/31/2020 3:49 PM  Performed by: Avinash Whitaker MD  Authorized by: Avinash Whitaker MD   Patient identity confirmed: verbally with patient and arm band  Body area: upper extremity  Location details: left small finger  Laceration length: 3 5 cm  Foreign bodies: no foreign bodies  Anesthesia: digital block    Anesthesia:  Local Anesthetic: lidocaine 1% with epinephrine      Procedure Details:  Irrigation solution: saline  Irrigation method: syringe  Amount of cleaning: standard  Skin closure: 4-0 nylon  Number of sutures: 9  Approximation difficulty: simple  Dressing: gauze roll               ED Course                             SBIRT 20yo+      Most Recent Value   SBIRT (23 yo +)   In order to provide better care to our patients, we are screening all of our patients for alcohol and drug use  Would it be okay to ask you these screening questions? No Filed at: 12/31/2020 1509                    MDM    Disposition  Final diagnoses:   Finger laceration     Time reflects when diagnosis was documented in both MDM as applicable and the Disposition within this note     Time User Action Codes Description Comment    12/31/2020  3:46 PM Adan Polanco Add [B10 [id number]] Finger laceration       ED Disposition     ED Disposition Condition Date/Time Comment    Discharge Stable Thu Dec 31, 2020  3:46 PM [facility] discharge to home/self care  Follow-up Information     Follow up With Specialties Details Why Contact Info    Michelle Allan MD Internal Medicine, Palliative Care In 10 days  [address]  [phone]            Patient's Medications   Discharge Prescriptions    No medications on file     No discharge procedures on file      PDMP Review     None          ED Provider  Electronically Signed by           Avinash Whitaker MD  12/31/20 1555

## 2021-01-01 DIAGNOSIS — H60.92 OTITIS EXTERNA OF LEFT EAR, UNSPECIFIED CHRONICITY, UNSPECIFIED TYPE: ICD-10-CM

## 2021-01-02 DIAGNOSIS — G20 PARKINSON'S DISEASE (HCC): ICD-10-CM

## 2021-01-03 DIAGNOSIS — J45.40 MODERATE PERSISTENT EXTRINSIC ASTHMA WITHOUT COMPLICATION: ICD-10-CM

## 2021-01-04 RX ORDER — NEOMYCIN SULFATE, POLYMYXIN B SULFATE, HYDROCORTISONE 3.5; 10000; 1 MG/ML; [USP'U]/ML; MG/ML
SOLUTION/ DROPS AURICULAR (OTIC)
Qty: 10 ML | Refills: 0 | Status: SHIPPED | OUTPATIENT
Start: 2021-01-04 | End: 2021-03-29

## 2021-01-06 LAB
LEFT EYE DIABETIC RETINOPATHY: NORMAL
RIGHT EYE DIABETIC RETINOPATHY: NORMAL

## 2021-01-07 DIAGNOSIS — J40 BRONCHITIS: ICD-10-CM

## 2021-01-08 RX ORDER — AZITHROMYCIN 250 MG/1
TABLET, FILM COATED ORAL
Qty: 6 TABLET | Refills: 0 | OUTPATIENT
Start: 2021-01-08

## 2021-01-12 DIAGNOSIS — R33.9 URINARY RETENTION: ICD-10-CM

## 2021-01-12 DIAGNOSIS — I10 ESSENTIAL HYPERTENSION: ICD-10-CM

## 2021-01-13 RX ORDER — FINASTERIDE 5 MG/1
TABLET, FILM COATED ORAL
Qty: 90 TABLET | Refills: 0 | OUTPATIENT
Start: 2021-01-13

## 2021-01-14 RX ORDER — IRBESARTAN AND HYDROCHLOROTHIAZIDE 150; 12.5 MG/1; MG/1
TABLET, FILM COATED ORAL
Qty: 90 TABLET | Refills: 2 | Status: SHIPPED | OUTPATIENT
Start: 2021-01-14 | End: 2021-09-30

## 2021-01-18 ENCOUNTER — TELEPHONE (OUTPATIENT)
Dept: PULMONOLOGY | Facility: CLINIC | Age: 86
End: 2021-01-18

## 2021-01-18 DIAGNOSIS — J44.1 COPD EXACERBATION (HCC): Primary | ICD-10-CM

## 2021-01-18 NOTE — TELEPHONE ENCOUNTER
He has not been seen since 4/2019  If he isn't feeling better he likely needs a chest x-ray and a visit with us  I can order the x-ray but he will need to be seen   Sending the same antibiotics would not be beneficial

## 2021-01-19 ENCOUNTER — TELEPHONE (OUTPATIENT)
Dept: UROLOGY | Facility: AMBULATORY SURGERY CENTER | Age: 86
End: 2021-01-19

## 2021-01-19 DIAGNOSIS — R33.9 URINARY RETENTION: ICD-10-CM

## 2021-01-19 RX ORDER — FINASTERIDE 5 MG/1
5 TABLET, FILM COATED ORAL DAILY
Qty: 90 TABLET | Refills: 3 | Status: SHIPPED | OUTPATIENT
Start: 2021-01-19 | End: 2021-04-06 | Stop reason: CLARIF

## 2021-01-19 NOTE — TELEPHONE ENCOUNTER
Patients daughter Elan Newton called to set up an appointment for her father  Patient needs a refill on medication but it has been denied because patient needs an appointment  Elan Newton would like a virtual visit as her father has a very hard time walking and it would be hard getting him to appointments  Please advise on an appointment so patient can get his medication  [phone]

## 2021-01-20 ENCOUNTER — TELEMEDICINE (OUTPATIENT)
Dept: UROLOGY | Facility: CLINIC | Age: 86
End: 2021-01-20
Payer: COMMERCIAL

## 2021-01-20 DIAGNOSIS — N40.1 BENIGN PROSTATIC HYPERPLASIA WITH LOWER URINARY TRACT SYMPTOMS, SYMPTOM DETAILS UNSPECIFIED: Primary | ICD-10-CM

## 2021-01-20 DIAGNOSIS — Z23 ENCOUNTER FOR IMMUNIZATION: ICD-10-CM

## 2021-01-20 PROCEDURE — 99213 OFFICE O/P EST LOW 20 MIN: CPT | Performed by: PHYSICIAN ASSISTANT

## 2021-01-20 NOTE — PROGRESS NOTES
Virtual Brief Visit    Assessment/Plan:    1  BPH - managed by Dr Clover Valencia  2  Resolved urinary retention  - patient comfortable with lower urinary tract symptoms  No reports of urinary infection, incontinence, or hematuria  Recent CT without evidence of upper tract obstruction  Continue finasteride daily  Follow-up 1 year for PVR re-evaluation  Encouraged to contact us in the meantime with any progressive urinary symptoms or urinary concerns  All questions answered  Problem List Items Addressed This Visit     None                Reason for visit is   Chief Complaint   Patient presents with    Virtual Brief Visit        Encounter provider Alesia Vázquez PA-C    Provider located at [address] [facility] [address] [facility] [address] [phone]    Recent Visits  No visits were found meeting these conditions  Showing recent visits within past 7 days and meeting all other requirements     Today's Visits  Date Type Provider Dept   01/20/21 Telemedicine Alesia Vázquez PA-C  [facility]   Showing today's visits and meeting all other requirements     Future Appointments  No visits were found meeting these conditions  Showing future appointments within next 150 days and meeting all other requirements        After connecting through telephone, the patient was identified by name and date of birth  Hector Sandoval was informed that this is a telemedicine visit and that the visit is being conducted through telephone  My office door was closed  No one else was in the room  He acknowledged consent and understanding of privacy and security of the platform  The patient has agreed to participate and understands he can discontinue the visit at any time  Patient is aware this is a billable service       Jessie Bell is a 81 y o  male with a history of Parkinson's disease presenting for follow-up of lower urinary tract symptoms  Patient was hospitalized in 2019 after an episode of urinary retention  Was found have over 1 L of urinary retention at that time was catheterized  Was started on tamsulosin however this caused significant dizziness and lightheadedness  Patient underwent cystoscopy 11/05/2019  Some prostatomegaly was noted  Patient was voiding well at that time  Recommendations were to start finasteride  If urinary symptoms were to progress, patient was felt to be a candidate for Uro lift  Virtual visit performed today as patient is nervous to limit exposure due to COVID-19  He is overall comfortable with his urination  Denies any episodes of urinary incontinence, dysuria, gross hematuria, or urinary infections  Reports an adequate stream and feeling empty after voiding  Continues to take finasteride daily  Patient did have a CT of the abdomen pelvis in October 2020  There is no evidence of upper tract obstruction  Bladder appeared adequately empty at that time without any massive over distension        Past Medical History:   Diagnosis Date    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[facility]     Allergic rhinitis     last assessed [id number]    Aortic regurgitation     Arm laceration     Asthma     Bronchitis, chronic obstructive, with exacerbation ([facility])     last assessed [id number]    Candidal intertrigo     Chronic obstructive lung disease ([facility] )     last assessed [id number]    COPD (chronic obstructive pulmonary disease) [facility]     Coronary artery disease     carotid stents    Cough     CVA (cerebral vascular accident) ([facility]     Dermatitis     Diabetes mellitus type 2, uncomplicated ([facility] )     controlled    Dysthymic disorder     Fatigue     Hyperlipidemia     Hypertension     Neurologic gait dysfunction     Parkinson's disease ([facility]     Pneumonia     PVD (peripheral vascular disease) ([facility]     Seborrheic keratosis     TIA (transient ischemic attack)     Tinea corporis     Tinea pedis of both feet     Tremor        Past Surgical History:   Procedure Laterality Date    NASAL SINUS SURGERY      ME BRONCHOSCOPY,DIAGNOSTIC N/A 7/27/2016    Procedure: BRONCHOSCOPY;  Surgeon: Eduarda Henry MD;  Location: [facility]; Service: Pulmonary       Current Outpatient Medications   Medication Sig Dispense Refill    acetaminophen (TYLENOL) 325 mg tablet Take 650 mg by mouth every 6 (six) hours as needed for mild pain      albuterol (2 5 mg/3 mL) 0 083 % nebulizer solution TAKE 1 VIAL (2 5 MG TOTAL) BY NEBULIZATION EVERY 4 (FOUR) HOURS AS NEEDED FOR WHEEZING 75 mL 5    albuterol (PROVENTIL HFA,VENTOLIN HFA) 90 mcg/act inhaler TAKE 2 PUFFS BY MOUTH EVERY 6 HOURS AS NEEDED FOR WHEEZE 18 Inhaler 3    atorvastatin (LIPITOR) 10 mg tablet TAKE 1 TABLET BY MOUTH EVERY DAY 90 tablet 0    carbidopa-levodopa (SINEMET)  mg per tablet TAKE 1 TABLET BY MOUTH THREE TIMES A DAY 90 tablet 0    finasteride (PROSCAR) 5 mg tablet Take 1 tablet (5 mg total) by mouth every morning 90 tablet 0    finasteride (PROSCAR) 5 mg tablet Take 1 tablet (5 mg total) by mouth daily 90 tablet 3    irbesartan-hydrochlorothiazide (AVALIDE) 150-12 5 MG per tablet TAKE 1 TABLET BY MOUTH EVERY DAY 90 tablet 2    levalbuterol (XOPENEX) 1 25 mg/0 5 mL nebulizer solution Take 0 5 mL (1 25 mg total) by nebulization 3 (three) times a day      montelukast (SINGULAIR) 10 mg tablet Take 1 tablet (10 mg total) by mouth daily at bedtime (Patient taking differently: Take 10 mg by mouth 3 (three) times a day ) 90 tablet 3    neomycin-polymyxin-hydrocortisone (CORTISPORIN) 1 % SOLN INSTILL 3 DROPS THREE TIMES A DAY FOR 5 DAYS   10 mL 0    nystatin (MYCOSTATIN) cream Apply to intertriginous areas 2-3 times daily as needed 80 g 3    nystatin (MYCOSTATIN) powder APPLY TO AFFECTED AREA TWICE A DAY 30 g 1    pramipexole (MIRAPEX) 0 5 mg tablet TAKE 1 TABLET (0 5 MG TOTAL) BY MOUTH 3 (THREE) TIMES A  tablet 0    predniSONE 10 mg tablet Take 1 tablet (10 mg total) by mouth daily 30 tablet 0    predniSONE 20 mg tablet Use 2 tablets for 5 days 1 tablet for 5 days and half a tablet for 5 days 18 tablet 0    QUEtiapine (SEROquel) 25 mg tablet Take 1 tablet (25 mg total) by mouth daily at bedtime 30 tablet 5    rivastigmine (EXELON) 4 5 MG capsule TAKE 1 CAPSULE BY MOUTH TWICE DAILY 180 capsule 1    tiotropium (Spiriva HandiHaler) 18 mcg inhalation capsule Place 1 capsule (18 mcg total) into inhaler and inhale daily 90 capsule 1    triamcinolone (KENALOG) 0 5 % cream APPLY TO AFFECTED AREA(S) OF THE RASH TWICE DAILY AS NEEDED 30 g 0    Wixela Inhub 250-50 MCG/DOSE inhaler INAHLE1 PUFF BY MOUTH TWICE DAILY *RINSE MOUTH AFTER USING 1 Inhaler 0     No current facility-administered medications for this visit  Allergies   Allergen Reactions    Penicillins Syncope       Review of Systems   Constitutional: Negative for activity change, appetite change, chills, diaphoresis, fatigue, fever and unexpected weight change  Respiratory: Negative for chest tightness and shortness of breath  Cardiovascular: Negative for chest pain, palpitations and leg swelling  Gastrointestinal: Negative for abdominal distention, abdominal pain, constipation, diarrhea, nausea and vomiting  Genitourinary: Negative for decreased urine volume, difficulty urinating, dysuria, enuresis, flank pain, frequency, genital sores, hematuria and urgency  Musculoskeletal: Negative for back pain, gait problem and myalgias  Skin: Negative for color change, pallor, rash and wound  Psychiatric/Behavioral: Negative for behavioral problems  The patient is not nervous/anxious  There were no vitals filed for this visit        I spent 10 minutes with patient today in which greater than 50% of the time was spent in counseling/coordination of care regarding LUTS    VIRTUAL VISIT [id number] acknowledges that he has consented to an online visit or consultation  He understands that the online visit is based solely on information provided by him, and that, in the absence of a face-to-face physical evaluation by the physician, the diagnosis he receives is both limited and provisional in terms of accuracy and completeness  This is not intended to replace a full medical face-to-face evaluation by the physician  [employer] understands and accepts these terms

## 2021-01-25 ENCOUNTER — IMMUNIZATIONS (OUTPATIENT)
Dept: FAMILY MEDICINE CLINIC | Facility: HOSPITAL | Age: 86
End: 2021-01-25

## 2021-01-25 DIAGNOSIS — Z23 ENCOUNTER FOR IMMUNIZATION: Primary | ICD-10-CM

## 2021-01-25 PROCEDURE — 0011A SARS-COV-2 / COVID-19 MRNA VACCINE (MODERNA) 100 MCG: CPT

## 2021-01-25 PROCEDURE — 91301 SARS-COV-2 / COVID-19 MRNA VACCINE (MODERNA) 100 MCG: CPT

## 2021-01-27 DIAGNOSIS — G20 PARKINSON'S DISEASE (HCC): ICD-10-CM

## 2021-01-27 DIAGNOSIS — J40 BRONCHITIS: ICD-10-CM

## 2021-01-27 RX ORDER — AZITHROMYCIN 250 MG/1
TABLET, FILM COATED ORAL
Qty: 6 TABLET | Refills: 0 | OUTPATIENT
Start: 2021-01-27

## 2021-01-27 RX ORDER — PREDNISONE 10 MG/1
TABLET ORAL
Qty: 36 TABLET | Refills: 0 | OUTPATIENT
Start: 2021-01-27

## 2021-01-28 RX ORDER — PRAMIPEXOLE DIHYDROCHLORIDE 0.5 MG/1
0.5 TABLET ORAL 3 TIMES DAILY
Qty: 270 TABLET | Refills: 0 | Status: SHIPPED | OUTPATIENT
Start: 2021-01-28 | End: 2021-04-26

## 2021-02-02 DIAGNOSIS — G20 DEMENTIA DUE TO PARKINSON'S DISEASE WITH BEHAVIORAL DISTURBANCE (HCC): ICD-10-CM

## 2021-02-02 DIAGNOSIS — F02.81 DEMENTIA DUE TO PARKINSON'S DISEASE WITH BEHAVIORAL DISTURBANCE (HCC): ICD-10-CM

## 2021-02-02 RX ORDER — QUETIAPINE FUMARATE 25 MG/1
TABLET, FILM COATED ORAL
Qty: 90 TABLET | Refills: 1 | Status: SHIPPED | OUTPATIENT
Start: 2021-02-02 | End: 2021-07-18

## 2021-02-04 ENCOUNTER — HOSPITAL ENCOUNTER (OUTPATIENT)
Dept: RADIOLOGY | Facility: HOSPITAL | Age: 86
Discharge: HOME | End: 2021-02-04
Payer: COMMERCIAL

## 2021-02-04 DIAGNOSIS — J44.1 COPD EXACERBATION (HCC): ICD-10-CM

## 2021-02-04 PROCEDURE — 71046 X-RAY EXAM CHEST 2 VIEWS: CPT

## 2021-02-05 DIAGNOSIS — G20 PARKINSON'S DISEASE (HCC): ICD-10-CM

## 2021-02-06 DIAGNOSIS — G20 PARKINSON'S DISEASE (HCC): ICD-10-CM

## 2021-02-06 DIAGNOSIS — J45.40 MODERATE PERSISTENT EXTRINSIC ASTHMA WITHOUT COMPLICATION: ICD-10-CM

## 2021-02-08 ENCOUNTER — TELEPHONE (OUTPATIENT)
Dept: PULMONOLOGY | Facility: CLINIC | Age: 86
End: 2021-02-08

## 2021-02-08 NOTE — TELEPHONE ENCOUNTER
Hi x-ray looks totally fine  We did also want him to make an appt as he has not been seen since 4/2019  He is also requesting a refill on his inhaler which I will send for 1 month  Thanks

## 2021-02-20 ENCOUNTER — IMMUNIZATIONS (OUTPATIENT)
Dept: FAMILY MEDICINE CLINIC | Facility: HOSPITAL | Age: 86
End: 2021-02-20

## 2021-02-20 DIAGNOSIS — Z23 ENCOUNTER FOR IMMUNIZATION: Primary | ICD-10-CM

## 2021-02-20 PROCEDURE — 91301 SARS-COV-2 / COVID-19 MRNA VACCINE (MODERNA) 100 MCG: CPT | Performed by: PEDIATRICS

## 2021-02-20 PROCEDURE — 0012A SARS-COV-2 / COVID-19 MRNA VACCINE (MODERNA) 100 MCG: CPT | Performed by: PEDIATRICS

## 2021-02-22 DIAGNOSIS — I63.9 CEREBROVASCULAR ACCIDENT (CVA), UNSPECIFIED MECHANISM (HCC): ICD-10-CM

## 2021-02-22 RX ORDER — ATORVASTATIN CALCIUM 10 MG/1
TABLET, FILM COATED ORAL
Qty: 90 TABLET | Refills: 0 | Status: SHIPPED | OUTPATIENT
Start: 2021-02-22 | End: 2021-05-09

## 2021-02-24 ENCOUNTER — OFFICE VISIT (OUTPATIENT)
Dept: PULMONOLOGY | Facility: CLINIC | Age: 86
End: 2021-02-24
Payer: COMMERCIAL

## 2021-02-24 VITALS
DIASTOLIC BLOOD PRESSURE: 60 MMHG | BODY MASS INDEX: 29.23 KG/M2 | HEIGHT: 63 IN | HEART RATE: 95 BPM | SYSTOLIC BLOOD PRESSURE: 118 MMHG | OXYGEN SATURATION: 93 % | TEMPERATURE: 97.9 F | WEIGHT: 165 LBS

## 2021-02-24 DIAGNOSIS — Z79.52 LONG TERM (CURRENT) USE OF SYSTEMIC STEROIDS: ICD-10-CM

## 2021-02-24 DIAGNOSIS — G20 PARKINSON'S DISEASE (HCC): ICD-10-CM

## 2021-02-24 DIAGNOSIS — B44.81 ABPA (ALLERGIC BRONCHOPULMONARY ASPERGILLOSIS) (HCC): Primary | ICD-10-CM

## 2021-02-24 DIAGNOSIS — J41.8 MIXED SIMPLE AND MUCOPURULENT CHRONIC BRONCHITIS (HCC): ICD-10-CM

## 2021-02-24 DIAGNOSIS — J47.9 BRONCHIECTASIS WITHOUT COMPLICATION (HCC): ICD-10-CM

## 2021-02-24 DIAGNOSIS — R26.2 AMBULATORY DYSFUNCTION: ICD-10-CM

## 2021-02-24 PROCEDURE — 1036F TOBACCO NON-USER: CPT | Performed by: PHYSICIAN ASSISTANT

## 2021-02-24 PROCEDURE — 99214 OFFICE O/P EST MOD 30 MIN: CPT | Performed by: PHYSICIAN ASSISTANT

## 2021-02-24 PROCEDURE — 1160F RVW MEDS BY RX/DR IN RCRD: CPT | Performed by: PHYSICIAN ASSISTANT

## 2021-02-24 NOTE — PROGRESS NOTES
Assessment:    1  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) ([facility] [address] )  Respiratory Therapy Supplies (Flutter) VON   2  Bronchiectasis without complication ([facility] [address] )  Respiratory Therapy Supplies (Flutter) VON   3  Mixed simple and mucopurulent chronic bronchitis ([facility])  Respiratory Therapy Supplies (Flutter) VON   4  Parkinson's disease ([facility] [address] )     5  Long term (current) use of systemic steroids     6  Ambulatory dysfunction           Plan:   Patient presenting for follow-up with his daughter present   Overall, it seems that his breathing has been stable although there has been gradual deconditioning   Continue Advair and Spiriva, Singulair 10 mg tablet at bedtime   Prednisone 10 mg daily  Albuterol may be used via nebulizer up to 4 times daily as needed  Albuterol rescue inhaler may be used every 6 hours as needed for wheezing  Advised to continue using the incentive spirometer  Discussed further modalities for airway clearance measures in the setting of bronchiectasis  Patient does have some benefit with Mucinex  Discussed benefits of percussive vest therapy and flutter valve  His daughter is going to work on manual chest PT with him and they would like to try the flutter valve  Follow up with Nephrology as scheduled   Patient received full COVID vaccine series without adverse reaction  Reviewed most recent CT of the chest done 10/26/2020 during an ED visit showing stable bronchiectasis with evidence of some mucus plugging  There was also a chest x-ray done 02/04/2021 showing stable multifocal bronchiectasis  No evidence of acute pulmonary disease  Subjective:     Patient ID: Leighton Calvillo is a 81 y o  male  Chief Complaint:  Gen Negro is a pleasant 81-year-old male former smoker with past medical history including bronchiectasis, allergic asthma/ABPA with IgE level greater than 5000, Parkinson's disease with dementia, ambulatory dysfunction presenting for follow-up    Patient has not been seen in over 1 year   It seems overall his symptoms are without significant change  There has been gradual progression of his Parkinson's associated with physical deconditioning and increased fatigue  He does have frequent cough and difficulty bringing up his mucus  The following portions of the patient's history were reviewed in this encounter and updated as appropriate: Past medical, social, surgical, family, allergies    Review of Systems   Constitutional: Positive for fatigue  HENT: Positive for congestion  Respiratory: Positive for cough and shortness of breath  Neurological: Positive for weakness  Psychiatric/Behavioral: Positive for confusion  All other systems reviewed and are negative  Objective:  Vitals:    02/24/21 1047   BP: 118/60   Pulse: 95   Temp: 97 9 °F (36 6 °C)   SpO2: 93%   Weight: 74 8 kg (165 lb)   Height: 5' 3" (1 6 m)       Physical Exam  Vitals signs and nursing note reviewed  Constitutional:       General: He is not in acute distress  Appearance: He is well-developed  He is not diaphoretic  HENT:      Head: Normocephalic and atraumatic  Right Ear: External ear normal       Left Ear: External ear normal       Nose: Nose normal    Eyes:      General: No scleral icterus  Right eye: No discharge  Left eye: No discharge  Extraocular Movements: Extraocular movements intact  Conjunctiva/sclera: Conjunctivae normal    Neck:      Musculoskeletal: Normal range of motion and neck supple  Vascular: No JVD  Trachea: No tracheal deviation  Cardiovascular:      Rate and Rhythm: Normal rate and regular rhythm  Heart sounds: Normal heart sounds  No murmur  No friction rub  No gallop  Pulmonary:      Effort: Pulmonary effort is normal  No respiratory distress  Breath sounds: No stridor  Comments: Some coarse breath sounds are noted at the bases  Abdominal:      General: Bowel sounds are normal  There is no distension  Palpations: Abdomen is soft  Tenderness: There is no abdominal tenderness  There is no guarding  Musculoskeletal:         General: No tenderness or deformity  Comments: Ambulates with walker   Skin:     General: Skin is warm and dry  Coloration: Skin is not pale  Findings: No erythema or rash  Neurological:      Mental Status: He is alert  Cranial Nerves: No cranial nerve deficit  Motor: Weakness present  No abnormal muscle tone  Comments: Parkinsonian features noted  Psychiatric:         Behavior: Behavior normal          Thought Content:  Thought content normal          Judgment: Judgment normal          Lab Review:   Orders Only on 01/06/2021   Component Date Value    Right Eye Diabetic Retin* 01/06/2021 None     Left Eye Diabetic Retino* 01/06/2021 None

## 2021-03-03 DIAGNOSIS — J40 BRONCHITIS: ICD-10-CM

## 2021-03-03 DIAGNOSIS — J42 CHRONIC BRONCHITIS, UNSPECIFIED CHRONIC BRONCHITIS TYPE (HCC): ICD-10-CM

## 2021-03-03 RX ORDER — PREDNISONE 10 MG/1
10 TABLET ORAL DAILY
Qty: 30 TABLET | Refills: 3 | Status: SHIPPED | OUTPATIENT
Start: 2021-03-03 | End: 2021-04-06 | Stop reason: CLARIF

## 2021-03-03 RX ORDER — ALBUTEROL SULFATE 90 UG/1
AEROSOL, METERED RESPIRATORY (INHALATION)
Qty: 18 INHALER | Refills: 3 | Status: SHIPPED | OUTPATIENT
Start: 2021-03-03 | End: 2021-06-04

## 2021-03-08 ENCOUNTER — TELEPHONE (OUTPATIENT)
Dept: PULMONOLOGY | Facility: CLINIC | Age: 86
End: 2021-03-08

## 2021-03-09 DIAGNOSIS — J47.9 BRONCHIECTASIS WITHOUT COMPLICATION (HCC): ICD-10-CM

## 2021-03-09 DIAGNOSIS — J41.8 MIXED SIMPLE AND MUCOPURULENT CHRONIC BRONCHITIS (HCC): ICD-10-CM

## 2021-03-09 DIAGNOSIS — B44.81 ABPA (ALLERGIC BRONCHOPULMONARY ASPERGILLOSIS) (HCC): ICD-10-CM

## 2021-03-17 DIAGNOSIS — J42 CHRONIC BRONCHITIS, UNSPECIFIED CHRONIC BRONCHITIS TYPE (HCC): ICD-10-CM

## 2021-03-17 RX ORDER — ALBUTEROL SULFATE 2.5 MG/3ML
SOLUTION RESPIRATORY (INHALATION)
Qty: 75 ML | Refills: 5 | Status: SHIPPED | OUTPATIENT
Start: 2021-03-17 | End: 2021-05-27 | Stop reason: SDUPTHER

## 2021-03-26 DIAGNOSIS — H60.92 OTITIS EXTERNA OF LEFT EAR, UNSPECIFIED CHRONICITY, UNSPECIFIED TYPE: ICD-10-CM

## 2021-03-29 RX ORDER — NEOMYCIN SULFATE, POLYMYXIN B SULFATE, HYDROCORTISONE 3.5; 10000; 1 MG/ML; [USP'U]/ML; MG/ML
SOLUTION/ DROPS AURICULAR (OTIC)
Qty: 10 ML | Refills: 0 | Status: SHIPPED | OUTPATIENT
Start: 2021-03-29 | End: 2021-04-06 | Stop reason: CLARIF

## 2021-03-30 ENCOUNTER — APPOINTMENT (OUTPATIENT)
Dept: LAB | Facility: CLINIC | Age: 86
End: 2021-03-30
Payer: COMMERCIAL

## 2021-03-30 DIAGNOSIS — E11.9 TYPE 2 DIABETES MELLITUS WITHOUT COMPLICATION, WITHOUT LONG-TERM CURRENT USE OF INSULIN (HCC): ICD-10-CM

## 2021-03-30 DIAGNOSIS — E78.2 MIXED HYPERLIPIDEMIA: ICD-10-CM

## 2021-03-30 DIAGNOSIS — I10 ESSENTIAL HYPERTENSION: ICD-10-CM

## 2021-03-30 DIAGNOSIS — E03.8 SUBCLINICAL HYPOTHYROIDISM: ICD-10-CM

## 2021-03-30 LAB
ALBUMIN SERPL BCP-MCNC: 3.5 G/DL (ref 3.5–5)
ALP SERPL-CCNC: 87 U/L (ref 46–116)
ALT SERPL W P-5'-P-CCNC: 25 U/L (ref 12–78)
ANION GAP SERPL CALCULATED.3IONS-SCNC: 4 MMOL/L (ref 4–13)
AST SERPL W P-5'-P-CCNC: 21 U/L (ref 5–45)
BASOPHILS # BLD AUTO: 0.04 THOUSANDS/ΜL (ref 0–0.1)
BASOPHILS NFR BLD AUTO: 0 % (ref 0–1)
BILIRUB SERPL-MCNC: 0.5 MG/DL (ref 0.2–1)
BUN SERPL-MCNC: 28 MG/DL (ref 5–25)
CALCIUM SERPL-MCNC: 9.3 MG/DL (ref 8.3–10.1)
CHLORIDE SERPL-SCNC: 110 MMOL/L (ref 100–108)
CHOLEST SERPL-MCNC: 206 MG/DL (ref 50–200)
CO2 SERPL-SCNC: 27 MMOL/L (ref 21–32)
CREAT SERPL-MCNC: 1.26 MG/DL (ref 0.6–1.3)
EOSINOPHIL # BLD AUTO: 0.36 THOUSAND/ΜL (ref 0–0.61)
EOSINOPHIL NFR BLD AUTO: 4 % (ref 0–6)
ERYTHROCYTE [DISTWIDTH] IN BLOOD BY AUTOMATED COUNT: 13 % (ref 11.6–15.1)
EST. AVERAGE GLUCOSE BLD GHB EST-MCNC: 140 MG/DL
GFR SERPL CREATININE-BSD FRML MDRD: 52 ML/MIN/1.73SQ M
GLUCOSE P FAST SERPL-MCNC: 87 MG/DL (ref 65–99)
HBA1C MFR BLD: 6.5 %
HCT VFR BLD AUTO: 43.7 % (ref 36.5–49.3)
HDLC SERPL-MCNC: 112 MG/DL
HGB BLD-MCNC: 14.3 G/DL (ref 12–17)
IMM GRANULOCYTES # BLD AUTO: 0.05 THOUSAND/UL (ref 0–0.2)
IMM GRANULOCYTES NFR BLD AUTO: 1 % (ref 0–2)
LDLC SERPL CALC-MCNC: 70 MG/DL (ref 0–100)
LYMPHOCYTES # BLD AUTO: 2 THOUSANDS/ΜL (ref 0.6–4.47)
LYMPHOCYTES NFR BLD AUTO: 21 % (ref 14–44)
MCH RBC QN AUTO: 31.2 PG (ref 26.8–34.3)
MCHC RBC AUTO-ENTMCNC: 32.7 G/DL (ref 31.4–37.4)
MCV RBC AUTO: 95 FL (ref 82–98)
MONOCYTES # BLD AUTO: 0.9 THOUSAND/ΜL (ref 0.17–1.22)
MONOCYTES NFR BLD AUTO: 9 % (ref 4–12)
NEUTROPHILS # BLD AUTO: 6.24 THOUSANDS/ΜL (ref 1.85–7.62)
NEUTS SEG NFR BLD AUTO: 65 % (ref 43–75)
NONHDLC SERPL-MCNC: 94 MG/DL
NRBC BLD AUTO-RTO: 0 /100 WBCS
PLATELET # BLD AUTO: 203 THOUSANDS/UL (ref 149–390)
PMV BLD AUTO: 9.6 FL (ref 8.9–12.7)
POTASSIUM SERPL-SCNC: 4.4 MMOL/L (ref 3.5–5.3)
PROT SERPL-MCNC: 7.1 G/DL (ref 6.4–8.2)
RBC # BLD AUTO: 4.59 MILLION/UL (ref 3.88–5.62)
SODIUM SERPL-SCNC: 141 MMOL/L (ref 136–145)
T4 FREE SERPL-MCNC: 0.83 NG/DL (ref 0.76–1.46)
TRIGL SERPL-MCNC: 118 MG/DL
TSH SERPL DL<=0.05 MIU/L-ACNC: 5.23 UIU/ML (ref 0.36–3.74)
WBC # BLD AUTO: 9.59 THOUSAND/UL (ref 4.31–10.16)

## 2021-03-30 PROCEDURE — 84443 ASSAY THYROID STIM HORMONE: CPT

## 2021-03-30 PROCEDURE — 84439 ASSAY OF FREE THYROXINE: CPT

## 2021-03-30 PROCEDURE — 83036 HEMOGLOBIN GLYCOSYLATED A1C: CPT

## 2021-03-30 PROCEDURE — 80053 COMPREHEN METABOLIC PANEL: CPT

## 2021-03-30 PROCEDURE — 85025 COMPLETE CBC W/AUTO DIFF WBC: CPT

## 2021-03-30 PROCEDURE — 36415 COLL VENOUS BLD VENIPUNCTURE: CPT

## 2021-03-30 PROCEDURE — 80061 LIPID PANEL: CPT

## 2021-04-06 ENCOUNTER — TELEPHONE (OUTPATIENT)
Dept: INTERNAL MEDICINE CLINIC | Facility: CLINIC | Age: 86
End: 2021-04-06

## 2021-04-06 ENCOUNTER — APPOINTMENT (OUTPATIENT)
Dept: RADIOLOGY | Facility: CLINIC | Age: 86
End: 2021-04-06
Payer: COMMERCIAL

## 2021-04-06 ENCOUNTER — OFFICE VISIT (OUTPATIENT)
Dept: INTERNAL MEDICINE CLINIC | Facility: CLINIC | Age: 86
End: 2021-04-06
Payer: COMMERCIAL

## 2021-04-06 ENCOUNTER — APPOINTMENT (OUTPATIENT)
Dept: LAB | Facility: CLINIC | Age: 86
End: 2021-04-06
Payer: COMMERCIAL

## 2021-04-06 VITALS
TEMPERATURE: 97.2 F | BODY MASS INDEX: 32.64 KG/M2 | HEART RATE: 76 BPM | SYSTOLIC BLOOD PRESSURE: 126 MMHG | DIASTOLIC BLOOD PRESSURE: 60 MMHG | WEIGHT: 184.2 LBS | HEIGHT: 63 IN

## 2021-04-06 DIAGNOSIS — E78.2 MIXED HYPERLIPIDEMIA: ICD-10-CM

## 2021-04-06 DIAGNOSIS — G20 DEMENTIA DUE TO PARKINSON'S DISEASE WITH BEHAVIORAL DISTURBANCE (HCC): ICD-10-CM

## 2021-04-06 DIAGNOSIS — R05.9 COUGH: ICD-10-CM

## 2021-04-06 DIAGNOSIS — G20 PARKINSON'S DISEASE (HCC): Primary | ICD-10-CM

## 2021-04-06 DIAGNOSIS — E11.9 TYPE 2 DIABETES MELLITUS WITHOUT COMPLICATION, WITHOUT LONG-TERM CURRENT USE OF INSULIN (HCC): ICD-10-CM

## 2021-04-06 DIAGNOSIS — F39 MOOD DISORDER (HCC): ICD-10-CM

## 2021-04-06 DIAGNOSIS — F02.81 DEMENTIA DUE TO PARKINSON'S DISEASE WITH BEHAVIORAL DISTURBANCE (HCC): ICD-10-CM

## 2021-04-06 DIAGNOSIS — I73.9 PERIPHERAL VASCULAR DISEASE (HCC): ICD-10-CM

## 2021-04-06 DIAGNOSIS — R01.1 MURMUR: ICD-10-CM

## 2021-04-06 DIAGNOSIS — S06.5X9A SUBDURAL HEMATOMA (HCC): ICD-10-CM

## 2021-04-06 DIAGNOSIS — L30.4 INTERTRIGO: ICD-10-CM

## 2021-04-06 DIAGNOSIS — J45.909 UNCOMPLICATED ASTHMA, UNSPECIFIED ASTHMA SEVERITY, UNSPECIFIED WHETHER PERSISTENT: ICD-10-CM

## 2021-04-06 DIAGNOSIS — D32.9 MENINGIOMA (HCC): ICD-10-CM

## 2021-04-06 PROBLEM — S06.5XAA SUBDURAL HEMATOMA: Status: ACTIVE | Noted: 2021-04-06

## 2021-04-06 LAB
ALBUMIN SERPL BCP-MCNC: 3.7 G/DL (ref 3.5–5)
ALP SERPL-CCNC: 96 U/L (ref 46–116)
ALT SERPL W P-5'-P-CCNC: 20 U/L (ref 12–78)
ANION GAP SERPL CALCULATED.3IONS-SCNC: 6 MMOL/L (ref 4–13)
AST SERPL W P-5'-P-CCNC: 29 U/L (ref 5–45)
BASOPHILS # BLD AUTO: 0.04 THOUSANDS/ΜL (ref 0–0.1)
BASOPHILS NFR BLD AUTO: 0 % (ref 0–1)
BILIRUB SERPL-MCNC: 0.34 MG/DL (ref 0.2–1)
BUN SERPL-MCNC: 28 MG/DL (ref 5–25)
CALCIUM SERPL-MCNC: 9.6 MG/DL (ref 8.3–10.1)
CHLORIDE SERPL-SCNC: 110 MMOL/L (ref 100–108)
CO2 SERPL-SCNC: 25 MMOL/L (ref 21–32)
CREAT SERPL-MCNC: 1.28 MG/DL (ref 0.6–1.3)
EOSINOPHIL # BLD AUTO: 0.15 THOUSAND/ΜL (ref 0–0.61)
EOSINOPHIL NFR BLD AUTO: 1 % (ref 0–6)
ERYTHROCYTE [DISTWIDTH] IN BLOOD BY AUTOMATED COUNT: 13.2 % (ref 11.6–15.1)
GFR SERPL CREATININE-BSD FRML MDRD: 51 ML/MIN/1.73SQ M
GLUCOSE SERPL-MCNC: 83 MG/DL (ref 65–140)
HCT VFR BLD AUTO: 45.3 % (ref 36.5–49.3)
HGB BLD-MCNC: 14.4 G/DL (ref 12–17)
IMM GRANULOCYTES # BLD AUTO: 0.07 THOUSAND/UL (ref 0–0.2)
IMM GRANULOCYTES NFR BLD AUTO: 1 % (ref 0–2)
LYMPHOCYTES # BLD AUTO: 1.24 THOUSANDS/ΜL (ref 0.6–4.47)
LYMPHOCYTES NFR BLD AUTO: 11 % (ref 14–44)
MCH RBC QN AUTO: 31.3 PG (ref 26.8–34.3)
MCHC RBC AUTO-ENTMCNC: 31.8 G/DL (ref 31.4–37.4)
MCV RBC AUTO: 99 FL (ref 82–98)
MONOCYTES # BLD AUTO: 0.75 THOUSAND/ΜL (ref 0.17–1.22)
MONOCYTES NFR BLD AUTO: 7 % (ref 4–12)
NEUTROPHILS # BLD AUTO: 9.33 THOUSANDS/ΜL (ref 1.85–7.62)
NEUTS SEG NFR BLD AUTO: 80 % (ref 43–75)
NRBC BLD AUTO-RTO: 0 /100 WBCS
NT-PROBNP SERPL-MCNC: 236 PG/ML
PLATELET # BLD AUTO: 235 THOUSANDS/UL (ref 149–390)
PMV BLD AUTO: 9.7 FL (ref 8.9–12.7)
POTASSIUM SERPL-SCNC: 4.7 MMOL/L (ref 3.5–5.3)
PROT SERPL-MCNC: 7.5 G/DL (ref 6.4–8.2)
RBC # BLD AUTO: 4.6 MILLION/UL (ref 3.88–5.62)
SODIUM SERPL-SCNC: 141 MMOL/L (ref 136–145)
WBC # BLD AUTO: 11.58 THOUSAND/UL (ref 4.31–10.16)

## 2021-04-06 PROCEDURE — 85025 COMPLETE CBC W/AUTO DIFF WBC: CPT

## 2021-04-06 PROCEDURE — 1170F FXNL STATUS ASSESSED: CPT | Performed by: NURSE PRACTITIONER

## 2021-04-06 PROCEDURE — 71046 X-RAY EXAM CHEST 2 VIEWS: CPT

## 2021-04-06 PROCEDURE — 99214 OFFICE O/P EST MOD 30 MIN: CPT | Performed by: NURSE PRACTITIONER

## 2021-04-06 PROCEDURE — 36415 COLL VENOUS BLD VENIPUNCTURE: CPT

## 2021-04-06 PROCEDURE — 83880 ASSAY OF NATRIURETIC PEPTIDE: CPT

## 2021-04-06 PROCEDURE — 1125F AMNT PAIN NOTED PAIN PRSNT: CPT | Performed by: NURSE PRACTITIONER

## 2021-04-06 PROCEDURE — G0439 PPPS, SUBSEQ VISIT: HCPCS | Performed by: NURSE PRACTITIONER

## 2021-04-06 PROCEDURE — 3725F SCREEN DEPRESSION PERFORMED: CPT | Performed by: NURSE PRACTITIONER

## 2021-04-06 PROCEDURE — 3288F FALL RISK ASSESSMENT DOCD: CPT | Performed by: NURSE PRACTITIONER

## 2021-04-06 PROCEDURE — 1160F RVW MEDS BY RX/DR IN RCRD: CPT | Performed by: NURSE PRACTITIONER

## 2021-04-06 PROCEDURE — 80053 COMPREHEN METABOLIC PANEL: CPT

## 2021-04-06 PROCEDURE — 1036F TOBACCO NON-USER: CPT | Performed by: NURSE PRACTITIONER

## 2021-04-06 RX ORDER — MONTELUKAST SODIUM 10 MG/1
10 TABLET ORAL
Qty: 90 TABLET | Refills: 3
Start: 2021-04-06 | End: 2021-09-27

## 2021-04-06 RX ORDER — NYSTATIN 100000 [USP'U]/G
1 POWDER TOPICAL 2 TIMES DAILY
Qty: 30 G | Refills: 1 | Status: SHIPPED | OUTPATIENT
Start: 2021-04-06 | End: 2021-04-26

## 2021-04-06 NOTE — PROGRESS NOTES
Assessment and Plan:     Problem List Items Addressed This Visit     None      Visit Diagnoses     Intertrigo            BMI Counseling: Body mass index is 32 63 kg/m²  The BMI is above normal  Nutrition recommendations include decreasing portion sizes and decreasing fast food intake  Exercise recommendations include exercising 3-5 times per week  No pharmacotherapy was ordered  Falls Plan of Care: balance, strength, and gait training instructions were provided  Home safety education provided  Preventive health issues were discussed with patient, and age appropriate screening tests were ordered as noted in patient's After Visit Summary  Personalized health advice and appropriate referrals for health education or preventive services given if needed, as noted in patient's After Visit Summary       History of Present Illness:     Patient presents for Medicare Annual Wellness visit    Patient Care Team:  Jennifer Hutchinson MD as PCP - Becky Denney MD as PCP - [address] [place] [address] ([facility])  MD Jennifer Elizabeth MD Rich Ferries, MD Cristela Gibbons, PA-C  Ruy Oconnell, PAGOMEZ Rosa MD     Problem List:     Patient Active Problem List   Diagnosis    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) [facility]    Allergic asthma    Allergic rhinitis    Aortic regurgitation    Bronchiectasis with acute lower respiratory infection ([facility] )    Long term (current) use of systemic steroids    Hyperlipidemia    Hypertension    Parkinson's disease ([facility]    Neurologic gait dysfunction    Type 2 diabetes mellitus ([address] )    Peripheral vascular disease ([facility]    Chronic obstructive pulmonary disease ([facility]    Cerebrovascular disease    Carotid artery disease ([facility]    Varicose 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    H/O burning pain in leg    Ambulatory dysfunction    Acute kidney injury ([address]    Essential hypertension    Gross hematuria    Meningioma ([address]    Benign neoplasm of supratentorial region of brain ([facility] )    Subclinical hypothyroidism    Dementia due to Parkinson's disease with behavioral disturbance ([facility] )    Community acquired pneumonia of right lower lobe of lung    Fall      Past Medical and Surgical History:     Past Medical History:   Diagnosis Date    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) [facility]     Allergic rhinitis     last assessed [id number]    Aortic regurgitation     Arm laceration     Asthma     Bronchitis, chronic obstructive, with exacerbation ([place] [address] )     last assessed [id number]    Candidal intertrigo     Chronic obstructive lung disease ([place] [address] )     last assessed [id number]    COPD (chronic obstructive pulmonary disease) ([facility]     Coronary artery disease     carotid stents    Cough     CVA (cerebral vascular accident) ([place] [address] )     Dermatitis     Diabetes mellitus type 2, uncomplicated ([place] [address] )     controlled    Dysthymic disorder     Fatigue     Hyperlipidemia     Hypertension     Neurologic gait dysfunction     Parkinson's disease ([facility] )     Pneumonia     PVD (peripheral vascular disease) ([place] [address] )     Seborrheic keratosis     TIA (transient ischemic attack)     Tinea corporis     Tinea pedis of both feet     Tremor      Past Surgical History:   Procedure Laterality Date    NASAL SINUS SURGERY      MN BRONCHOSCOPY,DIAGNOSTIC N/A 7/27/2016    Procedure: BRONCHOSCOPY;  Surgeon: Glory Mcburney, MD;  [facility];   Service: Pulmonary      Family History:     Family History   Problem Relation Age of Onset    No Known Problems Mother         Old Age   [facility] COPD Sister    [facility] Lung cancer Sister     Asthma Family       Social History:     E-Cigarette/Vaping    E-Cigarette Use Never User      E-Cigarette/Vaping Substances    Nicotine No     THC No     CBD No     Flavoring No     Other No     Unknown No      Social History     Socioeconomic History    Marital status: /Civil Union     Spouse name: None    Number of children: None    Years of education: None    Highest education level: None   Occupational History    Occupation: retired   Social Needs    Financial resource strain: None    Food insecurity     Worry: None     Inability: None    Transportation needs     Medical: None     Non-medical: None   Tobacco Use    Smoking status: Former Smoker     Packs/day: 1 00     Years: 3 00     Pack years: 3 00     Types: Cigarettes     Start date:      Quit date:      Years since quittin 2    Smokeless tobacco: Never Used   Substance and Sexual Activity    Alcohol use: Not Currently    Drug use: No    Sexual activity: Not Currently   Lifestyle    Physical activity     Days per week: 0 days     Minutes per session: 0 min    Stress: Not at all   Relationships    Social connections     Talks on phone: None     Gets together: None     Attends Bahai service: None     Active member of club or organization: None     Attends meetings of clubs or organizations: None     Relationship status: None    Intimate partner violence     Fear of current or ex partner: None     Emotionally abused: None     Physically abused: None     Forced sexual activity: None   Other Topics Concern    None   Social History Narrative    Lives independently with spouse    No advance directives      Medications and Allergies:     Current Outpatient Medications   Medication Sig Dispense Refill    acetaminophen (TYLENOL) 325 mg tablet Take 650 mg by mouth every 6 (six) hours as needed for mild pain      albuterol (2 5 mg/3 mL) 0 083 % nebulizer solution TAKE 1 VIAL (2 5 MG TOTAL) BY NEBULIZATION EVERY 4 (FOUR) HOURS AS NEEDED FOR WHEEZING 75 mL 5    albuterol (PROVENTIL HFA,VENTOLIN HFA) 90 mcg/act inhaler TAKE 2 PUFFS BY MOUTH EVERY 6 HOURS AS NEEDED FOR WHEEZE 18 Inhaler 3    atorvastatin (LIPITOR) 10 mg tablet TAKE 1 TABLET BY MOUTH EVERY DAY 90 tablet 0    carbidopa-levodopa (SINEMET)  mg per tablet TAKE 1 TABLET BY MOUTH THREE TIMES A  tablet 1    finasteride (PROSCAR) 5 mg tablet Take 1 tablet (5 mg total) by mouth every morning 90 tablet 0    irbesartan-hydrochlorothiazide (AVALIDE) 150-12 5 MG per tablet TAKE 1 TABLET BY MOUTH EVERY DAY 90 tablet 2    levalbuterol (XOPENEX) 1 25 mg/0 5 mL nebulizer solution Take 0 5 mL (1 25 mg total) by nebulization 3 (three) times a day      montelukast (SINGULAIR) 10 mg tablet Take 1 tablet (10 mg total) by mouth daily at bedtime (Patient taking differently: Take 10 mg by mouth 3 (three) times a day ) 90 tablet 3    nystatin (MYCOSTATIN) cream Apply to intertriginous areas 2-3 times daily as needed 80 g 3    nystatin (MYCOSTATIN) powder APPLY TO AFFECTED AREA TWICE A DAY 30 g 1    pramipexole (MIRAPEX) 0 5 mg tablet TAKE 1 TABLET (0 5 MG TOTAL) BY MOUTH 3 (THREE) TIMES A  tablet 0    predniSONE 10 mg tablet Take 1 tablet (10 mg total) by mouth daily 30 tablet 0    QUEtiapine (SEROquel) 25 mg tablet TAKE 1 TABLET BY MOUTH DAILY AT BEDTIME 90 tablet 1    Respiratory Therapy Supplies (Flutter) VON Use daily Use flutter valve at least three times daily and whenever needed for congestion 1 each 0    rivastigmine (EXELON) 4 5 MG capsule TAKE 1 CAPSULE BY MOUTH TWICE DAILY 180 capsule 1    tiotropium (Spiriva HandiHaler) 18 mcg inhalation capsule Place 1 capsule (18 mcg total) into inhaler and inhale daily 90 capsule 1    triamcinolone (KENALOG) 0 5 % cream APPLY TO AFFECTED AREA(S) OF THE RASH TWICE DAILY AS NEEDED 30 g 0    Wixela Inhub 250-50 MCG/DOSE inhaler INAHLE1 PUFF BY MOUTH TWICE DAILY *RINSE MOUTH AFTER USING 1 Inhaler 0    finasteride (PROSCAR) 5 mg tablet Take 1 tablet (5 mg total) by mouth daily (Patient not taking: Reported on 4/6/2021) 90 tablet 3    neomycin-polymyxin-hydrocortisone (CORTISPORIN) 1 % SOLN INSTILL 3 DROPS TO AFFECTED EAR(S) THREE TIMES A DAY FOR 5 DAYS  (Patient not taking: Reported on 4/6/2021) 10 mL 0    predniSONE 10 mg tablet Take 1 tablet (10 mg total) by mouth daily (Patient not taking: Reported on 4/6/2021) 30 tablet 3     No current facility-administered medications for this visit  Allergies   Allergen Reactions    Penicillins Syncope      Immunizations:     Immunization History   Administered Date(s) Administered    INFLUENZA 11/19/2007, 09/22/2009, 12/02/2011, 10/11/2013, 10/23/2015, 11/21/2017    Influenza Split High Dose Preservative Free IM 10/21/2014, 10/23/2015, 11/21/2017    Influenza, high dose seasonal 0 7 mL 09/26/2019, 11/09/2020    Influenza, seasonal, injectable 11/19/2007, 11/10/2008, 09/22/2009, 12/02/2011, 10/11/2013    Pneumococcal Conjugate 13-Valent 04/27/2016    Pneumococcal Polysaccharide PPV23 1936, 06/09/2017    SARS-CoV-2 / COVID-19 mRNA IM ([place]) 01/25/2021, 02/20/2021    Tdap 1935, 04/27/2016    Zoster 1935, 09/23/2015      Health Maintenance: There are no preventive care reminders to display for this patient  There are no preventive care reminders to display for this patient  Medicare Health Risk Assessment:     /60   Pulse 76   Temp (!) 97 2 °F (36 2 °C)   Ht 5' 3" (1 6 m)   Wt 83 6 kg (184 lb 3 2 oz)   BMI 32 63 kg/m²      Zach is here for his Subsequent Wellness visit  Health Risk Assessment:   Patient rates overall health as fair  Patient feels that their physical health rating is slightly worse  Patient is satisfied with their life  Eyesight was rated as slightly worse  Hearing was rated as same  Patient feels that their emotional and mental health rating is same  Patients states they are sometimes angry  Patient states they are often unusually tired/fatigued  Pain experienced in the last 7 days has been some  Patient's pain rating has been 6/10  Patient states that he has experienced no weight loss or gain in last 6 months  Depression Screening:   PHQ-2 Score: 1      Fall Risk Screening:    In the past year, patient has experienced: history of falling in past year    Number of falls: 2 or more  Injured during fall?: Yes    Feels unsteady when standing or walking?: Yes    Worried about falling?: Yes      Home Safety:  Patient has trouble with stairs inside or outside of their home  Patient has working smoke alarms and has working carbon monoxide detector  Home safety hazards include: none  Nutrition:   Current diet is Regular  Medications:   Patient is currently taking over-the-counter supplements  OTC medications include: see medication list  Patient is not able to manage medications  Activities of Daily Living (ADLs)/Instrumental Activities of Daily Living (IADLs):   Walk and transfer into and out of bed and chair?: No  Dress and groom yourself?: No    Bathe or shower yourself?: Yes    Feed yourself?  No  Do your laundry/housekeeping?: No  Manage your money, pay your bills and track your expenses?: No  Make your own meals?: No    Do your own shopping?: No    Previous Hospitalizations:   Any hospitalizations or ED visits within the last 12 months?: No      Advance Care Planning:     Five wishes given: Yes      Cognitive Screening:   Provider or family/friend/caregiver concerned regarding cognition?: No    PREVENTIVE SCREENINGS      Cardiovascular Screening:    General: Screening Not Indicated and History Lipid Disorder      Diabetes Screening:     General: Screening Not Indicated and History Diabetes      Colorectal Cancer Screening:     General: Screening Not Indicated      Prostate Cancer Screening:    General: Screening Not Indicated      Osteoporosis Screening:    General: Screening Not Indicated      Abdominal Aortic Aneurysm (AAA) Screening:    Risk factors include: tobacco use        Lung Cancer Screening:     General: Screening Not Indicated      Hepatitis C Screening:    General: Screening Not Indicated    Screening, Brief Intervention, and Referral to Treatment (SBIRT)    Screening  Typical number of drinks in a day: 0  Typical number of drinks in a week: 0  Interpretation: Low risk drinking behavior        [place], CRNP

## 2021-04-06 NOTE — PATIENT INSTRUCTIONS
Weight gain of 19 lbs since 2/24 with abnormal lung exam concerning for chf   Obtain XRAY today and labs, ordered ECHO last one was 2015 with a noted hx of aortic stenosis  COPD with increased SOB and cough hx of bronchietasis- Obtain XRAY today  Start Mucinex  Use nebulizer 3-4 times a day  DM- HGA1C at goal  Limit snacks, sweets, treats  Subclinical hypothyroid- slight bump, continue to monitor labs    Hyperlipidemia- Labs at goal  Continue with current therapy    Parkinson's- Admits to progressive weakness  Use walker when ambulating  Continue with current therapy       Documentation completed by Isak Hart NP student   I personally interviewed and examined patient and agree with assessment and plan

## 2021-04-06 NOTE — PROGRESS NOTES
Assessment/Plan:    Patient Instructions   Weight gain of 19 lbs since 2/24 with abnormal lung exam concerning for chf   Obtain XRAY today and labs, ordered ECHO last one was 2015 with a noted hx of aortic stenosis  COPD with increased SOB and cough hx of bronchietasis- Obtain XRAY today  Start Mucinex  Use nebulizer 3-4 times a day  DM- HGA1C at goal  Limit snacks, sweets, treats  Subclinical hypothyroid- slight bump, continue to monitor labs    Hyperlipidemia- Labs at goal  Continue with current therapy    Parkinson's- Admits to progressive weakness  Use walker when ambulating  Continue with current therapy       Documentation completed by Jose A Ortiz NP student  I personally interviewed and examined patient and agree with assessment and plan                 Diagnoses and all orders for this visit:    Parkinson's disease ([address] )    Intertrigo  -     nystatin (MYCOSTATIN) powder; Apply 1 application topically 2 (two) times a day To affected area    Type 2 diabetes mellitus without complication, without long-term current use of insulin (HCC)  -     Hemoglobin A1C; Future    Mixed hyperlipidemia  -     CBC and differential; Future  -     Comprehensive metabolic panel; Future  -     Lipid panel; Future  -     TSH, 3rd generation with Free T4 reflex; Future    Cough  -     XR chest pa & lateral; Future  -     CBC and differential; Future  -     Comprehensive metabolic panel; Future  -     Basic metabolic panel; Future    Dementia due to Parkinson's disease with behavioral disturbance (HCC)    Mood disorder (HCC)    Peripheral vascular disease (HCC)    Meningioma (HCC)    Subdural hematoma (HCC)    Murmur  -     Echo complete with contrast if indicated; Future    Uncomplicated asthma, unspecified asthma severity, unspecified whether persistent  -     montelukast (SINGULAIR) 10 mg tablet; Take 1 tablet (10 mg total) by mouth daily at bedtime         Subjective:      Patient ID: Rooney Hatchet is a 81 y o  male    Patient presents today for a wellness visit accompanied by his 2 daughters  He reports having more difficulty with breathing than usual and an increased cough  He was also noted to have a 19 lb weight gain since his last visit on 2/24  His daughters report that the patient has been eating more breads and snacks recently  He sleeps in a hospital bed at night and does need the [facility] elevated for comfort  They report he has had numerous falls at home due to his balance issues  The patient reports also having difficulty finding the words he wants to say     No home sugars or bps            Current Outpatient Medications:     acetaminophen (TYLENOL) 325 mg tablet, Take 650 mg by mouth every 6 (six) hours as needed for mild pain, Disp: , Rfl:     albuterol (2 5 mg/3 mL) 0 083 % nebulizer solution, TAKE 1 VIAL (2 5 MG TOTAL) BY NEBULIZATION EVERY 4 (FOUR) HOURS AS NEEDED FOR WHEEZING, Disp: 75 mL, Rfl: 5    albuterol (PROVENTIL HFA,VENTOLIN HFA) 90 mcg/act inhaler, TAKE 2 PUFFS BY MOUTH EVERY 6 HOURS AS NEEDED FOR WHEEZE, Disp: 18 Inhaler, Rfl: 3    atorvastatin (LIPITOR) 10 mg tablet, TAKE 1 TABLET BY MOUTH EVERY DAY, Disp: 90 tablet, Rfl: 0    carbidopa-levodopa (SINEMET)  mg per tablet, TAKE 1 TABLET BY MOUTH THREE TIMES A DAY, Disp: 270 tablet, Rfl: 1    finasteride (PROSCAR) 5 mg tablet, Take 1 tablet (5 mg total) by mouth every morning, Disp: 90 tablet, Rfl: 0    irbesartan-hydrochlorothiazide (AVALIDE) 150-12 5 MG per tablet, TAKE 1 TABLET BY MOUTH EVERY DAY, Disp: 90 tablet, Rfl: 2    levalbuterol (XOPENEX) 1 25 mg/0 5 mL nebulizer solution, Take 0 5 mL (1 25 mg total) by nebulization 3 (three) times a day, Disp: , Rfl:     montelukast (SINGULAIR) 10 mg tablet, Take 1 tablet (10 mg total) by mouth daily at bedtime, Disp: 90 tablet, Rfl: 3    nystatin (MYCOSTATIN) cream, Apply to intertriginous areas 2-3 times daily as needed, Disp: 80 g, Rfl: 3    nystatin (MYCOSTATIN) powder, Apply 1 application topically 2 (two) times a day To affected area, Disp: 30 g, Rfl: 1    pramipexole (MIRAPEX) 0 5 mg tablet, TAKE 1 TABLET (0 5 MG TOTAL) BY MOUTH 3 (THREE) TIMES A DAY, Disp: 270 tablet, Rfl: 0    predniSONE 10 mg tablet, Take 1 tablet (10 mg total) by mouth daily, Disp: 30 tablet, Rfl: 0    QUEtiapine (SEROquel) 25 mg tablet, TAKE 1 TABLET BY MOUTH DAILY AT BEDTIME, Disp: 90 tablet, Rfl: 1    Respiratory Therapy Supplies (Flutter) VON, Use daily Use flutter valve at least three times daily and whenever needed for congestion, Disp: 1 each, Rfl: 0    rivastigmine (EXELON) 4 5 MG capsule, TAKE 1 CAPSULE BY MOUTH TWICE DAILY, Disp: 180 capsule, Rfl: 1    tiotropium (Spiriva HandiHaler) 18 mcg inhalation capsule, Place 1 capsule (18 mcg total) into inhaler and inhale daily, Disp: 90 capsule, Rfl: 1    triamcinolone (KENALOG) 0 5 % cream, APPLY TO AFFECTED AREA(S) OF THE RASH TWICE DAILY AS NEEDED, Disp: 30 g, Rfl: 0    Wixela Inhub 250-50 MCG/DOSE inhaler, INAHLE1 PUFF BY MOUTH TWICE DAILY *RINSE MOUTH AFTER USING, Disp: 1 Inhaler, Rfl: 0    Recent Results (from the past 1008 hour(s))   CBC and differential    Collection Time: 03/30/21  9:40 AM   Result Value Ref Range    WBC 9 59 4 31 - 10 16 Thousand/uL    RBC 4 59 3 88 - 5 62 Million/uL    Hemoglobin 14 3 12 0 - 17 0 g/dL    Hematocrit 43 7 36 5 - 49 3 %    MCV 95 82 - 98 fL    MCH 31 2 26 8 - 34 3 pg    MCHC 32 7 31 4 - 37 4 g/dL    RDW 13 0 11 6 - 15 1 %    MPV 9 6 8 9 - 12 7 fL    Platelets 354 226 - 686 Thousands/uL    nRBC 0 /100 WBCs    Neutrophils Relative 65 43 - 75 %    Immat GRANS % 1 0 - 2 %    Lymphocytes Relative 21 14 - 44 %    Monocytes Relative 9 4 - 12 %    Eosinophils Relative 4 0 - 6 %    Basophils Relative 0 0 - 1 %    Neutrophils Absolute 6 24 1 85 - 7 62 Thousands/µL    Immature Grans Absolute 0 05 0 00 - 0 20 Thousand/uL    Lymphocytes Absolute 2 00 0 60 - 4 47 Thousands/µL    Monocytes Absolute 0 90 0 17 - 1 22 Thousand/µL Eosinophils Absolute 0 36 0 00 - 0 61 Thousand/µL    Basophils Absolute 0 04 0 00 - 0 10 Thousands/µL   Comprehensive metabolic panel    Collection Time: 03/30/21  9:40 AM   Result Value Ref Range    Sodium 141 136 - 145 mmol/L    Potassium 4 4 3 5 - 5 3 mmol/L    Chloride 110 (H) 100 - 108 mmol/L    CO2 27 21 - 32 mmol/L    ANION GAP 4 4 - 13 mmol/L    BUN 28 (H) 5 - 25 mg/dL    Creatinine 1 26 0 60 - 1 30 mg/dL    Glucose, Fasting 87 65 - 99 mg/dL    Calcium 9 3 8 3 - 10 1 mg/dL    AST 21 5 - 45 U/L    ALT 25 12 - 78 U/L    Alkaline Phosphatase 87 46 - 116 U/L    Total Protein 7 1 6 4 - 8 2 g/dL    Albumin 3 5 3 5 - 5 0 g/dL    Total Bilirubin 0 50 0 20 - 1 00 mg/dL    eGFR 52 ml/min/1 73sq m   Lipid panel    Collection Time: 03/30/21  9:40 AM   Result Value Ref Range    Cholesterol 206 (H) 50 - 200 mg/dL    Triglycerides 118 <=150 mg/dL    HDL, Direct 112 >=40 mg/dL    LDL Calculated 70 0 - 100 mg/dL    Non-HDL-Chol (CHOL-HDL) 94 mg/dl   Hemoglobin A1C    Collection Time: 03/30/21  9:40 AM   Result Value Ref Range    Hemoglobin A1C 6 5 (H) Normal 3 8-5 6%; PreDiabetic 5 7-6 4%; Diabetic >=6 5%; Glycemic control for adults with diabetes <7 0% %     mg/dl   TSH, 3rd generation with Free T4 reflex    Collection Time: 03/30/21  9:40 AM   Result Value Ref Range    TSH 3RD GENERATON 5 230 (H) 0 358 - 3 740 uIU/mL   T4, free    Collection Time: 03/30/21  9:40 AM   Result Value Ref Range    Free T4 0 83 0 76 - 1 46 ng/dL       The following portions of the patient's history were reviewed and updated as appropriate: allergies, current medications, past family history, past medical history, past social history, past surgical history and problem list      Review of Systems   Constitutional: Positive for unexpected weight change  Negative for appetite change, chills, diaphoresis, fatigue and fever  HENT: Positive for rhinorrhea  Negative for postnasal drip and sneezing  Eyes: Negative for visual disturbance  Respiratory: Positive for cough and shortness of breath  Negative for chest tightness  Cardiovascular: Negative for chest pain, palpitations and leg swelling  Gastrointestinal: Negative for abdominal pain and blood in stool  Endocrine: Negative for cold intolerance, heat intolerance, polydipsia, polyphagia and polyuria  Genitourinary: Negative for difficulty urinating, dysuria, frequency and urgency  Musculoskeletal: Positive for gait problem  Negative for arthralgias and myalgias  Skin: Negative for rash and wound  Neurological: Positive for weakness  Negative for dizziness, light-headedness and headaches  Hematological: Negative for adenopathy  Psychiatric/Behavioral: Negative for confusion, dysphoric mood and sleep disturbance  The patient is not nervous/anxious  Objective:      /60   Pulse 76   Temp (!) 97 2 °F (36 2 °C)   Ht 5' 3" (1 6 m)   Wt 83 6 kg (184 lb 3 2 oz)   BMI 32 63 kg/m²        Physical Exam  Constitutional:       General: He is not in acute distress  Appearance: He is well-developed  He is not diaphoretic  HENT:      Head: Normocephalic and atraumatic  Nose: Nose normal    Eyes:      Conjunctiva/sclera: Conjunctivae normal       Pupils: Pupils are equal, round, and reactive to light  Neck:      Musculoskeletal: Normal range of motion and neck supple  Thyroid: No thyromegaly  Vascular: No JVD  Trachea: No tracheal deviation  Cardiovascular:      Rate and Rhythm: Normal rate and regular rhythm  Heart sounds: Murmur present  No friction rub  No gallop  Pulmonary:      Effort: Pulmonary effort is normal  No respiratory distress  Breath sounds: Rhonchi present  No wheezing or rales  Comments: Dull to percuss from base to 2/3 up lung fields BL  Abdominal:      General: Bowel sounds are normal  There is no distension  Palpations: Abdomen is soft  Tenderness: There is no abdominal tenderness  Musculoskeletal: Normal range of motion  Lymphadenopathy:      Cervical: No cervical adenopathy  Skin:     General: Skin is warm and dry  Findings: Bruising present  No rash  Comments: Several areas of ecchymosis on back from recent falls at home   Neurological:      Mental Status: He is alert and oriented to person, place, and time  Cranial Nerves: No cranial nerve deficit  Psychiatric:         Behavior: Behavior normal          Thought Content: Thought content normal          Judgment: Judgment normal      BMI Counseling: Body mass index is 32 63 kg/m²  The BMI is above normal  Nutrition recommendations include decreasing portion sizes, encouraging healthy choices of fruits and vegetables, consuming healthier snacks and moderation in carbohydrate intake  Exercise recommendations include exercising 3-5 times per week and strength training exercises  Falls Plan of Care: balance, strength, and gait training instructions were provided  Home safety education provided

## 2021-04-07 ENCOUNTER — TELEPHONE (OUTPATIENT)
Dept: INTERNAL MEDICINE CLINIC | Facility: CLINIC | Age: 86
End: 2021-04-07

## 2021-04-07 DIAGNOSIS — R05.9 COUGH: Primary | ICD-10-CM

## 2021-04-07 NOTE — TELEPHONE ENCOUNTER
CALLED DAUGHTER AND WAS NOTIFIED AND STATED SHE SCHEDULED  THE ECHO AND IS WONDERING DOES SHE NEED TO KEEP THIS APPT

## 2021-04-07 NOTE — TELEPHONE ENCOUNTER
----- Message from Des Hackett sent at 4/7/2021  7:35 AM EDT -----  His cxr was normal and his labs are ok not suggesting heart failure   Increase nebs to three times a day and add mucinex if not improving by later this week contact me

## 2021-04-11 DIAGNOSIS — J45.40 MODERATE PERSISTENT EXTRINSIC ASTHMA WITHOUT COMPLICATION: ICD-10-CM

## 2021-04-26 DIAGNOSIS — B44.81 ABPA (ALLERGIC BRONCHOPULMONARY ASPERGILLOSIS) (HCC): ICD-10-CM

## 2021-04-26 DIAGNOSIS — G20 PARKINSON'S DISEASE (HCC): ICD-10-CM

## 2021-04-26 DIAGNOSIS — L30.4 INTERTRIGO: ICD-10-CM

## 2021-04-26 RX ORDER — PRAMIPEXOLE DIHYDROCHLORIDE 0.5 MG/1
0.5 TABLET ORAL 3 TIMES DAILY
Qty: 270 TABLET | Refills: 0 | Status: SHIPPED | OUTPATIENT
Start: 2021-04-26 | End: 2021-07-15

## 2021-04-26 RX ORDER — NYSTATIN 100000 [USP'U]/G
POWDER TOPICAL
Qty: 30 G | Refills: 1 | Status: SHIPPED | OUTPATIENT
Start: 2021-04-26 | End: 2021-05-20

## 2021-04-26 RX ORDER — PREDNISONE 10 MG/1
10 TABLET ORAL DAILY
Qty: 30 TABLET | Refills: 0 | Status: SHIPPED | OUTPATIENT
Start: 2021-04-26 | End: 2021-05-17

## 2021-04-30 ENCOUNTER — HOSPITAL ENCOUNTER (OUTPATIENT)
Dept: NON INVASIVE DIAGNOSTICS | Facility: CLINIC | Age: 86
Discharge: HOME | End: 2021-04-30
Payer: COMMERCIAL

## 2021-04-30 DIAGNOSIS — R01.1 MURMUR: ICD-10-CM

## 2021-04-30 PROCEDURE — 93306 TTE W/DOPPLER COMPLETE: CPT

## 2021-04-30 PROCEDURE — 93306 TTE W/DOPPLER COMPLETE: CPT | Performed by: INTERNAL MEDICINE

## 2021-05-08 DIAGNOSIS — I63.9 CEREBROVASCULAR ACCIDENT (CVA), UNSPECIFIED MECHANISM (HCC): ICD-10-CM

## 2021-05-08 DIAGNOSIS — R33.9 URINARY RETENTION: ICD-10-CM

## 2021-05-08 DIAGNOSIS — J44.9 CHRONIC OBSTRUCTIVE PULMONARY DISEASE, UNSPECIFIED COPD TYPE (HCC): ICD-10-CM

## 2021-05-09 RX ORDER — ATORVASTATIN CALCIUM 10 MG/1
TABLET, FILM COATED ORAL
Qty: 90 TABLET | Refills: 0 | Status: SHIPPED | OUTPATIENT
Start: 2021-05-09 | End: 2021-05-27 | Stop reason: SDUPTHER

## 2021-05-09 RX ORDER — TIOTROPIUM BROMIDE 18 UG/1
CAPSULE ORAL; RESPIRATORY (INHALATION)
Qty: 90 CAPSULE | Refills: 3 | Status: SHIPPED | OUTPATIENT
Start: 2021-05-09 | End: 2022-01-01 | Stop reason: SDUPTHER

## 2021-05-11 RX ORDER — FINASTERIDE 5 MG/1
5 TABLET, FILM COATED ORAL EVERY MORNING
Qty: 90 TABLET | Refills: 0 | Status: SHIPPED | OUTPATIENT
Start: 2021-05-11 | End: 2021-07-28

## 2021-05-17 DIAGNOSIS — B44.81 ABPA (ALLERGIC BRONCHOPULMONARY ASPERGILLOSIS) (HCC): ICD-10-CM

## 2021-05-17 RX ORDER — PREDNISONE 10 MG/1
TABLET ORAL
Qty: 30 TABLET | Refills: 0 | Status: SHIPPED | OUTPATIENT
Start: 2021-05-17 | End: 2021-06-21

## 2021-05-20 DIAGNOSIS — L30.4 INTERTRIGO: ICD-10-CM

## 2021-05-20 RX ORDER — NYSTATIN 100000 [USP'U]/G
POWDER TOPICAL
Qty: 30 G | Refills: 1 | Status: SHIPPED | OUTPATIENT
Start: 2021-05-20 | End: 2021-06-10

## 2021-05-27 DIAGNOSIS — J42 CHRONIC BRONCHITIS, UNSPECIFIED CHRONIC BRONCHITIS TYPE (HCC): ICD-10-CM

## 2021-05-27 DIAGNOSIS — I63.9 CEREBROVASCULAR ACCIDENT (CVA), UNSPECIFIED MECHANISM (HCC): ICD-10-CM

## 2021-05-27 RX ORDER — ATORVASTATIN CALCIUM 10 MG/1
10 TABLET, FILM COATED ORAL DAILY
Qty: 90 TABLET | Refills: 1 | Status: SHIPPED | OUTPATIENT
Start: 2021-05-27 | End: 2022-02-01

## 2021-05-27 RX ORDER — ALBUTEROL SULFATE 2.5 MG/3ML
2.5 SOLUTION RESPIRATORY (INHALATION) EVERY 4 HOURS PRN
Qty: 75 ML | Refills: 1 | Status: SHIPPED | OUTPATIENT
Start: 2021-05-27 | End: 2021-06-27

## 2021-06-04 DIAGNOSIS — J42 CHRONIC BRONCHITIS, UNSPECIFIED CHRONIC BRONCHITIS TYPE (HCC): ICD-10-CM

## 2021-06-04 RX ORDER — ALBUTEROL SULFATE 90 UG/1
AEROSOL, METERED RESPIRATORY (INHALATION)
Qty: 1 G | Refills: 3 | Status: SHIPPED | OUTPATIENT
Start: 2021-06-04 | End: 2021-08-29

## 2021-06-08 DIAGNOSIS — G20 PARKINSON'S DISEASE (HCC): ICD-10-CM

## 2021-06-08 DIAGNOSIS — L30.4 INTERTRIGO: ICD-10-CM

## 2021-06-09 RX ORDER — RIVASTIGMINE TARTRATE 4.5 MG/1
CAPSULE ORAL
Qty: 180 CAPSULE | Refills: 1 | Status: SHIPPED | OUTPATIENT
Start: 2021-06-09 | End: 2021-07-09 | Stop reason: SDUPTHER

## 2021-06-10 RX ORDER — NYSTATIN 100000 [USP'U]/G
POWDER TOPICAL
Qty: 30 G | Refills: 1 | Status: SHIPPED | OUTPATIENT
Start: 2021-06-10 | End: 2021-07-15

## 2021-06-21 DIAGNOSIS — B44.81 ABPA (ALLERGIC BRONCHOPULMONARY ASPERGILLOSIS) (HCC): ICD-10-CM

## 2021-06-21 RX ORDER — PREDNISONE 10 MG/1
TABLET ORAL
Qty: 30 TABLET | Refills: 0 | Status: SHIPPED | OUTPATIENT
Start: 2021-06-21 | End: 2021-07-26 | Stop reason: SDUPTHER

## 2021-06-26 DIAGNOSIS — J42 CHRONIC BRONCHITIS, UNSPECIFIED CHRONIC BRONCHITIS TYPE (HCC): ICD-10-CM

## 2021-06-27 RX ORDER — ALBUTEROL SULFATE 2.5 MG/3ML
SOLUTION RESPIRATORY (INHALATION)
Qty: 75 ML | Refills: 1 | Status: SHIPPED | OUTPATIENT
Start: 2021-06-27 | End: 2021-07-15

## 2021-07-07 DIAGNOSIS — J45.40 MODERATE PERSISTENT EXTRINSIC ASTHMA WITHOUT COMPLICATION: ICD-10-CM

## 2021-07-09 DIAGNOSIS — G20 PARKINSON'S DISEASE (HCC): ICD-10-CM

## 2021-07-12 RX ORDER — RIVASTIGMINE TARTRATE 4.5 MG/1
4.5 CAPSULE ORAL 2 TIMES DAILY
Qty: 180 CAPSULE | Refills: 1 | Status: SHIPPED | OUTPATIENT
Start: 2021-07-12 | End: 2021-09-09 | Stop reason: SDUPTHER

## 2021-07-15 DIAGNOSIS — G20 PARKINSON'S DISEASE (HCC): ICD-10-CM

## 2021-07-15 DIAGNOSIS — L30.4 INTERTRIGO: ICD-10-CM

## 2021-07-15 DIAGNOSIS — J42 CHRONIC BRONCHITIS, UNSPECIFIED CHRONIC BRONCHITIS TYPE (HCC): ICD-10-CM

## 2021-07-15 RX ORDER — NYSTATIN 100000 [USP'U]/G
POWDER TOPICAL
Qty: 30 G | Refills: 1 | Status: SHIPPED | OUTPATIENT
Start: 2021-07-15 | End: 2021-08-09

## 2021-07-15 RX ORDER — PRAMIPEXOLE DIHYDROCHLORIDE 0.5 MG/1
0.5 TABLET ORAL 3 TIMES DAILY
Qty: 270 TABLET | Refills: 0 | Status: SHIPPED | OUTPATIENT
Start: 2021-07-15 | End: 2021-09-27

## 2021-07-15 RX ORDER — ALBUTEROL SULFATE 2.5 MG/3ML
SOLUTION RESPIRATORY (INHALATION)
Qty: 75 ML | Refills: 1 | Status: SHIPPED | OUTPATIENT
Start: 2021-07-15 | End: 2021-08-03

## 2021-07-18 DIAGNOSIS — F02.81 DEMENTIA DUE TO PARKINSON'S DISEASE WITH BEHAVIORAL DISTURBANCE (HCC): ICD-10-CM

## 2021-07-18 DIAGNOSIS — G20 DEMENTIA DUE TO PARKINSON'S DISEASE WITH BEHAVIORAL DISTURBANCE (HCC): ICD-10-CM

## 2021-07-18 RX ORDER — QUETIAPINE FUMARATE 25 MG/1
TABLET, FILM COATED ORAL
Qty: 90 TABLET | Refills: 1 | Status: SHIPPED | OUTPATIENT
Start: 2021-07-18 | End: 2021-10-15

## 2021-07-19 DIAGNOSIS — G20 PARKINSON'S DISEASE (HCC): ICD-10-CM

## 2021-07-26 ENCOUNTER — TELEPHONE (OUTPATIENT)
Dept: INTERNAL MEDICINE CLINIC | Facility: CLINIC | Age: 86
End: 2021-07-26

## 2021-07-26 DIAGNOSIS — B44.81 ABPA (ALLERGIC BRONCHOPULMONARY ASPERGILLOSIS) (HCC): ICD-10-CM

## 2021-07-26 DIAGNOSIS — R33.9 URINARY RETENTION: ICD-10-CM

## 2021-07-26 NOTE — TELEPHONE ENCOUNTER
Mariano Hamm / Daughter     Pt has continuous falling    very hard to get him up    his face looks a little different    could he possibly have had a mini stroke    walking is deteriorating  Keyanna Cunning left side more indented    lips look a little different    she is asking if a nurse can come to evaluate him    or would  do a virtual appt with him ??? His speech is not slurred    if a virtual can be done it would have to be with his other daughter Armand Nyhan on her [employer] phone, number is in this this chart

## 2021-07-26 NOTE — TELEPHONE ENCOUNTER
If they are worried for stroke I would recommend ER- the virtual isn't a great idea b/c I cannot perform and exam

## 2021-07-27 RX ORDER — PREDNISONE 10 MG/1
10 TABLET ORAL DAILY
Qty: 30 TABLET | Refills: 0 | Status: SHIPPED | OUTPATIENT
Start: 2021-07-27 | End: 2021-09-22

## 2021-07-28 RX ORDER — FINASTERIDE 5 MG/1
5 TABLET, FILM COATED ORAL EVERY MORNING
Qty: 90 TABLET | Refills: 0 | Status: SHIPPED | OUTPATIENT
Start: 2021-07-28 | End: 2022-02-07 | Stop reason: SDUPTHER

## 2021-07-28 RX ORDER — FINASTERIDE 5 MG/1
TABLET, FILM COATED ORAL
Qty: 90 TABLET | Refills: 0 | Status: SHIPPED | OUTPATIENT
Start: 2021-07-28 | End: 2021-10-28

## 2021-08-02 DIAGNOSIS — J42 CHRONIC BRONCHITIS, UNSPECIFIED CHRONIC BRONCHITIS TYPE (HCC): ICD-10-CM

## 2021-08-03 RX ORDER — ALBUTEROL SULFATE 2.5 MG/3ML
SOLUTION RESPIRATORY (INHALATION)
Qty: 75 ML | Refills: 1 | Status: SHIPPED | OUTPATIENT
Start: 2021-08-03 | End: 2021-09-09

## 2021-08-09 DIAGNOSIS — L30.4 INTERTRIGO: ICD-10-CM

## 2021-08-09 RX ORDER — NYSTATIN 100000 [USP'U]/G
POWDER TOPICAL
Qty: 30 G | Refills: 1 | Status: SHIPPED | OUTPATIENT
Start: 2021-08-09 | End: 2021-09-27

## 2021-08-29 DIAGNOSIS — J42 CHRONIC BRONCHITIS, UNSPECIFIED CHRONIC BRONCHITIS TYPE (HCC): ICD-10-CM

## 2021-08-29 RX ORDER — ALBUTEROL SULFATE 90 UG/1
AEROSOL, METERED RESPIRATORY (INHALATION)
Qty: 1 G | Refills: 3 | Status: SHIPPED | OUTPATIENT
Start: 2021-08-29 | End: 2021-12-18

## 2021-09-04 DIAGNOSIS — J45.40 MODERATE PERSISTENT EXTRINSIC ASTHMA WITHOUT COMPLICATION: ICD-10-CM

## 2021-09-09 DIAGNOSIS — G20 PARKINSON'S DISEASE (HCC): ICD-10-CM

## 2021-09-09 DIAGNOSIS — J42 CHRONIC BRONCHITIS, UNSPECIFIED CHRONIC BRONCHITIS TYPE (HCC): ICD-10-CM

## 2021-09-09 RX ORDER — RIVASTIGMINE TARTRATE 4.5 MG/1
4.5 CAPSULE ORAL 2 TIMES DAILY
Qty: 180 CAPSULE | Refills: 1 | Status: SHIPPED | OUTPATIENT
Start: 2021-09-09 | End: 2022-03-18 | Stop reason: SDUPTHER

## 2021-09-09 RX ORDER — ALBUTEROL SULFATE 2.5 MG/3ML
SOLUTION RESPIRATORY (INHALATION)
Qty: 75 ML | Refills: 1 | Status: SHIPPED | OUTPATIENT
Start: 2021-09-09 | End: 2021-09-27

## 2021-09-22 DIAGNOSIS — B44.81 ABPA (ALLERGIC BRONCHOPULMONARY ASPERGILLOSIS) (HCC): ICD-10-CM

## 2021-09-22 RX ORDER — PREDNISONE 10 MG/1
TABLET ORAL
Qty: 30 TABLET | Refills: 0 | Status: SHIPPED | OUTPATIENT
Start: 2021-09-22 | End: 2021-10-26 | Stop reason: SDUPTHER

## 2021-09-27 DIAGNOSIS — G20 PARKINSON'S DISEASE (HCC): ICD-10-CM

## 2021-09-27 DIAGNOSIS — L30.4 INTERTRIGO: ICD-10-CM

## 2021-09-27 DIAGNOSIS — J42 CHRONIC BRONCHITIS, UNSPECIFIED CHRONIC BRONCHITIS TYPE (HCC): ICD-10-CM

## 2021-09-27 DIAGNOSIS — J45.909 UNCOMPLICATED ASTHMA, UNSPECIFIED ASTHMA SEVERITY, UNSPECIFIED WHETHER PERSISTENT: ICD-10-CM

## 2021-09-27 RX ORDER — PRAMIPEXOLE DIHYDROCHLORIDE 0.5 MG/1
0.5 TABLET ORAL 3 TIMES DAILY
Qty: 270 TABLET | Refills: 0 | Status: SHIPPED | OUTPATIENT
Start: 2021-09-27 | End: 2022-01-03

## 2021-09-27 RX ORDER — MONTELUKAST SODIUM 10 MG/1
TABLET ORAL
Qty: 90 TABLET | Refills: 3 | Status: SHIPPED | OUTPATIENT
Start: 2021-09-27 | End: 2022-04-01

## 2021-09-27 RX ORDER — ALBUTEROL SULFATE 2.5 MG/3ML
SOLUTION RESPIRATORY (INHALATION)
Qty: 75 ML | Refills: 1 | Status: SHIPPED | OUTPATIENT
Start: 2021-09-27 | End: 2021-11-06

## 2021-09-27 RX ORDER — NYSTATIN 100000 [USP'U]/G
POWDER TOPICAL
Qty: 30 G | Refills: 1 | Status: SHIPPED | OUTPATIENT
Start: 2021-09-27 | End: 2021-11-29

## 2021-09-30 DIAGNOSIS — I10 ESSENTIAL HYPERTENSION: ICD-10-CM

## 2021-09-30 RX ORDER — IRBESARTAN AND HYDROCHLOROTHIAZIDE 150; 12.5 MG/1; MG/1
TABLET, FILM COATED ORAL
Qty: 90 TABLET | Refills: 2 | Status: SHIPPED | OUTPATIENT
Start: 2021-09-30 | End: 2021-11-06 | Stop reason: HOSPADM

## 2021-10-09 ENCOUNTER — APPOINTMENT (OUTPATIENT)
Dept: LAB | Facility: CLINIC | Age: 86
End: 2021-10-09
Payer: COMMERCIAL

## 2021-10-09 DIAGNOSIS — R53.83 MALAISE AND FATIGUE: ICD-10-CM

## 2021-10-09 DIAGNOSIS — E11.9 TYPE 2 DIABETES MELLITUS WITHOUT COMPLICATION, WITHOUT LONG-TERM CURRENT USE OF INSULIN (HCC): ICD-10-CM

## 2021-10-09 DIAGNOSIS — I10 PRIMARY HYPERTENSION: ICD-10-CM

## 2021-10-09 DIAGNOSIS — R53.81 MALAISE AND FATIGUE: ICD-10-CM

## 2021-10-09 DIAGNOSIS — N17.9 ACUTE KIDNEY INJURY (HCC): Primary | ICD-10-CM

## 2021-10-09 DIAGNOSIS — E78.2 MIXED HYPERLIPIDEMIA: ICD-10-CM

## 2021-10-09 DIAGNOSIS — R05.9 COUGH: ICD-10-CM

## 2021-10-09 LAB
ALBUMIN SERPL BCP-MCNC: 3.2 G/DL (ref 3.5–5)
ALP SERPL-CCNC: 88 U/L (ref 46–116)
ALT SERPL W P-5'-P-CCNC: 23 U/L (ref 12–78)
ANION GAP SERPL CALCULATED.3IONS-SCNC: 3 MMOL/L (ref 4–13)
AST SERPL W P-5'-P-CCNC: 24 U/L (ref 5–45)
BASOPHILS # BLD AUTO: 0.05 THOUSANDS/ΜL (ref 0–0.1)
BASOPHILS NFR BLD AUTO: 1 % (ref 0–1)
BILIRUB SERPL-MCNC: 0.57 MG/DL (ref 0.2–1)
BUN SERPL-MCNC: 44 MG/DL (ref 5–25)
CALCIUM ALBUM COR SERPL-MCNC: 10 MG/DL (ref 8.3–10.1)
CALCIUM SERPL-MCNC: 9.4 MG/DL (ref 8.3–10.1)
CHLORIDE SERPL-SCNC: 107 MMOL/L (ref 100–108)
CHOLEST SERPL-MCNC: 169 MG/DL (ref 50–200)
CO2 SERPL-SCNC: 26 MMOL/L (ref 21–32)
CREAT SERPL-MCNC: 1.53 MG/DL (ref 0.6–1.3)
EOSINOPHIL # BLD AUTO: 0.21 THOUSAND/ΜL (ref 0–0.61)
EOSINOPHIL NFR BLD AUTO: 2 % (ref 0–6)
ERYTHROCYTE [DISTWIDTH] IN BLOOD BY AUTOMATED COUNT: 12.9 % (ref 11.6–15.1)
EST. AVERAGE GLUCOSE BLD GHB EST-MCNC: 148 MG/DL
GFR SERPL CREATININE-BSD FRML MDRD: 41 ML/MIN/1.73SQ M
GLUCOSE P FAST SERPL-MCNC: 107 MG/DL (ref 65–99)
HBA1C MFR BLD: 6.8 %
HCT VFR BLD AUTO: 40.7 % (ref 36.5–49.3)
HDLC SERPL-MCNC: 103 MG/DL
HGB BLD-MCNC: 13.2 G/DL (ref 12–17)
IMM GRANULOCYTES # BLD AUTO: 0.06 THOUSAND/UL (ref 0–0.2)
IMM GRANULOCYTES NFR BLD AUTO: 1 % (ref 0–2)
LDLC SERPL CALC-MCNC: 45 MG/DL (ref 0–100)
LYMPHOCYTES # BLD AUTO: 1.76 THOUSANDS/ΜL (ref 0.6–4.47)
LYMPHOCYTES NFR BLD AUTO: 18 % (ref 14–44)
MCH RBC QN AUTO: 31.2 PG (ref 26.8–34.3)
MCHC RBC AUTO-ENTMCNC: 32.4 G/DL (ref 31.4–37.4)
MCV RBC AUTO: 96 FL (ref 82–98)
MONOCYTES # BLD AUTO: 0.85 THOUSAND/ΜL (ref 0.17–1.22)
MONOCYTES NFR BLD AUTO: 9 % (ref 4–12)
NEUTROPHILS # BLD AUTO: 6.75 THOUSANDS/ΜL (ref 1.85–7.62)
NEUTS SEG NFR BLD AUTO: 69 % (ref 43–75)
NONHDLC SERPL-MCNC: 66 MG/DL
NRBC BLD AUTO-RTO: 0 /100 WBCS
NT-PROBNP SERPL-MCNC: 273 PG/ML
PLATELET # BLD AUTO: 255 THOUSANDS/UL (ref 149–390)
PMV BLD AUTO: 9.6 FL (ref 8.9–12.7)
POTASSIUM SERPL-SCNC: 4.2 MMOL/L (ref 3.5–5.3)
PROT SERPL-MCNC: 6.9 G/DL (ref 6.4–8.2)
RBC # BLD AUTO: 4.23 MILLION/UL (ref 3.88–5.62)
SODIUM SERPL-SCNC: 136 MMOL/L (ref 136–145)
TRIGL SERPL-MCNC: 105 MG/DL
TSH SERPL DL<=0.05 MIU/L-ACNC: 3.22 UIU/ML (ref 0.36–3.74)
WBC # BLD AUTO: 9.68 THOUSAND/UL (ref 4.31–10.16)

## 2021-10-09 PROCEDURE — 84443 ASSAY THYROID STIM HORMONE: CPT

## 2021-10-09 PROCEDURE — 80061 LIPID PANEL: CPT

## 2021-10-09 PROCEDURE — 80053 COMPREHEN METABOLIC PANEL: CPT

## 2021-10-09 PROCEDURE — 83036 HEMOGLOBIN GLYCOSYLATED A1C: CPT

## 2021-10-09 PROCEDURE — 36415 COLL VENOUS BLD VENIPUNCTURE: CPT

## 2021-10-09 PROCEDURE — 83880 ASSAY OF NATRIURETIC PEPTIDE: CPT

## 2021-10-09 PROCEDURE — 85025 COMPLETE CBC W/AUTO DIFF WBC: CPT

## 2021-10-15 ENCOUNTER — OFFICE VISIT (OUTPATIENT)
Dept: INTERNAL MEDICINE CLINIC | Facility: CLINIC | Age: 86
End: 2021-10-15
Payer: COMMERCIAL

## 2021-10-15 ENCOUNTER — TELEPHONE (OUTPATIENT)
Dept: INTERNAL MEDICINE CLINIC | Facility: CLINIC | Age: 86
End: 2021-10-15

## 2021-10-15 VITALS
SYSTOLIC BLOOD PRESSURE: 100 MMHG | DIASTOLIC BLOOD PRESSURE: 60 MMHG | HEIGHT: 63 IN | OXYGEN SATURATION: 94 % | HEART RATE: 78 BPM | TEMPERATURE: 97.2 F | BODY MASS INDEX: 29.95 KG/M2 | WEIGHT: 169 LBS | RESPIRATION RATE: 12 BRPM

## 2021-10-15 DIAGNOSIS — F02.81 DEMENTIA DUE TO PARKINSON'S DISEASE WITH BEHAVIORAL DISTURBANCE (HCC): ICD-10-CM

## 2021-10-15 DIAGNOSIS — E11.9 TYPE 2 DIABETES MELLITUS WITHOUT COMPLICATION, WITHOUT LONG-TERM CURRENT USE OF INSULIN (HCC): Primary | ICD-10-CM

## 2021-10-15 DIAGNOSIS — Z23 ENCOUNTER FOR IMMUNIZATION: ICD-10-CM

## 2021-10-15 DIAGNOSIS — E78.2 MIXED HYPERLIPIDEMIA: ICD-10-CM

## 2021-10-15 DIAGNOSIS — Z79.52 LONG TERM (CURRENT) USE OF SYSTEMIC STEROIDS: ICD-10-CM

## 2021-10-15 DIAGNOSIS — J41.8 MIXED SIMPLE AND MUCOPURULENT CHRONIC BRONCHITIS (HCC): ICD-10-CM

## 2021-10-15 DIAGNOSIS — R26.9 NEUROLOGIC GAIT DYSFUNCTION: ICD-10-CM

## 2021-10-15 DIAGNOSIS — I10 PRIMARY HYPERTENSION: ICD-10-CM

## 2021-10-15 DIAGNOSIS — G20 PARKINSON'S DISEASE (HCC): ICD-10-CM

## 2021-10-15 DIAGNOSIS — N17.9 ACUTE KIDNEY INJURY (HCC): ICD-10-CM

## 2021-10-15 DIAGNOSIS — I10 ESSENTIAL HYPERTENSION: ICD-10-CM

## 2021-10-15 DIAGNOSIS — G20 DEMENTIA DUE TO PARKINSON'S DISEASE WITH BEHAVIORAL DISTURBANCE (HCC): ICD-10-CM
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
## 2021-10-15 DIAGNOSIS — I67.9 CEREBROVASCULAR DISEASE: ICD-10-CM

## 2021-10-15 DIAGNOSIS — J47.0 BRONCHIECTASIS WITH ACUTE LOWER RESPIRATORY INFECTION (HCC): ICD-10-CM

## 2021-10-15 DIAGNOSIS — E03.8 SUBCLINICAL HYPOTHYROIDISM: ICD-10-CM

## 2021-10-15 DIAGNOSIS — J45.30 MILD PERSISTENT EXTRINSIC ASTHMA WITHOUT COMPLICATION: ICD-10-CM

## 2021-10-15 PROBLEM — J18.9 COMMUNITY ACQUIRED PNEUMONIA OF RIGHT LOWER LOBE OF LUNG: Status: RESOLVED | Noted: 2020-10-26 | Resolved: 2021-10-15

## 2021-10-15 PROCEDURE — 99215 OFFICE O/P EST HI 40 MIN: CPT | Performed by: INTERNAL MEDICINE

## 2021-10-15 PROCEDURE — 1036F TOBACCO NON-USER: CPT | Performed by: INTERNAL MEDICINE

## 2021-10-15 PROCEDURE — G0008 ADMIN INFLUENZA VIRUS VAC: HCPCS | Performed by: INTERNAL MEDICINE

## 2021-10-15 PROCEDURE — 1160F RVW MEDS BY RX/DR IN RCRD: CPT | Performed by: INTERNAL MEDICINE

## 2021-10-15 PROCEDURE — 90662 IIV NO PRSV INCREASED AG IM: CPT | Performed by: INTERNAL MEDICINE

## 2021-10-15 RX ORDER — QUETIAPINE FUMARATE 25 MG/1
50 TABLET, FILM COATED ORAL
Qty: 90 TABLET | Refills: 1
Start: 2021-10-15 | End: 2021-11-23

## 2021-10-15 NOTE — TELEPHONE ENCOUNTER
Martha Sy / Gerri Chou [phone]     [employer] - She rec'ed a referral for home health and they are closed to new pt's in her area    they are full

## 2021-10-26 DIAGNOSIS — B44.81 ABPA (ALLERGIC BRONCHOPULMONARY ASPERGILLOSIS) (HCC): ICD-10-CM

## 2021-10-26 RX ORDER — PREDNISONE 10 MG/1
10 TABLET ORAL DAILY
Qty: 30 TABLET | Refills: 0 | Status: SHIPPED | OUTPATIENT
Start: 2021-10-26 | End: 2021-10-29 | Stop reason: SDUPTHER

## 2021-10-28 DIAGNOSIS — R33.9 URINARY RETENTION: ICD-10-CM

## 2021-10-28 RX ORDER — FINASTERIDE 5 MG/1
TABLET, FILM COATED ORAL
Qty: 90 TABLET | Refills: 0 | Status: SHIPPED | OUTPATIENT
Start: 2021-10-28 | End: 2021-11-06 | Stop reason: HOSPADM

## 2021-10-29 DIAGNOSIS — B44.81 ABPA (ALLERGIC BRONCHOPULMONARY ASPERGILLOSIS) (HCC): ICD-10-CM

## 2021-11-01 ENCOUNTER — TELEPHONE (OUTPATIENT)
Dept: INTERNAL MEDICINE CLINIC | Facility: CLINIC | Age: 86
End: 2021-11-01

## 2021-11-01 RX ORDER — PREDNISONE 10 MG/1
10 TABLET ORAL DAILY
Qty: 30 TABLET | Refills: 0 | Status: ON HOLD | OUTPATIENT
Start: 2021-11-01 | End: 2021-11-06 | Stop reason: SDUPTHER

## 2021-11-02 ENCOUNTER — APPOINTMENT (EMERGENCY)
Dept: RADIOLOGY | Facility: HOSPITAL | Age: 86
DRG: 190 | End: 2021-11-02
Payer: COMMERCIAL

## 2021-11-02 ENCOUNTER — HOSPITAL ENCOUNTER (INPATIENT)
Facility: HOSPITAL | Age: 86
LOS: 3 days | Discharge: HOME HEALTH | DRG: 190 | End: 2021-11-06
Attending: EMERGENCY MEDICINE | Admitting: INTERNAL MEDICINE
Payer: COMMERCIAL

## 2021-11-02 ENCOUNTER — OFFICE VISIT (OUTPATIENT)
Dept: INTERNAL MEDICINE CLINIC | Facility: CLINIC | Age: 86
End: 2021-11-02
Payer: COMMERCIAL

## 2021-11-02 VITALS
HEIGHT: 63 IN | RESPIRATION RATE: 24 BRPM | BODY MASS INDEX: 31.71 KG/M2 | OXYGEN SATURATION: 95 % | SYSTOLIC BLOOD PRESSURE: 98 MMHG | HEART RATE: 72 BPM | WEIGHT: 179 LBS | TEMPERATURE: 101.5 F | DIASTOLIC BLOOD PRESSURE: 59 MMHG

## 2021-11-02 DIAGNOSIS — N18.9 CHRONIC RENAL FAILURE: ICD-10-CM

## 2021-11-02 DIAGNOSIS — J44.1 COPD WITH ACUTE EXACERBATION (HCC): Primary | ICD-10-CM

## 2021-11-02 DIAGNOSIS — J18.9 COMMUNITY ACQUIRED PNEUMONIA OF RIGHT LOWER LOBE OF LUNG: ICD-10-CM

## 2021-11-02 DIAGNOSIS — R50.9 FEVER, UNSPECIFIED FEVER CAUSE: ICD-10-CM

## 2021-11-02 DIAGNOSIS — B44.81 ABPA (ALLERGIC BRONCHOPULMONARY ASPERGILLOSIS) (HCC): ICD-10-CM

## 2021-11-02 DIAGNOSIS — R05.9 COUGH: Primary | ICD-10-CM

## 2021-11-02 LAB
ALBUMIN SERPL BCP-MCNC: 3.3 G/DL (ref 3.5–5)
ALP SERPL-CCNC: 89 U/L (ref 46–116)
ALT SERPL W P-5'-P-CCNC: 10 U/L (ref 12–78)
ANION GAP SERPL CALCULATED.3IONS-SCNC: 8 MMOL/L (ref 4–13)
APTT PPP: 28 SECONDS (ref 23–37)
AST SERPL W P-5'-P-CCNC: 21 U/L (ref 5–45)
ATRIAL RATE: 68 BPM
BASOPHILS # BLD AUTO: 0.02 THOUSANDS/ΜL (ref 0–0.1)
BASOPHILS NFR BLD AUTO: 0 % (ref 0–1)
BILIRUB SERPL-MCNC: 0.47 MG/DL (ref 0.2–1)
BILIRUB UR QL STRIP: NEGATIVE
BUN SERPL-MCNC: 41 MG/DL (ref 5–25)
CALCIUM ALBUM COR SERPL-MCNC: 9.6 MG/DL (ref 8.3–10.1)
CALCIUM SERPL-MCNC: 9 MG/DL (ref 8.3–10.1)
CHLORIDE SERPL-SCNC: 104 MMOL/L (ref 100–108)
CLARITY UR: CLEAR
CO2 SERPL-SCNC: 27 MMOL/L (ref 21–32)
COLOR UR: YELLOW
CREAT SERPL-MCNC: 1.74 MG/DL (ref 0.6–1.3)
EOSINOPHIL # BLD AUTO: 0.08 THOUSAND/ΜL (ref 0–0.61)
EOSINOPHIL NFR BLD AUTO: 1 % (ref 0–6)
ERYTHROCYTE [DISTWIDTH] IN BLOOD BY AUTOMATED COUNT: 12.9 % (ref 11.6–15.1)
FLUAV RNA RESP QL NAA+PROBE: NEGATIVE
FLUBV RNA RESP QL NAA+PROBE: NEGATIVE
GFR SERPL CREATININE-BSD FRML MDRD: 35 ML/MIN/1.73SQ M
GLUCOSE SERPL-MCNC: 103 MG/DL (ref 65–140)
GLUCOSE SERPL-MCNC: 241 MG/DL (ref 65–140)
GLUCOSE SERPL-MCNC: 290 MG/DL (ref 65–140)
GLUCOSE SERPL-MCNC: 335 MG/DL (ref 65–140)
GLUCOSE UR STRIP-MCNC: ABNORMAL MG/DL
HCT VFR BLD AUTO: 38.7 % (ref 36.5–49.3)
HGB BLD-MCNC: 12.7 G/DL (ref 12–17)
HGB UR QL STRIP.AUTO: NEGATIVE
IMM GRANULOCYTES # BLD AUTO: 0.04 THOUSAND/UL (ref 0–0.2)
IMM GRANULOCYTES NFR BLD AUTO: 0 % (ref 0–2)
INR PPP: 1.11 (ref 0.84–1.19)
KETONES UR STRIP-MCNC: NEGATIVE MG/DL
LACTATE SERPL-SCNC: 1.3 MMOL/L (ref 0.5–2)
LEUKOCYTE ESTERASE UR QL STRIP: NEGATIVE
LYMPHOCYTES # BLD AUTO: 0.81 THOUSANDS/ΜL (ref 0.6–4.47)
LYMPHOCYTES NFR BLD AUTO: 7 % (ref 14–44)
MCH RBC QN AUTO: 31 PG (ref 26.8–34.3)
MCHC RBC AUTO-ENTMCNC: 32.8 G/DL (ref 31.4–37.4)
MCV RBC AUTO: 94 FL (ref 82–98)
MONOCYTES # BLD AUTO: 1.02 THOUSAND/ΜL (ref 0.17–1.22)
MONOCYTES NFR BLD AUTO: 9 % (ref 4–12)
NEUTROPHILS # BLD AUTO: 9.71 THOUSANDS/ΜL (ref 1.85–7.62)
NEUTS SEG NFR BLD AUTO: 83 % (ref 43–75)
NITRITE UR QL STRIP: NEGATIVE
NRBC BLD AUTO-RTO: 0 /100 WBCS
P AXIS: 54 DEGREES
PH UR STRIP.AUTO: 5.5 [PH]
PLATELET # BLD AUTO: 230 THOUSANDS/UL (ref 149–390)
PMV BLD AUTO: 9.1 FL (ref 8.9–12.7)
POTASSIUM SERPL-SCNC: 4.4 MMOL/L (ref 3.5–5.3)
PR INTERVAL: 160 MS
PROCALCITONIN SERPL-MCNC: <0.05 NG/ML
PROT SERPL-MCNC: 7.2 G/DL (ref 6.4–8.2)
PROT UR STRIP-MCNC: NEGATIVE MG/DL
PROTHROMBIN TIME: 13.9 SECONDS (ref 11.6–14.5)
QRS AXIS: -20 DEGREES
QRSD INTERVAL: 104 MS
QT INTERVAL: 394 MS
QTC INTERVAL: 418 MS
RBC # BLD AUTO: 4.1 MILLION/UL (ref 3.88–5.62)
RSV RNA RESP QL NAA+PROBE: NEGATIVE
SARS-COV-2 RNA RESP QL NAA+PROBE: NEGATIVE
SODIUM SERPL-SCNC: 139 MMOL/L (ref 136–145)
SP GR UR STRIP.AUTO: 1.02 (ref 1–1.03)
T WAVE AXIS: 2 DEGREES
UROBILINOGEN UR QL STRIP.AUTO: 0.2 E.U./DL
VENTRICULAR RATE: 68 BPM
WBC # BLD AUTO: 11.68 THOUSAND/UL (ref 4.31–10.16)

## 2021-11-02 PROCEDURE — 85730 THROMBOPLASTIN TIME PARTIAL: CPT | Performed by: EMERGENCY MEDICINE

## 2021-11-02 PROCEDURE — 99214 OFFICE O/P EST MOD 30 MIN: CPT | Performed by: NURSE PRACTITIONER

## 2021-11-02 PROCEDURE — 94640 AIRWAY INHALATION TREATMENT: CPT

## 2021-11-02 PROCEDURE — 0241U HB NFCT DS VIR RESP RNA 4 TRGT: CPT | Performed by: EMERGENCY MEDICINE

## 2021-11-02 PROCEDURE — 99285 EMERGENCY DEPT VISIT HI MDM: CPT | Performed by: EMERGENCY MEDICINE

## 2021-11-02 PROCEDURE — 93010 ELECTROCARDIOGRAM REPORT: CPT | Performed by: INTERNAL MEDICINE

## 2021-11-02 PROCEDURE — 87040 BLOOD CULTURE FOR BACTERIA: CPT | Performed by: EMERGENCY MEDICINE

## 2021-11-02 PROCEDURE — 85025 COMPLETE CBC W/AUTO DIFF WBC: CPT | Performed by: EMERGENCY MEDICINE

## 2021-11-02 PROCEDURE — 81003 URINALYSIS AUTO W/O SCOPE: CPT | Performed by: EMERGENCY MEDICINE

## 2021-11-02 PROCEDURE — 99285 EMERGENCY DEPT VISIT HI MDM: CPT

## 2021-11-02 PROCEDURE — 96374 THER/PROPH/DIAG INJ IV PUSH: CPT

## 2021-11-02 PROCEDURE — 82948 REAGENT STRIP/BLOOD GLUCOSE: CPT

## 2021-11-02 PROCEDURE — 83605 ASSAY OF LACTIC ACID: CPT | Performed by: EMERGENCY MEDICINE

## 2021-11-02 PROCEDURE — 99220 PR INITIAL OBSERVATION CARE/DAY 70 MINUTES: CPT | Performed by: INTERNAL MEDICINE

## 2021-11-02 PROCEDURE — 85610 PROTHROMBIN TIME: CPT | Performed by: EMERGENCY MEDICINE

## 2021-11-02 PROCEDURE — 80053 COMPREHEN METABOLIC PANEL: CPT | Performed by: EMERGENCY MEDICINE

## 2021-11-02 PROCEDURE — 71045 X-RAY EXAM CHEST 1 VIEW: CPT

## 2021-11-02 PROCEDURE — 93005 ELECTROCARDIOGRAM TRACING: CPT

## 2021-11-02 PROCEDURE — 94760 N-INVAS EAR/PLS OXIMETRY 1: CPT

## 2021-11-02 PROCEDURE — 36415 COLL VENOUS BLD VENIPUNCTURE: CPT | Performed by: EMERGENCY MEDICINE

## 2021-11-02 PROCEDURE — 94668 MNPJ CHEST WALL SBSQ: CPT

## 2021-11-02 PROCEDURE — 84145 PROCALCITONIN (PCT): CPT | Performed by: EMERGENCY MEDICINE

## 2021-11-02 RX ORDER — METHYLPREDNISOLONE SODIUM SUCCINATE 40 MG/ML
40 INJECTION, POWDER, LYOPHILIZED, FOR SOLUTION INTRAMUSCULAR; INTRAVENOUS EVERY 8 HOURS
Status: DISCONTINUED | OUTPATIENT
Start: 2021-11-02 | End: 2021-11-04

## 2021-11-02 RX ORDER — HEPARIN SODIUM 5000 [USP'U]/ML
5000 INJECTION, SOLUTION INTRAVENOUS; SUBCUTANEOUS EVERY 8 HOURS SCHEDULED
Status: DISCONTINUED | OUTPATIENT
Start: 2021-11-02 | End: 2021-11-02

## 2021-11-02 RX ORDER — BENZONATATE 100 MG/1
100 CAPSULE ORAL 3 TIMES DAILY PRN
Status: DISCONTINUED | OUTPATIENT
Start: 2021-11-02 | End: 2021-11-06 | Stop reason: HOSPADM

## 2021-11-02 RX ORDER — ALBUTEROL SULFATE 90 UG/1
1 AEROSOL, METERED RESPIRATORY (INHALATION) EVERY 6 HOURS PRN
Status: DISCONTINUED | OUTPATIENT
Start: 2021-11-02 | End: 2021-11-06 | Stop reason: HOSPADM

## 2021-11-02 RX ORDER — ATORVASTATIN CALCIUM 10 MG/1
10 TABLET, FILM COATED ORAL DAILY
Status: DISCONTINUED | OUTPATIENT
Start: 2021-11-02 | End: 2021-11-06 | Stop reason: HOSPADM

## 2021-11-02 RX ORDER — ACETAMINOPHEN 325 MG/1
650 TABLET ORAL EVERY 6 HOURS PRN
Status: DISCONTINUED | OUTPATIENT
Start: 2021-11-02 | End: 2021-11-06 | Stop reason: HOSPADM

## 2021-11-02 RX ORDER — DOXYCYCLINE HYCLATE 100 MG/1
100 CAPSULE ORAL ONCE
Status: COMPLETED | OUTPATIENT
Start: 2021-11-02 | End: 2021-11-02

## 2021-11-02 RX ORDER — METHYLPREDNISOLONE SODIUM SUCCINATE 125 MG/2ML
80 INJECTION, POWDER, LYOPHILIZED, FOR SOLUTION INTRAMUSCULAR; INTRAVENOUS ONCE
Status: COMPLETED | OUTPATIENT
Start: 2021-11-02 | End: 2021-11-02

## 2021-11-02 RX ORDER — SODIUM CHLORIDE 9 MG/ML
75 INJECTION, SOLUTION INTRAVENOUS CONTINUOUS
Status: DISCONTINUED | OUTPATIENT
Start: 2021-11-02 | End: 2021-11-04

## 2021-11-02 RX ORDER — FINASTERIDE 5 MG/1
5 TABLET, FILM COATED ORAL EVERY MORNING
Status: DISCONTINUED | OUTPATIENT
Start: 2021-11-03 | End: 2021-11-06 | Stop reason: HOSPADM

## 2021-11-02 RX ORDER — PRAMIPEXOLE DIHYDROCHLORIDE 0.5 MG/1
0.5 TABLET ORAL 3 TIMES DAILY
Status: DISCONTINUED | OUTPATIENT
Start: 2021-11-02 | End: 2021-11-06 | Stop reason: HOSPADM

## 2021-11-02 RX ORDER — SODIUM CHLORIDE FOR INHALATION 0.9 %
3 VIAL, NEBULIZER (ML) INHALATION
Status: DISCONTINUED | OUTPATIENT
Start: 2021-11-02 | End: 2021-11-06 | Stop reason: HOSPADM

## 2021-11-02 RX ORDER — LEVALBUTEROL 1.25 MG/.5ML
1.25 SOLUTION, CONCENTRATE RESPIRATORY (INHALATION)
Status: DISCONTINUED | OUTPATIENT
Start: 2021-11-02 | End: 2021-11-06 | Stop reason: HOSPADM

## 2021-11-02 RX ORDER — MONTELUKAST SODIUM 10 MG/1
10 TABLET ORAL
Status: DISCONTINUED | OUTPATIENT
Start: 2021-11-02 | End: 2021-11-06 | Stop reason: HOSPADM

## 2021-11-02 RX ORDER — METHYLPREDNISOLONE SODIUM SUCCINATE 125 MG/2ML
125 INJECTION, POWDER, LYOPHILIZED, FOR SOLUTION INTRAMUSCULAR; INTRAVENOUS ONCE
Status: DISCONTINUED | OUTPATIENT
Start: 2021-11-02 | End: 2021-11-02

## 2021-11-02 RX ORDER — QUETIAPINE FUMARATE 25 MG/1
50 TABLET, FILM COATED ORAL
Status: DISCONTINUED | OUTPATIENT
Start: 2021-11-02 | End: 2021-11-06 | Stop reason: HOSPADM

## 2021-11-02 RX ORDER — RIVASTIGMINE TARTRATE 1.5 MG/1
4.5 CAPSULE ORAL 2 TIMES DAILY
Status: DISCONTINUED | OUTPATIENT
Start: 2021-11-02 | End: 2021-11-06 | Stop reason: HOSPADM

## 2021-11-02 RX ORDER — SODIUM CHLORIDE FOR INHALATION 0.9 %
3 VIAL, NEBULIZER (ML) INHALATION ONCE
Status: DISCONTINUED | OUTPATIENT
Start: 2021-11-02 | End: 2021-11-06 | Stop reason: HOSPADM

## 2021-11-02 RX ADMIN — DOXYCYCLINE 100 MG: 100 CAPSULE ORAL at 12:57

## 2021-11-02 RX ADMIN — SODIUM CHLORIDE 100 ML/HR: 0.9 INJECTION, SOLUTION INTRAVENOUS at 13:29

## 2021-11-02 RX ADMIN — IPRATROPIUM BROMIDE 1 MG: 0.5 SOLUTION RESPIRATORY (INHALATION) at 10:14

## 2021-11-02 RX ADMIN — METHYLPREDNISOLONE SODIUM SUCCINATE 40 MG: 40 INJECTION, POWDER, FOR SOLUTION INTRAMUSCULAR; INTRAVENOUS at 21:15

## 2021-11-02 RX ADMIN — ALBUTEROL SULFATE 10 MG: 2.5 SOLUTION RESPIRATORY (INHALATION) at 10:14

## 2021-11-02 RX ADMIN — METHYLPREDNISOLONE SODIUM SUCCINATE 80 MG: 125 INJECTION, POWDER, FOR SOLUTION INTRAMUSCULAR; INTRAVENOUS at 10:15

## 2021-11-03 ENCOUNTER — APPOINTMENT (OUTPATIENT)
Dept: CT IMAGING | Facility: HOSPITAL | Age: 86
DRG: 190 | End: 2021-11-03
Payer: COMMERCIAL

## 2021-11-03 LAB
ANION GAP SERPL CALCULATED.3IONS-SCNC: 9 MMOL/L (ref 4–13)
BASOPHILS # BLD AUTO: 0.01 THOUSANDS/ΜL (ref 0–0.1)
BASOPHILS NFR BLD AUTO: 0 % (ref 0–1)
BUN SERPL-MCNC: 49 MG/DL (ref 5–25)
CALCIUM SERPL-MCNC: 8.7 MG/DL (ref 8.3–10.1)
CHLORIDE SERPL-SCNC: 107 MMOL/L (ref 100–108)
CO2 SERPL-SCNC: 23 MMOL/L (ref 21–32)
CREAT SERPL-MCNC: 1.61 MG/DL (ref 0.6–1.3)
EOSINOPHIL # BLD AUTO: 0 THOUSAND/ΜL (ref 0–0.61)
EOSINOPHIL NFR BLD AUTO: 0 % (ref 0–6)
ERYTHROCYTE [DISTWIDTH] IN BLOOD BY AUTOMATED COUNT: 12.9 % (ref 11.6–15.1)
FLUAV RNA RESP QL NAA+PROBE: NEGATIVE
FLUBV RNA RESP QL NAA+PROBE: NEGATIVE
GFR SERPL CREATININE-BSD FRML MDRD: 38 ML/MIN/1.73SQ M
GLUCOSE P FAST SERPL-MCNC: 148 MG/DL (ref 65–99)
GLUCOSE SERPL-MCNC: 148 MG/DL (ref 65–140)
GLUCOSE SERPL-MCNC: 149 MG/DL (ref 65–140)
GLUCOSE SERPL-MCNC: 202 MG/DL (ref 65–140)
GLUCOSE SERPL-MCNC: 233 MG/DL (ref 65–140)
GLUCOSE SERPL-MCNC: 285 MG/DL (ref 65–140)
HCT VFR BLD AUTO: 35.7 % (ref 36.5–49.3)
HGB BLD-MCNC: 11.8 G/DL (ref 12–17)
IMM GRANULOCYTES # BLD AUTO: 0.08 THOUSAND/UL (ref 0–0.2)
IMM GRANULOCYTES NFR BLD AUTO: 1 % (ref 0–2)
LYMPHOCYTES # BLD AUTO: 0.6 THOUSANDS/ΜL (ref 0.6–4.47)
LYMPHOCYTES NFR BLD AUTO: 4 % (ref 14–44)
MCH RBC QN AUTO: 31.2 PG (ref 26.8–34.3)
MCHC RBC AUTO-ENTMCNC: 33.1 G/DL (ref 31.4–37.4)
MCV RBC AUTO: 94 FL (ref 82–98)
MONOCYTES # BLD AUTO: 0.41 THOUSAND/ΜL (ref 0.17–1.22)
MONOCYTES NFR BLD AUTO: 3 % (ref 4–12)
NEUTROPHILS # BLD AUTO: 13.16 THOUSANDS/ΜL (ref 1.85–7.62)
NEUTS SEG NFR BLD AUTO: 92 % (ref 43–75)
NRBC BLD AUTO-RTO: 0 /100 WBCS
PLATELET # BLD AUTO: 242 THOUSANDS/UL (ref 149–390)
PMV BLD AUTO: 9.2 FL (ref 8.9–12.7)
POTASSIUM SERPL-SCNC: 5.2 MMOL/L (ref 3.5–5.3)
RBC # BLD AUTO: 3.78 MILLION/UL (ref 3.88–5.62)
RSV RNA RESP QL NAA+PROBE: NEGATIVE
SARS-COV-2 RNA RESP QL NAA+PROBE: NEGATIVE
SODIUM SERPL-SCNC: 139 MMOL/L (ref 136–145)
WBC # BLD AUTO: 14.26 THOUSAND/UL (ref 4.31–10.16)

## 2021-11-03 PROCEDURE — 36415 COLL VENOUS BLD VENIPUNCTURE: CPT | Performed by: INTERNAL MEDICINE

## 2021-11-03 PROCEDURE — 94664 DEMO&/EVAL PT USE INHALER: CPT

## 2021-11-03 PROCEDURE — 94640 AIRWAY INHALATION TREATMENT: CPT

## 2021-11-03 PROCEDURE — 94760 N-INVAS EAR/PLS OXIMETRY 1: CPT

## 2021-11-03 PROCEDURE — 99232 SBSQ HOSP IP/OBS MODERATE 35: CPT | Performed by: INTERNAL MEDICINE

## 2021-11-03 PROCEDURE — G1004 CDSM NDSC: HCPCS

## 2021-11-03 PROCEDURE — 80048 BASIC METABOLIC PNL TOTAL CA: CPT | Performed by: INTERNAL MEDICINE

## 2021-11-03 PROCEDURE — 70450 CT HEAD/BRAIN W/O DYE: CPT

## 2021-11-03 PROCEDURE — 74176 CT ABD & PELVIS W/O CONTRAST: CPT

## 2021-11-03 PROCEDURE — 0241U HB NFCT DS VIR RESP RNA 4 TRGT: CPT | Performed by: INTERNAL MEDICINE

## 2021-11-03 PROCEDURE — NC001 PR NO CHARGE: Performed by: NURSE PRACTITIONER

## 2021-11-03 PROCEDURE — 82948 REAGENT STRIP/BLOOD GLUCOSE: CPT

## 2021-11-03 PROCEDURE — 85025 COMPLETE CBC W/AUTO DIFF WBC: CPT | Performed by: INTERNAL MEDICINE

## 2021-11-03 PROCEDURE — 94668 MNPJ CHEST WALL SBSQ: CPT

## 2021-11-03 RX ORDER — GUAIFENESIN 600 MG
600 TABLET, EXTENDED RELEASE 12 HR ORAL EVERY 12 HOURS SCHEDULED
Status: DISCONTINUED | OUTPATIENT
Start: 2021-11-03 | End: 2021-11-06 | Stop reason: HOSPADM

## 2021-11-03 RX ORDER — INSULIN GLARGINE 100 [IU]/ML
8 INJECTION, SOLUTION SUBCUTANEOUS
Status: DISCONTINUED | OUTPATIENT
Start: 2021-11-03 | End: 2021-11-06 | Stop reason: HOSPADM

## 2021-11-03 RX ADMIN — METHYLPREDNISOLONE SODIUM SUCCINATE 40 MG: 40 INJECTION, POWDER, FOR SOLUTION INTRAMUSCULAR; INTRAVENOUS at 20:23

## 2021-11-03 RX ADMIN — SODIUM CHLORIDE 75 ML/HR: 0.9 INJECTION, SOLUTION INTRAVENOUS at 15:12

## 2021-11-03 RX ADMIN — METHYLPREDNISOLONE SODIUM SUCCINATE 40 MG: 40 INJECTION, POWDER, FOR SOLUTION INTRAMUSCULAR; INTRAVENOUS at 05:00

## 2021-11-03 RX ADMIN — METHYLPREDNISOLONE SODIUM SUCCINATE 40 MG: 40 INJECTION, POWDER, FOR SOLUTION INTRAMUSCULAR; INTRAVENOUS at 12:25

## 2021-11-04 LAB
ANION GAP SERPL CALCULATED.3IONS-SCNC: 5 MMOL/L (ref 4–13)
BASOPHILS # BLD AUTO: 0.01 THOUSANDS/ΜL (ref 0–0.1)
BASOPHILS NFR BLD AUTO: 0 % (ref 0–1)
BUN SERPL-MCNC: 55 MG/DL (ref 5–25)
CALCIUM SERPL-MCNC: 8.2 MG/DL (ref 8.3–10.1)
CHLORIDE SERPL-SCNC: 110 MMOL/L (ref 100–108)
CO2 SERPL-SCNC: 25 MMOL/L (ref 21–32)
CREAT SERPL-MCNC: 1.57 MG/DL (ref 0.6–1.3)
EOSINOPHIL # BLD AUTO: 0 THOUSAND/ΜL (ref 0–0.61)
EOSINOPHIL NFR BLD AUTO: 0 % (ref 0–6)
ERYTHROCYTE [DISTWIDTH] IN BLOOD BY AUTOMATED COUNT: 13 % (ref 11.6–15.1)
GFR SERPL CREATININE-BSD FRML MDRD: 40 ML/MIN/1.73SQ M
GLUCOSE SERPL-MCNC: 113 MG/DL (ref 65–140)
GLUCOSE SERPL-MCNC: 143 MG/DL (ref 65–140)
GLUCOSE SERPL-MCNC: 158 MG/DL (ref 65–140)
GLUCOSE SERPL-MCNC: 166 MG/DL (ref 65–140)
GLUCOSE SERPL-MCNC: 251 MG/DL (ref 65–140)
HCT VFR BLD AUTO: 32.9 % (ref 36.5–49.3)
HGB BLD-MCNC: 10.6 G/DL (ref 12–17)
IMM GRANULOCYTES # BLD AUTO: 0.09 THOUSAND/UL (ref 0–0.2)
IMM GRANULOCYTES NFR BLD AUTO: 1 % (ref 0–2)
LYMPHOCYTES # BLD AUTO: 0.42 THOUSANDS/ΜL (ref 0.6–4.47)
LYMPHOCYTES NFR BLD AUTO: 3 % (ref 14–44)
MCH RBC QN AUTO: 31.3 PG (ref 26.8–34.3)
MCHC RBC AUTO-ENTMCNC: 32.2 G/DL (ref 31.4–37.4)
MCV RBC AUTO: 97 FL (ref 82–98)
MONOCYTES # BLD AUTO: 0.71 THOUSAND/ΜL (ref 0.17–1.22)
MONOCYTES NFR BLD AUTO: 4 % (ref 4–12)
NEUTROPHILS # BLD AUTO: 14.81 THOUSANDS/ΜL (ref 1.85–7.62)
NEUTS SEG NFR BLD AUTO: 92 % (ref 43–75)
NRBC BLD AUTO-RTO: 0 /100 WBCS
PLATELET # BLD AUTO: 218 THOUSANDS/UL (ref 149–390)
PMV BLD AUTO: 9.1 FL (ref 8.9–12.7)
POTASSIUM SERPL-SCNC: 4.7 MMOL/L (ref 3.5–5.3)
RBC # BLD AUTO: 3.39 MILLION/UL (ref 3.88–5.62)
SODIUM SERPL-SCNC: 140 MMOL/L (ref 136–145)
WBC # BLD AUTO: 16.04 THOUSAND/UL (ref 4.31–10.16)

## 2021-11-04 PROCEDURE — 94664 DEMO&/EVAL PT USE INHALER: CPT

## 2021-11-04 PROCEDURE — 85025 COMPLETE CBC W/AUTO DIFF WBC: CPT | Performed by: INTERNAL MEDICINE

## 2021-11-04 PROCEDURE — 94640 AIRWAY INHALATION TREATMENT: CPT

## 2021-11-04 PROCEDURE — 97167 OT EVAL HIGH COMPLEX 60 MIN: CPT

## 2021-11-04 PROCEDURE — 94760 N-INVAS EAR/PLS OXIMETRY 1: CPT

## 2021-11-04 PROCEDURE — 82948 REAGENT STRIP/BLOOD GLUCOSE: CPT

## 2021-11-04 PROCEDURE — 97163 PT EVAL HIGH COMPLEX 45 MIN: CPT

## 2021-11-04 PROCEDURE — 99232 SBSQ HOSP IP/OBS MODERATE 35: CPT | Performed by: INTERNAL MEDICINE

## 2021-11-04 PROCEDURE — 80048 BASIC METABOLIC PNL TOTAL CA: CPT | Performed by: INTERNAL MEDICINE

## 2021-11-04 RX ORDER — SODIUM CHLORIDE, SODIUM GLUCONATE, SODIUM ACETATE, POTASSIUM CHLORIDE, MAGNESIUM CHLORIDE, SODIUM PHOSPHATE, DIBASIC, AND POTASSIUM PHOSPHATE .53; .5; .37; .037; .03; .012; .00082 G/100ML; G/100ML; G/100ML; G/100ML; G/100ML; G/100ML; G/100ML
75 INJECTION, SOLUTION INTRAVENOUS CONTINUOUS
Status: DISCONTINUED | OUTPATIENT
Start: 2021-11-04 | End: 2021-11-05

## 2021-11-04 RX ORDER — DOXYCYCLINE HYCLATE 100 MG/1
100 CAPSULE ORAL EVERY 12 HOURS SCHEDULED
Status: DISCONTINUED | OUTPATIENT
Start: 2021-11-04 | End: 2021-11-06 | Stop reason: HOSPADM

## 2021-11-04 RX ORDER — METHYLPREDNISOLONE SODIUM SUCCINATE 40 MG/ML
40 INJECTION, POWDER, LYOPHILIZED, FOR SOLUTION INTRAMUSCULAR; INTRAVENOUS EVERY 12 HOURS
Status: DISCONTINUED | OUTPATIENT
Start: 2021-11-05 | End: 2021-11-06 | Stop reason: HOSPADM

## 2021-11-04 RX ORDER — METHYLPREDNISOLONE SODIUM SUCCINATE 40 MG/ML
40 INJECTION, POWDER, LYOPHILIZED, FOR SOLUTION INTRAMUSCULAR; INTRAVENOUS EVERY 12 HOURS
Status: DISCONTINUED | OUTPATIENT
Start: 2021-11-05 | End: 2021-11-04

## 2021-11-04 RX ADMIN — SODIUM CHLORIDE 75 ML/HR: 0.9 INJECTION, SOLUTION INTRAVENOUS at 03:33

## 2021-11-04 RX ADMIN — METHYLPREDNISOLONE SODIUM SUCCINATE 40 MG: 40 INJECTION, POWDER, FOR SOLUTION INTRAMUSCULAR; INTRAVENOUS at 03:32

## 2021-11-04 RX ADMIN — METHYLPREDNISOLONE SODIUM SUCCINATE 40 MG: 40 INJECTION, POWDER, FOR SOLUTION INTRAMUSCULAR; INTRAVENOUS at 12:22

## 2021-11-04 RX ADMIN — SODIUM CHLORIDE, SODIUM GLUCONATE, SODIUM ACETATE, POTASSIUM CHLORIDE, MAGNESIUM CHLORIDE, SODIUM PHOSPHATE, DIBASIC, AND POTASSIUM PHOSPHATE 75 ML/HR: .53; .5; .37; .037; .03; .012; .00082 INJECTION, SOLUTION INTRAVENOUS at 12:25

## 2021-11-05 DIAGNOSIS — J45.40 MODERATE PERSISTENT EXTRINSIC ASTHMA WITHOUT COMPLICATION: ICD-10-CM

## 2021-11-05 LAB
ANION GAP SERPL CALCULATED.3IONS-SCNC: 10 MMOL/L (ref 4–13)
BASOPHILS # BLD AUTO: 0.02 THOUSANDS/ΜL (ref 0–0.1)
BASOPHILS NFR BLD AUTO: 0 % (ref 0–1)
BUN SERPL-MCNC: 49 MG/DL (ref 5–25)
CALCIUM SERPL-MCNC: 8.3 MG/DL (ref 8.3–10.1)
CHLORIDE SERPL-SCNC: 110 MMOL/L (ref 100–108)
CO2 SERPL-SCNC: 23 MMOL/L (ref 21–32)
CREAT SERPL-MCNC: 1.34 MG/DL (ref 0.6–1.3)
EOSINOPHIL # BLD AUTO: 0 THOUSAND/ΜL (ref 0–0.61)
EOSINOPHIL NFR BLD AUTO: 0 % (ref 0–6)
ERYTHROCYTE [DISTWIDTH] IN BLOOD BY AUTOMATED COUNT: 12.9 % (ref 11.6–15.1)
GFR SERPL CREATININE-BSD FRML MDRD: 48 ML/MIN/1.73SQ M
GLUCOSE SERPL-MCNC: 103 MG/DL (ref 65–140)
GLUCOSE SERPL-MCNC: 166 MG/DL (ref 65–140)
GLUCOSE SERPL-MCNC: 242 MG/DL (ref 65–140)
GLUCOSE SERPL-MCNC: 83 MG/DL (ref 65–140)
GLUCOSE SERPL-MCNC: 92 MG/DL (ref 65–140)
HCT VFR BLD AUTO: 35.4 % (ref 36.5–49.3)
HGB BLD-MCNC: 11.3 G/DL (ref 12–17)
IMM GRANULOCYTES # BLD AUTO: 0.15 THOUSAND/UL (ref 0–0.2)
IMM GRANULOCYTES NFR BLD AUTO: 1 % (ref 0–2)
LYMPHOCYTES # BLD AUTO: 0.77 THOUSANDS/ΜL (ref 0.6–4.47)
LYMPHOCYTES NFR BLD AUTO: 6 % (ref 14–44)
MCH RBC QN AUTO: 31.3 PG (ref 26.8–34.3)
MCHC RBC AUTO-ENTMCNC: 31.9 G/DL (ref 31.4–37.4)
MCV RBC AUTO: 98 FL (ref 82–98)
MONOCYTES # BLD AUTO: 0.92 THOUSAND/ΜL (ref 0.17–1.22)
MONOCYTES NFR BLD AUTO: 7 % (ref 4–12)
NEUTROPHILS # BLD AUTO: 12.09 THOUSANDS/ΜL (ref 1.85–7.62)
NEUTS SEG NFR BLD AUTO: 86 % (ref 43–75)
NRBC BLD AUTO-RTO: 0 /100 WBCS
PLATELET # BLD AUTO: 223 THOUSANDS/UL (ref 149–390)
PMV BLD AUTO: 9.2 FL (ref 8.9–12.7)
POTASSIUM SERPL-SCNC: 4.6 MMOL/L (ref 3.5–5.3)
PROCALCITONIN SERPL-MCNC: <0.05 NG/ML
RBC # BLD AUTO: 3.61 MILLION/UL (ref 3.88–5.62)
SODIUM SERPL-SCNC: 143 MMOL/L (ref 136–145)
WBC # BLD AUTO: 13.95 THOUSAND/UL (ref 4.31–10.16)

## 2021-11-05 PROCEDURE — 94640 AIRWAY INHALATION TREATMENT: CPT

## 2021-11-05 PROCEDURE — 94760 N-INVAS EAR/PLS OXIMETRY 1: CPT

## 2021-11-05 PROCEDURE — 94668 MNPJ CHEST WALL SBSQ: CPT

## 2021-11-05 PROCEDURE — 85025 COMPLETE CBC W/AUTO DIFF WBC: CPT | Performed by: INTERNAL MEDICINE

## 2021-11-05 PROCEDURE — 99232 SBSQ HOSP IP/OBS MODERATE 35: CPT | Performed by: INTERNAL MEDICINE

## 2021-11-05 PROCEDURE — 80048 BASIC METABOLIC PNL TOTAL CA: CPT | Performed by: INTERNAL MEDICINE

## 2021-11-05 PROCEDURE — 82948 REAGENT STRIP/BLOOD GLUCOSE: CPT

## 2021-11-05 PROCEDURE — 84145 PROCALCITONIN (PCT): CPT | Performed by: INTERNAL MEDICINE

## 2021-11-05 RX ADMIN — SODIUM CHLORIDE, SODIUM GLUCONATE, SODIUM ACETATE, POTASSIUM CHLORIDE, MAGNESIUM CHLORIDE, SODIUM PHOSPHATE, DIBASIC, AND POTASSIUM PHOSPHATE 75 ML/HR: .53; .5; .37; .037; .03; .012; .00082 INJECTION, SOLUTION INTRAVENOUS at 03:40

## 2021-11-06 VITALS
HEIGHT: 63 IN | RESPIRATION RATE: 20 BRPM | TEMPERATURE: 98.3 F | BODY MASS INDEX: 31.89 KG/M2 | DIASTOLIC BLOOD PRESSURE: 67 MMHG | HEART RATE: 57 BPM | OXYGEN SATURATION: 99 % | WEIGHT: 180 LBS | SYSTOLIC BLOOD PRESSURE: 150 MMHG

## 2021-11-06 DIAGNOSIS — J42 CHRONIC BRONCHITIS, UNSPECIFIED CHRONIC BRONCHITIS TYPE (HCC): ICD-10-CM

## 2021-11-06 LAB
ANION GAP SERPL CALCULATED.3IONS-SCNC: 11 MMOL/L (ref 4–13)
BASOPHILS # BLD AUTO: 0.01 THOUSANDS/ΜL (ref 0–0.1)
BASOPHILS NFR BLD AUTO: 0 % (ref 0–1)
BUN SERPL-MCNC: 46 MG/DL (ref 5–25)
CALCIUM SERPL-MCNC: 8.8 MG/DL (ref 8.3–10.1)
CHLORIDE SERPL-SCNC: 109 MMOL/L (ref 100–108)
CO2 SERPL-SCNC: 22 MMOL/L (ref 21–32)
CREAT SERPL-MCNC: 1.52 MG/DL (ref 0.6–1.3)
EOSINOPHIL # BLD AUTO: 0 THOUSAND/ΜL (ref 0–0.61)
EOSINOPHIL NFR BLD AUTO: 0 % (ref 0–6)
ERYTHROCYTE [DISTWIDTH] IN BLOOD BY AUTOMATED COUNT: 13 % (ref 11.6–15.1)
GFR SERPL CREATININE-BSD FRML MDRD: 41 ML/MIN/1.73SQ M
GLUCOSE SERPL-MCNC: 118 MG/DL (ref 65–140)
GLUCOSE SERPL-MCNC: 128 MG/DL (ref 65–140)
GLUCOSE SERPL-MCNC: 136 MG/DL (ref 65–140)
HCT VFR BLD AUTO: 36.9 % (ref 36.5–49.3)
HGB BLD-MCNC: 11.9 G/DL (ref 12–17)
IMM GRANULOCYTES # BLD AUTO: 0.17 THOUSAND/UL (ref 0–0.2)
IMM GRANULOCYTES NFR BLD AUTO: 1 % (ref 0–2)
LYMPHOCYTES # BLD AUTO: 0.52 THOUSANDS/ΜL (ref 0.6–4.47)
LYMPHOCYTES NFR BLD AUTO: 4 % (ref 14–44)
MCH RBC QN AUTO: 31.3 PG (ref 26.8–34.3)
MCHC RBC AUTO-ENTMCNC: 32.2 G/DL (ref 31.4–37.4)
MCV RBC AUTO: 97 FL (ref 82–98)
MONOCYTES # BLD AUTO: 0.45 THOUSAND/ΜL (ref 0.17–1.22)
MONOCYTES NFR BLD AUTO: 4 % (ref 4–12)
NEUTROPHILS # BLD AUTO: 11.23 THOUSANDS/ΜL (ref 1.85–7.62)
NEUTS SEG NFR BLD AUTO: 91 % (ref 43–75)
NRBC BLD AUTO-RTO: 0 /100 WBCS
PLATELET # BLD AUTO: 237 THOUSANDS/UL (ref 149–390)
PMV BLD AUTO: 9.2 FL (ref 8.9–12.7)
POTASSIUM SERPL-SCNC: 5.2 MMOL/L (ref 3.5–5.3)
RBC # BLD AUTO: 3.8 MILLION/UL (ref 3.88–5.62)
SODIUM SERPL-SCNC: 142 MMOL/L (ref 136–145)
WBC # BLD AUTO: 12.38 THOUSAND/UL (ref 4.31–10.16)

## 2021-11-06 PROCEDURE — 99239 HOSP IP/OBS DSCHRG MGMT >30: CPT | Performed by: INTERNAL MEDICINE

## 2021-11-06 PROCEDURE — 94668 MNPJ CHEST WALL SBSQ: CPT

## 2021-11-06 PROCEDURE — 94664 DEMO&/EVAL PT USE INHALER: CPT

## 2021-11-06 PROCEDURE — 85025 COMPLETE CBC W/AUTO DIFF WBC: CPT | Performed by: INTERNAL MEDICINE

## 2021-11-06 PROCEDURE — 94640 AIRWAY INHALATION TREATMENT: CPT

## 2021-11-06 PROCEDURE — 80048 BASIC METABOLIC PNL TOTAL CA: CPT | Performed by: INTERNAL MEDICINE

## 2021-11-06 PROCEDURE — 94760 N-INVAS EAR/PLS OXIMETRY 1: CPT

## 2021-11-06 PROCEDURE — 82948 REAGENT STRIP/BLOOD GLUCOSE: CPT

## 2021-11-06 RX ORDER — GUAIFENESIN 1200 MG/1
1200 TABLET, EXTENDED RELEASE ORAL EVERY 12 HOURS SCHEDULED
Qty: 20 TABLET | Refills: 0 | Status: SHIPPED | OUTPATIENT
Start: 2021-11-06 | End: 2022-07-11

## 2021-11-06 RX ORDER — ALBUTEROL SULFATE 2.5 MG/3ML
SOLUTION RESPIRATORY (INHALATION)
Qty: 75 ML | Refills: 1 | Status: SHIPPED | OUTPATIENT
Start: 2021-11-06 | End: 2021-12-13

## 2021-11-06 RX ORDER — PREDNISONE 10 MG/1
TABLET ORAL
Qty: 30 TABLET | Refills: 0 | Status: SHIPPED | OUTPATIENT
Start: 2021-11-06 | End: 2022-01-31

## 2021-11-06 RX ORDER — BENZONATATE 100 MG/1
100 CAPSULE ORAL 3 TIMES DAILY PRN
Qty: 20 CAPSULE | Refills: 0 | Status: SHIPPED | OUTPATIENT
Start: 2021-11-06 | End: 2021-12-06

## 2021-11-07 LAB
BACTERIA BLD CULT: NORMAL
BACTERIA BLD CULT: NORMAL

## 2021-11-08 ENCOUNTER — TRANSITIONAL CARE MANAGEMENT (OUTPATIENT)
Dept: INTERNAL MEDICINE CLINIC | Facility: CLINIC | Age: 86
End: 2021-11-08

## 2021-11-10 ENCOUNTER — TELEPHONE (OUTPATIENT)
Dept: INTERNAL MEDICINE CLINIC | Facility: CLINIC | Age: 86
End: 2021-11-10

## 2021-11-10 NOTE — TELEPHONE ENCOUNTER
Jessika Scherer / [facility]    [phone]    She did resumption of care for pt    he is doing much better    does have Ronchi throughout all lung fields    yellow thick mucus    sort of clears after he coughs    will have phy therapy today and ot soon       Vitals are fine  Kyler Grady

## 2021-11-11 ENCOUNTER — HOSPITAL ENCOUNTER (OUTPATIENT)
Dept: ULTRASOUND IMAGING | Facility: HOSPITAL | Age: 86
Discharge: HOME | End: 2021-11-11
Attending: INTERNAL MEDICINE
Payer: COMMERCIAL

## 2021-11-11 ENCOUNTER — HOSPITAL ENCOUNTER (OUTPATIENT)
Dept: RADIOLOGY | Facility: HOSPITAL | Age: 86
Discharge: HOME | End: 2021-11-11
Payer: COMMERCIAL

## 2021-11-11 ENCOUNTER — TELEPHONE (OUTPATIENT)
Dept: INTERNAL MEDICINE CLINIC | Facility: CLINIC | Age: 86
End: 2021-11-11

## 2021-11-11 ENCOUNTER — APPOINTMENT (OUTPATIENT)
Dept: LAB | Facility: HOSPITAL | Age: 86
End: 2021-11-11
Payer: COMMERCIAL

## 2021-11-11 ENCOUNTER — OFFICE VISIT (OUTPATIENT)
Dept: INTERNAL MEDICINE CLINIC | Facility: CLINIC | Age: 86
End: 2021-11-11
Payer: COMMERCIAL

## 2021-11-11 VITALS
HEART RATE: 92 BPM | OXYGEN SATURATION: 95 % | RESPIRATION RATE: 14 BRPM | TEMPERATURE: 98.5 F | SYSTOLIC BLOOD PRESSURE: 130 MMHG | DIASTOLIC BLOOD PRESSURE: 80 MMHG

## 2021-11-11 DIAGNOSIS — R06.02 SHORTNESS OF BREATH: ICD-10-CM

## 2021-11-11 DIAGNOSIS — R05.9 COUGH: Primary | ICD-10-CM

## 2021-11-11 DIAGNOSIS — R05.9 COUGH: ICD-10-CM

## 2021-11-11 DIAGNOSIS — N17.9 ACUTE KIDNEY INJURY (HCC): ICD-10-CM

## 2021-11-11 LAB
ANION GAP SERPL CALCULATED.3IONS-SCNC: 8 MMOL/L (ref 4–13)
BASOPHILS # BLD AUTO: 0.01 THOUSANDS/ΜL (ref 0–0.1)
BASOPHILS NFR BLD AUTO: 0 % (ref 0–1)
BUN SERPL-MCNC: 36 MG/DL (ref 5–25)
CALCIUM SERPL-MCNC: 8.5 MG/DL (ref 8.3–10.1)
CHLORIDE SERPL-SCNC: 103 MMOL/L (ref 100–108)
CO2 SERPL-SCNC: 26 MMOL/L (ref 21–32)
CREAT SERPL-MCNC: 1.42 MG/DL (ref 0.6–1.3)
EOSINOPHIL # BLD AUTO: 0 THOUSAND/ΜL (ref 0–0.61)
EOSINOPHIL NFR BLD AUTO: 0 % (ref 0–6)
ERYTHROCYTE [DISTWIDTH] IN BLOOD BY AUTOMATED COUNT: 12.9 % (ref 11.6–15.1)
GFR SERPL CREATININE-BSD FRML MDRD: 45 ML/MIN/1.73SQ M
GLUCOSE P FAST SERPL-MCNC: 227 MG/DL (ref 65–99)
HCT VFR BLD AUTO: 41 % (ref 36.5–49.3)
HGB BLD-MCNC: 13 G/DL (ref 12–17)
IMM GRANULOCYTES # BLD AUTO: 0.22 THOUSAND/UL (ref 0–0.2)
IMM GRANULOCYTES NFR BLD AUTO: 2 % (ref 0–2)
LYMPHOCYTES # BLD AUTO: 0.47 THOUSANDS/ΜL (ref 0.6–4.47)
LYMPHOCYTES NFR BLD AUTO: 3 % (ref 14–44)
MCH RBC QN AUTO: 30.9 PG (ref 26.8–34.3)
MCHC RBC AUTO-ENTMCNC: 31.7 G/DL (ref 31.4–37.4)
MCV RBC AUTO: 97 FL (ref 82–98)
MONOCYTES # BLD AUTO: 0.36 THOUSAND/ΜL (ref 0.17–1.22)
MONOCYTES NFR BLD AUTO: 3 % (ref 4–12)
NEUTROPHILS # BLD AUTO: 13.46 THOUSANDS/ΜL (ref 1.85–7.62)
NEUTS SEG NFR BLD AUTO: 92 % (ref 43–75)
NRBC BLD AUTO-RTO: 0 /100 WBCS
PLATELET # BLD AUTO: 214 THOUSANDS/UL (ref 149–390)
PMV BLD AUTO: 9.4 FL (ref 8.9–12.7)
POTASSIUM SERPL-SCNC: 4.7 MMOL/L (ref 3.5–5.3)
PROCALCITONIN SERPL-MCNC: <0.05 NG/ML
RBC # BLD AUTO: 4.21 MILLION/UL (ref 3.88–5.62)
SODIUM SERPL-SCNC: 137 MMOL/L (ref 136–145)
WBC # BLD AUTO: 14.52 THOUSAND/UL (ref 4.31–10.16)

## 2021-11-11 PROCEDURE — 1111F DSCHRG MED/CURRENT MED MERGE: CPT | Performed by: INTERNAL MEDICINE

## 2021-11-11 PROCEDURE — 76770 US EXAM ABDO BACK WALL COMP: CPT

## 2021-11-11 PROCEDURE — 71046 X-RAY EXAM CHEST 2 VIEWS: CPT

## 2021-11-11 PROCEDURE — 36415 COLL VENOUS BLD VENIPUNCTURE: CPT

## 2021-11-11 PROCEDURE — 99214 OFFICE O/P EST MOD 30 MIN: CPT | Performed by: INTERNAL MEDICINE

## 2021-11-11 PROCEDURE — 84145 PROCALCITONIN (PCT): CPT

## 2021-11-11 PROCEDURE — 80048 BASIC METABOLIC PNL TOTAL CA: CPT

## 2021-11-11 PROCEDURE — 85025 COMPLETE CBC W/AUTO DIFF WBC: CPT

## 2021-11-15 ENCOUNTER — TELEPHONE (OUTPATIENT)
Dept: INTERNAL MEDICINE CLINIC | Facility: CLINIC | Age: 86
End: 2021-11-15

## 2021-11-23 ENCOUNTER — APPOINTMENT (OUTPATIENT)
Dept: LAB | Facility: CLINIC | Age: 86
End: 2021-11-23
Payer: COMMERCIAL

## 2021-11-23 ENCOUNTER — TELEPHONE (OUTPATIENT)
Dept: INTERNAL MEDICINE CLINIC | Facility: CLINIC | Age: 86
End: 2021-11-23

## 2021-11-23 ENCOUNTER — OFFICE VISIT (OUTPATIENT)
Dept: INTERNAL MEDICINE CLINIC | Facility: CLINIC | Age: 86
End: 2021-11-23
Payer: COMMERCIAL

## 2021-11-23 VITALS
OXYGEN SATURATION: 97 % | BODY MASS INDEX: 31.89 KG/M2 | SYSTOLIC BLOOD PRESSURE: 132 MMHG | HEART RATE: 70 BPM | TEMPERATURE: 97.1 F | DIASTOLIC BLOOD PRESSURE: 70 MMHG | RESPIRATION RATE: 14 BRPM | HEIGHT: 63 IN

## 2021-11-23 DIAGNOSIS — G20 PARKINSON'S DISEASE (HCC): ICD-10-CM

## 2021-11-23 DIAGNOSIS — G20 DEMENTIA DUE TO PARKINSON'S DISEASE WITH BEHAVIORAL DISTURBANCE (HCC): ICD-10-CM

## 2021-11-23 DIAGNOSIS — R53.1 GENERALIZED WEAKNESS: ICD-10-CM

## 2021-11-23 DIAGNOSIS — F02.81 DEMENTIA DUE TO PARKINSON'S DISEASE WITH BEHAVIORAL DISTURBANCE (HCC): ICD-10-CM

## 2021-11-23 DIAGNOSIS — G20 PARKINSON'S DISEASE (HCC): Primary | ICD-10-CM
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
## 2021-11-23 LAB
ALBUMIN SERPL BCP-MCNC: 3.1 G/DL (ref 3.5–5)
ALP SERPL-CCNC: 100 U/L (ref 46–116)
ALT SERPL W P-5'-P-CCNC: 18 U/L (ref 12–78)
ANION GAP SERPL CALCULATED.3IONS-SCNC: 3 MMOL/L (ref 4–13)
AST SERPL W P-5'-P-CCNC: 23 U/L (ref 5–45)
BASOPHILS # BLD AUTO: 0.02 THOUSANDS/ΜL (ref 0–0.1)
BASOPHILS NFR BLD AUTO: 0 % (ref 0–1)
BILIRUB DIRECT SERPL-MCNC: 0.18 MG/DL (ref 0–0.2)
BILIRUB SERPL-MCNC: 0.67 MG/DL (ref 0.2–1)
BUN SERPL-MCNC: 21 MG/DL (ref 5–25)
CALCIUM SERPL-MCNC: 9.1 MG/DL (ref 8.3–10.1)
CHLORIDE SERPL-SCNC: 105 MMOL/L (ref 100–108)
CK SERPL-CCNC: 127 U/L (ref 39–308)
CO2 SERPL-SCNC: 25 MMOL/L (ref 21–32)
CREAT SERPL-MCNC: 1.22 MG/DL (ref 0.6–1.3)
EOSINOPHIL # BLD AUTO: 0.02 THOUSAND/ΜL (ref 0–0.61)
EOSINOPHIL NFR BLD AUTO: 0 % (ref 0–6)
ERYTHROCYTE [DISTWIDTH] IN BLOOD BY AUTOMATED COUNT: 13.3 % (ref 11.6–15.1)
GFR SERPL CREATININE-BSD FRML MDRD: 54 ML/MIN/1.73SQ M
GLUCOSE SERPL-MCNC: 130 MG/DL (ref 65–140)
HCT VFR BLD AUTO: 38.5 % (ref 36.5–49.3)
HGB BLD-MCNC: 12.7 G/DL (ref 12–17)
IMM GRANULOCYTES # BLD AUTO: 0.05 THOUSAND/UL (ref 0–0.2)
IMM GRANULOCYTES NFR BLD AUTO: 1 % (ref 0–2)
LYMPHOCYTES # BLD AUTO: 0.63 THOUSANDS/ΜL (ref 0.6–4.47)
LYMPHOCYTES NFR BLD AUTO: 8 % (ref 14–44)
MAGNESIUM SERPL-MCNC: 1.9 MG/DL (ref 1.6–2.6)
MCH RBC QN AUTO: 31.3 PG (ref 26.8–34.3)
MCHC RBC AUTO-ENTMCNC: 33 G/DL (ref 31.4–37.4)
MCV RBC AUTO: 95 FL (ref 82–98)
MONOCYTES # BLD AUTO: 0.59 THOUSAND/ΜL (ref 0.17–1.22)
MONOCYTES NFR BLD AUTO: 7 % (ref 4–12)
NEUTROPHILS # BLD AUTO: 6.61 THOUSANDS/ΜL (ref 1.85–7.62)
NEUTS SEG NFR BLD AUTO: 84 % (ref 43–75)
NRBC BLD AUTO-RTO: 0 /100 WBCS
PHOSPHATE SERPL-MCNC: 2.2 MG/DL (ref 2.3–4.1)
PLATELET # BLD AUTO: 182 THOUSANDS/UL (ref 149–390)
PMV BLD AUTO: 9.6 FL (ref 8.9–12.7)
POTASSIUM SERPL-SCNC: 4.8 MMOL/L (ref 3.5–5.3)
PROT SERPL-MCNC: 6.9 G/DL (ref 6.4–8.2)
RBC # BLD AUTO: 4.06 MILLION/UL (ref 3.88–5.62)
SODIUM SERPL-SCNC: 133 MMOL/L (ref 136–145)
WBC # BLD AUTO: 7.92 THOUSAND/UL (ref 4.31–10.16)

## 2021-11-23 PROCEDURE — 82550 ASSAY OF CK (CPK): CPT

## 2021-11-23 PROCEDURE — 83735 ASSAY OF MAGNESIUM: CPT

## 2021-11-23 PROCEDURE — 99214 OFFICE O/P EST MOD 30 MIN: CPT | Performed by: INTERNAL MEDICINE

## 2021-11-23 PROCEDURE — 1111F DSCHRG MED/CURRENT MED MERGE: CPT | Performed by: INTERNAL MEDICINE

## 2021-11-23 PROCEDURE — 80076 HEPATIC FUNCTION PANEL: CPT

## 2021-11-23 PROCEDURE — 84100 ASSAY OF PHOSPHORUS: CPT

## 2021-11-23 PROCEDURE — 80048 BASIC METABOLIC PNL TOTAL CA: CPT

## 2021-11-23 PROCEDURE — 85025 COMPLETE CBC W/AUTO DIFF WBC: CPT

## 2021-11-23 PROCEDURE — 1036F TOBACCO NON-USER: CPT | Performed by: INTERNAL MEDICINE

## 2021-11-23 PROCEDURE — 36415 COLL VENOUS BLD VENIPUNCTURE: CPT

## 2021-11-23 PROCEDURE — 1160F RVW MEDS BY RX/DR IN RCRD: CPT | Performed by: INTERNAL MEDICINE

## 2021-11-23 RX ORDER — QUETIAPINE FUMARATE 100 MG/1
100 TABLET, FILM COATED ORAL
Qty: 30 TABLET | Refills: 5 | Status: SHIPPED | OUTPATIENT
Start: 2021-11-23 | End: 2022-03-31 | Stop reason: HOSPADM

## 2021-11-26 ENCOUNTER — TELEPHONE (OUTPATIENT)
Dept: INTERNAL MEDICINE CLINIC | Facility: CLINIC | Age: 86
End: 2021-11-26

## 2021-11-28 DIAGNOSIS — L30.4 INTERTRIGO: ICD-10-CM

## 2021-11-29 RX ORDER — NYSTATIN 100000 [USP'U]/G
POWDER TOPICAL
Qty: 30 G | Refills: 1 | Status: SHIPPED | OUTPATIENT
Start: 2021-11-29 | End: 2021-12-30

## 2021-12-03 ENCOUNTER — TELEPHONE (OUTPATIENT)
Dept: INTERNAL MEDICINE CLINIC | Facility: CLINIC | Age: 86
End: 2021-12-03

## 2021-12-06 DIAGNOSIS — J18.9 COMMUNITY ACQUIRED PNEUMONIA OF RIGHT LOWER LOBE OF LUNG: ICD-10-CM

## 2021-12-06 RX ORDER — BENZONATATE 100 MG/1
CAPSULE ORAL
Qty: 20 CAPSULE | Refills: 0 | Status: SHIPPED | OUTPATIENT
Start: 2021-12-06 | End: 2022-07-11

## 2021-12-12 DIAGNOSIS — J42 CHRONIC BRONCHITIS, UNSPECIFIED CHRONIC BRONCHITIS TYPE (HCC): ICD-10-CM

## 2021-12-13 RX ORDER — ALBUTEROL SULFATE 2.5 MG/3ML
SOLUTION RESPIRATORY (INHALATION)
Qty: 75 ML | Refills: 1 | Status: SHIPPED | OUTPATIENT
Start: 2021-12-13 | End: 2022-01-01 | Stop reason: SDUPTHER

## 2021-12-14 ENCOUNTER — TELEPHONE (OUTPATIENT)
Dept: INTERNAL MEDICINE CLINIC | Facility: CLINIC | Age: 86
End: 2021-12-14

## 2021-12-16 ENCOUNTER — HOSPITAL ENCOUNTER (OUTPATIENT)
Dept: RADIOLOGY | Facility: HOSPITAL | Age: 86
Discharge: HOME | End: 2021-12-16
Payer: COMMERCIAL

## 2021-12-16 DIAGNOSIS — R06.02 SHORTNESS OF BREATH: ICD-10-CM

## 2021-12-16 DIAGNOSIS — R05.9 COUGH: ICD-10-CM

## 2021-12-16 PROCEDURE — 74230 X-RAY XM SWLNG FUNCJ C+: CPT

## 2021-12-16 PROCEDURE — 92611 MOTION FLUOROSCOPY/SWALLOW: CPT

## 2021-12-18 DIAGNOSIS — J42 CHRONIC BRONCHITIS, UNSPECIFIED CHRONIC BRONCHITIS TYPE (HCC): ICD-10-CM

## 2021-12-18 RX ORDER — ALBUTEROL SULFATE 90 UG/1
AEROSOL, METERED RESPIRATORY (INHALATION)
Qty: 1 G | Refills: 3 | Status: SHIPPED | OUTPATIENT
Start: 2021-12-18 | End: 2022-04-06

## 2021-12-20 ENCOUNTER — TELEPHONE (OUTPATIENT)
Dept: INTERNAL MEDICINE CLINIC | Facility: CLINIC | Age: 86
End: 2021-12-20

## 2021-12-27 ENCOUNTER — TELEPHONE (OUTPATIENT)
Dept: INTERNAL MEDICINE CLINIC | Facility: CLINIC | Age: 86
End: 2021-12-27

## 2021-12-27 NOTE — TELEPHONE ENCOUNTER
PT  FELL  TWO  TIMES  IN  24 HOURS   PT  DIDN'T  GO  TO  THE  ER   THE  VISITING   NURSE   JUST  WANTED   DR Wiliam White   TO  KNOW  THIS    ANY QUESTIONS   CALL  Tova Crisostomo  [phone]

## 2021-12-29 DIAGNOSIS — J42 CHRONIC BRONCHITIS, UNSPECIFIED CHRONIC BRONCHITIS TYPE (HCC): ICD-10-CM

## 2021-12-29 DIAGNOSIS — L30.4 INTERTRIGO: ICD-10-CM

## 2021-12-29 RX ORDER — ALBUTEROL SULFATE 2.5 MG/3ML
SOLUTION RESPIRATORY (INHALATION)
Qty: 75 ML | Refills: 1 | OUTPATIENT
Start: 2021-12-29

## 2021-12-30 RX ORDER — NYSTATIN 100000 [USP'U]/G
POWDER TOPICAL
Qty: 30 G | Refills: 1 | Status: SHIPPED | OUTPATIENT
Start: 2021-12-30 | End: 2022-02-02

## 2022-01-01 ENCOUNTER — HOME CARE VISIT (OUTPATIENT)
Dept: HOME HOSPICE | Facility: HOSPICE | Age: 87
End: 2022-01-01

## 2022-01-01 ENCOUNTER — HOME CARE VISIT (OUTPATIENT)
Dept: HOME HEALTH SERVICES | Facility: HOME HEALTHCARE | Age: 87
End: 2022-01-01

## 2022-01-01 ENCOUNTER — HOSPICE ADMISSION (OUTPATIENT)
Dept: HOME HOSPICE | Facility: HOSPICE | Age: 87
End: 2022-01-01

## 2022-01-01 DIAGNOSIS — G20 PARKINSON'S DISEASE (HCC): ICD-10-CM

## 2022-01-01 DIAGNOSIS — J45.40 MODERATE PERSISTENT EXTRINSIC ASTHMA WITHOUT COMPLICATION: ICD-10-CM

## 2022-01-01 DIAGNOSIS — J42 CHRONIC BRONCHITIS, UNSPECIFIED CHRONIC BRONCHITIS TYPE (HCC): ICD-10-CM

## 2022-01-01 DIAGNOSIS — J44.9 CHRONIC OBSTRUCTIVE PULMONARY DISEASE, UNSPECIFIED COPD TYPE (HCC): ICD-10-CM

## 2022-01-01 DIAGNOSIS — G20 PARKINSON'S DISEASE: Primary | ICD-10-CM

## 2022-01-01 RX ORDER — PRAMIPEXOLE DIHYDROCHLORIDE 0.5 MG/1
0.5 TABLET ORAL 3 TIMES DAILY
Qty: 270 TABLET | Refills: 0 | Status: CANCELLED | OUTPATIENT
Start: 2022-01-01

## 2022-01-01 RX ORDER — RIVASTIGMINE TARTRATE 4.5 MG/1
4.5 CAPSULE ORAL 2 TIMES DAILY
Qty: 180 CAPSULE | Refills: 0 | Status: CANCELLED | OUTPATIENT
Start: 2022-01-01

## 2022-01-03 NOTE — TELEPHONE ENCOUNTER
Spoke with pt's daughter  Advised to call [facility] to fill medication rivastigmine  Pt has one 3 month refill available  Also let her know that mirapex script sent to [facility] today  Nothing further at this time

## 2022-01-04 DIAGNOSIS — J42 CHRONIC BRONCHITIS, UNSPECIFIED CHRONIC BRONCHITIS TYPE (HCC): ICD-10-CM

## 2022-01-04 RX ORDER — TIOTROPIUM BROMIDE 18 UG/1
18 CAPSULE ORAL; RESPIRATORY (INHALATION) DAILY
Qty: 90 CAPSULE | Refills: 0 | Status: SHIPPED | OUTPATIENT
Start: 2022-01-04 | End: 2022-05-16 | Stop reason: SDUPTHER

## 2022-01-04 RX ORDER — ALBUTEROL SULFATE 2.5 MG/3ML
SOLUTION RESPIRATORY (INHALATION)
Qty: 75 ML | Refills: 0 | Status: SHIPPED | OUTPATIENT
Start: 2022-01-04 | End: 2022-01-04

## 2022-01-04 RX ORDER — ALBUTEROL SULFATE 2.5 MG/3ML
SOLUTION RESPIRATORY (INHALATION)
Qty: 75 ML | Refills: 0 | Status: SHIPPED | OUTPATIENT
Start: 2022-01-04 | End: 2022-01-17 | Stop reason: SDUPTHER

## 2022-01-05 ENCOUNTER — IMMUNIZATIONS (OUTPATIENT)
Dept: FAMILY MEDICINE CLINIC | Facility: HOSPITAL | Age: 87
End: 2022-01-05

## 2022-01-05 DIAGNOSIS — Z23 ENCOUNTER FOR IMMUNIZATION: Primary | ICD-10-CM

## 2022-01-05 PROCEDURE — 91306 COVID-19 MODERNA VACC 0.25 ML BOOSTER: CPT

## 2022-01-05 PROCEDURE — 0064A COVID-19 MODERNA VACC 0.25 ML BOOSTER: CPT

## 2022-01-08 DIAGNOSIS — G20 PARKINSON'S DISEASE (HCC): ICD-10-CM

## 2022-01-08 RX ORDER — RIVASTIGMINE TARTRATE 4.5 MG/1
4.5 CAPSULE ORAL 2 TIMES DAILY
Qty: 180 CAPSULE | Refills: 0 | Status: CANCELLED | OUTPATIENT
Start: 2022-01-08

## 2022-01-10 NOTE — TELEPHONE ENCOUNTER
Pt requesting refill  Called [facility] pharmacy  States medication is ready to be picked up

## 2022-01-12 ENCOUNTER — TELEPHONE (OUTPATIENT)
Dept: INTERNAL MEDICINE CLINIC | Facility: CLINIC | Age: 87
End: 2022-01-12

## 2022-01-12 NOTE — TELEPHONE ENCOUNTER
FYI:  Lillian Levy from [address] at home   Braden Greco      Patient fell yesterday , No pain or bruising

## 2022-01-17 DIAGNOSIS — J42 CHRONIC BRONCHITIS, UNSPECIFIED CHRONIC BRONCHITIS TYPE (HCC): ICD-10-CM

## 2022-01-17 RX ORDER — ALBUTEROL SULFATE 2.5 MG/3ML
SOLUTION RESPIRATORY (INHALATION)
Qty: 75 ML | Refills: 0 | Status: SHIPPED | OUTPATIENT
Start: 2022-01-17 | End: 2022-02-02

## 2022-01-17 RX ORDER — ALBUTEROL SULFATE 2.5 MG/3ML
SOLUTION RESPIRATORY (INHALATION)
Qty: 75 ML | Refills: 0 | Status: SHIPPED | OUTPATIENT
Start: 2022-01-17 | End: 2022-02-08 | Stop reason: SDUPTHER

## 2022-01-23 DIAGNOSIS — G20 DEMENTIA DUE TO PARKINSON'S DISEASE WITH BEHAVIORAL DISTURBANCE (HCC): ICD-10-CM

## 2022-01-23 DIAGNOSIS — F02.81 DEMENTIA DUE TO PARKINSON'S DISEASE WITH BEHAVIORAL DISTURBANCE (HCC): ICD-10-CM

## 2022-01-23 RX ORDER — QUETIAPINE FUMARATE 25 MG/1
TABLET, FILM COATED ORAL
Qty: 90 TABLET | Refills: 1 | Status: SHIPPED | OUTPATIENT
Start: 2022-01-23 | End: 2022-03-31 | Stop reason: HOSPADM

## 2022-01-28 ENCOUNTER — TELEPHONE (OUTPATIENT)
Dept: INTERNAL MEDICINE CLINIC | Facility: CLINIC | Age: 87
End: 2022-01-28

## 2022-01-28 NOTE — TELEPHONE ENCOUNTER
WILLIAN - abbe fell yesterday  Cindy Gan got up between 2 & 7 am   He's ok    just a skin tear on r forearm

## 2022-01-31 DIAGNOSIS — B44.81 ABPA (ALLERGIC BRONCHOPULMONARY ASPERGILLOSIS) (HCC): ICD-10-CM

## 2022-01-31 RX ORDER — PREDNISONE 10 MG/1
TABLET ORAL
Qty: 30 TABLET | Refills: 0 | Status: SHIPPED | OUTPATIENT
Start: 2022-01-31 | End: 2022-02-08 | Stop reason: SDUPTHER

## 2022-02-01 DIAGNOSIS — L30.4 INTERTRIGO: ICD-10-CM

## 2022-02-01 DIAGNOSIS — J42 CHRONIC BRONCHITIS, UNSPECIFIED CHRONIC BRONCHITIS TYPE (HCC): ICD-10-CM

## 2022-02-01 DIAGNOSIS — I63.9 CEREBROVASCULAR ACCIDENT (CVA), UNSPECIFIED MECHANISM (HCC): ICD-10-CM

## 2022-02-01 RX ORDER — ATORVASTATIN CALCIUM 10 MG/1
TABLET, FILM COATED ORAL
Qty: 90 TABLET | Refills: 1 | Status: SHIPPED | OUTPATIENT
Start: 2022-02-01 | End: 2022-07-11

## 2022-02-02 RX ORDER — ALBUTEROL SULFATE 2.5 MG/3ML
SOLUTION RESPIRATORY (INHALATION)
Qty: 75 ML | Refills: 0 | Status: SHIPPED | OUTPATIENT
Start: 2022-02-02 | End: 2022-02-08 | Stop reason: SDUPTHER

## 2022-02-02 RX ORDER — NYSTATIN 100000 [USP'U]/G
POWDER TOPICAL
Qty: 30 G | Refills: 1 | Status: SHIPPED | OUTPATIENT
Start: 2022-02-02 | End: 2022-03-07

## 2022-02-04 PROBLEM — Z00.00 ENCOUNTER FOR PREVENTIVE HEALTH EXAMINATION: Status: ACTIVE | Noted: 2022-02-04

## 2022-02-07 DIAGNOSIS — R33.9 URINARY RETENTION: ICD-10-CM

## 2022-02-07 RX ORDER — FINASTERIDE 5 MG/1
5 TABLET, FILM COATED ORAL EVERY MORNING
Qty: 90 TABLET | Refills: 0 | Status: SHIPPED | OUTPATIENT
Start: 2022-02-07 | End: 2022-05-02

## 2022-02-08 ENCOUNTER — TELEPHONE (OUTPATIENT)
Dept: INTERNAL MEDICINE CLINIC | Facility: CLINIC | Age: 87
End: 2022-02-08

## 2022-02-08 DIAGNOSIS — J45.40 MODERATE PERSISTENT EXTRINSIC ASTHMA WITHOUT COMPLICATION: ICD-10-CM

## 2022-02-08 DIAGNOSIS — B44.81 ABPA (ALLERGIC BRONCHOPULMONARY ASPERGILLOSIS) (HCC): ICD-10-CM

## 2022-02-08 DIAGNOSIS — J42 CHRONIC BRONCHITIS, UNSPECIFIED CHRONIC BRONCHITIS TYPE (HCC): ICD-10-CM

## 2022-02-08 RX ORDER — ALBUTEROL SULFATE 2.5 MG/3ML
SOLUTION RESPIRATORY (INHALATION)
Qty: 75 ML | Refills: 0 | Status: SHIPPED | OUTPATIENT
Start: 2022-02-08 | End: 2022-04-01

## 2022-02-08 RX ORDER — ALBUTEROL SULFATE 2.5 MG/3ML
SOLUTION RESPIRATORY (INHALATION)
Qty: 75 ML | Refills: 0 | Status: SHIPPED | OUTPATIENT
Start: 2022-02-08 | End: 2022-02-11 | Stop reason: SDUPTHER

## 2022-02-08 RX ORDER — PREDNISONE 10 MG/1
TABLET ORAL
Qty: 30 TABLET | Refills: 0 | Status: SHIPPED | OUTPATIENT
Start: 2022-02-08 | End: 2022-03-11 | Stop reason: SDUPTHER

## 2022-02-11 ENCOUNTER — OFFICE VISIT (OUTPATIENT)
Dept: PULMONOLOGY | Facility: CLINIC | Age: 87
End: 2022-02-11
Payer: COMMERCIAL

## 2022-02-11 VITALS
SYSTOLIC BLOOD PRESSURE: 120 MMHG | HEART RATE: 73 BPM | DIASTOLIC BLOOD PRESSURE: 80 MMHG | OXYGEN SATURATION: 96 % | TEMPERATURE: 97.9 F

## 2022-02-11 DIAGNOSIS — J47.9 BRONCHIECTASIS WITHOUT COMPLICATION (HCC): Primary | ICD-10-CM

## 2022-02-11 DIAGNOSIS — J42 CHRONIC BRONCHITIS, UNSPECIFIED CHRONIC BRONCHITIS TYPE (HCC): ICD-10-CM

## 2022-02-11 DIAGNOSIS — Z79.52 LONG TERM (CURRENT) USE OF SYSTEMIC STEROIDS: ICD-10-CM

## 2022-02-11 DIAGNOSIS — J44.1 CHRONIC OBSTRUCTIVE PULMONARY DISEASE WITH (ACUTE) EXACERBATION (HCC): ICD-10-CM

## 2022-02-11 DIAGNOSIS — R05.9 COUGH: ICD-10-CM

## 2022-02-11 PROCEDURE — 99214 OFFICE O/P EST MOD 30 MIN: CPT | Performed by: INTERNAL MEDICINE

## 2022-02-11 RX ORDER — ALBUTEROL SULFATE 2.5 MG/3ML
SOLUTION RESPIRATORY (INHALATION)
Qty: 360 ML | Refills: 1 | Status: SHIPPED | OUTPATIENT
Start: 2022-02-11 | End: 2022-03-31 | Stop reason: HOSPADM

## 2022-02-11 RX ORDER — FAMOTIDINE 20 MG/1
20 TABLET, FILM COATED ORAL 2 TIMES DAILY
Qty: 30 TABLET | Refills: 2 | Status: SHIPPED | OUTPATIENT
Start: 2022-02-11 | End: 2022-02-21

## 2022-02-11 NOTE — PROGRESS NOTES
Assessment/Plan:   Diagnoses and all orders for this visit:    Bronchiectasis without complication ([address] )  -     Chest Percussion Vest    Chronic obstructive pulmonary disease with (acute) exacerbation (HCC)    Chronic bronchitis, unspecified chronic bronchitis type (HCC)  -     albuterol (2 5 mg/3 mL) 0 083 % nebulizer solution; INHALE 1 VIAL VIA NEBULIZER EVERY 4 HOURS AS NEEDED    Cough  -     famotidine (PEPCID) 20 mg tablet; Take 1 tablet (20 mg total) by mouth 2 (two) times a day    Long term (current) use of systemic steroids        Reviewed CT of the chest from October 2020 with extensive bronchiectasis , with  mucus plugging  Daughter states that since then the cough has decreased currently although it is still present he is not wheezing like before she states  He continues to use the albuterol via nebulizer 4 times daily   Continue with Advair 250/51 puff twice daily   Continue with Spiriva 1 puff daily   Continue with Singulair 10 mg daily  Continue with prednisone 10 mg daily  Discussed regarding percussion vest to be ordered given his bronchiectasis benefit from that   Patient has cough lasting for greater than 6 months, manual CPT failed to mobilize secretions, he would benefit from the percussion vest  Also added Pepcid 20 mg twice daily  As answered patient's daughter's questions and also long-term side effects of the prednisone understands and verbalizes   Vaccinations up-to-date   Follow-up in 3 months or p r n  earlier as needed  Return in about 3 months (around 5/11/2022)  All questions are answered to the patient's satisfaction and understanding  He verbalizes understanding  He is encouraged to call with any further questions or concerns  Portions of the record may have been created with voice recognition software  Occasional wrong word or "sound a like" substitutions may have occurred due to the inherent limitations of voice recognition software    Read the chart carefully and recognize, using context, where substitutions have occurred  Electronically Signed by Darryle Breaker, MD    ______________________________________________________________________    Chief Complaint:   Chief Complaint   Patient presents with    Follow-up       Patient ID: Cherie Sellers is a 81 y o  y o  male has a past medical history of 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) ([address] ), Allergic rhinitis, Aortic regurgitation, Arm laceration, Asthma, Bronchitis, chronic obstructive, with exacerbation ([address] ), Candidal intertrigo, Chronic obstructive lung disease ([address] ), COPD (chronic obstructive pulmonary disease) ([address] ), Coronary artery disease, Cough, CVA (cerebral vascular accident) ([facility] ), Dermatitis, Diabetes mellitus type 2, uncomplicated ([address] ), Dysthymic disorder, Fatigue, Hyperlipidemia, Hypertension, Neurologic gait dysfunction, Parkinson's disease ([address] ), Pneumonia, PVD (peripheral vascular disease) ([address] ), Seborrheic keratosis, TIA (transient ischemic attack), Tinea corporis, Tinea pedis of both feet, and Tremor  2/11/2022  Patient presents today for follow-up visit  Cherie Sellers is a pleasant 50-year-old male former smoker with past medical history including bronchiectasis, allergic asthma/ABPA with IgE level greater than 5000, Parkinson's disease with dementia, ambulatory dysfunction presenting for follow-up  Patient has not been seen in over 1 year  It seems overall his symptoms are without significant change  There has been gradual progression of his Parkinson's associated with physical deconditioning and increased fatigue  He does have frequent cough and difficulty bringing up his mucus  Has had 2 episodes of exacerbations in the past year requiring prednisone tapering down dose he has also been on a maintenance dose of prednisone 10 mg daily for several years now  Review of Systems   Constitutional: Positive for fatigue  HENT: Negative  Eyes: Negative      Respiratory: Positive for cough and shortness of breath  Cardiovascular: Negative  Gastrointestinal: Negative  Endocrine: Negative  Genitourinary: Negative  Musculoskeletal: Negative  Allergic/Immunologic: Negative  Neurological: Negative  Hematological: Negative  Psychiatric/Behavioral: Negative  Smoking history: He reports that he quit smoking about 26 years ago  His smoking use included cigarettes  He started smoking about 29 years ago  He has a 3 00 pack-year smoking history   He has never used smokeless tobacco     The following portions of the patient's history were reviewed and updated as appropriate: allergies, current medications, past family history, past medical history, past social history, past surgical history and problem list     Immunization History   Administered Date(s) Administered    COVID-19 MODERNA VACC 0 25 ML IM BOOSTER 01/05/2022    COVID-19 MODERNA VACC 0 5 ML IM 01/25/2021, 02/20/2021    INFLUENZA 11/19/2007, 09/22/2009, 12/02/2011, 10/11/2013, 10/23/2015, 11/21/2017    Influenza Split High Dose Preservative Free IM 10/21/2014, 10/23/2015, 11/21/2017    Influenza, high dose seasonal 0 7 mL 09/26/2019, 11/09/2020, 10/15/2021    Influenza, seasonal, injectable 11/19/2007, 11/10/2008, 09/22/2009, 12/02/2011, 10/11/2013    Pneumococcal Conjugate 13-Valent 04/27/2016    Pneumococcal Polysaccharide PPV23 1936, 06/09/2017    Tdap 1936, 04/27/2016    Zoster 1936, 09/23/2015     Current Outpatient Medications   Medication Sig Dispense Refill    acetaminophen (TYLENOL) 325 mg tablet Take 650 mg by mouth every 6 (six) hours as needed for mild pain      albuterol (2 5 mg/3 mL) 0 083 % nebulizer solution INHALE 1 VIAL VIA NEBULIZER EVERY 4 HOURS AS NEEDED 75 mL 0    albuterol (2 5 mg/3 mL) 0 083 % nebulizer solution INHALE 1 VIAL VIA NEBULIZER EVERY 4 HOURS AS NEEDED 360 mL 1    albuterol (PROVENTIL HFA,VENTOLIN HFA) 90 mcg/act inhaler INHALE 2 PUFFS BY MOUTH EVERY 6 HOURS AS NEEDED FOR WHEEZING 1 g 3    atorvastatin (LIPITOR) 10 mg tablet TAKE 1 TABLET BY MOUTH EVERY DAY 90 tablet 1    benzonatate (TESSALON PERLES) 100 mg capsule TAKE 1 CAPSULE BY MOUTH THREE TIMES A DAY AS NEEDED FOR COUGH 20 capsule 0    carbidopa-levodopa (SINEMET)  mg per tablet TAKE 1 TABLET BY MOUTH THREE TIMES A  tablet 1    finasteride (PROSCAR) 5 mg tablet Take 1 tablet (5 mg total) by mouth every morning 90 tablet 0    fluticasone-salmeterol (Wixela Inhub) 250-50 mcg/dose inhaler Inhale 1 puff 2 (two) times a day Rinse mouth after use  60 blister 2    fluticasone-salmeterol (Wixela Inhub) 250-50 mcg/dose inhaler Inhale 1 puff 2 (two) times a day Rinse mouth after use   60 blister 0    guaiFENesin 1200 MG TB12 Take 1 tablet (1,200 mg total) by mouth every 12 (twelve) hours 20 tablet 0    levalbuterol (XOPENEX) 1 25 mg/0 5 mL nebulizer solution Take 0 5 mL (1 25 mg total) by nebulization 3 (three) times a day      montelukast (SINGULAIR) 10 mg tablet TAKE 1 TABLET BY MOUTH DAILY AT BEDTIME 90 tablet 3    nystatin (MYCOSTATIN) cream Apply to intertriginous areas 2-3 times daily as needed 80 g 3    nystatin (MYCOSTATIN) powder APPLY TOPICALLY 2 (TWO) TIMES A DAY TO AFFECTED AREA 30 g 1    pramipexole (MIRAPEX) 0 5 mg tablet TAKE 1 TABLET (0 5 MG TOTAL) BY MOUTH 3 (THREE) TIMES A  tablet 0    predniSONE 10 mg tablet TAKE 1 TABLET BY MOUTH EVERY DAY 30 tablet 0    QUEtiapine (SEROquel) 100 mg tablet Take 1 tablet (100 mg total) by mouth daily at bedtime 30 tablet 5    QUEtiapine (SEROquel) 25 mg tablet TAKE 1 TABLET BY MOUTH EVERYDAY AT BEDTIME 90 tablet 1    Respiratory Therapy Supplies (Flutter) VON Use daily Use flutter valve at least three times daily and whenever needed for congestion 1 each 0    rivastigmine (EXELON) 4 5 MG capsule Take 1 capsule (4 5 mg total) by mouth 2 (two) times a day 180 capsule 1    tiotropium (Spiriva HandiHaler) 18 mcg inhalation capsule Place 1 capsule (18 mcg total) into inhaler and inhale daily [address] Handihaler at the same time every day 90 capsule 0    triamcinolone (KENALOG) 0 5 % cream APPLY TO AFFECTED AREA(S) OF THE RASH TWICE DAILY AS NEEDED 30 g 0    famotidine (PEPCID) 20 mg tablet Take 1 tablet (20 mg total) by mouth 2 (two) times a day 30 tablet 2     No current facility-administered medications for this visit  Allergies: Penicillins    Objective:  Vitals:    02/11/22 1219   BP: 120/80   Pulse: 73   Temp: 97 9 °F (36 6 °C)   SpO2: 96%   Oxygen Therapy  SpO2: 96 %    Wt Readings from Last 3 Encounters:   11/05/21 81 6 kg (180 lb)   11/02/21 81 2 kg (179 lb)   10/15/21 76 7 kg (169 lb)     There is no height or weight on file to calculate BMI  Physical Exam  Vitals and nursing note reviewed  Constitutional:       Appearance: He is well-developed  HENT:      Head: Normocephalic and atraumatic  Eyes:      Conjunctiva/sclera: Conjunctivae normal       Pupils: Pupils are equal, round, and reactive to light  Neck:      Thyroid: No thyromegaly  Vascular: No JVD  Cardiovascular:      Rate and Rhythm: Normal rate and regular rhythm  Heart sounds: Normal heart sounds  No murmur heard  No friction rub  No gallop  Pulmonary:      Effort: Pulmonary effort is normal  No respiratory distress  Breath sounds: Rales present  No wheezing  Chest:      Chest wall: No tenderness  Musculoskeletal:         General: No tenderness or deformity  Normal range of motion  Cervical back: Normal range of motion and neck supple  Lymphadenopathy:      Cervical: No cervical adenopathy  Skin:     General: Skin is warm and dry  Neurological:      Mental Status: He is alert and oriented to person, place, and time             Diagnostics:  I have personally reviewed pertinent films in PACS

## 2022-02-15 ENCOUNTER — TELEPHONE (OUTPATIENT)
Dept: NEUROLOGY | Facility: CLINIC | Age: 87
End: 2022-02-15

## 2022-02-15 ENCOUNTER — HOSPITAL ENCOUNTER (OUTPATIENT)
Dept: CT IMAGING | Facility: CLINIC | Age: 87
Discharge: HOME | End: 2022-02-15
Payer: COMMERCIAL

## 2022-02-15 ENCOUNTER — OFFICE VISIT (OUTPATIENT)
Dept: NEUROLOGY | Facility: CLINIC | Age: 87
End: 2022-02-15
Payer: COMMERCIAL

## 2022-02-15 ENCOUNTER — APPOINTMENT (OUTPATIENT)
Dept: LAB | Facility: CLINIC | Age: 87
End: 2022-02-15
Payer: COMMERCIAL

## 2022-02-15 VITALS
BODY MASS INDEX: 31.89 KG/M2 | SYSTOLIC BLOOD PRESSURE: 120 MMHG | TEMPERATURE: 97.6 F | HEIGHT: 63 IN | HEART RATE: 68 BPM | DIASTOLIC BLOOD PRESSURE: 68 MMHG | OXYGEN SATURATION: 98 % | WEIGHT: 180 LBS

## 2022-02-15 DIAGNOSIS — R26.2 AMBULATORY DYSFUNCTION: ICD-10-CM

## 2022-02-15 DIAGNOSIS — E11.9 TYPE 2 DIABETES MELLITUS WITHOUT COMPLICATION, WITHOUT LONG-TERM CURRENT USE OF INSULIN (HCC): ICD-10-CM
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
## 2022-02-15 DIAGNOSIS — G20 PARKINSON'S DISEASE (HCC): ICD-10-CM

## 2022-02-15 DIAGNOSIS — R26.9 NEUROLOGIC GAIT DYSFUNCTION: ICD-10-CM

## 2022-02-15 DIAGNOSIS — G20 PARKINSON'S DISEASE (HCC): Primary | ICD-10-CM

## 2022-02-15 LAB
CK SERPL-CCNC: 80 U/L (ref 39–308)
FOLATE SERPL-MCNC: >20 NG/ML (ref 3.1–17.5)
VIT B12 SERPL-MCNC: 541 PG/ML (ref 100–900)

## 2022-02-15 PROCEDURE — G1004 CDSM NDSC: HCPCS

## 2022-02-15 PROCEDURE — 82607 VITAMIN B-12: CPT

## 2022-02-15 PROCEDURE — 99214 OFFICE O/P EST MOD 30 MIN: CPT | Performed by: PSYCHIATRY & NEUROLOGY

## 2022-02-15 PROCEDURE — 82085 ASSAY OF ALDOLASE: CPT

## 2022-02-15 PROCEDURE — 1036F TOBACCO NON-USER: CPT | Performed by: PSYCHIATRY & NEUROLOGY

## 2022-02-15 PROCEDURE — 36415 COLL VENOUS BLD VENIPUNCTURE: CPT

## 2022-02-15 PROCEDURE — 82746 ASSAY OF FOLIC ACID SERUM: CPT

## 2022-02-15 PROCEDURE — 82550 ASSAY OF CK (CPK): CPT

## 2022-02-15 PROCEDURE — 1160F RVW MEDS BY RX/DR IN RCRD: CPT | Performed by: PSYCHIATRY & NEUROLOGY

## 2022-02-15 PROCEDURE — 70450 CT HEAD/BRAIN W/O DYE: CPT

## 2022-02-15 NOTE — PROGRESS NOTES
Patient ID: Mandie Landis is a 81 y o  male  Assessment/Plan:    Parkinson's disease ([facility] )  Mandie Landis is a 52-year-old male known to the practice for Parkinson's disease and dementia  He was last seen 2 years ago via telemedicine by Dr Kaila Perez 4/16/2020  He is maintained on carbidopa levodopa, Mirapex, and Exelon  At the time of his last visit he was still having significant falls due to impaired gait and postural reflexes, and had undergone physical therapy at home  Last CT head was completed 11/3/21 after a fall which revealed Left frontal vertex extra-axial mass likely representing a meningioma similar to prior study  Patient presents today with his daughters, Tatyana Shields and jose  They feel he has had a significant decline over the past two years more noticeably since his recent pneumonia and hospitalization in November 2021  He continues to have issues staying asleep at night and continues to get up to try to use the restroom and then falls down  It takes the both of them to get him out of the house for appointments, as he has to walk down 12-15 steps and he leans backwards and they are fearful he will fall and injure himself or one of them  They state his feet get stuck together often also leading to his falls  He is falling 3-4 times a week, with most recent fall with head strike being last Thursday  He's walking with a rollator walker and looses his balance  They deny any syncopal episodes  He does not always remember to call for help when he needs to use the restroom and has been found multiple times on the floor by his daughters with no estimation as to how long he has been there  He lives with his wife and daughter and his other two daughters take turns coming over to help  The daughter he lives with typically will get him in bed around 11pm and then will stay in the living room to keep watch until about 2:00am when she will then go back to her room on the second floor   That is when he generally will get up sometime between 2 am and 6 am, when he has no supervision  He cannot complete ADLs independently  He needs assistance with bathing, grooming and is incontinent of bowel and bladder and wears depends  He can feed himself but is very messy  There are hallucinations that are frightening to him occurring 2-3 times per week telling him that someone in the home will be harmed, they feel this too may be contributing to him getting out of bed at night  He is taking seroquel 100 mg at bedtime  He also frequently naps during the day  They feel his memory is not good, but is stable  He does still have tremors in his right leg and magnetism of his gait, not particularly worse at any specific time of the day  They deny any wearing off  He is currently undergoing physical therapy in his home  They are very torn as they would like to keep him at home with his daughters acting as his caregivers however they also realize they need some assistance especially due to his safety  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, so unfortunately his assets are too high and he does not qualify for a lot of resources  They have looked into private aides/nursing but it is too costly  They have been in contact with office of the aging and have not had any success in obtaining additional resources  They are also interested in options for senior  or respite care  We discussed today that he requires 24/7 supervision for his own safety and unfortunately his daughters may not be able to offer that level of care  They are open to exploring any available resources, including facility placement as a last resort  I did send a message to our social workers today to look into this for them  We did discuss a referral to Geriatric Medicine, however it is very difficult for them to get him out of his home as he needs to come down almost 20 steps    Will defer this referral at this time and will explore options with our social workers to begin with  We can consider referral to Geriatrics in the future, possibly via telemedicine if available  I will obtain a CT of the head today to evaluate for intracranial injury or bleed after his head strike last week  Will also obtain updated routine labs as well as a CPK and aldolase to evaluate for muscle breakdown  We will avoid adding or altering his medication regimen at this time, as we do not want to further complicate things or cause any side effects  I will see them back via telemedicine in 3 months to further evaluate for any further decline  At that time if needed we could consider adding a medication such as donepezil or Namenda for memory support  We could also consider increasing his carbidopa levodopa, however I am uncertain of how much benefit this will offer  I encouraged them to continue to follow fall precautions and working with physical therapy in the home  I suggested speaking with physical therapy about his safety using the Rollator walker, they may need to consider transitioning to a wheelchair due to his unsteadiness and frequent falls  Diagnoses and all orders for this visit:    Parkinson's disease ([facility] )  -     Vitamin B12; Future  -     Folate; Future    Neurologic gait dysfunction  -     CK (with reflex to MB); Future  -     Aldolase; Future  -     Vitamin B12; Future  -     Folate; Future    Fall, initial encounter  -     CK (with reflex to MB); Future  -     Aldolase; Future  -     Cancel: CT head wo contrast; Future  -     CT head wo contrast; Future    Ambulatory dysfunction  -     Vitamin B12; Future  -     Folate; Future           Subjective:    HPI Zandra Kayser is a 81-year-old male known to the practice for Parkinson's disease and dementia  He was last seen 2 years ago via telemedicine by Dr Agueda Moore 4/16/2020  He is maintained on carbidopa levodopa, Mirapex, and Exelon    At the time of his last visit he was still having significant falls due to impaired gait and postural reflexes, and had undergone physical therapy at home  Last CT head was completed 11/3/21 after a fall which revealed Left frontal vertex extra-axial mass likely representing a meningioma similar to prior study  Patient presents today with his daughters, Garrison Abebe and jose  They feel he has had a significant decline over the past two years more noticeably since his recent pneumonia and hospitalization in November 2021  He continues to have issues staying asleep at night and continues to get up to try to use the restroom and then falls down  It takes the both of them to get him out of the house for appointments, as he has to walk down 12-15 steps and he leans backwards and they are fearful he will fall or injure himself or one of them  They state his feet get stuck together often also leading to his falls  He is falling 3-4 times a week, with most recent fall with head strike being last Thursday  He's walking with a rollator walker and looses his balance  They deny any syncopal episodes  He does not always remember to call for help when he needs to use the restroom and has been found multiple times on the floor by his daughters with no estimation as to how long he has been there  He lives with his wife and daughter and his other two daughters take turns coming over to help  The daughter he lives with typically will get him in bed around 11pm and then will stay in the living room to keep watch until about 2:00am when she will then go back to her room on the second floor  That is when he generally will get up sometime between 2 am and 6 am, when he has no supervision  He cannot complete ADLs independently  He needs assistance with bathing, grooming and is incontinent of bowel and bladder and wears depends  He can feed himself but is very messy   There are hallucinations that are frightening to him occurring 2-3 times per week telling him that someone in the home will be harmed, they feel this too may be contributing to him getting out of bed at night  He is taking seroquel 100 mg at bedtime  He also frequently naps during the day  They feel his memory is not good, but is stable  He does still have tremors in his right leg and magnetism of his gait, not particularly worse at any specific time of the day  They deny any wearing off  He is currently undergoing physical therapy in his home  They are very torn as they would like to keep him at home with his daughters acting as his caregivers however they also realize they need some assistance especially due to his safety  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, so unfortunately his assets are too high and he does not qualify for a lot of resources  They have looked into private aides/nursing but it is too costly  They have been in contact with office of the aging and have not had any success in obtaining additional resources  They are also interested in options for senior  or respite care  The following portions of the patient's history were reviewed and updated as appropriate: allergies, current medications, past family history, past medical history, past social history and past surgical history  Objective:    Blood pressure 120/68, pulse 68, temperature 97 6 °F (36 4 °C), temperature source Temporal, height 5' 3" (1 6 m), weight 81 6 kg (180 lb), SpO2 98 %  Neurological Exam    On neurological examination patient is alert, awake, and in no distress  Speech is fluent without dysarthria or aphasia  There is hypophonia  Cranial nerves 2-12 were symmetrically intact bilaterally  No evidence of any focal weakness or sensory loss in the upper or lower extremities  There was no pronator drift  No fasciculations present  No abnormal involuntary movements  Deep tendon reflexes were 1+ and symmetric in the bilateral upper and lower extremities  He is brought in today in a wheelchair without his walker   Gait Assessment, tandem walk, and Romberg testing all deferred due to safety concerns  There is no tenderness with palpation of his head, particularly over the area of recent head strike  ROS:  Review of Systems  Constitutional: Negative  Negative for appetite change and fever  HENT: Positive for trouble swallowing and voice change  Negative for hearing loss and tinnitus  Eyes: Negative  Negative for photophobia and pain  Respiratory: Positive for shortness of breath  Cardiovascular: Negative  Negative for palpitations  Gastrointestinal: Negative  Negative for nausea and vomiting  Endocrine: Negative  Negative for cold intolerance  Genitourinary: Positive for frequency  Negative for dysuria and urgency  Musculoskeletal: Positive for gait problem and joint swelling  Negative for myalgias and neck pain  Skin: Negative  Negative for rash  Neurological: Positive for tremors, facial asymmetry, speech difficulty and weakness  Negative for dizziness, seizures, syncope, light-headedness, numbness and headaches  Patient daughter stated that he has tremors and weakness on both hands and legs  Patient daughter also stated that he is not doing better with he is Parkinson's  Hematological: Bruises/bleeds easily     Psychiatric/Behavioral: Positive for confusion, hallucinations and sleep disturbance        Reviewed ROS as entered by medical assistant

## 2022-02-15 NOTE — PATIENT INSTRUCTIONS
- Will have the social workers reach out to discuss any available resources  - Continue his medications the same for now  - Complete routine labs  - Complete CT head w/o contrast

## 2022-02-15 NOTE — PROGRESS NOTES
Patient ID: iPna Esposito is a 81 y o  male  Assessment/Plan:    No problem-specific Assessment & Plan notes found for this encounter  {Assess/PlanSmartLinks:75269}       Subjective:    HPI    {[facility] HPI texts:41136}    {Common ambulatory SmartLinks:78407}         Objective:    Blood pressure 120/68, pulse 68, temperature 97 6 °F (36 4 °C), temperature source Temporal, height 5' 3" (1 6 m), weight 81 6 kg (180 lb), SpO2 98 %  Physical Exam    Neurological Exam      ROS:    Review of Systems   Constitutional: Negative  Negative for appetite change and fever  HENT: Positive for trouble swallowing and voice change  Negative for hearing loss and tinnitus  Eyes: Negative  Negative for photophobia and pain  Respiratory: Positive for shortness of breath  Cardiovascular: Negative  Negative for palpitations  Gastrointestinal: Negative  Negative for nausea and vomiting  Endocrine: Negative  Negative for cold intolerance  Genitourinary: Positive for frequency  Negative for dysuria and urgency  Musculoskeletal: Positive for gait problem and joint swelling  Negative for myalgias and neck pain  Skin: Negative  Negative for rash  Neurological: Positive for tremors, facial asymmetry, speech difficulty and weakness  Negative for dizziness, seizures, syncope, light-headedness, numbness and headaches  Patient daughter stated that he has tremors and weakness on both hands and legs  Patient daughter also stated that he is not doing better with he is Parkinson's  Hematological: Bruises/bleeds easily  Psychiatric/Behavioral: Positive for confusion, hallucinations and sleep disturbance

## 2022-02-15 NOTE — TELEPHONE ENCOUNTER
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 [phone] Jenny Smith- daughter)    Thank you!

## 2022-02-15 NOTE — TELEPHONE ENCOUNTER
MSW attempted to reach patient's daughter, Latanya López, at [phone] x [phone] this date to discuss community resources  No answer  A member of the Social Work Team will try to reach patient's daughter again on 2/16  MSW unsure of all resources that patient/family have considered aside from Aging and private duty resources  Alternative considerations may include:    -Does patient have a long term care insurance plan? - Is patient a Mena? If so, family should reach out to [phone] to see if he would be eligible for care through their agency  - connect them with [employer] for placement options as they would know pricing information  - there does not appear to be any Adult Day Program in [facility] at this time

## 2022-02-15 NOTE — LETTER
[facility]  [address] [address]      Our office has been unsuccessful in trying to reach you by phone regarding:    ? Other:____community resources  _________________      Please contact our  Antoine Duncan at [phone]  and follow the prompts        Sincerely,          Antoine Duncan, TRINO     [facility]

## 2022-02-15 NOTE — ASSESSMENT & PLAN NOTE
Justice Zuleta is a 32-year-old male known to the practice for Parkinson's disease and dementia  He was last seen 2 years ago via telemedicine by Dr Kristal Perdomo 4/16/2020  He is maintained on carbidopa levodopa, Mirapex, and Exelon  At the time of his last visit he was still having significant falls due to impaired gait and postural reflexes, and had undergone physical therapy at home  Last CT head was completed 11/3/21 after a fall which revealed Left frontal vertex extra-axial mass likely representing a meningioma similar to prior study  Patient presents today with his daughters, Ghanshyam Mohan and jose  They feel he has had a significant decline over the past two years more noticeably since his recent pneumonia and hospitalization in November 2021  He continues to have issues staying asleep at night and continues to get up to try to use the restroom and then falls down  It takes the both of them to get him out of the house for appointments, as he has to walk down 12-15 steps and he leans backwards and they are fearful he will fall and injure himself or one of them  They state his feet get stuck together often also leading to his falls  He is falling 3-4 times a week, with most recent fall with head strike being last Thursday  He's walking with a rollator walker and looses his balance  They deny any syncopal episodes  He does not always remember to call for help when he needs to use the restroom and has been found multiple times on the floor by his daughters with no estimation as to how long he has been there  He lives with his wife and daughter and his other two daughters take turns coming over to help  The daughter he lives with typically will get him in bed around 11pm and then will stay in the living room to keep watch until about 2:00am when she will then go back to her room on the second floor  That is when he generally will get up sometime between 2 am and 6 am, when he has no supervision   He cannot complete ADLs independently  He needs assistance with bathing, grooming and is incontinent of bowel and bladder and wears depends  He can feed himself but is very messy  There are hallucinations that are frightening to him occurring 2-3 times per week telling him that someone in the home will be harmed, they feel this too may be contributing to him getting out of bed at night  He is taking seroquel 100 mg at bedtime  He also frequently naps during the day  They feel his memory is not good, but is stable  He does still have tremors in his right leg and magnetism of his gait, not particularly worse at any specific time of the day  They deny any wearing off  He is currently undergoing physical therapy in his home  They are very torn as they would like to keep him at home with his daughters acting as his caregivers however they also realize they need some assistance especially due to his safety  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, so unfortunately his assets are too high and he does not qualify for a lot of resources  They have looked into private aides/nursing but it is too costly  They have been in contact with office of the aging and have not had any success in obtaining additional resources  They are also interested in options for senior  or respite care  We discussed today that he requires 24/7 supervision for his own safety and unfortunately his daughters may not be able to offer that level of care  They are open to exploring any available resources, including facility placement as a last resort  I did send a message to our social workers today to look into this for them  We did discuss a referral to Geriatric Medicine, however it is very difficult for them to get him out of his home as he needs to come down almost 20 steps  Will defer this referral at this time and will explore options with our social workers to begin with    We can consider referral to Geriatrics in the future, possibly via telemedicine if available  I will obtain a CT of the head today to evaluate for intracranial injury or bleed after his head strike last week  Will also obtain updated routine labs as well as a CPK and aldolase to evaluate for muscle breakdown  We will avoid adding or altering his medication regimen at this time, as we do not want to further complicate things or cause any side effects  I will see them back via telemedicine in 3 months to further evaluate for any further decline  At that time if needed we could consider adding a medication such as donepezil or Namenda for memory support  We could also consider increasing his carbidopa levodopa, however I am uncertain of how much benefit this will offer  I encouraged them to continue to follow fall precautions and working with physical therapy in the home  I suggested speaking with physical therapy about his safety using the Rollator walker, they may need to consider transitioning to a wheelchair due to his unsteadiness and frequent falls

## 2022-02-16 LAB — ALDOLASE SERPL-CCNC: 4.4 U/L (ref 3.3–10.3)

## 2022-02-16 NOTE — TELEPHONE ENCOUNTER
MSW attempted to reach patient's daughter, Latanya López, at [phone] and lvm to  discuss community resources

## 2022-02-17 NOTE — TELEPHONE ENCOUNTER
Attempt #3    MSW attempted to reach patient's daughter, Kimberly Shelton, at [phone] and [facility] to  discuss community resources  please call back

## 2022-02-19 DIAGNOSIS — R05.9 COUGH: ICD-10-CM

## 2022-02-21 RX ORDER — FAMOTIDINE 20 MG/1
TABLET, FILM COATED ORAL
Qty: 30 TABLET | Refills: 2 | Status: SHIPPED | OUTPATIENT
Start: 2022-02-21 | End: 2022-03-22

## 2022-02-21 NOTE — TELEPHONE ENCOUNTER
MSW phoned [facility] at [phone] and lvm to [facility]  Requesting a call back requesting services for patient

## 2022-02-21 NOTE — TELEPHONE ENCOUNTER
MSW received transferred call from Evan Urbina  Patient's daughter Alma Garcia [phone] reported that she is interested in Respite care info  As of right now patient has 3 daughters and they are all taking turns to take care of patient  Alma Garcia reported that she is overwhelmed and frustrated with this situation  She reported that about 2 years ago patient was denied for an assistance that can provide an aide in the home  She is not sure why they were denied and thinks it is because patient had a house under his name  She requested to learn if patient could transfer the house title to one of his daughters in order for him to be eligible for an aide  MSW discussed waiver eligibility criteria which Alma Garcia feels patient is eligible but will not apply again if he was already denied  MSW suggested that she contact [employer] directly in order to obtain info about why patient was denied before re-applying  MSW also informed that [employer] will only disclose information about the case to the person that is listed in their records  Alma Garcia reported that she will discuss it with her sister as she is the one that was helping the patient apply for the assistance  They are also interested in getting connected with [employer]  Alma Garcia will discuss with sister and will contact DARSHANA COTA to learn why they were denied  After discussion with sister she will contact MSW  This writer will search for Respite care services in [facility]  Additionally will connect patient with [employer]        Additional topics to discuss:  Placement  Adult

## 2022-02-23 NOTE — TELEPHONE ENCOUNTER
MSW attempted to follow-up on referral with Jasson Babcock, , at [facility] on [address] at [phone], ext [phone]  No answer  MSW left a message requesting callback  Awaiting same  MSW also reached out to [facility] and spoke with David Miller at [phone] - they can offer assistance to families looking for respite care in the [facility]  [place] stated that the patient/family can contact her directly at the above number  MSW phoned [facility] at [phone] and spoke with Mack Rutherford  They do have record of patient in their system, but that patient does not have a  currently (was last open to them in 2017)  Mack Rutherford stated that there are no adult day care centers in [place] at this time, but she will send this writer a list of adult day care centers nearby  MSW attempted to reach patient's daughter, Latasha Finch, to discuss above  No answer at [phone]  MSW left a message requesting callback  Awaiting same

## 2022-02-24 NOTE — TELEPHONE ENCOUNTER
MSW spoke with Nathalie Rutherford,  at [facility] this date who confirmed receipt of the referral  Nathalie Rutherford stated that she did attempt to reach patient's daughter, Domenico Merritt, to review their services, but she had to leave a message and is awaiting her callback  Nathalie Rutherford stated that if patient has a limited income, that patient can be referred to the [facility] on Aging to see if the cost of the meals can be covered (this can take about 1-2 weeks)  Nathalie Rutherford stated that if patient is "financially stable" that they can offer meals on a sliding scale and that services could start next week  MSW attempted to reach patient's daughter, Domenico Merritt, at [phone] to relay info about [facility] [employer] and respite care  No answer  MSW left message requesting callback  Awaiting same

## 2022-02-25 ENCOUNTER — RA CDI HCC (OUTPATIENT)
Dept: OTHER | Facility: HOSPITAL | Age: 87
End: 2022-02-25

## 2022-02-25 NOTE — PROGRESS NOTES
[facility]  coding opportunities     DX: E11 51, E11 36        Chart Reviewed * (Number of) Inbasket suggestions sent to Provider: 2                  Patients insurance company: [employer]

## 2022-02-25 NOTE — TELEPHONE ENCOUNTER
MSW phoned patient's daughter, Valeria Trinh, again this date at [phone] and was able to connect with her  MSW asked if she was able to follow-up with the PA BEATA to understand why patient was denied  Valeria Trinh stated that she had not  Valeria Trinh then placed her daughter, Soraida Peterson, on the phoned  Soraida Peterson asked why they should go through the same company when they denied him in the past  MSW did advise that the Waiver program offers the most assistance available, but that patient must meet strict financial and care guidelines  MSW did advise that other programs that offer less assistance may be available through the [facility] on Aging with less strict financial guidelines  MSW did advised that the Aging office does have several programs all of which have different financial guidelines, so this writer cannot guarantee what he will qualify for but that this writer can make a referral  Family was agreeable to this  MSW will call in referral this date  MSW did advise that a referral was made to [facility] [employer] and that the  had tried to reach them  Valeria Trinh stated that she did get a message from them  MSW provided the number for [facility] [employer] [facility] as [phone], ext [phone], to reach Magdiel Hoffman  MSW also provided the phone number for [facility] at [facility] as [phone]  MSW advised that [facility] can assist with respite placement and will now pricing for facilities in patient's area  Family inquired if there was help with the cost of placement  MSW advised that assisted living placement is not covered under insurance, so it would likely be an out of pocket expense as patient does not have a long term insurance plan and is not a   Family will contact [facility] at [facility]  While on the phone, family did ask about assistance in getting patient down the steps  MSW did advise that a stair lift would be an out of pocket expense   MSW asked if a bed could be put on the main level to get him out of the house, but Harika Cedeno stated that all common areas are on the 2nd floor  Family stated that even a ramp would help to get patient out to his medical appointments  MSW advised that the Aging office may be aware of local programs that can assist with providing a ramp  Family will discuss this with the Aging representative  MSW phoned the [facility] Aging at [phone] and spoke with Ramona Meyers to make referral  MSW provided patient's demographic info, emergency contact info, and diagnosis/doctor info  Ramona Meyers stated that since patient was denied by Waiver, that they will need more detailed financial information and asked if patient's family can call in with same  MSW phoned patient's daughter, Solo Hurt, at [phone] to ask her to contact the Aging office to provided needed financial information  Solo Hurt requested that their contact number be emailed to her at [email or web address]  MSW sent Solo Hurt the following email to [email or web address]:    "Nando Leiva,    I did call in the initial referral to the [facility] on Aging this date  I did advise that you had told me that patient was previously denied by the Waiver program, so they are requesting more financial information from family to determine what program he can be referred to within the Aging office  Can you please contact Ramona Meyers at [phone] to provided the needed financial information at your earliest convenience? As soon as they have this additional information, they can process the referral     I will follow-up with you next week to ensure that you were able to connect with them  Best Regards,    TRINO Quintanilla   [facility]  [facility], [address]  [phone] - phone  [phone] - fax   [facility] [employer]  [facility]"

## 2022-03-01 ENCOUNTER — TELEPHONE (OUTPATIENT)
Dept: PULMONOLOGY | Facility: CLINIC | Age: 87
End: 2022-03-01

## 2022-03-01 NOTE — TELEPHONE ENCOUNTER
MSW attempted to reach patient's daughter, Elan Newton, at [phone]  No answer  MSW left a message requesting callback with an update about Aging/[employer]/[facility]  Awaiting same

## 2022-03-01 NOTE — TELEPHONE ENCOUNTER
MSW then received a callback from [place]  She advised that she had called [employer] and is awaiting to hear back from a supervisor  [place] also stated that she tried to call Aging and could not reach anyone  MSW provided [place] with the [address] number as [phone]  She will try to reach them again tomorrow  MSW will follow-up with [place] in a few days

## 2022-03-04 ENCOUNTER — OFFICE VISIT (OUTPATIENT)
Dept: INTERNAL MEDICINE CLINIC | Facility: CLINIC | Age: 87
End: 2022-03-04
Payer: COMMERCIAL

## 2022-03-04 ENCOUNTER — APPOINTMENT (OUTPATIENT)
Dept: LAB | Facility: CLINIC | Age: 87
End: 2022-03-04
Payer: COMMERCIAL

## 2022-03-04 VITALS
SYSTOLIC BLOOD PRESSURE: 124 MMHG | OXYGEN SATURATION: 97 % | BODY MASS INDEX: 31.82 KG/M2 | HEART RATE: 76 BPM | WEIGHT: 179.6 LBS | TEMPERATURE: 97.8 F | HEIGHT: 63 IN | DIASTOLIC BLOOD PRESSURE: 80 MMHG | RESPIRATION RATE: 16 BRPM

## 2022-03-04 DIAGNOSIS — E03.8 SUBCLINICAL HYPOTHYROIDISM: ICD-10-CM

## 2022-03-04 DIAGNOSIS — I10 PRIMARY HYPERTENSION: ICD-10-CM

## 2022-03-04 DIAGNOSIS — G20 PARKINSON'S DISEASE (HCC): ICD-10-CM

## 2022-03-04 DIAGNOSIS — E11.9 DIABETES MELLITUS WITHOUT COMPLICATION (HCC): ICD-10-CM

## 2022-03-04 DIAGNOSIS — E78.2 MIXED HYPERLIPIDEMIA: ICD-10-CM

## 2022-03-04 DIAGNOSIS — J44.1 COPD WITH ACUTE EXACERBATION (HCC): ICD-10-CM

## 2022-03-04 DIAGNOSIS — E11.9 TYPE 2 DIABETES MELLITUS WITHOUT COMPLICATION, WITHOUT LONG-TERM CURRENT USE OF INSULIN (HCC): Primary | ICD-10-CM

## 2022-03-04 DIAGNOSIS — E11.9 TYPE 2 DIABETES MELLITUS WITHOUT COMPLICATION, WITHOUT LONG-TERM CURRENT USE OF INSULIN (HCC): ICD-10-CM

## 2022-03-04 LAB
ALBUMIN SERPL BCP-MCNC: 3.1 G/DL (ref 3.5–5)
ALP SERPL-CCNC: 99 U/L (ref 46–116)
ALT SERPL W P-5'-P-CCNC: 17 U/L (ref 12–78)
ANION GAP SERPL CALCULATED.3IONS-SCNC: 5 MMOL/L (ref 4–13)
AST SERPL W P-5'-P-CCNC: 21 U/L (ref 5–45)
BACTERIA UR QL AUTO: NORMAL /HPF
BASOPHILS # BLD AUTO: 0.02 THOUSANDS/ΜL (ref 0–0.1)
BASOPHILS NFR BLD AUTO: 0 % (ref 0–1)
BILIRUB SERPL-MCNC: 0.24 MG/DL (ref 0.2–1)
BILIRUB UR QL STRIP: NEGATIVE
BUN SERPL-MCNC: 26 MG/DL (ref 5–25)
CALCIUM ALBUM COR SERPL-MCNC: 10.8 MG/DL (ref 8.3–10.1)
CALCIUM SERPL-MCNC: 10.1 MG/DL (ref 8.3–10.1)
CHLORIDE SERPL-SCNC: 111 MMOL/L (ref 100–108)
CHOLEST SERPL-MCNC: 186 MG/DL
CLARITY UR: CLEAR
CO2 SERPL-SCNC: 24 MMOL/L (ref 21–32)
COLOR UR: ABNORMAL
CREAT SERPL-MCNC: 1.18 MG/DL (ref 0.6–1.3)
EOSINOPHIL # BLD AUTO: 0.11 THOUSAND/ΜL (ref 0–0.61)
EOSINOPHIL NFR BLD AUTO: 1 % (ref 0–6)
ERYTHROCYTE [DISTWIDTH] IN BLOOD BY AUTOMATED COUNT: 13 % (ref 11.6–15.1)
EST. AVERAGE GLUCOSE BLD GHB EST-MCNC: 143 MG/DL
GFR SERPL CREATININE-BSD FRML MDRD: 55 ML/MIN/1.73SQ M
GLUCOSE SERPL-MCNC: 185 MG/DL (ref 65–140)
GLUCOSE UR STRIP-MCNC: ABNORMAL MG/DL
HBA1C MFR BLD: 6.6 %
HCT VFR BLD AUTO: 42.3 % (ref 36.5–49.3)
HDLC SERPL-MCNC: 108 MG/DL
HGB BLD-MCNC: 14 G/DL (ref 12–17)
HGB UR QL STRIP.AUTO: NEGATIVE
IMM GRANULOCYTES # BLD AUTO: 0.05 THOUSAND/UL (ref 0–0.2)
IMM GRANULOCYTES NFR BLD AUTO: 1 % (ref 0–2)
KETONES UR STRIP-MCNC: NEGATIVE MG/DL
LDLC SERPL CALC-MCNC: 50 MG/DL (ref 0–100)
LEUKOCYTE ESTERASE UR QL STRIP: NEGATIVE
LYMPHOCYTES # BLD AUTO: 0.58 THOUSANDS/ΜL (ref 0.6–4.47)
LYMPHOCYTES NFR BLD AUTO: 6 % (ref 14–44)
MCH RBC QN AUTO: 30.3 PG (ref 26.8–34.3)
MCHC RBC AUTO-ENTMCNC: 33.1 G/DL (ref 31.4–37.4)
MCV RBC AUTO: 92 FL (ref 82–98)
MONOCYTES # BLD AUTO: 0.62 THOUSAND/ΜL (ref 0.17–1.22)
MONOCYTES NFR BLD AUTO: 6 % (ref 4–12)
NEUTROPHILS # BLD AUTO: 8.71 THOUSANDS/ΜL (ref 1.85–7.62)
NEUTS SEG NFR BLD AUTO: 86 % (ref 43–75)
NITRITE UR QL STRIP: NEGATIVE
NON-SQ EPI CELLS URNS QL MICRO: NORMAL /HPF
NONHDLC SERPL-MCNC: 78 MG/DL
NRBC BLD AUTO-RTO: 0 /100 WBCS
PH UR STRIP.AUTO: 6 [PH]
PLATELET # BLD AUTO: 198 THOUSANDS/UL (ref 149–390)
PMV BLD AUTO: 10.3 FL (ref 8.9–12.7)
POTASSIUM SERPL-SCNC: 4.2 MMOL/L (ref 3.5–5.3)
PROT SERPL-MCNC: 6.9 G/DL (ref 6.4–8.2)
PROT UR STRIP-MCNC: ABNORMAL MG/DL
RBC # BLD AUTO: 4.62 MILLION/UL (ref 3.88–5.62)
RBC #/AREA URNS AUTO: NORMAL /HPF
SODIUM SERPL-SCNC: 140 MMOL/L (ref 136–145)
SP GR UR STRIP.AUTO: 1.02 (ref 1–1.03)
TRIGL SERPL-MCNC: 140 MG/DL
TSH SERPL DL<=0.05 MIU/L-ACNC: 2.28 UIU/ML (ref 0.36–3.74)
UROBILINOGEN UR STRIP-ACNC: <2 MG/DL
WBC # BLD AUTO: 10.09 THOUSAND/UL (ref 4.31–10.16)
WBC #/AREA URNS AUTO: NORMAL /HPF

## 2022-03-04 PROCEDURE — 80061 LIPID PANEL: CPT

## 2022-03-04 PROCEDURE — 3725F SCREEN DEPRESSION PERFORMED: CPT | Performed by: INTERNAL MEDICINE

## 2022-03-04 PROCEDURE — 85025 COMPLETE CBC W/AUTO DIFF WBC: CPT

## 2022-03-04 PROCEDURE — 80053 COMPREHEN METABOLIC PANEL: CPT

## 2022-03-04 PROCEDURE — 99214 OFFICE O/P EST MOD 30 MIN: CPT | Performed by: INTERNAL MEDICINE

## 2022-03-04 PROCEDURE — 84443 ASSAY THYROID STIM HORMONE: CPT

## 2022-03-04 PROCEDURE — 81001 URINALYSIS AUTO W/SCOPE: CPT

## 2022-03-04 PROCEDURE — 36415 COLL VENOUS BLD VENIPUNCTURE: CPT

## 2022-03-04 PROCEDURE — 83036 HEMOGLOBIN GLYCOSYLATED A1C: CPT

## 2022-03-04 NOTE — TELEPHONE ENCOUNTER
MSW phoned patient's daughter, Gema Nguyen, this date at [phone] to see if she was able to provide patient's financials to the [facility] [place] on Aging but every time she calls it just rings and there is no answer or voicemail  MSW verified that patient's daughter has the correct number  MSW offered to call with Gema Nguyen to the [facility] on Aging on Monday 3/7  Gema Nguyen stated that Tuesday 3/8 would be better for her around 10AM as she will be caring for her dad on Monday  MSW will reach out to patient's daughter/Aging on 3/8

## 2022-03-04 NOTE — PROGRESS NOTES
Assessment/Plan:    No problem-specific Assessment & Plan notes found for this encounter  Diagnoses and all orders for this visit:    Frequent falls  Last fall was last week Thursday  He got out of bed unassisted to go to the bathroom and fell  His daughter found him in the morning  Hurt his lower back  No exterior abnormalities notable on exam but tender on palpation of mid lower back  Is able to stand up and walk  No changes in urinary or bowel habits  Tylenol as needed for pain  Will monitor  Lives with multiple family members and they take turn watching him  They feel that they are able to maintain him at home at this point  Have looked into aid for assisted living in the past but they do not qualify based on income  Restlessness during the night has improved some with increased dose of Seroquel now at 100 mg daily  Will continue at the current dose  Type 2 diabetes mellitus without complication, without long-term current use of insulin ([facility] )  Will go for blood work today  Follow HbA1c result  Maintained on lifestyle modifications  COPD with acute exacerbation ([facility] )  Follows with pulmonology  Is awaiting vest therapy to be delivered  Has tried famotidine for his cough without much result  Continue inhaler and nebulizer treatments  Parkinson's disease ([facility] )  Follows with neurology  Was seen in the office on 2/15  Stable and no medication changes were made  Primary hypertension  Well controlled  BP in office 124/80  No S&S of orthostasis  Subclinical hypothyroidism  Will go for blood work today  Follow TSH result  Dysphagia  Family cuts his food in small pieces and gives soft food but he still occasionally chokes  Will send referral for home speech pathology dysphagia assessment  Subjective:      Patient ID: Ericka Harris is a 81 y o  male  Here for routine follow-up of chronic conditions including COPD, parkinson disease, hypertension and hyperlipidemia   His main problem currently is frequent falls  He has multifactorial generalized weakness of the lower extremities and coordination problems in combination with impulsive behavior resulting in mechanical falls  Family watches closely over him during the day and his daughter wakes him up once during the night to take him to the bathroom but he still occasionally gets up unsupervised during the night when he feels like he has to urinate  This happened last Thursday and his daughter found him on the floor in the morning  He hurt his back, no head strike or LOC  Unknown downtime  Has been complaining of pain in his lower back with movement since  Has been able to ambulate and no changes in urinary or bowel habits  Tylenol is moderately effective  Did see his neurologist recently and no changes were made in his Parkinson management  Also recently saw his pulmonologist and was started on additional famotidine for his coughing and is awaiting delivery of vest therapy  Patient has good appetite but family does mention occasional chocking on his food even though they try to give soft and well cut food  The following portions of the patient's history were reviewed and updated as appropriate: allergies, current medications, past family history, past medical history, past social history, past surgical history and problem list     Review of Systems   Constitutional: Negative for appetite change, chills, diaphoresis, fatigue, fever and unexpected weight change  HENT: Negative for congestion, hearing loss and rhinorrhea  Eyes: Negative for visual disturbance  Respiratory: Positive for cough and choking  Negative for chest tightness, shortness of breath and wheezing  Cardiovascular: Negative for chest pain, palpitations and leg swelling  Gastrointestinal: Negative for abdominal pain and blood in stool  Endocrine: Negative for cold intolerance, heat intolerance, polydipsia and polyuria     Genitourinary: Negative for difficulty urinating, dysuria, frequency and urgency  Musculoskeletal: Positive for gait problem  Negative for arthralgias and myalgias  Skin: Negative for rash  Neurological: Positive for weakness  Negative for dizziness, syncope, light-headedness and headaches  Hematological: Does not bruise/bleed easily  Psychiatric/Behavioral: Positive for sleep disturbance  Negative for dysphoric mood  Objective:      Pulse 76   Temp 97 8 °F (36 6 °C) (Tympanic)   Resp 16   Ht 5' 3" (1 6 m)   Wt 81 5 kg (179 lb 9 6 oz)   SpO2 97%   BMI 31 81 kg/m²          Physical Exam  Vitals reviewed  Constitutional:       General: He is not in acute distress  Appearance: He is obese  He is not ill-appearing  HENT:      Head: Normocephalic and atraumatic  Eyes:      General: No scleral icterus  Right eye: No discharge  Left eye: No discharge  Conjunctiva/sclera: Conjunctivae normal    Cardiovascular:      Rate and Rhythm: Normal rate and regular rhythm  Pulses: Normal pulses  Heart sounds: Normal heart sounds  No murmur heard  Pulmonary:      Effort: Pulmonary effort is normal  No respiratory distress  Breath sounds: No stridor  Rhonchi present  No wheezing or rales  Chest:      Chest wall: No tenderness  Abdominal:      General: Bowel sounds are normal  There is no distension  Palpations: Abdomen is soft  Tenderness: There is no abdominal tenderness  Musculoskeletal:      Cervical back: Neck supple  Right lower leg: No edema  Left lower leg: No edema  Skin:     General: Skin is warm and dry  Capillary Refill: Capillary refill takes less than 2 seconds  Findings: No bruising or lesion  Neurological:      Mental Status: He is alert  Mental status is at baseline     Psychiatric:         Mood and Affect: Mood normal          Behavior: Behavior normal

## 2022-03-06 DIAGNOSIS — L30.4 INTERTRIGO: ICD-10-CM

## 2022-03-07 ENCOUNTER — APPOINTMENT (OUTPATIENT)
Dept: PULMONOLOGY | Facility: CLINIC | Age: 87
End: 2022-03-07
Payer: COMMERCIAL

## 2022-03-07 VITALS
TEMPERATURE: 97.8 F | OXYGEN SATURATION: 95 % | WEIGHT: 197 LBS | HEIGHT: 70 IN | SYSTOLIC BLOOD PRESSURE: 143 MMHG | HEART RATE: 89 BPM | DIASTOLIC BLOOD PRESSURE: 85 MMHG | BODY MASS INDEX: 28.2 KG/M2

## 2022-03-07 PROCEDURE — 71046 X-RAY EXAM CHEST 2 VIEWS: CPT

## 2022-03-07 PROCEDURE — 99203 OFFICE O/P NEW LOW 30 MIN: CPT | Mod: 25

## 2022-03-07 PROCEDURE — 36415 COLL VENOUS BLD VENIPUNCTURE: CPT

## 2022-03-07 RX ORDER — NYSTATIN 100000 [USP'U]/G
POWDER TOPICAL
Qty: 30 G | Refills: 1 | Status: SHIPPED | OUTPATIENT
Start: 2022-03-07 | End: 2022-05-15

## 2022-03-07 NOTE — CONSULT LETTER
[Dear  ___] : Dear  [unfilled], [Consult Letter:] : I had the pleasure of evaluating your patient, [unfilled]. [Please see my note below.] : Please see my note below. [Consult Closing:] : Thank you very much for allowing me to participate in the care of this patient.  If you have any questions, please do not hesitate to contact me. [Sincerely,] : Sincerely, [FreeTextEntry3] : Dr. Susy Smith

## 2022-03-07 NOTE — ASSESSMENT
[FreeTextEntry1] : I advised him to hold quinapril for the time being.\par Start Flonase nasal spray.\par Venipuncture with labs drawn in office\par Check pulmonary function test for further evaluation next week.\par Also advised him to closely follow with you clinically.

## 2022-03-07 NOTE — PROCEDURE
[FreeTextEntry1] : \par  Xray Chest 2 Views PA/Lat             Final\par \par No Documents Attached\par \par \par \par \par   \par Chest x-ray PA and lateral views performed in my office today showed right hiatal hernia, clear lungs, no evidence of infiltrates or pleural effusions. \par \par \par  Ordered by: BHUPINDER CABAN       Collected/Examined: 07Mar2022 10:11AM       \par Verification Required       Stage: Final       \par  Performed at: In Office       Performed by: BHUPINDER CABAN       Resulted: 07Mar2022 10:11AM       Last Updated: 07Mar2022 10:11AM

## 2022-03-07 NOTE — DISCUSSION/SUMMARY
[FreeTextEntry1] : Eliazar is a patient with chronic dry cough for the last 6 months, possibly secondary to a combination of postnasal drip and Quinapril (ACE inhibitor) induced cough, rule out asthma.  Secondly, he is a patient with history of hypertension.

## 2022-03-07 NOTE — HISTORY OF PRESENT ILLNESS
[TextBox_4] : Eliazar is a pleasant 87-year-old gentleman with history of hypertension, on quinapril among other antihypertensive medications, he came in complaining of chronic dry cough for 6 months, he also has throat clearing/itch, no chest pain or shortness of breath.

## 2022-03-08 NOTE — TELEPHONE ENCOUNTER
MSW attempted to reach Akhil Cyr this date at [phone]  She advised that she had not been able to reach Aging on her own   MSW asked is this writer could facilitate a conference call, but Akhil Cyr stated that she is driving now and requested a callback tomm between 1030-11AM  MSW will call Akhil Cyr back then as per her request

## 2022-03-09 ENCOUNTER — TELEPHONE (OUTPATIENT)
Dept: INTERNAL MEDICINE CLINIC | Facility: CLINIC | Age: 87
End: 2022-03-09

## 2022-03-09 LAB
ALBUMIN SERPL ELPH-MCNC: 4.6 G/DL
ALP BLD-CCNC: 61 U/L
ALT SERPL-CCNC: 21 U/L
ANION GAP SERPL CALC-SCNC: 14 MMOL/L
AST SERPL-CCNC: 25 U/L
BASOPHILS # BLD AUTO: 0.05 K/UL
BASOPHILS NFR BLD AUTO: 0.5 %
BILIRUB SERPL-MCNC: 0.5 MG/DL
BUN SERPL-MCNC: 29 MG/DL
CALCIUM SERPL-MCNC: 9.4 MG/DL
CHLORIDE SERPL-SCNC: 101 MMOL/L
CO2 SERPL-SCNC: 27 MMOL/L
CREAT SERPL-MCNC: 1.09 MG/DL
EGFR: 66 ML/MIN/1.73M2
EOSINOPHIL # BLD AUTO: 0.29 K/UL
EOSINOPHIL NFR BLD AUTO: 3.2 %
ERYTHROCYTE [SEDIMENTATION RATE] IN BLOOD BY WESTERGREN METHOD: 28 MM/HR
GLUCOSE SERPL-MCNC: 128 MG/DL
HCT VFR BLD CALC: 49.7 %
HGB BLD-MCNC: 15.7 G/DL
IMM GRANULOCYTES NFR BLD AUTO: 0.3 %
LYMPHOCYTES # BLD AUTO: 1.88 K/UL
LYMPHOCYTES NFR BLD AUTO: 20.6 %
MAN DIFF?: NORMAL
MCHC RBC-ENTMCNC: 31.5 PG
MCHC RBC-ENTMCNC: 31.6 GM/DL
MCV RBC AUTO: 99.6 FL
MONOCYTES # BLD AUTO: 0.7 K/UL
MONOCYTES NFR BLD AUTO: 7.7 %
NEUTROPHILS # BLD AUTO: 6.18 K/UL
NEUTROPHILS NFR BLD AUTO: 67.7 %
PLATELET # BLD AUTO: 270 K/UL
POTASSIUM SERPL-SCNC: 4.9 MMOL/L
PROT SERPL-MCNC: 7.5 G/DL
RBC # BLD: 4.99 M/UL
RBC # FLD: 13.4 %
SODIUM SERPL-SCNC: 142 MMOL/L
WBC # FLD AUTO: 9.13 K/UL

## 2022-03-09 NOTE — TELEPHONE ENCOUNTER
[facility]     Patient has fallen twice this morning , first time was when getting out of bed and second time was getting out of his recliner    no bruises but is leaning more to his left side and unsteady    Sol Skipper   Also not sleeping at night

## 2022-03-09 NOTE — TELEPHONE ENCOUNTER
MSW phoned patient's daughter, Michelle Brown, this date at [phone]  MSW facilitated conference call with the [place] on Aging at [phone]  MSW and Michelle Brown unable to speak directly with Chris Maldonado as she was on the phone  MSW left message asking Byron Wilcox to call Michelle Kevin on 3/10 in the AM, as she has patient's financial information ready to provide  MSW did request a callback from Aristeo Vázquez to inquire if patient would be eligible for any service from the [facility] on Aging  Will await same

## 2022-03-11 DIAGNOSIS — B44.81 ABPA (ALLERGIC BRONCHOPULMONARY ASPERGILLOSIS) (HCC): ICD-10-CM

## 2022-03-13 LAB — TOTAL IGE SMQN RAST: 507 KU/L

## 2022-03-13 NOTE — PROGRESS NOTES
Patient ID: Dannie Nye is a 81 y o  male  Diagnoses and all orders for this visit:    Abnormal finding on MRI of brain  -     MRI brain with and without contrast; Future    Benign neoplasm of supratentorial region of brain ([facility] )  -     MRI brain with and without contrast; Future    Meningioma ([facility] )  -     MRI brain with and without contrast; Future    Parkinson's disease (HCC)          Assessment/Plan:    Very pleasant 81-year-old male accompanied by his son Le Friend and daughter Evelin Moses, returns for six-month follow-up, arrives by wheelchair, to review updated MRI of the brain  He has a known meningioma  This was an incidental finding on MRI of the brain, following a fall December of 2018  He has a history of Parkinson's disease, followed by Neurology, Dr Michelle Huber, he had MRI for initial workup of this 2016, in retrospect the meningioma was present at that time and was subcentimeter in size  Family only reports new hallucinations, they report his Parkinson's is relatively stable although he has been showing progressively worsening difficulty with ambulation  They notice no specific weakness to 1 side of the body or the other    He has no history of headaches or history of seizures  There is no reported bowel or bladder incontinence, no complaints of motor or sensory difficulties in the upper or lower extremities, he does continue to ambulate with the assistance of a single-point cane, and uses a wheelchair for long distance travel  He has had updated MRI of the brain, 8/17/19, the study was carefully reviewed in detail by Dr Bobby Vargas, and compared with prior study of 12/6/18, there has been some increase in size of the meningioma, measured by Radiology, was:  9 x 2 9 x 1 0 cm, and currently: 2 0 x 3 2 x 1 2 cm  There is no specific vasogenic edema noted on FLAIR study, unchanged from prior study  The study was reviewed in detail with the patient and family members      On examination today: He is awake alert and oriented and responds appropriately to questioning  Motor exam of the upper and lower extremities is 5 x 5 for power, there is no cogwheel motion at the wrists, reflexes are intact and symmetric, there is no pronator drift, finger-nose is intact, gait was not evaluated as the patient was in a wheelchair  Cranial nerves were grossly intact  Options for management were reviewed again in detail, these included surgical resection, SRS treatment and continued observation with serial imaging  At this time the to family members suggested they would like to discuss these options with other family members, they did want to continue with six-month follow-up with MRI  They both reported they would call should they have any additional questions or have any change in management such as proceeding with SRS  Family members can be reached:  daughter Lola (T [phone]), son Ashly Duvall (A [phone])    They do understand should there be any new symptoms, headache, decreased level consciousness, muscle weakness in the upper lower extremities on the left, seizures or other neurologic changes return sooner for reassessment  These findings, impressions and recommendations are reviewed in great detail with the patient and his son and daughter, they expressed understanding and agreement, their questions were answered completely and to their satisfaction  Follow up has been scheduled  Return in about 6 months (around 2/21/2020) for Review MRI brain with without contrast     Chief Complaint  Six-month follow-up, abnormal finding on MRI brain, meningioma, review updated study    Very pleasant 26-year-old male, returns for six-month follow-up to review MRI brain, history of meningioma  He has a history of:     Parkinson's disease, is currently managed with Sinemet, Exelon and Mirapex, followed by Dr Kia Castro      COPD, and is currently managed with albuterol, Spiriva and Singulair, he is followed by Dr Jodi Romero, and DARSHANA Jung  Diabetes mellitus type 2 for which he is followed by his primary care provider Dr Jacquelyn Borja, , most recent hemoglobin A1c, 7 2%, 2/4/19  History of CVA, remains on Plavix  Hypertension and dyslipidemia managed by his primary care provider, treated with Lipitor and Avalide    In addition he has vascular disease including aortic regurgitation, peripheral vascular disease, and carotid artery disease  The following portions of the patient's history were reviewed and updated as appropriate: allergies, current medications, past family history, past medical history, past social history and past surgical history  Review of Systems   Constitutional: Negative  HENT: Negative  Eyes: Positive for visual disturbance (blurred vision)  Respiratory: Negative  Cardiovascular: Negative  Gastrointestinal: Negative  Endocrine: Negative  Genitourinary: Negative  Musculoskeletal: Negative  Skin: Negative  Allergic/Immunologic: Negative  Neurological: Positive for weakness (b/l legs)  Negative for dizziness, tremors, seizures, syncope, facial asymmetry, speech difficulty, light-headedness, numbness and headaches  Hematological: Negative  Psychiatric/Behavioral: Positive for confusion, decreased concentration and hallucinations  All other systems reviewed and are negative  Objective:    Physical Exam   Constitutional: He is oriented to person, place, and time  He appears well-developed and well-nourished  HENT:   Head: Normocephalic and atraumatic  Eyes: Pupils are equal, round, and reactive to light  EOM are normal    Cardiovascular: Normal rate  Pulmonary/Chest: Effort normal and breath sounds normal    Neurological: He is alert and oriented to person, place, and time  Skin: Skin is warm and dry  Psychiatric: He has a normal mood and affect  Vitals reviewed        Neurologic Exam     Mental Status   Oriented to person, place, and time  Cranial Nerves     CN III, IV, VI   Pupils are equal, round, and reactive to light  Extraocular motions are normal           MRI BRAIN WITH AND WITHOUT CONTRAST   8/17/19     INDICATION: R90 89: Other abnormal findings on diagnostic imaging of central nervous system  D33 0: Benign neoplasm of brain, supratentorial  D32 9: Benign neoplasm of meninges, unspecified      COMPARISON:  MRI from 12/6/2018     TECHNIQUE:  Sagittal T1, axial T2, axial FLAIR, axial T1, axial [place], axial diffusion  Sagittal, axial T1 postcontrast   Axial bravo postcontrast with coronal reconstructions           IV Contrast:  7 mL of gadobutrol injection (MULTI-DOSE)      IMAGE QUALITY:   Diagnostic      FINDINGS:     BRAIN PARENCHYMA:  There is redemonstration of an avidly enhancing lesion along the high left frontal vertex compatible with a meningioma  The meningioma measures approximately 2 0 x 3 2 x 1 2 cm (transverse by anteroposterior by craniocaudad   dimensions)  This appears to have slightly increased in size since the prior examination where it measured approximately 1 9 x 2 9 x 1 0 cm when measured in the same planes  There is no definite associated edema      There is age-related involutional change  There is scattered chronic microvascular ischemic change  There is no evidence of recent infarction         VENTRICLES:  Normal      SELLA AND PITUITARY GLAND:  Normal      ORBITS:  Normal      PARANASAL SINUSES:  There is paranasal sinus mucosal thickening and partial opacification  There is mucosal thickening of the right frontal sinus      VASCULATURE:  Evaluation of the major intracranial vasculature demonstrates appropriate flow voids      CALVARIUM AND SKULL BASE:  Normal      EXTRACRANIAL SOFT TISSUES:  Normal      IMPRESSION:     Redemonstration of an avidly enhancing meningioma along the high left frontal vertex, slightly increased in size since the prior examination from 12/6/2018   No definite underlying vasogenic edema is identified      Scattered periventricular, subcortical and deep white matter chronic microvascular ischemic change  Yes

## 2022-03-14 ENCOUNTER — APPOINTMENT (OUTPATIENT)
Dept: PULMONOLOGY | Facility: CLINIC | Age: 87
End: 2022-03-14

## 2022-03-14 NOTE — TELEPHONE ENCOUNTER
MSW received a callback from [facility] at the [facility] on [address]  She stated that they Aging Office had done all they can for this patient - that the Level of Care Assessment was already completed  MSW advised, that as per Nikolas Tierney, patient has been denied for the Waiver 3 times, so we are seeking services from the [facility] stated that they will need a denial letter from the Waiver before they can proceed with assessing patient for any Aging services

## 2022-03-14 NOTE — TELEPHONE ENCOUNTER
TRINO attempted to follow-up with Suyapa Turk at the [facility] on Aging to see if she was able to obtain patient's financial information  No answer at [phone]  TRINO left message requesting  Awaiting same

## 2022-03-15 RX ORDER — PREDNISONE 10 MG/1
TABLET ORAL
Qty: 30 TABLET | Refills: 0 | Status: ON HOLD | OUTPATIENT
Start: 2022-03-15 | End: 2022-03-31 | Stop reason: SDUPTHER

## 2022-03-16 LAB — SARS-COV-2 N GENE NPH QL NAA+PROBE: NOT DETECTED

## 2022-03-17 ENCOUNTER — APPOINTMENT (OUTPATIENT)
Dept: PULMONOLOGY | Facility: CLINIC | Age: 87
End: 2022-03-17
Payer: COMMERCIAL

## 2022-03-17 VITALS — SYSTOLIC BLOOD PRESSURE: 135 MMHG | DIASTOLIC BLOOD PRESSURE: 77 MMHG | HEART RATE: 90 BPM | OXYGEN SATURATION: 96 %

## 2022-03-17 PROCEDURE — 94726 PLETHYSMOGRAPHY LUNG VOLUMES: CPT

## 2022-03-17 PROCEDURE — 88738 HGB QUANT TRANSCUTANEOUS: CPT

## 2022-03-17 PROCEDURE — 94729 DIFFUSING CAPACITY: CPT

## 2022-03-17 PROCEDURE — 95012 NITRIC OXIDE EXP GAS DETER: CPT

## 2022-03-17 PROCEDURE — 94010 BREATHING CAPACITY TEST: CPT

## 2022-03-18 ENCOUNTER — TELEPHONE (OUTPATIENT)
Dept: INTERNAL MEDICINE CLINIC | Facility: CLINIC | Age: 87
End: 2022-03-18

## 2022-03-18 DIAGNOSIS — G20 PARKINSON'S DISEASE (HCC): ICD-10-CM

## 2022-03-18 NOTE — TELEPHONE ENCOUNTER
MSW attempted to reach patient's daughter, Domenico Merritt, at [phone]  No answer  MSW left message requesting callback  Awaiting same

## 2022-03-18 NOTE — TELEPHONE ENCOUNTER
[facility] [place] at home   Stating that patient is getting up throughout the night 2 to 3 time wanting to leave the house and kicking            Thinks the QUEtiapine (SEROquel) 100 mg  Needs to be adjusted

## 2022-03-18 NOTE — TELEPHONE ENCOUNTER
Speak to Padilla Lei or Aly morgan and advise increase seroquel to 150 mg for 3-4 days and then f/u w/ me next week

## 2022-03-21 RX ORDER — RIVASTIGMINE TARTRATE 4.5 MG/1
4.5 CAPSULE ORAL 2 TIMES DAILY
Qty: 180 CAPSULE | Refills: 6 | Status: SHIPPED | OUTPATIENT
Start: 2022-03-21

## 2022-03-21 NOTE — TELEPHONE ENCOUNTER
MSW phoned patient's daughter, Emiliano Lewis, at [phone]  She is not aware of any denial letter from the Waiver, but stated that another one of her sisters had been helping patient apply for the waiver  MSW advised that this writer could facilitate a conference call with the sister who listed as an authorized representative on the patient's chart for waiver services to check the status/request denial letter  Emiliano Lewis will speak with her sisters about this and will get back to this writer next week

## 2022-03-22 ENCOUNTER — OFFICE VISIT (OUTPATIENT)
Dept: INTERNAL MEDICINE CLINIC | Facility: CLINIC | Age: 87
End: 2022-03-22
Payer: COMMERCIAL

## 2022-03-22 VITALS
DIASTOLIC BLOOD PRESSURE: 62 MMHG | RESPIRATION RATE: 16 BRPM | OXYGEN SATURATION: 96 % | HEART RATE: 86 BPM | SYSTOLIC BLOOD PRESSURE: 130 MMHG | WEIGHT: 167.4 LBS | TEMPERATURE: 97.3 F | HEIGHT: 63 IN | BODY MASS INDEX: 29.66 KG/M2

## 2022-03-22 DIAGNOSIS — R05.9 COUGH: ICD-10-CM

## 2022-03-22 DIAGNOSIS — J44.1 COPD WITH ACUTE EXACERBATION (HCC): Primary | ICD-10-CM

## 2022-03-22 DIAGNOSIS — G20 DEMENTIA DUE TO PARKINSON'S DISEASE WITH BEHAVIORAL DISTURBANCE (HCC): ICD-10-CM

## 2022-03-22 DIAGNOSIS — F02.81 DEMENTIA DUE TO PARKINSON'S DISEASE WITH BEHAVIORAL DISTURBANCE (HCC): ICD-10-CM

## 2022-03-22 PROCEDURE — 99214 OFFICE O/P EST MOD 30 MIN: CPT | Performed by: INTERNAL MEDICINE

## 2022-03-22 PROCEDURE — 3078F DIAST BP <80 MM HG: CPT | Performed by: INTERNAL MEDICINE

## 2022-03-22 PROCEDURE — 1036F TOBACCO NON-USER: CPT | Performed by: INTERNAL MEDICINE

## 2022-03-22 PROCEDURE — 3075F SYST BP GE 130 - 139MM HG: CPT | Performed by: INTERNAL MEDICINE

## 2022-03-22 PROCEDURE — 1160F RVW MEDS BY RX/DR IN RCRD: CPT | Performed by: INTERNAL MEDICINE

## 2022-03-22 RX ORDER — FAMOTIDINE 20 MG/1
TABLET, FILM COATED ORAL
Qty: 30 TABLET | Refills: 2 | Status: SHIPPED | OUTPATIENT
Start: 2022-03-22 | End: 2022-03-25

## 2022-03-22 RX ORDER — PRAMIPEXOLE DIHYDROCHLORIDE 0.5 MG/1
TABLET ORAL
Qty: 270 TABLET | Refills: 0 | Status: SHIPPED | OUTPATIENT
Start: 2022-03-22 | End: 2022-06-14

## 2022-03-22 NOTE — PATIENT INSTRUCTIONS
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

## 2022-03-22 NOTE — PROGRESS NOTES
Assessment/Plan:    Diagnoses and all orders for this visit:    COPD with acute exacerbation ([facility] )    Dementia due to Parkinson's disease with behavioral disturbance ([facility] [address] )    Aspiration by  with respiratory symptoms, unspecified aspirated material              Patient Instructions   Continue Seroquel 75 mg at night  Continue to monitor for response  I am somewhat surprised that decreased dose improved behavior but will certainly continue on this dose as it appears effective  Can consider further dose adjustment moving forward verses changing to a different agent  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-reach out to home health to schedule speech therapy for home evaluation and teaching  Mechanical soft diet with thin liquids has previously been recommended  Continue with bronchodilators and pulmonary toilet  Subjective:      Patient ID: Doron Fitch is a 81 y o  male    Patient presents with his granddaughter Ky and granddaughter-in-law  Ky helps with a lot of the history  She has been home off and on for the last several months but will be going back to [place] in 2 weeks  I received communication from home health nurse noting that patient had increased nocturnal agitation including hallucinations and frequent falls  He had been taking 100 mg of Seroquel  I encouraged him to increase to 150 mg on the 18 add schedule follow-up this week  Patient's son was concerned that this was too high of a dose instead of increasing went down to 75 mg and Ky states that he has been sleeping much better on that dose  Notably, his daughter puts him to bed at about 11 and then wakes admit to to use the bathroom and that is when she gives the Seroquel  Patient isn't able to give much history  He continues to have cough and prandial cough and frequent chest congestion despite nebulizers    Speech therapy has not evaluated him in the home as ordered earlier this month          Current Outpatient Medications:     QUEtiapine (SEROquel) 25 mg tablet, TAKE 1 TABLET BY MOUTH EVERYDAY AT BEDTIME (Patient taking differently: 75 mg  ), Disp: 90 tablet, Rfl: 1    acetaminophen (TYLENOL) 325 mg tablet, Take 650 mg by mouth every 6 (six) hours as needed for mild pain  , Disp: , Rfl:     albuterol (2 5 mg/3 mL) 0 083 % nebulizer solution, INHALE 1 VIAL VIA NEBULIZER EVERY 4 HOURS AS NEEDED, Disp: 75 mL, Rfl: 0    albuterol (2 5 mg/3 mL) 0 083 % nebulizer solution, INHALE 1 VIAL VIA NEBULIZER EVERY 4 HOURS AS NEEDED, Disp: 360 mL, Rfl: 1    albuterol (PROVENTIL HFA,VENTOLIN HFA) 90 mcg/act inhaler, INHALE 2 PUFFS BY MOUTH EVERY 6 HOURS AS NEEDED FOR WHEEZING, Disp: 1 g, Rfl: 3    atorvastatin (LIPITOR) 10 mg tablet, TAKE 1 TABLET BY MOUTH EVERY DAY, Disp: 90 tablet, Rfl: 1    benzonatate (TESSALON PERLES) 100 mg capsule, TAKE 1 CAPSULE BY MOUTH THREE TIMES A DAY AS NEEDED FOR COUGH (Patient not taking: Reported on 2/15/2022), Disp: 20 capsule, Rfl: 0    carbidopa-levodopa (SINEMET)  mg per tablet, TAKE 1 TABLET BY MOUTH THREE TIMES A DAY, Disp: 270 tablet, Rfl: 1    famotidine (PEPCID) 20 mg tablet, TAKE 1 TABLET BY MOUTH TWICE A DAY, Disp: 30 tablet, Rfl: 2    finasteride (PROSCAR) 5 mg tablet, Take 1 tablet (5 mg total) by mouth every morning, Disp: 90 tablet, Rfl: 0    fluticasone-salmeterol (Wixela Inhub) 250-50 mcg/dose inhaler, Inhale 1 puff 2 (two) times a day Rinse mouth after use , Disp: 60 blister, Rfl: 2    fluticasone-salmeterol (Wixela Inhub) 250-50 mcg/dose inhaler, Inhale 1 puff 2 (two) times a day Rinse mouth after use , Disp: 60 blister, Rfl: 0    guaiFENesin 1200 MG TB12, Take 1 tablet (1,200 mg total) by mouth every 12 (twelve) hours (Patient not taking: Reported on 2/15/2022 ), Disp: 20 tablet, Rfl: 0    levalbuterol (XOPENEX) 1 25 mg/0 5 mL nebulizer solution, Take 0 5 mL (1 25 mg total) by nebulization 3 (three) times a day, Disp: , Rfl:    montelukast (SINGULAIR) 10 mg tablet, TAKE 1 TABLET BY MOUTH DAILY AT BEDTIME, Disp: 90 tablet, Rfl: 3    nystatin (MYCOSTATIN) cream, Apply to intertriginous areas 2-3 times daily as needed, Disp: 80 g, Rfl: 3    nystatin (MYCOSTATIN) powder, APPLY TO AFFECTED AREA TWICE A DAY AS DIRECTED, Disp: 30 g, Rfl: 1    pramipexole (MIRAPEX) 0 5 mg tablet, TAKE 1 TABLET (0 5 MG TOTAL) BY MOUTH 3 (THREE) TIMES A DAY, Disp: 270 tablet, Rfl: 0    predniSONE 10 mg tablet, TAKE 1 TABLET BY MOUTH EVERY DAY, Disp: 30 tablet, Rfl: 0    QUEtiapine (SEROquel) 100 mg tablet, Take 1 tablet (100 mg total) by mouth daily at bedtime (Patient not taking: Reported on 3/22/2022 ), Disp: 30 tablet, Rfl: 5    Respiratory Therapy Supplies (Flutter) VON, Use daily Use flutter valve at least three times daily and whenever needed for congestion (Patient not taking: Reported on 2/15/2022 ), Disp: 1 each, Rfl: 0    rivastigmine (EXELON) 4 5 MG capsule, Take 1 capsule (4 5 mg total) by mouth 2 (two) times a day, Disp: 180 capsule, Rfl: 6    tiotropium (Spiriva HandiHaler) 18 mcg inhalation capsule, Place 1 capsule (18 mcg total) into inhaler and inhale daily Via Handihaler at the same time every day, Disp: 90 capsule, Rfl: 0    triamcinolone (KENALOG) 0 5 % cream, APPLY TO AFFECTED AREA(S) OF THE RASH TWICE DAILY AS NEEDED, Disp: 30 g, Rfl: 0    Recent Results (from the past 1008 hour(s))   CK (with reflex to MB)    Collection Time: 02/15/22 10:57 AM   Result Value Ref Range    Total CK 80 39 - 308 U/L   Aldolase    Collection Time: 02/15/22 10:57 AM   Result Value Ref Range    Aldolase 4 4 3 3 - 10 3 U/L   Vitamin B12    Collection Time: 02/15/22 10:57 AM   Result Value Ref Range    Vitamin B-12 541 100 - 900 pg/mL   Folate    Collection Time: 02/15/22 10:57 AM   Result Value Ref Range    Folate >20 0 (H) 3 1 - 17 5 ng/mL   Lipid panel    Collection Time: 03/04/22 12:52 PM   Result Value Ref Range    Cholesterol 186 See Comment mg/dL Triglycerides 140 See Comment mg/dL    HDL, Direct 108 >=40 mg/dL    LDL Calculated 50 0 - 100 mg/dL    Non-HDL-Chol (CHOL-HDL) 78 mg/dl   Hemoglobin A1C    Collection Time: 03/04/22 12:52 PM   Result Value Ref Range    Hemoglobin A1C 6 6 (H) Normal 3 8-5 6%; PreDiabetic 5 7-6 4%;  Diabetic >=6 5%; Glycemic control for adults with diabetes <7 0% %     mg/dl   TSH, 3rd generation with Free T4 reflex    Collection Time: 03/04/22 12:52 PM   Result Value Ref Range    TSH 3RD GENERATON 2 280 0 358 - 3 740 uIU/mL   CBC and differential    Collection Time: 03/04/22 12:52 PM   Result Value Ref Range    WBC 10 09 4 31 - 10 16 Thousand/uL    RBC 4 62 3 88 - 5 62 Million/uL    Hemoglobin 14 0 12 0 - 17 0 g/dL    Hematocrit 42 3 36 5 - 49 3 %    MCV 92 82 - 98 fL    MCH 30 3 26 8 - 34 3 pg    MCHC 33 1 31 4 - 37 4 g/dL    RDW 13 0 11 6 - 15 1 %    MPV 10 3 8 9 - 12 7 fL    Platelets 638 449 - 061 Thousands/uL    nRBC 0 /100 WBCs    Neutrophils Relative 86 (H) 43 - 75 %    Immat GRANS % 1 0 - 2 %    Lymphocytes Relative 6 (L) 14 - 44 %    Monocytes Relative 6 4 - 12 %    Eosinophils Relative 1 0 - 6 %    Basophils Relative 0 0 - 1 %    Neutrophils Absolute 8 71 (H) 1 85 - 7 62 Thousands/µL    Immature Grans Absolute 0 05 0 00 - 0 20 Thousand/uL    Lymphocytes Absolute 0 58 (L) 0 60 - 4 47 Thousands/µL    Monocytes Absolute 0 62 0 17 - 1 22 Thousand/µL    Eosinophils Absolute 0 11 0 00 - 0 61 Thousand/µL    Basophils Absolute 0 02 0 00 - 0 10 Thousands/µL   Comprehensive metabolic panel    Collection Time: 03/04/22 12:52 PM   Result Value Ref Range    Sodium 140 136 - 145 mmol/L    Potassium 4 2 3 5 - 5 3 mmol/L    Chloride 111 (H) 100 - 108 mmol/L    CO2 24 21 - 32 mmol/L    ANION GAP 5 4 - 13 mmol/L    BUN 26 (H) 5 - 25 mg/dL    Creatinine 1 18 0 60 - 1 30 mg/dL    Glucose 185 (H) 65 - 140 mg/dL    Calcium 10 1 8 3 - 10 1 mg/dL    Corrected Calcium 10 8 (H) 8 3 - 10 1 mg/dL    AST 21 5 - 45 U/L    ALT 17 12 - 78 U/L Alkaline Phosphatase 99 46 - 116 U/L    Total Protein 6 9 6 4 - 8 2 g/dL    Albumin 3 1 (L) 3 5 - 5 0 g/dL    Total Bilirubin 0 24 0 20 - 1 00 mg/dL    eGFR 55 ml/min/1 73sq m   UA w Reflex to Microscopic w Reflex to Culture    Collection Time: 03/04/22 12:52 PM    Specimen: Urine   Result Value Ref Range    Color, UA Light Yellow     Clarity, UA Clear     Specific [place], UA 1 019 1 003 - 1 030    pH, UA 6 0 4 5, 5 0, 5 5, 6 0, 6 5, 7 0, 7 5, 8 0    Leukocytes, UA Negative Negative    Nitrite, UA Negative Negative    Protein,  (2+) (A) Negative mg/dl    Glucose, UA 70 (7/100%) (A) Negative mg/dl    Ketones, UA Negative Negative mg/dl    Urobilinogen, UA <2 0 <2 0 mg/dl mg/dl    Bilirubin, UA Negative Negative    Blood, UA Negative Negative   Urine Microscopic    Collection Time: 03/04/22 12:52 PM   Result Value Ref Range    RBC, UA 1-2 None Seen, 1-2 /hpf    WBC, UA 1-2 None Seen, 1-2 /hpf    Epithelial Cells Occasional None Seen, Occasional /hpf    Bacteria, UA None Seen None Seen, Occasional /hpf       The following portions of the patient's history were reviewed and updated as appropriate: allergies, current medications, past family history, past medical history, past social history, past surgical history and problem list      Review of Systems   Constitutional: Negative for appetite change, chills, diaphoresis, fatigue, fever and unexpected weight change  HENT: Negative for congestion, hearing loss and rhinorrhea  Eyes: Negative for visual disturbance  Respiratory: Positive for cough and shortness of breath  Negative for chest tightness and wheezing  Cardiovascular: Negative for chest pain, palpitations and leg swelling  Gastrointestinal: Negative for abdominal pain and blood in stool  Endocrine: Negative for cold intolerance, heat intolerance, polydipsia and polyuria  Genitourinary: Negative for difficulty urinating, dysuria, frequency and urgency     Musculoskeletal: Positive for arthralgias and gait problem  Negative for myalgias  Skin: Negative for rash  Neurological: Negative for dizziness, weakness, light-headedness and headaches  Hematological: Does not bruise/bleed easily  Psychiatric/Behavioral: Positive for behavioral problems, confusion, decreased concentration and sleep disturbance  Negative for dysphoric mood  Objective:      Vitals:    03/22/22 1007   BP: 130/62   Pulse: 86   Resp: 16   Temp: (!) 97 3 °F (36 3 °C)   SpO2: 96%          Physical Exam  Constitutional:       Appearance: He is well-developed  HENT:      Head: Normocephalic and atraumatic  Nose: Nose normal    Eyes:      General: No scleral icterus  Conjunctiva/sclera: Conjunctivae normal       Pupils: Pupils are equal, round, and reactive to light  Neck:      Thyroid: No thyromegaly  Vascular: No JVD  Trachea: No tracheal deviation  Cardiovascular:      Rate and Rhythm: Normal rate and regular rhythm  Heart sounds: No murmur heard  No friction rub  No gallop  Pulmonary:      Effort: Pulmonary effort is normal  No respiratory distress  Breath sounds: Rhonchi present  No wheezing or rales  Comments: Coarse bilateral rhonchi with prolonged expiratory phase  Musculoskeletal:         General: No deformity  Cervical back: Normal range of motion and neck supple  Lymphadenopathy:      Cervical: No cervical adenopathy  Skin:     General: Skin is warm and dry  Coloration: Skin is not pale  Findings: No erythema or rash  Neurological:      Mental Status: He is alert and oriented to person, place, and time  Cranial Nerves: No cranial nerve deficit  Psychiatric:         Behavior: Behavior normal          Thought Content:  Thought content normal          Judgment: Judgment normal

## 2022-03-24 DIAGNOSIS — R05.9 COUGH: ICD-10-CM

## 2022-03-25 ENCOUNTER — TELEPHONE (OUTPATIENT)
Dept: INTERNAL MEDICINE CLINIC | Facility: CLINIC | Age: 87
End: 2022-03-25

## 2022-03-25 RX ORDER — FAMOTIDINE 20 MG/1
TABLET, FILM COATED ORAL
Qty: 30 TABLET | Refills: 2 | Status: SHIPPED | OUTPATIENT
Start: 2022-03-25 | End: 2022-04-11

## 2022-03-25 NOTE — TELEPHONE ENCOUNTER
Veronique Tavera so let the family know that joey doesn't have speech   They can either do outpatient speech therapy or they will have to change there home care agency

## 2022-03-25 NOTE — TELEPHONE ENCOUNTER
Rah Adam from [facility] requests a speech therapist in addition to  , PT, and nursing care  Family states Dr Dyan Robins agreed  This [facility] does not have a speech therapist and if the Family and Dr Dyan Robins wish to have pt receive speech therapy he would be discharged from [address] and would have to be reassigned elsewhere      Please Advise

## 2022-03-28 ENCOUNTER — APPOINTMENT (EMERGENCY)
Dept: RADIOLOGY | Facility: HOSPITAL | Age: 87
DRG: 178 | End: 2022-03-28
Payer: COMMERCIAL

## 2022-03-28 ENCOUNTER — HOSPITAL ENCOUNTER (INPATIENT)
Facility: HOSPITAL | Age: 87
LOS: 3 days | Discharge: HOME HEALTH | DRG: 178 | End: 2022-03-31
Attending: EMERGENCY MEDICINE | Admitting: STUDENT IN AN ORGANIZED HEALTH CARE EDUCATION/TRAINING PROGRAM
Payer: COMMERCIAL

## 2022-03-28 DIAGNOSIS — F02.81 DEMENTIA DUE TO PARKINSON'S DISEASE WITH BEHAVIORAL DISTURBANCE (HCC): ICD-10-CM

## 2022-03-28 DIAGNOSIS — G20 DEMENTIA DUE TO PARKINSON'S DISEASE WITH BEHAVIORAL DISTURBANCE (HCC): ICD-10-CM

## 2022-03-28 DIAGNOSIS — B44.81 ABPA (ALLERGIC BRONCHOPULMONARY ASPERGILLOSIS) (HCC): ICD-10-CM

## 2022-03-28 DIAGNOSIS — J18.9 PNEUMONIA: Primary | ICD-10-CM

## 2022-03-28 DIAGNOSIS — J69.0 ASPIRATION PNEUMONIA OF LEFT LOWER LOBE DUE TO REGURGITATED FOOD (HCC): ICD-10-CM

## 2022-03-28 PROBLEM — R13.10 DYSPHAGIA: Status: ACTIVE | Noted: 2022-03-28

## 2022-03-28 PROBLEM — J43.8 OTHER EMPHYSEMA (HCC): Status: ACTIVE | Noted: 2022-03-28

## 2022-03-28 LAB
2HR DELTA HS TROPONIN: -7 NG/L
4HR DELTA HS TROPONIN: -7 NG/L
ALBUMIN SERPL BCP-MCNC: 2.9 G/DL (ref 3.5–5)
ALP SERPL-CCNC: 76 U/L (ref 46–116)
ALT SERPL W P-5'-P-CCNC: 12 U/L (ref 12–78)
ANION GAP SERPL CALCULATED.3IONS-SCNC: 9 MMOL/L (ref 4–13)
AST SERPL W P-5'-P-CCNC: 21 U/L (ref 5–45)
ATRIAL RATE: 89 BPM
BASOPHILS # BLD AUTO: 0.04 THOUSANDS/ΜL (ref 0–0.1)
BASOPHILS NFR BLD AUTO: 0 % (ref 0–1)
BILIRUB SERPL-MCNC: 0.5 MG/DL (ref 0.2–1)
BUN SERPL-MCNC: 35 MG/DL (ref 5–25)
CALCIUM ALBUM COR SERPL-MCNC: 9.8 MG/DL (ref 8.3–10.1)
CALCIUM SERPL-MCNC: 8.9 MG/DL (ref 8.3–10.1)
CARDIAC TROPONIN I PNL SERPL HS: 21 NG/L
CARDIAC TROPONIN I PNL SERPL HS: 21 NG/L
CARDIAC TROPONIN I PNL SERPL HS: 28 NG/L
CHLORIDE SERPL-SCNC: 104 MMOL/L (ref 100–108)
CO2 SERPL-SCNC: 23 MMOL/L (ref 21–32)
CREAT SERPL-MCNC: 1.82 MG/DL (ref 0.6–1.3)
EOSINOPHIL # BLD AUTO: 0.03 THOUSAND/ΜL (ref 0–0.61)
EOSINOPHIL NFR BLD AUTO: 0 % (ref 0–6)
ERYTHROCYTE [DISTWIDTH] IN BLOOD BY AUTOMATED COUNT: 13.3 % (ref 11.6–15.1)
FLUAV RNA RESP QL NAA+PROBE: NEGATIVE
FLUBV RNA RESP QL NAA+PROBE: NEGATIVE
GFR SERPL CREATININE-BSD FRML MDRD: 32 ML/MIN/1.73SQ M
GLUCOSE SERPL-MCNC: 151 MG/DL (ref 65–140)
GLUCOSE SERPL-MCNC: 203 MG/DL (ref 65–140)
GLUCOSE SERPL-MCNC: 227 MG/DL (ref 65–140)
HCT VFR BLD AUTO: 44.8 % (ref 36.5–49.3)
HGB BLD-MCNC: 14.3 G/DL (ref 12–17)
IMM GRANULOCYTES # BLD AUTO: 0.12 THOUSAND/UL (ref 0–0.2)
IMM GRANULOCYTES NFR BLD AUTO: 1 % (ref 0–2)
INR PPP: 1.22 (ref 0.84–1.19)
LACTATE SERPL-SCNC: 2 MMOL/L (ref 0.5–2)
LYMPHOCYTES # BLD AUTO: 0.71 THOUSANDS/ΜL (ref 0.6–4.47)
LYMPHOCYTES NFR BLD AUTO: 3 % (ref 14–44)
MCH RBC QN AUTO: 30 PG (ref 26.8–34.3)
MCHC RBC AUTO-ENTMCNC: 31.9 G/DL (ref 31.4–37.4)
MCV RBC AUTO: 94 FL (ref 82–98)
MONOCYTES # BLD AUTO: 1.44 THOUSAND/ΜL (ref 0.17–1.22)
MONOCYTES NFR BLD AUTO: 6 % (ref 4–12)
NEUTROPHILS # BLD AUTO: 20.55 THOUSANDS/ΜL (ref 1.85–7.62)
NEUTS SEG NFR BLD AUTO: 90 % (ref 43–75)
NRBC BLD AUTO-RTO: 0 /100 WBCS
P AXIS: 52 DEGREES
PLATELET # BLD AUTO: 193 THOUSANDS/UL (ref 149–390)
PMV BLD AUTO: 9.7 FL (ref 8.9–12.7)
POTASSIUM SERPL-SCNC: 4.2 MMOL/L (ref 3.5–5.3)
PR INTERVAL: 154 MS
PROCALCITONIN SERPL-MCNC: 19.1 NG/ML
PROT SERPL-MCNC: 6.5 G/DL (ref 6.4–8.2)
PROTHROMBIN TIME: 14.9 SECONDS (ref 11.6–14.5)
QRS AXIS: -24 DEGREES
QRSD INTERVAL: 106 MS
QT INTERVAL: 370 MS
QTC INTERVAL: 450 MS
RBC # BLD AUTO: 4.77 MILLION/UL (ref 3.88–5.62)
RSV RNA RESP QL NAA+PROBE: NEGATIVE
SARS-COV-2 RNA RESP QL NAA+PROBE: NEGATIVE
SODIUM SERPL-SCNC: 136 MMOL/L (ref 136–145)
T WAVE AXIS: 16 DEGREES
VENTRICULAR RATE: 89 BPM
WBC # BLD AUTO: 22.89 THOUSAND/UL (ref 4.31–10.16)

## 2022-03-28 PROCEDURE — 85025 COMPLETE CBC W/AUTO DIFF WBC: CPT | Performed by: EMERGENCY MEDICINE

## 2022-03-28 PROCEDURE — 0241U HB NFCT DS VIR RESP RNA 4 TRGT: CPT | Performed by: EMERGENCY MEDICINE

## 2022-03-28 PROCEDURE — 82948 REAGENT STRIP/BLOOD GLUCOSE: CPT

## 2022-03-28 PROCEDURE — 99223 1ST HOSP IP/OBS HIGH 75: CPT | Performed by: STUDENT IN AN ORGANIZED HEALTH CARE EDUCATION/TRAINING PROGRAM

## 2022-03-28 PROCEDURE — 71046 X-RAY EXAM CHEST 2 VIEWS: CPT

## 2022-03-28 PROCEDURE — 93005 ELECTROCARDIOGRAM TRACING: CPT

## 2022-03-28 PROCEDURE — 80053 COMPREHEN METABOLIC PANEL: CPT | Performed by: EMERGENCY MEDICINE

## 2022-03-28 PROCEDURE — 93010 ELECTROCARDIOGRAM REPORT: CPT | Performed by: INTERNAL MEDICINE

## 2022-03-28 PROCEDURE — 84145 PROCALCITONIN (PCT): CPT | Performed by: STUDENT IN AN ORGANIZED HEALTH CARE EDUCATION/TRAINING PROGRAM

## 2022-03-28 PROCEDURE — 84484 ASSAY OF TROPONIN QUANT: CPT | Performed by: EMERGENCY MEDICINE

## 2022-03-28 PROCEDURE — 36415 COLL VENOUS BLD VENIPUNCTURE: CPT | Performed by: EMERGENCY MEDICINE

## 2022-03-28 PROCEDURE — 85610 PROTHROMBIN TIME: CPT | Performed by: EMERGENCY MEDICINE

## 2022-03-28 PROCEDURE — 83605 ASSAY OF LACTIC ACID: CPT | Performed by: EMERGENCY MEDICINE

## 2022-03-28 PROCEDURE — 99285 EMERGENCY DEPT VISIT HI MDM: CPT | Performed by: EMERGENCY MEDICINE

## 2022-03-28 PROCEDURE — 99285 EMERGENCY DEPT VISIT HI MDM: CPT

## 2022-03-28 PROCEDURE — 87040 BLOOD CULTURE FOR BACTERIA: CPT | Performed by: EMERGENCY MEDICINE

## 2022-03-28 RX ORDER — LEVALBUTEROL INHALATION SOLUTION 0.63 MG/3ML
0.63 SOLUTION RESPIRATORY (INHALATION) EVERY 8 HOURS PRN
Status: DISCONTINUED | OUTPATIENT
Start: 2022-03-28 | End: 2022-03-31 | Stop reason: HOSPADM

## 2022-03-28 RX ORDER — GUAIFENESIN 600 MG
600 TABLET, EXTENDED RELEASE 12 HR ORAL EVERY 12 HOURS SCHEDULED
Status: DISCONTINUED | OUTPATIENT
Start: 2022-03-28 | End: 2022-03-31 | Stop reason: HOSPADM

## 2022-03-28 RX ORDER — LANOLIN ALCOHOL/MO/W.PET/CERES
3 CREAM (GRAM) TOPICAL
Status: DISCONTINUED | OUTPATIENT
Start: 2022-03-28 | End: 2022-03-31 | Stop reason: HOSPADM

## 2022-03-28 RX ORDER — ALBUTEROL SULFATE 90 UG/1
2 AEROSOL, METERED RESPIRATORY (INHALATION) EVERY 6 HOURS PRN
Status: DISCONTINUED | OUTPATIENT
Start: 2022-03-28 | End: 2022-03-28

## 2022-03-28 RX ORDER — RIVASTIGMINE TARTRATE 1.5 MG/1
4.5 CAPSULE ORAL 2 TIMES DAILY
Status: DISCONTINUED | OUTPATIENT
Start: 2022-03-28 | End: 2022-03-31 | Stop reason: HOSPADM

## 2022-03-28 RX ORDER — PRAMIPEXOLE DIHYDROCHLORIDE 0.5 MG/1
0.5 TABLET ORAL 3 TIMES DAILY
Status: DISCONTINUED | OUTPATIENT
Start: 2022-03-28 | End: 2022-03-31 | Stop reason: HOSPADM

## 2022-03-28 RX ORDER — PREDNISONE 10 MG/1
10 TABLET ORAL DAILY
Status: DISCONTINUED | OUTPATIENT
Start: 2022-03-29 | End: 2022-03-31 | Stop reason: HOSPADM

## 2022-03-28 RX ORDER — ACETAMINOPHEN 325 MG/1
650 TABLET ORAL EVERY 6 HOURS PRN
Status: DISCONTINUED | OUTPATIENT
Start: 2022-03-28 | End: 2022-03-31 | Stop reason: HOSPADM

## 2022-03-28 RX ORDER — FAMOTIDINE 20 MG/1
20 TABLET, FILM COATED ORAL 2 TIMES DAILY
Status: DISCONTINUED | OUTPATIENT
Start: 2022-03-28 | End: 2022-03-31 | Stop reason: HOSPADM

## 2022-03-28 RX ORDER — ATORVASTATIN CALCIUM 10 MG/1
10 TABLET, FILM COATED ORAL DAILY
Status: DISCONTINUED | OUTPATIENT
Start: 2022-03-29 | End: 2022-03-31 | Stop reason: HOSPADM

## 2022-03-28 RX ORDER — ALBUTEROL SULFATE 2.5 MG/3ML
2.5 SOLUTION RESPIRATORY (INHALATION) EVERY 6 HOURS PRN
Status: DISCONTINUED | OUTPATIENT
Start: 2022-03-28 | End: 2022-03-28 | Stop reason: SDUPTHER

## 2022-03-28 RX ORDER — FLUTICASONE FUROATE AND VILANTEROL 200; 25 UG/1; UG/1
1 POWDER RESPIRATORY (INHALATION)
Status: DISCONTINUED | OUTPATIENT
Start: 2022-03-29 | End: 2022-03-31 | Stop reason: HOSPADM

## 2022-03-28 RX ORDER — MONTELUKAST SODIUM 10 MG/1
10 TABLET ORAL
Status: DISCONTINUED | OUTPATIENT
Start: 2022-03-28 | End: 2022-03-31 | Stop reason: HOSPADM

## 2022-03-28 RX ORDER — FINASTERIDE 5 MG/1
5 TABLET, FILM COATED ORAL EVERY MORNING
Status: DISCONTINUED | OUTPATIENT
Start: 2022-03-29 | End: 2022-03-31 | Stop reason: HOSPADM

## 2022-03-28 RX ORDER — OLANZAPINE 2.5 MG/1
2.5 TABLET ORAL EVERY 6 HOURS PRN
Status: DISCONTINUED | OUTPATIENT
Start: 2022-03-28 | End: 2022-03-31 | Stop reason: HOSPADM

## 2022-03-28 RX ORDER — HEPARIN SODIUM 5000 [USP'U]/ML
5000 INJECTION, SOLUTION INTRAVENOUS; SUBCUTANEOUS EVERY 8 HOURS SCHEDULED
Status: DISCONTINUED | OUTPATIENT
Start: 2022-03-28 | End: 2022-03-31 | Stop reason: HOSPADM

## 2022-03-28 RX ORDER — ONDANSETRON 2 MG/ML
4 INJECTION INTRAMUSCULAR; INTRAVENOUS EVERY 6 HOURS PRN
Status: DISCONTINUED | OUTPATIENT
Start: 2022-03-28 | End: 2022-03-31 | Stop reason: HOSPADM

## 2022-03-28 RX ADMIN — PRAMIPEXOLE DIHYDROCHLORIDE 0.5 MG: 0.5 TABLET ORAL at 21:50

## 2022-03-28 RX ADMIN — CEFTRIAXONE SODIUM 1000 MG: 10 INJECTION, POWDER, FOR SOLUTION INTRAVENOUS at 18:54

## 2022-03-28 RX ADMIN — FAMOTIDINE 20 MG: 20 TABLET ORAL at 18:40

## 2022-03-28 RX ADMIN — RIVASTIGMINE TARTRATE 4.5 MG: 1.5 CAPSULE ORAL at 18:40

## 2022-03-28 RX ADMIN — CARBIDOPA AND LEVODOPA 1 TABLET: 25; 100 TABLET ORAL at 17:00

## 2022-03-28 RX ADMIN — PRAMIPEXOLE DIHYDROCHLORIDE 0.5 MG: 0.5 TABLET ORAL at 18:00

## 2022-03-28 RX ADMIN — MONTELUKAST 10 MG: 10 TABLET, FILM COATED ORAL at 21:50

## 2022-03-28 RX ADMIN — HEPARIN SODIUM 5000 UNITS: 5000 INJECTION INTRAVENOUS; SUBCUTANEOUS at 17:15

## 2022-03-28 RX ADMIN — Medication 3 MG: at 21:52

## 2022-03-28 RX ADMIN — GUAIFENESIN 600 MG: 600 TABLET ORAL at 21:51

## 2022-03-28 RX ADMIN — CARBIDOPA AND LEVODOPA 1 TABLET: 25; 100 TABLET ORAL at 21:51

## 2022-03-28 RX ADMIN — HEPARIN SODIUM 5000 UNITS: 5000 INJECTION INTRAVENOUS; SUBCUTANEOUS at 21:53

## 2022-03-28 NOTE — ASSESSMENT & PLAN NOTE
Presented with cough, chest pain reported symptoms of chills and rigors at home  Chest x-ray consistent with pneumonia, with source likely aspiration  Increased leukocytosis, patient did not meet sepsis criteria  Continue IV Rocephin, cultures pending  Mucinex  Speech therapy consulted  Will give gentle IV fluids

## 2022-03-28 NOTE — ED PROVIDER NOTES
History  Chief Complaint   Patient presents with    Chest Pain     pt started with "bad chest pains" after breakfast  Family reports tremors last night but "he didnt want to go to the hospital last night " reports pain in chest at this time, unable to describe  reports "sweating last night, and today his skin is all red with pins feeling on both legs  "     81year old male patient presents emergency department for evaluation of consistent persistent cough  The patient has a history of COPD, utilize medications for this at home, over last several days he has been noted to have an increased cough which is now productive  The patient also was noted to have rigors last night for approximately 25 minutes and then a fever afterwards  Patient be evaluated for pneumonia  History provided by:  Patient   used: No    Chest Pain  Pain quality: aching    Pain radiates to:  Does not radiate  Pain severity:  Mild  Onset quality:  Gradual  Timing:  Constant  Chronicity:  New  Context: not breathing, no movement and not raising an arm    Relieved by:  Nothing  Worsened by:  Nothing tried  Ineffective treatments:  None tried  Associated symptoms: no anorexia, no claudication, no fever, no heartburn and no PND        Prior to Admission Medications   Prescriptions Last Dose Informant Patient Reported? Taking?    QUEtiapine (SEROquel) 100 mg tablet   No No   Sig: Take 1 tablet (100 mg total) by mouth daily at bedtime   Patient not taking: Reported on 3/22/2022    QUEtiapine (SEROquel) 25 mg tablet   No No   Sig: TAKE 1 TABLET BY MOUTH EVERYDAY AT BEDTIME   Patient taking differently: 75 mg     Respiratory Therapy Supplies (Flutter) VON  Child No No   Sig: Use daily Use flutter valve at least three times daily and whenever needed for congestion   Patient not taking: Reported on 2/15/2022    acetaminophen (TYLENOL) 325 mg tablet  Child Yes No   Sig: Take 650 mg by mouth every 6 (six) hours as needed for mild pain     albuterol (2 5 mg/3 mL) 0 083 % nebulizer solution   No No   Sig: INHALE 1 VIAL VIA NEBULIZER EVERY 4 HOURS AS NEEDED   albuterol (2 5 mg/3 mL) 0 083 % nebulizer solution   No No   Sig: INHALE 1 VIAL VIA NEBULIZER EVERY 4 HOURS AS NEEDED   albuterol (PROVENTIL HFA,VENTOLIN HFA) 90 mcg/act inhaler   No No   Sig: INHALE 2 PUFFS BY MOUTH EVERY 6 HOURS AS NEEDED FOR WHEEZING   atorvastatin (LIPITOR) 10 mg tablet   No No   Sig: TAKE 1 TABLET BY MOUTH EVERY DAY   benzonatate (TESSALON PERLES) 100 mg capsule   No No   Sig: TAKE 1 CAPSULE BY MOUTH THREE TIMES A DAY AS NEEDED FOR COUGH   Patient not taking: Reported on 2/15/2022   carbidopa-levodopa (SINEMET)  mg per tablet   No No   Sig: TAKE 1 TABLET BY MOUTH THREE TIMES A DAY   famotidine (PEPCID) 20 mg tablet   No No   Sig: TAKE 1 TABLET BY MOUTH TWICE A DAY   finasteride (PROSCAR) 5 mg tablet   No No   Sig: Take 1 tablet (5 mg total) by mouth every morning   fluticasone-salmeterol (Wixela Inhub) 250-50 mcg/dose inhaler   No No   Sig: Inhale 1 puff 2 (two) times a day Rinse mouth after use  fluticasone-salmeterol (Wixela Inhub) 250-50 mcg/dose inhaler   No No   Sig: Inhale 1 puff 2 (two) times a day Rinse mouth after use  guaiFENesin 1200 MG TB12   No No   Sig: Take 1 tablet (1,200 mg total) by mouth every 12 (twelve) hours   Patient not taking: Reported on 2/15/2022    levalbuterol (XOPENEX) 1 25 mg/0 5 mL nebulizer solution  Child No No   Sig: Take 0 5 mL (1 25 mg total) by nebulization 3 (three) times a day   montelukast (SINGULAIR) 10 mg tablet   No No   Sig: TAKE 1 TABLET BY MOUTH DAILY AT BEDTIME   nystatin (MYCOSTATIN) cream  Child No No   Sig: Apply to intertriginous areas 2-3 times daily as needed   nystatin (MYCOSTATIN) powder   No No   Sig: APPLY TO AFFECTED AREA TWICE A DAY AS DIRECTED   pramipexole (MIRAPEX) 0 5 mg tablet   No No   Sig: TAKE 1 TABLET BY MOUTH 3 TIMES A DAY     predniSONE 10 mg tablet   No No   Sig: TAKE 1 TABLET BY MOUTH EVERY DAY   rivastigmine (EXELON) 4 5 MG capsule   No No   Sig: Take 1 capsule (4 5 mg total) by mouth 2 (two) times a day   tiotropium (Spiriva HandiHaler) 18 mcg inhalation capsule   No No   Sig: Place 1 capsule (18 mcg total) into inhaler and inhale daily Via Handihaler at the same time every day   triamcinolone (KENALOG) 0 5 % cream  Child No No   Sig: APPLY TO AFFECTED AREA(S) OF THE RASH TWICE DAILY AS NEEDED      Facility-Administered Medications: None       Past Medical History:   Diagnosis Date    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) [facility]     Allergic rhinitis     last assessed 17Dec2013    Aortic regurgitation     Arm laceration     Asthma     Bronchitis, chronic obstructive, with exacerbation ([facility] [address] )     last assessed 12Jun2015    Candidal intertrigo     Chronic obstructive lung disease ([facility] )     last assessed 17Dec2013    COPD (chronic obstructive pulmonary disease) ([facility])     Coronary artery disease     carotid stents    Cough     CVA (cerebral vascular accident) ([facility] [facility]     Dermatitis     Diabetes mellitus type 2, uncomplicated ([facility] [address] )     controlled    Dysthymic disorder     Fatigue     Hyperlipidemia     Hypertension     Neurologic gait dysfunction     Parkinson's disease ([facility] [address] )     Pneumonia     PVD (peripheral vascular disease) ([facility] [address] [facility]     Seborrheic keratosis     TIA (transient ischemic attack)     Tinea corporis     Tinea pedis of both feet     Tremor        Past Surgical History:   Procedure Laterality Date    NASAL SINUS SURGERY      OR BRONCHOSCOPY,DIAGNOSTIC N/A 7/27/2016    Procedure: BRONCHOSCOPY;  Surgeon: Maranda Veronica MD;  Location: [facility]; Service: Pulmonary       Family History   Problem Relation Age of Onset    No Known Problems Mother         Old Age    COPD Sister     Lung cancer Sister     Asthma Family      I have reviewed and agree with the history as documented      E-Cigarette/Vaping    E-Cigarette Use Never User      E-Cigarette/Vaping Substances    Nicotine No     THC No     CBD No     Flavoring No     Other No     Unknown No      Social History     Tobacco Use    Smoking status: Former Smoker     Packs/day: 1 00     Years: 3 00     Pack years: 3 00     Types: Cigarettes     Start date: 47     Quit date:      Years since quittin 2    Smokeless tobacco: Never Used   Vaping Use    Vaping Use: Never used   Substance Use Topics    Alcohol use: Not Currently    Drug use: No       Review of Systems   Constitutional: Negative for fever  Cardiovascular: Positive for chest pain  Negative for claudication and PND  Gastrointestinal: Negative for anorexia and heartburn  All other systems reviewed and are negative  Physical Exam  Physical Exam  Vitals and nursing note reviewed  Constitutional:       General: He is not in acute distress  Appearance: He is well-developed  He is not diaphoretic  HENT:      Head: Normocephalic and atraumatic  Right Ear: External ear normal       Left Ear: External ear normal    Eyes:      General: No scleral icterus  Right eye: No discharge  Left eye: No discharge  Conjunctiva/sclera: Conjunctivae normal    Neck:      Thyroid: No thyromegaly  Vascular: No JVD  Trachea: No tracheal deviation  Cardiovascular:      Rate and Rhythm: Normal rate and regular rhythm  Pulmonary:      Effort: Pulmonary effort is normal  No respiratory distress  Breath sounds: Normal breath sounds  No stridor  No wheezing or rales  Abdominal:      General: Bowel sounds are normal  There is no distension  Palpations: Abdomen is soft  Tenderness: There is no abdominal tenderness  Musculoskeletal:         General: No tenderness or deformity  Normal range of motion  Cervical back: Normal range of motion and neck supple  Skin:     General: Skin is warm and dry  Neurological:      Mental Status: He is alert and oriented to person, place, and time  Cranial Nerves: No cranial nerve deficit        Coordination: Coordination normal    Psychiatric:         Behavior: Behavior normal          Vital Signs  ED Triage Vitals   Temperature Pulse Respirations Blood Pressure SpO2   03/28/22 1342 03/28/22 1342 03/28/22 1342 03/28/22 1342 03/28/22 1342   99 1 °F (37 3 °C) 86 18 131/62 95 %      Temp src Heart Rate Source Patient Position - Orthostatic VS BP Location FiO2 (%)   -- 03/28/22 1500 03/28/22 1500 03/28/22 1500 --    Monitor Sitting Right arm       Pain Score       03/28/22 1342       10 - Worst Possible Pain           Vitals:    03/29/22 0749 03/29/22 2311 03/30/22 0659 03/30/22 1450   BP: 131/56 134/58 130/56 142/61   Pulse: 62 65 62 73   Patient Position - Orthostatic VS:             Visual Acuity  Visual Acuity      Most Recent Value   L Pupil Size (mm) 3   R Pupil Size (mm) 3   L Pupil Shape Round   R Pupil Shape Round          ED Medications  Medications   atorvastatin (LIPITOR) tablet 10 mg (10 mg Oral Given 3/30/22 0842)   carbidopa-levodopa (SINEMET)  mg per tablet 1 tablet (1 tablet Oral Given 3/30/22 0842)   famotidine (PEPCID) tablet 20 mg (20 mg Oral Given 3/30/22 0842)   finasteride (PROSCAR) tablet 5 mg (5 mg Oral Given 3/30/22 0843)   fluticasone-vilanterol (BREO ELLIPTA) 200-25 MCG/INH inhaler 1 puff (1 puff Inhalation Given 3/30/22 0844)   montelukast (SINGULAIR) tablet 10 mg (10 mg Oral Given 3/29/22 2213)   pramipexole (MIRAPEX) tablet 0 5 mg (0 5 mg Oral Given 3/30/22 0843)   rivastigmine (EXELON) capsule 4 5 mg (4 5 mg Oral Given 3/30/22 0842)   umeclidinium bromide (INCRUSE ELLIPTA) 62 5 mcg/inh inhaler AEPB 1 puff (1 puff Inhalation Given 3/30/22 0844)   OLANZapine (ZyPREXA) tablet 2 5 mg (has no administration in time range)   melatonin tablet 3 mg (3 mg Oral Given 3/29/22 3522)   guaiFENesin (MUCINEX) 12 hr tablet 600 mg (600 mg Oral Given 3/30/22 2647)   acetaminophen (TYLENOL) tablet 650 mg (has no administration in time range) ondansetron [facility]) injection 4 mg (has no administration in time range)   heparin (porcine) subcutaneous injection 5,000 Units (5,000 Units Subcutaneous Given 3/30/22 1417)   ceftriaxone (ROCEPHIN) 1 g/50 mL in dextrose IVPB (1,000 mg Intravenous New Bag 3/29/22 1703)   levalbuterol (XOPENEX) inhalation solution 0 63 mg (has no administration in time range)   predniSONE tablet 10 mg (10 mg Oral Given 3/30/22 0841)   insulin lispro (HumaLOG) 100 units/mL subcutaneous injection 1-5 Units (1 Units Subcutaneous Given 3/30/22 1208)   QUEtiapine (SEROquel) tablet 75 mg (75 mg Oral Given 3/29/22 2209)   metroNIDAZOLE (FLAGYL) tablet 500 mg (500 mg Oral Given 3/30/22 1417)   barium sulfate (LIQUID E-Z-PAQUE) oral suspension 355 mL (75 mL Oral Given 3/30/22 1129)   barium sulfate 98 % oral suspension 135 mL (50 mL Oral Given 3/30/22 1129)       Diagnostic Studies  Results Reviewed     Procedure Component Value Units Date/Time    Blood culture [[id number]] Collected: 03/28/22 1501    Lab Status: Preliminary result Specimen: Blood Updated: 03/29/22 2001     Blood Culture No Growth at 24 hrs  Blood culture [[id number]] Collected: 03/28/22 1501    Lab Status: Preliminary result Specimen: Blood Updated: 03/29/22 2001     Blood Culture No Growth at 24 hrs      Procalcitonin, Next Day AM Collection [[id number]]  (Abnormal) Collected: 03/29/22 0516    Lab Status: Final result Specimen: Blood from Arm, Right Updated: 03/29/22 0631     Procalcitonin 18 49 ng/ml     Basic metabolic panel [[id number]]  (Abnormal) Collected: 03/29/22 0516    Lab Status: Final result Specimen: Blood from Arm, Right Updated: 03/29/22 0317     Sodium 137 mmol/L      Potassium 3 8 mmol/L      Chloride 104 mmol/L      CO2 25 mmol/L      ANION GAP 8 mmol/L      BUN 38 mg/dL      Creatinine 1 60 mg/dL      Glucose 110 mg/dL      Calcium 9 0 mg/dL      eGFR 38 ml/min/1 73sq m     Narrative:      [place] guidelines for Chronic Kidney Disease (CKD):     Stage 1 with normal or high GFR (GFR > 90 mL/min/1 73 square meters)    Stage 2 Mild CKD (GFR = 60-89 mL/min/1 73 square meters)    Stage 3A Moderate CKD (GFR = 45-59 mL/min/1 73 square meters)    Stage 3B Moderate CKD (GFR = 30-44 mL/min/1 73 square meters)    Stage 4 Severe CKD (GFR = 15-29 mL/min/1 73 square meters)    Stage 5 End Stage CKD (GFR <15 mL/min/1 73 square meters)  Note: GFR calculation is accurate only with a steady state creatinine    CBC (With Platelets) [[id number]]  (Abnormal) Collected: 03/29/22 0516    Lab Status: Final result Specimen: Blood from Arm, Right Updated: 03/29/22 0605     WBC 15 78 Thousand/uL      RBC 4 39 Million/uL      Hemoglobin 13 5 g/dL      Hematocrit 41 4 %      MCV 94 fL      MCH 30 8 pg      MCHC 32 6 g/dL      RDW 13 4 %      Platelets 567 Thousands/uL      MPV 9 9 fL     HS Troponin I 4hr [[id number]]  (Normal) Collected: 03/28/22 1830    Lab Status: Final result Specimen: Blood from Arm, Right Updated: 03/28/22 1911     hs TnI 4hr 21 ng/L      Delta 4hr hsTnI -7 ng/L     Procalcitonin [[id number]]  (Abnormal) Collected: 03/28/22 1830    Lab Status: Final result Specimen: Blood from Arm, Right Updated: 03/28/22 1911     Procalcitonin 19 10 ng/ml     Legionella antigen, Urine [[id number]]     Lab Status: No result Specimen: Urine     Strep Pneumoniae, Urine [[id number]]     Lab Status: No result Specimen: Urine     Sputum culture and Gram stain [[id number]]     Lab Status: No result Specimen: Sputum     HS Troponin I 2hr [[id number]]  (Normal) Collected: 03/28/22 1623    Lab Status: Final result Specimen: Blood Updated: 03/28/22 1648     hs TnI 2hr 21 ng/L      Delta 2hr hsTnI -7 ng/L     COVID/FLU/RSV [[id number]]  (Normal) Collected: 03/28/22 1501    Lab Status: Final result Specimen: Nares from Nose Updated: 03/28/22 1558     SARS-CoV-2 Negative     INFLUENZA A PCR Negative     INFLUENZA B PCR Negative     RSV PCR Negative    Narrative:      FOR PEDIATRIC PATIENTS - copy/paste COVID Guidelines URL to browser: [email or web address]    SARS-CoV-2 assay is a Nucleic Acid Amplification assay intended for the  qualitative detection of nucleic acid from SARS-CoV-2 in nasopharyngeal  swabs  Results are for the presumptive identification of SARS-CoV-2 RNA  Positive results are indicative of infection with SARS-CoV-2, the virus  causing COVID-19, but do not rule out bacterial infection or co-infection  with other viruses  Laboratories within [facility] and its  territories are required to report all positive results to the appropriate  public health authorities  Negative results do not preclude SARS-CoV-2  infection and should not be used as the sole basis for treatment or other  patient management decisions  Negative results must be combined with  clinical observations, patient history, and epidemiological information  This test has not been FDA cleared or approved  This test has been authorized by FDA under an Emergency Use Authorization  (EUA)  This test is only authorized for the duration of time the  declaration that circumstances exist justifying the authorization of the  emergency use of an in vitro diagnostic tests for detection of SARS-CoV-2  virus and/or diagnosis of COVID-19 infection under section 564(b)(1) of  the Act, 21 U  S C  505RAM-0(U)(3), unless the authorization is terminated  or revoked sooner  The test has been validated but independent review by FDA  and CLIA is pending  Test performed using [employer] GeneXpert: This RT-PCR assay targets N2,  a region unique to SARS-CoV-2  A conserved region in the E-gene was chosen  for pan-Sarbecovirus detection which includes SARS-CoV-2      Comprehensive metabolic panel [[id number]]  (Abnormal) Collected: 03/28/22 1501    Lab Status: Final result Specimen: Blood from Arm, Left Updated: 03/28/22 1552     Sodium 136 mmol/L      Potassium 4 2 mmol/L      Chloride 104 mmol/L      CO2 23 mmol/L      ANION GAP 9 mmol/L      BUN 35 mg/dL      Creatinine 1 82 mg/dL      Glucose 227 mg/dL      Calcium 8 9 mg/dL      Corrected Calcium 9 8 mg/dL      AST 21 U/L      ALT 12 U/L      Alkaline Phosphatase 76 U/L      Total Protein 6 5 g/dL      Albumin 2 9 g/dL      Total Bilirubin 0 50 mg/dL      eGFR 32 ml/min/1 73sq m     Narrative:      [place] guidelines for Chronic Kidney Disease (CKD):     Stage 1 with normal or high GFR (GFR > 90 mL/min/1 73 square meters)    Stage 2 Mild CKD (GFR = 60-89 mL/min/1 73 square meters)    Stage 3A Moderate CKD (GFR = 45-59 mL/min/1 73 square meters)    Stage 3B Moderate CKD (GFR = 30-44 mL/min/1 73 square meters)    Stage 4 Severe CKD (GFR = 15-29 mL/min/1 73 square meters)    Stage 5 End Stage CKD (GFR <15 mL/min/1 73 square meters)  Note: GFR calculation is accurate only with a steady state creatinine    Lactic acid, plasma [[id number]]  (Normal) Collected: 03/28/22 1501    Lab Status: Final result Specimen: Blood Updated: 03/28/22 1542     LACTIC ACID 2 0 mmol/L     Narrative:      Result may be elevated if tourniquet was used during collection      CBC and differential [[id number]]  (Abnormal) Collected: 03/28/22 1421    Lab Status: Final result Specimen: Blood from Arm, Left Updated: 03/28/22 1510     WBC 22 89 Thousand/uL      RBC 4 77 Million/uL      Hemoglobin 14 3 g/dL      Hematocrit 44 8 %      MCV 94 fL      MCH 30 0 pg      MCHC 31 9 g/dL      RDW 13 3 %      MPV 9 7 fL      Platelets 467 Thousands/uL      nRBC 0 /100 WBCs      Neutrophils Relative 90 %      Immat GRANS % 1 %      Lymphocytes Relative 3 %      Monocytes Relative 6 %      Eosinophils Relative 0 %      Basophils Relative 0 %      Neutrophils Absolute 20 55 Thousands/µL      Immature Grans Absolute 0 12 Thousand/uL      Lymphocytes Absolute 0 71 Thousands/µL      Monocytes Absolute 1 44 Thousand/µL      Eosinophils Absolute 0 03 Thousand/µL      Basophils Absolute 0 04 Thousands/µL     Narrative: This is an appended report  These results have been appended to a previously verified report  HS Troponin 0hr (reflex protocol) [[id number]]  (Normal) Collected: 03/28/22 1421    Lab Status: Final result Specimen: Blood from Arm, Left Updated: 03/28/22 1502     hs TnI 0hr 28 ng/L     Protime-INR [[id number]]  (Abnormal) Collected: 03/28/22 1421    Lab Status: Final result Specimen: Blood from Arm, Left Updated: 03/28/22 1449     Protime 14 9 seconds      INR 1 22                 FL barium swallow video w speech   Final Result by SYSTEMGENERATED, DOCUMENTATION (03/30 4971)      XR chest 2 views   Final Result by Edin Davis MD (03/29 2142)      Minimal left basilar consolidation may represent subsegmental atelectasis, pneumonia, or aspiration  Suspect small left pleural effusion  This study demonstrates a significant  finding and was documented as such in [facility] for liaison and referring practitioner notification  Workstation performed: [id number]                    Procedures  Procedures         ED Course               Identification of Seniors at Risk      Most Recent Value   (ISAR) Identification of Seniors at Risk    Before the illness or injury that brought you to the Emergency, did you need someone to help you on a regular basis? 0 Filed at: 03/28/2022 1342   In the last 24 hours, have you needed more help than usual? 1 Filed at: 03/28/2022 1342   Have you been hospitalized for one or more nights during the past 6 months? 0 Filed at: 03/28/2022 1342   In general, do you see well? 0 Filed at: 03/28/2022 1342   In general, do you have serious problems with your memory? 0 Filed at: 03/28/2022 1342   Do you take more than three different medications every day?  1 Filed at: 03/28/2022 1342   ISAR Score 2 Filed at: 03/28/2022 1342                      SBIRT 20yo+      Most Recent Value   SBIRT (22 yo +)    In order to provide better care to our patients, we are screening all of our patients for alcohol and drug use  Would it be okay to ask you these screening questions? Yes Filed at: 03/28/2022 8850   Initial Alcohol Screen: US AUDIT-C     1  How often do you have a drink containing alcohol? 0 Filed at: 03/28/2022 1357   2  How many drinks containing alcohol do you have on a typical day you are drinking? 0 Filed at: 03/28/2022 1357   3a  Male UNDER 65: How often do you have five or more drinks on one occasion? 0 Filed at: 03/28/2022 1357   Audit-C Score 0 Filed at: 03/28/2022 1355   SAMANTHA: How many times in the past year have you    Used an illegal drug or used a prescription medication for non-medical reasons? Never Filed at: 03/28/2022 135                    MDM  Number of Diagnoses or Management Options  Pneumonia: new and requires workup     Amount and/or Complexity of Data Reviewed  Clinical lab tests: ordered and reviewed  Tests in the radiology section of CPT®: reviewed and ordered  Decide to obtain previous medical records or to obtain history from someone other than the patient: yes  Review and summarize past medical records: yes    Patient Progress  Patient progress: stable      Disposition  Final diagnoses:   Pneumonia     Time reflects when diagnosis was documented in both MDM as applicable and the Disposition within this note     Time User Action Codes Description Comment    3/28/2022  4:54 PM Rosanne Velásquez Add [J18 [id number]] Pneumonia       ED Disposition     ED Disposition Condition Date/Time Comment    Admit Stable Mon Mar 28, 2022  4:55 PM Case was discussed with Dr Malachi Méndez and the patient's admission status was agreed to be Admission Status: Inpatient status to the service of Dr Malachi Méndez           Follow-up Information     Follow up With Specialties Details Why [facility]  Follow up They will call to arrange a visiting nurse, physical and occupational therapy and speech therapy at home  You can also contact them at [phone]  [phone]  [facility] [id number]    Better  Follow up Call or look online to review condom catheter options  It looks like they may be able to try and get this covered through dad's insurance  [email or web address]  [phone]          Current Discharge Medication List      CONTINUE these medications which have NOT CHANGED    Details   acetaminophen (TYLENOL) 325 mg tablet Take 650 mg by mouth every 6 (six) hours as needed for mild pain        !! albuterol (2 5 mg/3 mL) 0 083 % nebulizer solution INHALE 1 VIAL VIA NEBULIZER EVERY 4 HOURS AS NEEDED  Qty: 75 mL, Refills: 0    Associated Diagnoses: Chronic bronchitis, unspecified chronic bronchitis type ([address] )      ! ! albuterol (2 5 mg/3 mL) 0 083 % nebulizer solution INHALE 1 VIAL VIA NEBULIZER EVERY 4 HOURS AS NEEDED  Qty: 360 mL, Refills: 1    Associated Diagnoses: Chronic bronchitis, unspecified chronic bronchitis type (HCC)      albuterol (PROVENTIL HFA,VENTOLIN HFA) 90 mcg/act inhaler INHALE 2 PUFFS BY MOUTH EVERY 6 HOURS AS NEEDED FOR WHEEZING  Qty: 1 g, Refills: 3    Associated Diagnoses: Chronic bronchitis, unspecified chronic bronchitis type (HCC)      atorvastatin (LIPITOR) 10 mg tablet TAKE 1 TABLET BY MOUTH EVERY DAY  Qty: 90 tablet, Refills: 1    Associated Diagnoses: Cerebrovascular accident (CVA), unspecified mechanism (HCC)      benzonatate (TESSALON PERLES) 100 mg capsule TAKE 1 CAPSULE BY MOUTH THREE TIMES A DAY AS NEEDED FOR COUGH  Qty: 20 capsule, Refills: 0    Associated Diagnoses: Community acquired pneumonia of right lower lobe of lung      carbidopa-levodopa (SINEMET)  mg per tablet TAKE 1 TABLET BY MOUTH THREE TIMES A DAY  Qty: 270 tablet, Refills: 1    Associated Diagnoses: Parkinson's disease (HCC)      famotidine (PEPCID) 20 mg tablet TAKE 1 TABLET BY MOUTH TWICE A DAY  Qty: 30 tablet, Refills: 2    Associated Diagnoses: Cough      finasteride (PROSCAR) 5 mg tablet Take 1 tablet (5 mg total) by mouth every morning  Qty: 90 tablet, Refills: 0    Comments: The patient is overdue for an office visit  We will extend this prescription due to the current COVID-19 pandemic  The patient needs to schedule an office visit for future refills  Thank you! (10/29/20)  Associated Diagnoses: Urinary retention      !! 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 fluticasone-salmeterol (Wixela Inhub) 250-50 mcg/dose inhaler Inhale 1 puff 2 (two) times a day Rinse mouth after use  Qty: 60 blister, Refills: 0    Comments: Substitution to a formulary equivalent within the same pharmaceutical class is authorized  Associated Diagnoses:  Moderate persistent extrinsic asthma without complication      guaiFENesin 1200 MG TB12 Take 1 tablet (1,200 mg total) by mouth every 12 (twelve) hours  Qty: 20 tablet, Refills: 0    Associated Diagnoses: Community acquired pneumonia of right lower lobe of lung      levalbuterol (XOPENEX) 1 25 mg/0 5 mL nebulizer solution Take 0 5 mL (1 25 mg total) by nebulization 3 (three) times a day    Associated Diagnoses: Chronic bronchitis, unspecified chronic bronchitis type (HCC)      montelukast (SINGULAIR) 10 mg tablet TAKE 1 TABLET BY MOUTH DAILY AT BEDTIME  Qty: 90 tablet, Refills: 3    Associated Diagnoses: Uncomplicated asthma, unspecified asthma severity, unspecified whether persistent      nystatin (MYCOSTATIN) cream Apply to intertriginous areas 2-3 times daily as needed  Qty: 80 g, Refills: 3    Associated Diagnoses: Intertrigo      nystatin (MYCOSTATIN) powder APPLY TO AFFECTED AREA TWICE A DAY AS DIRECTED  Qty: 30 g, Refills: 1    Associated Diagnoses: Intertrigo      pramipexole (MIRAPEX) 0 5 mg tablet TAKE 1 TABLET BY MOUTH 3 TIMES A DAY  Qty: 270 tablet, Refills: 0    Associated Diagnoses: Parkinson's disease ([facility])      predniSONE 10 mg tablet TAKE 1 TABLET BY MOUTH EVERY DAY  Qty: 30 tablet, Refills: 0    Associated Diagnoses: 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) ([facility])      !! QUEtiapine (SEROquel) 100 mg tablet Take 1 tablet (100 mg total) by mouth daily at bedtime  Qty: 30 tablet, Refills: 5    Associated Diagnoses: Dementia due to Parkinson's disease with behavioral disturbance ([facility])      !! QUEtiapine (SEROquel) 25 mg tablet TAKE 1 TABLET BY MOUTH EVERYDAY AT BEDTIME  Qty: 90 tablet, Refills: 1    Associated Diagnoses: Dementia due to Parkinson's disease with behavioral disturbance ([facility])      Respiratory Therapy Supplies (Flutter) VON Use daily Use flutter valve at least three times daily and whenever needed for congestion  Qty: 1 each, Refills: 0    Associated Diagnoses: 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) ([facility] ); Bronchiectasis without complication ([facility] [address] ); Mixed simple and mucopurulent chronic bronchitis ([facility])      rivastigmine (EXELON) 4 5 MG capsule Take 1 capsule (4 5 mg total) by mouth 2 (two) times a day  Qty: 180 capsule, Refills: 6    Associated Diagnoses: Parkinson's disease ([facility])      tiotropium (Spiriva HandiHaler) 18 mcg inhalation capsule Place 1 capsule (18 mcg total) into inhaler and inhale daily Via Handihaler at the same time every day  Qty: 90 capsule, Refills: 0    Associated Diagnoses: Chronic obstructive pulmonary disease, unspecified COPD type ([facility])      triamcinolone (KENALOG) 0 5 % cream APPLY TO AFFECTED AREA(S) OF THE RASH TWICE DAILY AS NEEDED  Qty: 30 g, Refills: 0    Associated Diagnoses: Dermatitis       ! ! - Potential duplicate medications found  Please discuss with provider  No discharge procedures on file      PDMP Review     None          ED Provider  Electronically Signed by           [facility], DO  03/30/22 0062

## 2022-03-28 NOTE — H&P
[address] [facility] 1935, 81 y o  male MRN: [id number]  Unit/Bed#: ED 20 Encounter: [id number]  Primary Care Provider: Santana Ribera MD   Date and time admitted to hospital: 3/28/2022  1:44 PM    * Pneumonia  Assessment & Plan  Presented with cough, chest pain reported symptoms of chills and rigors at home  Chest x-ray consistent with pneumonia, with source likely aspiration  Increased leukocytosis, patient did not meet sepsis criteria  Continue IV Rocephin, cultures pending  Mucinex  Speech therapy consulted  Will give gentle IV fluids      Dysphagia  Assessment & Plan  Family reports progressive dysphagia, likely contributing to pneumonia and current admission  Will have speech therapy to evaluate    Other emphysema ([facility] )  Assessment & Plan  Continue home bronchodilators, prednisone 10mg  PRN xopenox added    Essential hypertension  Assessment & Plan  Not on any meds, elevated  Will monitor  Avoid aggressive BP control with parkinson's disease and concern for orthostatic hypotension    Type 2 diabetes mellitus ([facility] )  Assessment & Plan  Lab Results   Component Value Date    HGBA1C 6 6 (H) 03/04/2022     Not on insulin  SSI, accuchecks    Parkinson's disease ([facility] [address] )  Assessment & Plan  Continue sinemet  TID  Hold seroquel given hallucinations at home  Add melatonin for sleep    VTE Prophylaxis: Heparin  / sequential compression device   Code Status: DNR/DNI  POLST: There is no POLST form on file for this patient (pre-hospital)  Discussion with family: family bedside    Anticipated Length of Stay:  Patient will be admitted on an Inpatient basis with an anticipated length of stay of 2 midnights  Justification for Hospital Stay: IV abx, speech evaluation    Total Time for Visit, including Counseling / Coordination of Care: 45 minutes  Greater than 50% of this total time spent on direct patient counseling and coordination of care      Chief Complaint:   Cough, Chest pains    History of Present Illness:    Liv Carranza is a 81 y o  male who presents with chest pains  Patient has history of Parkinson's dementia, hypertension, COPD, and diabetes  Family at bedside reports the patient has been struggling some with his swallow while eating  States today he noted to have chest pain clenching his chest, with cough that was productive  Patient did have increased white count on admission, chest x-ray reviewed by me showing infiltrates likely aspiration as source  Family at bedside, discussed code status and reports that he is a do not resuscitate, do not intubate  Discussed that he may need feeding tube, family has not discussed that  They have discussed regarding hospice but not come to a decision  No fevers noted, with no other complaints      Review of Systems:    Review of Systems   Unable to perform ROS: Mental status change       Past Medical and Surgical History:     Past Medical History:   Diagnosis Date    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) ([facility] [facility] )     Allergic rhinitis     last assessed [id number]    Aortic regurgitation     Arm laceration     Asthma     Bronchitis, chronic obstructive, with exacerbation ([facility] )     last assessed [id number]    Candidal intertrigo     Chronic obstructive lung disease ([facility] )     last assessed [id number]    COPD (chronic obstructive pulmonary disease) (HCC)     Coronary artery disease     carotid stents    Cough     CVA (cerebral vascular accident) ([facility] )     Dermatitis     Diabetes mellitus type 2, uncomplicated ([facility] )     controlled    Dysthymic disorder     Fatigue     Hyperlipidemia     Hypertension     Neurologic gait dysfunction     Parkinson's disease ([address] )     Pneumonia     PVD (peripheral vascular disease) ([facility] )     Seborrheic keratosis     TIA (transient ischemic attack)     Tinea corporis     Tinea pedis of both feet     Tremor        Past Surgical History:   Procedure Laterality Date    NASAL SINUS SURGERY      CO BRONCHOSCOPY,DIAGNOSTIC N/A 7/27/2016    Procedure: BRONCHOSCOPY;  Surgeon: Zaina Saunders MD;  Location: [facility]; Service: Pulmonary       Meds/Allergies:    Prior to Admission medications    Medication Sig Start Date End Date Taking? Authorizing Provider   acetaminophen (TYLENOL) 325 mg tablet Take 650 mg by mouth every 6 (six) hours as needed for mild pain      Historical Provider, MD   albuterol (2 5 mg/3 mL) 0 083 % nebulizer solution INHALE 1 VIAL VIA NEBULIZER EVERY 4 HOURS AS NEEDED 2/8/22   Cleon Dakins, MD   albuterol (2 5 mg/3 mL) 0 083 % nebulizer solution INHALE 1 VIAL VIA NEBULIZER EVERY 4 HOURS AS NEEDED 2/11/22   Zaina Saunders MD   albuterol (PROVENTIL HFA,VENTOLIN HFA) 90 mcg/act inhaler INHALE 2 PUFFS BY MOUTH EVERY 6 HOURS AS NEEDED FOR WHEEZING 12/18/21   Cleon Dakins, MD   atorvastatin (LIPITOR) 10 mg tablet TAKE 1 TABLET BY MOUTH EVERY DAY 2/1/22   Cleon Dakins, MD   benzonatate (TESSALON PERLES) 100 mg capsule TAKE 1 CAPSULE BY MOUTH THREE TIMES A DAY AS NEEDED FOR COUGH  Patient not taking: Reported on 2/15/2022 12/6/21   Cleon Dakins, MD   carbidopa-levodopa (SINEMET)  mg per tablet TAKE 1 TABLET BY MOUTH THREE TIMES A DAY 1/10/22   Daya Randolph MD   famotidine (PEPCID) 20 mg tablet TAKE 1 TABLET BY MOUTH TWICE A DAY 3/25/22   Jayme Jones PA-C   finasteride (PROSCAR) 5 mg tablet Take 1 tablet (5 mg total) by mouth every morning 2/7/22   Rosalino Peña PA-C   fluticasone-salmeterol (Wixela Inhub) 250-50 mcg/dose inhaler Inhale 1 puff 2 (two) times a day Rinse mouth after use  1/11/22   Zaina Saunders MD   fluticasone-salmeterol (Wixela Inhub) 250-50 mcg/dose inhaler Inhale 1 puff 2 (two) times a day Rinse mouth after use   2/8/22   Jayme Jones PA-C   guaiFENesin 1200 MG TB12 Take 1 tablet (1,200 mg total) by mouth every 12 (twelve) hours  Patient not taking: Reported on 2/15/2022  11/6/21   Cleon Dakins, MD   [facility]val[facility]erol [facility]) 1 25 mg/0 5 mL nebulizer solution Take 0 5 mL (1 25 mg total) by nebulization 3 (three) times a day 8/28/19   Mónica Fuentes MD   montelukast (SINGULAIR) 10 mg tablet TAKE 1 TABLET BY MOUTH DAILY AT BEDTIME 9/27/21   Joesph Tomlinson PA-C   nystatin (MYCOSTATIN) cream Apply to intertriginous areas 2-3 times daily as needed 8/13/20   Mónica Fuentes MD   nystatin (MYCOSTATIN) powder APPLY TO AFFECTED AREA TWICE A DAY AS DIRECTED 3/7/22   Mónica Fuentes MD   pramipexole (MIRAPEX) 0 5 mg tablet TAKE 1 TABLET BY MOUTH 3 TIMES A DAY  3/22/22   NORBERTO Jarquin   predniSONE 10 mg tablet TAKE 1 TABLET BY MOUTH EVERY DAY 3/15/22   Mónica Fuentes MD   QUEtiapine (SEROquel) 100 mg tablet Take 1 tablet (100 mg total) by mouth daily at bedtime  Patient not taking: Reported on 3/22/2022  11/23/21   Mónica Fuentes MD   QUEtiapine (SEROquel) 25 mg tablet TAKE 1 TABLET BY MOUTH EVERYDAY AT BEDTIME  Patient taking differently: 75 mg   1/23/22   Mónica Fuentes MD   Respiratory Therapy Supplies (Flutter) VON Use daily Use flutter valve at least three times daily and whenever needed for congestion  Patient not taking: Reported on 2/15/2022  3/9/21   Joesph Tomlinson PA-C   rivastigmine (EXELON) 4 5 MG capsule Take 1 capsule (4 5 mg total) by mouth 2 (two) times a day 3/21/22   Randa Menendez MD   tiotropium (Spiriva HandiHaler) 18 mcg inhalation capsule Place 1 capsule (18 mcg total) into inhaler and inhale daily Via Handihaler at the same time every day 1/4/22   Mónica Fuentes MD   triamcinolone (KENALOG) 0 5 % cream APPLY TO AFFECTED AREA(S) OF THE RASH TWICE DAILY AS NEEDED 9/11/20   Mnóica Fuentes MD     I have reviewed home medications with patient family member  Allergies:    Allergies   Allergen Reactions    Penicillins Syncope       Social History:     Marital Status: /Civil Union   Occupation: Retired  Patient Pre-hospital Living Situation: Home  Patient Pre-hospital Level of Mobility: Amb  Patient Pre-hospital Diet Restrictions: None  Substance Use History:   Social History     Substance and Sexual Activity   Alcohol Use Not Currently     Social History     Tobacco Use   Smoking Status Former Smoker    Packs/day: 1 00    Years: 3 00    Pack years: 3 00    Types: Cigarettes    Start date: 47    Quit date: Hair Her Years since quittin 2   Smokeless Tobacco Never Used     Social History     Substance and Sexual Activity   Drug Use No       Family History:    Family History   Problem Relation Age of Onset    No Known Problems Mother         Old Age    COPD Sister     Lung cancer Sister     Asthma Family        Physical Exam:     Vitals:   Blood Pressure: 126/76 (22 1630)  Pulse: 67 (22 1630)  Temperature: 99 1 °F (37 3 °C) (22 1342)  Respirations: 18 (22 1630)  SpO2: 92 % (22 1630)    Physical Exam  Vitals and nursing note reviewed  Constitutional:       General: He is not in acute distress  Appearance: He is obese  HENT:      Head: Normocephalic and atraumatic  Eyes:      General: No scleral icterus  Conjunctiva/sclera: Conjunctivae normal    Cardiovascular:      Rate and Rhythm: Normal rate and regular rhythm  Heart sounds: Normal heart sounds  Pulmonary:      Effort: No respiratory distress  Breath sounds: Rhonchi present  No wheezing  Abdominal:      General: Bowel sounds are normal  There is no distension  Palpations: Abdomen is soft  Musculoskeletal:         General: No swelling  Right lower leg: No edema  Left lower leg: No edema  Skin:     General: Skin is warm and dry  Neurological:      Mental Status: He is alert  Mental status is at baseline  He is disoriented  Additional Data:     Lab Results: I have personally reviewed pertinent reports        Results from last 7 days   Lab Units 22  1421   WBC Thousand/uL 22 89*   HEMOGLOBIN g/dL 14 3   HEMATOCRIT % 44 8   PLATELETS Thousands/uL 193   NEUTROS PCT % 90*   LYMPHS PCT % 3*   MONOS PCT % 6   EOS PCT % 0     Results from last 7 days   Lab Units 03/28/22  1501   SODIUM mmol/L 136   POTASSIUM mmol/L 4 2   CHLORIDE mmol/L 104   CO2 mmol/L 23   BUN mg/dL 35*   CREATININE mg/dL 1 82*   ANION GAP mmol/L 9   CALCIUM mg/dL 8 9   ALBUMIN g/dL 2 9*   TOTAL BILIRUBIN mg/dL 0 50   ALK PHOS U/L 76   ALT U/L 12   AST U/L 21   GLUCOSE RANDOM mg/dL 227*     Results from last 7 days   Lab Units 03/28/22  1421   INR  1 22*             Results from last 7 days   Lab Units 03/28/22  1501   LACTIC ACID mmol/L 2 0       Imaging: I have personally reviewed pertinent reports  XR chest 2 views    (Results Pending)       EKG, Pathology, and Other Studies Reviewed on Admission:   · EKG: None    Allscripts / Epic Records Reviewed: Yes     ** Please Note: This note has been constructed using a voice recognition system   **

## 2022-03-28 NOTE — PLAN OF CARE
Problem: Potential for Falls  Goal: Patient will remain free of falls  Description: INTERVENTIONS:  - Educate patient/family on patient safety including physical limitations  - Instruct patient to call for assistance with activity   - Consult OT/PT to assist with strengthening/mobility   - Keep Call bell within reach  - Keep bed low and locked with side rails adjusted as appropriate  - Keep care items and personal belongings within reach  - Initiate and maintain comfort rounds  - Make Fall Risk Sign visible to staff  - Offer Toileting every  Hours, in advance of need  - Initiate/Maintain alarm  - Obtain necessary fall risk management equipment:   - Apply yellow socks and bracelet for high fall risk patients  - Consider moving patient to room near nurses station  Outcome: Progressing     Problem: PAIN - ADULT  Goal: Verbalizes/displays adequate comfort level or baseline comfort level  Description: Interventions:  - Encourage patient to monitor pain and request assistance  - Assess pain using appropriate pain scale  - Administer analgesics based on type and severity of pain and evaluate response  - Implement non-pharmacological measures as appropriate and evaluate response  - Consider cultural and social influences on pain and pain management  - Notify physician/advanced practitioner if interventions unsuccessful or patient reports new pain  Outcome: Progressing     Problem: INFECTION - ADULT  Goal: Absence or prevention of progression during hospitalization  Description: INTERVENTIONS:  - Assess and monitor for signs and symptoms of infection  - Monitor lab/diagnostic results  - Monitor all insertion sites, i e  indwelling lines, tubes, and drains  - Monitor endotracheal if appropriate and nasal secretions for changes in amount and color  - [place] appropriate cooling/warming therapies per order  - Administer medications as ordered  - Instruct and encourage patient and family to use good hand hygiene technique  - Identify and instruct in appropriate isolation precautions for identified infection/condition  Outcome: Progressing  Goal: Absence of fever/infection during neutropenic period  Description: INTERVENTIONS:  - Monitor WBC    Outcome: Progressing     Problem: SAFETY ADULT  Goal: Patient will remain free of falls  Description: INTERVENTIONS:  - Educate patient/family on patient safety including physical limitations  - Instruct patient to call for assistance with activity   - Consult OT/PT to assist with strengthening/mobility   - Keep Call bell within reach  - Keep bed low and locked with side rails adjusted as appropriate  - Keep care items and personal belongings within reach  - Initiate and maintain comfort rounds  - Make Fall Risk Sign visible to staff  - Offer Toileting every  Hours, in advance of need  - Initiate/Maintain alarm  - Obtain necessary fall risk management equipment:   - Apply yellow socks and bracelet for high fall risk patients  - Consider moving patient to room near nurses station  Outcome: Progressing  Goal: Maintain or return to baseline ADL function  Description: INTERVENTIONS:  -  Assess patient's ability to carry out ADLs; assess patient's baseline for ADL function and identify physical deficits which impact ability to perform ADLs (bathing, care of mouth/teeth, toileting, grooming, dressing, etc )  - Assess/evaluate cause of self-care deficits   - Assess range of motion  - Assess patient's mobility; develop plan if impaired  - Assess patient's need for assistive devices and provide as appropriate  - Encourage maximum independence but intervene and supervise when necessary  - Involve family in performance of ADLs  - Assess for home care needs following discharge   - Consider OT consult to assist with ADL evaluation and planning for discharge  - Provide patient education as appropriate  Outcome: Progressing  Goal: Maintains/Returns to pre admission functional level  Description: INTERVENTIONS:  - Perform BMAT or MOVE assessment daily    - Set and communicate daily mobility goal to care team and patient/family/caregiver  - Collaborate with rehabilitation services on mobility goals if consulted  - Perform Range of Motion  times a day  - Reposition patient every  hours  - Dangle patient  times a day  - Stand patient  times a day  - Ambulate patient  times a day  - Out of bed to chair  times a day   - Out of bed for meal times a day  - Out of bed for toileting  - Record patient progress and toleration of activity level   Outcome: Progressing     Problem: DISCHARGE PLANNING  Goal: Discharge to home or other facility with appropriate resources  Description: INTERVENTIONS:  - Identify barriers to discharge w/patient and caregiver  - Arrange for needed discharge resources and transportation as appropriate  - Identify discharge learning needs (meds, wound care, etc )  - Arrange for interpretive services to assist at discharge as needed  - Refer to Case Management Department for coordinating discharge planning if the patient needs post-hospital services based on physician/advanced practitioner order or complex needs related to functional status, cognitive ability, or social support system  Outcome: Progressing     Problem: Knowledge Deficit  Goal: Patient/family/caregiver demonstrates understanding of disease process, treatment plan, medications, and discharge instructions  Description: Complete learning assessment and assess knowledge base    Interventions:  - Provide teaching at level of understanding  - Provide teaching via preferred learning methods  Outcome: Progressing

## 2022-03-28 NOTE — TELEPHONE ENCOUNTER
[facility] MSW, received a call from patient's daughter Kimberly Shelton  [facility] notified this writer who called Kimberly Shelton back at [phone]  Despite a week passing, Kimberly Shelton was still unsure which one of her sisters helped patient apply for the PA IEB  Kimberly Shelton stated that it was either Homero Avilez ([phone]) or Dahlia Holter ([phone])  Kimberly Shelton directed this writer to reach out to her sister, Homero Avilez, right away as her shift of caring for patient had just ended  Kimberly Shelton stated that if this writer could not reach Homero Avilez now, that this writer should reach out to Dahlia Holter on Wed 3/30  While on the phone, Kimberly Allanruth stated that someone is coming out for an interview on Friday 4/1 and that if it is the PA IEB, that they want "no part of it" since they report that patient already got denied for same last year [employer] stated that he was over income by $100)  MSW advised that this writer had attempted to make an Aging referral, but they will need a denial letter from the [address] [place] [address] first before they can consider patient for any services at [facility]  MSW explained that the [address] [place] [address] will not speak with this writer since this writer is not an authorized person on his account, and that is why this writer has suggested that this writer should call with patient/whomever is an authorized to check status of referral/request a denial letter if patient was indeed denied  MSW did advise that the PA IEB would not be coming out as far as this writer was aware  MSW did recall that a referral to [employer] was made, so perhaps it is someone from that agency that is coming out  MSW advised that this writer will check on it, and will update Kimberly Shelton as able  MSW attempted to reach patient's daughter, Homero Avilez, at [phone]  No answer and the call went right to voicemail  MSW left a detailed message requesting a callback  MSW then phoned [facility] at [phone], and spoke with Phuong Man stated that no one from [employer] is going out, but rather that she had a made a referral to Aging, so it is likely someone from [facility] who is going out to do the interview  MSW phoned [facility] at [phone] and spoke with Ne Pendleton to request to speak with who is going out to see patient on Friday  Ne Pendleton stated that no one from [facility] is scheduled to go out, but that she can see patient was referred to the PA IEB this month  MSW explained that family is reporting that patient has been denied for the PA IEB twice in the past  Ne Pendleton suggested that this writer speak with the Aging Supervisor, Lisa Jacobson  MSW's call was transferred to Lisa Jacobson at [facility] but there was no answer  MSW left a detailed message regarding reason for call and requested callback  MSW then received a callback from patient's daughter, Husamruth Trevino  Deidre Trevino attempted to give this writer permission to call the PA IEB alone to check status/request the denial letter  MSW explained that the [address] [place] [address] will not speak with this writer since this writer is not an authorized person on his account  MSW could hear another person in the background, who was later identified as Kimberli Walters, stating that Nikolas Tierney was the one who requested the in-home help so she should be the one to partake in the interview  MSW explained that this writer is suggesting that we contact the PA IEB to check on status of referral/request the denial letter from the previous referral in an attempt to be able to refer directly to the Aging office (if patient has truly been denied for Waiver services)  MSW offered to call with Kylee/Nisreen right away to the PA IEB or at a time that was more convenient for them  Deidre Trevino stated that they cannot call now, as they all are in the middle of things and that they are awaiting a callback from patient's PCP/may need to take patient to the ER as he is experiencing chest pain  Deidre Trevino stated that she will call this writer back when she is able       MSW then received a callback from Darylene Factor who advised that Stephen Beck cannot talk now as she is taking care of patient/seeking treatment for him due to his chest pain (awaiting a callback from PCP/taking him to the ER)  MSW then retrieved a message from [place] (Nicole) - she advised that it is Aging's requirement is that they receive a letter from the [address] [place] [address] that patient was denied due to finances before he can be considered for Aging services  [place] stated that another option would be for patient to pay privately for services  MSW then called back to Darylene Factor at [phone] to make her aware that the denial will be necessary and advised that she have her sisters (Kylee/Nisreen) contact this writer at their earliest convenience so that a conference call can be made to the [address] [place] [address]  MSW did advise if we can get the denial letter from the [address] [place] [address] that perhaps she will not need to proceed with the interview that is scheduled for 4/1 with the PA IEB Darylene Factor stated that she will tell her sisters but that she is currently in the ER with patient

## 2022-03-28 NOTE — ASSESSMENT & PLAN NOTE
Family reports progressive dysphagia, likely contributing to pneumonia and current admission  Will have speech therapy to evaluate

## 2022-03-28 NOTE — TELEPHONE ENCOUNTER
Daughter edvin called pt going to ed having chest pain and had ivanna shaking last evening when she woke him at 2 to go to bathroom , was not cold told her he did not know why he was shaking and was sweaty

## 2022-03-28 NOTE — ASSESSMENT & PLAN NOTE
Not on any meds, elevated  Will monitor  Avoid aggressive BP control with parkinson's disease and concern for orthostatic hypotension

## 2022-03-29 ENCOUNTER — TELEPHONE (OUTPATIENT)
Dept: INTERNAL MEDICINE CLINIC | Facility: CLINIC | Age: 87
End: 2022-03-29

## 2022-03-29 LAB
ANION GAP SERPL CALCULATED.3IONS-SCNC: 8 MMOL/L (ref 4–13)
BUN SERPL-MCNC: 38 MG/DL (ref 5–25)
CALCIUM SERPL-MCNC: 9 MG/DL (ref 8.3–10.1)
CHLORIDE SERPL-SCNC: 104 MMOL/L (ref 100–108)
CO2 SERPL-SCNC: 25 MMOL/L (ref 21–32)
CREAT SERPL-MCNC: 1.6 MG/DL (ref 0.6–1.3)
ERYTHROCYTE [DISTWIDTH] IN BLOOD BY AUTOMATED COUNT: 13.4 % (ref 11.6–15.1)
GFR SERPL CREATININE-BSD FRML MDRD: 38 ML/MIN/1.73SQ M
GLUCOSE SERPL-MCNC: 110 MG/DL (ref 65–140)
GLUCOSE SERPL-MCNC: 122 MG/DL (ref 65–140)
GLUCOSE SERPL-MCNC: 187 MG/DL (ref 65–140)
GLUCOSE SERPL-MCNC: 216 MG/DL (ref 65–140)
GLUCOSE SERPL-MCNC: 99 MG/DL (ref 65–140)
HCT VFR BLD AUTO: 41.4 % (ref 36.5–49.3)
HGB BLD-MCNC: 13.5 G/DL (ref 12–17)
MCH RBC QN AUTO: 30.8 PG (ref 26.8–34.3)
MCHC RBC AUTO-ENTMCNC: 32.6 G/DL (ref 31.4–37.4)
MCV RBC AUTO: 94 FL (ref 82–98)
PLATELET # BLD AUTO: 175 THOUSANDS/UL (ref 149–390)
PMV BLD AUTO: 9.9 FL (ref 8.9–12.7)
POTASSIUM SERPL-SCNC: 3.8 MMOL/L (ref 3.5–5.3)
PROCALCITONIN SERPL-MCNC: 18.49 NG/ML
RBC # BLD AUTO: 4.39 MILLION/UL (ref 3.88–5.62)
SODIUM SERPL-SCNC: 137 MMOL/L (ref 136–145)
WBC # BLD AUTO: 15.78 THOUSAND/UL (ref 4.31–10.16)

## 2022-03-29 PROCEDURE — 84145 PROCALCITONIN (PCT): CPT | Performed by: STUDENT IN AN ORGANIZED HEALTH CARE EDUCATION/TRAINING PROGRAM

## 2022-03-29 PROCEDURE — 80048 BASIC METABOLIC PNL TOTAL CA: CPT | Performed by: STUDENT IN AN ORGANIZED HEALTH CARE EDUCATION/TRAINING PROGRAM

## 2022-03-29 PROCEDURE — 82948 REAGENT STRIP/BLOOD GLUCOSE: CPT

## 2022-03-29 PROCEDURE — 97163 PT EVAL HIGH COMPLEX 45 MIN: CPT

## 2022-03-29 PROCEDURE — 94664 DEMO&/EVAL PT USE INHALER: CPT

## 2022-03-29 PROCEDURE — 92610 EVALUATE SWALLOWING FUNCTION: CPT

## 2022-03-29 PROCEDURE — 97167 OT EVAL HIGH COMPLEX 60 MIN: CPT

## 2022-03-29 PROCEDURE — 99232 SBSQ HOSP IP/OBS MODERATE 35: CPT | Performed by: INTERNAL MEDICINE

## 2022-03-29 PROCEDURE — 85027 COMPLETE CBC AUTOMATED: CPT | Performed by: STUDENT IN AN ORGANIZED HEALTH CARE EDUCATION/TRAINING PROGRAM

## 2022-03-29 RX ORDER — SODIUM CHLORIDE, SODIUM LACTATE, POTASSIUM CHLORIDE, CALCIUM CHLORIDE 600; 310; 30; 20 MG/100ML; MG/100ML; MG/100ML; MG/100ML
100 INJECTION, SOLUTION INTRAVENOUS CONTINUOUS
Status: DISCONTINUED | OUTPATIENT
Start: 2022-03-29 | End: 2022-03-30

## 2022-03-29 RX ORDER — QUETIAPINE FUMARATE 25 MG/1
75 TABLET, FILM COATED ORAL
Status: DISCONTINUED | OUTPATIENT
Start: 2022-03-29 | End: 2022-03-31 | Stop reason: HOSPADM

## 2022-03-29 RX ADMIN — FINASTERIDE 5 MG: 5 TABLET, FILM COATED ORAL at 08:09

## 2022-03-29 RX ADMIN — METRONIDAZOLE 500 MG: 500 INJECTION, SOLUTION INTRAVENOUS at 12:13

## 2022-03-29 RX ADMIN — HEPARIN SODIUM 5000 UNITS: 5000 INJECTION INTRAVENOUS; SUBCUTANEOUS at 05:45

## 2022-03-29 RX ADMIN — HEPARIN SODIUM 5000 UNITS: 5000 INJECTION INTRAVENOUS; SUBCUTANEOUS at 22:16

## 2022-03-29 RX ADMIN — INSULIN LISPRO 1 UNITS: 100 INJECTION, SOLUTION INTRAVENOUS; SUBCUTANEOUS at 12:13

## 2022-03-29 RX ADMIN — PRAMIPEXOLE DIHYDROCHLORIDE 0.5 MG: 0.5 TABLET ORAL at 16:12

## 2022-03-29 RX ADMIN — SODIUM CHLORIDE, SODIUM LACTATE, POTASSIUM CHLORIDE, AND CALCIUM CHLORIDE 100 ML/HR: .6; .31; .03; .02 INJECTION, SOLUTION INTRAVENOUS at 14:00

## 2022-03-29 RX ADMIN — METRONIDAZOLE 500 MG: 500 INJECTION, SOLUTION INTRAVENOUS at 17:01

## 2022-03-29 RX ADMIN — PREDNISONE 10 MG: 10 TABLET ORAL at 08:09

## 2022-03-29 RX ADMIN — HEPARIN SODIUM 5000 UNITS: 5000 INJECTION INTRAVENOUS; SUBCUTANEOUS at 13:36

## 2022-03-29 RX ADMIN — PRAMIPEXOLE DIHYDROCHLORIDE 0.5 MG: 0.5 TABLET ORAL at 22:11

## 2022-03-29 RX ADMIN — Medication 3 MG: at 22:12

## 2022-03-29 RX ADMIN — GUAIFENESIN 600 MG: 600 TABLET ORAL at 22:11

## 2022-03-29 RX ADMIN — CARBIDOPA AND LEVODOPA 1 TABLET: 25; 100 TABLET ORAL at 22:12

## 2022-03-29 RX ADMIN — CARBIDOPA AND LEVODOPA 1 TABLET: 25; 100 TABLET ORAL at 16:12

## 2022-03-29 RX ADMIN — FAMOTIDINE 20 MG: 20 TABLET ORAL at 17:03

## 2022-03-29 RX ADMIN — PRAMIPEXOLE DIHYDROCHLORIDE 0.5 MG: 0.5 TABLET ORAL at 08:09

## 2022-03-29 RX ADMIN — FAMOTIDINE 20 MG: 20 TABLET ORAL at 08:09

## 2022-03-29 RX ADMIN — RIVASTIGMINE TARTRATE 4.5 MG: 1.5 CAPSULE ORAL at 17:03

## 2022-03-29 RX ADMIN — MONTELUKAST 10 MG: 10 TABLET, FILM COATED ORAL at 22:13

## 2022-03-29 RX ADMIN — GUAIFENESIN 600 MG: 600 TABLET ORAL at 08:09

## 2022-03-29 RX ADMIN — FLUTICASONE FUROATE AND VILANTEROL TRIFENATATE 1 PUFF: 200; 25 POWDER RESPIRATORY (INHALATION) at 08:10

## 2022-03-29 RX ADMIN — RIVASTIGMINE TARTRATE 4.5 MG: 1.5 CAPSULE ORAL at 08:13

## 2022-03-29 RX ADMIN — QUETIAPINE FUMARATE 75 MG: 25 TABLET ORAL at 22:09

## 2022-03-29 RX ADMIN — CARBIDOPA AND LEVODOPA 1 TABLET: 25; 100 TABLET ORAL at 08:09

## 2022-03-29 RX ADMIN — CEFTRIAXONE SODIUM 1000 MG: 10 INJECTION, POWDER, FOR SOLUTION INTRAVENOUS at 17:03

## 2022-03-29 RX ADMIN — UMECLIDINIUM 1 PUFF: 62.5 AEROSOL, POWDER ORAL at 08:10

## 2022-03-29 RX ADMIN — ATORVASTATIN CALCIUM 10 MG: 10 TABLET, FILM COATED ORAL at 08:09

## 2022-03-29 NOTE — UTILIZATION REVIEW
Inpatient Admission Authorization Request   NOTIFICATION OF INPATIENT ADMISSION/INPATIENT AUTHORIZATION REQUEST   SERVICING FACILITY:   [address] [place] [address]  Tax ID: [id number]  NPI: [id number]  Place of Service: Inpatient [facility]  60[address]  Place of Service Code: 24     ATTENDING PROVIDER:  Attending Name and NPI#: Pushpa Webb Md [[id number]]  Address: [address] [place] [address]  Phone: [phone]     UTILIZATION REVIEW CONTACT:  Juan Jose Nation, Utilization   Network Utilization Review Department  Phone: [phone]  Fax [phone]  Email: [email or web address]     PHYSICIAN ADVISORY SERVICES:  FOR [id number] REVIEW - MEDICAL NECESSITY DENIAL  Phone: [phone]  Fax: [phone]  Email: [email or web address]     TYPE OF REQUEST:  Inpatient Status     ADMISSION INFORMATION:  ADMISSION DATE/TIME: 3/28/22  4:55 PM  PATIENT DIAGNOSIS CODE/DESCRIPTION:  Chest pain [R07 9]  Pneumonia [J18 9]  DISCHARGE DATE/TIME: No discharge date for patient encounter  IMPORTANT INFORMATION:  Please contact the Juan Jose Nation directly with any questions or concerns regarding this request  Department voicemails are confidential     Send requests for admission clinical reviews, concurrent reviews, approvals, and administrative denials due to lack of clinical to fax [phone]

## 2022-03-29 NOTE — ASSESSMENT & PLAN NOTE
Continue sinemet  TID, continue Pramipexole 0 5 mg TID, Rivastigmine 4 5 mg BID  Held seroquel given hallucinations at home initially  Add melatonin for sleep  Restarted Seroquel at 75mg

## 2022-03-29 NOTE — PLAN OF CARE
Problem: OCCUPATIONAL THERAPY ADULT  Goal: Performs self-care activities at highest level of function for planned discharge setting  See evaluation for individualized goals  Description: Treatment Interventions: ADL retraining,Functional transfer training,UE strengthening/ROM,Endurance training,Cognitive reorientation,Patient/family training,Equipment evaluation/education,Compensatory technique education,Continued evaluation,Energy conservation,Activityengagement          See flowsheet documentation for full assessment, interventions and recommendations  Note: Limitation: Decreased ADL status,Decreased UE strength,Decreased cognition,Decreased endurance,Decreased self-care trans,Decreased high-level ADLs  Prognosis: Good  Assessment: Patient is a 81 y o  male seen for OT evaluation s/p admit to [facility] on 3/28/2022 w/Pneumonia  Commorbidities affecting patient's functional performance at time of assessment include: dysphagia, other emphysema, essential HTN, type 2 DM, and Parkinson's disease  Patient  has a past medical history of 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) ([address] ), Allergic rhinitis, Aortic regurgitation, Arm laceration, Asthma, Bronchitis, chronic obstructive, with exacerbation ([address] ), Candidal intertrigo, Chronic obstructive lung disease ([address] ), COPD (chronic obstructive pulmonary disease) ([address] ), Coronary artery disease, Cough, CVA (cerebral vascular accident) ([address] ), Dermatitis, Diabetes mellitus type 2, uncomplicated ([address] ), Dysthymic disorder, Fatigue, Hyperlipidemia, Hypertension, Neurologic gait dysfunction, Parkinson's disease ([address] ), Pneumonia, PVD (peripheral vascular disease) ([address] ), Seborrheic keratosis, TIA (transient ischemic attack), Tinea corporis, Tinea pedis of both feet, and Tremor  Orders placed for OT evaluation and treatment  Performed at least two patient identifiers during session including name and wristband   Prior to admission, patient was living with his family in a two-story home with a FOS to the second floor setup  At baseline, patient requires assistance with both ADLs and IADLs and is primarily ambulatory with a rollator  Personal factors affecting patient at time of initial evaluation include: difficulty performing ADLs, difficulty performing IADLs and health management, and interior steps  Upon evaluation, patient requires minimal assist for UB ADLs, maximal assist for LB ADLs, transfers and functional ambulation in room and bathroom with minimal assist x2, with the use of Rolling Walker  Patient is oriented to name,, disoriented to time, and disoriented to situation, and inconsistent to place  Occupational performance is affected by the following deficits: orientation, decreased muscle strength, impaired gross motor coordination, dynamic sit/ stand balance deficit with poor standing tolerance time for self care and functional mobility, decreased activity tolerance, impaired memory, impaired problem solving, decreased safety awareness, increased pain and postural control and postural alignment deficit, requiring external assistance to complete transitional movements  Patient to benefit from continued Occupational Therapy treatment while in the hospital to address deficits as defined above and maximize level of functional independence with ADLs and functional mobility  Occupational Performance areas to address include: eating, grooming , bathing/ shower, dressing, toilet hygiene, transfer to all surfaces, functional mobility, emergency response, IADLs: safety procedures and Leisure Participation  From OT standpoint, recommendation at time of d/c would be post-acute rehabilitation services         OT Discharge Recommendation: Post acute rehabilitation services

## 2022-03-29 NOTE — ASSESSMENT & PLAN NOTE
-Presented with cough, chest pain reported symptoms of chills and rigors at home  -Chest x-ray Minimal left basilar consolidation may represent subsegmental atelectasis, pneumonia, or aspiration   Suspect small left pleural effusion  -WBC: 22 89, 15 78  -Procal:  19 1, 18 49  -Patient did not meet sepsis criteria  -Family reports episodes of choking and coughing while eating    Plan:  -Trend WBC, Procal  -Blood cultures pending  -Sputum cultures and urine legionella and strep antigen pending  -Continue Mucinex  -Speech therapy consulted  -Continue IV Rocephin (started on 3/28)  -Will start LR at 100cc  -Can complete 5 day course if continued clinical improvement  -Added IV Flagyl 500mg q8h (started on 3/29)

## 2022-03-29 NOTE — ASSESSMENT & PLAN NOTE
Continue home bronchodilators, prednisone 10mg  PRN xopenox added  Patient seemed to have pain at home with chest wall oscillation therapy  Will hold off for now

## 2022-03-29 NOTE — ASSESSMENT & PLAN NOTE
Family reports progressive dysphagia, likely contributing to pneumonia and current admission  Will have speech therapy to evaluate  Ordered repeat VBS  On dysphagia 1 pureed diet with nectar thick liquids tolerating well  Will need dietary education on discharge

## 2022-03-29 NOTE — NURSING NOTE
Patient did not void from 2200-7811  Patient was bladder scanned around 2330 because of concern that condom cath had been empty the entire shift despite patient drinking nectar thick fluids and feeling the urge to void  Bladder scan showed 360 ml of urine in bladder  Family at bedside said patient had voided a large amount in the ED earlier, so patient was encouraged to void if possible, and upon assessment at 0000 urine was in condom cath drainage bag  300ml removed around 0445  Will continue to monitor  SLIM notified, I&O's and retention protocol initiated

## 2022-03-29 NOTE — PLAN OF CARE
Problem: Potential for Falls  Goal: Patient will remain free of falls  Description: INTERVENTIONS:  - Educate patient/family on patient safety including physical limitations  - Instruct patient to call for assistance with activity   - Consult OT/PT to assist with strengthening/mobility   - Keep Call bell within reach  - Keep bed low and locked with side rails adjusted as appropriate  - Keep care items and personal belongings within reach  - Initiate and maintain comfort rounds  - Make Fall Risk Sign visible to staff  - Offer Toileting every  2  Hours, in advance of need  - Initiate/Maintain  daily alarm  - Obtain necessary fall risk management equipment: daily- Apply yellow socks and bracelet for high fall risk patients  - Consider moving patient to room near nurses station  Outcome: Progressing     Problem: PAIN - ADULT  Goal: Verbalizes/displays adequate comfort level or baseline comfort level  Description: Interventions:  - Encourage patient to monitor pain and request assistance  - Assess pain using appropriate pain scale  - Administer analgesics based on type and severity of pain and evaluate response  - Implement non-pharmacological measures as appropriate and evaluate response  - Consider cultural and social influences on pain and pain management  - Notify physician/advanced practitioner if interventions unsuccessful or patient reports new pain  Outcome: Progressing     Problem: INFECTION - ADULT  Goal: Absence or prevention of progression during hospitalization  Description: INTERVENTIONS:  - Assess and monitor for signs and symptoms of infection  - Monitor lab/diagnostic results  - Monitor all insertion sites, i e  indwelling lines, tubes, and drains  - Monitor endotracheal if appropriate and nasal secretions for changes in amount and color  - [place] appropriate cooling/warming therapies per order  - Administer medications as ordered  - Instruct and encourage patient and family to use good hand hygiene technique  - Identify and instruct in appropriate isolation precautions for identified infection/condition  Outcome: Progressing     Problem: SAFETY ADULT  Goal: Patient will remain free of falls  Description: INTERVENTIONS:  - Educate patient/family on patient safety including physical limitations  - Instruct patient to call for assistance with activity   - Consult OT/PT to assist with strengthening/mobility   - Keep Call bell within reach  - Keep bed low and locked with side rails adjusted as appropriate  - Keep care items and personal belongings within reach  - Initiate and maintain comfort rounds  - Make Fall Risk Sign visible to staff  - Offer Toileting every  2  Hours, in advance of need  - Initiate/Maintain daily alarm  - Obtain necessary fall risk management equipment: daily  - Apply yellow socks and bracelet for high fall risk patients  - Consider moving patient to room near nurses station  Outcome: Progressing

## 2022-03-29 NOTE — PLAN OF CARE
Problem: PHYSICAL THERAPY ADULT  Goal: Performs mobility at highest level of function for planned discharge setting  See evaluation for individualized goals  Description: Treatment/Interventions: Functional transfer training,LE strengthening/ROM,Therapeutic exercise,Endurance training,Patient/family training,Equipment eval/education,Bed mobility,Gait training,Spoke to nursing,Continued evaluation,OT,Family,Spoke to MD  Equipment Recommended: Julius Newton (RW)       See flowsheet documentation for full assessment, interventions and recommendations  3/29/2022 1242 by Cesar Dowling PT  Note: Prognosis: Fair  Problem List: Decreased strength,Decreased endurance,Impaired balance,Decreased mobility,Decreased cognition,Pain  Assessment: Pt is 81 y o  male seen for high-complexity PT evaluation on 3/29/2022 s/p admit to [facility] on 3/28/2022 w/ Pneumonia  PT was consulted to assess pt's functional mobility and d/c needs  Order placed for PT eval and tx, w/ up and OOB as tolerated order  PTA, pt resides with spouse, family in [facility] with FOS to 2nd floor set up, ambulates requiring A from family & use of RW, requires A for I/ADLs  At time of eval, mod A x2 for supine to sit transfer, min A x2 for STS and short gait trial from EOB to recliner c use of RW  Upon evaluation, pt presenting with impaired functional mobility d/t decreased strength, decreased ROM, decreased endurance, impaired balance, decreased mobility, decreased cognition, pain and activity intolerance  Pertinent PMHx and current co-morbidities affecting pt's physical performance at time of assessment include: pneumonia, Parkinson's disease, type 2 DM, HTN, emphysema, dysphagia, ABPA, asthma, aortic regurgitation, HLD, neurologic gait dysfunction, PVD, COPD, cerebro vascular disease, ambulatory dysfunction, fall, subclinical hypothyroidism, mood disorder, subdural hematoma   Personal factors affecting pt at time of eval include: inaccessible home environment, ambulating w/ assistive device, stairs to enter home, inability to ambulate household distances, inability to navigate level surfaces w/o external assistance, unable to perform dynamic tasks in community, decreased cognition, positive fall history and decreased initiation and engagement  The following objective measures performed on IE also reveal limitations: Barthel Index: 30/100, Modified [place]: 4 (moderate/severe disability) and AM-PAC 6-Clicks: 56/70  Pt's clinical presentation is currently unstable/unpredictable seen in pt's presentation of advanced age, abnormal lab value(s), need for input for task focus and mobility technique and ongoing medical assessment  Overall, pt's rehab potential and prognosis to return to PLOF is fair as impacted by objective findings, warranting pt to receive further skilled PT interventions to address identified impairments, activity limitation(s), and participation restriction(s)  Pt to benefit from continued PT tx to address deficits as defined above and maximize level of functional independent mobility  From PT/mobility standpoint, recommendation at time of d/c would be post acute rehabilitation services pending progress in order to facilitate return to PLOF  Barriers to Discharge: Decreased caregiver support,Inaccessible home environment        PT Discharge Recommendation: Post acute rehabilitation services          See flowsheet documentation for full assessment  3/29/2022 1242 by Hope Lackey PT  Note: Prognosis: Fair  Problem List: Decreased strength,Decreased endurance,Impaired balance,Decreased mobility,Decreased cognition,Pain  Assessment: Pt is 81 y o  male seen for high-complexity PT evaluation on 3/29/2022 s/p admit to [place] on 3/28/2022 w/ Pneumonia  PT was consulted to assess pt's functional mobility and d/c needs  Order placed for PT eval and tx, w/ up and OOB as tolerated order   PTA, pt resides with spouse, family in [facility] with FOS to 2nd floor set up, ambulates requiring A from family & use of RW, requires A for I/ADLs  At time of eval, mod A x2 for supine to sit transfer, min A x2 for STS and short gait trial from EOB to recliner c use of RW  Upon evaluation, pt presenting with impaired functional mobility d/t decreased strength, decreased ROM, decreased endurance, impaired balance, decreased mobility, decreased cognition, pain and activity intolerance  Pertinent PMHx and current co-morbidities affecting pt's physical performance at time of assessment include: pneumonia, Parkinson's disease, type 2 DM, HTN, emphysema, dysphagia, ABPA, asthma, aortic regurgitation, HLD, neurologic gait dysfunction, PVD, COPD, cerebro vascular disease, ambulatory dysfunction, fall, subclinical hypothyroidism, mood disorder, subdural hematoma  Personal factors affecting pt at time of eval include: inaccessible home environment, ambulating w/ assistive device, stairs to enter home, inability to ambulate household distances, inability to navigate level surfaces w/o external assistance, unable to perform dynamic tasks in community, decreased cognition, positive fall history and decreased initiation and engagement  The following objective measures performed on IE also reveal limitations: Barthel Index: 30/100, Modified [place]: 4 (moderate/severe disability) and AM-PAC 6-Clicks: 23/51  Pt's clinical presentation is currently unstable/unpredictable seen in pt's presentation of advanced age, abnormal lab value(s), need for input for task focus and mobility technique and ongoing medical assessment  Overall, pt's rehab potential and prognosis to return to PLOF is fair as impacted by objective findings, warranting pt to receive further skilled PT interventions to address identified impairments, activity limitation(s), and participation restriction(s)  Pt to benefit from continued PT tx to address deficits as defined above and maximize level of functional independent mobility   From PT/mobility standpoint, recommendation at time of d/c would be post acute rehabilitation services pending progress in order to facilitate return to PLOF  Barriers to Discharge: Decreased caregiver support,Inaccessible home environment        PT Discharge Recommendation: Post acute rehabilitation services          See flowsheet documentation for full assessment

## 2022-03-29 NOTE — SPEECH THERAPY NOTE
Speech-Language Pathology Bedside Swallow Evaluation        Patient Name: Saige Gunderson  JQYHU'J Date: 3/29/2022     Problem List  Principal Problem:    Pneumonia  Active Problems:    Parkinson's disease ([facility] )    Type 2 diabetes mellitus ([facility] )    ANGELA (acute kidney injury) ([facility] )    Essential hypertension    Other emphysema ([facility] )    Dysphagia       Past Medical History  Past Medical History:   Diagnosis Date    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) ([facility] )     Allergic rhinitis     last assessed [id number]    Aortic regurgitation     Arm laceration     Asthma     Bronchitis, chronic obstructive, with exacerbation ([facility] )     last assessed [id number]    Candidal intertrigo     Chronic obstructive lung disease ([facility] )     last assessed [id number]    [facility]     Coronary artery disease     carotid stents    Cough     CVA (cerebral vascular accident) ([facility] )     Dermatitis     Diabetes mellitus type 2, uncomplicated ([place] [address] )     controlled    Dysthymic disorder     Fatigue     Hyperlipidemia     Hypertension     Neurologic gait dysfunction     Parkinson's disease ([facility] )     Pneumonia     PVD (peripheral vascular disease) ([facility] )     Seborrheic keratosis     TIA (transient ischemic attack)     Tinea corporis     Tinea pedis of both feet     Tremor        Past Surgical History  Past Surgical History:   Procedure Laterality Date    NASAL SINUS SURGERY      ME BRONCHOSCOPY,DIAGNOSTIC N/A 7/27/2016    Procedure: BRONCHOSCOPY;  Surgeon: Anel Morel MD;  Location: [facility]; Service: Pulmonary       Summary/Impressions:    Pt presents with oral and suspected pharyngeal dysphagia; this impairment is likely secondary to history of PD  VBS completed as an outpatient at this facility in Dec 2021  No aspiration noted and patient was recommended to be placed on a diet of soft/mechanical soft solids and thin liquids    As of this encounter family feels swallowing has gotten worse as pt known to cough on most solid textures, only coughs when drinking very cold liquids  No reported difficulties with room temp  Daughter present and reports pt is able to self-feed  Impulsivity reported and observed by clinician  Adequate acceptance an containment  Anterior munch with prolonged manipulation of chewable solids noted  Transit supected to be prolonged with ?lingual rocking  Occasional tongue thrust noted with missing front dentition  Minimal oral residue  x1 delayed cough, otherwise no s/s suggestive of aspiration or pharyngeal dysphagia observed  Pt benefits from small, controlled quantities, thin liquids by cup sip  Recommendations:   Diet: puree/level 1 diet and thin liquids   Meds: whole with puree   Feeding assistance: 1:1   Frequent Oral care: 2-4x/day  Aspiration precautions and compensatory swallowing strategies: upright posture, only feed when fully alert, slow rate of feeding, small bites/sips, no straws and alternating bites and sips  Other Recommendations/ considerations: Repeat VBS     Current Medical Status  81year old male patient presents emergency department for evaluation of consistent persistent cough  The patient has a history of COPD, utilize medications for this at home, over last several days he has been noted to have an increased cough which is now productive  The patient also was noted to have rigors last night for approximately 25 minutes and then a fever afterwards  Patient be evaluated for pneumonia  Dysphagia evaluation orders placed  Past medical history:  Please see H&P for details    Special Studies:  3/28/22 CXR:  IMPRESSION:  Minimal left basilar consolidation may represent subsegmental atelectasis, pneumonia, or aspiration  Suspect small left pleural effusion  Please see VBS report from 12/16/21 per Ms Jelly Avalos CCC-SLP       Social/Education/Vocational Hx:  Pt lives with family    Swallow Information Current Risks for Dysphagia & Aspiration: known history of dysphagia  Current Symptoms/Concerns: coughing with po  Current Diet: puree/level 1 diet and nectar thick liquids   Baseline Diet: mechanically altered/level 2 diet and thin liquids    Baseline Assessment   Behavior/Cognition: alert  Speech/Language Status: limited verbal output  Patient Positioning: upright in chair    Swallow Mechanism Exam   Facial: symmetrical  Labial: WFL  Lingual: decreased ROM  Velum: unable to visualize  Mandible: adequate ROM  Dentition: missing front teeth  Vocal quality:no verbal output   Volitional Cough: strong/productive   Respiratory: RA    Consistencies Assessed and Performance   Consistencies Administered: ice chips, thin liquids, nectar thick, puree and mechanical soft solids    Oral Stage: Impaired  Prolonged oral processing with suspected lingual rocking  Anterior munch-like pattern  Hard solid trials deferred 2/2 previous VBS  Clears oral cavity entirely  Pharyngeal Stage: Cannot r/o pharyngeal dysphagia  Laryngeal rise noted upon palpation  Swallow initiation suspected to be delayed/sluggish  Delayed cough x1  Otherwise no overt s/s of aspiration with small, controlled quantities       Esophageal Concerns: none reported      Results Reviewed with: patient, RN, MD and family     Plan    Will continue to follow for 3-5x  Dysphagia Goals: pt will tolerate thin liquids by single cup sip with puree/plus mech soft solid without s/s of aspiration x3-5 and pt will participate in VBS  Discharge recommendation: SNF    Speech Therapy Prognosis   Prognosis: deferred  Prognosis Considerations: progressive disease process        [address], [address] [facility]  Speech-Language Pathologist  [place] #[id number]  [place] #[id number]

## 2022-03-29 NOTE — OCCUPATIONAL THERAPY NOTE
Occupational Therapy Evaluation Note        Patient Name: Sonia Saunders  WNQFW'J Date: 3/29/2022       03/29/22 0857   OT Last Visit   OT Visit Date 03/29/22   Note Type   Note type Evaluation   Restrictions/Precautions   Weight Bearing Precautions Per Order No   Braces or Orthoses Other (Comment)  (none per pt)   Other Precautions Chair Alarm;Cognitive; Bed Alarm; Fall Risk;Pain;Multiple lines   Pain Assessment   Pain Assessment Tool Trevino-Baker FACES   Pain Score 6  (per pt report)   Trevino-Baker FACES Pain Rating 2   Pain Location/Orientation Location: Chest   Pain Radiating Towards pt reports chest pain when he presses on sternum   Home Living   Type of [address] [place] [address] Two level;Bed/bath upstairs; Other (Comment)  (FOS to 2nd floor setup w/ HR & 2 person assist)   Bathroom Shower/Tub Walk-in shower  (doesn't access, sponge bathes seated on toilet)   Bathroom Toilet Raised   Bathroom Equipment Grab bars in shower;Grab bars around [facility]; Hospital bed  (pt uses walker/rollator at baseline)   Additional Comments pt requires assistance for ambulation at baseline   Prior Function   Level of [place] Needs assistance with IADLs; Needs assistance with ADLs and functional mobility   Lives With Spouse;Daughter;Family   Receives Help From Family  (family rotates shifts to provide A; h/o HHPT/VNA)   ADL Assistance Needs assistance   IADLs Needs assistance   Falls in the last 6 months >10  (per dtr: 1-2 falls/wk)   Vocational Retired   Comments pt's dtr present at bedside to assist with PLOF/ home environment history interview d/t pt's impaired cognition   Lifestyle   Autonomy Per patient and pt's dtr's report, patient lives with his family in a two-story home with a FOS to the bedroom and bathroom on the second level  Patient requires assistance with both ambulation and stair navigation at baseline   Patient receives assistance for all ADLs and IADLs and sponge bathes w/ assistance while seated on toilet (dtr reports increased difficulty with shower transfers)  Reciprocal Relationships Supportive family, dtr reports 24/7 assistance available   Service to Others Retired   Intrinsic Gratification Watching baseball, [employer]   Psychosocial   Psychosocial (WDL) [address]  5  [facility] 4  Minimal Assistance   [address] 4  Minimal Assistance   LB [address] 2  Maximal [address] 4  Minimal Assistance    [facility] [address] 2  Maximal [address]  2  Maximal Assistance   Functional Assistance 2  Maximal Assistance   Additional Comments ADL assist levels based on pt's functional performance during OT evaluation   Bed Mobility   Supine to Sit 3  Moderate assistance   Additional items Assist x 2;HOB elevated; Increased time required;Verbal cues;LE management   Additional Comments Patient received lying supine in bed upon OT arrival; at end of session: pt seated OOB to recliner chair w/ all needs within reach and chair alarm activated   Transfers   Sit to Stand 4  Minimal assistance   Additional items Assist x 2; Increased time required;Verbal cues   Stand to Sit 4  Minimal assistance   Additional items Assist x 2;Armrests; Increased time required;Verbal cues   Additional Comments Performed functional transfers using RW   Functional Mobility   Functional Mobility 4  Minimal assistance   Additional Comments x2; 3-4 steps from EOB to recliner chair w/ no overt LOB or SOB   Additional items Rolling walker   Balance   Static Sitting Fair -   Dynamic Sitting Poor +   Static Standing Poor +   Dynamic Standing Poor   Ambulatory Poor   Activity Tolerance   Activity Tolerance Patient limited by fatigue; Other (Comment)  (Cognition)   Medical Staff Made Aware PT Jeremy Chamberlain   Nurse Made Aware GLEN Rivas confirmed pt appropriate for therapy and made aware of session outcomes   RUE Assessment   RUE Assessment X  (AROM grossly WFL; strength 3+/5 distally)   LUE Assessment   LUE Assessment X  (AROM grossly WFL; strength 3+/5 distally)   Hand Function   Gross Motor Coordination Impaired  (Impaired finger to nose test)   Fine Motor Coordination   (Further assessment required)   Vision-Basic Assessment   Current Vision Does not wear glasses   Cognition   Overall Cognitive Status Impaired   Arousal/Participation Alert; Responsive; Cooperative   Attention Attends with cues to redirect   Orientation Level Oriented to person;Disoriented to time;Disoriented to situation  (Inconsistent to place)   Memory Decreased short term memory;Decreased recall of recent events   Following Commands Follows one step commands with increased time or repetition   Comments Patient agreeable to OT evaluation   Assessment   Limitation Decreased ADL status; Decreased UE strength;Decreased cognition;Decreased endurance;Decreased self-care trans;Decreased high-level ADLs   Prognosis Good   Assessment Patient is a 81 y o  male seen for OT evaluation s/p admit to [id number] [place] [address] on 3/28/2022 w/Pneumonia  Commorbidities affecting patient's functional performance at time of assessment include: dysphagia, other emphysema, essential HTN, type 2 DM, and Parkinson's disease   Patient  has a past medical history of 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) ([address] ), Allergic rhinitis, Aortic regurgitation, Arm laceration, Asthma, Bronchitis, chronic obstructive, with exacerbation ([address] ), Candidal intertrigo, Chronic obstructive lung disease ([address] ), COPD (chronic obstructive pulmonary disease) ([address] ), Coronary artery disease, Cough, CVA (cerebral vascular accident) ([address] ), Dermatitis, Diabetes mellitus type 2, uncomplicated ([address] ), Dysthymic disorder, Fatigue, Hyperlipidemia, Hypertension, Neurologic gait dysfunction, Parkinson's disease ([address] ), Pneumonia, PVD (peripheral vascular disease) ([address] ), Seborrheic keratosis, TIA (transient ischemic attack), Tinea corporis, Tinea pedis of both feet, and Tremor  Orders placed for OT evaluation and treatment  Performed at least two patient identifiers during session including name and wristband  Prior to admission, patient was living with his family in a two-story home with a FOS to the second floor setup  At baseline, patient requires assistance with both ADLs and IADLs and is primarily ambulatory with a rollator  Personal factors affecting patient at time of initial evaluation include: difficulty performing ADLs, difficulty performing IADLs and health management, and interior steps  Upon evaluation, patient requires minimal assist for UB ADLs, maximal assist for LB ADLs, transfers and functional ambulation in room and bathroom with minimal assist x2, with the use of Rolling Walker  Patient is oriented to name,, disoriented to time, and disoriented to situation, and inconsistent to place  Occupational performance is affected by the following deficits: orientation, decreased muscle strength, impaired gross motor coordination, dynamic sit/ stand balance deficit with poor standing tolerance time for self care and functional mobility, decreased activity tolerance, impaired memory, impaired problem solving, decreased safety awareness, increased pain and postural control and postural alignment deficit, requiring external assistance to complete transitional movements  Patient to benefit from continued Occupational Therapy treatment while in the hospital to address deficits as defined above and maximize level of functional independence with ADLs and functional mobility  Occupational Performance areas to address include: eating, grooming , bathing/ shower, dressing, toilet hygiene, transfer to all surfaces, functional mobility, emergency response, IADLs: safety procedures and Leisure Participation  From OT standpoint, recommendation at time of d/c would be post-acute rehabilitation services         Goals   Patient Goals none expressed   Plan   Treatment Interventions ADL retraining;Functional transfer training;UE strengthening/ROM; Endurance training;Cognitive reorientation;Patient/family training;Equipment evaluation/education; Compensatory technique education;Continued evaluation; Energy conservation; Activityengagement   Goal Expiration Date 04/12/22   OT Treatment Day 0   OT Frequency 3-5x/wk   Recommendation   OT Discharge Recommendation Post acute rehabilitation services   Additional Comments  The patient's raw score on the AM-PAC Daily Activity inpatient short form is 15, standardized score is 34 69, less than 39 4  Patients at this level are likely to benefit from discharge to post-acute rehabilitation services  Please refer to the recommendation of the Occupational Therapist for safe discharge planning  AM-PAC Daily Activity Inpatient   Lower Body Dressing 2   Bathing 2   Toileting 2   Upper Body Dressing 3   Grooming 3   Eating 3   Daily Activity Raw Score 15   Daily Activity Standardized Score (Calc for Raw Score >=11) 34 69   AM-PAC Applied Cognition Inpatient   Following a Speech/Presentation 2   Understanding Ordinary Conversation 3   Taking Medications 1   Remembering Where Things Are Placed or Put Away 2   Remembering List of 4-5 Errands 1   Taking Care of Complicated Tasks 1   Applied Cognition Raw Score 10   Applied Cognition Standardized Score 24 98   Barthel Index   Feeding 5   Bathing 0   Grooming Score 0   Dressing Score 5   Bladder Score 0   Bowels Score 5   Toilet Use Score 5   Transfers (Bed/Chair) Score 5   Mobility (Level Surface) Score 0   Stairs Score 0   Barthel Index Score 25   Modified Aurora Scale   Modified Aurora Scale 4     Occupational Therapy Goals to be completed in 7-14 Days:    1 - Patient will verbalize and demonstrate use of energy conservation/ deep breathing technique and work simplification skills during functional activity with minimal verbal cues      2 - Patient will verbalize and demonstrate good body mechanics and joint protection techniques during  ADLs/ IADLs with minimal verbal cues  3 - Patient will increase OOB/ sitting tolerance to 2-4 hours per day for increased participation in self care and leisure tasks with no s/s of exertion  4 - Patient will increase standing tolerance time to 5 minutes with unilateral UE support to complete sink level ADLs@ mod I level  5 - Patient will increase sitting tolerance at edge of bed to 20 minutes to complete UB ADLs @ set up assist level  6 - Patient will transfer bed to Chair / toilet at Set up assist level with AD as indicated  7 - Patient will complete UB ADLs with set up assist with the use of compensatory techniques as indicated  8 - Patient will complete LB ADLs with mod assist with the use of adaptive equipment  9 - Patient will complete toileting hygiene with minimal assist/ supervision for thoroughness  8 - Patient/ Family will demonstrate competency with UE Home Exercise Program       11- Patient will demonstrate good safety awareness during functional transfers, mobility, and ADLs 100% of the time with minimal verbal cues  12- Pt will improve static and dynamic sitting balance to fair to increase safety and independence during functional transfers and ADL and decrease risk for falls      Mei Acuna, OTR/L

## 2022-03-29 NOTE — PHYSICAL THERAPY NOTE
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) [facility]    Allergic asthma    Allergic rhinitis    Aortic regurgitation    Pneumonia    Long term (current) use of systemic steroids    Hyperlipidemia    Hypertension    Parkinson's disease [facility]    Neurologic gait dysfunction    [facility]    Peripheral vascular disease ([facility]    COPD with acute exacerbation ([facility]    Cerebrovascular disease    Carotid artery disease [facility]    Varicose 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    H/O burning pain in leg    Ambulatory dysfunction    ANGELA (acute kidney injury) ([facility]    Essential hypertension    Gross hematuria    Meningioma ([facility]    Benign neoplasm of supratentorial region of brain ([facility]    Subclinical hypothyroidism    Dementia due to Parkinson's disease with behavioral disturbance ([facility]    Community acquired pneumonia of right lower lobe of lung    Fall    Mood disorder ([facility]    Subdural hematoma (H[facility])    Cough    Shortness of breath    Other emphysema [facility]    Dysphagia       Past Medical History  Past Medical History:   Diagnosis Date    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) [facility]     Allergic rhinitis     last assessed [id number]    Aortic regurgitation     Arm laceration     Asthma     Bronchitis, chronic obstructive, with exacerbation ([facility]     last assessed [id number]    Candidal intertrigo     Chronic obstructive lung disease ([facility]     last assessed [id number]    COPD (chronic obstructive pulmonary disease) ([facility]     Coronary artery disease     carotid stents    Cough     CVA (cerebral vascular accident) ([facility]     Dermatitis     Diabetes mellitus type 2, uncomplicated ([facility]     controlled    Dysthymic disorder     Fatigue     Hyperlipidemia     Hypertension     Neurologic gait dysfunction     [facility]'s disease [facility]     Pneumonia     PVD (peripheral vascular disease) ([facility]     Seborrheic keratosis     TIA (transient ischemic attack)     Tinea corporis     Tinea pedis of both feet     Tremor        Past Surgical History  Past Surgical History:   Procedure Laterality Date    NASAL SINUS SURGERY      IL BRONCHOSCOPY,DIAGNOSTIC N/A 7/27/2016    Procedure: BRONCHOSCOPY;  Surgeon: Leticia Zamudio MD;  Location: [facility]; Service: Pulmonary       PT performed at least 2 patient identifiers during session: Name and wristband  03/29/22 0830   PT Last Visit   PT Visit Date 03/29/22   Note Type   Note type Evaluation   Pain Assessment   Pain Assessment Tool Trevino-Baker FACES   Pain Score 6  (per pt report)   Trevino-Baker FACES Pain Rating 2   Pain Location/Orientation Location: Chest  ("nails in feet" sensation)   Pain Radiating Towards pt reports chest pain when he presses on sternum   Restrictions/Precautions   Weight Bearing Precautions Per Order No   Braces or Orthoses Other (Comment)  (none per pt)   Other Precautions Cognitive; Bed Alarm;Multiple lines; Fall Risk;Pain   Home Living   [address] Two level;Bed/bath upstairs  (FOS to 2nd floor set up c HR & 2 person assist)   Bathroom Shower/Tub Walk-in shower  (doesn't access, sponge bathes seated on toilet)   Bathroom Toilet Raised   Bathroom Equipment Grab bars in shower; Shower chair;Grab bars around [facility]; Hospital bed  (pt uses walker/rollator)   Additional Comments pt requires assistance for ambulation, I/ADLs   Prior Function   Level of [place] Needs assistance with ADLs and functional mobility; Needs assistance with IADLs   Lives With Spouse;Daughter;Family   Receives Help From Family  (family rotates shifts to provide A; h/o HHPT/VNA)   ADL Assistance Needs assistance   IADLs Needs assistance   Falls in the last 6 months >10  (per dtr - 1-2 falls/wk)   Vocational Retired   Comments pt's dtr present at bedside to assist with PLOF/ home environment history interview d/t pt's impaired cognition   General   Family/Caregiver Present Yes  (dtr)   Cognition   Overall Cognitive Status Impaired   Arousal/Participation Alert   Orientation Level Oriented to person;Disoriented to time;Disoriented to situation  (inconsistent to place)   Memory Decreased recall of biographical information;Decreased short term memory;Decreased recall of recent events;Decreased recall of precautions   Following Commands Follows one step commands with increased time or repetition   Comments pt agreeable to PT eval   RUE Assessment   RUE Assessment   (defer to OT eval for comments)   LUE Assessment   LUE Assessment   (defer to OT eval for comments)   RLE Assessment   RLE Assessment   (grossly 3+/5 observed c functional mobility)   LLE Assessment   LLE Assessment   (grossly 3+/5 observed c functional mobility)   Coordination   Movements are Fluid and Coordinated 1   Light Touch   RLE Light Touch Grossly intact   LLE Light Touch Grossly intact   Bed Mobility   Supine to Sit 3  Moderate assistance   Additional items HOB elevated; Increased time required;Verbal cues;LE management;Assist x 2   Transfers   Sit to Stand 4  Minimal assistance   Additional items Assist x 2; Increased time required;Verbal cues;Armrests   Stand to Sit 4  Minimal assistance   Additional items Assist x 2; Increased time required;Verbal cues;Armrests   Ambulation/Elevation   Gait pattern Improper Weight shift;Decreased foot clearance; Short stride; Step to;Excessively slow  (degraded posture; rigid trunk)   Gait Assistance 4  Minimal assist   Additional items Assist x 2;Verbal cues; Tactile cues   Assistive Device Rolling walker   Distance 4' from EOB to recliner   Balance   Static Sitting Fair -   Dynamic Sitting Poor +   Static Standing Poor +   Dynamic Standing Poor   Ambulatory Poor   Endurance Deficit   Endurance Deficit Yes   Activity Tolerance   Activity Tolerance Patient limited by fatigue; Other (Comment)  (cognition)   Medical Staff Made Aware care coordination with OT Dashawn Tuttle verbalized pt appropriate to see, made aware of session outcome/recs   Assessment   Prognosis Fair   Problem List Decreased strength;Decreased endurance; Impaired balance;Decreased mobility; Decreased cognition;Pain   Assessment Pt is 81 y o  male seen for high-complexity PT evaluation on 3/29/2022 s/p admit to [facility] on 3/28/2022 w/ Pneumonia  PT was consulted to assess pt's functional mobility and d/c needs  Order placed for PT eval and tx, w/ up and OOB as tolerated order  PTA, pt resides with spouse, family in [facility] with FOS to 2nd floor set up, ambulates requiring A from family & use of RW, requires A for I/ADLs  At time of eval, mod A x2 for supine to sit transfer, min A x2 for STS and short gait trial from EOB to recliner c use of RW  Upon evaluation, pt presenting with impaired functional mobility d/t decreased strength, decreased ROM, decreased endurance, impaired balance, decreased mobility, decreased cognition, pain and activity intolerance  Pertinent PMHx and current co-morbidities affecting pt's physical performance at time of assessment include: pneumonia, Parkinson's disease, type 2 DM, HTN, emphysema, dysphagia, ABPA, asthma, aortic regurgitation, HLD, neurologic gait dysfunction, PVD, COPD, cerebro vascular disease, ambulatory dysfunction, fall, subclinical hypothyroidism, mood disorder, subdural hematoma   Personal factors affecting pt at time of eval include: inaccessible home environment, ambulating w/ assistive device, stairs to enter home, inability to ambulate household distances, inability to navigate level surfaces w/o external assistance, unable to perform dynamic tasks in community, decreased cognition, positive fall history and decreased initiation and engagement  The following objective measures performed on IE also reveal limitations: Barthel Index: 30/100, Modified [place]: 4 (moderate/severe disability) and AM-PAC 6-Clicks: 66/75  Pt's clinical presentation is currently unstable/unpredictable seen in pt's presentation of advanced age, abnormal lab value(s), need for input for task focus and mobility technique and ongoing medical assessment  Overall, pt's rehab potential and prognosis to return to PLOF is fair as impacted by objective findings, warranting pt to receive further skilled PT interventions to address identified impairments, activity limitation(s), and participation restriction(s)  Pt to benefit from continued PT tx to address deficits as defined above and maximize level of functional independent mobility  From PT/mobility standpoint, recommendation at time of d/c would be post acute rehabilitation services pending progress in order to facilitate return to PLOF  Barriers to Discharge Decreased caregiver support; Inaccessible home environment   Goals   Patient Goals none expressed   STG Expiration Date 04/08/22   Short Term Goal #1 In 7-10 days: Increase bilateral LE strength 1/2 grade to facilitate independent mobility, Perform all bed mobility tasks with min A of 1 to decrease caregiver burden, Perform all transfers with min A of 1 to improve independence, Ambulate > 50 ft  with least restrictive assistive device with min A of 1 w/o LOB and w/ normalized gait pattern 100% of the time, Increase all balance 1/2 grade to decrease risk for falls, Tolerate 4 hr OOB to faciliate upright tolerance, Improve Barthel Index score to 45 or greater to facilitate independence, PT provider will perform functional balance assessment to determine fall risk and PT to see and establish goals for stairs/elevations when appropriate   PT Treatment Day 0   Plan   Treatment/Interventions Functional transfer training;LE strengthening/ROM; Therapeutic exercise; Endurance training;Patient/family training;Equipment eval/education; Bed mobility;Gait training;Spoke to nursing;Continued evaluation;OT;Family;Spoke to MD   PT Frequency 3-5x/wk   Recommendation   PT Discharge Recommendation Post acute rehabilitation services   Equipment Recommended Walker  (RW)   AM-PAC Basic Mobility Inpatient   Turning in Bed Without Bedrails 2   Lying on Back to Sitting on Edge of Flat Bed 1   Moving Bed to Chair 2   Standing Up From Chair 2   Walk in Room 2   Climb 3-5 Stairs 1   Basic Mobility Inpatient Raw Score 10   Turning Head Towards Sound 3   Follow Simple Instructions 3   Low Function Basic Mobility Raw Score 16   Low Function Basic Mobility Standardized Score 25 72   Highest Level Of Mobility   -[facility] Goal 4: Move to chair/commode   [facility] Highest Level of Mobility 4: Move to chair/commode   -[facility] Goal Achieved Yes   Modified Suad Scale   Modified Suad Scale 4   Barthel Index   Feeding 5   Bathing 0   Grooming Score 0   Dressing Score 5   Bladder Score 5   Bowels Score 5   Toilet Use Score 5   Transfers (Bed/Chair) Score 5   Mobility (Level Surface) Score 0   Stairs Score 0   Barthel Index Score 30       Yosef Rae, PT, DPT

## 2022-03-29 NOTE — CASE MANAGEMENT
Case Management Assessment & Discharge Planning Note    Patient name Saige Gunderson  Location /-01 MRN [id number]  : 1935 Date 3/29/2022       Current Admission Date: 3/28/2022  Current Admission Diagnosis:Pneumonia   Patient Active Problem List    Diagnosis Date Noted    Other emphysema ([facility] ) 2022    Dysphagia 2022    Cough 2021    Shortness of breath 2021    Mood disorder [facility] 2021    Subdural hematoma ([facility] ) 2021    Fall 10/28/2020    Community acquired pneumonia of right lower lobe of lung 10/26/2020    Dementia due to Parkinson's disease with behavioral disturbance ([facility] ) 10/09/2020    Subclinical hypothyroidism 09/15/2020    Meningioma ([facility] ) 2020    Benign neoplasm of supratentorial region of brain ([facility] ) 2020    Ambulatory dysfunction 2019    ANGELA (acute kidney injury) ([facility] ) 2019    Essential hypertension 2019    Gross hematuria 2019    H/O burning pain in leg 2018    Carotid artery disease ([facility] ) 2018    Varicose 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 2018    Long term (current) use of systemic steroids 2018    Pneumonia 2017    Parkinson's disease ([facility] ) 2016    Neurologic gait dysfunction 2016    Cerebrovascular disease 2016    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) ([facility] ) 2016    Peripheral vascular disease ([facility] ) 2015    Aortic regurgitation 2015    Allergic asthma 2014    Hypertension 2014    COPD with acute exacerbation [facility] 2014    Allergic rhinitis 2014    Hyperlipidemia 2014    Type 2 diabetes mellitus ([facility] ) 2014      LOS (days): 1  Geometric Mean LOS (GMLOS) (days):   Days to GMLOS:     OBJECTIVE:    Risk of Unplanned Readmission Score: 25      Current admission status: Inpatient     Preferred Pharmacy:   [id number] [facility] [address]  Phone: [phone] Fax: [phone]    Primary Care Provider: David Terry MD    Primary Insurance: Mackenzie Panda [facility]  Secondary Insurance:     ASSESSMENT:  Elizabeth Parmar Proxies    There are no active Health Care Proxies on file  Advance Directives  Does patient have a Health Care POA?: No  Was patient offered paperwork?: No (Inappropriate at this time as patient has dementia  His spouse is of sound mind and daughter Marissa Benoit reports the family works together to make his decisions )  Does patient currently have a Health Care decision maker?: Yes, please see Health Care Proxy section  Does patient have Advance Directives?: No  Was patient offered paperwork?: No (See above)  Primary Contact: dtr Marissa Benoit     Readmission Root Cause  30 Day Readmission: No    Patient Information  Admitted from[de-identified] Home  Mental Status: Alert,Confused  During Assessment patient was accompanied by: Daughter Yovana Aylin)  Assessment information provided by[de-identified] Homero Barrerasherrill Aylin)  Primary Caregiver: Child  Caregiver's Name[de-identified] all 3 of patient's children take turns watching/caring for him  His daughters Marissa Benoit and Kristine Treviño trade off during the day and Ericka Hutton takes the night shift  Caregiver's Relationship to Patient[de-identified] Family Member  Caregiver's Telephone Number[de-identified] see El [address]: Spouse/significant other,Daughter,Family [facility] care staff  [place] of Residence: [place] [id number] do you live in?: [address] entry access options  Select all that apply : Stairs  Number of steps to enter home :  (10+; it sounds like there are multiple stairs broken up by landings to get into the home   Family needs to help him at least 2 people at a time and it's not easy )  Type of Current Residence: [address] home  Upon entering residence, is there a bedroom on the main floor (no further steps)?: Yes  Upon entering residence, is there a bathroom on the main floor (no further steps)?: Yes  In the last 12 months, was there a time when you were not able to pay the mortgage or rent on time?: No  In the last 12 months, how many places have you lived?: 1  In the last 12 months, was there a time when you did not have a steady place to sleep or slept in a shelter (including now)?: No  Living Arrangements: Lives w/ Spouse/significant other    Activities of Daily Living Prior to Admission  Functional Status: Total dependent  Completes ADLs independently?: No  Level of ADL dependence: Total Dependent  Ambulates independently?: No  Level of ambulatory dependence: Total Dependent  Does patient use assisted devices?: Yes  Assisted Devices (DME) used: Walker,Hospital Bed  Does patient currently own DME?: Yes  What DME does the patient currently own?: Walker,Hospital Bed  Does the patient have a history of Short-Term Rehab?: Yes ([facility])  Does patient have a history of [facility]?: Yes  Does patient currently have [facility]?: No (patient just terminated with [facility] as they're unable to offer ST at home   Family is in need of a new [facility] provider )     Patient Information Continued  Does patient have prescription coverage?: Yes  Within the past 12 months, you worried that your food would run out before you got the money to buy more : Never true  Within the past 12 months, the food you bought just didnt last and you didnt have money to get more : Never true  Does patient receive dialysis treatments?: No  Does patient have a history of Mental Health Diagnosis?: No     Means of Transportation  Means of Transport to Appts[de-identified] Family transport  In the past 12 months, has lack of transportation kept you from medical appointments or from getting medications?: No  In the past 12 months, has lack of transportation kept you from meetings, work, or from getting things needed for daily living?: No    DISCHARGE DETAILS:    Discharge planning discussed with[de-identified] patient and his daughter Babar Perera at bedside  [place] of Choice: Yes  Comments - Freedom of Choice: CM met with patient and his daughter Preston Sethi at bedside this morning to introduce self and role  Patient was alert, but confused, being fed breakfast by his daughter while he sat up in the recliner chair  Preston Sethi initially let CM know that she is interested in STR at [place], but after speaking with her sisters, they've collectively decided to take patient home at [place]  Preston Sethi and her sister Iam Ken take shifts caring for patient during the day and sister Jose M Stark watches him at night  They just terminated services with [facility] as they do not offer ST at home, and are in need of a new [facility] agency that can provide SN/PT/OT/ST  Preston Sethi also feels a maria luz lift and transport chair would be helpful, pending the cost as patient is on a fixed income  She asked that CM send referrals to [facility] and follow up with her re: OOP cost  SLIM aware of family's preferences  CM contacted family/caregiver?: Yes  Were Treatment Team discharge recommendations reviewed with patient/caregiver?: Yes  Did patient/caregiver verbalize understanding of patient care needs?: Yes  Were patient/caregiver advised of the risks associated with not following Treatment Team discharge recommendations?: Yes    Contacts  Patient Contacts: dtr Preston Sethi  Relationship to Patient[de-identified] Family  Contact Method:  In Person  Reason/Outcome: Continuity of [address]         Is the patient interested in [facility] at discharge?: Yes  [address] requested[de-identified] Allison Lemon [address] [place] [address] Name[de-identified] Other (TBD)  [address]  Provider[de-identified] PCP  Home Health Services Needed[de-identified] Diabetes Management,COPD Management,Evaluate Functional Status and Safety,Gait/ADL Training,Strengthening/Theraputic Exercises to Improve Function  Homebound Criteria Met[de-identified] Requires the Assistance of Another Person for Safe Ambulation or to Leave the Home,Uses an Assist Device (i e  cane, walker, etc)  Supporting Clincal Findings[de-identified] Cognitive Deficit Requiring the Assistance of Others,Limited Endurance,Fatigues Easliy in Short Distances    DME Referral Provided  Referral made for DME?: Yes (sent via parachute)  DME referral completed for the following items[de-identified] Sophie Martin (transport chair)  DME Supplier Name[de-identified] [employer]    Other Referral/Resources/Interventions Provided:  Interventions: DME,C  Referral Comments: Referral sent to [facility] via Gino Amaro for transport chair and maria luz lift  CM asked them to contact family to discuss copay costs  CM also sent [facility] referral for SN/PT/OT/ST  Treatment Team Recommendation: Short Term Rehab  Discharge Destination Plan[de-identified] Home with [place] at Discharge : [address] (BLS vs family   [facility] reports they may still be paying for the last ambulance ride and will need to discuss among themselves )

## 2022-03-29 NOTE — PROGRESS NOTES
[facility]  Progress Note - Colin Huynh 1935, 81 y o  male MRN: [id number]  Unit/Bed#: -Skyler Encounter: [id number]  Primary Care Provider: Cleon Dakins, MD   Date and time admitted to hospital: 3/28/2022  1:44 PM    * Pneumonia  Assessment & Plan  -Presented with cough, chest pain reported symptoms of chills and rigors at home  -Chest x-ray Minimal left basilar consolidation may represent subsegmental atelectasis, pneumonia, or aspiration   Suspect small left pleural effusion  -WBC: 22 89, 15 78  -Procal:  19 1, 18 49  -Patient did not meet sepsis criteria  -Family reports episodes of choking and coughing while eating    Plan:  -Trend WBC, Procal  -Blood cultures pending  -Sputum cultures and urine legionella and strep antigen pending  -Continue Mucinex  -Speech therapy consulted  -Continue IV Rocephin (started on 3/28)  -Will start LR at 100cc  -Can complete 5 day course if continued clinical improvement  -Added IV Flagyl 500mg q8h (started on 3/29)    Dysphagia  Assessment & Plan  Family reports progressive dysphagia, likely contributing to pneumonia and current admission  Will have speech therapy to evaluate    Parkinson's disease (HCC)  Assessment & Plan  Continue sinemet  TID, continue Pramipexole 0 5 mg TID, Rivastigmine 4 5 mg BID  Held seroquel given hallucinations at home initially  Add melatonin for sleep  Restarted Seroquel at 75mg    ANGELA (acute kidney injury) ([facility] [address] )  Assessment & Plan  Creatinine on admission 1 82 improved to 1 60  Baseline is approximately 1 2  Started on  cc/hour    Other emphysema ([facility] [address] )  Assessment & Plan  Continue home bronchodilators, prednisone 10mg  PRN xopenox added  Patient seemed to have pain at home with chest wall oscillation therapy  Will hold off for now    Essential hypertension  Assessment & Plan  Not on any meds, elevated  Will monitor  Avoid aggressive BP control with parkinson's disease and concern for orthostatic hypotension    Type 2 diabetes mellitus ([facility] [address] )  Assessment & Plan  Lab Results   Component Value Date    HGBA1C 6 6 (H) 2022     Not on insulin  SSI, accuchecks        VTE Pharmacologic Prophylaxis:     Moderate Risk (Score 3-4) - Pharmacological DVT Prophylaxis Ordered: Heparin  Mechanical VTE Prophylaxis in Place: Yes    Patient Centered Rounds: I have performed bedside rounds with nursing staff today  Discussions with Specialists or Other Care Team Provider: Attending    Education and Discussions with Family / Patient: Updated  (daughter) at bedside  Current Length of Stay: 1 day(s)    Current Patient Status: Inpatient     Discharge Plan / Estimated Discharge Date: To be determined    Code Status: Level 3 - DNAR and DNI      Subjective:   Patient is alert and oriented times 3  Spoke to patients daughter at bedside  He slept poorly overnight  He has a fear of sleeping due to nightmares  He has been coughing and had some chest pain  He was tolerating nectar thick liquids overnight previously had choking and coughing episodes with water  Objective:     Vitals:   Temp (24hrs), Av 1 °F (36 7 °C), Min:97 3 °F (36 3 °C), Max:99 1 °F (37 3 °C)    Temp:  [97 3 °F (36 3 °C)-99 1 °F (37 3 °C)] 97 9 °F (36 6 °C)  HR:  [62-86] 62  Resp:  [16-21] 21  BP: (101-131)/(55-76) 131/56  SpO2:  [92 %-97 %] 94 %  Body mass index is 33 47 kg/m²  Input and Output Summary (last 24 hours): Intake/Output Summary (Last 24 hours) at 3/29/2022 0918  Last data filed at 3/29/2022 0452  Gross per 24 hour   Intake 210 ml   Output 300 ml   Net -90 ml       Physical Exam:     Physical Exam  Constitutional:       Appearance: He is ill-appearing  HENT:      Head: Normocephalic and atraumatic  Nose: Nose normal       Mouth/Throat:      Mouth: Mucous membranes are moist    Eyes:      Pupils: Pupils are equal, round, and reactive to light     Cardiovascular:      Rate and Rhythm: Normal rate and regular rhythm  Pulses: Normal pulses  Pulmonary:      Breath sounds: Rhonchi present  Comments: Scattered rhonchi    Abdominal:      General: Bowel sounds are normal       Palpations: Abdomen is soft  Tenderness: There is no abdominal tenderness  Genitourinary:     Comments: Condom catheter in place    Musculoskeletal:      Right lower leg: No edema  Left lower leg: No edema  Skin:     General: Skin is warm  Neurological:      Mental Status: He is alert and oriented to person, place, and time  Motor: Weakness present  Comments: Bradykinesia  Lower extremity weakness            Additional Data:     Labs:  Results from last 7 days   Lab Units 03/29/22  0516 03/28/22  1421 03/28/22  1421   WBC Thousand/uL 15 78*   < > 22 89*   HEMOGLOBIN g/dL 13 5   < > 14 3   HEMATOCRIT % 41 4   < > 44 8   PLATELETS Thousands/uL 175   < > 193   NEUTROS PCT %  --   --  90*   LYMPHS PCT %  --   --  3*   MONOS PCT %  --   --  6   EOS PCT %  --   --  0    < > = values in this interval not displayed  Results from last 7 days   Lab Units 03/29/22  0516 03/28/22  1501 03/28/22  1501   SODIUM mmol/L 137   < > 136   POTASSIUM mmol/L 3 8   < > 4 2   CHLORIDE mmol/L 104   < > 104   CO2 mmol/L 25   < > 23   BUN mg/dL 38*   < > 35*   CREATININE mg/dL 1 60*   < > 1 82*   ANION GAP mmol/L 8   < > 9   CALCIUM mg/dL 9 0   < > 8 9   ALBUMIN g/dL  --   --  2 9*   TOTAL BILIRUBIN mg/dL  --   --  0 50   ALK PHOS U/L  --   --  76   ALT U/L  --   --  12   AST U/L  --   --  21   GLUCOSE RANDOM mg/dL 110   < > 227*    < > = values in this interval not displayed       Results from last 7 days   Lab Units 03/28/22  1421   INR  1 22*     Results from last 7 days   Lab Units 03/29/22  0745 03/28/22  2107 03/28/22  1844   POC GLUCOSE mg/dl 99 151* 203*         Results from last 7 days   Lab Units 03/29/22  0516 03/28/22  1830 03/28/22  1501   LACTIC ACID mmol/L  --   --  2 0   PROCALCITONIN ng/ml 18 49* 19 10*  --        Imaging: Reviewed radiology reports from this admission including: chest xray    Recent Cultures (last 7 days):     Results from last 7 days   Lab Units 03/28/22  1501   BLOOD CULTURE  Received in Microbiology Lab  Culture in Progress  Received in Microbiology Lab  Culture in Progress         Lines/Drains:  Invasive Devices  Report    Peripheral Intravenous Line            Peripheral IV 03/28/22 Left Antecubital <1 day          Drain            External Urinary Catheter Medium <1 day                Telemetry:        Last 24 Hours Medication List:   Current Facility-Administered Medications   Medication Dose Route Frequency Provider Last Rate    acetaminophen  650 mg Oral Q6H PRN Pearl Naidu MD      atorvastatin  10 mg Oral Daily Pearl Naidu MD      carbidopa-levodopa  1 tablet Oral TID Pearl Naidu MD      cefTRIAXone  1,000 mg Intravenous Q24H Pearl Naidu MD 1,000 mg (03/28/22 1625)    famotidine  20 mg Oral BID Pearl Naidu MD      finasteride  5 mg Oral QAM Pearl Naidu MD      fluticasone-vilanterol  1 puff Inhalation Daily Pearl Naidu MD      guaiFENesin  600 mg Oral Q12H Micheline Marrero MD      heparin (porcine)  5,000 Units Subcutaneous [facility] Felicia Henry MD      insulin lispro  1-5 Units Subcutaneous TID AC Tahir Henry MD      lactated ringers  100 mL/hr Intravenous Continuous Tawanda Lemon MD      levalbuterol  0 63 mg Nebulization Q8H PRN Pearl Naidu MD      melatonin  3 mg Oral HS Pearl Naidu MD      metroNIDAZOLE  500 mg Intravenous Q8H Tawanda Lemon MD      montelukast  10 mg Oral HS Pearl Naidu MD      OLANZapine  2 5 mg Oral Q6H PRN Pearl Naidu MD      ondansetron  4 mg Intravenous Q6H PRN Pearl Naidu MD      pramipexole  0 5 mg Oral TID Pearl Naidu MD      predniSONE  10 mg Oral Daily Pearl Naidu MD      QUEtiapine  75 mg Oral HS Tawanda Lemon MD      rivastigmine  4 5 mg Oral BID Pearl Naidu MD      umeclidinium bromide  1 puff Inhalation Daily Kelsey Awad MD          Today, Patient Was Seen By: Fritz Zepeda MD    ** Please Note: This note has been constructed using a voice recognition system   **

## 2022-03-29 NOTE — TELEPHONE ENCOUNTER
FYI Dr Minda Werner: The sisters would like to have physical therapy continued in the home when [facility] is released from the hospital  They do not want him to go to a facility

## 2022-03-30 ENCOUNTER — APPOINTMENT (INPATIENT)
Dept: RADIOLOGY | Facility: HOSPITAL | Age: 87
DRG: 178 | End: 2022-03-30
Payer: COMMERCIAL

## 2022-03-30 LAB
ANION GAP SERPL CALCULATED.3IONS-SCNC: 6 MMOL/L (ref 4–13)
BASOPHILS # BLD AUTO: 0.02 THOUSANDS/ΜL (ref 0–0.1)
BASOPHILS NFR BLD AUTO: 0 % (ref 0–1)
BUN SERPL-MCNC: 30 MG/DL (ref 5–25)
CALCIUM SERPL-MCNC: 8.7 MG/DL (ref 8.3–10.1)
CHLORIDE SERPL-SCNC: 107 MMOL/L (ref 100–108)
CO2 SERPL-SCNC: 26 MMOL/L (ref 21–32)
CREAT SERPL-MCNC: 1.29 MG/DL (ref 0.6–1.3)
EOSINOPHIL # BLD AUTO: 0.19 THOUSAND/ΜL (ref 0–0.61)
EOSINOPHIL NFR BLD AUTO: 2 % (ref 0–6)
ERYTHROCYTE [DISTWIDTH] IN BLOOD BY AUTOMATED COUNT: 13.2 % (ref 11.6–15.1)
GFR SERPL CREATININE-BSD FRML MDRD: 49 ML/MIN/1.73SQ M
GLUCOSE SERPL-MCNC: 105 MG/DL (ref 65–140)
GLUCOSE SERPL-MCNC: 136 MG/DL (ref 65–140)
GLUCOSE SERPL-MCNC: 164 MG/DL (ref 65–140)
GLUCOSE SERPL-MCNC: 186 MG/DL (ref 65–140)
GLUCOSE SERPL-MCNC: 95 MG/DL (ref 65–140)
HCT VFR BLD AUTO: 38.5 % (ref 36.5–49.3)
HGB BLD-MCNC: 12.4 G/DL (ref 12–17)
IMM GRANULOCYTES # BLD AUTO: 0.04 THOUSAND/UL (ref 0–0.2)
IMM GRANULOCYTES NFR BLD AUTO: 0 % (ref 0–2)
LYMPHOCYTES # BLD AUTO: 0.92 THOUSANDS/ΜL (ref 0.6–4.47)
LYMPHOCYTES NFR BLD AUTO: 9 % (ref 14–44)
MCH RBC QN AUTO: 30.5 PG (ref 26.8–34.3)
MCHC RBC AUTO-ENTMCNC: 32.2 G/DL (ref 31.4–37.4)
MCV RBC AUTO: 95 FL (ref 82–98)
MONOCYTES # BLD AUTO: 0.91 THOUSAND/ΜL (ref 0.17–1.22)
MONOCYTES NFR BLD AUTO: 9 % (ref 4–12)
NEUTROPHILS # BLD AUTO: 8.36 THOUSANDS/ΜL (ref 1.85–7.62)
NEUTS SEG NFR BLD AUTO: 80 % (ref 43–75)
NRBC BLD AUTO-RTO: 0 /100 WBCS
PLATELET # BLD AUTO: 157 THOUSANDS/UL (ref 149–390)
PMV BLD AUTO: 9.8 FL (ref 8.9–12.7)
POTASSIUM SERPL-SCNC: 3.7 MMOL/L (ref 3.5–5.3)
PROCALCITONIN SERPL-MCNC: 11.43 NG/ML
RBC # BLD AUTO: 4.06 MILLION/UL (ref 3.88–5.62)
SODIUM SERPL-SCNC: 139 MMOL/L (ref 136–145)
WBC # BLD AUTO: 10.44 THOUSAND/UL (ref 4.31–10.16)

## 2022-03-30 PROCEDURE — 80048 BASIC METABOLIC PNL TOTAL CA: CPT | Performed by: INTERNAL MEDICINE

## 2022-03-30 PROCEDURE — 92611 MOTION FLUOROSCOPY/SWALLOW: CPT

## 2022-03-30 PROCEDURE — 82948 REAGENT STRIP/BLOOD GLUCOSE: CPT

## 2022-03-30 PROCEDURE — 85025 COMPLETE CBC W/AUTO DIFF WBC: CPT | Performed by: INTERNAL MEDICINE

## 2022-03-30 PROCEDURE — 94664 DEMO&/EVAL PT USE INHALER: CPT

## 2022-03-30 PROCEDURE — 74230 X-RAY XM SWLNG FUNCJ C+: CPT

## 2022-03-30 PROCEDURE — 99232 SBSQ HOSP IP/OBS MODERATE 35: CPT | Performed by: INTERNAL MEDICINE

## 2022-03-30 PROCEDURE — 84145 PROCALCITONIN (PCT): CPT | Performed by: INTERNAL MEDICINE

## 2022-03-30 RX ORDER — METRONIDAZOLE 500 MG/1
500 TABLET ORAL EVERY 8 HOURS SCHEDULED
Status: DISCONTINUED | OUTPATIENT
Start: 2022-03-30 | End: 2022-03-31 | Stop reason: HOSPADM

## 2022-03-30 RX ADMIN — FAMOTIDINE 20 MG: 20 TABLET ORAL at 17:00

## 2022-03-30 RX ADMIN — QUETIAPINE FUMARATE 75 MG: 25 TABLET ORAL at 22:05

## 2022-03-30 RX ADMIN — HEPARIN SODIUM 5000 UNITS: 5000 INJECTION INTRAVENOUS; SUBCUTANEOUS at 05:15

## 2022-03-30 RX ADMIN — INSULIN LISPRO 1 UNITS: 100 INJECTION, SOLUTION INTRAVENOUS; SUBCUTANEOUS at 12:08

## 2022-03-30 RX ADMIN — INSULIN LISPRO 1 UNITS: 100 INJECTION, SOLUTION INTRAVENOUS; SUBCUTANEOUS at 16:59

## 2022-03-30 RX ADMIN — GUAIFENESIN 600 MG: 600 TABLET ORAL at 08:42

## 2022-03-30 RX ADMIN — ATORVASTATIN CALCIUM 10 MG: 10 TABLET, FILM COATED ORAL at 08:42

## 2022-03-30 RX ADMIN — CEFTRIAXONE SODIUM 1000 MG: 10 INJECTION, POWDER, FOR SOLUTION INTRAVENOUS at 18:15

## 2022-03-30 RX ADMIN — PRAMIPEXOLE DIHYDROCHLORIDE 0.5 MG: 0.5 TABLET ORAL at 16:56

## 2022-03-30 RX ADMIN — CARBIDOPA AND LEVODOPA 1 TABLET: 25; 100 TABLET ORAL at 22:05

## 2022-03-30 RX ADMIN — RIVASTIGMINE TARTRATE 4.5 MG: 1.5 CAPSULE ORAL at 08:42

## 2022-03-30 RX ADMIN — FAMOTIDINE 20 MG: 20 TABLET ORAL at 08:42

## 2022-03-30 RX ADMIN — MONTELUKAST 10 MG: 10 TABLET, FILM COATED ORAL at 22:06

## 2022-03-30 RX ADMIN — CARBIDOPA AND LEVODOPA 1 TABLET: 25; 100 TABLET ORAL at 16:56

## 2022-03-30 RX ADMIN — PRAMIPEXOLE DIHYDROCHLORIDE 0.5 MG: 0.5 TABLET ORAL at 22:06

## 2022-03-30 RX ADMIN — PREDNISONE 10 MG: 10 TABLET ORAL at 08:41

## 2022-03-30 RX ADMIN — FINASTERIDE 5 MG: 5 TABLET, FILM COATED ORAL at 08:43

## 2022-03-30 RX ADMIN — METRONIDAZOLE 500 MG: 500 TABLET ORAL at 14:17

## 2022-03-30 RX ADMIN — METRONIDAZOLE 500 MG: 500 INJECTION, SOLUTION INTRAVENOUS at 00:50

## 2022-03-30 RX ADMIN — CARBIDOPA AND LEVODOPA 1 TABLET: 25; 100 TABLET ORAL at 08:42

## 2022-03-30 RX ADMIN — Medication 3 MG: at 22:06

## 2022-03-30 RX ADMIN — UMECLIDINIUM 1 PUFF: 62.5 AEROSOL, POWDER ORAL at 08:44

## 2022-03-30 RX ADMIN — PRAMIPEXOLE DIHYDROCHLORIDE 0.5 MG: 0.5 TABLET ORAL at 08:43

## 2022-03-30 RX ADMIN — HEPARIN SODIUM 5000 UNITS: 5000 INJECTION INTRAVENOUS; SUBCUTANEOUS at 14:17

## 2022-03-30 RX ADMIN — FLUTICASONE FUROATE AND VILANTEROL TRIFENATATE 1 PUFF: 200; 25 POWDER RESPIRATORY (INHALATION) at 08:44

## 2022-03-30 RX ADMIN — HEPARIN SODIUM 5000 UNITS: 5000 INJECTION INTRAVENOUS; SUBCUTANEOUS at 22:06

## 2022-03-30 RX ADMIN — METRONIDAZOLE 500 MG: 500 INJECTION, SOLUTION INTRAVENOUS at 08:44

## 2022-03-30 RX ADMIN — METRONIDAZOLE 500 MG: 500 TABLET ORAL at 22:05

## 2022-03-30 RX ADMIN — GUAIFENESIN 600 MG: 600 TABLET ORAL at 22:06

## 2022-03-30 RX ADMIN — RIVASTIGMINE TARTRATE 4.5 MG: 1.5 CAPSULE ORAL at 17:00

## 2022-03-30 NOTE — ASSESSMENT & PLAN NOTE
Not on any meds  Will monitor  Avoid aggressive BP control with parkinson's disease and concern for orthostatic hypotension

## 2022-03-30 NOTE — PLAN OF CARE
Problem: INFECTION - ADULT  Goal: Absence or prevention of progression during hospitalization  Description: INTERVENTIONS:  - Assess and monitor for signs and symptoms of infection  - Monitor lab/diagnostic results  - Monitor all insertion sites, i e  indwelling lines, tubes, and drains  - Monitor endotracheal if appropriate and nasal secretions for changes in amount and color  - [place] appropriate cooling/warming therapies per order  - Administer medications as ordered  - Instruct and encourage patient and family to use good hand hygiene technique  - Identify and instruct in appropriate isolation precautions for identified infection/condition  Outcome: Progressing

## 2022-03-30 NOTE — CASE MANAGEMENT
Case Management Progress Note    Patient name Nadine Jones  Location /-83 MRN [id number]  : 1935 Date 3/30/2022       LOS (days): 2  Geometric Mean LOS (GMLOS) (days):   Days to GMLOS:        OBJECTIVE:        Current admission status: Inpatient  Preferred Pharmacy:   [facility] [address], [address] [address]  Phone: [phone] Fax: [phone]    Primary Care Provider: Juan R Tan MD    Primary Insurance: [address] [place] [address]  Secondary Insurance:     PROGRESS NOTE:  CM received VM and message in [facility] from [place] at [address]; she reports they DO offer ST at home and can accept patient for all services  CM placed contact information on AVS     CM met with patient and his daughter at bedside to review  Richard Soria is happy to receive services from [facility]  She received call from [address] this morning and agreed to delivery of the transfer chair (she has to "rent" it for less than $4/month for 3 months and then they own it)  She asked them to hold off on the maria luz until she can do further research on it  Richard Soria also mentioned that [employer] is calling her Monday to complete an assessment for coverage  Richard Soria did ask about getting a condom catheter for patient and INDER stated that unfortunately, CM has never arranged for same but knows that [employer] doesn't offer it  CM agreed to do some research and Richard Soria did as well  There is a company called [employer]" that looks to provide urinary medical supplies at home   CM placed their contact information on patient's AVS

## 2022-03-30 NOTE — ASSESSMENT & PLAN NOTE
-Presented with cough, chest pain reported symptoms of chills and rigors at home  -Chest x-ray Minimal left basilar consolidation may represent subsegmental atelectasis, pneumonia, or aspiration   Suspect small left pleural effusion  -WBC: 22 89, 15 78, 10 4  -Procal:  19 1, 18 49, 11  -Patient did not meet sepsis criteria  -Family reports episodes of choking and coughing while eating    Plan:  -Trend WBC  -Blood cultures pending  -Sputum cultures and urine legionella and strep antigen pending  -Continue Mucinex  -Speech therapy consulted  -Continue IV Rocephin (started on 3/28)  -Added IV Flagyl 500mg q8h (started on 3/29) switched to PO Flagyl  -Likely can switch to PO Vantin 200 BID and PO Flagyl 500 q8h to complete total 5 day course (Patient is allergic to penicillins)

## 2022-03-30 NOTE — ASSESSMENT & PLAN NOTE
Continue sinemet  TID, continue Pramipexole 0 5 mg TID, Rivastigmine 4 5 mg BID  Held seroquel given hallucinations at home initially  Add melatonin for sleep  Restarted Seroquel at 75mg, continue at discharge  PT rec STR but family would prefer Home with HHS  Requested wheelchair and condom catheter at discharge   Would like [facility] as [facility] does not have speech therapy

## 2022-03-30 NOTE — CASE MANAGEMENT
Case Management Progress Note    Patient name Lisa Fang  Location /-41 MRN [id number]  : 1935 Date 3/30/2022       LOS (days): 2  Geometric Mean LOS (GMLOS) (days):   Days to GMLOS:        OBJECTIVE:        Current admission status: Inpatient  Preferred Pharmacy:   [facility], [address] [id number]  Phone: [phone] Fax: [phone]    Primary Care Provider: Lory Lassiter MD    Primary Insurance: Miya Cortes [facility]  Secondary Insurance:     PROGRESS NOTE:  At this time, the majority of [facility] have declined patient d/t staffing constraints, being OON with his insurance, or not offering speech therapy  [facility] and [facility] have yet to respond  CM called [facility] at [phone] and spoke with Nanette Sharp; she confirms they do offer ST and will have her supervisor review client's referral     CM called [facility] at [phone] and spoke with Vicky Truong; they do not offer ST     CM called [facility] at [phone] and spoke with [facility]; they do not have ST in [place] or [facility]  CM will await call back from [address] as they are the only potential option at this time  [employer] is still processing the orders for transport chair and maria luz lift; they will contact CM and daughter Valeria Trinh once processed with copay amounts

## 2022-03-30 NOTE — PROCEDURES
Speech-Language Pathology Video Barium Swallow Study        Patient Name: Sandhya Manriquez    [id number] Date: 3/30/2022     Problem List  Patient Active Problem List   Diagnosis    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) ([facility] [address] [facility]    Allergic asthma    Allergic rhinitis    Aortic regurgitation    Pneumonia    Long term (current) use of systemic steroids    Hyperlipidemia    Hypertension    Parkinson's disease ([facility] [address] [facility]    Neurologic gait dysfunction    Type 2 diabetes mellitus ([facility] [address] [facility]    Peripheral vascular disease ([facility] [address] [facility]    COPD with acute exacerbation ([facility] [address] [facility]    Cerebrovascular disease    Carotid artery disease ([facility] [address] [facility]    Varicose 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    H/O burning pain in leg    Ambulatory dysfunction    ANGELA (acute kidney injury) ([facility] [address] [facility]    Essential hypertension    Gross hematuria    Meningioma ([facility] [address] [facility]    Benign neoplasm of supratentorial region of brain ([facility] [address] [facility]    Subclinical hypothyroidism    Dementia due to Parkinson's disease with behavioral disturbance ([facility] [address] [facility]    Community acquired pneumonia of right lower lobe of lung    Fall    Mood disorder ([facility] [address] [facility]    Subdural hematoma (HCC)    Cough    Shortness of breath    Other emphysema ([address]    Dysphagia       Past Medical History  Past Medical History:   Diagnosis Date    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) (HCC)     Allergic rhinitis     last assessed [id number]    Aortic regurgitation     Arm laceration     Asthma     Bronchitis, chronic obstructive, with exacerbation ([facility] [address] )     last assessed [id number]    Candidal intertrigo     Chronic obstructive lung disease ([facility] [address] )     last assessed [id number]    COPD (chronic obstructive pulmonary disease) (HCC)     Coronary artery disease     carotid stents    Cough     CVA (cerebral vascular accident) ([facility] [address] [facility]     Dermatitis     Diabetes mellitus type 2, uncomplicated ([facility] [address] )     controlled    Dysthymic disorder     Fatigue     Hyperlipidemia     Hypertension     Neurologic gait dysfunction     Parkinson's disease ([facility] [address] [facility]     Pneumonia     PVD (peripheral vascular disease) ([facility] [address] [facility]     Seborrheic keratosis     TIA (transient ischemic attack)     Tinea corporis     Tinea pedis of both feet     Tremor        Past Surgical History  Past Surgical History:   Procedure Laterality Date    NASAL SINUS SURGERY      [place] BRONCHOSCOPY,DIAGNOSTIC N/A 7/27/2016    Procedure: BRONCHOSCOPY;  Surgeon: Leta Terrell MD;  Location: [facility]; Service: Pulmonary         General Information;  Pt is a 81 y o  male who presented to [facility] with consistent persistent cough   The patient has a history of COPD, utilize medications for this at home, over last several days he has been noted to have an increased cough which is now productive   The patient also was noted to have rigors last night for approximately 25 minutes and then a fever afterwards   Patient be evaluated for pneumonia  Dysphagia evaluation orders placed  Pt was seen for a BSE, with recommendations for puree/thin  VBS was recommended due to h/o PD and family reports of worsening dysphagia  Pt was viewed in lateral position and was given trials of moistened/mechcanial soft solids, puree solids, thin liquids, honey thick, nectar thick  Liquids given by spoon, cup and straw  Swallow Information   Current Risks for Dysphagia & Aspiration: CVA and known history of dysphagia     Current Symptoms/Concerns: coughing with po    Current Diet: puree/level 1 diet and thin liquids      Baseline Diet: mechanically altered/level 2 diet and thin liquids      Baseline Assessment   Behavior/Cognition: alert    Speech/Language Status: able to participate in basic conversation    Patient Positioning: upright in chair and Laterally at 90 degrees      Speech/Swallow Mech: Oral motor movements appeared grossly functional for oral feeding       Previous VBS: 12/16/21; moderate oral and mild pharyngeal dysphagia  No aspiration, penetration or significant pharyngeal residue  For additional details please see VBS report from 12/16/21; study completed as an outpatient at [facility]  Consistencies Assessed and Performance   Pt was seen in radiology for a Video Barium Swallow Study, seated in the upright position and viewed laterally with the following consistencies: thin liquids, nectar thick, honey thick, puree and mechanical soft solids  - additional solid trials deferred 2/2 findings from prev swallow study  Results are as follows:   Oral stage; Pt presents with moderate oral dysphagia characterized by decreased mastication resulting in anterior munch-like pattern  Transfer prolonged and incomplete, lingual rocking evident with posterior loss over the base of tongue (solids and liquids)  Reduced bolus control  Mild oral residue, cleared with second spontaneous swallow  Pharyngeal stage; Pt presents with mild pharyngeal dysphagia characterized by mild delay in swallow initiation  Adequate and complete laryngeal vestibule closure with adequate tongue base retraction and pharyngeal constriction  This resulted in premature spillage of all solids and thickened liquids (mild) to the valleculae and episodic spillage to the pyriforms (liqulids)  Thin liquids were noted to consistently spill to the pyriforms  Once again airway protection was found to be timely and complete, no tracheal aspiration or laryngeal penetration was observed during the present study  All material cleared to the UES with no significant pharyngeal stasis  Of note, pt demonstrated no signs of distress (cough) during this time, which he is known to do during meals at home  Cannot r/o post-swallow aspiration or negate possibility of aspiration with larger quantities of preferred beverages and food items (vs barium coated food)        Esophageal stage;  Esophageal screening was no completed 2/2 pt positioning in fluoro  Assessment Summary; Patient presents with moderate oral and mild pharyngeal dysphagia characterized by immature/incomplete mastication, prolonged bolus transit with poor bolus cohesion 2/2 lingual rocking, and delayed swallow response  This resulted in premature bolus loss over the base of tongue to the level of valleculae (during oral manipulation of all solids, honey/nectar thick), and pyriforms (nectar thick/thin)  No evidence of mistimed airway protection visualized under fluoro, resulting no amaury aspiration or laryngeal penetration while under fluoro  No significant pharyngeal residue or obstruction in bolus pathway  All material safety cleared though the UES within 1-2 swallows resulting no apparent bolus retention  Esophageal sweep unable to be completed 2/2 patient positioning in fluoro suite  Of note, pt demonstrated no signs of distress (cough) during this time  He is known to  cough during mealtime at home (solids>liquids) raising concern and reason for the present study  Cannot r/o post-swallow aspiration or negate possiblilty of aspiration with larger quantities of preferred beverages and food items (vs barium coated food)  *Images available for direct review in PACS     Diagnosis/Prognosis;  mild-moderate oropharyngeal dysphagia    good and fair prognosis for continued safe po intake given findings outlined above  Prognosis Considerations: progressive disease process    Recommendations; Recommend mechanically altered/level 2 diet and thin liquids, with upright posture, only feed when fully alert, slow rate of feeding, small bites/sips and alternating bites and sips  Should difficulties persist during mealtime, consider downgrade to puree solids/nectar thick liquids (pt also noted to safety take these textures during today's study)      Recommended Form of Meds: whole with puree   Full/Intermittent Supervision  Aspiration precautions   Reflux Precautions  Consider consult with:   Aspiration precautions posted  If a dedicated assessment of the esophagus is desired, consider esophagram/barium swallow  Results reviewed with patient, RN and MD       Goals:  Pt will tolerate least restrictive diet w/out s/s aspiration or oral/pharyngeal difficulties      Dysphagia Goals: pt will tolerate dys2 solids with thin liquids without s/s of aspiration x1-3    Treatment Recommendations: Strategies for safe oral feeding      Discharge recommendation: home health        [address] Pathologist  [place] #[id number]  [place] #[id number]

## 2022-03-30 NOTE — PROGRESS NOTES
[address] [facility]  Progress Note - Nadine Jones 1935, 81 y o  male MRN: [id number]  Unit/Bed#: -Skyler Encounter: [id number]  Primary Care Provider: Juan R Tan MD   Date and time admitted to hospital: 3/28/2022  1:44 PM    * Pneumonia  Assessment & Plan  -Presented with cough, chest pain reported symptoms of chills and rigors at home  -Chest x-ray Minimal left basilar consolidation may represent subsegmental atelectasis, pneumonia, or aspiration   Suspect small left pleural effusion  -WBC: 22 89, 15 78, 10 4  -Procal:  19 1, 18 49, 11  -Patient did not meet sepsis criteria  -Family reports episodes of choking and coughing while eating    Plan:  -Trend WBC  -Blood cultures pending  -Sputum cultures and urine legionella and strep antigen pending  -Continue Mucinex  -Speech therapy consulted  -Continue IV Rocephin (started on 3/28)  -Added IV Flagyl 500mg q8h (started on 3/29) switched to PO Flagyl  -Likely can switch to PO Vantin 200 BID and PO Flagyl 500 q8h to complete total 5 day course (Patient is allergic to penicillins)    Dysphagia  Assessment & Plan  Family reports progressive dysphagia, likely contributing to pneumonia and current admission  Will have speech therapy to evaluate  Ordered repeat VBS  On dysphagia 1 pureed diet with nectar thick liquids tolerating well  Will need dietary education on discharge    Parkinson's disease (HCC)  Assessment & Plan  Continue sinemet  TID, continue Pramipexole 0 5 mg TID, Rivastigmine 4 5 mg BID  Held seroquel given hallucinations at home initially  Add melatonin for sleep  Restarted Seroquel at 75mg, continue at discharge  PT rec STR but family would prefer Home with [facility]  Requested wheelchair and condom catheter at discharge   Would like [facility] as [place] does not have speech therapy    ANGELA (acute kidney injury) ([facility] [address] )  Assessment & Plan  Creatinine on admission 1 82  Baseline is approximately 1 2  Back to baseline after IV fluids    Other emphysema (HCC)  Assessment & Plan  Continue home bronchodilators, prednisone 10mg  PRN xopenox added  Patient seemed to have pain at home with chest wall oscillation therapy  Will hold off for now    Essential hypertension  Assessment & Plan  Not on any meds  Will monitor  Avoid aggressive BP control with parkinson's disease and concern for orthostatic hypotension    Type 2 diabetes mellitus ([facility] [address] )  Assessment & Plan  Lab Results   Component Value Date    HGBA1C 6 6 (H) 2022     Not on insulin  SSI, accuchecks          VTE Pharmacologic Prophylaxis:     Moderate Risk (Score 3-4) - Pharmacological DVT Prophylaxis Ordered: Heparin  Mechanical VTE Prophylaxis in Place: Yes    Patient Centered Rounds: I have performed bedside rounds with nursing staff today  Discussions with Specialists or Other Care Team Provider: Attending    Education and Discussions with Family / Patient: Updated  (daughter) at bedside  Current Length of Stay: 2 day(s)    Current Patient Status: Inpatient     Discharge Plan / Estimated Discharge Date: Anticipate discharge tomorrow to home with home services  Code Status: Level 3 - DNAR and DNI      Subjective:   Patient slept well overnight  He had intermittent cough with yellow thick sputum  Overall he states he is feeling better  Daughter is at bedside and was updated  Objective:     Vitals:   Temp (24hrs), Av 7 °F (37 1 °C), Min:98 4 °F (36 9 °C), Max:98 9 °F (37 2 °C)    Temp:  [98 4 °F (36 9 °C)-98 9 °F (37 2 °C)] 98 4 °F (36 9 °C)  HR:  [62-65] 62  Resp:  [33] 33  BP: (130-134)/(56-58) 130/56  SpO2:  [93 %] 93 %  Body mass index is 33 47 kg/m²  Input and Output Summary (last 24 hours): Intake/Output Summary (Last 24 hours) at 3/30/2022 0953  Last data filed at 3/30/2022 6852  Gross per 24 hour   Intake 410 ml   Output 1200 ml   Net -790 ml       Physical Exam:     Physical Exam  Vitals reviewed     Constitutional: Appearance: He is ill-appearing  HENT:      Head: Normocephalic and atraumatic  Nose: Nose normal       Mouth/Throat:      Mouth: Mucous membranes are moist    Eyes:      Pupils: Pupils are equal, round, and reactive to light  Cardiovascular:      Rate and Rhythm: Normal rate and regular rhythm  Pulses: Normal pulses  Pulmonary:      Breath sounds: Rhonchi present  Comments: Scattered rhonchi  Abdominal:      General: Bowel sounds are normal       Palpations: Abdomen is soft  Tenderness: There is no abdominal tenderness  Musculoskeletal:      Right lower leg: No edema  Left lower leg: No edema  Skin:     General: Skin is warm  Neurological:      Mental Status: He is alert and oriented to person, place, and time  Motor: Weakness present  Comments: Bradykinesia  Lower extremity weakness         Additional Data:     Labs:  Results from last 7 days   Lab Units 03/30/22  0436   WBC Thousand/uL 10 44*   HEMOGLOBIN g/dL 12 4   HEMATOCRIT % 38 5   PLATELETS Thousands/uL 157   NEUTROS PCT % 80*   LYMPHS PCT % 9*   MONOS PCT % 9   EOS PCT % 2     Results from last 7 days   Lab Units 03/30/22  0436 03/29/22  0516 03/28/22  1501   SODIUM mmol/L 139   < > 136   POTASSIUM mmol/L 3 7   < > 4 2   CHLORIDE mmol/L 107   < > 104   CO2 mmol/L 26   < > 23   BUN mg/dL 30*   < > 35*   CREATININE mg/dL 1 29   < > 1 82*   ANION GAP mmol/L 6   < > 9   CALCIUM mg/dL 8 7   < > 8 9   ALBUMIN g/dL  --   --  2 9*   TOTAL BILIRUBIN mg/dL  --   --  0 50   ALK PHOS U/L  --   --  76   ALT U/L  --   --  12   AST U/L  --   --  21   GLUCOSE RANDOM mg/dL 95   < > 227*    < > = values in this interval not displayed       Results from last 7 days   Lab Units 03/28/22  1421   INR  1 22*     Results from last 7 days   Lab Units 03/30/22  0657 03/29/22  2108 03/29/22  1548 03/29/22  1122 03/29/22  0745 03/28/22  2107 03/28/22  1844   POC GLUCOSE mg/dl 105 216* 122 187* 99 151* 203*         Results from last 7 days Lab Units 03/30/22  0436 03/29/22  0516 03/28/22  1830 03/28/22  1501   LACTIC ACID mmol/L  --   --   --  2 0   PROCALCITONIN ng/ml 11 43* 18 49* 19 10*  --        Imaging: Reviewed radiology reports from this admission including: chest xray    Recent Cultures (last 7 days):     Results from last 7 days   Lab Units 03/28/22  1501   BLOOD CULTURE  No Growth at 24 hrs  No Growth at 24 hrs         Lines/Drains:  Invasive Devices  Report    Peripheral Intravenous Line            Peripheral IV 03/28/22 Left Antecubital 1 day          Drain            External Urinary Catheter Medium 1 day                Telemetry:        Last 24 Hours Medication List:   Current Facility-Administered Medications   Medication Dose Route Frequency Provider Last Rate    acetaminophen  650 mg Oral Q6H PRN Jerrica Chang MD      atorvastatin  10 mg Oral Daily Jerrica Chang MD      carbidopa-levodopa  1 tablet Oral TID Jerrica Chang MD      cefTRIAXone  1,000 mg Intravenous Q24H Jerrica Chang MD 1,000 mg (03/29/22 1703)    famotidine  20 mg Oral BID Jerrica Chang MD      finasteride  5 mg Oral QAM Tahir Quiroga MD      fluticasone-vilanterol  1 puff Inhalation Daily Jerrica Chang MD      guaiFENesin  600 mg Oral Q12H [address] Chelsie Quiroga MD      heparin (porcine)  5,000 Units Subcutaneous [facility] Chelsie Quiroga MD      insulin lispro  1-5 Units Subcutaneous TID AC Tahir Quiroga MD      levalbuterol  0 63 mg Nebulization Q8H PRN Chelsie Quiroga MD      melatonin  3 mg Oral HS Chelsie Quiroga MD      metroNIDAZOLE  500 mg Oral [facility] Polina Sutton MD      montelukast  10 mg Oral HS Jerrica Chang MD      OLANZapine  2 5 mg Oral Q6H PRN Jerrica Chang MD      ondansetron  4 mg Intravenous Q6H PRN Jerrica Chang MD      pramipexole  0 5 mg Oral TID Jerrica Chang MD      predniSONE  10 mg Oral Daily Jerrica Chang MD      QUEtiapine  75 mg Oral HS Polina Sutton MD      rivastigmine  4 5 mg Oral BID Mckenzie Mendoza MD      umeclidinium bromide  1 puff Inhalation Daily Mckenzie Mendoza MD          Today, Patient Was Seen By: Julian Calvillo MD    ** Please Note: This note has been constructed using a voice recognition system   **

## 2022-03-30 NOTE — PLAN OF CARE
VBS completed; dysphagia 2 ([facility] soft) with small, single sips of thin liquid  Should difficulties/distress during mealtime become apparent consider downgrade to puree solids and/or nectar thick liquids

## 2022-03-30 NOTE — PLAN OF CARE
Family updated on all plans as pt confused  Barium swallow completed  Adequate: hears normal conversation without difficulty

## 2022-03-31 ENCOUNTER — TRANSITIONAL CARE MANAGEMENT (OUTPATIENT)
Dept: INTERNAL MEDICINE CLINIC | Facility: CLINIC | Age: 87
End: 2022-03-31

## 2022-03-31 VITALS
BODY MASS INDEX: 33.67 KG/M2 | TEMPERATURE: 98.5 F | RESPIRATION RATE: 17 BRPM | OXYGEN SATURATION: 93 % | WEIGHT: 182.98 LBS | HEIGHT: 62 IN | SYSTOLIC BLOOD PRESSURE: 130 MMHG | HEART RATE: 63 BPM | DIASTOLIC BLOOD PRESSURE: 59 MMHG

## 2022-03-31 DIAGNOSIS — B44.81 ABPA (ALLERGIC BRONCHOPULMONARY ASPERGILLOSIS) (HCC): ICD-10-CM

## 2022-03-31 DIAGNOSIS — J42 CHRONIC BRONCHITIS, UNSPECIFIED CHRONIC BRONCHITIS TYPE (HCC): ICD-10-CM

## 2022-03-31 PROBLEM — J69.0 ASPIRATION PNEUMONIA (HCC): Status: ACTIVE | Noted: 2017-12-28

## 2022-03-31 LAB
ANION GAP SERPL CALCULATED.3IONS-SCNC: 9 MMOL/L (ref 4–13)
BASOPHILS # BLD AUTO: 0.02 THOUSANDS/ΜL (ref 0–0.1)
BASOPHILS NFR BLD AUTO: 0 % (ref 0–1)
BUN SERPL-MCNC: 31 MG/DL (ref 5–25)
CALCIUM SERPL-MCNC: 8.6 MG/DL (ref 8.3–10.1)
CHLORIDE SERPL-SCNC: 107 MMOL/L (ref 100–108)
CO2 SERPL-SCNC: 25 MMOL/L (ref 21–32)
CREAT SERPL-MCNC: 1.32 MG/DL (ref 0.6–1.3)
DME PARACHUTE DELIVERY DATE ACTUAL: NORMAL
DME PARACHUTE DELIVERY DATE EXPECTED: NORMAL
DME PARACHUTE DELIVERY DATE REQUESTED: NORMAL
DME PARACHUTE DELIVERY NOTE: NORMAL
DME PARACHUTE ITEM DESCRIPTION: NORMAL
DME PARACHUTE ITEM DESCRIPTION: NORMAL
DME PARACHUTE ORDER STATUS: NORMAL
DME PARACHUTE SUPPLIER NAME: NORMAL
DME PARACHUTE SUPPLIER PHONE: NORMAL
EOSINOPHIL # BLD AUTO: 0.27 THOUSAND/ΜL (ref 0–0.61)
EOSINOPHIL NFR BLD AUTO: 3 % (ref 0–6)
ERYTHROCYTE [DISTWIDTH] IN BLOOD BY AUTOMATED COUNT: 13.2 % (ref 11.6–15.1)
GFR SERPL CREATININE-BSD FRML MDRD: 48 ML/MIN/1.73SQ M
GLUCOSE SERPL-MCNC: 100 MG/DL (ref 65–140)
GLUCOSE SERPL-MCNC: 112 MG/DL (ref 65–140)
GLUCOSE SERPL-MCNC: 192 MG/DL (ref 65–140)
HCT VFR BLD AUTO: 37.7 % (ref 36.5–49.3)
HGB BLD-MCNC: 12 G/DL (ref 12–17)
IMM GRANULOCYTES # BLD AUTO: 0.05 THOUSAND/UL (ref 0–0.2)
IMM GRANULOCYTES NFR BLD AUTO: 1 % (ref 0–2)
LYMPHOCYTES # BLD AUTO: 1.21 THOUSANDS/ΜL (ref 0.6–4.47)
LYMPHOCYTES NFR BLD AUTO: 13 % (ref 14–44)
MCH RBC QN AUTO: 30.1 PG (ref 26.8–34.3)
MCHC RBC AUTO-ENTMCNC: 31.8 G/DL (ref 31.4–37.4)
MCV RBC AUTO: 95 FL (ref 82–98)
MONOCYTES # BLD AUTO: 0.91 THOUSAND/ΜL (ref 0.17–1.22)
MONOCYTES NFR BLD AUTO: 10 % (ref 4–12)
NEUTROPHILS # BLD AUTO: 6.67 THOUSANDS/ΜL (ref 1.85–7.62)
NEUTS SEG NFR BLD AUTO: 73 % (ref 43–75)
NRBC BLD AUTO-RTO: 0 /100 WBCS
PLATELET # BLD AUTO: 174 THOUSANDS/UL (ref 149–390)
PMV BLD AUTO: 9.5 FL (ref 8.9–12.7)
POTASSIUM SERPL-SCNC: 3.7 MMOL/L (ref 3.5–5.3)
RBC # BLD AUTO: 3.99 MILLION/UL (ref 3.88–5.62)
SODIUM SERPL-SCNC: 141 MMOL/L (ref 136–145)
WBC # BLD AUTO: 9.13 THOUSAND/UL (ref 4.31–10.16)

## 2022-03-31 PROCEDURE — 94664 DEMO&/EVAL PT USE INHALER: CPT

## 2022-03-31 PROCEDURE — 85025 COMPLETE CBC W/AUTO DIFF WBC: CPT | Performed by: INTERNAL MEDICINE

## 2022-03-31 PROCEDURE — 94760 N-INVAS EAR/PLS OXIMETRY 1: CPT

## 2022-03-31 PROCEDURE — 99239 HOSP IP/OBS DSCHRG MGMT >30: CPT | Performed by: INTERNAL MEDICINE

## 2022-03-31 PROCEDURE — 80048 BASIC METABOLIC PNL TOTAL CA: CPT | Performed by: INTERNAL MEDICINE

## 2022-03-31 PROCEDURE — 82948 REAGENT STRIP/BLOOD GLUCOSE: CPT

## 2022-03-31 PROCEDURE — 92526 ORAL FUNCTION THERAPY: CPT

## 2022-03-31 RX ORDER — QUETIAPINE FUMARATE 25 MG/1
75 TABLET, FILM COATED ORAL
Start: 2022-03-31 | End: 2022-04-27

## 2022-03-31 RX ORDER — CEFPODOXIME PROXETIL 200 MG/1
200 TABLET, FILM COATED ORAL 2 TIMES DAILY
Qty: 6 TABLET | Refills: 0 | Status: SHIPPED | OUTPATIENT
Start: 2022-03-31 | End: 2022-04-03

## 2022-03-31 RX ORDER — METRONIDAZOLE 500 MG/1
500 TABLET ORAL EVERY 8 HOURS SCHEDULED
Qty: 9 TABLET | Refills: 0 | Status: SHIPPED | OUTPATIENT
Start: 2022-03-31 | End: 2022-04-03

## 2022-03-31 RX ORDER — PREDNISONE 10 MG/1
TABLET ORAL
Qty: 40 TABLET | Refills: 0 | Status: SHIPPED | OUTPATIENT
Start: 2022-03-31 | End: 2022-04-01

## 2022-03-31 RX ADMIN — PRAMIPEXOLE DIHYDROCHLORIDE 0.5 MG: 0.5 TABLET ORAL at 08:42

## 2022-03-31 RX ADMIN — FINASTERIDE 5 MG: 5 TABLET, FILM COATED ORAL at 08:43

## 2022-03-31 RX ADMIN — FAMOTIDINE 20 MG: 20 TABLET ORAL at 08:42

## 2022-03-31 RX ADMIN — METRONIDAZOLE 500 MG: 500 TABLET ORAL at 06:36

## 2022-03-31 RX ADMIN — RIVASTIGMINE TARTRATE 4.5 MG: 1.5 CAPSULE ORAL at 08:42

## 2022-03-31 RX ADMIN — ATORVASTATIN CALCIUM 10 MG: 10 TABLET, FILM COATED ORAL at 08:43

## 2022-03-31 RX ADMIN — HEPARIN SODIUM 5000 UNITS: 5000 INJECTION INTRAVENOUS; SUBCUTANEOUS at 06:36

## 2022-03-31 RX ADMIN — GUAIFENESIN 600 MG: 600 TABLET ORAL at 08:43

## 2022-03-31 RX ADMIN — CARBIDOPA AND LEVODOPA 1 TABLET: 25; 100 TABLET ORAL at 08:43

## 2022-03-31 RX ADMIN — PREDNISONE 10 MG: 10 TABLET ORAL at 08:42

## 2022-03-31 RX ADMIN — FLUTICASONE FUROATE AND VILANTEROL TRIFENATATE 1 PUFF: 200; 25 POWDER RESPIRATORY (INHALATION) at 08:50

## 2022-03-31 RX ADMIN — PREDNISONE 30 MG: 20 TABLET ORAL at 09:32

## 2022-03-31 RX ADMIN — UMECLIDINIUM 1 PUFF: 62.5 AEROSOL, POWDER ORAL at 08:50

## 2022-03-31 NOTE — SPEECH THERAPY NOTE
Speech Language/Pathology     Speech/Language Pathology Progress Note     Patient Name: Martha CASTANEDA Date: 3/31/2022     Problem List  Principal Problem:    Aspiration pneumonia ([facility] [address] )  Active Problems:    Parkinson's disease ([facility] [address] )    Type 2 diabetes mellitus ([facility] [address] )    ANG[facility]    Essential hypertension    Other emphysema ([facility] )    Dysphagia        Recommendations:   Diet: mechanically altered/level 2 diet and thin liquids/ consider downgrade to puree/NTL if difficulties persist / pt unable to follow strategies   Meds: crushed with puree   Feeding Assistance: Close monitoring and assistance   Frequent Oral care: 2-4x/day  Aspiration precautions and compensatory swallowing strategies: upright posture, only feed when fully alert, slow rate of feeding, small bites/sips and alternating bites and sips  Other Recommendations/ considerations: Suggest aspiration precautions       Subjective:  Patient received awake and alert, self-feeding breakfast      Previous/current diet: Dys2/thin though pt was given nectar per PCA (unaware of order change)    Objective: The following consistencies were tested puree solids, mech soft, nectar, thin  Patient presents with adequate bolus containment, manipulation and control  Mastication judged to be efficient on consistencies tested (ie moistened/ground solids)   Cough x1 which PCA, present, states pt has been doing all along and is typical   RN confirms recent dose of Mucinex and frequent coughing in the absence of oral intake  Assessment:  Oropharyngeal swallow function appears unchanged from findings on recent VBS  Tolerated mech/moistened soft and puree solids, as well as thin and nectar thick liquids by controlled cup sip  No overt s/s of aspiration or distress  Of note, pt reports to 'prefer' thickened liquids  Unclear if this is 2/2 temp vs  Comfort when swallowing    Regardless, continue to monitor and suggest trials of both at next level of care  Plan: Will continue follow as indicated  F/u at next level of care for ongoing pt/family edu and implementation of safe feeding strategies and guidelines should pt wish to continue on thin liquid/dys 2 diet       [address], [address] [facility]  Speech-Language Pathologist  [place] #[id number]  [place] #[id number]

## 2022-03-31 NOTE — RESPIRATORY THERAPY NOTE
RT Protocol Note  Gavalentina Slice 81 y o  male MRN: [id number]  Unit/Bed#: -01 Encounter: [id number]    Assessment    Principal Problem:    Pneumonia  Active Problems:    Parkinson's disease ([place] [address] )    Type 2 diabetes mellitus ([place] [address] )    ANGELA (acute kidney injury) ([place] [address] )    Essential hypertension    Other emphysema ([place] [address] )    Dysphagia      Home Pulmonary Medications:    Home Devices/Therapy:  (albuterol prn, wixela, singulair, xopenex, spiriva)    Past Medical History:   Diagnosis Date    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) ([place] [address] )     Allergic rhinitis     last assessed 2013    Aortic regurgitation     Arm laceration     Asthma     Bronchitis, chronic obstructive, with exacerbation ([place] [address] )     last assessed 2015    Candidal intertrigo     Chronic obstructive lung disease ([place] [address] )     last assessed [id number]    COPD [facility]ch[facility] obstructive pulmonary disease) [facility]     Coronary artery disease     carotid stents    Cough     CVA (cerebral vascular accident) ([place] [address] )     Dermatitis     Diabetes mellitus type 2, uncomplicated ([place] [address] )     controlled    Dysthymic disorder     Fatigue     Hyperlipidemia     Hypertension     Neurologic gait dysfunction     Parkinson's disease ([place] [address] )     Pneumonia     PVD (peripheral vascular disease) ([place] [address] )     Seborrheic keratosis     TIA (transient ischemic attack)     Tinea corporis     Tinea pedis of both feet     Tremor      Social History     Socioeconomic History    Marital status: /Civil Union     Spouse name: None    Number of children: None    Years of education: None    Highest education level: None   Occupational History    Occupation: retired   Tobacco Use    Smoking status: Former Smoker     Packs/day: 1 00     Years: 3 00     Pack years: 3 00     Types: Cigarettes     Start date:      Quit date:      Years since quittin 2    Smokeless tobacco: Never Used   Vaping Use    Vaping Use: Never used   Substance and Sexual Activity    Alcohol use: Not Currently    Drug use: No    Sexual activity: Not Currently   Other Topics Concern    None   Social History Narrative    Lives independently with spouse    No advance directives     Social Determinants of Health     Financial Resource Strain: Not on file   Food Insecurity: No Food Insecurity    Worried About Running Out of Food in the Last Year: Never true    Prerna of Food in the Last Year: Never true   Transportation Needs: No Transportation Needs    Lack of Transportation (Medical): No    Lack of Transportation (Non-Medical): No   Physical Activity: Not on file   Stress: Not on file   Social Connections: Not on file   Intimate Partner Violence: Not on file   Housing Stability: Low Risk     Unable to Pay for Housing in the Last Year: No    Number of Places Lived in the Last Year: 1    Unstable Housing in the Last Year: No       Subjective         Objective    Physical Exam:   Assessment Type: Assess only  General Appearance: Sleeping  Respiratory Pattern: Normal  Chest Assessment: Chest expansion symmetrical  Bilateral Breath Sounds: Diminished,Clear    Vitals:  Blood pressure 142/61, pulse 73, temperature 98 1 °F (36 7 °C), resp  rate 18, height 5' 2" (1 575 m), weight 83 kg (182 lb 15 7 oz), SpO2 93 %  Imaging and other studies: I have personally reviewed pertinent reports  O2 Device: room air, none     Plan    Respiratory Plan: No distress/Pulmonary history        Resp Comments: pt was resting comfortably  no distress  spo2 95% on room air

## 2022-03-31 NOTE — PLAN OF CARE
Problem: Potential for Falls  Goal: Patient will remain free of falls  Description: INTERVENTIONS:  - Educate patient/family on patient safety including physical limitations  - Instruct patient to call for assistance with activity   - Consult OT/PT to assist with strengthening/mobility   - Keep Call bell within reach  - Keep bed low and locked with side rails adjusted as appropriate  - Keep care items and personal belongings within reach  - Initiate and maintain comfort rounds  - Make Fall Risk Sign visible to staff  - Offer Toileting every 2 Hours, in advance of need  - Initiate/Maintain bed/chair alarm  - Apply yellow socks and bracelet for high fall risk patients  - Consider moving patient to room near nurses station  3/31/2022 1202 by Leslie Elizabeth RN  Outcome: Adequate for Discharge  3/31/2022 1202 by Leslie Elizabeth RN  Outcome: Adequate for Discharge     Problem: PAIN - ADULT  Goal: Verbalizes/displays adequate comfort level or baseline comfort level  Description: Interventions:  - Encourage patient to monitor pain and request assistance  - Assess pain using appropriate pain scale  - Administer analgesics based on type and severity of pain and evaluate response  - Implement non-pharmacological measures as appropriate and evaluate response  - Consider cultural and social influences on pain and pain management  - Notify physician/advanced practitioner if interventions unsuccessful or patient reports new pain  3/31/2022 1202 by Leslie Elizabeth RN  Outcome: Adequate for Discharge  3/31/2022 1202 by Leslie Elizabeth RN  Outcome: Adequate for Discharge     Problem: INFECTION - ADULT  Goal: Absence or prevention of progression during hospitalization  Description: INTERVENTIONS:  - Assess and monitor for signs and symptoms of infection  - Monitor lab/diagnostic results  - Monitor all insertion sites, i e  indwelling lines, tubes, and drains  - Monitor endotracheal if appropriate and nasal secretions for changes in amount and color  - [place] appropriate cooling/warming therapies per order  - Administer medications as ordered  - Instruct and encourage patient and family to use good hand hygiene technique  - Identify and instruct in appropriate isolation precautions for identified infection/condition  3/31/2022 1202 by Jalil Bauer RN  Outcome: Adequate for Discharge  3/31/2022 1202 by Jalil Bauer RN  Outcome: Adequate for Discharge  Goal: Absence of fever/infection during neutropenic period  Description: INTERVENTIONS:  - Monitor WBC    3/31/2022 1202 by Jalil Bauer RN  Outcome: Adequate for Discharge  3/31/2022 1202 by Jalil Bauer RN  Outcome: Adequate for Discharge     Problem: SAFETY ADULT  Goal: Patient will remain free of falls  Description: INTERVENTIONS:  - Educate patient/family on patient safety including physical limitations  - Instruct patient to call for assistance with activity   - Consult OT/PT to assist with strengthening/mobility   - Keep Call bell within reach  - Keep bed low and locked with side rails adjusted as appropriate  - Keep care items and personal belongings within reach  - Initiate and maintain comfort rounds  - Make Fall Risk Sign visible to staff  - Offer Toileting every 2 Hours, in advance of need  - Initiate/Maintain bed/chair alarm  - Apply yellow socks and bracelet for high fall risk patients  - Consider moving patient to room near nurses station  3/31/2022 1202 by Jalil Bauer RN  Outcome: Adequate for Discharge  3/31/2022 1202 by Jalil Bauer RN  Outcome: Adequate for Discharge  Goal: Maintain or return to baseline ADL function  Description: INTERVENTIONS:  -  Assess patient's ability to carry out ADLs; assess patient's baseline for ADL function and identify physical deficits which impact ability to perform ADLs (bathing, care of mouth/teeth, toileting, grooming, dressing, etc )  - Assess/evaluate cause of self-care deficits   - Assess range of motion  - Assess patient's mobility; develop plan if impaired  - Assess patient's need for assistive devices and provide as appropriate  - Encourage maximum independence but intervene and supervise when necessary  - Involve family in performance of ADLs  - Assess for home care needs following discharge   - Consider OT consult to assist with ADL evaluation and planning for discharge  - Provide patient education as appropriate  3/31/2022 1202 by Jalil Bauer RN  Outcome: Adequate for Discharge  3/31/2022 1202 by Jalil Bauer RN  Outcome: Adequate for Discharge  Goal: Maintains/Returns to pre admission functional level  Description: INTERVENTIONS:  - Perform BMAT or MOVE assessment daily    - Set and communicate daily mobility goal to care team and patient/family/caregiver  - Collaborate with rehabilitation services on mobility goals if consulted  - Perform Range of Motion 4 times a day  - Reposition patient every 2 hours    - Dangle patient 3 times a day  - Stand patient 3 times a day  - Ambulate patient 3 times a day  - Out of bed to chair 3 times a day   - Out of bed for meals 3 times a day  - Out of bed for toileting  - Record patient progress and toleration of activity level   3/31/2022 1202 by Jalil Bauer RN  Outcome: Adequate for Discharge  3/31/2022 1202 by Jalil Bauer RN  Outcome: Adequate for Discharge     Problem: DISCHARGE PLANNING  Goal: Discharge to home or other facility with appropriate resources  Description: INTERVENTIONS:  - Identify barriers to discharge w/patient and caregiver  - Arrange for needed discharge resources and transportation as appropriate  - Identify discharge learning needs (meds, wound care, etc )  - Arrange for interpretive services to assist at discharge as needed  - Refer to Case Management Department for coordinating discharge planning if the patient needs post-hospital services based on physician/advanced practitioner order or complex needs related to functional status, cognitive ability, or social support system  3/31/2022 1202 by Óscar Alexis RN  Outcome: Adequate for Discharge  3/31/2022 1202 by Óscar Alexis RN  Outcome: Adequate for Discharge     Problem: Knowledge Deficit  Goal: Patient/family/caregiver demonstrates understanding of disease process, treatment plan, medications, and discharge instructions  Description: Complete learning assessment and assess knowledge base    Interventions:  - Provide teaching at level of understanding  - Provide teaching via preferred learning methods  3/31/2022 1202 by Óscar Alexis RN  Outcome: Adequate for Discharge  3/31/2022 1202 by Óscar Alexis RN  Outcome: Adequate for Discharge     Problem: MOBILITY - ADULT  Goal: Maintain or return to baseline ADL function  Description: INTERVENTIONS:  -  Assess patient's ability to carry out ADLs; assess patient's baseline for ADL function and identify physical deficits which impact ability to perform ADLs (bathing, care of mouth/teeth, toileting, grooming, dressing, etc )  - Assess/evaluate cause of self-care deficits   - Assess range of motion  - Assess patient's mobility; develop plan if impaired  - Assess patient's need for assistive devices and provide as appropriate  - Encourage maximum independence but intervene and supervise when necessary  - Involve family in performance of ADLs  - Assess for home care needs following discharge   - Consider OT consult to assist with ADL evaluation and planning for discharge  - Provide patient education as appropriate  3/31/2022 1202 by Óscar Alexis RN  Outcome: Adequate for Discharge  3/31/2022 1202 by Óscar Aleixs RN  Outcome: Adequate for Discharge  Goal: Maintains/Returns to pre admission functional level  Description: INTERVENTIONS:  - Perform BMAT or MOVE assessment daily    - Set and communicate daily mobility goal to care team and patient/family/caregiver  - Collaborate with rehabilitation services on mobility goals if consulted  - Perform Range of Motion 4 times a day  - Reposition patient every 2 hours  - Dangle patient 3 times a day  - Stand patient 3 times a day  - Ambulate patient 3 times a day  - Out of bed to chair 3 times a day   - Out of bed for meals 3 times a day  - Out of bed for toileting  - Record patient progress and toleration of activity level   3/31/2022 1202 by Leslie Elizabeth RN  Outcome: Adequate for Discharge  3/31/2022 1202 by Leslie Elizabeth RN  Outcome: Adequate for Discharge     Problem: Prexisting or High Potential for Compromised Skin Integrity  Goal: Skin integrity is maintained or improved  Description: INTERVENTIONS:  - Identify patients at risk for skin breakdown  - Assess and monitor skin integrity  - Assess and monitor nutrition and hydration status  - Monitor labs   - Assess for incontinence   - Turn and reposition patient  - Assist with mobility/ambulation  - Relieve pressure over bony prominences  - Avoid friction and shearing  - Provide appropriate hygiene as needed including keeping skin clean and dry  - Evaluate need for skin moisturizer/barrier cream  - Collaborate with interdisciplinary team   - Patient/family teaching  - Consider wound care consult   3/31/2022 1202 by Leslie Elizabeth RN  Outcome: Adequate for Discharge  3/31/2022 1202 by Leslie Elizabeth RN  Outcome: Adequate for Discharge     Problem: SLP ADULT - SWALLOWING, IMPAIRED  Goal: Advance to least restrictive diet without signs or symptoms of aspiration for planned discharge setting  See evaluation for individualized goals  Description: Patient will:    Outcome: Adequate for Discharge     Problem: PHYSICAL THERAPY ADULT  Goal: Performs mobility at highest level of function for planned discharge setting  See evaluation for individualized goals    Description: Treatment/Interventions: Functional transfer training,LE strengthening/ROM,Therapeutic exercise,Endurance training,Patient/family training,Equipment eval/education,Bed mobility,Gait training,Spoke to nursing,Continued evaluation,OT,Family,Spoke to MD  Equipment Recommended: Marilee Thakur (RW)       See flowsheet documentation for full assessment, interventions and recommendations  Outcome: Adequate for Discharge     Problem: OCCUPATIONAL THERAPY ADULT  Goal: Performs self-care activities at highest level of function for planned discharge setting  See evaluation for individualized goals  Description: Treatment Interventions: ADL retraining,Functional transfer training,UE strengthening/ROM,Endurance training,Cognitive reorientation,Patient/family training,Equipment evaluation/education,Compensatory technique education,Continued evaluation,Energy conservation,Activityengagement          See flowsheet documentation for full assessment, interventions and recommendations  Outcome: Adequate for Discharge     Problem: Nutrition/Hydration-ADULT  Goal: Nutrient/Hydration intake appropriate for improving, restoring or maintaining nutritional needs  Description: Monitor and assess patient's nutrition/hydration status for malnutrition  Collaborate with interdisciplinary team and initiate plan and interventions as ordered  Monitor patient's weight and dietary intake as ordered or per policy  Utilize nutrition screening tool and intervene as necessary  Determine patient's food preferences and provide high-protein, high-caloric foods as appropriate       INTERVENTIONS:  - Monitor oral intake, urinary output, labs, and treatment plans  - Assess nutrition and hydration status and recommend course of action  - Evaluate amount of meals eaten  - Assist patient with eating if necessary   - Allow adequate time for meals  - Recommend/ encourage appropriate diets, oral nutritional supplements, and vitamin/mineral supplements  - Order, calculate, and assess calorie counts as needed  - Recommend, monitor, and adjust tube feedings and TPN/PPN based on assessed needs  - Assess need for intravenous fluids  - Provide specific nutrition/hydration education as appropriate  - Include patient/family/caregiver in decisions related to nutrition  3/31/2022 1202 by Collin Harris RN  Outcome: Adequate for Discharge  3/31/2022 1202 by Collin Harris RN  Outcome: Adequate for Discharge

## 2022-03-31 NOTE — DISCHARGE SUMMARY
Discharge Summary - Tonja Marrero 81 y o  male MRN: [id number]  Unit/Bed#: -01 Encounter: [id number]    Admission Date:    3/28/2022   Discharge Date:   03/31/22   Admitting Diagnosis:   Chest pain [R07 9]  Pneumonia [J18 9]  Admitting Provider:   Gallo Apodaca MD  Discharge Provider:   Raimundo Little MD     Primary Care Physician at Discharge:   Raimundo Little MD,[phone]    HPI:  From history and physical by Dr Cid Verona is a 81 y o  male who presents with chest pains  Patient has history of Parkinson's dementia, hypertension, COPD, and diabetes  Family at bedside reports the patient has been struggling some with his swallow while eating  States today he noted to have chest pain clenching his chest, with cough that was productive  Patient did have increased white count on admission, chest x-ray reviewed by me showing infiltrates likely aspiration as source  Family at bedside, discussed code status and reports that he is a do not resuscitate, do not intubate  Discussed that he may need feeding tube, family has not discussed that  They have discussed regarding hospice but not come to a decision  No fevers noted, with no other complaints  Procedures Performed:   No orders of the defined types were placed in this encounter  Hospital Course:   Left lower lobe pneumonia presumably on the basis of aspiration given prandial cough  White blood cell count 22 9 on admission, normalized throughout the hospital course  Patient remained afebrile  He was treated initially with Rocephin and on hospital day 2 Flagyl was added due to presumed aspiration  Breath sounds remained rhonchorous throughout the hospitalization despite his outpatient dose of prednisone 10 mg daily therefore on the day of discharge he was treated with 40 mg of prednisone with a gradual taper back to 10 mg over 6 days taking 30 mg for 3 days and 20 mg for 3 days    He will receive 3 more days of Vantin and Flagyl and have close outpatient follow-up  Bronchodilators will remain as prior to hospitalization  Chest x-ray will need to be followed up in 4-6 weeks to assure resolution  Oral pharyngeal dysphagia-patient was evaluated by speech therapy, originally placed on nectar thick liquids with pureed diet then downgraded to thin liquids with mechanical soft diet based upon unremarkable video fluoroscopy  However on the day of discharge he was re-evaluated by speech therapy who felt he did better with nectar thick liquids  Acute kidney injury-admission creatinine 1 82  He received IV fluids and creatinine improved back to baseline  Kidney injury was felt secondary to volume depletion related to pneumonia  Type 2 diabetes mellitus is stable with diet control with A1c 6 6  He received sliding scale insulin while hospitalized  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-Seroquel was held on the 1st night due to concerns by family that this was actually causing him more agitation and hallucinations  His daughter, Gema Nguyen spent the night with him in noted significantly more agitation and restlessness without the Seroquel  This did improve when he was placed back on 75 mg of Seroquel and he was noted to have slept better through the night  Condom catheter was also employed throughout the hospitalization which was beneficial to the patient  Parkinson's disease remained stable on outpatient regimen  Patient was evaluated by Physical therapy and Occupational therapy, both agreed that he would benefit from subacute inpatient rehab however patient and family declined, wanting to take him home with home health      Current Facility-Administered Medications:     acetaminophen (TYLENOL) tablet 650 mg, 650 mg, Oral, Q6H PRN, oJnes Loaiza MD    atorvastatin (LIPITOR) tablet 10 mg, 10 mg, Oral, Daily, Jones Loaiza MD, 10 mg at 03/31/22 0843    carbidopa-levodopa (SINEMET)  mg per tablet 1 tablet, 1 tablet, Oral, TID, Viky Pierce MD, 1 tablet at 03/31/22 0843    ceftriaxone (ROCEPHIN) 1 g/50 mL in dextrose IVPB, 1,000 mg, Intravenous, Q24H, Viky Pierce MD, Last Rate: 100 mL/hr at 03/30/22 1815, 1,000 mg at 03/30/22 1815    famotidine (PEPCID) tablet 20 mg, 20 mg, Oral, BID, Viky Pierce MD, 20 mg at 03/31/22 0842    finasteride (PROSCAR) tablet 5 mg, 5 mg, Oral, QAM, Viky Pierce MD, 5 mg at 03/31/22 0843    fluticasone-vilanterol (BREO ELLIPTA) 200-25 MCG/INH inhaler 1 puff, 1 puff, Inhalation, Daily, Viky Pierce MD, 1 puff at 03/31/22 0850    guaiFENesin (MUCINEX) 12 hr tablet 600 mg, 600 mg, Oral, Q12H [address], Viky Pierce MD, 600 mg at 03/31/22 0843    heparin (porcine) subcutaneous injection 5,000 Units, 5,000 Units, Subcutaneous, Q8H [address], 5,000 Units at 03/31/22 0636 **AND** [CANCELED] Platelet count, , , Once, Viky Pierce MD    insulin lispro (HumaLOG) 100 units/mL subcutaneous injection 1-5 Units, 1-5 Units, Subcutaneous, TID AC, 1 Units at 03/30/22 1659 **AND** Fingerstick Glucose (POCT), , , TID AC, Tahir Solis MD    [facility]) inhalation solution 0 63 mg, 0 63 mg, Nebulization, Q8H PRN, Viky Pierce MD    melatonin tablet 3 mg, 3 mg, Oral, HS, Viky Pierce MD, 3 mg at 03/30/22 2206    metroNIDAZOLE (FLAGYL) tablet 500 mg, 500 mg, Oral, Q8H [address], Baruch Bamberger, MD, 500 mg at 03/31/22 0636    montelukast (SINGULAIR) tablet 10 mg, 10 mg, Oral, HS, Viky Pierce MD, 10 mg at 03/30/22 2206    OLANZapine (ZyPREXA) tablet 2 5 mg, 2 5 mg, Oral, Q6H PRN, Viky Pierce MD    ondansetron [facility]) injection 4 mg, 4 mg, Intravenous, Q6H PRN, Viky Pierce MD    pramipexole (MIRAPEX) tablet 0 5 mg, 0 5 mg, Oral, TID, Viky Pierce MD, 0 5 mg at 03/31/22 6184    predniSONE tablet 10 mg, 10 mg, Oral, Daily, Viky Pierce MD, 10 mg at 03/31/22 0842    QUEtiapine (SEROquel) tablet 75 mg, 75 mg, Oral, HS, Baruch Bamberger, MD, 75 mg at 03/30/22 2205    rivastigmine (EXELON) capsule 4 5 mg, 4 5 mg, Oral, BID, Janis Don MD, 4 5 mg at 03/31/22 1490    umeclidinium bromide (INCRUSE ELLIPTA) 62 5 mcg/inh inhaler AEPB 1 puff, 1 puff, Inhalation, Daily, Janis Don MD, 1 puff at 03/31/22 8024      Consulting Providers   None    Significant Findings, Care, Treatment and Services Provided:   As above    Complications:   None  Physical Exam:  General Appearance:    Alert, cooperative, no distress   Head:    Normocephalic, without obvious abnormality, atraumatic   Eyes:    PERRL, conjunctiva/corneas clear, EOM's intact       Nose:   Moist mucous membranes, no drainage or sinus tenderness   Throat:   No tenderness, no exudates   Neck:   Supple, symmetrical, trachea midline, no JVD   Lungs:     Coarse rhonchi throughout with prolonged expiratory phase respirations unlabored   Heart:    Regular rate and rhythm, S1 and S2 normal, no murmur, rub   or gallop   Abdomen: Soft, non-tender, positive bowel sounds, no masses, no organomegaly   Extremities:  No pedal edema, calf tenderness  Distal pulses palpable  Neurologic:     CNII-XII intact      Labs:   Lab Results   Component Value Date    WBC 9 13 03/31/2022    WBC 9 45 12/10/2015    RBC 3 99 03/31/2022    RBC 4 52 12/10/2015    HGB 12 0 03/31/2022    HGB 13 7 12/10/2015    HCT 37 7 03/31/2022    HCT 41 6 12/10/2015    MCV 95 03/31/2022    MCV 92 12/10/2015    MCH 30 1 03/31/2022    MCH 30 3 12/10/2015    RDW 13 2 03/31/2022    RDW 13 4 12/10/2015     03/31/2022     12/10/2015     Lab Results   Component Value Date    CREATININE 1 32 (H) 03/31/2022    CREATININE 0 98 12/10/2015    BUN 31 (H) 03/31/2022    BUN 21 12/10/2015     12/10/2015    K 3 7 03/31/2022    K 4 3 12/10/2015     03/31/2022     (H) 12/10/2015    CO2 25 03/31/2022    CO2 27 12/10/2015    GLUCOSE 94 12/10/2015    PROT 8 0 12/10/2015    ALKPHOS 76 03/28/2022    ALKPHOS 104 12/10/2015    ALT 12 03/28/2022 ALT 34 12/10/2015    AST 21 03/28/2022    AST 22 12/10/2015    BILIDIR 0 18 11/23/2021    BILIDIR 0 13 12/10/2015         Discharge Diagnosis:   Principal Problem:    Aspiration pneumonia ([facility] )  Active Problems:    Parkinson's disease ([facility] )    Type 2 diabetes mellitus ([facility] )    ANGELA (acute kidney injury) ([place] [address] )    Essential hypertension    Other emphysema ([place] [address] )    Dysphagia  Resolved Problems:    * No resolved hospital problems  *      Condition at Discharge:   Good     Code Status: Level 3 - DNAR and DNI  Advance Directive and Living Will: <no information>  Power of :    POLST:      Discharge instructions/Information to patient and family:   See after visit summary for information provided to patient and family  Provisions for Follow-Up Care:  See after visit summary for information related to follow-up care and any pertinent home health orders  Disposition:   Home    Planned Readmission:   No    Discharge Statement   I spent 42 minutes discharging the patient  This time was spent on the day of discharge  I had direct contact with the patient on the day of discharge  Additional documentation is required if more than 30 minutes were spent on discharge  Greater than 50% of the total time was spent examining patient, answering all patient questions, arranging and discussing plan of care with patient as well as directly providing post-discharge instructions  Additional time then spent on discharge activities  Discharge Medications:  See after visit summary for reconciled discharge medications provided to patient and family        Michelle Allan MD  3/31/2022,9:38 AM

## 2022-03-31 NOTE — CASE MANAGEMENT
Case Management Discharge Planning Note    Patient name Aldo Balderas  Location /-78 MRN [id number]  : 1935 Date 3/31/2022       Current Admission Date: 3/28/2022  Current Admission Diagnosis:Aspiration pneumonia [facility])   Patient Active Problem List    Diagnosis Date Noted    Other emphysema ([facility] ) 2022    Dysphagia 2022    Cough 2021    Shortness of breath 2021    Mood disorder ([facility] ) 2021    Subdural hematoma ([facility] ) 2021    Fall 10/28/2020    Community acquired pneumonia of right lower lobe of lung 10/26/2020    Dementia due to Parkinson's disease with behavioral disturbance ([facility] ) 10/09/2020    Subclinical hypothyroidism 09/15/2020    Meningioma ([facility] ) 2020    Benign neoplasm of supratentorial region of brain ([facility] ) 2020    Ambulatory dysfunction 2019    ANGELA (acute kidney injury) ([facility] ) 2019    Essential hypertension 2019    Gross hematuria 2019    H/O burning pain in leg 2018    Carotid artery disease ([facility] ) 2018    Varicose 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 2018    Long term (current) use of systemic steroids 2018    Aspiration pneumonia ([facility] ) 2017    Parkinson's disease ([facility] ) 2016    Neurologic gait dysfunction 2016    Cerebrovascular disease 2016    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) ([facility] ) 2016    Peripheral vascular disease ([facility] ) 2015    Aortic regurgitation 2015    Allergic asthma 2014    Hypertension 2014    COPD with acute exacerbation ([facility] ) 2014    Allergic rhinitis 2014    Hyperlipidemia 2014    Type 2 diabetes mellitus ([facility] ) 2014      LOS (days): 3  Geometric Mean LOS (GMLOS) (days): 4 00  Days to GMLOS:1 3     OBJECTIVE:  Risk of Unplanned Readmission Score: 26         Current admission status: Inpatient   Preferred Pharmacy:   [facility]/pharmacy [id number] - [place], [address] [facility] [address]  Phone: [phone] Fax: [phone]    Primary Care Provider: Mandi Holcomb MD    Primary Insurance: Phillip YUNG  Secondary Insurance:     DISCHARGE DETAILS:    Discharge planning discussed with[de-identified] patient and daughter Alma Garcia at bedside  Freedom of Choice: Yes  Comments - Freedom of Choice: CM met with patient and daughter Alma Garcia at bedside as he's written for dc home today  Patient's daughter Alma Garcia reports she's decided against the maria luz at this time and just wants a transport chair; she already spoke with [facility]  She stated that as long as patient is discharged by 12 pm, family will transport home today bc she'll have extra support  CM spoke with RN and she'll plan to dc around 11 am  CM called [facility] and spoke with Jose E Urban to inform re: dc today and faxed AVS to [phone]  CM contacted family/caregiver?: Yes  Were Treatment Team discharge recommendations reviewed with patient/caregiver?: Yes  Did patient/caregiver verbalize understanding of patient care needs?: Yes  Were patient/caregiver advised of the risks associated with not following Treatment Team discharge recommendations?: Yes    Contacts  Patient Contacts: dtr Alma Garcia  Relationship to Patient[de-identified] Family  Contact Method: In Person  Reason/Outcome: Continuity of Care,Referral,Discharge Planning        Other Referral/Resources/Interventions Provided:  Interventions: [facility]  Referral Comments: [facility] is aware that patient will dc today  AVS faxed to [phone]       Treatment Team Recommendation: Short Term Rehab  Discharge Destination Plan[de-identified] Home with [facility] ([facility] for SN/PT/OT/ST)  Transport at Discharge : Automobile,Family        ETA of Transport (Date): 03/31/22  ETA of Transport (Time): 1100

## 2022-04-01 ENCOUNTER — TELEPHONE (OUTPATIENT)
Dept: INTERNAL MEDICINE CLINIC | Facility: CLINIC | Age: 87
End: 2022-04-01

## 2022-04-01 DIAGNOSIS — J45.909 UNCOMPLICATED ASTHMA, UNSPECIFIED ASTHMA SEVERITY, UNSPECIFIED WHETHER PERSISTENT: ICD-10-CM

## 2022-04-01 RX ORDER — MONTELUKAST SODIUM 10 MG/1
TABLET ORAL
Qty: 90 TABLET | Refills: 3 | Status: SHIPPED | OUTPATIENT
Start: 2022-04-01

## 2022-04-01 RX ORDER — PREDNISONE 10 MG/1
TABLET ORAL
Qty: 30 TABLET | Refills: 5 | Status: SHIPPED | OUTPATIENT
Start: 2022-04-01 | End: 2022-05-17 | Stop reason: SDUPTHER

## 2022-04-01 RX ORDER — ALBUTEROL SULFATE 2.5 MG/3ML
SOLUTION RESPIRATORY (INHALATION)
Qty: 75 ML | Refills: 0 | Status: SHIPPED | OUTPATIENT
Start: 2022-04-01 | End: 2022-05-16 | Stop reason: SDUPTHER

## 2022-04-01 NOTE — TELEPHONE ENCOUNTER
[address] SPOKE WITH PATIENT AND HIS FAMILY TODAY,     THEY ARE REQUESTING TO BE SEEN BY HOSPICE    NEEDS HOSPICE EVAL AND TREAT ORDER PLEASE FAX # [phone]

## 2022-04-01 NOTE — UTILIZATION REVIEW
Notification of Discharge   This is a Notification of Discharge from our facility [address]  Please be advised that this patient has been discharge from our facility  Below you will find the admission and discharge date and time including the patients disposition  UTILIZATION REVIEW CONTACT:  Leigha Lester  Utilization   Network Utilization Review Department  Phone: [phone] x carefully listen to the prompts  All voicemails are confidential   Email: [email or web address]     PHYSICIAN ADVISORY SERVICES:  FOR MACV-CB-FUCG REVIEW - MEDICAL NECESSITY DENIAL  Phone: [phone]  Fax: [phone]  Email: [email or web address]     PRESENTATION DATE: 3/28/2022  1:44 PM  OBERVATION ADMISSION DATE:   INPATIENT ADMISSION DATE: 3/28/22  4:55 PM   DISCHARGE DATE: 3/31/2022 12:30 PM  DISPOSITION: Home with [facility]      IMPORTANT INFORMATION:  Send all requests for admission clinical reviews, approved or denied determinations and any other requests to dedicated fax number below belonging to the campus where the patient is receiving treatment   List of dedicated fax numbers:  [address] DENIALS (Administrative/Medical Necessity) [phone]   [address] (Maternity/NICU/Pediatrics) [phone]   [facility] [phone]   [address] [facility] [phone]   [address] [place] [address] [phone]   [address] [place] [address] [address] [place] [address],[address] [phone]   [facility]  [phone]   [address] [facility]  [address] [facility] [phone]

## 2022-04-02 LAB
BACTERIA BLD CULT: NORMAL
BACTERIA BLD CULT: NORMAL

## 2022-04-04 ENCOUNTER — APPOINTMENT (OUTPATIENT)
Dept: PULMONOLOGY | Facility: CLINIC | Age: 87
End: 2022-04-04
Payer: MEDICARE

## 2022-04-04 VITALS — HEART RATE: 66 BPM | DIASTOLIC BLOOD PRESSURE: 80 MMHG | OXYGEN SATURATION: 96 % | SYSTOLIC BLOOD PRESSURE: 152 MMHG

## 2022-04-04 PROCEDURE — 99213 OFFICE O/P EST LOW 20 MIN: CPT

## 2022-04-04 NOTE — DISCUSSION/SUMMARY
[FreeTextEntry1] : Improved dry cough likely secondary to a combination of asthma and postnasal drip.

## 2022-04-04 NOTE — TELEPHONE ENCOUNTER
Seeing you virtual tomorrow  Home care rec'd hospice but didn't explain the no-hospital deal   They talked it over over the weekend and I called them back this evening and they are not going with hospice at this time  Obviously, there will be ongoing discussions

## 2022-04-04 NOTE — ASSESSMENT
[FreeTextEntry1] : Continue Flonase nasal spray \par Start Singulair\par Continue Advair HFA\par Albuterol HFA as needed\par Check pulmonary function test for follow-up in 1 month.

## 2022-04-05 ENCOUNTER — TELEPHONE (OUTPATIENT)
Dept: INTERNAL MEDICINE CLINIC | Facility: CLINIC | Age: 87
End: 2022-04-05

## 2022-04-06 DIAGNOSIS — J42 CHRONIC BRONCHITIS, UNSPECIFIED CHRONIC BRONCHITIS TYPE (HCC): ICD-10-CM

## 2022-04-06 RX ORDER — ALBUTEROL SULFATE 90 UG/1
AEROSOL, METERED RESPIRATORY (INHALATION)
Qty: 1 G | Refills: 11 | Status: SHIPPED | OUTPATIENT
Start: 2022-04-06

## 2022-04-06 NOTE — TELEPHONE ENCOUNTER
MSW reviewed chart  MSW noted that patient was discharged to home on 3/31 to family's care with home health care services (despite recommendation for short term rehab)  MSW also noted that there was a request for a hospice evaluate and treat order to the PCP office, but that family changed their mind  MSW reached out to patient's daughter, Santosh Quarles, to follow-up at [phone]  Santosh Quarles stated that someone is coming out from the [employer] on Aging on Friday to do an assessment between 11AM-1PM, but that she does not have the name  MSW advised that it may be the [place] coming out, but that this writer will try to verify that and will update her as able  Santosh Quarles stated the person called her to schedule, that they did not have her name on file, but they added same  MSW phoned the [facility] on Aging at [phone] and spoke with Taras Shorten  She stated that patient does have an assigned casework - Reece - but that she cannot see if it is Reece's schedule to confirm if it her that is doing the home visit  MSW was transferred to [employer]  MSW left a message requesting callback  Awaiting same  MSW then phoned the [place] at [phone]  MSW spoke with Mónica Shook who advised that patient's home assessment on 4/4 is with Fam Garcia from the [place]  Mónica Shook provided her direct number as [phone]  Mónica Shook confirmed that Santosh Quarles is an authorized representative on patient's account  MSW asked if Santosh Quarles would need to be on the line to get further information on patient's previous applications  Mónica Shook was willing to speak with this writer directly  MSW inquired if patient was ever denied for the Waiver in the past  Mónica Shook stated that there was 2 previous applications - one on 3/5/71 and 3/18/20 - but that they were not even able to complete assessments so the cases were closed (not denied)   Mónica Shook stated that the new application was opened by the [place] on 3/4/22 after they received the RENALDO from the [facility] on [address] stated that this current application is "the furthest that the [place] has gotten" with patient's case to date  MSW phoned [place] du sac at [phone] to make her aware of above  MSW explained that patient/family must proceed with the [place] and complete all of the steps to determine if patient will be eligible for services  MSW did advise that the [place] Waiver offers the most in home services for patients living in [place]  MSW advised that only if patient is denied by the [place] that a referral can then be made to the Aging office for their programs (which will not be able to offer as much assistance)  MSW did advise that the [place] Waiver application process can take about 3 months as they need to determine his eligibility based on his level of care as well as his financial eligibility  Bast[place] du sac verbalized understanding  MSW will follow-up with [place] du sac on 4/11 to ensure that the home assessment was completed  B[place]rop du sac was in agreement with this plan

## 2022-04-07 ENCOUNTER — TELEMEDICINE (OUTPATIENT)
Dept: INTERNAL MEDICINE CLINIC | Facility: CLINIC | Age: 87
End: 2022-04-07
Payer: COMMERCIAL

## 2022-04-07 DIAGNOSIS — J69.0 ASPIRATION PNEUMONIA OF LEFT LOWER LOBE, UNSPECIFIED ASPIRATION PNEUMONIA TYPE (HCC): ICD-10-CM

## 2022-04-07 DIAGNOSIS — E11.9 TYPE 2 DIABETES MELLITUS WITHOUT COMPLICATION, WITHOUT LONG-TERM CURRENT USE OF INSULIN (HCC): ICD-10-CM

## 2022-04-07 DIAGNOSIS — J18.9 COMMUNITY ACQUIRED PNEUMONIA OF RIGHT LOWER LOBE OF LUNG: ICD-10-CM

## 2022-04-07 DIAGNOSIS — F02.81 DEMENTIA DUE TO PARKINSON'S DISEASE WITH BEHAVIORAL DISTURBANCE (HCC): ICD-10-CM

## 2022-04-07 DIAGNOSIS — G20 DEMENTIA DUE TO PARKINSON'S DISEASE WITH BEHAVIORAL DISTURBANCE (HCC): ICD-10-CM

## 2022-04-07 DIAGNOSIS — I87.2 VENOUS INSUFFICIENCY: ICD-10-CM

## 2022-04-07 DIAGNOSIS — R13.12 OROPHARYNGEAL DYSPHAGIA: Primary | ICD-10-CM

## 2022-04-07 DIAGNOSIS — R26.9 NEUROLOGIC GAIT DYSFUNCTION: ICD-10-CM

## 2022-04-07 PROCEDURE — 99496 TRANSJ CARE MGMT HIGH F2F 7D: CPT | Performed by: INTERNAL MEDICINE

## 2022-04-07 PROCEDURE — 1111F DSCHRG MED/CURRENT MED MERGE: CPT | Performed by: INTERNAL MEDICINE

## 2022-04-07 NOTE — PROGRESS NOTES
Virtual TCM Visit:    Verification of patient location:    Patient is located in the following state in which I hold an active license [place]        Assessment/Plan:        Problem List Items Addressed This Visit        Digestive    Dysphagia - Primary       Endocrine    Type 2 diabetes mellitus ([facility] )       Respiratory    Aspiration pneumonia ([facility] )    Community acquired pneumonia of right lower lobe of lung       Nervous and Auditory    Dementia due to Parkinson's disease with behavioral disturbance ([facility] )       Other    Neurologic gait dysfunction      Other Visit Diagnoses     Venous insufficiency             Patient advised to increase prednisone to 30 milligrams daily for 3 days then down to 20 milligrams daily for 3 days and then back to 10 milligrams daily  Continue other medications  Agree with mechanical soft diet and nectar thick liquids    We discussed end of life care and transition to hospice care and during does not feel patient is near that time and feels that she and her sisters can adequately care for him in the home    Parkinson's with gait dysfunction and dementia with behavioral disturbance-she has been giving 25 milligrams of Seroquel at 10 p m  and 50 milligrams at about 2 a m  and that seems to be quite helpful in keeping him in the bed and getting better sleep  There have been no falls stay or near falls since discharge      Patient has scheduled for in office well visit next month    Reason for visit is T cm    Encounter provider Julian Short MD       Provider located at [address] [place] [address]      Recent Visits  Date Type Provider Dept   04/05/22 Telephone [facility]   04/01/22 Telephone 800 [facility] recent visits within past 7 days and meeting all other requirements  Today's Visits  Date Type Provider Dept   04/07/22 Telemedicine Clotilde Quintana  [facility] today's visits and meeting all other requirements  Future Appointments  No visits were found meeting these conditions  Showing future appointments within next 150 days and meeting all other requirements       After connecting through [employer], the patient was identified by name and date of birth  Lady Hand was informed that this is a telemedicine visit and that the visit is being conducted through [employer] and patient was informed that this is a secure, HIPAA-compliant platform  He agrees to proceed     My office door was closed  No one else was in the room  He acknowledged consent and understanding of privacy and security of the video platform  The patient has agreed to participate and understands they can discontinue the visit at any time  Patient is aware this is a billable service  Subjective:     Patient ID: Lady Hand is a 81 y o  male  Patient was discharged March 31st after 3 day admission for aspiration pneumonia  He is accompanied by his daughter, Bakari Gomez for the T cm  T cm is virtual because patient has significant ambulatory dysfunction and is considerable burden to get him out of the house  Daughter Bakari Gomez gives most of the history  In reviewing his medications, he did not take the prednisone taper as prescribed and has continued on his 10 milligrams daily  He continues to have some cough and congestion but no fevers, chills or shortness of breath  Bakari Gomez is concerned with some right greater than left lower extremity swelling that is typical for him by the end of the day but usually resolves overnight and this morning it did not  He is not having any calf pain  He continues with prandial cough and Bakari Gomez has ordered thick in her but it has not yet arrived        Home health nurse discussed hospice with patient and his daughters last week and they put in a call to me and I had subsequently discussed on April 1st with daughter, Jose A Machuca and again a follow-up conversation on April 4th  The home health nurse did not fully explain hospice and giving up Medicare benefit to hospice, especially missing in this discussion were the fact that patient could not return to the hospital while on hospice care at our follow-up discussion on the 4th patient and family decided that they were not ready for hospice  Review of Systems   Constitutional: Negative for appetite change, chills, diaphoresis, fatigue, fever and unexpected weight change  HENT: Negative for congestion, hearing loss and rhinorrhea  Eyes: Negative for visual disturbance  Respiratory: Positive for cough and shortness of breath  Negative for chest tightness and wheezing  Cardiovascular: Negative for chest pain, palpitations and leg swelling  Gastrointestinal: Negative for abdominal pain and blood in stool  Endocrine: Negative for cold intolerance, heat intolerance, polydipsia and polyuria  Genitourinary: Negative for difficulty urinating, dysuria, frequency and urgency  Musculoskeletal: Negative for arthralgias and myalgias  Skin: Negative for rash  Neurological: Negative for dizziness, weakness, light-headedness and headaches  Hematological: Does not bruise/bleed easily  Psychiatric/Behavioral: Negative for dysphoric mood and sleep disturbance  Objective: There were no vitals filed for this visit  Physical Exam  Constitutional:       Appearance: He is well-developed  HENT:      Head: Normocephalic and atraumatic  Pulmonary:      Effort: Pulmonary effort is normal    Musculoskeletal:      Right lower leg: Edema present  Left lower leg: Edema present  Comments: Bilateral pitting edema with well-demarcated sock line, right is slightly greater than left but they appear fairly symmetrical to me in there is no calf tenderness when [place] squeezes the calves     Neurological:      Mental Status: He is alert and oriented to person, place, and time  Psychiatric:         Behavior: Behavior normal  Behavior is cooperative  Thought Content: Thought content normal          Judgment: Judgment normal              Transitional Care Management Review:  Khadra Gonzalez is a 81 y o  male here for TCM follow up  During the TCM phone call patient stated:    TCM Call (since 3/7/2022)     Date and time call was made  4/1/2022  3:40 PM    Hospital care reviewed  Records reviewed        Patient was hospitialized at  [facility]        Date of Admission  03/28/22    Date of discharge  03/31/22    Diagnosis  Aspiration pneumonia     Disposition  Home; Home health services    Current Symptoms  Weakness    Weakness severity  Mild      TCM Call (since 3/7/2022)     Post hospital issues  Reduced activity    Should patient be enrolled in anticoag monitoring? No    Scheduled for follow up? Yes    Patients specialists  Pulmonlolgist    Pulmonologist name  Guadalupe Chapman MD    Pulmonologist contact #  [phone]    Endocrinologist name  [phone]    Did you obtain your prescribed medications  Yes  x    Do you need help managing your prescriptions or medications  No    Is transportation to your appointment needed  No    I have advised the patient to call PCP with any new or worsening symptoms  ClaudiaLPSHIVA    [address] [facility]    Do you have social support  Yes, as much as I need    Are you recieving home care services  Yes    Types of home care services  Nurse visit; Home PT  OT, Speech    Are you using any community resources  No    Current waiver services  No    Have you fallen in the last 12 months  Yes    Interperter language line needed  No    Counseling  Family    Counseling topics  patient and family education          I spent 23 minutes with the patient during this visit      Nichole aMria MD      VIRTUAL VISIT [address] [place] [address] verbally agrees to participate in Virtual Care Services  Pt is aware that [employer] could be limited without vital signs or the ability to perform a full hands-on physical Lizette Henok understands he or the provider may request at any time to terminate the video visit and request the patient to seek care or treatment in person

## 2022-04-09 DIAGNOSIS — R05.9 COUGH: ICD-10-CM

## 2022-04-11 RX ORDER — FAMOTIDINE 20 MG/1
TABLET, FILM COATED ORAL
Qty: 30 TABLET | Refills: 2 | Status: SHIPPED | OUTPATIENT
Start: 2022-04-11 | End: 2022-04-22

## 2022-04-11 NOTE — TELEPHONE ENCOUNTER
MSW phoned patients daughter Parul Leung this date at [phone]  MSW asked if patient had the [place] assessment on Friday as scheduled  Parul Leung stated that they did not and that she does not plan on following through with that  Parul Leung stated that she received a call on Thursday stating that she would be getting a call from the [address] [place] [address] who would be coming out to ask a few preliminary questions before the in-home visit  Parul Leung said she was fine receiving a call before the visit  Parul Leung stated that on Friday 4/8, she received a call from Zion English who prefaced her conversation by saying "I need to get these questions answered before I can come out or else well need to reschedule"  Parul Leung stated that she was very put off by this statement because it seemed as if Von [employer]win did not want to come out stating that she had a far drive  Parul Leung stated she continued to answer the questions to the best of her ability but that she was caring for both her mother and father at that time so it was difficult for her to fully engage in a conversation  Parul Gregorioernethy stated she answered several questions with yes or no answers and it one point Zion English asked her if patient has a job  Parul Leung stated she responded with "of course he does not have a job after all that Sandy told you I think he would know that"  Zion English then stated that she did not feel comfortable working with Parul Leung and the call ended  Parul Gregorioernethy stated she was then notified by her sister,  Oliver Maravilla, that [employer] can contacted her and told her that she would not be coming out to do the assessment as she was not comfortable with Parul Leung and did not need to put up with this  Parulmark GregorioXochitl stated that shes familiar with how the government works unless youre cheating or lying you wont get anything      Parul Gregorioernethy stated she then received a callback from Zion English who stated that she ends understands that Parul Gregorioernethy is frustrated and that she is willing to come out and finish the assessment  Latanya López then told Myra Meyer alirezae in the wrong line of business and disconnected the call  Latanya López stated that Myra Meyer then called back and immediately and said Siobhan Dominguez was rude  Latanya López stated that she then told her to have a blessed day and hung up  MSW explained that if patient and family are looking for in home help that the waiver program could potentially offer the most services if patient made both the financial criteria and the level of care criteria  MSW advised at the Office on Aging has made it abundantly clear that they will not be able to provide services unless patient is denied by the [place]  Latanya López stated that she would be agreeable to have someone else come out to do the assessment but that she will not work with Myra Meyer  MSW advise this writer will attempt to call the [place] to request another  independently, but did advise that this writer may need to call back with Latanya López on the line to request same (as this writer is not an authorized representative on patient's case)  Latanya López verbalized understanding  While on the phone Latanya López did advise that someone is coming out from [employer] tomorrow on April 12 to get paperwork signed and that patient and his wife will then begin to receive meals on April 14

## 2022-04-12 NOTE — TELEPHONE ENCOUNTER
MSW phoned the [place] at [phone] and spoke with [facility] to request another  to complete the second assessment as patient's daughter was not agreeable to work with Naveense Lozoya again  He advised that he cannot speak with this writer unless an authorized representative is on the phone  MSW consulted Webb du sac into the call at [phone]  Ramesh stated that the case was closed and that he would need to reopen same  Ramesh put this writer and Webb du sac on hold while he reopened the case  He was able to get an over the phone assessment scheduled for Tuesday 4/19 from 1-3PM with Monse Teixeira ([phone])  [facility] stated that all paperwork that was completed with the previous case is still on file and valid, so none of that will need to be redone  Webb du sac requested this writer to contact her on Monday 4/18 to remind her of the call and that she will get the 's name/number from this writer at that time (was driving and could not take down the information)

## 2022-04-20 NOTE — TELEPHONE ENCOUNTER
[facility] to follow-up regarding the PA IEB evaluation that was scheduled for 4/19  [facility] stated that Jovanny Wiley did call and was very polite  [facility] stated that patient would likely have qualified based on his income, but when they began discussing assets (including patient's property) [facility] the [employer]  [facility] stated that family is not willing to proceed with the PA IEB at half of the house is willed to her sister Jose M Stark and they will not jeopardize losing/owing on the house  MSW advised that this writer will contact the [facility], but advised that it is likely that patient will not qualify for any services  [facility] stated that she is "not expecting anything"  MSW will update Bethanne Chamber as able  MSW phoned the [facility] on [facility] at [phone] and [place]   [place] that since patient was deemed "nursing facility clinically eligible" (NFCE) in March, that the state requires that he apply for Waiver services  [place] stated that since patient was determined to need the highest level of care, that his needs cannot be met by the Options Program because the program does not offer as much services as patient needs  [place] stated that the only program patient would qualify for would be Meals on Wheels, which patient is already getting  [place] stated that if the patient/family will not follow through with the Waiver application, that they will need to pay privately  [place] stated that if the family as any questions, they can contact her at the [facility] on [facility]

## 2022-04-21 NOTE — TELEPHONE ENCOUNTER
MSW phoned patient's daughter, Solo Hurt, to make her aware of Geeta's response from the [facility]  Solo Hurt verbalized understanding and stated that they would not be proceeding with the Waiver application  Solo Hurt stated that family will just continue to care for patient on their own  MSW will be available to patient/family as needed

## 2022-04-22 DIAGNOSIS — R05.9 COUGH: ICD-10-CM

## 2022-04-22 RX ORDER — FAMOTIDINE 20 MG/1
TABLET, FILM COATED ORAL
Qty: 30 TABLET | Refills: 2 | Status: SHIPPED | OUTPATIENT
Start: 2022-04-22 | End: 2022-05-24

## 2022-04-27 ENCOUNTER — OFFICE VISIT (OUTPATIENT)
Dept: INTERNAL MEDICINE CLINIC | Facility: CLINIC | Age: 87
End: 2022-04-27
Payer: COMMERCIAL

## 2022-04-27 VITALS
RESPIRATION RATE: 16 BRPM | HEART RATE: 84 BPM | DIASTOLIC BLOOD PRESSURE: 60 MMHG | SYSTOLIC BLOOD PRESSURE: 130 MMHG | TEMPERATURE: 99.8 F | OXYGEN SATURATION: 94 %

## 2022-04-27 DIAGNOSIS — G20 DEMENTIA DUE TO PARKINSON'S DISEASE WITH BEHAVIORAL DISTURBANCE (HCC): ICD-10-CM

## 2022-04-27 DIAGNOSIS — J43.8 OTHER EMPHYSEMA (HCC): Primary | ICD-10-CM

## 2022-04-27 DIAGNOSIS — F02.81 DEMENTIA DUE TO PARKINSON'S DISEASE WITH BEHAVIORAL DISTURBANCE (HCC): ICD-10-CM

## 2022-04-27 DIAGNOSIS — Z79.52 LONG TERM (CURRENT) USE OF SYSTEMIC STEROIDS: ICD-10-CM

## 2022-04-27 DIAGNOSIS — R26.9 NEUROLOGIC GAIT DYSFUNCTION: ICD-10-CM

## 2022-04-27 DIAGNOSIS — E11.9 TYPE 2 DIABETES MELLITUS WITHOUT COMPLICATION, WITHOUT LONG-TERM CURRENT USE OF INSULIN (HCC): ICD-10-CM

## 2022-04-27 DIAGNOSIS — G20 PARKINSON'S DISEASE (HCC): ICD-10-CM

## 2022-04-27 DIAGNOSIS — J45.30 MILD PERSISTENT EXTRINSIC ASTHMA WITHOUT COMPLICATION: ICD-10-CM

## 2022-04-27 DIAGNOSIS — I10 ESSENTIAL HYPERTENSION: ICD-10-CM

## 2022-04-27 PROBLEM — N17.9 AKI (ACUTE KIDNEY INJURY) (HCC): Status: RESOLVED | Noted: 2019-08-26 | Resolved: 2022-04-27

## 2022-04-27 PROBLEM — S06.5X9A SUBDURAL HEMATOMA (HCC): Status: RESOLVED | Noted: 2021-04-06 | Resolved: 2022-04-27

## 2022-04-27 PROBLEM — R31.0 GROSS HEMATURIA: Status: RESOLVED | Noted: 2019-08-26 | Resolved: 2022-04-27

## 2022-04-27 PROBLEM — S06.5XAA SUBDURAL HEMATOMA: Status: RESOLVED | Noted: 2021-04-06 | Resolved: 2022-04-27

## 2022-04-27 PROCEDURE — 3288F FALL RISK ASSESSMENT DOCD: CPT | Performed by: INTERNAL MEDICINE

## 2022-04-27 PROCEDURE — 99214 OFFICE O/P EST MOD 30 MIN: CPT | Performed by: INTERNAL MEDICINE

## 2022-04-27 PROCEDURE — 1170F FXNL STATUS ASSESSED: CPT | Performed by: INTERNAL MEDICINE

## 2022-04-27 PROCEDURE — G0439 PPPS, SUBSEQ VISIT: HCPCS | Performed by: INTERNAL MEDICINE

## 2022-04-27 PROCEDURE — 1125F AMNT PAIN NOTED PAIN PRSNT: CPT | Performed by: INTERNAL MEDICINE

## 2022-04-27 PROCEDURE — 1111F DSCHRG MED/CURRENT MED MERGE: CPT | Performed by: INTERNAL MEDICINE

## 2022-04-27 PROCEDURE — 3078F DIAST BP <80 MM HG: CPT | Performed by: INTERNAL MEDICINE

## 2022-04-27 PROCEDURE — 1160F RVW MEDS BY RX/DR IN RCRD: CPT | Performed by: INTERNAL MEDICINE

## 2022-04-27 PROCEDURE — 3725F SCREEN DEPRESSION PERFORMED: CPT | Performed by: INTERNAL MEDICINE

## 2022-04-27 PROCEDURE — 3075F SYST BP GE 130 - 139MM HG: CPT | Performed by: INTERNAL MEDICINE

## 2022-04-27 PROCEDURE — 1036F TOBACCO NON-USER: CPT | Performed by: INTERNAL MEDICINE

## 2022-04-27 RX ORDER — QUETIAPINE FUMARATE 25 MG/1
TABLET, FILM COATED ORAL
Start: 2022-04-27 | End: 2022-05-17 | Stop reason: SDUPTHER

## 2022-04-27 NOTE — PATIENT INSTRUCTIONS
Labs reviewed and stable    Emphysema with allergic asthma-clinically stable, continue present regimen    Continue to follow aspiration precautions    Parkinson's disease with gait dysfunction and cognitive impairment-continue present regimen with neurology follow-up  Agree with seeking  of elder  to further identify home care options and cost considerations  Diabetes has been stable with diet, check A1c prior to follow-up    Blood pressures remained stable    Sleep disturbance-increase Seroquel to 50 mg at 10:00 p m  And again at 2:00 a m  And see if sleep improves  Contact me with results  Follow-up after labs in September, sooner as needed  Medicare Preventive Visit Patient Instructions  Thank you for completing your Welcome to Medicare Visit or Medicare Annual Wellness Visit today  Your next wellness visit will be due in one year (4/28/2023)  The screening/preventive services that you may require over the next 5-10 years are detailed below  Some tests may not apply to you based off risk factors and/or age  Screening tests ordered at today's visit but not completed yet may show as past due  Also, please note that scanned in results may not display below  Preventive Screenings:  Service Recommendations Previous Testing/Comments   Colorectal Cancer Screening  · Colonoscopy    · Fecal Occult Blood Test (FOBT)/Fecal Immunochemical Test (FIT)  · Fecal DNA/Cologuard Test  · Flexible Sigmoidoscopy Age: 54-65 years old   Colonoscopy: every 10 years (May be performed more frequently if at higher risk)  OR  FOBT/FIT: every 1 year  OR  Cologuard: every 3 years  OR  Sigmoidoscopy: every 5 years  Screening may be recommended earlier than age 48 if at higher risk for colorectal cancer  Also, an individualized decision between you and your healthcare provider will decide whether screening between the ages of 74-80 would be appropriate   Colonoscopy: Not on file  FOBT/FIT: Not on file  Cologuard: Not on file  Sigmoidoscopy: Not on file    Screening Not Indicated     Prostate Cancer Screening Individualized decision between patient and health care provider in men between ages of 53-78   Medicare will cover every 12 months beginning on the day after your 50th birthday PSA: No results in last 5 years     Screening Not Indicated     Hepatitis C Screening Once for adults born between [birth year] and 1965  More frequently in patients at high risk for Hepatitis C Hep C Antibody: Not on file        Diabetes Screening 1-2 times per year if you're at risk for diabetes or have pre-diabetes Fasting glucose: 227 mg/dL   A1C: 6 6 %    Screening Not Indicated  History Diabetes   Cholesterol Screening Once every 5 years if you don't have a lipid disorder  May order more often based on risk factors  Lipid panel: 03/04/2022    Screening Not Indicated  History Lipid Disorder      Other Preventive Screenings Covered by Medicare:  1  Abdominal Aortic Aneurysm (AAA) Screening: covered once if your at risk  You're considered to be at risk if you have a family history of AAA or a male between the age of 73-68 who smoking at least 100 cigarettes in your lifetime  2  Lung Cancer Screening: covers low dose CT scan once per year if you meet all of the following conditions: (1) Age 50-69; (2) No signs or symptoms of lung cancer; (3) Current smoker or have quit smoking within the last 15 years; (4) You have a tobacco smoking history of at least 30 pack years (packs per day x number of years you smoked); (5) You get a written order from a healthcare provider  3  Glaucoma Screening: covered annually if you're considered high risk: (1) You have diabetes OR (2) Family history of glaucoma OR (3)  aged 48 and older OR (3)  American aged 72 and older  3   Osteoporosis Screening: covered every 2 years if you meet one of the following conditions: (1) Have a vertebral abnormality; (2) On glucocorticoid therapy for more than 3 months; (3) Have primary hyperparathyroidism; (4) On osteoporosis medications and need to assess response to drug therapy  5  HIV Screening: covered annually if you're between the age of 12-76  Also covered annually if you are younger than 13 and older than 72 with risk factors for HIV infection  For pregnant patients, it is covered up to 3 times per pregnancy  Immunizations:  Immunization Recommendations   Influenza Vaccine Annual influenza vaccination during flu season is recommended for all persons aged >= 6 months who do not have contraindications   Pneumococcal Vaccine (Prevnar and Pneumovax)  * Prevnar = PCV13  * Pneumovax = PPSV23 Adults 25-60 years old: 1-3 doses may be recommended based on certain risk factors  Adults 72 years old: Prevnar (PCV13) vaccine recommended followed by Pneumovax (PPSV23) vaccine  If already received PPSV23 since turning 65, then PCV13 recommended at least one year after PPSV23 dose  Hepatitis B Vaccine 3 dose series if at intermediate or high risk (ex: diabetes, end stage renal disease, liver disease)   Tetanus (Td) Vaccine - COST NOT COVERED BY MEDICARE PART B Following completion of primary series, a booster dose should be given every 10 years to maintain immunity against tetanus  Td may also be given as tetanus wound prophylaxis  Tdap Vaccine - COST NOT COVERED BY MEDICARE PART B Recommended at least once for all adults  For pregnant patients, recommended with each pregnancy  Shingles Vaccine (Shingrix) - COST NOT COVERED BY MEDICARE PART B  2 shot series recommended in those aged 48 and above     Health Maintenance Due:  There are no preventive care reminders to display for this patient  Immunizations Due:  There are no preventive care reminders to display for this patient  Advance Directives   What are advance directives? Advance directives are legal documents that state your wishes and plans for medical care   These plans are made ahead of time in case you lose your ability to make decisions for yourself  Advance directives can apply to any medical decision, such as the treatments you want, and if you want to donate organs  What are the types of advance directives? There are many types of advance directives, and each state has rules about how to use them  You may choose a combination of any of the following:  · Living will: This is a written record of the treatment you want  You can also choose which treatments you do not want, which to limit, and which to stop at a certain time  This includes surgery, medicine, IV fluid, and tube feedings  · Durable power of  for healthcare [facility]): This is a written record that states who you want to make healthcare choices for you when you are unable to make them for yourself  This person, called a proxy, is usually a family member or a friend  You may choose more than 1 proxy  · Do not resuscitate (DNR) order:  A DNR order is used in case your heart stops beating or you stop breathing  It is a request not to have certain forms of treatment, such as CPR  A DNR order may be included in other types of advance directives  · Medical directive: This covers the care that you want if you are in a coma, near death, or unable to make decisions for yourself  You can list the treatments you want for each condition  Treatment may include pain medicine, surgery, blood transfusions, dialysis, IV or tube feedings, and a ventilator (breathing machine)  · Values history: This document has questions about your views, beliefs, and how you feel and think about life  This information can help others choose the care that you would choose  Why are advance directives important? An advance directive helps you control your care  Although spoken wishes may be used, it is better to have your wishes written down  Spoken wishes can be misunderstood, or not followed  Treatments may be given even if you do not want them   An advance directive may make it easier for your family to make difficult choices about your care  Fall Prevention    Fall prevention  includes ways to make your home and other areas safer  It also includes ways you can move more carefully to prevent a fall  Health conditions that cause changes in your blood pressure, vision, or muscle strength and coordination may increase your risk for falls  Medicines may also increase your risk for falls if they make you dizzy, weak, or sleepy  Fall prevention tips:   · Stand or sit up slowly  · Use assistive devices as directed  · Wear shoes that fit well and have soles that   · Wear a personal alarm  · Stay active  · Manage your medical conditions  Home Safety Tips:  · Add items to prevent falls in the bathroom  · Keep paths clear  · Install bright lights in your home  · Keep items you use often on shelves within reach  · Paint or place reflective tape on the edges of your stairs  Weight Management   Why it is important to manage your weight:  Being overweight increases your risk of health conditions such as heart disease, high blood pressure, type 2 diabetes, and certain types of cancer  It can also increase your risk for osteoarthritis, sleep apnea, and other respiratory problems  Aim for a slow, steady weight loss  Even a small amount of weight loss can lower your risk of health problems  How to lose weight safely:  A safe and healthy way to lose weight is to eat fewer calories and get regular exercise  You can lose up about 1 pound a week by decreasing the number of calories you eat by 500 calories each day  Healthy meal plan for weight management:  A healthy meal plan includes a variety of foods, contains fewer calories, and helps you stay healthy  A healthy meal plan includes the following:  · Eat whole-grain foods more often  A healthy meal plan should contain fiber  Fiber is the part of grains, fruits, and vegetables that is not broken down by your body  Whole-grain foods are healthy and provide extra fiber in your diet  Some examples of whole-grain foods are whole-wheat breads and pastas, oatmeal, brown rice, and bulgur  · Eat a variety of vegetables every day  Include dark, leafy greens such as spinach, kale, nagi greens, and mustard greens  Eat yellow and orange vegetables such as carrots, sweet potatoes, and winter squash  · Eat a variety of fruits every day  Choose fresh or canned fruit (canned in its own juice or light syrup) instead of juice  Fruit juice has very little or no fiber  · Eat low-fat dairy foods  Drink fat-free (skim) milk or 1% milk  Eat fat-free yogurt and low-fat cottage cheese  Try low-fat cheeses such as mozzarella and other reduced-fat cheeses  · Choose meat and other protein foods that are low in fat  Choose beans or other legumes such as split peas or lentils  Choose fish, skinless poultry (chicken or turkey), or lean cuts of red meat (beef or pork)  Before you cook meat or poultry, cut off any visible fat  · Use less fat and oil  Try baking foods instead of frying them  Add less fat, such as margarine, sour cream, regular salad dressing and mayonnaise to foods  Eat fewer high-fat foods  Some examples of high-fat foods include french fries, doughnuts, ice cream, and cakes  · Eat fewer sweets  Limit foods and drinks that are high in sugar  This includes candy, cookies, regular soda, and sweetened drinks  Exercise:  Exercise at least 30 minutes per day on most days of the week  Some examples of exercise include walking, biking, dancing, and swimming  You can also fit in more physical activity by taking the stairs instead of the elevator or parking farther away from stores  Ask your healthcare provider about the best exercise plan for you  © Copyright [employer] 2018 Information is for End User's use only and may not be sold, redistributed or otherwise used for commercial purposes   All illustrations and images included in [employer] are the copyrighted property of A D A M , Inc  or [facility] Danelle Núñez

## 2022-04-27 NOTE — PROGRESS NOTES
Assessment and Plan:     Problem List Items Addressed This Visit     None           Preventive health issues were discussed with patient, and age appropriate screening tests were ordered as noted in patient's After Visit Summary  Personalized health advice and appropriate referrals for health education or preventive services given if needed, as noted in patient's After Visit Summary       History of Present Illness:     Patient presents for Medicare Annual Wellness visit    Patient Care Team:  Nyla Harper MD as PCP - Nicholas Barakat MD as PCP - [address] [place] [address] (RTE)  [facility]  Michae Bernheim, MD Claudia Ide, MD Equilla Mu, MD Kathlyn Girt, PARafaelC  Joaquin Torres, PA-C  Jason Suggs MD     Problem List:     Patient Active Problem List   Diagnosis    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) ([address]    Allergic asthma    Allergic rhinitis    Aortic regurgitation    Aspiration pneumonia ([address]    Long term (current) use of systemic steroids    Hyperlipidemia    Hypertension    Parkinson's disease ([address] )    Neurologic gait dysfunction    Type 2 diabetes mellitus ([address] )    Peripheral vascular disease ([address] )    COPD with acute exacerbation ([address] )    Cerebrovascular disease    Carotid artery disease ([address] )    Varicose 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    H/O burning pain in leg    Ambulatory dysfunction    ANGLEA (acute kidney injury) ([address] )    Essential hypertension    Gross hematuria    Meningioma ([address]    Benign neoplasm of supratentorial region of brain ([address]    Subclinical hypothyroidism    Dementia due to Parkinson's disease with behavioral disturbance ([address] )    Community acquired pneumonia of right lower lobe of lung    Fall    Mood disorder ([address] )    Subdural hematoma ([address] )    Cough    Shortness of breath    Other emphysema ([address] )    Dysphagia      Past Medical and Surgical History:     Past Medical History:   Diagnosis Date    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) [facility]     Allergic rhinitis     last assessed 2013    Aortic regurgitation     Arm laceration     Asthma     Bronchitis, chronic obstructive, with exacerbation ([facility])     last assessed 2015    Candidal intertrigo     Chronic obstructive lung disease ([place] [address] )     last assessed 2013    COPD (chronic obstructive pulmonary disease) ([facility]     Coronary artery disease     carotid stents    Cough     CVA (cerebral vascular accident) ([place] [address] )     Dermatitis     Diabetes mellitus type 2, uncomplicated ([place] [address] )     controlled    Dysthymic disorder     Fatigue     Hyperlipidemia     Hypertension     Neurologic gait dysfunction     Parkinson's disease ([facility] )     Pneumonia     PVD (peripheral vascular disease) ([place] [address] )     Seborrheic keratosis     TIA (transient ischemic attack)     Tinea corporis     Tinea pedis of both feet     Tremor      Past Surgical History:   Procedure Laterality Date    NASAL SINUS SURGERY      MD BRONCHOSCOPY,DIAGNOSTIC N/A 2016    Procedure: BRONCHOSCOPY;  Surgeon: Sara Cee MD;  [facility];   Service: Pulmonary      Family History:     Family History   Problem Relation Age of Onset    No Known Problems Mother         Old Age   Kaitlin Ribera COPD Sister    Kaitlin Ribera Lung cancer Sister     Asthma Family       Social History:     Social History     Socioeconomic History    Marital status: /Civil Union     Spouse name: None    Number of children: None    Years of education: None    Highest education level: None   Occupational History    Occupation: retired   Tobacco Use    Smoking status: Former Smoker     Packs/day: 1 00     Years: 3 00     Pack years: 3 00     Types: Cigarettes     Start date:      Quit date:      Years since quittin 3    Smokeless tobacco: Never Used   Vaping Use    Vaping Use: Never used   Substance and Sexual Activity    Alcohol use: Not Currently   Kaitlin Ribera Drug use: No    Sexual activity: Not Currently   Other Topics Concern    None   Social History Narrative    Lives independently with spouse    No advance directives     Social Determinants of Health     Financial Resource Strain: Not on file   Food Insecurity: No Food Insecurity    Worried About Running Out of Food in the Last Year: Never true    Prerna of Food in the Last Year: Never true   Transportation Needs: No Transportation Needs    Lack of Transportation (Medical): No    Lack of Transportation (Non-Medical): No   Physical Activity: Not on file   Stress: Not on file   Social Connections: Not on file   Intimate Partner Violence: Not on file   Housing Stability: Low Risk     Unable to Pay for Housing in the Last Year: No    Number of Places Lived in the Last Year: 1    Unstable Housing in the Last Year: No      Medications and Allergies:     Current Outpatient Medications   Medication Sig Dispense Refill    acetaminophen (TYLENOL) 325 mg tablet Take 650 mg by mouth every 6 (six) hours as needed for mild pain        albuterol (2 5 mg/3 mL) 0 083 % nebulizer solution USE 1 VIAL VIA NEBULIZER EVERY 4 HOURS AS NEEDED 75 mL 0    albuterol (PROVENTIL HFA,VENTOLIN HFA) 90 mcg/act inhaler TAKE 2 PUFFS BY MOUTH EVERY 6 HOURS AS NEEDED FOR WHEEZE 1 g 11    atorvastatin (LIPITOR) 10 mg tablet TAKE 1 TABLET BY MOUTH EVERY DAY 90 tablet 1    carbidopa-levodopa (SINEMET)  mg per tablet TAKE 1 TABLET BY MOUTH THREE TIMES A  tablet 1    famotidine (PEPCID) 20 mg tablet TAKE 1 TABLET BY MOUTH TWICE A DAY 30 tablet 2    finasteride (PROSCAR) 5 mg tablet Take 1 tablet (5 mg total) by mouth every morning 90 tablet 0    fluticasone-salmeterol (Wixela Inhub) 250-50 mcg/dose inhaler Inhale 1 puff 2 (two) times a day Rinse mouth after use   60 blister 2    montelukast (SINGULAIR) 10 mg tablet TAKE 1 TABLET BY MOUTH DAILY AT BEDTIME 90 tablet 3    nystatin (MYCOSTATIN) cream Apply to intertriginous areas 2-3 times daily as needed 80 g 3    nystatin (MYCOSTATIN) powder APPLY TO AFFECTED AREA TWICE A DAY AS DIRECTED 30 g 1    pramipexole (MIRAPEX) 0 5 mg tablet TAKE 1 TABLET BY MOUTH 3 TIMES A DAY  270 tablet 0    predniSONE 10 mg tablet 40 MG BY MOUTH DAILY X 3 DAYS, 30MG DAILY X 3 DAYS, 20MG DAILY X 3 DAYS, THEN 10 MG DAILY 30 tablet 5    QUEtiapine (SEROquel) 25 mg tablet Take 3 tablets (75 mg total) by mouth daily at bedtime      rivastigmine (EXELON) 4 5 MG capsule Take 1 capsule (4 5 mg total) by mouth 2 (two) times a day 180 capsule 6    tiotropium (Spiriva HandiHaler) 18 mcg inhalation capsule Place 1 capsule (18 mcg total) into inhaler and inhale daily [address] at the same time every day 90 capsule 0    triamcinolone (KENALOG) 0 5 % cream APPLY TO AFFECTED AREA(S) OF THE RASH TWICE DAILY AS NEEDED 30 g 0    benzonatate (TESSALON PERLES) 100 mg capsule TAKE 1 CAPSULE BY MOUTH THREE TIMES A DAY AS NEEDED FOR COUGH (Patient not taking: Reported on 4/27/2022) 20 capsule 0    fluticasone-salmeterol (Wixela Inhub) 250-50 mcg/dose inhaler Inhale 1 puff 2 (two) times a day Rinse mouth after use  60 blister 0    guaiFENesin 1200 MG TB12 Take 1 tablet (1,200 mg total) by mouth every 12 (twelve) hours 20 tablet 0    Respiratory Therapy Supplies (Flutter) VON Use daily Use flutter valve at least three times daily and whenever needed for congestion 1 each 0     No current facility-administered medications for this visit       Allergies   Allergen Reactions    Penicillins Syncope      Immunizations:     Immunization History   Administered Date(s) Administered    COVID-19 MODERNA VACC 0 25 ML IM BOOSTER 01/05/2022    COVID-19 MODERNA VACC 0 5 ML IM 01/25/2021, 02/20/2021    INFLUENZA 11/19/2007, 09/22/2009, 12/02/2011, 10/11/2013, 10/23/2015, 11/21/2017    Influenza Split High Dose Preservative Free IM 10/21/2014, 10/23/2015, 11/21/2017    Influenza, high dose seasonal 0 7 mL 09/26/2019, 11/09/2020, 10/15/2021    Influenza, seasonal, injectable 11/19/2007, 11/10/2008, 09/22/2009, 12/02/2011, 10/11/2013    Pneumococcal Conjugate 13-Valent 04/27/2016    Pneumococcal Polysaccharide PPV23 1936, 06/09/2017    Tdap 1935, 04/27/2016    Zoster 1935, 09/23/2015      Health Maintenance: There are no preventive care reminders to display for this patient  There are no preventive care reminders to display for this patient  Medicare Health Risk Assessment:     /60 (BP Location: Right arm, Patient Position: Sitting, Cuff Size: Standard)   Pulse 84   Temp 99 8 °F (37 7 °C) (Tympanic)   Resp 16   SpO2 94%      Zach is here for his Subsequent Wellness visit  Health Risk Assessment:   Patient rates overall health as fair  Patient feels that their physical health rating is slightly worse  Patient is satisfied with their life  Eyesight was rated as slightly worse  Hearing was rated as same  Patient feels that their emotional and mental health rating is slightly worse  Patients states they are sometimes angry  Patient states they are often unusually tired/fatigued  Pain experienced in the last 7 days has been none  Patient states that he has experienced no weight loss or gain in last 6 months  Depression Screening:   PHQ-2 Score: 0      Fall Risk Screening: In the past year, patient has experienced: history of falling in past year    Number of falls: 2 or more  Injured during fall?: No    Feels unsteady when standing or walking?: Yes    Worried about falling?: Yes      Home Safety:  Patient has trouble with stairs inside or outside of their home  Patient has working smoke alarms and has working carbon monoxide detector  Home safety hazards include: none  Nutrition:   Current diet is Regular  Medications:   Patient is currently taking over-the-counter supplements  OTC medications include: see medication list  Patient is not able to manage medications       Activities of Daily Living (ADLs)/Instrumental Activities of Daily Living (IADLs):   Walk and transfer into and out of bed and chair?: No  Dress and groom yourself?: No    Bathe or shower yourself?: No    Feed yourself? Yes  Do your laundry/housekeeping?: No  Manage your money, pay your bills and track your expenses?: No  Make your own meals?: No    Do your own shopping?: No    Previous Hospitalizations:   Any hospitalizations or ED visits within the last 12 months?: Yes    How many hospitalizations have you had in the last year?: 1-2    Advance Care Planning:   Living will: Yes    Advanced directive: Yes      Cognitive Screening:   Provider or family/friend/caregiver concerned regarding cognition?: No    PREVENTIVE SCREENINGS      Cardiovascular Screening:    General: Screening Not Indicated and History Lipid Disorder      Diabetes Screening:     General: Screening Not Indicated and History Diabetes      Colorectal Cancer Screening:     General: Screening Not Indicated      Prostate Cancer Screening:    General: Screening Not Indicated      Osteoporosis Screening:    General: Screening Not Indicated      Abdominal Aortic Aneurysm (AAA) Screening:    Risk factors include: tobacco use        General: Screening Not Indicated      Lung Cancer Screening:     General: Screening Not Indicated      Hepatitis C Screening:    General: Screening Not Indicated    Screening, Brief Intervention, and Referral to Treatment (SBIRT)    Screening  Typical number of drinks in a day: 0  Typical number of drinks in a week: 0  Interpretation: Low risk drinking behavior      Single Item Drug Screening:  How often have you used an illegal drug (including marijuana) or a prescription medication for non-medical reasons in the past year? never    Single Item Drug Screen Score: 0  Interpretation: Negative screen for possible drug use disorder      Kristen Navas MD

## 2022-04-27 NOTE — PROGRESS NOTES
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)  -     QUEtiapine (SEROquel) 25 mg tablet; 50 mg at 10 pm and 50 mg at 2 am    Parkinson's disease ([facility] [address] )    Long term (current) use of systemic steroids    Neurologic gait dysfunction    Type 2 diabetes mellitus without complication, without long-term current use of insulin (HCC)  -     Microalbumin / creatinine urine ratio ([facility]); Future              Patient Instructions     Labs reviewed and stable    Emphysema with allergic asthma-clinically stable, continue present regimen    Continue to follow aspiration precautions    Parkinson's disease with gait dysfunction and cognitive impairment-continue present regimen with neurology follow-up  Agree with seeking  of elder  to further identify home care options and cost considerations  Diabetes has been stable with diet, check A1c prior to follow-up    Blood pressures remained stable    Sleep disturbance-increase Seroquel to 50 mg at 10:00 p m  And again at 2:00 a m  And see if sleep improves  Contact me with results  Follow-up after labs in September, sooner as needed  Medicare Preventive Visit Patient Instructions  Thank you for completing your Welcome to Medicare Visit or Medicare Annual Wellness Visit today  Your next wellness visit will be due in one year (4/28/2023)  The screening/preventive services that you may require over the next 5-10 years are detailed below  Some tests may not apply to you based off risk factors and/or age  Screening tests ordered at today's visit but not completed yet may show as past due  Also, please note that scanned in results may not display below    Preventive Screenings:  Service Recommendations Previous Testing/Comments   Colorectal Cancer Screening  · Colonoscopy    · Fecal Occult Blood Test (FOBT)/Fecal Immunochemical Test (FIT)  · Fecal DNA/Cologuard Test  · Flexible Sigmoidoscopy Age: 54-65 years old   Colonoscopy: every 10 years (May be performed more frequently if at higher risk)  OR  FOBT/FIT: every 1 year  OR  Cologuard: every 3 years  OR  Sigmoidoscopy: every 5 years  Screening may be recommended earlier than age 48 if at higher risk for colorectal cancer  Also, an individualized decision between you and your healthcare provider will decide whether screening between the ages of 74-80 would be appropriate  Colonoscopy: Not on file  FOBT/FIT: Not on file  Cologuard: Not on file  Sigmoidoscopy: Not on file    Screening Not Indicated     Prostate Cancer Screening Individualized decision between patient and health care provider in men between ages of 53-78   Medicare will cover every 12 months beginning on the day after your 50th birthday PSA: No results in last 5 years     Screening Not Indicated     Hepatitis C Screening Once for adults born between [facility]  More frequently in patients at high risk for Hepatitis C Hep C Antibody: Not on file        Diabetes Screening 1-2 times per year if you're at risk for diabetes or have pre-diabetes Fasting glucose: 227 mg/dL   A1C: 6 6 %    Screening Not Indicated  History Diabetes   Cholesterol Screening Once every 5 years if you don't have a lipid disorder  May order more often based on risk factors  Lipid panel: 03/04/2022    Screening Not Indicated  History Lipid Disorder      Other Preventive Screenings Covered by Medicare:  1  Abdominal Aortic Aneurysm (AAA) Screening: covered once if your at risk  You're considered to be at risk if you have a family history of AAA or a male between the age of 73-68 who smoking at least 100 cigarettes in your lifetime    2  Lung Cancer Screening: covers low dose CT scan once per year if you meet all of the following conditions: (1) Age 50-69; (2) No signs or symptoms of lung cancer; (3) Current smoker or have quit smoking within the last 15 years; (4) You have a tobacco smoking history of at least 30 pack years (packs per day x number of years you smoked); (5) You get a written order from a healthcare provider  3  Glaucoma Screening: covered annually if you're considered high risk: (1) You have diabetes OR (2) Family history of glaucoma OR (3)  aged 48 and older OR (3)  American aged 72 and older  3  Osteoporosis Screening: covered every 2 years if you meet one of the following conditions: (1) Have a vertebral abnormality; (2) On glucocorticoid therapy for more than 3 months; (3) Have primary hyperparathyroidism; (4) On osteoporosis medications and need to assess response to drug therapy  5  HIV Screening: covered annually if you're between the age of 12-76  Also covered annually if you are younger than 13 and older than 72 with risk factors for HIV infection  For pregnant patients, it is covered up to 3 times per pregnancy  Immunizations:  Immunization Recommendations   Influenza Vaccine Annual influenza vaccination during flu season is recommended for all persons aged >= 6 months who do not have contraindications   Pneumococcal Vaccine (Prevnar and Pneumovax)  * Prevnar = PCV13  * Pneumovax = PPSV23 Adults 25-60 years old: 1-3 doses may be recommended based on certain risk factors  Adults 72 years old: Prevnar (PCV13) vaccine recommended followed by Pneumovax (PPSV23) vaccine  If already received PPSV23 since turning 65, then PCV13 recommended at least one year after PPSV23 dose  Hepatitis B Vaccine 3 dose series if at intermediate or high risk (ex: diabetes, end stage renal disease, liver disease)   Tetanus (Td) Vaccine - COST NOT COVERED BY MEDICARE PART B Following completion of primary series, a booster dose should be given every 10 years to maintain immunity against tetanus  Td may also be given as tetanus wound prophylaxis     Tdap Vaccine - COST NOT COVERED BY MEDICARE PART B Recommended at least once for all adults  For pregnant patients, recommended with each pregnancy  Shingles Vaccine (Shingrix) - COST NOT COVERED BY MEDICARE PART B  2 shot series recommended in those aged 48 and above     Health Maintenance Due:  There are no preventive care reminders to display for this patient  Immunizations Due:  There are no preventive care reminders to display for this patient  Advance Directives   What are advance directives? Advance directives are legal documents that state your wishes and plans for medical care  These plans are made ahead of time in case you lose your ability to make decisions for yourself  Advance directives can apply to any medical decision, such as the treatments you want, and if you want to donate organs  What are the types of advance directives? There are many types of advance directives, and each state has rules about how to use them  You may choose a combination of any of the following:  · Living will: This is a written record of the treatment you want  You can also choose which treatments you do not want, which to limit, and which to stop at a certain time  This includes surgery, medicine, IV fluid, and tube feedings  · Durable power of  for healthcare [facility]): This is a written record that states who you want to make healthcare choices for you when you are unable to make them for yourself  This person, called a proxy, is usually a family member or a friend  You may choose more than 1 proxy  · Do not resuscitate (DNR) order:  A DNR order is used in case your heart stops beating or you stop breathing  It is a request not to have certain forms of treatment, such as CPR  A DNR order may be included in other types of advance directives  · Medical directive: This covers the care that you want if you are in a coma, near death, or unable to make decisions for yourself   You can list the treatments you want for each condition  Treatment may include pain medicine, surgery, blood transfusions, dialysis, IV or tube feedings, and a ventilator (breathing machine)  · Values history: This document has questions about your views, beliefs, and how you feel and think about life  This information can help others choose the care that you would choose  Why are advance directives important? An advance directive helps you control your care  Although spoken wishes may be used, it is better to have your wishes written down  Spoken wishes can be misunderstood, or not followed  Treatments may be given even if you do not want them  An advance directive may make it easier for your family to make difficult choices about your care  Fall Prevention    Fall prevention  includes ways to make your home and other areas safer  It also includes ways you can move more carefully to prevent a fall  Health conditions that cause changes in your blood pressure, vision, or muscle strength and coordination may increase your risk for falls  Medicines may also increase your risk for falls if they make you dizzy, weak, or sleepy  Fall prevention tips:   · Stand or sit up slowly  · Use assistive devices as directed  · Wear shoes that fit well and have soles that   · Wear a personal alarm  · Stay active  · Manage your medical conditions  Home Safety Tips:  · Add items to prevent falls in the bathroom  · Keep paths clear  · Install bright lights in your home  · Keep items you use often on shelves within reach  · Paint or place reflective tape on the edges of your stairs  Weight Management   Why it is important to manage your weight:  Being overweight increases your risk of health conditions such as heart disease, high blood pressure, type 2 diabetes, and certain types of cancer  It can also increase your risk for osteoarthritis, sleep apnea, and other respiratory problems  Aim for a slow, steady weight loss   Even a small amount of weight loss can lower your risk of health problems  How to lose weight safely:  A safe and healthy way to lose weight is to eat fewer calories and get regular exercise  You can lose up about 1 pound a week by decreasing the number of calories you eat by 500 calories each day  Healthy meal plan for weight management:  A healthy meal plan includes a variety of foods, contains fewer calories, and helps you stay healthy  A healthy meal plan includes the following:  · Eat whole-grain foods more often  A healthy meal plan should contain fiber  Fiber is the part of grains, fruits, and vegetables that is not broken down by your body  Whole-grain foods are healthy and provide extra fiber in your diet  Some examples of whole-grain foods are whole-wheat breads and pastas, oatmeal, brown rice, and bulgur  · Eat a variety of vegetables every day  Include dark, leafy greens such as spinach, kale, nagi greens, and mustard greens  Eat yellow and orange vegetables such as carrots, sweet potatoes, and winter squash  · Eat a variety of fruits every day  Choose fresh or canned fruit (canned in its own juice or light syrup) instead of juice  Fruit juice has very little or no fiber  · Eat low-fat dairy foods  Drink fat-free (skim) milk or 1% milk  Eat fat-free yogurt and low-fat cottage cheese  Try low-fat cheeses such as mozzarella and other reduced-fat cheeses  · Choose meat and other protein foods that are low in fat  Choose beans or other legumes such as split peas or lentils  Choose fish, skinless poultry (chicken or turkey), or lean cuts of red meat (beef or pork)  Before you cook meat or poultry, cut off any visible fat  · Use less fat and oil  Try baking foods instead of frying them  Add less fat, such as margarine, sour cream, regular salad dressing and mayonnaise to foods  Eat fewer high-fat foods  Some examples of high-fat foods include french fries, doughnuts, ice cream, and cakes    · Eat fewer sweets  Limit foods and drinks that are high in sugar  This includes candy, cookies, regular soda, and sweetened drinks  Exercise:  Exercise at least 30 minutes per day on most days of the week  Some examples of exercise include walking, biking, dancing, and swimming  You can also fit in more physical activity by taking the stairs instead of the elevator or parking farther away from stores  Ask your healthcare provider about the best exercise plan for you  © Copyright [employer] 2018 Information is for End User's use only and may not be sold, redistributed or otherwise used for commercial purposes  All illustrations and images included in CareNotes® are the copyrighted property of A D A M , Inc  or [employer]      Subjective:      Patient ID: Saima Daigle is a 81 y o  male    Here w/ [facility] M Stark and [place] [facility] to f/u labs and mmp  No significant change in status  Sleeping better w/ Seroquel 25 mg at 10 p and 50 mg at 2 am and up for BR  Then sleeps until am     He has minimal ability to walk and has been essentially wheelchair-bound  Trying to get aid but unsure w/ cost and going after assets  No more discussion re hospice at this time  DM-watching carbs  HTN/HPL-taking rx as directed, not testing home blood pressures  PD-taking rx as directed, f/b Dr Rowlandt Levels  Asthma/copd-taking prednisone and BD's as directed  Improved after pred taper  Back to baseline after hospitalization for pna  Aspiration-using thick-it and mech soft diet and no recent evidence for aspiration  Dementia-stable w/ exelon            Current Outpatient Medications:     acetaminophen (TYLENOL) 325 mg tablet, Take 650 mg by mouth every 6 (six) hours as needed for mild pain  , Disp: , Rfl:     albuterol (2 5 mg/3 mL) 0 083 % nebulizer solution, USE 1 VIAL VIA NEBULIZER EVERY 4 HOURS AS NEEDED, Disp: 75 mL, Rfl: 0    albuterol (PROVENTIL HFA,VENTOLIN HFA) 90 mcg/act inhaler, TAKE 2 PUFFS BY MOUTH EVERY 6 HOURS AS NEEDED FOR WHEEZE, Disp: 1 g, Rfl: 11    atorvastatin (LIPITOR) 10 mg tablet, TAKE 1 TABLET BY MOUTH EVERY DAY, Disp: 90 tablet, Rfl: 1    carbidopa-levodopa (SINEMET)  mg per tablet, TAKE 1 TABLET BY MOUTH THREE TIMES A DAY, Disp: 270 tablet, Rfl: 1    famotidine (PEPCID) 20 mg tablet, TAKE 1 TABLET BY MOUTH TWICE A DAY, Disp: 30 tablet, Rfl: 2    finasteride (PROSCAR) 5 mg tablet, Take 1 tablet (5 mg total) by mouth every morning, Disp: 90 tablet, Rfl: 0    fluticasone-salmeterol (Wixela Inhub) 250-50 mcg/dose inhaler, Inhale 1 puff 2 (two) times a day Rinse mouth after use , Disp: 60 blister, Rfl: 2    montelukast (SINGULAIR) 10 mg tablet, TAKE 1 TABLET BY MOUTH DAILY AT BEDTIME, Disp: 90 tablet, Rfl: 3    nystatin (MYCOSTATIN) cream, Apply to intertriginous areas 2-3 times daily as needed, Disp: 80 g, Rfl: 3    nystatin (MYCOSTATIN) powder, APPLY TO AFFECTED AREA TWICE A DAY AS DIRECTED, Disp: 30 g, Rfl: 1    pramipexole (MIRAPEX) 0 5 mg tablet, TAKE 1 TABLET BY MOUTH 3 TIMES A DAY , Disp: 270 tablet, Rfl: 0    predniSONE 10 mg tablet, 40 MG BY MOUTH DAILY X 3 DAYS, 30MG DAILY X 3 DAYS, 20MG DAILY X 3 DAYS, THEN 10 MG DAILY, Disp: 30 tablet, Rfl: 5    QUEtiapine (SEROquel) 25 mg tablet, 50 mg at 10 pm and 50 mg at 2 am, Disp: , Rfl:     rivastigmine (EXELON) 4 5 MG capsule, Take 1 capsule (4 5 mg total) by mouth 2 (two) times a day, Disp: 180 capsule, Rfl: 6    tiotropium (Spiriva HandiHaler) 18 mcg inhalation capsule, Place 1 capsule (18 mcg total) into inhaler and inhale daily Via Handihaler at the same time every day, Disp: 90 capsule, Rfl: 0    triamcinolone (KENALOG) 0 5 % cream, APPLY TO AFFECTED AREA(S) OF THE RASH TWICE DAILY AS NEEDED, Disp: 30 g, Rfl: 0    benzonatate (TESSALON PERLES) 100 mg capsule, TAKE 1 CAPSULE BY MOUTH THREE TIMES A DAY AS NEEDED FOR COUGH (Patient not taking: Reported on 4/27/2022), Disp: 20 capsule, Rfl: 0    fluticasone-salmeterol (Fer Hutton) 250-50 mcg/dose inhaler, Inhale 1 puff 2 (two) times a day Rinse mouth after use , Disp: 60 blister, Rfl: 0    guaiFENesin 1200 MG TB12, Take 1 tablet (1,200 mg total) by mouth every 12 (twelve) hours, Disp: 20 tablet, Rfl: 0    Respiratory Therapy Supplies (Flutter) OVN, Use daily Use flutter valve at least three times daily and whenever needed for congestion, Disp: 1 each, Rfl: 0    Recent Results (from the past 1008 hour(s))   ECG 12 lead    Collection Time: 03/28/22  1:43 PM   Result Value Ref Range    Ventricular Rate 89 BPM    Atrial Rate 89 BPM    MI Interval 154 ms    QRSD Interval 106 ms    QT Interval 370 ms    QTC Interval 450 ms    P Axis 52 degrees    QRS Axis -24 degrees    T Wave Axis 16 degrees   CBC and differential    Collection Time: 03/28/22  2:21 PM   Result Value Ref Range    WBC 22 89 (H) 4 31 - 10 16 Thousand/uL    RBC 4 77 3 88 - 5 62 Million/uL    Hemoglobin 14 3 12 0 - 17 0 g/dL    Hematocrit 44 8 36 5 - 49 3 %    MCV 94 82 - 98 fL    MCH 30 0 26 8 - 34 3 pg    MCHC 31 9 31 4 - 37 4 g/dL    RDW 13 3 11 6 - 15 1 %    MPV 9 7 8 9 - 12 7 fL    Platelets 067 184 - 852 Thousands/uL    nRBC 0 /100 WBCs    Neutrophils Relative 90 (H) 43 - 75 %    Immat GRANS % 1 0 - 2 %    Lymphocytes Relative 3 (L) 14 - 44 %    Monocytes Relative 6 4 - 12 %    Eosinophils Relative 0 0 - 6 %    Basophils Relative 0 0 - 1 %    Neutrophils Absolute 20 55 (H) 1 85 - 7 62 Thousands/µL    Immature Grans Absolute 0 12 0 00 - 0 20 Thousand/uL    Lymphocytes Absolute 0 71 0 60 - 4 47 Thousands/µL    Monocytes Absolute 1 44 (H) 0 17 - 1 22 Thousand/µL    Eosinophils Absolute 0 03 0 00 - 0 61 Thousand/µL    Basophils Absolute 0 04 0 00 - 0 10 Thousands/µL   Protime-INR    Collection Time: 03/28/22  2:21 PM   Result Value Ref Range    Protime 14 9 (H) 11 6 - 14 5 seconds    INR 1 22 (H) 0 84 - 1 19   HS Troponin 0hr (reflex protocol)    Collection Time: 03/28/22  2:21 PM   Result Value Ref Range    hs TnI 0hr 28 "Refer to ACS Flowchart"- see link ng/L   Comprehensive metabolic panel    Collection Time: 03/28/22  3:01 PM   Result Value Ref Range    Sodium 136 136 - 145 mmol/L    Potassium 4 2 3 5 - 5 3 mmol/L    Chloride 104 100 - 108 mmol/L    CO2 23 21 - 32 mmol/L    ANION GAP 9 4 - 13 mmol/L    BUN 35 (H) 5 - 25 mg/dL    Creatinine 1 82 (H) 0 60 - 1 30 mg/dL    Glucose 227 (H) 65 - 140 mg/dL    Calcium 8 9 8 3 - 10 1 mg/dL    Corrected Calcium 9 8 8 3 - 10 1 mg/dL    AST 21 5 - 45 U/L    ALT 12 12 - 78 U/L    Alkaline Phosphatase 76 46 - 116 U/L    Total Protein 6 5 6 4 - 8 2 g/dL    Albumin 2 9 (L) 3 5 - 5 0 g/dL    Total Bilirubin 0 50 0 20 - 1 00 mg/dL    eGFR 32 ml/min/1 73sq m   Lactic acid, plasma    Collection Time: 03/28/22  3:01 PM   Result Value Ref Range    LACTIC ACID 2 0 0 5 - 2 0 mmol/L   Blood culture    Collection Time: 03/28/22  3:01 PM    Specimen: Blood   Result Value Ref Range    Blood Culture No Growth After 5 Days  Blood culture    Collection Time: 03/28/22  3:01 PM    Specimen: Blood   Result Value Ref Range    Blood Culture No Growth After 5 Days      COVID/FLU/RSV    Collection Time: 03/28/22  3:01 PM    Specimen: Nose; Nares   Result Value Ref Range    SARS-CoV-2 Negative Negative    INFLUENZA A PCR Negative Negative    INFLUENZA B PCR Negative Negative    RSV PCR Negative Negative   HS Troponin I 2hr    Collection Time: 03/28/22  4:23 PM   Result Value Ref Range    hs TnI 2hr 21 "Refer to ACS Flowchart"- see link ng/L    Delta 2hr hsTnI -7 <20 ng/L   HS Troponin I 4hr    Collection Time: 03/28/22  6:30 PM   Result Value Ref Range    hs TnI 4hr 21 "Refer to ACS Flowchart"- see link ng/L    Delta 4hr hsTnI -7 <20 ng/L   Procalcitonin    Collection Time: 03/28/22  6:30 PM   Result Value Ref Range    Procalcitonin 19 10 (H) <=0 25 ng/ml   Fingerstick Glucose (POCT)    Collection Time: 03/28/22  6:44 PM   Result Value Ref Range    POC Glucose 203 (H) 65 - 140 mg/dl   Fingerstick Glucose (POCT)    Collection Time: 03/28/22  9:07 PM   Result Value Ref Range    POC Glucose 151 (H) 65 - 140 mg/dl   Basic metabolic panel    Collection Time: 03/29/22  5:16 AM   Result Value Ref Range    Sodium 137 136 - 145 mmol/L    Potassium 3 8 3 5 - 5 3 mmol/L    Chloride 104 100 - 108 mmol/L    CO2 25 21 - 32 mmol/L    ANION GAP 8 4 - 13 mmol/L    BUN 38 (H) 5 - 25 mg/dL    Creatinine 1 60 (H) 0 60 - 1 30 mg/dL    Glucose 110 65 - 140 mg/dL    Calcium 9 0 8 3 - 10 1 mg/dL    eGFR 38 ml/min/1 73sq m   CBC (With Platelets)    Collection Time: 03/29/22  5:16 AM   Result Value Ref Range    WBC 15 78 (H) 4 31 - 10 16 Thousand/uL    RBC 4 39 3 88 - 5 62 Million/uL    Hemoglobin 13 5 12 0 - 17 0 g/dL    Hematocrit 41 4 36 5 - 49 3 %    MCV 94 82 - 98 fL    MCH 30 8 26 8 - 34 3 pg    MCHC 32 6 31 4 - 37 4 g/dL    RDW 13 4 11 6 - 15 1 %    Platelets 540 862 - 257 Thousands/uL    MPV 9 9 8 9 - 12 7 fL   Procalcitonin, Next Day AM Collection    Collection Time: 03/29/22  5:16 AM   Result Value Ref Range    Procalcitonin 18 49 (H) <=0 25 ng/ml   Fingerstick Glucose (POCT)    Collection Time: 03/29/22  7:45 AM   Result Value Ref Range    POC Glucose 99 65 - 140 mg/dl   Fingerstick Glucose (POCT)    Collection Time: 03/29/22 11:22 AM   Result Value Ref Range    POC Glucose 187 (H) 65 - 140 mg/dl   Standard Transport Chair    Collection Time: 03/29/22 12:44 PM   Result Value Ref Range    Supplier Name [employer]     Supplier Phone Number [phone]     Order Status Delivery Successful, Contacting Patient     Delivery Note       Please contact daughter Elan Newton at [phone] to discuss copay and coordinate delivery to the home    Delivery Request Date 03/29/2022     Date Delivered  03/31/2022     Supplier Name 03/31/2022     Item Description Standard Transport Chair, 23''     Item Description Standard [employer] Det[employer]able [employer] Rests    Fingerstick Glucose (POCT)    Collection Time: 03/29/22  3:48 PM   Result Value Ref Range POC Glucose 122 65 - 140 mg/dl   Fingerstick Glucose (POCT)    Collection Time: 03/29/22  9:08 PM   Result Value Ref Range    POC Glucose 216 (H) 65 - 140 mg/dl   CBC and differential    Collection Time: 03/30/22  4:36 AM   Result Value Ref Range    WBC 10 44 (H) 4 31 - 10 16 Thousand/uL    RBC 4 06 3 88 - 5 62 Million/uL    Hemoglobin 12 4 12 0 - 17 0 g/dL    Hematocrit 38 5 36 5 - 49 3 %    MCV 95 82 - 98 fL    MCH 30 5 26 8 - 34 3 pg    MCHC 32 2 31 4 - 37 4 g/dL    RDW 13 2 11 6 - 15 1 %    MPV 9 8 8 9 - 12 7 fL    Platelets 990 677 - 842 Thousands/uL    nRBC 0 /100 WBCs    Neutrophils Relative 80 (H) 43 - 75 %    Immat GRANS % 0 0 - 2 %    Lymphocytes Relative 9 (L) 14 - 44 %    Monocytes Relative 9 4 - 12 %    Eosinophils Relative 2 0 - 6 %    Basophils Relative 0 0 - 1 %    Neutrophils Absolute 8 36 (H) 1 85 - 7 62 Thousands/µL    Immature Grans Absolute 0 04 0 00 - 0 20 Thousand/uL    Lymphocytes Absolute 0 92 0 60 - 4 47 Thousands/µL    Monocytes Absolute 0 91 0 17 - 1 22 Thousand/µL    Eosinophils Absolute 0 19 0 00 - 0 61 Thousand/µL    Basophils Absolute 0 02 0 00 - 0 10 Thousands/µL   Basic metabolic panel    Collection Time: 03/30/22  4:36 AM   Result Value Ref Range    Sodium 139 136 - 145 mmol/L    Potassium 3 7 3 5 - 5 3 mmol/L    Chloride 107 100 - 108 mmol/L    CO2 26 21 - 32 mmol/L    ANION GAP 6 4 - 13 mmol/L    BUN 30 (H) 5 - 25 mg/dL    Creatinine 1 29 0 60 - 1 30 mg/dL    Glucose 95 65 - 140 mg/dL    Calcium 8 7 8 3 - 10 1 mg/dL    eGFR 49 ml/min/1 73sq m   Procalcitonin    Collection Time: 03/30/22  4:36 AM   Result Value Ref Range    Procalcitonin 11 43 (H) <=0 25 ng/ml   Fingerstick Glucose (POCT)    Collection Time: 03/30/22  6:57 AM   Result Value Ref Range    POC Glucose 105 65 - 140 mg/dl   Fingerstick Glucose (POCT)    Collection Time: 03/30/22 11:37 AM   Result Value Ref Range    POC Glucose 164 (H) 65 - 140 mg/dl   Fingerstick Glucose (POCT)    Collection Time: 03/30/22  4:29 PM Result Value Ref Range    POC Glucose 186 (H) 65 - 140 mg/dl   Fingerstick Glucose (POCT)    Collection Time: 03/30/22  9:39 PM   Result Value Ref Range    POC Glucose 136 65 - 140 mg/dl   CBC and differential    Collection Time: 03/31/22  4:50 AM   Result Value Ref Range    WBC 9 13 4 31 - 10 16 Thousand/uL    RBC 3 99 3 88 - 5 62 Million/uL    Hemoglobin 12 0 12 0 - 17 0 g/dL    Hematocrit 37 7 36 5 - 49 3 %    MCV 95 82 - 98 fL    MCH 30 1 26 8 - 34 3 pg    MCHC 31 8 31 4 - 37 4 g/dL    RDW 13 2 11 6 - 15 1 %    MPV 9 5 8 9 - 12 7 fL    Platelets 931 952 - 321 Thousands/uL    nRBC 0 /100 WBCs    Neutrophils Relative 73 43 - 75 %    Immat GRANS % 1 0 - 2 %    Lymphocytes Relative 13 (L) 14 - 44 %    Monocytes Relative 10 4 - 12 %    Eosinophils Relative 3 0 - 6 %    Basophils Relative 0 0 - 1 %    Neutrophils Absolute 6 67 1 85 - 7 62 Thousands/µL    Immature Grans Absolute 0 05 0 00 - 0 20 Thousand/uL    Lymphocytes Absolute 1 21 0 60 - 4 47 Thousands/µL    Monocytes Absolute 0 91 0 17 - 1 22 Thousand/µL    Eosinophils Absolute 0 27 0 00 - 0 61 Thousand/µL    Basophils Absolute 0 02 0 00 - 0 10 Thousands/µL   Basic metabolic panel    Collection Time: 03/31/22  4:53 AM   Result Value Ref Range    Sodium 141 136 - 145 mmol/L    Potassium 3 7 3 5 - 5 3 mmol/L    Chloride 107 100 - 108 mmol/L    CO2 25 21 - 32 mmol/L    ANION GAP 9 4 - 13 mmol/L    BUN 31 (H) 5 - 25 mg/dL    Creatinine 1 32 (H) 0 60 - 1 30 mg/dL    Glucose 100 65 - 140 mg/dL    Calcium 8 6 8 3 - 10 1 mg/dL    eGFR 48 ml/min/1 73sq m   Fingerstick Glucose (POCT)    Collection Time: 03/31/22  7:12 AM   Result Value Ref Range    POC Glucose 112 65 - 140 mg/dl   Fingerstick Glucose (POCT)    Collection Time: 03/31/22 11:02 AM   Result Value Ref Range    POC Glucose 192 (H) 65 - 140 mg/dl       The following portions of the patient's history were reviewed and updated as appropriate: allergies, current medications, past family history, past medical history, past social history, past surgical history and problem list      Review of Systems   Constitutional: Positive for fatigue  Negative for appetite change, chills, diaphoresis, fever and unexpected weight change  HENT: Negative for congestion, hearing loss and rhinorrhea  Eyes: Negative for visual disturbance  Respiratory: Positive for cough and shortness of breath  Negative for chest tightness and wheezing  Cardiovascular: Negative for chest pain, palpitations and leg swelling  Gastrointestinal: Negative for abdominal pain and blood in stool  Endocrine: Negative for cold intolerance, heat intolerance, polydipsia and polyuria  Genitourinary: Negative for difficulty urinating, dysuria, frequency and urgency  Musculoskeletal: Positive for gait problem  Negative for arthralgias and myalgias  Skin: Negative for rash  Neurological: Negative for dizziness, weakness, light-headedness and headaches  Hematological: Does not bruise/bleed easily  Psychiatric/Behavioral: Negative for dysphoric mood and sleep disturbance  Objective:      Vitals:    04/27/22 1408   BP: 130/60   Pulse: 84   Resp: 16   Temp: 99 8 °F (37 7 °C)   SpO2: 94%          Physical Exam  Constitutional:       Appearance: He is well-developed  HENT:      Head: Normocephalic and atraumatic  Nose: Nose normal    Eyes:      General: No scleral icterus  Conjunctiva/sclera: Conjunctivae normal       Pupils: Pupils are equal, round, and reactive to light  Neck:      Thyroid: No thyromegaly  Vascular: No JVD  Trachea: No tracheal deviation  Cardiovascular:      Rate and Rhythm: Normal rate and regular rhythm  Heart sounds: Murmur heard  No friction rub  No gallop  Pulmonary:      Effort: Pulmonary effort is normal  No respiratory distress  Breath sounds: Rhonchi present  No wheezing or rales        Comments: Poor air exchange, coarse rhonchi bilaterally with prolonged expiratory phase  Musculoskeletal:         General: No deformity  Cervical back: Normal range of motion and neck supple  Lymphadenopathy:      Cervical: No cervical adenopathy  Skin:     General: Skin is warm and dry  Coloration: Skin is not pale  Findings: No erythema or rash  Neurological:      Mental Status: He is alert and oriented to person, place, and time  Cranial Nerves: No cranial nerve deficit  Psychiatric:         Behavior: Behavior normal          Thought Content:  Thought content normal          Judgment: Judgment normal

## 2022-05-02 DIAGNOSIS — R33.9 URINARY RETENTION: ICD-10-CM

## 2022-05-02 RX ORDER — FINASTERIDE 5 MG/1
TABLET, FILM COATED ORAL
Qty: 90 TABLET | Refills: 0 | Status: SHIPPED | OUTPATIENT
Start: 2022-05-02 | End: 2022-07-27

## 2022-05-03 ENCOUNTER — TELEPHONE (OUTPATIENT)
Dept: INTERNAL MEDICINE CLINIC | Facility: CLINIC | Age: 87
End: 2022-05-03

## 2022-05-03 NOTE — TELEPHONE ENCOUNTER
Physical Therapists reports pt had 2 falls     ~ Friday night 4/29 - no injury  ~ This a m  5/3 - no injury  ( fell while getting out of bed - daughter found and picked up      [facility] # [phone]

## 2022-05-09 ENCOUNTER — APPOINTMENT (OUTPATIENT)
Dept: PULMONOLOGY | Facility: CLINIC | Age: 87
End: 2022-05-09
Payer: MEDICARE

## 2022-05-09 ENCOUNTER — TELEPHONE (OUTPATIENT)
Dept: INTERNAL MEDICINE CLINIC | Facility: CLINIC | Age: 87
End: 2022-05-09

## 2022-05-09 VITALS
HEART RATE: 51 BPM | TEMPERATURE: 97.5 F | OXYGEN SATURATION: 95 % | SYSTOLIC BLOOD PRESSURE: 147 MMHG | DIASTOLIC BLOOD PRESSURE: 61 MMHG

## 2022-05-09 DIAGNOSIS — Z3A.12 12 WEEKS GESTATION OF PREGNANCY: ICD-10-CM

## 2022-05-09 PROCEDURE — 88738 HGB QUANT TRANSCUTANEOUS: CPT

## 2022-05-09 PROCEDURE — 99214 OFFICE O/P EST MOD 30 MIN: CPT | Mod: 25

## 2022-05-09 PROCEDURE — 94726 PLETHYSMOGRAPHY LUNG VOLUMES: CPT

## 2022-05-09 PROCEDURE — 95012 NITRIC OXIDE EXP GAS DETER: CPT

## 2022-05-09 PROCEDURE — 94010 BREATHING CAPACITY TEST: CPT

## 2022-05-09 PROCEDURE — 94729 DIFFUSING CAPACITY: CPT

## 2022-05-09 NOTE — ASSESSMENT
[FreeTextEntry1] : Continue Flonase nasal spray \par Continue Singulair\par Continue Advair HFA\par Albuterol HFA as needed\par Start Mucinex as an expectorant.\par Check pulmonary function test for follow-up in 1 month.

## 2022-05-09 NOTE — REASON FOR VISIT
[Follow-Up] : a follow-up visit [Cough] : cough [Asthma] : asthma [TextBox_44] : Complains of throat itch/congestion

## 2022-05-09 NOTE — PROCEDURE
[FreeTextEntry1] : Pulmonary function test performed in my office today: Spirometry shows suggestive evidence of moderate restrictive defect; lung volume is within normal limits; resistance is increased; diffusion is within normal limits.  SPO2 at rest on room air is 95%.\par \par  Exhaled Nitric Oxide             Final\par \par No Documents Attached\par \par \par   Test   Result   Flag Reference Goal Last Verified \par   Exhaled Nitric Oxide 9      REQUIRED \par \par  Ordered by: BHUPINDER CABAN       Collected/Examined: 09May2022 10:06AM       \par Verification Required       Stage: Final       \par  Performed at: Other       Performed by: BHUPINDER CABAN       Resulted: 09May2022 10:05AM       Last Updated: 09May2022 10:06AM       \par

## 2022-05-09 NOTE — TELEPHONE ENCOUNTER
If he is not having any significant pain, then I do not think he needs to go to the emergency department  Home safety remains primary concern

## 2022-05-09 NOTE — TELEPHONE ENCOUNTER
called edvin and was notified and then stated he started to complain of pain right side will take him to ed

## 2022-05-09 NOTE — TELEPHONE ENCOUNTER
Occupational Therapist   Daughter Lola reports that pt had 4 falls since Thursday p m  - Daughter found pt on floor this a m  Bruise on left hip and swollen left ring finger  - Finger does not appear broken  Pt reports no pain  Vitals normal    Pt does not wanted to go to ER but family wanted him to go  Would like direction and advise from Dr Rodrigues [facility]

## 2022-05-10 ENCOUNTER — TELEPHONE (OUTPATIENT)
Dept: NEUROLOGY | Facility: CLINIC | Age: 87
End: 2022-05-10

## 2022-05-10 NOTE — TELEPHONE ENCOUNTER
Called patient leave a message to confirm [facility]

## 2022-05-11 ENCOUNTER — HOSPITAL ENCOUNTER (EMERGENCY)
Facility: HOSPITAL | Age: 87
Discharge: HOME | End: 2022-05-11
Attending: EMERGENCY MEDICINE
Payer: COMMERCIAL

## 2022-05-11 ENCOUNTER — APPOINTMENT (EMERGENCY)
Dept: CT IMAGING | Facility: HOSPITAL | Age: 87
End: 2022-05-11
Payer: COMMERCIAL

## 2022-05-11 VITALS
HEART RATE: 57 BPM | RESPIRATION RATE: 20 BRPM | DIASTOLIC BLOOD PRESSURE: 70 MMHG | TEMPERATURE: 98.1 F | SYSTOLIC BLOOD PRESSURE: 167 MMHG | OXYGEN SATURATION: 98 %
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
## 2022-05-11 DIAGNOSIS — S30.1XXA CONTUSION OF ABDOMINAL WALL, INITIAL ENCOUNTER: Primary | ICD-10-CM

## 2022-05-11 PROCEDURE — 74176 CT ABD & PELVIS W/O CONTRAST: CPT

## 2022-05-11 PROCEDURE — 99284 EMERGENCY DEPT VISIT MOD MDM: CPT | Performed by: EMERGENCY MEDICINE

## 2022-05-11 PROCEDURE — 99283 EMERGENCY DEPT VISIT LOW MDM: CPT

## 2022-05-11 PROCEDURE — G1004 CDSM NDSC: HCPCS

## 2022-05-11 NOTE — ED PROVIDER NOTES
History  Chief Complaint   Patient presents with    Cori Ny this past , family reports no LOC, did not hit head  Gabriela Kawasaki in between toilet and fall  Complains of left pinky and ring finger pain  No thinners  Complains of left hip pain  Caregiver reports multiple falls over last few days     81year-old male, presents with chief complaints of left abdominal pain  Patient lives at home, has home health and daughters who help take care of him  Daughter currently present states patient has frequent falls  Patient fell in bathroom on Monday injuring left side of his abdomen, has been complaining of pain in left abdomen since  Patient reports moderate, dull pain in left lateral abdomen that is constant with no modifying factors  Denies any head injury, no loss conscious  History provided by:  Patient and caregiver   used: No    Fall  Associated symptoms: abdominal pain    Associated symptoms: no back pain, no chest pain, no nausea, no neck pain and no vomiting        Prior to Admission Medications   Prescriptions Last Dose Informant Patient Reported? Taking?    QUEtiapine (SEROquel) 25 mg tablet   No No   Si mg at 10 pm and 50 mg at 2 am   Respiratory Therapy Supplies (Flutter) VON  Child No No   Sig: Use daily Use flutter valve at least three times daily and whenever needed for congestion   acetaminophen (TYLENOL) 325 mg tablet  Child Yes No   Sig: Take 650 mg by mouth every 6 (six) hours as needed for mild pain     albuterol (2 5 mg/3 mL) 0 083 % nebulizer solution   No No   Sig: USE 1 VIAL VIA NEBULIZER EVERY 4 HOURS AS NEEDED   albuterol (PROVENTIL HFA,VENTOLIN HFA) 90 mcg/act inhaler   No No   Sig: TAKE 2 PUFFS BY MOUTH EVERY 6 HOURS AS NEEDED FOR WHEEZE   atorvastatin (LIPITOR) 10 mg tablet   No No   Sig: TAKE 1 TABLET BY MOUTH EVERY DAY   benzonatate (TESSALON PERLES) 100 mg capsule   No No   Sig: TAKE 1 CAPSULE BY MOUTH THREE TIMES A DAY AS NEEDED FOR COUGH   Patient not taking: Reported on 2022   carbidopa-levodopa (SINEMET)  mg per tablet   No No   Sig: TAKE 1 TABLET BY MOUTH THREE TIMES A DAY   famotidine (PEPCID) 20 mg tablet   No No   Sig: TAKE 1 TABLET BY MOUTH TWICE A DAY   finasteride (PROSCAR) 5 mg tablet   No No   Sig: TAKE 1 TABLET BY MOUTH EVERY DAY IN THE MORNING   fluticasone-salmeterol (Wixela Inhub) 250-50 mcg/dose inhaler   No No   Sig: Inhale 1 puff 2 (two) times a day Rinse mouth after use  fluticasone-salmeterol (Wixela Inhub) 250-50 mcg/dose inhaler   No No   Sig: Inhale 1 puff 2 (two) times a day Rinse mouth after use  guaiFENesin 1200 MG TB12   No No   Sig: Take 1 tablet (1,200 mg total) by mouth every 12 (twelve) hours   montelukast (SINGULAIR) 10 mg tablet   No No   Sig: TAKE 1 TABLET BY MOUTH DAILY AT BEDTIME   nystatin (MYCOSTATIN) cream  Child No No   Sig: Apply to intertriginous areas 2-3 times daily as needed   nystatin (MYCOSTATIN) powder   No No   Sig: APPLY TO AFFECTED AREA TWICE A DAY AS DIRECTED   pramipexole (MIRAPEX) 0 5 mg tablet   No No   Sig: TAKE 1 TABLET BY MOUTH 3 TIMES A DAY     predniSONE 10 mg tablet   No No   Si MG BY MOUTH DAILY X 3 DAYS, 30MG DAILY X 3 DAYS, 20MG DAILY X 3 DAYS, THEN 10 MG DAILY   rivastigmine (EXELON) 4 5 MG capsule   No No   Sig: Take 1 capsule (4 5 mg total) by mouth 2 (two) times a day   tiotropium (Spiriva HandiHaler) 18 mcg inhalation capsule   No No   Sig: Place 1 capsule (18 mcg total) into inhaler and inhale daily Via Handihaler at the same time every day   triamcinolone (KENALOG) 0 5 % cream  Child No No   Sig: APPLY TO AFFECTED AREA(S) OF THE RASH TWICE DAILY AS NEEDED      Facility-Administered Medications: None       Past Medical History:   Diagnosis Date    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) ([facility])     Allergic rhinitis     last assessed [id number]    Aortic regurgitation     Arm laceration     Asthma     Bronchitis, chronic obstructive, with exacerbation ([facility]) last assessed 2015    Candidal intertrigo     Chronic obstructive lung disease ([facility] )     last assessed [id number]    COPD (chronic obstructive pulmonary disease) ([facility])     Coronary artery disease     carotid stents    Cough     CVA (cerebral vascular accident) ([place] [address] )     Dermatitis     Diabetes mellitus type 2, uncomplicated ([place] [address] )     controlled    Dysthymic disorder     Fatigue     Hyperlipidemia     Hypertension     Neurologic gait dysfunction     Parkinson's disease ([place] [address] )     Pneumonia     PVD (peripheral vascular disease) ([place] [address] )     Seborrheic keratosis     TIA (transient ischemic attack)     Tinea corporis     Tinea pedis of both feet     Tremor        Past Surgical History:   Procedure Laterality Date    NASAL SINUS SURGERY      MI BRONCHOSCOPY,DIAGNOSTIC N/A 2016    Procedure: BRONCHOSCOPY;  Surgeon: Adriana Merritt MD;  Location: [facility]; Service: Pulmonary       Family History   Problem Relation Age of Onset    No Known Problems Mother         Old Age    COPD Sister     Lung cancer Sister     Asthma Family      I have reviewed and agree with the history as documented  E-Cigarette/Vaping    E-Cigarette Use Never User      E-Cigarette/Vaping Substances    Nicotine No     THC No     CBD No     Flavoring No     Other No     Unknown No      Social History     Tobacco Use    Smoking status: Former Smoker     Packs/day: 1 00     Years: 3 00     Pack years: 3 00     Types: Cigarettes     Start date: 47     Quit date:      Years since quittin 3    Smokeless tobacco: Never Used   Vaping Use    Vaping Use: Never used   Substance Use Topics    Alcohol use: Not Currently    Drug use: No       Review of Systems   Constitutional: Negative  HENT: Negative  Eyes: Negative  Respiratory: Negative  Negative for shortness of breath  Cardiovascular: Negative  Negative for chest pain  Gastrointestinal: Positive for abdominal pain   Negative for nausea and vomiting  Musculoskeletal: Negative for back pain and neck pain  Neurological: Negative  Physical Exam  Physical Exam  Vitals and nursing note reviewed  Constitutional:       General: He is not in acute distress  HENT:      Head: Normocephalic and atraumatic  Eyes:      Extraocular Movements: Extraocular movements intact  Pupils: Pupils are equal, round, and reactive to light  Cardiovascular:      Rate and Rhythm: Normal rate and regular rhythm  Pulmonary:      Effort: Pulmonary effort is normal       Breath sounds: Normal breath sounds  Chest:      Chest wall: No deformity or tenderness  Abdominal:      Comments: Nondistended, soft  Mild tenderness with small area of ecchymosis to left lower lateral abdomen  Rest of abdomen nontender  Musculoskeletal:         General: No tenderness  Normal range of motion  Cervical back: Normal range of motion and neck supple  No tenderness  Comments: Bilateral hips with full range of motion, no tenderness  No back tenderness   Skin:     General: Skin is warm and dry  Comments: Small area of ecchymosis to with lower lateral abdomen  No ecchymosis noted to back  Neurological:      General: No focal deficit present  Mental Status: He is alert        Comments: Able stand and ambulate with assistance         Vital Signs  ED Triage Vitals [05/11/22 1340]   Temperature Pulse Respirations Blood Pressure SpO2   98 1 °F (36 7 °C) 77 20 136/61 96 %      Temp Source Heart Rate Source Patient Position - Orthostatic VS BP Location FiO2 (%)   Oral Monitor Sitting Left arm --      Pain Score       --           Vitals:    05/11/22 1340   BP: 136/61   Pulse: 77   Patient Position - Orthostatic VS: Sitting         Visual Acuity      ED Medications  Medications - No data to display    Diagnostic Studies  Results Reviewed     None                 CT abdomen pelvis wo contrast    (Results Pending)              Procedures  Procedures         ED Course  ED Course as of 05/11/22 1448   Wed May 11, 2022   1448 CT scan results reviewed, no acute traumatic injury noted  Discussed with patient and daughter, instructed to ice the area and use Tylenol as needed  Follow with PCP, return precautions given  MDM  Number of Diagnoses or Management Options  Contusion of abdominal wall, initial encounter  Fall, initial encounter  Diagnosis management comments: 81-year-old male, history of frequent falls, presented with abdominal pain  Differential diagnosis includes traumatic intra-abdominal injury, contusion, strain among other diagnosis  CT abdomen pelvis ordered for further evaluation, continue monitor needing re-evaluate  Amount and/or Complexity of Data Reviewed  Tests in the radiology section of CPT®: ordered and reviewed  Obtain history from someone other than the patient: yes        Disposition  Final diagnoses:   None     ED Disposition     None      Follow-up Information    None         Patient's Medications   Discharge Prescriptions    No medications on file       No discharge procedures on file      PDMP Review     None          ED Provider  Electronically Signed by           Chris Ojeda MD  05/11/22 1129

## 2022-05-15 DIAGNOSIS — L30.4 INTERTRIGO: ICD-10-CM

## 2022-05-15 RX ORDER — NYSTATIN 100000 [USP'U]/G
POWDER TOPICAL
Qty: 30 G | Refills: 1 | Status: SHIPPED | OUTPATIENT
Start: 2022-05-15 | End: 2022-07-10

## 2022-05-16 ENCOUNTER — TELEMEDICINE (OUTPATIENT)
Dept: PULMONOLOGY | Facility: CLINIC | Age: 87
End: 2022-05-16
Payer: COMMERCIAL

## 2022-05-16 ENCOUNTER — TELEMEDICINE (OUTPATIENT)
Dept: NEUROLOGY | Facility: CLINIC | Age: 87
End: 2022-05-16
Payer: COMMERCIAL

## 2022-05-16 VITALS — HEIGHT: 62 IN | BODY MASS INDEX: 33.49 KG/M2 | WEIGHT: 182 LBS

## 2022-05-16 DIAGNOSIS — B44.81 ABPA (ALLERGIC BRONCHOPULMONARY ASPERGILLOSIS) (HCC): ICD-10-CM

## 2022-05-16 DIAGNOSIS — J45.40 MODERATE PERSISTENT EXTRINSIC ASTHMA WITHOUT COMPLICATION: ICD-10-CM

## 2022-05-16 DIAGNOSIS — J42 CHRONIC BRONCHITIS, UNSPECIFIED CHRONIC BRONCHITIS TYPE (HCC): ICD-10-CM

## 2022-05-16 DIAGNOSIS — G20 PARKINSON'S DISEASE (HCC): Primary | ICD-10-CM

## 2022-05-16 DIAGNOSIS — J44.9 CHRONIC OBSTRUCTIVE PULMONARY DISEASE, UNSPECIFIED COPD TYPE (HCC): ICD-10-CM

## 2022-05-16 DIAGNOSIS — F02.81 DEMENTIA DUE TO PARKINSON'S DISEASE WITH BEHAVIORAL DISTURBANCE (HCC): ICD-10-CM

## 2022-05-16 DIAGNOSIS — J47.9 BRONCHIECTASIS WITHOUT COMPLICATION (HCC): Primary | ICD-10-CM

## 2022-05-16 DIAGNOSIS — G20 DEMENTIA DUE TO PARKINSON'S DISEASE WITH BEHAVIORAL DISTURBANCE (HCC): ICD-10-CM

## 2022-05-16 PROCEDURE — 1160F RVW MEDS BY RX/DR IN RCRD: CPT | Performed by: INTERNAL MEDICINE

## 2022-05-16 PROCEDURE — 99441 PR PHYS/QHP TELEPHONE EVALUATION 5-10 MIN: CPT

## 2022-05-16 PROCEDURE — 1036F TOBACCO NON-USER: CPT | Performed by: INTERNAL MEDICINE

## 2022-05-16 PROCEDURE — 99214 OFFICE O/P EST MOD 30 MIN: CPT | Performed by: INTERNAL MEDICINE

## 2022-05-16 RX ORDER — TIOTROPIUM BROMIDE 18 UG/1
18 CAPSULE ORAL; RESPIRATORY (INHALATION) DAILY
Qty: 90 CAPSULE | Refills: 1 | Status: SHIPPED | OUTPATIENT
Start: 2022-05-16 | End: 2022-07-11

## 2022-05-16 RX ORDER — ALBUTEROL SULFATE 2.5 MG/3ML
SOLUTION RESPIRATORY (INHALATION)
Qty: 360 ML | Refills: 1 | Status: SHIPPED | OUTPATIENT
Start: 2022-05-16 | End: 2022-07-11

## 2022-05-16 NOTE — PROGRESS NOTES
Virtual Regular Visit    Verification of patient location:    Patient is located in the following state in which I hold an active license [place]      Assessment/Plan:    Problem List Items Addressed This Visit        Respiratory    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) ([facility] [address] )    Relevant Medications    fluticasone-salmeterol (Advair) 250-50 mcg/dose inhaler    tiotropium (Spiriva HandiHaler) 18 mcg inhalation capsule    albuterol (2 5 mg/3 mL) 0 083 % nebulizer solution    Allergic asthma    Relevant Medications    fluticasone-salmeterol (Advair) 250-50 mcg/dose inhaler    tiotropium (Spiriva HandiHaler) 18 mcg inhalation capsule    albuterol (2 5 mg/3 mL) 0 083 % nebulizer solution      Other Visit Diagnoses     Bronchiectasis without complication (HCC)    -  Primary    Relevant Medications    fluticasone-salmeterol (Advair) 250-50 mcg/dose inhaler    tiotropium (Spiriva HandiHaler) 18 mcg inhalation capsule    albuterol (2 5 mg/3 mL) 0 083 % nebulizer solution    Chronic obstructive pulmonary disease, unspecified COPD type (HCC)        Relevant Medications    fluticasone-salmeterol (Advair) 250-50 mcg/dose inhaler    tiotropium (Spiriva HandiHaler) 18 mcg inhalation capsule    albuterol (2 5 mg/3 mL) 0 083 % nebulizer solution    Chronic bronchitis, unspecified chronic bronchitis type (HCC)        Relevant Medications    fluticasone-salmeterol (Advair) 250-50 mcg/dose inhaler    tiotropium (Spiriva HandiHaler) 18 mcg inhalation capsule    albuterol (2 5 mg/3 mL) 0 083 % nebulizer solution        As per the daughter the cough has significantly decreased from before he has and he is not wheezing   Discussed with the patient to continue with albuterol via nebulizer 4 times daily as needed currently using it twice a day she states      Continue with Wixela 250/51 puff twice daily  Rinse mouth after use   Continue with Spiriva 1 puff daily   Continue with Singulair 10 mg daily and the prednisone 10 mg daily after breakfast     Percussion vest was ordered, he states he does still did not receive eat yet  Follow-up in 3 months or p r n  earlier as needed  Vaccinations up-to-date       Reason for visit is   Chief Complaint   Patient presents with    Virtual Regular Visit        Encounter provider Guadalupe Chapman MD    Provider located at [address] [place] [address],[address] [facility] [address]      Recent Visits  Date Type Provider Dept   05/09/22 Telephone Nichole Maria MD [address] [place] [address] recent visits within past 7 days and meeting all other requirements  Today's Visits  Date Type Provider Dept   05/16/22 Telemedicine Guadalupe Chapman MD [facility]   Showing today's visits and meeting all other requirements  Future Appointments  No visits were found meeting these conditions  Showing future appointments within next 150 days and meeting all other requirements       The patient was identified by name and date of birth  Khadra Gonzalez was informed that this is a telemedicine visit and that the visit is being conducted through [address] [place] [address] Now and patient was informed that this is a secure, HIPAA-compliant platform  He agrees to proceed     My office door was closed  No one else was in the room  He acknowledged consent and understanding of privacy and security of the video platform  The patient has agreed to participate and understands they can discontinue the visit at any time  Patient is aware this is a billable service  Farrukh Villasenor is a 81 y o  male    [place]en[place] Devi is a pleasant 60-year-old male former smoker with past medical history including bronchiectasis, allergic asthma/ABPA with IgE level greater than 5000, Parkinson's disease with dementia, ambulatory dysfunction presenting for follow-up  Patient has not been seen in over 1 year    It seems overall his symptoms are without significant change  There has been gradual progression of his Parkinson's associated with physical deconditioning and increased fatigue  He does have frequent cough and difficulty bringing up his mucus  Has had 2 episodes of exacerbations in the past year requiring prednisone tapering down dose he has also been on a maintenance dose of prednisone 10 mg daily for several years now  He scheduled for an office visit today and he states his daughter was exposed to Radha and she did come to visit him yesterday he currently does not have any symptoms, so his he called to make this visit a virtual visit  Past Medical History:   Diagnosis Date    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) ([facility])     Allergic rhinitis     last assessed [id number]    Aortic regurgitation     Arm laceration     Asthma     Bronchitis, chronic obstructive, with exacerbation ([facility])     last assessed 12Jun2015    Candidal intertrigo     [facility]     last assessed 17Dec2013    COPD (chronic obstructive pulmonary disease) ([facility])     Coronary artery disease     carotid stents    Cough     CVA [facility]cerebral vascular accident) [facility]     Dermatitis     Diabetes mellitus type 2, uncomplicated ([facility] [address] )     controlled    Dysthymic disorder     Fatigue     Hyperlipidemia     Hypertension     Neurologic gait dysfunction     Parkinson's disease [facility]     Pneumonia     PVD [facility]peripheral vascular disease) [facility]     Seborrheic keratosis     TIA (transient ischemic attack)     Tinea corporis     Tinea pedis of both feet     Tremor        Past Surgical History:   Procedure Laterality Date    NASAL SINUS SURGERY      [place] BRONCHOSCOPY,DIAGNOSTIC N/A 7/27/2016    Procedure: BRONCHOSCOPY;  Surgeon: Carolann Guadarrama MD;  [facility];   Service: Pulmonary       Current Outpatient Medications   Medication Sig Dispense Refill    acetaminophen (TYLENOL) 325 mg tablet Take 650 mg by mouth every 6 (six) hours as needed for mild pain        albuterol (2 5 mg/3 mL) 0 083 % nebulizer solution USE 1 VIAL VIA NEBULIZER EVERY 4 HOURS AS NEEDED 360 mL 1    albuterol (PROVENTIL HFA,VENTOLIN HFA) 90 mcg/act inhaler TAKE 2 PUFFS BY MOUTH EVERY 6 HOURS AS NEEDED FOR WHEEZE 1 g 11    atorvastatin (LIPITOR) 10 mg tablet TAKE 1 TABLET BY MOUTH EVERY DAY 90 tablet 1    carbidopa-levodopa (SINEMET)  mg per tablet TAKE 1 TABLET BY MOUTH THREE TIMES A  tablet 1    famotidine (PEPCID) 20 mg tablet TAKE 1 TABLET BY MOUTH TWICE A DAY 30 tablet 2    finasteride (PROSCAR) 5 mg tablet TAKE 1 TABLET BY MOUTH EVERY DAY IN THE MORNING 90 tablet 0    fluticasone-salmeterol (Advair) 250-50 mcg/dose inhaler Inhale 1 puff  in the morning and 1 puff in the evening  Rinse mouth after use    60 blister 5    fluticasone-salmeterol (Wixela Inhub) 250-50 mcg/dose inhaler Inhale 1 puff 2 (two) times a day Rinse mouth after use  60 blister 2    guaiFENesin 1200 MG TB12 Take 1 tablet (1,200 mg total) by mouth every 12 (twelve) hours 20 tablet 0    montelukast (SINGULAIR) 10 mg tablet TAKE 1 TABLET BY MOUTH DAILY AT BEDTIME 90 tablet 3    nystatin (MYCOSTATIN) cream Apply to intertriginous areas 2-3 times daily as needed 80 g 3    nystatin (MYCOSTATIN) powder APPLY TO AFFECTED AREA TWICE A DAY AS DIRECTED 30 g 1    pramipexole (MIRAPEX) 0 5 mg tablet TAKE 1 TABLET BY MOUTH 3 TIMES A DAY   270 tablet 0    predniSONE 10 mg tablet 40 MG BY MOUTH DAILY X 3 DAYS, 30MG DAILY X 3 DAYS, 20MG DAILY X 3 DAYS, THEN 10 MG DAILY 30 tablet 5    QUEtiapine (SEROquel) 25 mg tablet 50 mg at 10 pm and 50 mg at 2 am      Respiratory Therapy Supplies (Flutter) VON Use daily Use flutter valve at least three times daily and whenever needed for congestion 1 each 0    rivastigmine (EXELON) 4 5 MG capsule Take 1 capsule (4 5 mg total) by mouth 2 (two) times a day 180 capsule 6    tiotropium (Spiriva HandiHaler) 18 mcg inhalation capsule Place 1 capsule (18 mcg total) into inhaler and inhale in the morning  Via Handihaler at the same time every day  90 capsule 1    triamcinolone (KENALOG) 0 5 % cream APPLY TO AFFECTED AREA(S) OF THE RASH TWICE DAILY AS NEEDED 30 g 0    benzonatate (TESSALON PERLES) 100 mg capsule TAKE 1 CAPSULE BY MOUTH THREE TIMES A DAY AS NEEDED FOR COUGH (Patient not taking: No sig reported) 20 capsule 0     No current facility-administered medications for this visit  Allergies   Allergen Reactions    Penicillins Syncope       Review of Systems   Constitutional: Negative  HENT: Negative  Eyes: Negative  Respiratory: Positive for cough and shortness of breath  Cardiovascular: Negative  Gastrointestinal: Negative  Endocrine: Negative  Genitourinary: Negative  Musculoskeletal: Negative  Allergic/Immunologic: Negative  Neurological: Negative  Hematological: Negative  Psychiatric/Behavioral: Negative  Video Exam  Currently speaking in full sentences not in any acute respiratory distress  Vitals:    05/16/22 1027   Weight: 82 6 kg (182 lb)   Height: 5' 2" (1 575 m)       Physical Exam   General:  Awake, alert, and oriented  No acute distress  HEENT: Normocephalic, without obvious abnormality, atraumatic  Conjunctiva and EOM are normal   Sclera nonicteric, noninjected  Hearing intact  Normal range of motion of neck  Pulm:  Effort normal  No respiratory distress  Cardiovascular:  No JVD appreciated  Musculoskeletal:  Moving all limbs appropriately  No gross deformity noted  Neuro:  No aphasia  No facial asymmetry  No acute focal neurologic deficit noted  Skin:  Appears to be dry  Not diaphoretic  No jaundice, erythema, or cyanosis appreciated  Psych:  Cooperative  Normal mood and affect  Behavior is normal  Judgement and thought content normal  No acute psychosis  VIRTUAL VISIT DISCLAIMER      Delmi Finch verbally agrees to participate in [employer]   Pt is aware that [facility] Services could be limited without vital signs or the ability to perform a full hands-on physical Thressa Roch understands he or the provider may request at any time to terminate the video visit and request the patient to seek care or treatment in person

## 2022-05-16 NOTE — ASSESSMENT & PLAN NOTE
Overall his Parkinson's disease is stable  His major risk is his frequent falls  His daughter states this is because he does not always remember to move his body with his walker or leans his weight on his daughters when they are assisting him with ambulation  Plan:  Continue carbidopa levodopa  1 tid  Continue pramipexole 0 5 mg tid  Continue rivastigmine 4 5 mg bid  Continue with speech therapy, occupational therapy, and physical therapy  Continue with fall precautions, consider using the wheelchair especially on days where he appears to be more off balance    Follow up in 6 months; sooner if needed

## 2022-05-16 NOTE — ASSESSMENT & PLAN NOTE
Overall his memory/cognition is stable  His daughters deny any significant change  He is still having hallucinations however his daughter prefers not to increase his Seroquel or add/change to an alternative as he has had confusion in the past with higher dosages of seroquel  He continues on 50 mg at 10:00 p m  and 50 mg at 2:00 a m  and his daughter feels this is adequate at this time  Will continue to monitor for now
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
## 2022-05-17 DIAGNOSIS — F02.81 DEMENTIA DUE TO PARKINSON'S DISEASE WITH BEHAVIORAL DISTURBANCE (HCC): ICD-10-CM

## 2022-05-17 DIAGNOSIS — G20 DEMENTIA DUE TO PARKINSON'S DISEASE WITH BEHAVIORAL DISTURBANCE (HCC): ICD-10-CM

## 2022-05-17 DIAGNOSIS — B44.81 ABPA (ALLERGIC BRONCHOPULMONARY ASPERGILLOSIS) (HCC): ICD-10-CM

## 2022-05-18 RX ORDER — QUETIAPINE FUMARATE 25 MG/1
TABLET, FILM COATED ORAL
Refills: 0
Start: 2022-05-18 | End: 2022-06-14 | Stop reason: SDUPTHER

## 2022-05-18 RX ORDER — PREDNISONE 10 MG/1
TABLET ORAL
Qty: 30 TABLET | Refills: 0 | Status: SHIPPED | OUTPATIENT
Start: 2022-05-18 | End: 2022-06-15

## 2022-05-24 DIAGNOSIS — R05.9 COUGH: ICD-10-CM

## 2022-05-24 RX ORDER — FAMOTIDINE 20 MG/1
TABLET, FILM COATED ORAL
Qty: 30 TABLET | Refills: 2 | Status: SHIPPED | OUTPATIENT
Start: 2022-05-24 | End: 2022-06-10

## 2022-06-09 DIAGNOSIS — R05.9 COUGH: ICD-10-CM

## 2022-06-10 RX ORDER — FAMOTIDINE 20 MG/1
TABLET, FILM COATED ORAL
Qty: 180 TABLET | Refills: 1 | Status: SHIPPED | OUTPATIENT
Start: 2022-06-10

## 2022-06-19 ENCOUNTER — APPOINTMENT (EMERGENCY)
Dept: RADIOLOGY | Facility: HOSPITAL | Age: 87
DRG: 189 | End: 2022-06-19
Payer: COMMERCIAL

## 2022-06-19 ENCOUNTER — APPOINTMENT (EMERGENCY)
Dept: CT IMAGING | Facility: HOSPITAL | Age: 87
DRG: 189 | End: 2022-06-19
Payer: COMMERCIAL

## 2022-06-19 ENCOUNTER — HOSPITAL ENCOUNTER (INPATIENT)
Facility: HOSPITAL | Age: 87
LOS: 1 days | Discharge: HOME HEALTH | DRG: 189 | End: 2022-06-20
Attending: EMERGENCY MEDICINE | Admitting: STUDENT IN AN ORGANIZED HEALTH CARE EDUCATION/TRAINING PROGRAM
Payer: COMMERCIAL

## 2022-06-19 DIAGNOSIS — I63.9 STROKE (HCC): Primary | ICD-10-CM

## 2022-06-19 DIAGNOSIS — J96.01 ACUTE RESPIRATORY FAILURE WITH HYPOXIA (HCC): ICD-10-CM

## 2022-06-19 DIAGNOSIS — B44.81 ABPA (ALLERGIC BRONCHOPULMONARY ASPERGILLOSIS) (HCC): ICD-10-CM

## 2022-06-19 DIAGNOSIS — J69.0 ASPIRATION PNEUMONIA OF LEFT LOWER LOBE, UNSPECIFIED ASPIRATION PNEUMONIA TYPE (HCC): ICD-10-CM

## 2022-06-19 DIAGNOSIS — T17.908A ASPIRATION INTO AIRWAY: ICD-10-CM

## 2022-06-19 DIAGNOSIS — J44.1 COPD EXACERBATION (HCC): ICD-10-CM

## 2022-06-19 PROBLEM — T17.928A ASPIRATION OF FOOD: Status: ACTIVE | Noted: 2022-06-19

## 2022-06-19 PROBLEM — R41.82 ALTERED MENTAL STATUS: Status: ACTIVE | Noted: 2022-06-19

## 2022-06-19 LAB
2HR DELTA HS TROPONIN: 7 NG/L
4HR DELTA HS TROPONIN: 7 NG/L
ANION GAP SERPL CALCULATED.3IONS-SCNC: 14 MMOL/L (ref 4–13)
APTT PPP: 23 SECONDS (ref 23–37)
ATRIAL RATE: 88 BPM
BUN SERPL-MCNC: 25 MG/DL (ref 5–25)
CALCIUM SERPL-MCNC: 9.1 MG/DL (ref 8.3–10.1)
CARDIAC TROPONIN I PNL SERPL HS: 20 NG/L
CARDIAC TROPONIN I PNL SERPL HS: 27 NG/L
CARDIAC TROPONIN I PNL SERPL HS: 27 NG/L
CHLORIDE SERPL-SCNC: 102 MMOL/L (ref 100–108)
CO2 SERPL-SCNC: 23 MMOL/L (ref 21–32)
CREAT SERPL-MCNC: 1.5 MG/DL (ref 0.6–1.3)
ERYTHROCYTE [DISTWIDTH] IN BLOOD BY AUTOMATED COUNT: 13.2 % (ref 11.6–15.1)
FLUAV RNA RESP QL NAA+PROBE: NEGATIVE
FLUBV RNA RESP QL NAA+PROBE: NEGATIVE
GFR SERPL CREATININE-BSD FRML MDRD: 41 ML/MIN/1.73SQ M
GLUCOSE SERPL-MCNC: 194 MG/DL (ref 65–140)
GLUCOSE SERPL-MCNC: 204 MG/DL (ref 65–140)
GLUCOSE SERPL-MCNC: 300 MG/DL (ref 65–140)
HCT VFR BLD AUTO: 43.2 % (ref 36.5–49.3)
HGB BLD-MCNC: 13.8 G/DL (ref 12–17)
INR PPP: 1.12 (ref 0.84–1.19)
MCH RBC QN AUTO: 31.1 PG (ref 26.8–34.3)
MCHC RBC AUTO-ENTMCNC: 31.9 G/DL (ref 31.4–37.4)
MCV RBC AUTO: 97 FL (ref 82–98)
P AXIS: 52 DEGREES
PLATELET # BLD AUTO: 193 THOUSANDS/UL (ref 149–390)
PMV BLD AUTO: 9.6 FL (ref 8.9–12.7)
POTASSIUM SERPL-SCNC: 4 MMOL/L (ref 3.5–5.3)
PR INTERVAL: 168 MS
PROTHROMBIN TIME: 13.9 SECONDS (ref 11.6–14.5)
QRS AXIS: -19 DEGREES
QRSD INTERVAL: 100 MS
QT INTERVAL: 392 MS
QTC INTERVAL: 474 MS
RBC # BLD AUTO: 4.44 MILLION/UL (ref 3.88–5.62)
RSV RNA RESP QL NAA+PROBE: NEGATIVE
SARS-COV-2 RNA RESP QL NAA+PROBE: NEGATIVE
SODIUM SERPL-SCNC: 139 MMOL/L (ref 136–145)
T WAVE AXIS: 40 DEGREES
VENTRICULAR RATE: 88 BPM
WBC # BLD AUTO: 11.22 THOUSAND/UL (ref 4.31–10.16)

## 2022-06-19 PROCEDURE — NC001 PR NO CHARGE: Performed by: NEUROLOGICAL SURGERY

## 2022-06-19 PROCEDURE — 0241U HB NFCT DS VIR RESP RNA 4 TRGT: CPT | Performed by: EMERGENCY MEDICINE

## 2022-06-19 PROCEDURE — 84484 ASSAY OF TROPONIN QUANT: CPT | Performed by: EMERGENCY MEDICINE

## 2022-06-19 PROCEDURE — 85610 PROTHROMBIN TIME: CPT | Performed by: EMERGENCY MEDICINE

## 2022-06-19 PROCEDURE — 93005 ELECTROCARDIOGRAM TRACING: CPT

## 2022-06-19 PROCEDURE — 36415 COLL VENOUS BLD VENIPUNCTURE: CPT | Performed by: EMERGENCY MEDICINE

## 2022-06-19 PROCEDURE — 82948 REAGENT STRIP/BLOOD GLUCOSE: CPT

## 2022-06-19 PROCEDURE — 80048 BASIC METABOLIC PNL TOTAL CA: CPT | Performed by: EMERGENCY MEDICINE

## 2022-06-19 PROCEDURE — 94640 AIRWAY INHALATION TREATMENT: CPT

## 2022-06-19 PROCEDURE — 99223 1ST HOSP IP/OBS HIGH 75: CPT | Performed by: HOSPITALIST

## 2022-06-19 PROCEDURE — 85730 THROMBOPLASTIN TIME PARTIAL: CPT | Performed by: EMERGENCY MEDICINE

## 2022-06-19 PROCEDURE — 85027 COMPLETE CBC AUTOMATED: CPT | Performed by: EMERGENCY MEDICINE

## 2022-06-19 PROCEDURE — 93010 ELECTROCARDIOGRAM REPORT: CPT | Performed by: INTERNAL MEDICINE

## 2022-06-19 PROCEDURE — 99285 EMERGENCY DEPT VISIT HI MDM: CPT

## 2022-06-19 PROCEDURE — 70498 CT ANGIOGRAPHY NECK: CPT

## 2022-06-19 PROCEDURE — 96374 THER/PROPH/DIAG INJ IV PUSH: CPT

## 2022-06-19 PROCEDURE — 71046 X-RAY EXAM CHEST 2 VIEWS: CPT

## 2022-06-19 PROCEDURE — 70496 CT ANGIOGRAPHY HEAD: CPT

## 2022-06-19 PROCEDURE — 99291 CRITICAL CARE FIRST HOUR: CPT | Performed by: EMERGENCY MEDICINE

## 2022-06-19 PROCEDURE — 99223 1ST HOSP IP/OBS HIGH 75: CPT | Performed by: PHYSICIAN ASSISTANT

## 2022-06-19 RX ORDER — FINASTERIDE 5 MG/1
5 TABLET, FILM COATED ORAL EVERY MORNING
Status: DISCONTINUED | OUTPATIENT
Start: 2022-06-20 | End: 2022-06-20 | Stop reason: HOSPADM

## 2022-06-19 RX ORDER — ALBUTEROL SULFATE 90 UG/1
2 AEROSOL, METERED RESPIRATORY (INHALATION) EVERY 4 HOURS PRN
Status: DISCONTINUED | OUTPATIENT
Start: 2022-06-19 | End: 2022-06-20 | Stop reason: HOSPADM

## 2022-06-19 RX ORDER — METHYLPREDNISOLONE SODIUM SUCCINATE 125 MG/2ML
125 INJECTION, POWDER, LYOPHILIZED, FOR SOLUTION INTRAMUSCULAR; INTRAVENOUS ONCE
Status: COMPLETED | OUTPATIENT
Start: 2022-06-19 | End: 2022-06-19

## 2022-06-19 RX ORDER — ATORVASTATIN CALCIUM 10 MG/1
10 TABLET, FILM COATED ORAL DAILY
Status: DISCONTINUED | OUTPATIENT
Start: 2022-06-20 | End: 2022-06-20 | Stop reason: HOSPADM

## 2022-06-19 RX ORDER — GUAIFENESIN 600 MG
1200 TABLET, EXTENDED RELEASE 12 HR ORAL EVERY 12 HOURS SCHEDULED
Status: DISCONTINUED | OUTPATIENT
Start: 2022-06-19 | End: 2022-06-20 | Stop reason: HOSPADM

## 2022-06-19 RX ORDER — BENZONATATE 100 MG/1
100 CAPSULE ORAL 3 TIMES DAILY PRN
Status: DISCONTINUED | OUTPATIENT
Start: 2022-06-19 | End: 2022-06-20 | Stop reason: HOSPADM

## 2022-06-19 RX ORDER — ALBUTEROL SULFATE 2.5 MG/3ML
5 SOLUTION RESPIRATORY (INHALATION) ONCE
Status: COMPLETED | OUTPATIENT
Start: 2022-06-19 | End: 2022-06-19

## 2022-06-19 RX ORDER — MONTELUKAST SODIUM 10 MG/1
10 TABLET ORAL
Status: DISCONTINUED | OUTPATIENT
Start: 2022-06-19 | End: 2022-06-20 | Stop reason: HOSPADM

## 2022-06-19 RX ORDER — PRAMIPEXOLE DIHYDROCHLORIDE 0.5 MG/1
0.5 TABLET ORAL 3 TIMES DAILY
Status: DISCONTINUED | OUTPATIENT
Start: 2022-06-19 | End: 2022-06-20 | Stop reason: HOSPADM

## 2022-06-19 RX ORDER — FAMOTIDINE 20 MG/1
20 TABLET, FILM COATED ORAL 2 TIMES DAILY
Status: DISCONTINUED | OUTPATIENT
Start: 2022-06-19 | End: 2022-06-20 | Stop reason: HOSPADM

## 2022-06-19 RX ORDER — NYSTATIN 100000 U/G
CREAM TOPICAL 2 TIMES DAILY
Status: DISCONTINUED | OUTPATIENT
Start: 2022-06-19 | End: 2022-06-20 | Stop reason: HOSPADM

## 2022-06-19 RX ORDER — TRIAMCINOLONE ACETONIDE 5 MG/G
CREAM TOPICAL 2 TIMES DAILY
Status: DISCONTINUED | OUTPATIENT
Start: 2022-06-19 | End: 2022-06-20 | Stop reason: HOSPADM

## 2022-06-19 RX ORDER — METHYLPREDNISOLONE SODIUM SUCCINATE 40 MG/ML
40 INJECTION, POWDER, LYOPHILIZED, FOR SOLUTION INTRAMUSCULAR; INTRAVENOUS EVERY 12 HOURS SCHEDULED
Status: DISCONTINUED | OUTPATIENT
Start: 2022-06-19 | End: 2022-06-20 | Stop reason: HOSPADM

## 2022-06-19 RX ORDER — ENOXAPARIN SODIUM 100 MG/ML
40 INJECTION SUBCUTANEOUS DAILY
Status: DISCONTINUED | OUTPATIENT
Start: 2022-06-20 | End: 2022-06-20 | Stop reason: HOSPADM

## 2022-06-19 RX ORDER — INSULIN LISPRO 100 [IU]/ML
1-5 INJECTION, SOLUTION INTRAVENOUS; SUBCUTANEOUS
Status: DISCONTINUED | OUTPATIENT
Start: 2022-06-19 | End: 2022-06-20 | Stop reason: HOSPADM

## 2022-06-19 RX ORDER — FLUTICASONE FUROATE AND VILANTEROL 200; 25 UG/1; UG/1
1 POWDER RESPIRATORY (INHALATION)
Status: DISCONTINUED | OUTPATIENT
Start: 2022-06-20 | End: 2022-06-20 | Stop reason: HOSPADM

## 2022-06-19 RX ORDER — ALBUTEROL SULFATE 2.5 MG/3ML
2.5 SOLUTION RESPIRATORY (INHALATION) EVERY 4 HOURS PRN
Status: DISCONTINUED | OUTPATIENT
Start: 2022-06-19 | End: 2022-06-19

## 2022-06-19 RX ORDER — RIVASTIGMINE TARTRATE 1.5 MG/1
4.5 CAPSULE ORAL 2 TIMES DAILY
Status: DISCONTINUED | OUTPATIENT
Start: 2022-06-19 | End: 2022-06-20 | Stop reason: HOSPADM

## 2022-06-19 RX ORDER — ACETAMINOPHEN 325 MG/1
650 TABLET ORAL EVERY 6 HOURS PRN
Status: DISCONTINUED | OUTPATIENT
Start: 2022-06-19 | End: 2022-06-20 | Stop reason: HOSPADM

## 2022-06-19 RX ORDER — QUETIAPINE FUMARATE 25 MG/1
50 TABLET, FILM COATED ORAL 2 TIMES DAILY
Status: DISCONTINUED | OUTPATIENT
Start: 2022-06-19 | End: 2022-06-20 | Stop reason: HOSPADM

## 2022-06-19 RX ADMIN — QUETIAPINE FUMARATE 50 MG: 25 TABLET ORAL at 18:24

## 2022-06-19 RX ADMIN — MONTELUKAST 10 MG: 10 TABLET, FILM COATED ORAL at 21:37

## 2022-06-19 RX ADMIN — CARBIDOPA AND LEVODOPA 1 TABLET: 25; 100 TABLET ORAL at 21:37

## 2022-06-19 RX ADMIN — IOHEXOL 70 ML: 350 INJECTION, SOLUTION INTRAVENOUS at 10:57

## 2022-06-19 RX ADMIN — INSULIN LISPRO 3 UNITS: 100 INJECTION, SOLUTION INTRAVENOUS; SUBCUTANEOUS at 21:34

## 2022-06-19 RX ADMIN — RIVASTIGMINE TARTRATE 4.5 MG: 1.5 CAPSULE ORAL at 18:19

## 2022-06-19 RX ADMIN — IPRATROPIUM BROMIDE 0.5 MG: 0.5 SOLUTION RESPIRATORY (INHALATION) at 12:05

## 2022-06-19 RX ADMIN — METHYLPREDNISOLONE SODIUM SUCCINATE 125 MG: 125 INJECTION, POWDER, FOR SOLUTION INTRAMUSCULAR; INTRAVENOUS at 12:05

## 2022-06-19 RX ADMIN — GUAIFENESIN 1200 MG: 600 TABLET ORAL at 21:37

## 2022-06-19 RX ADMIN — ALBUTEROL SULFATE 5 MG: 2.5 SOLUTION RESPIRATORY (INHALATION) at 12:05

## 2022-06-19 RX ADMIN — FAMOTIDINE 20 MG: 20 TABLET ORAL at 18:24

## 2022-06-19 RX ADMIN — METHYLPREDNISOLONE SODIUM SUCCINATE 40 MG: 40 INJECTION, POWDER, FOR SOLUTION INTRAMUSCULAR; INTRAVENOUS at 21:36

## 2022-06-19 RX ADMIN — TRIAMCINOLONE ACETONIDE: 5 CREAM TOPICAL at 18:20

## 2022-06-19 RX ADMIN — PRAMIPEXOLE DIHYDROCHLORIDE 0.5 MG: 0.5 TABLET ORAL at 21:37

## 2022-06-19 RX ADMIN — NYSTATIN: 100000 CREAM TOPICAL at 18:19

## 2022-06-19 NOTE — ASSESSMENT & PLAN NOTE
-continue home Parkinson's medications as follows: carbidopa levodopa  mg 1 tab t i d , pramipexole 0 5 mg t i d , and rivastigmine 4 5 mg b i d

## 2022-06-19 NOTE — ASSESSMENT & PLAN NOTE
81 y o  male with a PMH of Parkinson's disease , Parkinson's dementia, COPD, hypertension, type 2 diabetes, who presents with aspiration  At 10 am, he was eating a piece of bread when he started choking and then became unresponsive    Likely COPD exacerbation from aspiration episode  Check repeat CXR in AM  Steroids  Continue home medications
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
## 2022-06-19 NOTE — PLAN OF CARE
Problem: Potential for Falls  Goal: Patient will remain free of falls  Description: INTERVENTIONS:  - Educate patient/family on patient safety including physical limitations  - Instruct patient to call for assistance with activity   - Consult OT/PT to assist with strengthening/mobility   - Keep Call bell within reach  - Keep bed low and locked with side rails adjusted as appropriate  - Keep care items and personal belongings within reach  - Initiate and maintain comfort rounds  - Make Fall Risk Sign visible to staff  - Offer Toileting every  Hours, in advance of need  - Initiate/Maintain alarm  - Obtain necessary fall risk management equipment:  - Apply yellow socks and bracelet for high fall risk patients  - Consider moving patient to room near nurses station  6/19/2022 1814 by Rhea Hargrove RN  Outcome: Progressing  6/19/2022 1813 by Rhea Hargrove, RN  Outcome: Progressing

## 2022-06-19 NOTE — TELEMEDICINE
Progress Note - Neurosurgery   Terrence Roberts 81 y o  male MRN: [id number]  Unit/Bed#: ED 29 Encounter: [id number]    Assessment/Plan:    · Stroke alert at [facility]   · Initial CT head read reports left subdural hematoma  This is more likely noncalcified meningioma that has increased slowly in size since 2016  · Family has elected not to intervene, patient was released to p r n  follow-up   · Asked Radiology to adjust report   · No plan for intervention  · Stroke pathway per Neurology  · Discussed with ER staff  Call with questions  Objective:     Vitals: Blood pressure (!) 181/78, pulse 97, resp  rate 20, SpO2 94 %  ,There is no height or weight on file to calculate BMI  MDM:    CT stroke alert brain    Result Date: 6/19/2022  Narrative: CT BRAIN - STROKE ALERT PROTOCOL INDICATION:   Stroke Alert  COMPARISON:  Prior CT February 15, 2022 TECHNIQUE:  CT examination of the brain was performed  In addition to axial images, coronal reformatted images were created and submitted for interpretation  Radiation dose length product (DLP) for this visit:   This examination, like all CT scans performed in the [facility], was performed utilizing techniques to minimize radiation dose exposure, including the use of iterative reconstruction  and automated exposure control  IMAGE QUALITY:  Diagnostic  FINDINGS:  PARENCHYMA: Decreased attenuation is noted in periventricular and subcortical white matter demonstrating an appearance that is statistically most likely to represent moderate microangiopathic change; this appearance is similar when compared to most recent prior examination  No CT signs of acute infarction  No intracranial mass, mass effect or midline shift  Left frontal subdural hemorrhage measures 1 5 cm in maximum transverse dimension which is slightly larger than the prior study  Normal intracranial vasculature  VENTRICLES AND EXTRA-AXIAL SPACES:  Normal for patient's age   VISUALIZED ORBITS AND PARANASAL SINUSES:  Unremarkable  CALVARIUM AND EXTRACRANIAL SOFT TISSUES:   Normal      Impression: Slight interval increase size of left frontal subdural hemorrhage measuring 1 5 cm maximum dimension  No midline shift  Moderate chronic microangiopathic changes are similar to the prior study  Findings were directly discussed with Vilma Bustillo at 10:59 AM on 6/19/2022  Workstation performed: [id number]     ADDENDUM:     Left frontal extra-axial high dense region represents meningioma rather than subdural hemorrhage  There is no definite subdural hemorrhage identified  The meningioma is stable in size from the prior study  There is low density adjacent in the   subcortical white matter of the left paramedian frontal lobe which may represent encephalomalacia  The possibility of edema is not excluded  MRI of the brain recommended further evaluate  CTA stroke alert (head/neck)    Result Date: 6/19/2022  Narrative: CTA NECK AND BRAIN WITH CONTRAST INDICATION: Stroke Alert COMPARISON:   None  TECHNIQUE:   Post contrast imaging was performed after administration of iodinated contrast through the neck and brain  Post contrast axial 0 625 mm images timed to opacify the arterial system  3D rendering was performed on an independent workstation  MIP reconstructions performed  Coronal reconstructions were performed of the noncontrast portion of the brain  Radiation dose length product (DLP) for this visit:  607 mGy-cm   This examination, like all CT scans performed in the [facility], was performed utilizing techniques to minimize radiation dose exposure, including the use of iterative reconstruction and automated exposure control  IV Contrast:  70 mL of iohexol (OMNIPAQUE)  IMAGE QUALITY:   Diagnostic FINDINGS: CERVICAL VASCULATURE AORTIC ARCH AND GREAT VESSELS:  Bovine type aortic branching pattern noted which is a common anatomic variant   RIGHT VERTEBRAL ARTERY CERVICAL SEGMENT: Normal origin  The vessel is normal in caliber throughout the neck  LEFT VERTEBRAL ARTERY CERVICAL SEGMENT:  Normal origin  The vessel is normal in caliber throughout the neck  RIGHT EXTRACRANIAL CAROTID SEGMENT:  Status post stent placement in the distal right common carotid artery extending into the right internal carotid artery  Stent is widely patent without evidence of in-stent stenosis  LEFT EXTRACRANIAL CAROTID SEGMENT:  Normal caliber common carotid artery  Normal bifurcation and cervical internal carotid artery  No stenosis or dissection  NASCET criteria was used to determine the degree of internal carotid artery diameter stenosis  INTRACRANIAL VASCULATURE INTERNAL CAROTID ARTERIES:  Normal enhancement of the intracranial portions of the internal carotid arteries  Normal ophthalmic artery origins  Normal ICA terminus  ANTERIOR CIRCULATION:  Symmetric A1 segments and anterior cerebral arteries with normal enhancement  Normal anterior communicating artery  MIDDLE CEREBRAL ARTERY CIRCULATION:  M1 segment and middle cerebral artery branches demonstrate normal enhancement bilaterally  DISTAL VERTEBRAL ARTERIES:  Normal distal vertebral arteries  Posterior inferior cerebellar artery origins are normal  Normal vertebral basilar junction  BASILAR ARTERY:  Basilar artery is normal in caliber  Normal superior cerebellar arteries  POSTERIOR CEREBRAL ARTERIES: Both posterior cerebral arteries arises arises via a fetal origin VENOUS STRUCTURES:  Normal  NON VASCULAR ANATOMY BONY STRUCTURES:  No acute osseous abnormality  SOFT TISSUES OF THE NECK:  Normal  THORACIC INLET:  Right apical scarring with bronchiectasis  Impression: No significant carotid or vertebral artery stenosis  Status post right ICA stenting without evidence of in-stent stenosis  No focal intracranial stenosis or aneurysm  Prominent vasculature noted left frontoparietal vertex adjacent to the left frontal meningioma    Findings were directly discussed with Juliana Garcia at 11:20 AM on 6/19/2022  Workstation performed: [id number]       Imaging Studies: I have personally reviewed pertinent reports     and I have personally reviewed pertinent films in PACS with a Radiologist

## 2022-06-19 NOTE — CONSULTS
Consultation - Stroke   Garry 81 y o  male MRN: [id number]  Unit/Bed#: ED 29 Encounter: [id number]      Assessment/Plan     Altered mental status  Assessment & Plan  81 y o   male with Parkinson's disease , Parkinson's dementia, COPD, hypertension, type 2 diabetes, who presents to [facility] is a pre-hospital stroke alert due to altered mental status  Last known well 10:00 a m  06/19/2022  BP on arrival 181/70  NIHSS 0  CT head revealed known left frontal meningioma  CTA head and neck with and without contrast revealed no LVO, aneurysm, or significant stenosis  Patient not a tPA candidate due to no disabling symptoms  Episode of altered mental status most likely due to patient with episode of hypoxia after aspirating  Per attending neurologist, remainder of stroke workup canceled due to no acute focal neuro deficit on exam and likelihood that patient's transient episode of altered mental status/lack of communication was most likely due to aspirating  Plan   - deferred MRI brain per attending neurologist  -continue remainder of medical treatment per primary team  -no further inpatient Neurology recommendations at this time  If any change in neurologic exam, please reach out to Neurology team     Dementia due to Parkinson's disease with behavioral disturbance [facility])  Assessment & Plan  -continue home Seroquel    Meningioma [facility])  Assessment & Plan  -continue management per neurosurgical team    Essential hypertension  Assessment & Plan  -goal normotension  -continue management per primary team    Type 2 diabetes mellitus ([facility] )  Assessment & Plan  Lab Results   Component Value Date    HGBA1C 6 6 (H) 03/04/2022       No results for input(s): POCGLU in the last 72 hours      Blood Sugar Average: Last 72 hrs:  -euglycemia goal  -continue management per primary team    Parkinson's disease [facility])  Assessment & Plan  -continue home Parkinson's medications as follows: carbidopa levodopa  mg 1 tab t i d , pramipexole 0 5 mg t i d , and rivastigmine 4 5 mg b i d       TPA Decision: Patient not a TPA candidate  Symptoms resolved/clearly non disabling  Case and plan discussed with attending neurologist   Please see attending attestation for any further recommendations and/or changes to plan  Recommend patient follows up with Dr Evalee Gowers at his next regularly scheduled appointment on 11/17/2022  History of Present Illness     Reason for Consult / Principal Problem:  Pre-hospital stroke alert  Hx and PE limited by:  Patient with hypophonic voice due to severe chest congestion  Patient last known well:  10:00 a m  06/19/2022  Stroke alert called:  10:47 a m  06/19/2022  Neurology time of arrival:  10:47 a m  6/19/22  HPI: Khadra Gonzalez is a 81 y o   male with Parkinson's disease , Parkinson's dementia, COPD, hypertension, type 2 diabetes, who presents to [facility] is a pre-hospital stroke alert due to altered mental status  Last known well 10:00 a m  06/19/2022  BP on arrival 181/70  NIHSS 0  CT head revealed known left frontal meningioma  CTA head and neck with and without contrast revealed no LVO, aneurysm, or significant stenosis  Patient not a tPA candidate due to no disabling symptoms  Per patient's family who provided history, patient was eating bread and started choking on it  Patient's family tried to extricate separate from the patient's mouth  After successfully doing so, patient began staring blankly, so patient's family called EMS  Per EMS, on arrival, patient was staring blankly with eyes open  No loss of consciousness  No shaking activity witnessed  Patient's family reports that at baseline, patient is very conversant  Patient's family reports that he is very impulsive and tries to get up at night on his own  Patient's family does believe he fell and hit his head a few weeks ago    Patient's family states that patient is not currently on any antiplatelet or anticoagulation medications  Patient's son states that they believe he was on "some sort of blood thinner" but unknown as to why  Patient's family reports that this was stopped multiple months ago by patient's PCP due to patient's likelihood for bleeding and falls  Per chart review, patient follows with [facility] as an outpatient for management Parkinson's disease  At time of last appointment, patient recommended to continue with carbidopa levodopa  mg 1 tab t i d , pramipexole 0 5 mg t i d , and rivastigmine 4 5 mg b i d  Patient does have hallucinations purse last outpatient Neurology note  Patient's family wish not to increased patient's Seroquel as patient has had increased confusion with higher doses of Seroquel in the past   Patient is currently on Seroquel 50 mg at 10:00 p m , and 50 mg at 2:00 a m  Consult to Neurology  Consult performed by: Luis Antonio Reyes PA-C  Consult ordered by: Astrid Coppola DO          Review of Systems   Reason unable to perform ROS: Unable to assess due to acuity of condition, patient unable to verbalize well at this time due to severe congestion         Historical Information   Past Medical History:   Diagnosis Date    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) ([facility])     Allergic rhinitis     last assessed 17Dec2013    Aortic regurgitation     Arm laceration     Asthma     Bronchitis, chronic obstructive, with exacerbation ([facility])     last assessed 12Jun2015    Candidal intertrigo     Chronic obstructive lung disease ([facility] )     last assessed [id number]    COPD (chronic obstructive pulmonary disease) ([facility])     Coronary artery disease     carotid stents    Cough     CVA (cerebral vascular accident) ([facility]     Dermatitis     Diabetes mellitus type 2, uncomplicated ([facility] )     controlled    Dysthymic disorder     Fatigue     Hyperlipidemia     Hypertension     Neurologic gait dysfunction     Parkinson's disease ([facility] )     Pneumonia     PVD (peripheral vascular disease) ([facility] )     Seborrheic keratosis     TIA (transient ischemic attack)     Tinea corporis     Tinea pedis of both feet     Tremor      Past Surgical History:   Procedure Laterality Date    NASAL SINUS SURGERY      OR BRONCHOSCOPY,DIAGNOSTIC N/A 2016    Procedure: BRONCHOSCOPY;  Surgeon: Aida Huang MD;  Location: [facility]; Service: Pulmonary     Social History   Social History     Substance and Sexual Activity   Alcohol Use Not Currently     Social History     Substance and Sexual Activity   Drug Use No     E-Cigarette/Vaping    E-Cigarette Use Never User      E-Cigarette/Vaping Substances    Nicotine No     THC No     CBD No     Flavoring No     Other No     Unknown No      Social History     Tobacco Use   Smoking Status Former Smoker    Packs/day: 1 00    Years: 3 00    Pack years: 3 00    Types: Cigarettes    Start date: 47    Quit date: 57    Years since quittin 4   Smokeless Tobacco Never Used     Family History:   Family History   Problem Relation Age of Onset    No Known Problems Mother         Old Age    COPD Sister     Lung cancer Sister     Asthma Family        Review of previous medical records was  completed  Meds/Allergies   current meds:   No current facility-administered medications for this encounter  and PTA meds:   Prior to Admission Medications   Prescriptions Last Dose Informant Patient Reported? Taking?    QUEtiapine (SEROquel) 25 mg tablet   No No   Si mg at 10 pm and 50 mg at 2 am   Respiratory Therapy Supplies (Flutter) VON  Child No No   Sig: Use daily Use flutter valve at least three times daily and whenever needed for congestion   acetaminophen (TYLENOL) 325 mg tablet  Child Yes No   Sig: Take 650 mg by mouth every 6 (six) hours as needed for mild pain     albuterol (2 5 mg/3 mL) 0 083 % nebulizer solution   No No   Sig: USE 1 VIAL VIA NEBULIZER EVERY 4 HOURS AS NEEDED albuterol (PROVENTIL HFA,VENTOLIN HFA) 90 mcg/act inhaler   No No   Sig: TAKE 2 PUFFS BY MOUTH EVERY 6 HOURS AS NEEDED FOR WHEEZE   atorvastatin (LIPITOR) 10 mg tablet   No No   Sig: TAKE 1 TABLET BY MOUTH EVERY DAY   benzonatate (TESSALON PERLES) 100 mg capsule   No No   Sig: TAKE 1 CAPSULE BY MOUTH THREE TIMES A DAY AS NEEDED FOR COUGH   carbidopa-levodopa (SINEMET)  mg per tablet   No No   Sig: TAKE 1 TABLET BY MOUTH THREE TIMES A DAY   famotidine (PEPCID) 20 mg tablet   No No   Sig: TAKE 1 TABLET BY MOUTH TWICE A DAY   finasteride (PROSCAR) 5 mg tablet   No No   Sig: TAKE 1 TABLET BY MOUTH EVERY DAY IN THE MORNING   fluticasone-salmeterol (Advair) 250-50 mcg/dose inhaler   No No   Sig: Inhale 1 puff  in the morning and 1 puff in the evening  Rinse mouth after use      fluticasone-salmeterol (Wixela Inhub) 250-50 mcg/dose inhaler   No No   Sig: Inhale 1 puff 2 (two) times a day Rinse mouth after use  guaiFENesin 1200 MG TB12   No No   Sig: Take 1 tablet (1,200 mg total) by mouth every 12 (twelve) hours   montelukast (SINGULAIR) 10 mg tablet   No No   Sig: TAKE 1 TABLET BY MOUTH DAILY AT BEDTIME   nystatin (MYCOSTATIN) cream  Child No No   Sig: Apply to intertriginous areas 2-3 times daily as needed   nystatin (MYCOSTATIN) powder   No No   Sig: APPLY TO AFFECTED AREA TWICE A DAY AS DIRECTED   pramipexole (MIRAPEX) 0 5 mg tablet   No No   Sig: TAKE 1 TABLET BY MOUTH THREE TIMES A DAY   predniSONE 10 mg tablet   No No   Sig: 10 MG PO  DAILY   rivastigmine (EXELON) 4 5 MG capsule   No No   Sig: Take 1 capsule (4 5 mg total) by mouth 2 (two) times a day   tiotropium (Spiriva HandiHaler) 18 mcg inhalation capsule   No No   Sig: Place 1 capsule (18 mcg total) into inhaler and inhale in the morning  Via Handihaler at the same time every day     triamcinolone (KENALOG) 0 5 % cream  Child No No   Sig: APPLY TO AFFECTED AREA(S) OF THE RASH TWICE DAILY AS NEEDED      Facility-Administered Medications: None Allergies   Allergen Reactions    Penicillins Syncope       Objective   Vitals:Blood pressure 166/76, pulse 85, resp  rate (!) 33, SpO2 93 %  ,There is no height or weight on file to calculate BMI  No intake or output data in the 24 hours ending 06/19/22 1327    Invasive Devices: Invasive Devices  Report    Peripheral Intravenous Line  Duration           Peripheral IV 06/19/22 Left Antecubital <1 day    Peripheral IV 06/19/22 Right Antecubital <1 day                Physical Exam  Vitals and nursing note reviewed  Constitutional:       General: He is in acute distress ( respiratory)  Appearance: He is obese  HENT:      Head: Normocephalic  Nose: Nose normal  No congestion or rhinorrhea  Eyes:      General: No scleral icterus  Right eye: No discharge  Left eye: No discharge  Conjunctiva/sclera: Conjunctivae normal    Cardiovascular:      Rate and Rhythm: Normal rate  Pulmonary:      Effort: Respiratory distress ( increased effort, increased respirations) present  Musculoskeletal:      Right lower leg: No edema  Left lower leg: No edema  Skin:     Findings: Bruising present  Neurological:      Mental Status: He is alert  Coordination: Finger-Nose-Finger Test normal       Deep Tendon Reflexes:      Reflex Scores:       Bicep reflexes are 2+ on the right side and 2+ on the left side  Psychiatric:      Comments: Cooperative during exam       Neurologic Exam     Mental Status   Follows 1 step commands  Attention: Appropriately attends to provider  Concentration: No redirection required during exam    Speech: (Very hypophonic  No dysarthria appreciated on exam, though difficult to fully assess due to patient's hypophonia  Patient spoke in only 1 word or very short phrases )  Level of consciousness: alert  Able to name object (Glove, glasses, watch)     -oriented to self  -oriented to age  -oriented to type of place  -oriented to year     Cranial Nerves CN II   Visual acuity: (Grossly intact)    CN III, IV, VI   Right pupil: Size: 3 mm  Shape: regular  Left pupil: Size: 3 mm  Shape: regular  Nystagmus: none     CN V   Facial sensation intact  Right facial sensation deficit: none  Left facial sensation deficit: none    CN VII   Facial expression full, symmetric  Right facial weakness: none  Left facial weakness: none    CN VIII   Hearing impaired: Grossly intact   -slightly dysconjugate gaze, left eye appears slightly abducted when attempting to look straight  -EOMs slightly restrictive with attempting to look towards left  -blink to threat intact bilaterally     Motor Exam   Muscle bulk: decreasedStrength to confrontation testing  -bilateral hand  5/5  -bilateral elbow flexion extension 5/5  -bilateral shoulder abduction 5/5  -bilateral plantar flexion and dorsiflexion 5/5  -bilateral knee flexion and extension 5/5  -bilateral hip flexion 5/5     Sensory Exam   Light touch normal    Right arm pinprick: normal  Left arm pinprick: normal  Right leg pinprick: normal  Left leg pinprick: normal  -extinction intact  -sensation to temperature intact x4 extremities     Gait, Coordination, and Reflexes     Gait  Gait: (Deferred for patient safety)    Coordination   Finger to nose coordination: normal    Reflexes   Right biceps: 2+  Left biceps: 2+  Right patellar reflex grade: Trace  Left patellar reflex grade: Trace  NIHSS:  1a Level of Consciousness: 0 = Alert   1b  LOC Questions: 0 = Answers both correctly   1c  LOC Commands: 0 = Obeys both correctly   2  Best Gaze: 0 = Normal   3  Visual: 0 = No visual field loss   4  Facial Palsy: 0=Normal symmetric movement   5a  Motor Right Arm: 0=No drift, limb holds 90 (or 45) degrees for full 10 seconds   5b  Motor Left Arm: 0=No drift, limb holds 90 (or 45) degrees for full 10 seconds   6a  Motor Right Le=No drift, limb holds 90 (or 45) degrees for full 10 seconds   6b   Motor Left Le=No drift, limb holds 90 (or 45) degrees for full 10 seconds   7  Limb Ataxia:  0=Absent   8  Sensory: 0=Normal; no sensory loss   9  Best Language:  0=No aphasia, normal   10  Dysarthria: 0=Normal articulation   11  Extinction and Inattention (formerly Neglect): 0=No abnormality   Total Score: 0     Time NIHSS was completed: 11:00AM 6/19/22    Modified Hocking Score:  4 (Moderately severe disability; unable to walk and attend to bodily needs without assistance)    Lab Results:   CBC:   Results from last 7 days   Lab Units 06/19/22  1117   WBC Thousand/uL 11 22*   RBC Million/uL 4 44   HEMOGLOBIN g/dL 13 8   HEMATOCRIT % 43 2   MCV fL 97   PLATELETS Thousands/uL 193   , BMP/CMP:   Results from last 7 days   Lab Units 06/19/22  1117   SODIUM mmol/L 139   POTASSIUM mmol/L 4 0   CHLORIDE mmol/L 102   CO2 mmol/L 23   BUN mg/dL 25   CREATININE mg/dL 1 50*   CALCIUM mg/dL 9 1   EGFR ml/min/1 73sq m 41     Imaging Studies: I have personally reviewed pertinent reports  and I have personally reviewed pertinent films in PACS CT head without contrast 06/19/2022, CTA head and neck with contrast 6/19/22  EKG, Pathology, and Other Studies: I have personally reviewed pertinent reports  Code Status: Prior  Advance Directive and Living Will:      Power of :    POLST:      Counseling / Coordination of Care  Total Critical Care time spent 45 minutes excluding procedures, teaching and family updates  This note was completed in part utilizing M-Modal Fluency Direct Software  Grammatical errors, random word insertions, spelling mistakes, and incomplete sentences may be an occasional consequence of this system secondary to software limitations, ambient noise, and hardware issues  If you have any questions or concerns about the content, text, or information contained within the body of this dictation, please contact the provider for clarification

## 2022-06-19 NOTE — H&P
[address] [facility] 1935, 81 y o  male MRN: [id number]  Unit/Bed#: ED 29 Encounter: [id number]  Primary Care Provider: To Pete MD   Date and time admitted to hospital: 6/19/2022 10:50 AM    * Acute respiratory failure with hypoxia [facility])  Assessment & Plan   81 y o  male with a PMH of Parkinson's disease , Parkinson's dementia, COPD, hypertension, type 2 diabetes, who presents with aspiration  At 10 am, he was eating a piece of bread when he started choking and then became unresponsive  Likely COPD exacerbation from aspiration episode  Check repeat CXR in AM  Steroids  Continue home medications    COPD with acute exacerbation ([facility] )  Assessment & Plan  Continue home medications  steroids  Consider ambulatory pulse ox in next 24 to 48 hours    Aspiration of food  Assessment & Plan  Place on dental soft diet with thin liquids  Speech eval unnecessary as patient's family is aware of swallowing needs    Type 2 diabetes mellitus ([facility] )  Assessment & Plan  Lab Results   Component Value Date    HGBA1C 6 6 (H) 03/04/2022       No results for input(s): POCGLU in the last 72 hours  Blood Sugar Average: Last 72 hrs:     Place on sliding scale    Dementia due to Parkinson's disease with behavioral disturbance ([facility] )  Assessment & Plan  Continue home medications    Meningioma [facility])  Assessment & Plan  Patient seen by neurosurgery  No need for inpatient intervention    Essential hypertension  Assessment & Plan  Continue home medications    Peripheral vascular disease ([facility] )  Assessment & Plan  Continue home medications    Parkinson's disease ([facility] )  Assessment & Plan  Continue home medications    Hyperlipidemia  Assessment & Plan  Continue statin    VTE Pharmacologic Prophylaxis: VTE Score: 8 Moderate Risk (Score 3-4) - Pharmacological DVT Prophylaxis Ordered: enoxaparin (Lovenox)    Code Status: Prior   Discussion with family: Updated  (daughter) at bedside  Anticipated Length of Stay: Patient will be admitted on an inpatient basis with an anticipated length of stay of greater than 2 midnights secondary to copd exacerbation  Total Time for Visit, including Counseling / Coordination of Care: 70 minutes Greater than 50% of this total time spent on direct patient counseling and coordination of care  Chief Complaint: aspiration    History of Present Illness:  Kavon Vinson is a 81 y o  male with a PMH of Parkinson's disease , Parkinson's dementia, COPD, hypertension, type 2 diabetes, who presents with aspiration  At 10 am, he was eating a piece of bread when he started choking and then became unresponsive  When EMS arrived, he was awake but not responding to them or following commands  CT head revealed known left frontal meningioma  CTA head and neck with and without contrast revealed no LVO, aneurysm, or significant stenosis  Stoke alert was called and both neurology and neurosurgery evaluated  They felt that this was an epsiode of hypoxia and not an extension of sudural hematoma around menigiaoma, or a CVA  Patient is at baseline now and is responding to questions  He is not normally on home oxygen  Review of Systems:  Review of Systems   Constitutional: Negative for activity change, appetite change and chills  HENT: Negative for congestion, dental problem, drooling and sinus pressure  Eyes: Negative for photophobia, pain and discharge  Respiratory: Positive for cough, choking and wheezing  Negative for chest tightness and stridor  Cardiovascular: Negative for chest pain and leg swelling  Gastrointestinal: Negative for abdominal distention and abdominal pain  Endocrine: Negative for cold intolerance and heat intolerance  Genitourinary: Negative for difficulty urinating, dysuria and flank pain  Musculoskeletal: Negative for arthralgias, back pain and neck stiffness  Skin: Negative for color change, pallor and rash  Allergic/Immunologic: Negative for environmental allergies and food allergies  Neurological: Negative for dizziness, tremors and syncope  Psychiatric/Behavioral: Negative for agitation, behavioral problems and confusion  Past Medical and Surgical History:   Past Medical History:   Diagnosis Date    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) ([facility])     Allergic rhinitis     last assessed [id number]    Aortic regurgitation     Arm laceration     Asthma     Bronchitis, chronic obstructive, with exacerbation ([facility])     last assessed [id number]    Candidal intertrigo     Chronic obstructive lung disease ([facility] )     last assessed [id number]    COPD (chronic obstructive pulmonary disease) ([facility])     Coronary artery disease     carotid stents    Cough     CVA (cerebral vascular accident) ([facility] )     Dermatitis     Diabetes mellitus type 2, uncomplicated ([place] [address] )     controlled    Dysthymic disorder     Fatigue     Hyperlipidemia     Hypertension     Neurologic gait dysfunction     Parkinson's disease ([facility] )     Pneumonia     PVD (peripheral vascular disease) ([place] [address] )     Seborrheic keratosis     TIA (transient ischemic attack)     Tinea corporis     Tinea pedis of both feet     Tremor        Past Surgical History:   Procedure Laterality Date    NASAL SINUS SURGERY      ID BRONCHOSCOPY,DIAGNOSTIC N/A 7/27/2016    Procedure: BRONCHOSCOPY;  Surgeon: Gilda Bruno MD;  Location: [facility]; Service: Pulmonary       Meds/Allergies:  Prior to Admission medications    Medication Sig Start Date End Date Taking?  Authorizing Provider   acetaminophen (TYLENOL) 325 mg tablet Take 650 mg by mouth every 6 (six) hours as needed for mild pain      Historical Provider, MD   albuterol (2 5 mg/3 mL) 0 083 % nebulizer solution USE 1 VIAL VIA NEBULIZER EVERY 4 HOURS AS NEEDED 5/16/22   Gilda Bruno MD   albuterol (PROVENTIL HFA,VENTOLIN HFA) 90 mcg/act inhaler TAKE 2 PUFFS BY MOUTH EVERY 6 HOURS AS NEEDED FOR WHEEZE 4/6/22   Marcell Rodrigues MD   atorvastatin (LIPITOR) 10 mg tablet TAKE 1 TABLET BY MOUTH EVERY DAY 2/1/22   Michelle Allan MD   benzonatate (TESSALON PERLES) 100 mg capsule TAKE 1 CAPSULE BY MOUTH THREE TIMES A DAY AS NEEDED FOR COUGH 12/6/21   Michelle Allan MD   carbidopa-levodopa (SINEMET)  mg per tablet TAKE 1 TABLET BY MOUTH THREE TIMES A DAY 1/10/22   Hui Duvall MD   famotidine (PEPCID) 20 mg tablet TAKE 1 TABLET BY MOUTH TWICE A DAY 6/10/22   Yoly Newell PA-C   finasteride (PROSCAR) 5 mg tablet TAKE 1 TABLET BY MOUTH EVERY DAY IN THE MORNING 5/2/22   Meir Schwarz PA-C   fluticasone-salmeterol (Advair) 250-50 mcg/dose inhaler Inhale 1 puff  in the morning and 1 puff in the evening  Rinse mouth after use    5/16/22   Brendon Holguin MD   fluticasone-salmeterol (Hodan Carlos) 250-50 mcg/dose inhaler Inhale 1 puff 2 (two) times a day Rinse mouth after use  1/11/22   Brendon Holguin MD   guaiFENesin 1200 MG TB12 Take 1 tablet (1,200 mg total) by mouth every 12 (twelve) hours 11/6/21   Michelle Allan MD   montelukast (SINGULAIR) 10 mg tablet TAKE 1 TABLET BY MOUTH DAILY AT BEDTIME 4/1/22   Yoly Newell PA-C   nystatin (MYCOSTATIN) cream Apply to intertriginous areas 2-3 times daily as needed 8/13/20   Michelle Allan MD   nystatin (MYCOSTATIN) powder APPLY TO AFFECTED AREA TWICE A DAY AS DIRECTED 5/15/22   Michelle Allan MD   pramipexole (MIRAPEX) 0 5 mg tablet TAKE 1 TABLET BY MOUTH THREE TIMES A DAY 6/14/22   NORBERTO Jarquin   predniSONE 10 mg tablet 10 MG PO  DAILY 6/15/22   Michelle Allan MD   QUEtiapine (SEROquel) 25 mg tablet 50 mg at 10 pm and 50 mg at 2 am 6/15/22   Michelle Allan MD   Respiratory Therapy Supplies (Flutter) VON Use daily Use flutter valve at least three times daily and whenever needed for congestion 3/9/21   Yoly Newell PA-C   rivastigmine (EXELON) 4 5 MG capsule Take 1 capsule (4 5 mg total) by mouth 2 (two) times a day 3/21/22   Amy Casey Eryn Donis MD   tiotropium (Spiriva HandiHaler) 18 mcg inhalation capsule Place 1 capsule (18 mcg total) into inhaler and inhale in the morning  Via Handihaler at the same time every day  22   Aly Pittman MD   triamcinolone (KENALOG) 0 5 % cream APPLY TO AFFECTED AREA(S) OF THE RASH TWICE DAILY AS NEEDED 20   Felicia Shone, MD     I have reviewed home medications using recent Epic encounter  Allergies: Allergies   Allergen Reactions    Penicillins Syncope       Social History:  Marital Status: /Civil Union   Occupation: retired  Patient Pre-hospital Living Situation: Home  Patient Pre-hospital Level of Mobility: unable to be assessed at time of evaluation  Patient Pre-hospital Diet Restrictions: soft diet  Substance Use History:   Social History     Substance and Sexual Activity   Alcohol Use Not Currently     Social History     Tobacco Use   Smoking Status Former Smoker    Packs/day: 1 00    Years: 3 00    Pack years: 3 00    Types: Cigarettes    Start date: 47    Quit date: Jorge Luis Annita Years since quittin 4   Smokeless Tobacco Never Used     Social History     Substance and Sexual Activity   Drug Use No       Family History:  Family History   Problem Relation Age of Onset    No Known Problems Mother         Old Age    COPD Sister     Lung cancer Sister     Asthma Family        Physical Exam:     Vitals:   Blood Pressure: 167/79 (22 171)  Pulse: 82 (22)  Temperature: 98 1 °F (36 7 °C) (22)  Temp Source: Oral (22)  Respirations: (!) 30 (22)  SpO2: 94 % (22)    Physical Exam  Constitutional:       Appearance: Normal appearance  He is normal weight  HENT:      Head: Normocephalic and atraumatic  Mouth/Throat:      Mouth: Mucous membranes are moist       Pharynx: Oropharynx is clear  Eyes:      Extraocular Movements: Extraocular movements intact  Pupils: Pupils are equal, round, and reactive to light  Cardiovascular:      Rate and Rhythm: Normal rate  Pulses: Normal pulses  Heart sounds: Normal heart sounds  Pulmonary:      Effort: Pulmonary effort is normal       Breath sounds: Wheezing present  Chest:      Chest wall: No tenderness  Abdominal:      General: Abdomen is flat  Bowel sounds are normal       Palpations: Abdomen is soft  Musculoskeletal:      Cervical back: No rigidity or tenderness  Skin:     General: Skin is warm and dry  Capillary Refill: Capillary refill takes less than 2 seconds  Neurological:      Mental Status: He is alert  He is disoriented  Psychiatric:         Mood and Affect: Mood normal          Additional Data:     Lab Results:  Results from last 7 days   Lab Units 06/19/22  1117   WBC Thousand/uL 11 22*   HEMOGLOBIN g/dL 13 8   HEMATOCRIT % 43 2   PLATELETS Thousands/uL 193     Results from last 7 days   Lab Units 06/19/22  1117   SODIUM mmol/L 139   POTASSIUM mmol/L 4 0   CHLORIDE mmol/L 102   CO2 mmol/L 23   BUN mg/dL 25   CREATININE mg/dL 1 50*   ANION GAP mmol/L 14*   CALCIUM mg/dL 9 1   GLUCOSE RANDOM mg/dL 204*     Results from last 7 days   Lab Units 06/19/22  1117   INR  1 12                   Imaging: Reviewed radiology reports from this admission including: chest xray  CT stroke alert brain   Final Result by Ursula Alvares DO (06/19 1126)   Addendum 1 of 1 by Ursula Alvares DO (06/19 1126)   ADDENDUM:      Left frontal extra-axial high dense region represents meningioma rather    than subdural hemorrhage  There is no definite subdural hemorrhage    identified  The meningioma is stable in size from the prior study  There    is low density adjacent in the    subcortical white matter of the left paramedian frontal lobe which may    represent encephalomalacia  The possibility of edema is not excluded  MRI of the brain recommended further evaluate        Final      Slight interval increase size of left frontal subdural hemorrhage measuring 1 5 cm maximum dimension  No midline shift  Moderate chronic microangiopathic changes are similar to the prior study  Findings were directly discussed with Adrian Chopra at 10:59 AM on 6/19/2022  Workstation performed: [id number]         CTA stroke alert (head/neck)   Final Result by Goldie Edwards DO (06/19 1124)      No significant carotid or vertebral artery stenosis  Status post right ICA stenting without evidence of in-stent stenosis  No focal intracranial stenosis or aneurysm  Prominent vasculature noted left frontoparietal vertex adjacent to the left frontal meningioma  Findings were directly discussed with Adrian Chopra at 11:20 AM on 6/19/2022  Workstation performed: [id number]         XR chest 2 views    (Results Pending)       EKG and Other Studies Reviewed on Admission:   · EKG: NSR  HR 89     ** Please Note: This note has been constructed using a voice recognition system   **

## 2022-06-19 NOTE — ASSESSMENT & PLAN NOTE
81 y o   male with Parkinson's disease , Parkinson's dementia, COPD, hypertension, type 2 diabetes, who presents to [facility] - [facility] is a pre-hospital stroke alert due to altered mental status  Last known well 10:00 a m  06/19/2022  BP on arrival 181/70  NIHSS 0  CT head revealed known left frontal meningioma  CTA head and neck with and without contrast revealed no LVO, aneurysm, or significant stenosis  Patient not a tPA candidate due to no disabling symptoms  Episode of altered mental status most likely due to patient with episode of hypoxia after aspirating  Per attending neurologist, remainder of stroke workup canceled due to no acute focal neuro deficit on exam and likelihood that patient's transient episode of altered mental status/lack of communication was most likely due to aspirating  Plan   - deferred MRI brain per attending neurologist  -continue remainder of medical treatment per primary team  -no further inpatient Neurology recommendations at this time    If any change in neurologic exam, please reach out to Neurology team

## 2022-06-19 NOTE — ASSESSMENT & PLAN NOTE
Place on dental soft diet with thin liquids  Speech eval unnecessary as patient's family is aware of swallowing needs

## 2022-06-19 NOTE — ASSESSMENT & PLAN NOTE
Continue home medications  steroids  Consider ambulatory pulse ox in next 24 to 48 hours Removed intact  Left Radial

## 2022-06-19 NOTE — QUICK NOTE
QUICK NOTE - Deterioration Index  Yvette Allen 81 y o  male MRN: [id number]  Unit/Bed#: ED 29 Encounter: [id number]    Date Paged: 06/19/22  Time Paged: [phone]  Room #: ED 29  Arrival Time: 1658  Deterioration index score at time of page: 63 04  %  Spoke with Daisy Grant RN from primary team  Need to escalate level of care: no     PROBLEMS resulting in high DI score:   40% Respiratory rate 36   24 Age 81years old   15% Neurological exam Lethargic   11% Soha coma scale 14   6% Systolic 921       PLAN:     RR inaccurate   Breathing comfortable with SpO2 93% on room air   NO need for escalation of care   Continue care per primary team     Please contact critical care via [employer] with any questions or concerns  Vitals:   Vitals:    06/19/22 1120 06/19/22 1139 06/19/22 1347 06/19/22 1622   BP:   148/69 165/78   BP Location:   Left arm Left arm   Pulse: 85  85 82   Resp: (!) 33  (!) 2 (!) 36   SpO2: 96% 93% 92%        Respiratory:  SpO2: SpO2: 92 %, SpO2 Activity: SpO2 Activity: At Rest, SpO2 Device: O2 Device: None (Room air)       Temperature: No data recorded    Current:      Labs:   Results from last 7 days   Lab Units 06/19/22  1117   WBC Thousand/uL 11 22*   HEMOGLOBIN g/dL 13 8   HEMATOCRIT % 43 2   PLATELETS Thousands/uL 193     Results from last 7 days   Lab Units 06/19/22  1117   SODIUM mmol/L 139   POTASSIUM mmol/L 4 0   CHLORIDE mmol/L 102   CO2 mmol/L 23   BUN mg/dL 25   CREATININE mg/dL 1 50*   CALCIUM mg/dL 9 1                       Code Status: Prior

## 2022-06-19 NOTE — ASSESSMENT & PLAN NOTE
Lab Results   Component Value Date    HGBA1C 6 6 (H) 03/04/2022       No results for input(s): POCGLU in the last 72 hours      Blood Sugar Average: Last 72 hrs:  -euglycemia goal  -continue management per primary team

## 2022-06-19 NOTE — ASSESSMENT & PLAN NOTE
Lab Results   Component Value Date    HGBA1C 6 6 (H) 03/04/2022       No results for input(s): POCGLU in the last 72 hours      Blood Sugar Average: Last 72 hrs:     Place on sliding scale

## 2022-06-19 NOTE — ED PROVIDER NOTES
History  Chief Complaint   Patient presents with    CVA/TIA-like Symptoms     Pt presents to ER via ALS from home as pre hospital stroke alert - see ED narrator for details  80 y/o male, hx of meniningioma, COPD, and dementia, presents to the ED by EMS for stroke alert  EMS reports that he was last seen normal around 10a  States that at that time he was eating a piece of bread when he started choking and then became unresponsive  When EMS arrived, he was awake but not responding to them or following commands  Denies any thinners  No recent report of trauma or fall  History provided by:  EMS personnel  History limited by:  Acuity of condition  CVA/TIA-like Symptoms  Presenting symptoms: change in consciousness, language symptoms and weakness    Date/time of last known well:  2022 10:00 AM  Onset quality:  Sudden  Timing:  Constant  Progression:  Improving  Associated symptoms: difficulty swallowing    Associated symptoms: no vomiting        Prior to Admission Medications   Prescriptions Last Dose Informant Patient Reported? Taking?    QUEtiapine (SEROquel) 25 mg tablet   No No   Si mg at 10 pm and 50 mg at 2 am   Respiratory Therapy Supplies (Flutter) VON  Child No No   Sig: Use daily Use flutter valve at least three times daily and whenever needed for congestion   acetaminophen (TYLENOL) 325 mg tablet  Child Yes No   Sig: Take 650 mg by mouth every 6 (six) hours as needed for mild pain     albuterol (2 5 mg/3 mL) 0 083 % nebulizer solution   No No   Sig: USE 1 VIAL VIA NEBULIZER EVERY 4 HOURS AS NEEDED   albuterol (PROVENTIL HFA,VENTOLIN HFA) 90 mcg/act inhaler   No No   Sig: TAKE 2 PUFFS BY MOUTH EVERY 6 HOURS AS NEEDED FOR WHEEZE   atorvastatin (LIPITOR) 10 mg tablet   No No   Sig: TAKE 1 TABLET BY MOUTH EVERY DAY   benzonatate (TESSALON PERLES) 100 mg capsule   No No   Sig: TAKE 1 CAPSULE BY MOUTH THREE TIMES A DAY AS NEEDED FOR COUGH   carbidopa-levodopa (SINEMET)  mg per tablet   No No   Sig: TAKE 1 TABLET BY MOUTH THREE TIMES A DAY   famotidine (PEPCID) 20 mg tablet   No No   Sig: TAKE 1 TABLET BY MOUTH TWICE A DAY   finasteride (PROSCAR) 5 mg tablet   No No   Sig: TAKE 1 TABLET BY MOUTH EVERY DAY IN THE MORNING   fluticasone-salmeterol (Advair) 250-50 mcg/dose inhaler   No No   Sig: Inhale 1 puff  in the morning and 1 puff in the evening  Rinse mouth after use      fluticasone-salmeterol (Wixela Inhub) 250-50 mcg/dose inhaler   No No   Sig: Inhale 1 puff 2 (two) times a day Rinse mouth after use  guaiFENesin 1200 MG TB12   No No   Sig: Take 1 tablet (1,200 mg total) by mouth every 12 (twelve) hours   montelukast (SINGULAIR) 10 mg tablet   No No   Sig: TAKE 1 TABLET BY MOUTH DAILY AT BEDTIME   nystatin (MYCOSTATIN) cream  Child No No   Sig: Apply to intertriginous areas 2-3 times daily as needed   nystatin (MYCOSTATIN) powder   No No   Sig: APPLY TO AFFECTED AREA TWICE A DAY AS DIRECTED   pramipexole (MIRAPEX) 0 5 mg tablet   No No   Sig: TAKE 1 TABLET BY MOUTH THREE TIMES A DAY   predniSONE 10 mg tablet   No No   Sig: 10 MG PO  DAILY   rivastigmine (EXELON) 4 5 MG capsule   No No   Sig: Take 1 capsule (4 5 mg total) by mouth 2 (two) times a day   tiotropium (Spiriva HandiHaler) 18 mcg inhalation capsule   No No   Sig: Place 1 capsule (18 mcg total) into inhaler and inhale in the morning  Via Handihaler at the same time every day     triamcinolone (KENALOG) 0 5 % cream  Child No No   Sig: APPLY TO AFFECTED AREA(S) OF THE RASH TWICE DAILY AS NEEDED      Facility-Administered Medications: None       Past Medical History:   Diagnosis Date    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[facility]     Allergic rhinitis     last assessed [id number]    Aortic regurgitation     Arm laceration     Asthma     Bronchitis, chronic obstructive, with exacerbation ([facility] )     last assessed [id number]    Candidal intertrigo     Chronic obstructive lung disease ([facility] )     last assessed [id number]    COPD (chronic obstructive pulmonary disease) (HCC)     Coronary artery disease     carotid stents    Cough     [facility]     Dermatitis     Diabetes mellitus type 2, uncomplicated ([place] [address] )     controlled    Dysthymic disorder     Fatigue     Hyperlipidemia     Hypertension     Neurologic gait dysfunction     Parkinson's disease ([facility] )     Pneumonia     PVD (peripheral vascular disease) ([facility] )     Seborrheic keratosis     TIA (transient ischemic attack)     Tinea corporis     Tinea pedis of both feet     Tremor        Past Surgical History:   Procedure Laterality Date    NASAL SINUS SURGERY      ND BRONCHOSCOPY,DIAGNOSTIC N/A 2016    Procedure: BRONCHOSCOPY;  Surgeon: Aida Huang MD;  Location: [facility]; Service: Pulmonary       Family History   Problem Relation Age of Onset    No Known Problems Mother         Old Age    COPD Sister     Lung cancer Sister     Asthma Family      I have reviewed and agree with the history as documented  E-Cigarette/Vaping    E-Cigarette Use Never User      E-Cigarette/Vaping Substances    Nicotine No     THC No     CBD No     Flavoring No     Other No     Unknown No      Social History     Tobacco Use    Smoking status: Former Smoker     Packs/day: 1 00     Years: 3 00     Pack years: 3 00     Types: Cigarettes     Start date:      Quit date:      Years since quittin 4    Smokeless tobacco: Never Used   Vaping Use    Vaping Use: Never used   Substance Use Topics    Alcohol use: Not Currently    Drug use: No       Review of Systems   Unable to perform ROS: Patient nonverbal   HENT: Positive for trouble swallowing  Gastrointestinal: Negative for vomiting  Neurological: Positive for speech difficulty and weakness  Physical Exam  Physical Exam  Vitals and nursing note reviewed  Constitutional:       Appearance: He is well-developed  HENT:      Head: Normocephalic and atraumatic     Eyes: Pupils: Pupils are equal, round, and reactive to light  Cardiovascular:      Rate and Rhythm: Normal rate and regular rhythm  Pulmonary:      Effort: Pulmonary effort is normal  Tachypnea present  Breath sounds: Rhonchi present  Abdominal:      General: Bowel sounds are normal       Palpations: Abdomen is soft  Musculoskeletal:         General: Normal range of motion  Cervical back: Normal range of motion and neck supple  Skin:     General: Skin is warm and dry  Neurological:      Mental Status: He is alert  Comments: Patient awake, aphasic, follows commands and moving bilateral arms no movement of bilateral feet   No facial droop         Vital Signs  ED Triage Vitals   Temp Pulse Respirations Blood Pressure SpO2   -- 06/19/22 1110 06/19/22 1110 06/19/22 1110 06/19/22 1110    101 (!) 23 (!) 181/78 92 %      Temp src Heart Rate Source Patient Position - Orthostatic VS BP Location FiO2 (%)   -- -- -- -- --             Pain Score       --                  Vitals:    06/19/22 1110 06/19/22 1111 06/19/22 1115 06/19/22 1120   BP: (!) 181/78 166/76     Pulse: 97  91 85         Visual Acuity      ED Medications  Medications   iohexol (OMNIPAQUE) 350 MG/ML injection (SINGLE-DOSE) 70 mL (70 mL Intravenous Given 6/19/22 1057)   albuterol inhalation solution 5 mg (5 mg Nebulization Given 6/19/22 1205)   ipratropium (ATROVENT) 0 02 % inhalation solution 0 5 mg (0 5 mg Nebulization Given 6/19/22 1205)   methylPREDNISolone sodium succinate (Solu-MEDROL) injection 125 mg (125 mg Intravenous Given 6/19/22 1205)       Diagnostic Studies  Results Reviewed     Procedure Component Value Units Date/Time    HS Troponin 0hr (reflex protocol) [[id number]]  (Normal) Collected: 06/19/22 1117    Lab Status: Final result Specimen: Blood from Arm, Right Updated: 06/19/22 1232     hs TnI 0hr 20 ng/L     HS Troponin I 2hr [[id number]]     Lab Status: No result Specimen: Blood     HS Troponin I 4hr [[id number]]     Lab Status: No result Specimen: Blood     COVID/FLU/RSV - 2 hour TAT [[id number]]  (Normal) Collected: 06/19/22 1133    Lab Status: Final result Specimen: Nares from Nose Updated: 06/19/22 1230     SARS-CoV-2 Negative     INFLUENZA A PCR Negative     INFLUENZA B PCR Negative     RSV PCR Negative    Narrative:      FOR PEDIATRIC PATIENTS - copy/paste COVID Guidelines URL to browser: [email or web address]  "[employer]x    SARS-CoV-2 assay is a Nucleic Acid Amplification assay intended for the  qualitative detection of nucleic acid from SARS-CoV-2 in nasopharyngeal  swabs  Results are for the presumptive identification of SARS-CoV-2 RNA  Positive results are indicative of infection with SARS-CoV-2, the virus  causing COVID-19, but do not rule out bacterial infection or co-infection  with other viruses  Laboratories within the [place] and its  territories are required to report all positive results to the appropriate  public health authorities  Negative results do not preclude SARS-CoV-2  infection and should not be used as the sole basis for treatment or other  patient management decisions  Negative results must be combined with  clinical observations, patient history, and epidemiological information  This test has not been FDA cleared or approved  This test has been authorized by FDA under an Emergency Use Authorization  (EUA)  This test is only authorized for the duration of time the  declaration that circumstances exist justifying the authorization of the  emergency use of an in vitro diagnostic tests for detection of SARS-CoV-2  virus and/or diagnosis of COVID-19 infection under section 564(b)(1) of  the Act, 21 U  S C  767ENJ-9(J)(4), unless the authorization is terminated  or revoked sooner  The test has been validated but independent review by FDA  and CLIA is pending  Test performed using [employer]:  This RT-PCR assay targets N2,  a region unique to SARS-CoV-2  A conserved region in the E-gene was chosen  for pan-Sarbecovirus detection which includes SARS-CoV-2      Protime-INR [[id number]]  (Normal) Collected: 06/19/22 1117    Lab Status: Final result Specimen: Blood from Arm, Right Updated: 06/19/22 1141     Protime 13 9 seconds      INR 1 12    APTT [[id number]]  (Normal) Collected: 06/19/22 1117    Lab Status: Final result Specimen: Blood from Arm, Right Updated: 06/19/22 1141     PTT 23 seconds     Basic metabolic panel [[id number]]  (Abnormal) Collected: 06/19/22 1117    Lab Status: Final result Specimen: Blood from Arm, Right Updated: 06/19/22 1139     Sodium 139 mmol/L      Potassium 4 0 mmol/L      Chloride 102 mmol/L      CO2 23 mmol/L      ANION GAP 14 mmol/L      BUN 25 mg/dL      Creatinine 1 50 mg/dL      Glucose 204 mg/dL      Calcium 9 1 mg/dL      eGFR 41 ml/min/1 73sq m     Narrative:      [facility] guidelines for Chronic Kidney Disease (CKD):     Stage 1 with normal or high GFR (GFR > 90 mL/min/1 73 square meters)    Stage 2 Mild CKD (GFR = 60-89 mL/min/1 73 square meters)    Stage 3A Moderate CKD (GFR = 45-59 mL/min/1 73 square meters)    Stage 3B Moderate CKD (GFR = 30-44 mL/min/1 73 square meters)    Stage 4 Severe CKD (GFR = 15-29 mL/min/1 73 square meters)    Stage 5 End Stage CKD (GFR <15 mL/min/1 73 square meters)  Note: GFR calculation is accurate only with a steady state creatinine    CBC and Platelet [[id number]]  (Abnormal) Collected: 06/19/22 1117    Lab Status: Final result Specimen: Blood from Arm, Right Updated: 06/19/22 1129     WBC 11 22 Thousand/uL      RBC 4 44 Million/uL      Hemoglobin 13 8 g/dL      Hematocrit 43 2 %      MCV 97 fL      MCH 31 1 pg      MCHC 31 9 g/dL      RDW 13 2 %      Platelets 751 Thousands/uL      MPV 9 6 fL                  CT stroke alert brain   Final Result by Ursula Alvares DO (06/19 1126)   Addendum 1 of 1 by Ursula Alvares DO (06/19 1126)   ADDENDUM: Left frontal extra-axial high dense region represents meningioma rather    than subdural hemorrhage  There is no definite subdural hemorrhage    identified  The meningioma is stable in size from the prior study  There    is low density adjacent in the    subcortical white matter of the left paramedian frontal lobe which may    represent encephalomalacia  The possibility of edema is not excluded  MRI of the brain recommended further evaluate  Final      Slight interval increase size of left frontal subdural hemorrhage measuring 1 5 cm maximum dimension  No midline shift  Moderate chronic microangiopathic changes are similar to the prior study  Findings were directly discussed with Rob Dhaliwal at 10:59 AM on 6/19/2022  Workstation performed: [id number]         CTA stroke alert (head/neck)   Final Result by Robert Martinez DO (06/19 1124)      No significant carotid or vertebral artery stenosis  Status post right ICA stenting without evidence of in-stent stenosis  No focal intracranial stenosis or aneurysm  Prominent vasculature noted left frontoparietal vertex adjacent to the left frontal meningioma  Findings were directly discussed with Rob Dhaliwal at 11:20 AM on 6/19/2022  Workstation performed: [id number]         XR chest 2 views    (Results Pending)              Procedures  Procedures         ED Course  ED Course as of 06/19/22 1318   Sun Jun 19, 2022   1114 Spoke with Dr Houston Garcia who states that there is a small SDH assocaited with the meningioma - requests that I reach out to neurosurgery to see if patient needs to be transferred   1115 GCS 11 at this time    5 Spoke with Dr Elle Cifuentes from neurosurgery at 1120- states that this is not SDH and is meningioma  States that he has followed for it and family did not want further intervention at that time  He states that there is nothing further to do from neurosurgery standpoint   Also spoke with neurology who states that his exam is improved and nothing further to do from their standpoint as well  Will give breathing tx, steroids, and admit for copd/ aspiration                                              MDM  Number of Diagnoses or Management Options  Aspiration into airway: new and requires workup  COPD exacerbation [facility]): new and requires workup  Stroke [facility]): new and requires workup  Diagnosis management comments: Patient pre-hosptial stroke alert  Neuro PA at bedside upon arrival  Stroke workup completed  Initially thought patient with SDH, spoke with Dr Andrés Bower from neurosurgery who states that it is not a SDH and nothing to do further from their standpoint  When patient was brought to room, neuro exam much improved  Patient moving all extremities, answering questions, and follows commands  Neurology signed off  Will admit for COPD / aspiration  Patient's family at bedside states that he has a history of dysphagia and is not supposed to eat bread as he has a hx of aspiration  Patient reevaluated and feels improved  Patient updated on results of tests and plan of care including admission to hospital for further evaluation of presenting complaint  Patient demonstrates verbal understanding and agrees with plan  Report to Dr Harmon Payment  with [facility] for continuation of patient care          Amount and/or Complexity of Data Reviewed  Clinical lab tests: ordered and reviewed  Tests in the radiology section of CPT®: ordered and reviewed  Tests in the medicine section of CPT®: ordered and reviewed  Discussion of test results with the performing providers: yes  Decide to obtain previous medical records or to obtain history from someone other than the patient: yes  Obtain history from someone other than the patient: yes  Review and summarize past medical records: yes  Discuss the patient with other providers: yes  Independent visualization of images, tracings, or specimens: yes    Patient Progress  Patient progress: improved      Disposition  Final diagnoses:   Stroke [facility])   COP[facility] )   Aspiration into airway     Time reflects when diagnosis was documented in both MDM as applicable and the Disposition within this note     Time User Action Codes Description Comment    6/19/2022 10:51 AM Anabelle DEVLIN Add [I63 9] Stroke ([facility] )     6/19/2022 12:12 PM Anabelle DEVLIN Add [J44 1] COPD exacerbation ([facility] )     6/19/2022 12:12 PM Anabelle DEVLIN Add [B83 379V] Aspiration into airway       ED Disposition     ED Disposition   Admit    Condition   Stable    Date/Time   Sun Jun 19, 2022 12:12 PM    Comment   Case was discussed with ASHER and the patient's admission status was agreed to be Admission Status: inpatient status to the service of Dr Nena Cheema   Follow-up Information    None         Patient's Medications   Discharge Prescriptions    No medications on file       No discharge procedures on file      PDMP Review     None          ED Provider  Electronically Signed by           Lenis Castleman, DO  06/19/22 7422

## 2022-06-20 ENCOUNTER — APPOINTMENT (INPATIENT)
Dept: RADIOLOGY | Facility: HOSPITAL | Age: 87
DRG: 189 | End: 2022-06-20
Payer: COMMERCIAL

## 2022-06-20 ENCOUNTER — TELEPHONE (OUTPATIENT)
Dept: OTHER | Facility: OTHER | Age: 87
End: 2022-06-20

## 2022-06-20 ENCOUNTER — APPOINTMENT (OUTPATIENT)
Dept: PULMONOLOGY | Facility: CLINIC | Age: 87
End: 2022-06-20
Payer: MEDICARE

## 2022-06-20 ENCOUNTER — TELEPHONE (OUTPATIENT)
Dept: INTERNAL MEDICINE CLINIC | Facility: CLINIC | Age: 87
End: 2022-06-20

## 2022-06-20 VITALS
DIASTOLIC BLOOD PRESSURE: 72 MMHG | OXYGEN SATURATION: 95 % | RESPIRATION RATE: 18 BRPM | TEMPERATURE: 98.5 F | HEART RATE: 63 BPM | SYSTOLIC BLOOD PRESSURE: 142 MMHG

## 2022-06-20 VITALS
TEMPERATURE: 97.6 F | SYSTOLIC BLOOD PRESSURE: 144 MMHG | HEART RATE: 49 BPM | WEIGHT: 198 LBS | HEIGHT: 70 IN | OXYGEN SATURATION: 98 % | BODY MASS INDEX: 28.35 KG/M2 | DIASTOLIC BLOOD PRESSURE: 69 MMHG

## 2022-06-20 DIAGNOSIS — G20 PARKINSON'S DISEASE (HCC): ICD-10-CM

## 2022-06-20 LAB
ANION GAP SERPL CALCULATED.3IONS-SCNC: 9 MMOL/L (ref 4–13)
BUN SERPL-MCNC: 27 MG/DL (ref 5–25)
CALCIUM SERPL-MCNC: 9.2 MG/DL (ref 8.3–10.1)
CHLORIDE SERPL-SCNC: 104 MMOL/L (ref 100–108)
CO2 SERPL-SCNC: 24 MMOL/L (ref 21–32)
CREAT SERPL-MCNC: 1.27 MG/DL (ref 0.6–1.3)
ERYTHROCYTE [DISTWIDTH] IN BLOOD BY AUTOMATED COUNT: 13.2 % (ref 11.6–15.1)
GFR SERPL CREATININE-BSD FRML MDRD: 50 ML/MIN/1.73SQ M
GLUCOSE SERPL-MCNC: 153 MG/DL (ref 65–140)
GLUCOSE SERPL-MCNC: 154 MG/DL (ref 65–140)
GLUCOSE SERPL-MCNC: 178 MG/DL (ref 65–140)
GLUCOSE SERPL-MCNC: 212 MG/DL (ref 65–140)
HCT VFR BLD AUTO: 42.4 % (ref 36.5–49.3)
HGB BLD-MCNC: 14 G/DL (ref 12–17)
MCH RBC QN AUTO: 31.2 PG (ref 26.8–34.3)
MCHC RBC AUTO-ENTMCNC: 33 G/DL (ref 31.4–37.4)
MCV RBC AUTO: 94 FL (ref 82–98)
PLATELET # BLD AUTO: 220 THOUSANDS/UL (ref 149–390)
PMV BLD AUTO: 9.6 FL (ref 8.9–12.7)
POTASSIUM SERPL-SCNC: 4.9 MMOL/L (ref 3.5–5.3)
RBC # BLD AUTO: 4.49 MILLION/UL (ref 3.88–5.62)
SODIUM SERPL-SCNC: 137 MMOL/L (ref 136–145)
WBC # BLD AUTO: 13.71 THOUSAND/UL (ref 4.31–10.16)

## 2022-06-20 PROCEDURE — 97167 OT EVAL HIGH COMPLEX 60 MIN: CPT

## 2022-06-20 PROCEDURE — 94760 N-INVAS EAR/PLS OXIMETRY 1: CPT

## 2022-06-20 PROCEDURE — 99214 OFFICE O/P EST MOD 30 MIN: CPT

## 2022-06-20 PROCEDURE — 94664 DEMO&/EVAL PT USE INHALER: CPT

## 2022-06-20 PROCEDURE — 82948 REAGENT STRIP/BLOOD GLUCOSE: CPT

## 2022-06-20 PROCEDURE — 97163 PT EVAL HIGH COMPLEX 45 MIN: CPT

## 2022-06-20 PROCEDURE — 85027 COMPLETE CBC AUTOMATED: CPT | Performed by: HOSPITALIST

## 2022-06-20 PROCEDURE — 80048 BASIC METABOLIC PNL TOTAL CA: CPT | Performed by: HOSPITALIST

## 2022-06-20 PROCEDURE — 92610 EVALUATE SWALLOWING FUNCTION: CPT

## 2022-06-20 PROCEDURE — 99239 HOSP IP/OBS DSCHRG MGMT >30: CPT | Performed by: INTERNAL MEDICINE

## 2022-06-20 PROCEDURE — 71046 X-RAY EXAM CHEST 2 VIEWS: CPT

## 2022-06-20 RX ORDER — PREDNISONE 10 MG/1
TABLET ORAL
Qty: 90 TABLET | Refills: 3 | Status: SHIPPED | OUTPATIENT
Start: 2022-06-20 | End: 2022-06-23

## 2022-06-20 RX ADMIN — GUAIFENESIN 1200 MG: 600 TABLET ORAL at 08:51

## 2022-06-20 RX ADMIN — METHYLPREDNISOLONE SODIUM SUCCINATE 40 MG: 40 INJECTION, POWDER, FOR SOLUTION INTRAMUSCULAR; INTRAVENOUS at 08:51

## 2022-06-20 RX ADMIN — ENOXAPARIN SODIUM 40 MG: 40 INJECTION SUBCUTANEOUS at 08:51

## 2022-06-20 RX ADMIN — RIVASTIGMINE TARTRATE 4.5 MG: 1.5 CAPSULE ORAL at 08:51

## 2022-06-20 RX ADMIN — FLUTICASONE FUROATE AND VILANTEROL TRIFENATATE 1 PUFF: 200; 25 POWDER RESPIRATORY (INHALATION) at 08:51

## 2022-06-20 RX ADMIN — CARBIDOPA AND LEVODOPA 1 TABLET: 25; 100 TABLET ORAL at 08:51

## 2022-06-20 RX ADMIN — QUETIAPINE FUMARATE 50 MG: 25 TABLET ORAL at 08:51

## 2022-06-20 RX ADMIN — NYSTATIN: 100000 CREAM TOPICAL at 08:52

## 2022-06-20 RX ADMIN — INSULIN LISPRO 1 UNITS: 100 INJECTION, SOLUTION INTRAVENOUS; SUBCUTANEOUS at 12:24

## 2022-06-20 RX ADMIN — FINASTERIDE 5 MG: 5 TABLET, FILM COATED ORAL at 08:51

## 2022-06-20 RX ADMIN — ATORVASTATIN CALCIUM 10 MG: 10 TABLET, FILM COATED ORAL at 08:51

## 2022-06-20 RX ADMIN — PRAMIPEXOLE DIHYDROCHLORIDE 0.5 MG: 0.5 TABLET ORAL at 08:51

## 2022-06-20 RX ADMIN — FAMOTIDINE 20 MG: 20 TABLET ORAL at 08:51

## 2022-06-20 RX ADMIN — INSULIN LISPRO 1 UNITS: 100 INJECTION, SOLUTION INTRAVENOUS; SUBCUTANEOUS at 08:51

## 2022-06-20 RX ADMIN — TRIAMCINOLONE ACETONIDE: 5 CREAM TOPICAL at 08:52

## 2022-06-20 RX ADMIN — UMECLIDINIUM 1 PUFF: 62.5 AEROSOL, POWDER ORAL at 08:51

## 2022-06-20 NOTE — TELEPHONE ENCOUNTER
Patient's daughter would like to set up a time to discuss hospice with Dr Kizzy Dugan  She mentioned that a zoom call was discussed  Please give them a call back to discuss further

## 2022-06-20 NOTE — DISCHARGE INSTR - OTHER ORDERS
Skin care Plan:  1-Apply Hydraguard cream (blue top) to sacrum and buttock twice a day and as needed  2-Turn/reposition every 2 hours for pressure re-distribution on skin   3-Elevate heels to offload pressure  4-Moisturize skin daily with skin nourishing cream  5-Ehob cushion in chair when out of bed  6-Hydraguard to bilateral heels twice a day and as needed  7-Bilateral leg wounds- Cleanse wounds with NSS, pat dry  Apply Adaptic over wound beds and cover with Allevyn foam dressing  Change every other day and as needed

## 2022-06-20 NOTE — ASSESSMENT
[FreeTextEntry1] : Continue Flonase nasal spray \par Continue Singulair\par Continue Advair HFA\par Albuterol HFA as needed\par Continue Mucinex as an expectorant.\par Check pulmonary function test for follow-up in 1 month.

## 2022-06-20 NOTE — UTILIZATION REVIEW
Initial Clinical Review    Admission: Date/Time/Statement:   Admission Orders (From admission, onward)     Ordered        06/19/22 1212  Inpatient Admission  Once                      Orders Placed This Encounter   Procedures    Inpatient Admission     Standing Status:   Standing     Number of Occurrences:   1     Order Specific Question:   Level of Care     Answer:   Med Surg [16]     Order Specific Question:   Estimated length of stay     Answer:   More than 2 Midnights     Order Specific Question:   Certification     Answer:   I certify that inpatient services are medically necessary for this patient for a duration of greater than two midnights  See H&P and MD Progress Notes for additional information about the patient's course of treatment  ED Arrival Information     Expected   -    Arrival   6/19/2022 10:50    Acuity   Immediate            Means of arrival   Ambulance    Escorted by   [facility]    Admission type   Emergency            Arrival complaint   [address] [place]           Chief Complaint   Patient presents with    CVA/TIA-like Symptoms     Pt presents to ER via ALS from home as pre hospital stroke alert - see ED narrator for details  · Initial Presentation: 81 y o  male to the ED from home via EMS with complaints of non responsive after eating something, choking and becoming unresponsive  Admitted to inpatient for acute respiratory failure with hypoxia, aspiration for food  H/O Parkinson's disease , Parkinson's dementia, COPD, hypertension, type 2 diabetes  Upon EMS arrival to the patient, he is awake, but not responding or following commands  Stroke alert on arrival Initial CT head read reports left subdural hematoma  This is more likely noncalcified meningioma that has increased slowly in size since 2016  Likely aspiration event  Arrives tachycardic, tachypneic, hypertensive  NIHSS 0  Not a tpa candidate due to non disabling symptoms    Started on iv steroids, nebulizers  Neurosurgery consult  Wheezing lung sounds  He is disoriented  Repeat CXR in am   GCS 14  Date: 6/20   Day 2:   Is now alert, and back to baseline  As per speech, no new recommendations   PT/OT recommend psot acute rehab    ED Triage Vitals   Temperature Pulse Respirations Blood Pressure SpO2   06/19/22 1718 06/19/22 1110 06/19/22 1110 06/19/22 1110 06/19/22 1110   98 1 °F (36 7 °C) 101 (!) 23 (!) 181/78 92 %      Temp Source Heart Rate Source Patient Position - Orthostatic VS BP Location FiO2 (%)   06/19/22 1718 06/19/22 1347 06/19/22 1347 06/19/22 1347 --   Oral Monitor Lying Left arm       Pain Score       06/19/22 1347       No Pain          Wt Readings from Last 1 Encounters:   05/16/22 82 6 kg (182 lb)     Additional Vital Signs:   Date/Time Temp Pulse Resp BP MAP (mmHg) SpO2 O2 Device Patient Position - Orthostatic VS   06/20/22 0745 -- -- -- -- -- 94 % None (Room air) --   06/20/22 07:28:51 98 5 °F (36 9 °C) 71 18 142/72 95 94 % -- --   06/19/22 23:15:41 98 °F (36 7 °C) 72 17 138/74 95 95 % -- --   06/19/22 2100 -- -- -- -- -- -- None (Room air) --   06/19/22 18:15:30 -- 82 17 154/88 110 96 % -- --   06/19/22 1718 98 1 °F (36 7 °C) 82 30 Abnormal  167/79 -- 94 % None (Room air) Lying   06/19/22 1622 -- 82 36 Abnormal  165/78 -- -- -- Sitting   06/19/22 1347 -- 85 2 Abnormal  148/69 -- 92 % None (Room air) Lying   06/19/22 1139 -- -- -- -- -- 93 % -- --   06/19/22 1120 -- 85 33 Abnormal  -- -- 96 % -- --   06/19/22 1115 -- 91 34 Abnormal  -- -- 96 % -- --   06/19/22 1111 -- -- -- 166/76 -- -- -- --   06/19/22 11:10:32 -- 97 20 181/78 Abnormal  -- 94 % -- --   06/19/22 1110 -- 101 23 Abnormal             Pertinent Labs/Diagnostic Test Results:   EKG 6/19:  Normal sinus rhythm  Moderate voltage criteria for LVH, may be normal variant  Junctional ST depression, probably normal  Borderline ECG  XR chest pa & lateral   Final Result by Kevan Richards MD (06/20 2221)      Persistent low lung volumes and bibasal atelectasis                  Workstation performed: [id number]         XR chest 2 views   Final Result by Ary Horton MD (06/20 0700)      Bibasilar atelectasis  Workstation performed: [id number]         CT stroke alert brain   Final Result by Ursula Alvares DO (06/19 1126)   Addendum 1 of 1 by Ursula Alvares DO (06/19 1126)   ADDENDUM:      Left frontal extra-axial high dense region represents meningioma rather    than subdural hemorrhage  There is no definite subdural hemorrhage    identified  The meningioma is stable in size from the prior study  There    is low density adjacent in the    subcortical white matter of the left paramedian frontal lobe which may    represent encephalomalacia  The possibility of edema is not excluded  MRI of the brain recommended further evaluate  Final      Slight interval increase size of left frontal subdural hemorrhage measuring 1 5 cm maximum dimension  No midline shift  Moderate chronic microangiopathic changes are similar to the prior study  Findings were directly discussed with Austyn Harrington at 10:59 AM on 6/19/2022  Workstation performed: [id number]         CTA stroke alert (head/neck)   Final Result by Ursula Alvares DO (06/19 1124)      No significant carotid or vertebral artery stenosis  Status post right ICA stenting without evidence of in-stent stenosis  No focal intracranial stenosis or aneurysm  Prominent vasculature noted left frontoparietal vertex adjacent to the left frontal meningioma  Findings were directly discussed with Austyn Harrington at 11:20 AM on 6/19/2022                          Workstation performed: [id number]           Results from last 7 days   Lab Units 06/19/22  1133   SARS-COV-2  Negative     Results from last 7 days   Lab Units 06/20/22  0441 06/19/22  1117   WBC Thousand/uL 13 71* 11 22*   HEMOGLOBIN g/dL 14 0 13 8   HEMATOCRIT % 42 4 43 2 PLATELETS Thousands/uL 220 193         Results from last 7 days   Lab Units 06/20/22  0441 06/19/22  1117   SODIUM mmol/L 137 139   POTASSIUM mmol/L 4 9 4 0   CHLORIDE mmol/L 104 102   CO2 mmol/L 24 23   ANION GAP mmol/L 9 14*   BUN mg/dL 27* 25   CREATININE mg/dL 1 27 1 50*   EGFR ml/min/1 73sq m 50 41   CALCIUM mg/dL 9 2 9 1         Results from last 7 days   Lab Units 06/20/22  1113 06/20/22  0727 06/19/22  2134 06/19/22  1805   POC GLUCOSE mg/dl 178* 154* 300* 194*     Results from last 7 days   Lab Units 06/20/22  0441 06/19/22  1117   GLUCOSE RANDOM mg/dL 153* 204*       Results from last 7 days   Lab Units 06/19/22  1621 06/19/22  1530 06/19/22  1117   HS TNI 0HR ng/L  --   --  20   HS TNI 2HR ng/L  --  27  --    HSTNI D2 ng/L  --  7  --    HS TNI 4HR ng/L 27  --   --    HSTNI D4 ng/L 7  --   --          Results from last 7 days   Lab Units 06/19/22  1117   PROTIME seconds 13 9   INR  1 12   PTT seconds 23                 Results from last 7 days   Lab Units 06/19/22  1133   INFLUENZA A PCR  Negative   INFLUENZA B PCR  Negative   RSV PCR  Negative     ED Treatment:   Medication Administration from 06/19/2022 1050 to 06/19/2022 1757       Date/Time Order Dose Route Action     06/19/2022 1205 albuterol inhalation solution 5 mg 5 mg Nebulization Given     06/19/2022 1205 ipratropium (ATROVENT) 0 02 % inhalation solution 0 5 mg 0 5 mg Nebulization Given     06/19/2022 1205 methylPREDNISolone sodium succinate (Solu-MEDROL) injection 125 mg 125 mg Intravenous Given        Past Medical History:   Diagnosis Date    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) [facility]     Allergic rhinitis     last assessed [id number]    Aortic regurgitation     Arm laceration     Asthma     Bronchitis, chronic obstructive, with exacerbation ([facility])     last assessed 12Jun2015    Candidal intertrigo     Chronic obstructive lung disease ([facility] [address] )     last assessed [id number]    [facility]ch[facility]     Coronary artery disease     carotid stents    Cough     CVA (cerebral vascular accident) ([place] [address] )     Dermatitis     Diabetes mellitus type 2, uncomplicated ([place] [address] )     controlled    Dysthymic disorder     Fatigue     Hyperlipidemia     Hypertension     Neurologic gait dysfunction     Parkinson's disease ([place] [address] )     Pneumonia     PVD (peripheral vascular disease) ([place] [address] )     Seborrheic keratosis     TIA (transient ischemic attack)     Tinea corporis     Tinea pedis of both feet     Tremor          Admitting Diagnosis: Stroke ([place] [address] ) [I63 9]  COPD exacerbation ([place] [address] ) [J44 1]  Acute respiratory failure with hypoxia (HCC) [J96 01]  Stroke-like symptoms [R29 90]  Aspiration into airway [T17 908A]  Age/Sex: 81 y o  male  Admission Orders:  SCds  Up and OOB  Dysphagia diet, thin liquids  Scheduled Medications:  atorvastatin, 10 mg, Oral, Daily  carbidopa-levodopa, 1 tablet, Oral, TID  enoxaparin, 40 mg, Subcutaneous, Daily  famotidine, 20 mg, Oral, BID  finasteride, 5 mg, Oral, QAM  fluticasone-vilanterol, 1 puff, Inhalation, Daily  guaiFENesin, 1,200 mg, Oral, Q12H ARLEN  insulin lispro, 1-5 Units, Subcutaneous, TID AC  methylPREDNISolone sodium succinate, 40 mg, Intravenous, Q12H ARLEN  montelukast, 10 mg, Oral, HS  nystatin, , Topical, BID  pramipexole, 0 5 mg, Oral, TID  QUEtiapine, 50 mg, Oral, BID  rivastigmine, 4 5 mg, Oral, BID  triamcinolone, , Topical, BID  umeclidinium bromide, 1 puff, Inhalation, Daily      Continuous IV Infusions:     PRN Meds:  acetaminophen, 650 mg, Oral, Q6H PRN  albuterol, 2 puff, Inhalation, Q4H PRN  benzonatate, 100 mg, Oral, TID PRN        IP CONSULT TO NEUROLOGY  IP CONSULT TO CASE MANAGEMENT    Network Utilization Review Department  ATTENTION: Please call with any questions or concerns to [phone] and carefully listen to the prompts so that you are directed to the right person   All voicemails are confidential   Antonella Mims all requests for admission clinical reviews, approved or denied determinations and any other requests to dedicated fax number below belonging to the campus where the patient is receiving treatment   List of dedicated fax numbers for the Facilities:  [address] DENIALS (Administrative/Medical Necessity) [phone]   [address] (Maternity/NICU/Pediatrics) [phone] [address] [place] [address] Medical [place] [address] [facility] [place] [id number] [place] [address] [address] [place] [address] [phone]

## 2022-06-20 NOTE — OCCUPATIONAL THERAPY NOTE
Occupational Therapy Evaluation        Patient Name: Tonja COVINGTON Date: 6/20/2022 06/20/22 0956   OT Last Visit   OT Visit Date 06/20/22   Note Type   Note type Evaluation   Restrictions/Precautions   Weight Bearing Precautions Per Order No   Other Precautions Cognitive; Chair Alarm; Fall Risk   Pain Assessment   Pain Assessment Tool 0-10   Pain Score No Pain   Home Living   Type of Home House   Home Layout Two level;Stairs to enter with rails;Bed/bath upstairs  (5 [address], FOS with assistance)   Bathroom Shower/Tub Tub/shower unit  (pt  reports bathing while seated on toilet as transferring into bath is difficult)   Bathroom Toilet Raised   Bathroom Equipment Grab bars in shower; Shower chair   Bathroom Accessibility Accessible   Home Equipment Walker;Cane;Wheelchair-manual;Hospital bed   Additional Comments ambulatory with cane but if needed can use walker   Prior Function   Level of [place] Needs assistance with ADLs and functional mobility; Needs assistance with IADLs   Lives With Spouse;Daughter   Receives Help From Family   ADL Assistance Needs assistance  (can wash UEs needs help with LE)   IADLs Needs assistance   Falls in the last 6 months 1 to 4  (3 falls)   Vocational Retired  ([employer])   Comments  (-)   Lifestyle   Autonomy Patient reported assistance is needed with ADLs, IADLs, and functional mobility  Patient lives with family in a two story home with 5 steps to enter and a flight of steps to second level where bed/bath are located  Patient reports he waits for assistance in the morning before getting out of bed and can wash and dress himself but does require some assistance  Patient reports bathing while seated on the toilet as it is too difficult to get into the tub  Patient ambulates with a cane and reports 3 falls within the last 6 months   Patient is retired and reports he enjoys "nothing much "   Reciprocal Relationships supportive family   Intrinsic Gratification nothing much   Psychosocial   Psychosocial (WDL) WDL   Subjective   Subjective "No pain"   ADL   Eating Assistance 5  Supervision/Setup   Grooming Assistance 5  Supervision/Setup   UB Bathing Assistance 3  Moderate Assistance   LB Bathing Assistance 2  Maximal Assistance   UB Dressing Assistance 3  Moderate Assistance   LB Dressing Assistance 2  Maximal [address]  4  Minimal Assistance   Functional Assistance 3  Moderate Assistance   Bed Mobility   Additional Comments patient recieved OOB seated in recliner   Transfers   Sit to Stand 4  Minimal assistance   Additional items Assist x 1; Armrests; Increased time required;Verbal cues   Stand to Sit 4  Minimal assistance   Additional items Assist x 1; Armrests; Increased time required;Verbal cues   Functional Mobility   Functional Mobility 4  Minimal assistance   Additional items Rolling walker   Balance   Static Sitting Fair +   Dynamic Sitting Fair -   Static Standing Fair -   Dynamic Standing Poor +   Ambulatory Poor +   Activity Tolerance   Activity Tolerance Patient limited by fatigue   Nurse Made Aware GLEN DOOLEY Assessment   RUE Assessment WFL  (grossly WFL)   RUE Strength   RUE Overall Strength Deficits  (4/5 proximally, 3+/5 distally)   LUE Assessment   LUE Assessment WFL  (grossly WFL)   LUE Strength   LUE Overall Strength Deficits  (4/5 proximally, 3+/5 distally)   Hand Function   Gross Motor Coordination Impaired   Fine Motor Coordination Functional   Sensation   Light Touch No apparent deficits  (BUEs)   Proprioception   Proprioception No apparent deficits  (BUEs)   Vision-Basic Assessment   Current Vision Does not wear glasses   Patient Visual Report Other (Comment)  (no notable changes reported)   Cognition   Overall Cognitive Status Impaired   Arousal/Participation Alert; Responsive; Cooperative   Attention Within functional limits   Orientation Level Oriented to person;Disoriented to time;Disoriented to situation  (place "hospital", "[place]")   Memory Decreased recall of biographical information;Decreased short term memory;Decreased recall of recent events   Following Commands Follows one step commands without difficulty   Assessment   Limitation Decreased ADL status; Decreased UE strength;Decreased Safe judgement during ADL;Decreased cognition;Decreased endurance;Decreased self-care trans;Decreased high-level ADLs   Prognosis Good   Assessment Patient is a 81 yr  old male who was seen for an OT evaluation s/p admission to [place] [address] on 6/19/22 due to aspiration  Patient was diagnosed with acute respiratory failure with hypoxia  Other contributing factors affecting the patients occupational performance at this time include COPD with acute exacerbation, aspiration of food, type 2 diabetes, dementia due to Parkinson's disease with behavioral disturbance, meningioma, essential hypertension, peripheral vascular disease, Parkinson's disease, and hyperlipidemia  Orders were placed for OT evaluation and treatment  At least two patient identifiers performed during session including name and wristband  Prior to admission patient reported assistance is needed with ADLs, IADLs, and functional mobility  Patient lives with family in a two story home with 5 steps to enter and a flight of steps to second level where bed/bath are located  Patient reports he waits for assistance in the morning before getting out of bed and can wash and dress himself but does require some assistance  Patient reports bathing while seated on the toilet as it is too difficult to get into the tub  Patient is retired and reports he enjoys "nothing much "  Personal factors that are inhibiting the patients performance at time of evaluation include: steps to enter, difficulty performing ADLs, difficulty performing IADLs, and decreased initiation and engagement   Upon evaluation patient requires Min A for functional mobility, Mod A for UB ADLs, and Max A for LB ADLs  Patients occupational performance is affected by the following deficits: decreased safety awareness, limited UE strength, impaired gross motor coordination, decreased static/dynamic sitting balance, decreased static/dynamic standing balance, decreased activity tolerance, decreased endurance, decreased orientation level, and impaired short term memory  Patient to benefit from continued Occupational Therapy treatment while in the hospital to address the stated deficits and improve occupational performance and overall quality of life  From an OT standpoint at this time, d/c recommendation would be post acute rehab services  Plan   Treatment Interventions ADL retraining;Functional transfer training;UE strengthening/ROM; Endurance training;Cognitive reorientation;Patient/family training;Equipment evaluation/education; Compensatory technique education;Continued evaluation; Energy conservation; Activityengagement   Goal Expiration Date 07/04/22   OT Frequency 3-5x/wk   Recommendation   OT Discharge Recommendation Post acute rehabilitation services   AM-PAC Daily Activity Inpatient   Lower Body Dressing 2   Bathing 2   Toileting 3   Upper Body Dressing 2   Grooming 3   Eating 3   Daily Activity Raw Score 15   Daily Activity Standardized Score (Calc for Raw Score >=11) 34 69   AM-PAC Applied Cognition Inpatient   Following a Speech/Presentation 3   Understanding Ordinary Conversation 3   Taking Medications 1   Remembering Where Things Are Placed or Put Away 1   Remembering List of 4-5 Errands 1   Taking Care of Complicated Tasks 1   Applied Cognition Raw Score 10   Applied Cognition Standardized Score 24 98   Barthel Index   Feeding 5   Bathing 0   Grooming Score 0   Dressing Score 5   Bladder Score 5   Bowels Score 5   Toilet Use Score 5   Transfers (Bed/Chair) Score 10   Mobility (Level Surface) Score 0   Stairs Score 0   Barthel Index Score 35   Modified Suad Scale   Modified Suad Scale 4 Patient will complete grooming tasks seated at EOB with Mod I  Patient will complete toileting hygiene with Supervision/Setup assist      Patient will improve standing tolerance to 10-15 min to increase activity tolerance during ADL/IADL tasks  Patient will complete UB/ LB bathing while seated in shower with Min assist and the use of adaptative techniques  Patient will complete UB/LB dressing tasks while seated EOB with Min A  Patient will complete transfers from bed to chair/toilet with supervision/setup assist and AD as indicated  Patient will demonstrate OOB/ sitting tolerance to 2-4 hours per day for increased participation in self care and leisure tasks with no exertion  Patient will demonstrate- fair +  dynamic/static standing balance for increased participation in ADL/IADL activities  Patient will increase distal UE strength to 4/5 for completion of ADL/IADL tasks  Patient will identify 3 leisure activities in hospital/home/community setting which promote physical/emotional or cognitive well being  Patient will demonstrate good carryover of walker safety during functional activities with no verbal cues

## 2022-06-20 NOTE — SPEECH THERAPY NOTE
Speech-Language Pathology Bedside Swallow Evaluation        Patient Name: Adriana Otto  VSEEI'N Date: 6/20/2022     Problem List  Principal Problem:    Acute respiratory failure with hypoxia ([facility] )  Active Problems:    Hyperlipidemia    Parkinson's disease ([facility] )    Type 2 diabetes mellitus ([facility] )    Peripheral vascular disease ([facility] )    COPD with acute exacerbation ([facility] )    Essential hypertension    Meningioma ([facility] )    Dementia due to Parkinson's disease with behavioral disturbance ([facility] )    Aspiration of food       Past Medical History  Past Medical History:   Diagnosis Date    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) ([facility] )     Allergic rhinitis     last assessed [id number]    Aortic regurgitation     Arm laceration     Asthma     Bronchitis, chronic obstructive, with exacerbation (HCC)     last assessed 12Jun2015    Candidal intertrigo     Chronic obstructive lung disease ([facility] )     last assessed [id number]    [facility]     Coronary artery disease     carotid stents    Cough     CVA (cerebral vascular accident) ([facility] )     Dermatitis     Diabetes mellitus type 2, uncomplicated ([facility] )     controlled    Dysthymic disorder     Fatigue     Hyperlipidemia     Hypertension     Neurologic gait dysfunction     Parkinson's disease ([facility] )     Pneumonia     PVD (peripheral vascular disease) ([facility] )     Seborrheic keratosis     TIA (transient ischemic attack)     Tinea corporis     Tinea pedis of both feet     Tremor        Past Surgical History  Past Surgical History:   Procedure Laterality Date    NASAL SINUS SURGERY      OK BRONCHOSCOPY,DIAGNOSTIC N/A 7/27/2016    Procedure: BRONCHOSCOPY;  Surgeon: Randall Olivares MD;  Location: [facility]; Service: Pulmonary       Summary/Impressions:    Pt presents with mild-mod oral and suspected pharyngeal dysphagia secondary to PMH PD, Parkinsons dementia, known h/o dysphagia and recent choking episode    Patient accepts all textures with adequate acceptance and containment  Mastication prolonged with reduced breakdown of hard and soft solids; benefits from added moisture for improved efficiency and bolus cohesion  No overt s/s of aspiration or changes in vocal quality across all textures  Pt does benefit from cues for small bites, slow paced eating and liq wash as indicated  Recommendations:   Diet: mechanically altered/level 2 diet and thin liquids   Meds: whole with puree ; cut/crush larger if mx cleared  Feeding assistance: close monitoring and assist   Frequent Oral care: 2-4x/day  Aspiration precautions and compensatory swallowing strategies: upright posture, only feed when fully alert, slow rate of feeding, small bites/sips and alternating bites and sips  Other Recommendations/ considerations: ST to f/u with take home packet/pt/family edu re: dysphagia 2 diet an acceptable textures  Current Medical Status  Pt is a 81 y o  male who presented to [facility] with a PMH of Parkinson's disease , Parkinson's dementia, COPD, hypertension, type 2 diabetes, who presents with aspiration  At 10 am, he was eating a piece of bread when he started choking and then became unresponsive  When EMS arrived, he was awake but not responding to them or following commands  CT head revealed known left frontal meningioma   CTA head and neck with and without contrast revealed no LVO, aneurysm, or significant stenosis  Stoke alert was called and both neurology and neurosurgery evaluated  They felt that this was an epsiode of hypoxia and not an extension of sudural hematoma around menigiaoma, or a CVA  Patient is at baseline now and is responding to questions  He is not normally on home oxygen     Pt known to this dept from recent admission w/ [facility] in March 2022  Discharged on dysphagia 2/thin liquids  ST consult placed following nursing concern with swallow function and pt's ability to take medications         Past medical history:  Please see H&P for details    Special Studies:  CXR 6/20/22:  Persistent low lung volumes and bibasal atelectasis    VBS 3/30/22:  Patient presents with moderate oral and mild pharyngeal dysphagia characterized by immature/incomplete mastication, prolonged bolus transit with poor bolus cohesion 2/2 lingual rocking, and delayed swallow response   This resulted in premature bolus loss over the base of tongue to the level of valleculae (during oral manipulation of all solids, honey/nectar thick), and pyriforms (nectar thick/thin)   No evidence of mistimed airway protection visualized under fluoro, resulting no amaury aspiration or laryngeal penetration while under fluoro   No significant pharyngeal residue or obstruction in bolus pathway   All material safety cleared though the UES within 1-2 swallows resulting no apparent bolus retention   Esophageal sweep unable to be completed 2/2 patient positioning in fluoro suite        Of note, pt demonstrated no signs of distress (cough) during this time  He is known to  cough during mealtime at home (solids>liquids) raising concern and reason for the present study    Cannot r/o post-swallow aspiration or negate possiblilty of aspiration with larger quantities of preferred beverages and food items (vs barium coated food)          Social/Education/Vocational Hx:  Pt lives with family    Swallow Information   Current Risks for Dysphagia & Aspiration: known history of dysphagia  Current Symptoms/Concerns: choking incident  Current Diet: soft/level 3 diet and thin liquids   Baseline Diet: soft/level 3 diet and thin liquids - presumed    (per notes, family aware of swallowing deficits)   Baseline Assessment   Behavior/Cognition: alert  Speech/Language Status: able to participate in basic conversation and able to follow commands  Patient Positioning: upright in chair    Swallow Mechanism Exam   Facial: symmetrical  Labial: WFL  Lingual: WFL (?slightly reduced ROM)  Velum: unable to visualize  Mandible: adequate ROM  Dentition: adequate  Vocal quality:breathy and weak   Volitional Cough: weak   Respiratory: RA    Consistencies Assessed and Performance   Consistencies Administered: ice chips, thin liquids, nectar thick, puree, mechanical soft solids and soft solids    Oral Stage: Impaired  Adequate acceptance and containment  Reduced mastication/ant munch and bolus cohesion of harder textures, as well as dry soft texture  Transfer prolonged  Pt more efficient with added moisture  Minimal oral residue  Pharyngeal Stage: Laryngeal rise noted upon palpation  Swallow initiation suspected to be delayed  No overt s/s of aspiration or changes in vocal quality at any point  O2 saturation remains WNL  Esophageal Concerns: none reported      Results Reviewed with: patient, RN and MD     Plan    Will continue to follow for x1   Dysphagia Goals: pt will tolerate dys2 with thin without s/s of aspiration x1-3   Pt/family edu  *Note, pt planned for d/c today  Packet of information containing acceptable food items for dysphagia 2 diet & how to safe modify was presented to patient at bedside  Given to take home  RN aware       Discharge recommendation: home health    Speech Therapy Prognosis   Prognosis: fair  Prognosis Considerations: progressive disease process        [address], [address] [facility]  Speech-Language Pathologist  [place] #[id number]  [place] #[id number]

## 2022-06-20 NOTE — DISCHARGE SUMMARY
Discharge Summary - Skyla Coreas 81 y o  male MRN: [id number]  Unit/Bed#: -01 Encounter: [id number]    Admission Date:    6/19/2022   Discharge Date:   06/20/22   Admitting Diagnosis:   Stroke [facility]) [I63 9]  COPD exacerbation ([facility] ) [J44 1]  Acute respiratory failure with hypoxia ([facility] ) [J96 01]  Stroke-like symptoms [R29 90]  Aspiration into airway [T17 908A]  Admitting Provider:   Cale Fox MD  Discharge Provider:   Kyleigh Rich MD     Primary Care Physician at Discharge:   Kyleigh Rich MD,[phone]    HPI:  From history and physical by Dr Roberts He is a 81 y o  male with a PMH of Parkinson's disease , Parkinson's dementia, COPD, hypertension, type 2 diabetes, who presents with aspiration  At 10 am, he was eating a piece of bread when he started choking and then became unresponsive  When EMS arrived, he was awake but not responding to them or following commands  CT head revealed known left frontal meningioma   CTA head and neck with and without contrast revealed no LVO, aneurysm, or significant stenosis  Stoke alert was called and both neurology and neurosurgery evaluated  They felt that this was an epsiode of hypoxia and not an extension of sudural hematoma around menigiaoma, or a CVA  Patient is at baseline now and is responding to questions  He is not normally on home oxygen  Procedures Performed:   No orders of the defined types were placed in this encounter  Hospital Course:   Aspiration-patient has known history of aspiration and patient and family follow aspiration precautions at home  He was re-evaluated by speech therapy who made no new recommendations other than to continue counseling patient and family  Chest x-ray revealed no evidence of infiltrate and patient remained hemodynamically stable without oxygen requirement  COPD exacerbation likely secondary to aspiration event  Patient appeared to be close to baseline today    Transition from Solu-Medrol to prednisone taper with close outpatient follow-up  Acute respiratory failure with hypoxia secondary to above, resolved, no current oxygen requirement  Will arrange for home overnight oximetry to consider nocturnal oxygen  Type 2 diabetes mellitus remains stable with diet control with A1c 6 6    Meningioma-initial CT suggested hemorrhage  Neurology and Neurosurgery were consulted and felt that there was some subtle growth of previously known meningioma for which patient and family have opted no further workup  Radiology commented that they cannot exclude encephalomalacia or edema  Goal of care discussion-patient and family and I have had ongoing goal of care discussions regarding appropriate timing for transition to hospice  I spoke with Tootie Alejo at the time of discharge to will discuss further with her sisters and the rest of the family and let me know  Parkinson's disease, dementia, hypertension, hyperlipidemia and peripheral vascular disease remained stable and no medication changes were made        Current Facility-Administered Medications:     acetaminophen (TYLENOL) tablet 650 mg, 650 mg, Oral, Q6H PRN, Cary Soria MD    albuterol (PROVENTIL HFA,VENTOLIN HFA) inhaler 2 puff, 2 puff, Inhalation, Q4H PRN, Cary Soria MD    atorvastatin (LIPITOR) tablet 10 mg, 10 mg, Oral, Daily, Cary Soria MD, 10 mg at 06/20/22 0851    benzonatate (TESSALON PERLES) capsule 100 mg, 100 mg, Oral, TID PRN, Cary Soria MD    carbidopa-levodopa (SINEMET)  mg per tablet 1 tablet, 1 tablet, Oral, TID, Cary Soria MD, 1 tablet at 06/20/22 0851    enoxaparin (LOVENOX) subcutaneous injection 40 mg, 40 mg, Subcutaneous, Daily, Cary Soria MD, 40 mg at 06/20/22 0851    famotidine (PEPCID) tablet 20 mg, 20 mg, Oral, BID, Cary Soria MD, 20 mg at 06/20/22 0851    finasteride (PROSCAR) tablet 5 mg, 5 mg, Oral, QAM, Cary Soria MD, 5 mg at 06/20/22 0851    fluticasone-vilanterol (BREO ELLIPTA) 200-25 MCG/INH inhaler 1 puff, 1 puff, Inhalation, Daily, Thai Urbina MD, 1 puff at 06/20/22 0851    guaiFENesin (MUCINEX) 12 hr tablet 1,200 mg, 1,200 mg, Oral, Q12H [address], Thai Urbina MD, 1,200 mg at 06/20/22 0851    insulin lispro (HumaLOG) 100 units/mL subcutaneous injection 1-5 Units, 1-5 Units, Subcutaneous, TID AC, 1 Units at 06/20/22 1224 **AND** Fingerstick Glucose (POCT), , , TID AC, Thai Urbina MD    methylPREDNISolone sodium succinate (Solu-MEDROL) injection 40 mg, 40 mg, Intravenous, Q12H [address], Thai Urbina MD, 40 mg at 06/20/22 0851    montelukast (SINGULAIR) tablet 10 mg, 10 mg, Oral, HS, Thai Urbina MD, 10 mg at 06/19/22 2137    nystatin (MYCOSTATIN) cream, , Topical, BID, Thai Urbina MD, Given at 06/20/22 7458    pramipexole (MIRAPEX) tablet 0 5 mg, 0 5 mg, Oral, TID, Thai Urbina MD, 0 5 mg at 06/20/22 0851    QUEtiapine (SEROquel) tablet 50 mg, 50 mg, Oral, BID, Thai Urbina MD, 50 mg at 06/20/22 9612    rivastigmine (EXELON) capsule 4 5 mg, 4 5 mg, Oral, BID, Thai Urbina MD, 4 5 mg at 06/20/22 7165    triamcinolone (KENALOG) 0 5 % cream, , Topical, BID, Thai Urbina MD, Given at 06/20/22 9429    umeclidinium bromide (INCRUSE ELLIPTA) 62 5 mcg/inh inhaler AEPB 1 puff, 1 puff, Inhalation, Daily, Thai Urbina MD, 1 puff at 06/20/22 3008      Consulting Providers   Neurology, neuro surgery    Significant Findings, Care, Treatment and Services Provided:   As above    Complications:   None  Physical Exam:  General Appearance:    Alert, cooperative, no distress   Head:    Normocephalic, without obvious abnormality, atraumatic   Eyes:    PERRL, conjunctiva/corneas clear, EOM's intact       Nose:   Moist mucous membranes, no drainage or sinus tenderness   Throat:   No tenderness, no exudates   Neck:   Supple, symmetrical, trachea midline, no JVD   Lungs:     Poor air exchange with bilateral coarse rhonchi at bases, respirations unlabored   Heart:    Regular rate and rhythm, S1 and S2 normal, 1/6 systolic murmur   Abdomen: Soft, non-tender, positive bowel sounds, no masses, no organomegaly   Extremities:  No pedal edema, calf tenderness  Distal pulses palpable  Neurologic:     CNII-XII intact  Labs:   Lab Results   Component Value Date    WBC 13 71 (H) 06/20/2022    WBC 9 45 12/10/2015    RBC 4 49 06/20/2022    RBC 4 52 12/10/2015    HGB 14 0 06/20/2022    HGB 13 7 12/10/2015    HCT 42 4 06/20/2022    HCT 41 6 12/10/2015    MCV 94 06/20/2022    MCV 92 12/10/2015    MCH 31 2 06/20/2022    MCH 30 3 12/10/2015    RDW 13 2 06/20/2022    RDW 13 4 12/10/2015     06/20/2022     12/10/2015     Lab Results   Component Value Date    CREATININE 1 27 06/20/2022    CREATININE 0 98 12/10/2015    BUN 27 (H) 06/20/2022    BUN 21 12/10/2015     12/10/2015    K 4 9 06/20/2022    K 4 3 12/10/2015     06/20/2022     (H) 12/10/2015    CO2 24 06/20/2022    CO2 27 12/10/2015    GLUCOSE 94 12/10/2015    PROT 8 0 12/10/2015    ALKPHOS 76 03/28/2022    ALKPHOS 104 12/10/2015    ALT 12 03/28/2022    ALT 34 12/10/2015    AST 21 03/28/2022    AST 22 12/10/2015    BILIDIR 0 18 11/23/2021    BILIDIR 0 13 12/10/2015           Discharge Diagnosis:   Principal Problem:    Acute respiratory failure with hypoxia (HCC)  Active Problems:    Hyperlipidemia    Parkinson's disease ([facility] )    Type 2 diabetes mellitus ([facility] )    Peripheral vascular disease ([facility] )    COPD with acute exacerbation ([facility] )    Essential hypertension    Meningioma ([facility] )    Dementia due to Parkinson's disease with behavioral disturbance ([facility] )    Aspiration of food  Resolved Problems:    * No resolved hospital problems  *      Condition at Discharge:   Good     Code Status: Level 3 - DNAR and DNI  Advance Directive and Living Will: <no information>  Power of :    POLST:      Discharge instructions/Information to patient and family:   See after visit summary for information provided to patient and family        Provisions for Follow-Up Care:  See after visit summary for information related to follow-up care and any pertinent home health orders  Disposition:   Home    Planned Readmission:   No    Discharge Statement   I spent 38 minutes discharging the patient  This time was spent on the day of discharge  I had direct contact with the patient on the day of discharge  Additional documentation is required if more than 30 minutes were spent on discharge  Greater than 50% of the total time was spent examining patient, answering all patient questions, arranging and discussing plan of care with patient as well as directly providing post-discharge instructions  Additional time then spent on discharge activities  Discharge Medications:  See after visit summary for reconciled discharge medications provided to patient and family        Kimani Leach MD  6/20/2022,12:58 PM

## 2022-06-20 NOTE — PLAN OF CARE
Problem: PAIN - ADULT  Goal: Verbalizes/displays adequate comfort level or baseline comfort level  Description: Interventions:  - Encourage patient to monitor pain and request assistance  - Assess pain using appropriate pain scale  - Administer analgesics based on type and severity of pain and evaluate response  - Implement non-pharmacological measures as appropriate and evaluate response  - Consider cultural and social influences on pain and pain management  - Notify physician/advanced practitioner if interventions unsuccessful or patient reports new pain  Outcome: Progressing     Problem: SAFETY ADULT  Goal: Patient will remain free of falls  Description: INTERVENTIONS:  - Educate patient/family on patient safety including physical limitations  - Instruct patient to call for assistance with activity   - Consult OT/PT to assist with strengthening/mobility   - Keep Call bell within reach  - Keep bed low and locked with side rails adjusted as appropriate  - Keep care items and personal belongings within reach  - Initiate and maintain comfort rounds  - Make Fall Risk Sign visible to staff  - Offer Toileting every 2 Hours, in advance of need  - Initiate/Maintain bed alarm  - Obtain necessary fall risk management equipment:   - Apply yellow socks and bracelet for high fall risk patients  - Consider moving patient to room near nurses station  Outcome: Progressing     Problem: SAFETY ADULT  Goal: Maintain or return to baseline ADL function  Description: INTERVENTIONS:  -  Assess patient's ability to carry out ADLs; assess patient's baseline for ADL function and identify physical deficits which impact ability to perform ADLs (bathing, care of mouth/teeth, toileting, grooming, dressing, etc )  - Assess/evaluate cause of self-care deficits   - Assess range of motion  - Assess patient's mobility; develop plan if impaired  - Assess patient's need for assistive devices and provide as appropriate  - Encourage maximum independence but intervene and supervise when necessary  - Involve family in performance of ADLs  - Assess for home care needs following discharge   - Consider OT consult to assist with ADL evaluation and planning for discharge  - Provide patient education as appropriate  Outcome: Progressing

## 2022-06-20 NOTE — PHYSICAL THERAPY NOTE
Physical Therapy Evaluation   Time in: 805  Time out: 821  Total evaluation time: 16 minutes    Patient's Name: Saima Daigle    Admitting Diagnosis  Stroke [facility]) [I63 9]  COPD exacerbation ([address] ) [J44 1]  Acute respiratory failure with hypoxia ([address] ) [J96 01]  Stroke-like symptoms [R29 90]  Aspiration into airway [T17 908A]    Problem List  Patient Active Problem List   Diagnosis    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) ([address] )    Allergic asthma    Allergic rhinitis    Aortic regurgitation    Aspiration pneumonia ([address] )    Long term (current) use of systemic steroids    Hyperlipidemia    Parkinson's disease ([address] )    Neurologic gait dysfunction    Type 2 diabetes mellitus ([facility] )    Peripheral vascular disease ([address] )    COPD with acute exacerbation ([address] )    Cerebrovascular disease    Carotid artery disease ([address] )    Varicose 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    H/O burning pain in leg    Ambulatory dysfunction    Essential hypertension    Meningioma ([facility])    Benign neoplasm of supratentorial region of brain ([address] )    Subclinical hypothyroidism    Dementia due to Parkinson's disease with behavioral disturbance ([address] )    Community acquired pneumonia of right lower lobe of lung    Fall    Mood disorder ([address] )    Cough    Shortness of breath    Other emphysema ([address] )    Dysphagia    Aspiration of food    Acute respiratory failure with hypoxia ([address] )    Altered mental status       Past Medical History  Past Medical History:   Diagnosis Date    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) ([address] )     Allergic rhinitis     last assessed [id number]    Aortic regurgitation     Arm laceration     Asthma     Bronchitis, chronic obstructive, with exacerbation (HCC)     last assessed [id number]    Candidal intertrigo     Chronic obstructive lung disease ([address] )     last assessed [id number]    COPD (chronic obstructive pulmonary disease) (HCC)     Coronary artery disease     carotid stents    Cough     CVA (cerebral vascular accident) [facility])     Dermatitis     Diabetes mellitus type 2, uncomplicated ([facility] )     controlled    Dysthymic disorder     Fatigue     Hyperlipidemia     Hypertension     Neurologic gait dysfunction     Parkinson's disease ([facility] )     Pneumonia     PVD (peripheral vascular disease) ([facility] )     Seborrheic keratosis     TIA (transient ischemic attack)     Tinea corporis     Tinea pedis of both feet     Tremor        Past Surgical History  Past Surgical History:   Procedure Laterality Date    NASAL SINUS SURGERY      [place] BRONCHOSCOPY,DIAGNOSTIC N/A 7/27/2016    Procedure: BRONCHOSCOPY;  Surgeon: Brendon Holguin MD;  Location: [facility]; Service: Pulmonary       PT performed at least 2 patient identifiers during session: Name and wristband  06/20/22 0805   PT Last Visit   PT Visit Date 06/20/22   Note Type   Note type Evaluation   Pain Assessment   Pain Assessment Tool 0-10   Pain Score No Pain   Restrictions/Precautions   Weight Bearing Precautions Per Order No   Other Precautions Cognitive; Chair Alarm; Bed Alarm; Fall Risk   Home Living   Type of [address] [place] [address] One level;Performs ADLs on one level  (pt reports 1SH, previous PT IE- reports 2 story home)   [employer] bars in shower; Shower chair;Grab bars around toilet;Commode  (per EMR)   [address]   Additional Comments Per previous PT eval from March 2022- pt lives in [facility] with FOS to 2nd floor & requires 2 person assist, has walk in shower but doesn't access, bathes seated on toilet; requires assistance for ambulation and I/ADLs  Family rotated shifts to provide A  This information was obtained from pt's daughter who was present at bedside at time of that PT IE  Prior Function   Level of [place] Needs assistance with ADLs and functional mobility; Needs assistance with IADLs   Lives With Spouse;Daughter   Receives Help From Family   ADL Assistance Needs assistance  (per pt- reports he was independent; per EMR- pt receives assistance from family as of March 2022)   IADLs Needs assistance   Falls in the last 6 months 1 to 4  (3 falls per pt)   Comments information listed above obtained from pt at time of PT IE, pt questionable historian, CM to follow up  Per previous PT eval in March 2022,   General   Family/Caregiver Present No   Cognition   Overall Cognitive Status Impaired   Arousal/Participation Alert   Orientation Level Oriented to person;Disoriented to time;Disoriented to situation  ("hospital"- unable to provide name of hospital)   Memory Decreased recall of biographical information;Decreased short term memory;Decreased recall of recent events;Decreased recall of precautions   Following Commands Follows one step commands with increased time or repetition   Comments pt agreeable to PT eval   RLE Assessment   RLE Assessment   (grossly3 +/5)   LLE Assessment   LLE Assessment   (grossly3 +/5)   Coordination   Sensation [facility]   Light Touch   RLE Light Touch Grossly intact   LLE Light Touch Grossly intact   Bed Mobility   Supine to Sit 3  Moderate assistance   Additional items Assist x 2;HOB elevated; Increased time required;Verbal cues;LE management; Bedrails   Transfers   Sit to Stand 4  Minimal assistance   Additional items Assist x 2;Armrests; Increased time required;Verbal cues   Stand to Sit 4  Minimal assistance   Additional items Assist x 2;Armrests; Increased time required;Verbal cues   Additional Comments vc for appropriate hand/foot placement during STS transfers   Ambulation/Elevation   Gait pattern Decreased foot clearance;Shuffling; Short stride; Step to; Forward Flexion;Knees flexed  (degraded posture)   Gait Assistance 4  Minimal assist   Additional items Assist x 2;Verbal cues; Tactile cues   Assistive Device Rolling walker   Distance 5' from EOB to recliner   Balance   Static Sitting Fair   Dynamic Sitting Poor +   Static Standing Poor +   Dynamic Standing Poor   Ambulatory Poor   Endurance Deficit   Endurance Deficit Yes Activity Tolerance   Activity Tolerance Patient limited by fatigue; Other (Comment)  (cognition)   Medical Staff Made Aware DANIELA Garcia   Nurse Made Aware RN Sangeeta   Assessment   Prognosis Good   Problem List Decreased strength;Decreased endurance; Impaired balance;Decreased mobility; Decreased cognition   Assessment Pt is an 81 y o  male seen for high-complexity PT evaluation on 6/20/2022 s/p admit to [facility] on 6/19/2022 w/ Acute respiratory failure with hypoxia ([address] )  PT was consulted to assess pt's functional mobility and d/c needs  Order placed for PT eval and tx  PTA, [facility] set [facility], ambulates with walker, has A from family for I/ADLs- this info as of March 2022- pt poor historian, INDER to follow up  At time of eval, mod A x2 for supine to sit transfer, min A x2 for STS and short gait trial from EOB to recliner c use of RW  Upon evaluation, pt presenting with impaired functional mobility d/t decreased strength, decreased ROM, decreased endurance, impaired balance, decreased mobility, decreased cognition, and activity intolerance  Pertinent PMHx and current co-morbidities affecting pt's physical performance at time of assessment include: pneumonia, Parkinson's disease, type 2 DM, HTN, emphysema, dysphagia, ABPA, asthma, aortic regurgitation, HLD, neurologic gait dysfunction, PVD, COPD, cerebro vascular disease, ambulatory dysfunction, fall, subclinical hypothyroidism, mood disorder, subdural hematoma  Personal factors affecting pt at time of eval include: inaccessible home environment, ambulating w/ assistive device, stairs in home, inability to navigate level surfaces w/o external assistance, unable to perform dynamic tasks in community, decreased cognition, positive fall history and decreased initiation and engagement   The following objective measures performed on IE also reveal limitations: Barthel Index: 25/100, Modified [place]: 4 (moderate/severe disability) and AM-PAC 6-Clicks: 31/61  Pt's clinical presentation is currently unstable/unpredictable seen in pt's presentation of advanced age, abnormal lab value(s), need for input for task focus and mobility technique and ongoing medical assessment  Overall, pt's rehab potential and prognosis to return to PLOF is fair as impacted by objective findings, warranting pt to receive further skilled PT interventions to address identified impairments, activity limitation(s), and participation restriction(s)  Pt to benefit from continued PT tx to address deficits as defined above and maximize level of functional independent mobility  From PT/mobility standpoint, recommendation at time of d/c would be post acute rehabilitation services pending progress in order to facilitate return to PLOF  Barriers to Discharge Inaccessible home environment;Decreased caregiver support   Goals   Patient Goals none expressed   STG Expiration Date 06/30/22   Short Term Goal #1 In 7-10 days: Increase bilateral LE strength 1/2 grade to facilitate independent mobility, Perform all bed mobility tasks with min A of 1 to decrease caregiver burden from wife and daughter, Perform all transfers with min A of 1 to improve independence, Ambulate > 50 ft  with least restrictive assistive device with min A of 1 w/o LOB and w/ normalized gait pattern 100% of the time, Increase all balance 1/2 grade to decrease risk for falls, Tolerate 4 hr OOB to faciliate upright tolerance, Improve Barthel Index score to 40 or greater to facilitate independence, PT provider will perform functional balance assessment to determine fall risk and PT to see and establish goals for stairs/elevations when appropriate   Plan   Treatment/Interventions Functional transfer training;LE strengthening/ROM; Therapeutic exercise; Endurance training;Patient/family training;Equipment eval/education; Bed mobility;Gait training;Spoke to nursing;Continued evaluation   PT Frequency 3-5x/wk Recommendation   PT Discharge Recommendation Post acute rehabilitation services  (speech consult)   Equipment Recommended Edward Blount  (NICOLE)   AM-PAC Basic Mobility Inpatient   Turning in Bed Without Bedrails 2   Lying on Back to Sitting on Edge of Flat Bed 1   Moving Bed to Chair 2   Standing Up From Chair 2   Walk in Room 2   Climb 3-5 Stairs 1   Basic Mobility Inpatient Raw Score 10   Turning Head Towards Sound 3   Follow Simple Instructions 3   Low Function Basic Mobility Raw Score 16   Low Function Basic Mobility Standardized Score 25 72   Highest Level Of Mobility   -[facility] Goal 4: Move to chair/commode   -HL Achieved 4: Move to chair/commode   Modified [place] Varina Scale   Modified [place] Varina Scale 4   Barthel Index   Feeding 5   Bathing 0   Grooming Score 0   Dressing Score 5   Bladder Score 0   Bowels Score 5   Toilet Use Score 5   Transfers (Bed/Chair) Score 5   Mobility (Level Surface) Score 0   Stairs Score 0   Barthel Index Score 25       Hoang Lai, PT, DPT

## 2022-06-20 NOTE — CASE MANAGEMENT
Case Management Assessment & Discharge Planning Note    Patient name Lisa Fang  Location /-56 MRN [id number]  : 1935 Date 2022       Current Admission Date: 2022  Current Admission Diagnosis:Acute respiratory failure with hypoxia [facility])   Patient Active Problem List    Diagnosis Date Noted    Aspiration of food 2022    Acute respiratory failure with hypoxia ([address] ) 2022    Altered mental status 2022    Other emphysema ([facility] ) 2022    Dysphagia 2022    Cough 2021    Shortness of breath 2021    Mood disorder ([facility] ) 2021    Fall 10/28/2020    Community acquired pneumonia of right lower lobe of lung 10/26/2020    Dementia due to Parkinson's disease with behavioral disturbance ([facility] ) 10/09/2020    Subclinical hypothyroidism 09/15/2020    [facility] ) 2020    Benign neoplasm of supratentorial region of brain [facility]) 2020    Ambulatory dysfunction 2019    Essential hypertension 2019    H/O burning pain in leg 2018    Carotid artery disease ([facility] ) 2018    Varicose 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 2018    Long term (current) use of systemic steroids 2018    Aspiration pneumonia ([facility] ) 2017    Parkinson's disease ([facility] ) 2016    Neurologic gait dysfunction 2016    Cerebrovascular disease 2016    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) ([facility] ) 2016    Peripheral vascular disease ([facility] ) 2015    Aortic regurgitation 2015    Allergic asthma 2014    COPD with acute exacerbation ([address] ) 2014    Allergic rhinitis 2014    Hyperlipidemia 2014    Type 2 diabetes mellitus ([address] ) 2014      LOS (days): 1  Geometric Mean LOS (GMLOS) (days):   Days to GMLOS:     OBJECTIVE:    Risk of Unplanned Readmission Score: 23 54     Current admission status: Inpatient    Preferred Pharmacy:   [id number] [address], [address] [facility]   [address] [address]  Phone: [phone] Fax: [phone]    Primary Care Provider: Dominique Lauren MD    Primary Insurance: Jono Boucher [facility]  Secondary Insurance:     ASSESSMENT:  Elizabeth 26 Proxies    There are no active Health Care Proxies on file  Advance Directives  Does patient have a [address]?: No  Was patient offered paperwork?: No  Does patient have Advance Directives?: No  Was patient offered paperwork?: No    Readmission Root Cause  30 Day Readmission: No    Patient Information  Admitted from[de-identified] Home  During Assessment patient was accompanied by: Not accompanied during assessment  Assessment information provided by[de-identified] Patient  Primary Caregiver: Self  Support Systems: Self, Spouse/significant other, Daughter  [place] of Residence: [place] [address] do you live in?: [place]  Type of Current Residence: [address] [place] home  Living Arrangements: Lives w/ Spouse/significant other, Lives w/ Daughter    Activities of Daily Living Prior to Admission  Functional Status: Assistance  Completes ADLs independently?: No  Level of ADL dependence: Assistance  Ambulates independently?: No  Level of ambulatory dependence: Assistance  Does patient use assisted devices?: Yes  Assisted Devices (DME) used: Straight Cane  Does patient currently own DME?: Yes  What DME does the patient currently own?: Straight Cane  Does patient have a history of Outpatient Therapy (PT/OT)?: No  Does the patient have a history of Short-Term Rehab?: Yes (The patient is not sure of the name)  Does patient have a history of HHC?: Yes ([address])  Does patient currently have [address]?: Yes    Current Home Health Care  Type of Current Home Care Services:  Other (Comment) (Speech)  [address][de-identified] Other (please enter name in comment) ([address])  [address] [facility] Provider[de-identified] PCP    Patient Information Continued  Does patient have prescription coverage?: Yes  Does patient receive dialysis treatments?: No  Does patient have a history of substance abuse?: No  Does patient have a history of Mental Health Diagnosis?: No    Means of Transportation  Means of Transport to [facility][de-identified] Family transport    DISCHARGE DETAILS:    Discharge planning discussed with[de-identified] the patient  Freedom of Choice: Yes  Comments - Freedom of Choice: CM met with the patient at the bedside to complete the initial assessment  The patient was admitted to the hospital for Stroke, COPD Exacerbation, and Acute respiratory failure  The patient oriented x 4 able to make needs known to staff  The patient's demographic sheet was verified  The patient lives in a 2 story home with his wife and daughters  The patient requires assistance completing his ADL's and IADL's which are met by his family  The patient is current with [address] for speech, and will add SN, PT/OT  The patients family will provide transportation home at discharge    CM contacted family/caregiver?: Yes  Were Treatment Team discharge recommendations reviewed with patient/caregiver?: Yes  Did patient/caregiver verbalize understanding of patient care needs?: Yes  Were patient/caregiver advised of the risks associated with not following Treatment Team discharge recommendations?: Yes    Contacts  Patient Contacts: Noé Budge  Relationship to Patient[de-identified] Family  Contact Method: Phone  Phone Number: [phone]  Reason/Outcome: Continuity of Care, Emergency [address]         Is the patient interested in [place] [address] at discharge?: Yes  [address] requested[de-identified] Nursing, Occupational Therapy, Physical Therapy, Speech Language Pathology  Home Health Agency Name[de-identified] Other ([address]) [address] Provider[de-identified] PCP  Home Health Services Needed[de-identified] Evaluate Functional Status and Safety, Gait/ADL Training, Strengthening/Theraputic Exercises to Improve Function  Homebound Criteria Met[de-identified] Requires the Assistance of Another Person for Safe Ambulation or to Leave the Home, Uses an Assist Device (i e  cane, walker, etc)  Supporting Clincal Findings[de-identified] Limited Endurance    DME Referral Provided  Referral made for DME?: No    Other Referral/Resources/Interventions Provided:  Interventions: [facility]    Treatment Team Recommendation: Home  Discharge Destination Plan[de-identified] Home with [facility]

## 2022-06-20 NOTE — REASON FOR VISIT
[Follow-Up] : a follow-up visit [Asthma] : asthma [Cough] : cough [TextBox_44] : Cough and throat congestion are much better now

## 2022-06-20 NOTE — PLAN OF CARE
Problem: Potential for Falls  Goal: Patient will remain free of falls  Description: INTERVENTIONS:  - Educate patient/family on patient safety including physical limitations  - Instruct patient to call for assistance with activity   - Consult OT/PT to assist with strengthening/mobility   - Keep Call bell within reach  - Keep bed low and locked with side rails adjusted as appropriate  - Keep care items and personal belongings within reach  - Initiate and maintain comfort rounds  - Make Fall Risk Sign visible to staff  - Offer Toileting every 2 Hours, in advance of need  - Initiate/Maintain alarm  - Obtain necessary fall risk management equipment:   - Apply yellow socks and bracelet for high fall risk patients  - Consider moving patient to room near nurses station  Outcome: Progressing     Problem: PAIN - ADULT  Goal: Verbalizes/displays adequate comfort level or baseline comfort level  Description: Interventions:  - Encourage patient to monitor pain and request assistance  - Assess pain using appropriate pain scale  - Administer analgesics based on type and severity of pain and evaluate response  - Implement non-pharmacological measures as appropriate and evaluate response  - Consider cultural and social influences on pain and pain management  - Notify physician/advanced practitioner if interventions unsuccessful or patient reports new pain  Outcome: Progressing     Problem: INFECTION - ADULT  Goal: Absence or prevention of progression during hospitalization  Description: INTERVENTIONS:  - Assess and monitor for signs and symptoms of infection  - Monitor lab/diagnostic results  - Monitor all insertion sites, i e  indwelling lines, tubes, and drains  - Monitor endotracheal if appropriate and nasal secretions for changes in amount and color  - Arabi appropriate cooling/warming therapies per order  - Administer medications as ordered  - Instruct and encourage patient and family to use good hand hygiene technique  - Identify and instruct in appropriate isolation precautions for identified infection/condition  Outcome: Progressing  Goal: Absence of fever/infection during neutropenic period  Description: INTERVENTIONS:  - Monitor WBC    Outcome: Progressing     Problem: SAFETY ADULT  Goal: Patient will remain free of falls  Description: INTERVENTIONS:  - Educate patient/family on patient safety including physical limitations  - Instruct patient to call for assistance with activity   - Consult OT/PT to assist with strengthening/mobility   - Keep Call bell within reach  - Keep bed low and locked with side rails adjusted as appropriate  - Keep care items and personal belongings within reach  - Initiate and maintain comfort rounds  - Make Fall Risk Sign visible to staff  - Offer Toileting every 2 Hours, in advance of need  - Initiate/Maintain alarm  - Obtain necessary fall risk management equipment:   - Apply yellow socks and bracelet for high fall risk patients  - Consider moving patient to room near nurses station  Outcome: Progressing  Goal: Maintain or return to baseline ADL function  Description: INTERVENTIONS:  -  Assess patient's ability to carry out ADLs; assess patient's baseline for ADL function and identify physical deficits which impact ability to perform ADLs (bathing, care of mouth/teeth, toileting, grooming, dressing, etc )  - Assess/evaluate cause of self-care deficits   - Assess range of motion  - Assess patient's mobility; develop plan if impaired  - Assess patient's need for assistive devices and provide as appropriate  - Encourage maximum independence but intervene and supervise when necessary  - Involve family in performance of ADLs  - Assess for home care needs following discharge   - Consider OT consult to assist with ADL evaluation and planning for discharge  - Provide patient education as appropriate  Outcome: Progressing  Goal: Maintains/Returns to pre admission functional level  Description: INTERVENTIONS:  - Perform BMAT or MOVE assessment daily    - Set and communicate daily mobility goal to care team and patient/family/caregiver  - Collaborate with rehabilitation services on mobility goals if consulted  - Perform Range of Motion 2 times a day  - Reposition patient every 2 hours  - Dangle patient 2 times a day  - Stand patient 2 times a day  - Ambulate patient 2 times a day  - Out of bed to chair 2 times a day   - Out of bed for meals 2 times a day  - Out of bed for toileting  - Record patient progress and toleration of activity level   Outcome: Progressing     Problem: DISCHARGE PLANNING  Goal: Discharge to home or other facility with appropriate resources  Description: INTERVENTIONS:  - Identify barriers to discharge w/patient and caregiver  - Arrange for needed discharge resources and transportation as appropriate  - Identify discharge learning needs (meds, wound care, etc )  - Arrange for interpretive services to assist at discharge as needed  - Refer to Case Management Department for coordinating discharge planning if the patient needs post-hospital services based on physician/advanced practitioner order or complex needs related to functional status, cognitive ability, or social support system  Outcome: Progressing     Problem: Knowledge Deficit  Goal: Patient/family/caregiver demonstrates understanding of disease process, treatment plan, medications, and discharge instructions  Description: Complete learning assessment and assess knowledge base    Interventions:  - Provide teaching at level of understanding  - Provide teaching via preferred learning methods  Outcome: Progressing     Problem: Prexisting or High Potential for Compromised Skin Integrity  Goal: Skin integrity is maintained or improved  Description: INTERVENTIONS:  - Identify patients at risk for skin breakdown  - Assess and monitor skin integrity  - Assess and monitor nutrition and hydration status  - Monitor labs   - Assess for incontinence   - Turn and reposition patient  - Assist with mobility/ambulation  - Relieve pressure over bony prominences  - Avoid friction and shearing  - Provide appropriate hygiene as needed including keeping skin clean and dry  - Evaluate need for skin moisturizer/barrier cream  - Collaborate with interdisciplinary team   - Patient/family teaching  - Consider wound care consult   Outcome: Progressing     Problem: MOBILITY - ADULT  Goal: Maintain or return to baseline ADL function  Description: INTERVENTIONS:  -  Assess patient's ability to carry out ADLs; assess patient's baseline for ADL function and identify physical deficits which impact ability to perform ADLs (bathing, care of mouth/teeth, toileting, grooming, dressing, etc )  - Assess/evaluate cause of self-care deficits   - Assess range of motion  - Assess patient's mobility; develop plan if impaired  - Assess patient's need for assistive devices and provide as appropriate  - Encourage maximum independence but intervene and supervise when necessary  - Involve family in performance of ADLs  - Assess for home care needs following discharge   - Consider OT consult to assist with ADL evaluation and planning for discharge  - Provide patient education as appropriate  Outcome: Progressing  Goal: Maintains/Returns to pre admission functional level  Description: INTERVENTIONS:  - Perform BMAT or MOVE assessment daily    - Set and communicate daily mobility goal to care team and patient/family/caregiver  - Collaborate with rehabilitation services on mobility goals if consulted  - Perform Range of Motion 2 times a day  - Reposition patient every 2 hours    - Dangle patient 2 times a day  - Stand patient 2 times a day  - Ambulate patient 2 times a day  - Out of bed to chair 2 times a day   - Out of bed for meals 2 times a day  - Out of bed for toileting  - Record patient progress and toleration of activity level   Outcome: Progressing

## 2022-06-20 NOTE — PLAN OF CARE
Problem: OCCUPATIONAL THERAPY ADULT  Goal: Performs self-care activities at highest level of function for planned discharge setting  See evaluation for individualized goals  Description: Treatment Interventions: ADL retraining, Functional transfer training, UE strengthening/ROM, Endurance training, Cognitive reorientation, Patient/family training, Equipment evaluation/education, Compensatory technique education, Continued evaluation, Energy conservation, Activityengagement        See flowsheet documentation for full assessment, interventions and recommendations  Note: Limitation: Decreased ADL status, Decreased UE strength, Decreased Safe judgement during ADL, Decreased cognition, Decreased endurance, Decreased self-care trans, Decreased high-level ADLs  Prognosis: Good  Assessment: Patient is a 81 yr  old male who was seen for an OT evaluation s/p admission to [address] [place] [address] on 6/19/22 due to aspiration  Patient was diagnosed with acute respiratory failure with hypoxia  Other contributing factors affecting the patients occupational performance at this time include COPD with acute exacerbation, aspiration of food, type 2 diabetes, dementia due to Parkinson's disease with behavioral disturbance, meningioma, essential hypertension, peripheral vascular disease, Parkinson's disease, and hyperlipidemia  Orders were placed for OT evaluation and treatment  At least two patient identifiers performed during session including name and wristband  Prior to admission patient reported assistance is needed with ADLs, IADLs, and functional mobility  Patient lives with family in a two story home with 5 steps to enter and a flight of steps to second level where bed/bath are located  Patient reports he waits for assistance in the morning before getting out of bed and can wash and dress himself but does require some assistance   Patient reports bathing while seated on the toilet as it is too difficult to get into the tub  Patient is retired and reports he enjoys "nothing much "  Personal factors that are inhibiting the patients performance at time of evaluation include: steps to enter, difficulty performing ADLs, difficulty performing IADLs, and decreased initiation and engagement  Upon evaluation patient requires Min A for functional mobility, Mod A for UB ADLs, and Max A for LB ADLs  Patients occupational performance is affected by the following deficits: decreased safety awareness, limited UE strength, impaired gross motor coordination, decreased static/dynamic sitting balance, decreased static/dynamic standing balance, decreased activity tolerance, decreased endurance, decreased orientation level, and impaired short term memory  Patient to benefit from continued Occupational Therapy treatment while in the hospital to address the stated deficits and improve occupational performance and overall quality of life  From an OT standpoint at this time, d/c recommendation would be post acute rehab services       OT Discharge Recommendation: Post acute rehabilitation services

## 2022-06-20 NOTE — WOUND OSTOMY CARE
Progress Note - Wound   Adriana Otto 81 y o  male MRN: [id number]  Unit/Bed#: -01 Encounter: [id number]      Assessment:   Patient admitted due to acute respiratory failure with hypoxia  History of COPD, CAD, CVA, diabetes, HLD, HTN, PVD, parkinson's disease  Wound care consulted for wounds to lower extremity  Patient agreeable to assessment, alert and oriented to self and place, is incontinent of bowel and bladder, external male urinary catheter in place for urine management, is an assist of 1 with walker to stand for assessment, is OOB to chair with EHOB waffle cushion in place, is an assist with care  Primary RN made aware of assessment findings  1  Bilateral sacrum and buttock skin is dry, intact, blanchable hyperpigmented skin, no wounds noted  2  Bilateral heels are dry, intact, blanchable skin  3  Bilateral anterior tibial- Noted to have scattered abrasions that are irregular in shape, appear partial thickness, approx  20% are pink partial thickness tissue and 80% are dry with brown adhered scabbing, no drainage noted  Jenni-wounds are dry, intact, no redness, no warmth  Educated patient on importance of frequent offloading of pressure via turning, repositioning, and weight-shifting  No induration, fluctuance, odor, warmth, redness, or purulence noted to the above noted wounds  New dressings applied  Patient tolerated well, denies pain to the wounds  Primary nurse aware of plan of care  See flow sheets for more detailed assessment findings  Will follow along  Skin care Plan:  1-Protect sacrum w/Allevyn foam, nohemi with P, change q3d and PRN, peel back and check skin q-shift  2-Turn/reposition q2h for pressure re-distribution on skin   3-Elevate heels to offload pressure  4-Moisturize skin daily with skin nourishing cream  5-Ehob cushion in chair when out of bed  6-Hydraguard to bilateral heels BID and PRN  7- Bilateral anterior tibial- Cleanse wounds with NSS, pat dry   Apply Adaptic over wound beds and cover with Allevyn foam dressing  Change every other day and as needed  Wound 06/20/22 Pretibial Proximal;Right (Active)   Wound Image   06/20/22 0922   Wound Description Pink;Dry;Brown 06/20/22 0922   Jenni-wound Assessment Dry; Intact 06/20/22 0922   Wound Length (cm) 9 cm 06/20/22 0922   Wound Width (cm) 1 5 cm 06/20/22 0922   Wound Depth (cm) 0 1 cm 06/20/22 0922   Wound Surface Area (cm^2) 13 5 cm^2 06/20/22 0922   Wound Volume (cm^3) 1 35 cm^3 06/20/22 0922   Calculated Wound Volume (cm^3) 1 35 cm^3 06/20/22 0922   Tunneling 0 cm 06/20/22 0922   Undermining 0 06/20/22 0922   Drainage Amount None 06/20/22 0922   Non-staged Wound Description Partial thickness 06/20/22 0922   Treatments Cleansed;Site care 06/20/22 0922   Dressing Foam, Silicon (eg  Allevyn, etc); Vaseline gauze 06/20/22 0922   Wound packed? No 06/20/22 0922   Dressing Changed Changed 06/20/22 0922   Patient Tolerance Tolerated well 06/20/22 0922   Dressing Status Clean;Dry; Intact 06/20/22 0922       Wound 06/20/22 Pretibial Left;Proximal (Active)   Wound Image   06/20/22 0922   Wound Description Dry;Brown 06/20/22 0922   Jenni-wound Assessment Dry; Intact 06/20/22 0922   Wound Length (cm) 0 5 cm 06/20/22 0922   Wound Width (cm) 1 5 cm 06/20/22 0922   Wound Depth (cm) 0 1 cm 06/20/22 0922   Wound Surface Area (cm^2) 0 75 cm^2 06/20/22 0922   Wound Volume (cm^3) 0 075 cm^3 06/20/22 0922   Calculated Wound Volume (cm^3) 0 08 cm^3 06/20/22 0922   Tunneling 0 cm 06/20/22 0922   Undermining 0 06/20/22 0922   Drainage Amount None 06/20/22 0922   Non-staged Wound Description Not applicable 48/96/16 7087   Treatments Cleansed;Site care 06/20/22 0922   Dressing Vaseline gauze; Foam, Silicon (eg  Allevyn, etc) 06/20/22 5573   Wound packed? No 06/20/22 0922   Dressing Changed Changed 06/20/22 0922   Patient Tolerance Tolerated well 06/20/22 0922   Dressing Status Clean;Dry; Intact 06/20/22 7437     Call or tigertext with any questions  Wound Care will continue to follow    Caio ROBLEDO RN [employer]  Wound care

## 2022-06-20 NOTE — PLAN OF CARE
Problem: PHYSICAL THERAPY ADULT  Goal: Performs mobility at highest level of function for planned discharge setting  See evaluation for individualized goals  Description: Treatment/Interventions: Functional transfer training, LE strengthening/ROM, Therapeutic exercise, Endurance training, Patient/family training, Equipment eval/education, Bed mobility, Gait training, Spoke to nursing, Continued evaluation  Equipment Recommended: Ruffus Pilling (RW)       See flowsheet documentation for full assessment, interventions and recommendations  6/20/2022 1157 by Rolando Molina PT  Note: Prognosis: Good  Problem List: Decreased strength, Decreased endurance, Impaired balance, Decreased mobility, Decreased cognition  Assessment: Pt is an 81 y o  male seen for high-complexity PT evaluation on 6/20/2022 s/p admit to [facility] on 6/19/2022 w/ Acute respiratory failure with hypoxia ([address] )  PT was consulted to assess pt's functional mobility and d/c needs  Order placed for PT eval and tx  PTA, pt resides with wife and daughter in [facility] with 2nd floor set up, ambulates with walker, has A from family for I/ADLs- this info as of March 2022- pt poor historian, CM to follow up  At time of eval, mod A x2 for supine to sit transfer, min A x2 for STS and short gait trial from EOB to recliner c use of RW  Upon evaluation, pt presenting with impaired functional mobility d/t decreased strength, decreased ROM, decreased endurance, impaired balance, decreased mobility, decreased cognition, and activity intolerance  Pertinent PMHx and current co-morbidities affecting pt's physical performance at time of assessment include: pneumonia, Parkinson's disease, type 2 DM, HTN, emphysema, dysphagia, ABPA, asthma, aortic regurgitation, HLD, neurologic gait dysfunction, PVD, COPD, cerebro vascular disease, ambulatory dysfunction, fall, subclinical hypothyroidism, mood disorder, subdural hematoma   Personal factors affecting pt at time of eval include: inaccessible home environment, ambulating w/ assistive device, stairs in home, inability to navigate level surfaces w/o external assistance, unable to perform dynamic tasks in community, decreased cognition, positive fall history and decreased initiation and engagement  The following objective measures performed on IE also reveal limitations: Barthel Index: 25/100, Modified [place]: 4 (moderate/severe disability) and AM-PAC 6-Clicks: 84/85  Pt's clinical presentation is currently unstable/unpredictable seen in pt's presentation of advanced age, abnormal lab value(s), need for input for task focus and mobility technique and ongoing medical assessment  Overall, pt's rehab potential and prognosis to return to PLOF is fair as impacted by objective findings, warranting pt to receive further skilled PT interventions to address identified impairments, activity limitation(s), and participation restriction(s)  Pt to benefit from continued PT tx to address deficits as defined above and maximize level of functional independent mobility  From PT/mobility standpoint, recommendation at time of d/c would be post acute rehabilitation services pending progress in order to facilitate return to PLOF  Barriers to Discharge: Inaccessible home environment, Decreased caregiver support        PT Discharge Recommendation: Post acute rehabilitation services (speech consult)          See flowsheet documentation for full assessment  6/20/2022 1157 by Gladys Lobo PT  Note: Prognosis: Good  Problem List: Decreased strength, Decreased endurance, Impaired balance, Decreased mobility, Decreased cognition  Assessment: Pt is an 81 y o  male seen for high-complexity PT evaluation on 6/20/2022 s/p admit to [facility] on 6/19/2022 w/ Acute respiratory failure with hypoxia ([address] )  PT was consulted to assess pt's functional mobility and d/c needs  Order placed for PT eval and tx   PTA, pt resides with wife and daughter in [facility] with 2nd floor set up, ambulates with walker, has A from family for I/ADLs- this info as of March 2022- pt poor historian, INDER to follow up  At time of eval, mod A x2 for supine to sit transfer, min A x2 for STS and short gait trial from EOB to recliner c use of RW  Upon evaluation, pt presenting with impaired functional mobility d/t decreased strength, decreased ROM, decreased endurance, impaired balance, decreased mobility, decreased cognition, and activity intolerance  Pertinent PMHx and current co-morbidities affecting pt's physical performance at time of assessment include: pneumonia, Parkinson's disease, type 2 DM, HTN, emphysema, dysphagia, ABPA, asthma, aortic regurgitation, HLD, neurologic gait dysfunction, PVD, COPD, cerebro vascular disease, ambulatory dysfunction, fall, subclinical hypothyroidism, mood disorder, subdural hematoma  Personal factors affecting pt at time of eval include: inaccessible home environment, ambulating w/ assistive device, stairs in home, inability to navigate level surfaces w/o external assistance, unable to perform dynamic tasks in community, decreased cognition, positive fall history and decreased initiation and engagement  The following objective measures performed on IE also reveal limitations: Barthel Index: 25/100, Modified [place]: 4 (moderate/severe disability) and AM-PAC 6-Clicks: 40/40  Pt's clinical presentation is currently unstable/unpredictable seen in pt's presentation of advanced age, abnormal lab value(s), need for input for task focus and mobility technique and ongoing medical assessment  Overall, pt's rehab potential and prognosis to return to PLOF is fair as impacted by objective findings, warranting pt to receive further skilled PT interventions to address identified impairments, activity limitation(s), and participation restriction(s)  Pt to benefit from continued PT tx to address deficits as defined above and maximize level of functional independent mobility   From PT/mobility standpoint, recommendation at time of d/c would be post acute rehabilitation services pending progress in order to facilitate return to PLOF  Barriers to Discharge: Inaccessible home environment, Decreased caregiver support        PT Discharge Recommendation: Post acute rehabilitation services (speech consult)          See flowsheet documentation for full assessment

## 2022-06-20 NOTE — RESPIRATORY THERAPY NOTE
RT Protocol Note  Jeff Rodríguez 81 y o  male MRN: [id number]  Unit/Bed#: -01 Encounter: [id number]    Assessment    Principal Problem:    Acute respiratory failure with hypoxia ([place] [address] )  Active Problems:    Hyperlipidemia    Parkinson's disease ([place] [address] )    Type 2 diabetes mellitus ([place] [address] )    Peripheral vascular disease ([place] [address] )    COPD with acute exacerbation ([facility])    Essential hypertension    Meningioma ([facility])    Dementia due to Parkinson's disease with behavioral disturbance ([place] [address] )    Aspiration of food      Home Pulmonary Medications:  Inhalers and prn neb       Past Medical History:   Diagnosis Date    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) ([place] [address] )     Allergic rhinitis     last assessed [id number]    Aortic regurgitation     Arm laceration     Asthma     Bronchitis, chronic obstructive, with exacerbation ([facility])     last assessed 2015    Candidal intertrigo     Chronic obstructive lung disease ([place] [address] )     last assessed [id number]    COPD (chronic obstructive pulmonary disease) ([facility])     Coronary artery disease     carotid stents    Cough     CVA (cerebral vascular accident) ([place] [address] )     Dermatitis     Diabetes mellitus type 2, uncomplicated ([place] [address] )     controlled    Dysthymic disorder     Fatigue     Hyperlipidemia     Hypertension     Neurologic gait dysfunction     Parkinson's disease ([place] [address] )     Pneumonia     PVD (peripheral vascular disease) ([place] [address] )     Seborrheic keratosis     TIA (transient ischemic attack)     Tinea corporis     Tinea pedis of both feet     Tremor      Social History     Socioeconomic History    Marital status: /Civil Union     Spouse name: None    Number of children: None    Years of education: None    Highest education level: None   Occupational History    Occupation: retired   Tobacco Use    Smoking status: Former Smoker     Packs/day: 1 00     Years: 3 00     Pack years: 3 00     Types: Cigarettes     Start date: 47     Quit date:      Years since quittin 4    Smokeless tobacco: Never Used   Vaping Use    Vaping Use: Never used   Substance and Sexual Activity    Alcohol use: Not Currently    Drug use: No    Sexual activity: Not Currently   Other Topics Concern    None   Social History Narrative    Lives independently with spouse    No advance directives     Social Determinants of Health     Financial Resource Strain: Not on file   Food Insecurity: No Food Insecurity    Worried About Running Out of Food in the Last Year: Never true    Ran Out of Food in the Last Year: Never true   Transportation Needs: No Transportation Needs    Lack of Transportation (Medical): No    Lack of Transportation (Non-Medical): No   Physical Activity: Not on file   Stress: Not on file   Social Connections: Not on file   Intimate Partner Violence: Not on file   Housing Stability: Low Risk     Unable to Pay for Housing in the Last Year: No    Number of Places Lived in the Last Year: 1    Unstable Housing in the Last Year: No       Subjective         Objective    Physical Exam:   Assessment Type: Assess only  General Appearance: Alert, Awake  Respiratory Pattern: Normal  Chest Assessment: Chest expansion symmetrical  Bilateral Breath Sounds: Scattered, Coarse  Cough: None  O2 Device: RA    Vitals:  Blood pressure 142/72, pulse 63, temperature 98 5 °F (36 9 °C), resp  rate 18, SpO2 95 %  Imaging and other studies: I have personally reviewed pertinent reports  O2 Device: RA     Plan    Respiratory Plan: Home Bronchodilator Patient pathway        Resp Comments: Protocol completed at this time  Pt w/ PMH of COPD, had an aspiration event that brought him to the hospital   Pt takes inhalers at home and will cont w/ ordered inhalers

## 2022-06-20 NOTE — UTILIZATION REVIEW
Inpatient Admission Authorization Request   NOTIFICATION OF INPATIENT ADMISSION/INPATIENT AUTHORIZATION REQUEST   SERVICING FACILITY:   [address] [place] [address]  Tax ID: [id number]  NPI: [id number]  Place of Service: Inpatient [facility]  [address]  Place of Service Code: 24     ATTENDING PROVIDER:  Attending Name and NPI#: Nyla Harper Md [[id number]]  Address: [address] [place] [address]  Phone: [phone]     UTILIZATION REVIEW CONTACT:  Isabel Joe, Utilization   Network Utilization Review Department  Phone: [phone]  Fax [phone]  Email: [email or web address]     PHYSICIAN ADVISORY SERVICES:  FOR WJFC-KN-LPYU REVIEW - MEDICAL NECESSITY DENIAL  Phone: [phone]  Fax: [phone]  Email: [email or web address]     TYPE OF REQUEST:  Inpatient Status     ADMISSION INFORMATION:  ADMISSION DATE/TIME: 6/19/22 12:12 PM  PATIENT DIAGNOSIS CODE/DESCRIPTION:  Stroke ([facility] ) [I63 9]  COPD exacerbation ([facility] ) [J44 1]  Acute respiratory failure with hypoxia ([facility]) [J96 01]  Stroke-like symptoms [R29 90]  Aspiration into airway [T17 908A]  DISCHARGE DATE/TIME: No discharge date for patient encounter  IMPORTANT INFORMATION:  Please contact the Isabel Joe directly with any questions or concerns regarding this request  Department voicemails are confidential     Send requests for admission clinical reviews, concurrent reviews, approvals, and administrative denials due to lack of clinical to fax [phone]

## 2022-06-21 ENCOUNTER — TRANSITIONAL CARE MANAGEMENT (OUTPATIENT)
Dept: INTERNAL MEDICINE CLINIC | Facility: CLINIC | Age: 87
End: 2022-06-21

## 2022-06-21 DIAGNOSIS — F02.81 DEMENTIA DUE TO PARKINSON'S DISEASE WITH BEHAVIORAL DISTURBANCE (HCC): ICD-10-CM

## 2022-06-21 DIAGNOSIS — G20 DEMENTIA DUE TO PARKINSON'S DISEASE WITH BEHAVIORAL DISTURBANCE (HCC): ICD-10-CM

## 2022-06-21 NOTE — UTILIZATION REVIEW
Notification of Discharge   This is a Notification of Discharge from our facility [address]  Please be advised that this patient has been discharge from our facility  Below you will find the admission and discharge date and time including the patients disposition  UTILIZATION REVIEW CONTACT:  Vini Lester  Utilization   Network Utilization Review Department  Phone: [phone] x carefully listen to the prompts  All voicemails are confidential   Email: [email or web address]     PHYSICIAN ADVISORY SERVICES:  FOR AHFU-SX-DOFS REVIEW - MEDICAL NECESSITY DENIAL  Phone: [phone]  Fax: [phone]  Email: [email or web address]     PRESENTATION DATE: 6/19/2022 10:50 AM  OBERVATION ADMISSION DATE:   INPATIENT ADMISSION DATE: 6/19/22 12:12 PM   DISCHARGE DATE: 6/20/2022  5:31 PM  DISPOSITION: Home with [place] with [address] INFORMATION:  Send all requests for admission clinical reviews, approved or denied determinations and any other requests to dedicated fax number below belonging to the campus where the patient is receiving treatment   List of dedicated fax numbers:  [address] [place] [address] DENIALS (Administrative/Medical Necessity) [phone]   [address]  (Maternity/NICU/Pediatrics) [phone]   [facility] [phone]   [facility] [phone]   [address] [place] [address] [phone]   [address] [place] [address] [address] [place] [address],[address]Th [address] [phone]   [facility]  [phone]   [address] [place] [address], [facility]  [address] [facility] [address] [facility] [phone]

## 2022-06-22 ENCOUNTER — HOME CARE VISIT (OUTPATIENT)
Dept: HOME HEALTH SERVICES | Facility: HOME HEALTHCARE | Age: 87
End: 2022-06-22
Payer: MEDICARE

## 2022-06-22 ENCOUNTER — TELEMEDICINE (OUTPATIENT)
Dept: INTERNAL MEDICINE CLINIC | Facility: CLINIC | Age: 87
End: 2022-06-22
Payer: COMMERCIAL

## 2022-06-22 ENCOUNTER — HOSPICE ADMISSION (OUTPATIENT)
Dept: HOME HOSPICE | Facility: HOSPICE | Age: 87
End: 2022-06-22
Payer: MEDICARE

## 2022-06-22 DIAGNOSIS — J44.1 COPD WITH ACUTE EXACERBATION (HCC): ICD-10-CM

## 2022-06-22 DIAGNOSIS — E11.9 TYPE 2 DIABETES MELLITUS WITHOUT COMPLICATION, WITHOUT LONG-TERM CURRENT USE OF INSULIN (HCC): ICD-10-CM

## 2022-06-22 DIAGNOSIS — J69.0 ASPIRATION PNEUMONIA OF LEFT LOWER LOBE, UNSPECIFIED ASPIRATION PNEUMONIA TYPE (HCC): ICD-10-CM

## 2022-06-22 DIAGNOSIS — F02.81 DEMENTIA DUE TO PARKINSON'S DISEASE WITH BEHAVIORAL DISTURBANCE (HCC): ICD-10-CM

## 2022-06-22 DIAGNOSIS — B44.81 ABPA (ALLERGIC BRONCHOPULMONARY ASPERGILLOSIS) (HCC): ICD-10-CM

## 2022-06-22 DIAGNOSIS — G20 DEMENTIA DUE TO PARKINSON'S DISEASE WITH BEHAVIORAL DISTURBANCE (HCC): ICD-10-CM

## 2022-06-22 DIAGNOSIS — G20 PARKINSON'S DISEASE (HCC): ICD-10-CM

## 2022-06-22 DIAGNOSIS — D32.9 MENINGIOMA (HCC): ICD-10-CM

## 2022-06-22 DIAGNOSIS — R13.12 OROPHARYNGEAL DYSPHAGIA: Primary | ICD-10-CM

## 2022-06-22 DIAGNOSIS — J96.01 ACUTE RESPIRATORY FAILURE WITH HYPOXIA (HCC): ICD-10-CM

## 2022-06-22 PROCEDURE — 99496 TRANSJ CARE MGMT HIGH F2F 7D: CPT | Performed by: INTERNAL MEDICINE

## 2022-06-22 RX ORDER — QUETIAPINE FUMARATE 25 MG/1
TABLET, FILM COATED ORAL
Refills: 0
Start: 2022-06-22

## 2022-06-22 NOTE — PROGRESS NOTES
Virtual TCM Visit:    Verification of patient location:    Patient is located in the following state in which I hold an active license [place]        Assessment/Plan:        Problem List Items Addressed This Visit        Digestive    Dysphagia - Primary    Relevant Orders    Ambulatory Referral to Hospice       Endocrine    Type 2 diabetes mellitus ([place] [address] )    Relevant Orders    Ambulatory Referral to Hospice       Respiratory    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) ([place] [address] )    Relevant Orders    Ambulatory Referral to Hospice    Aspiration pneumonia [facility])    Relevant Orders    Ambulatory Referral to Hospice    COPD with acute exacerbation [facility])    Relevant Orders    Ambulatory Referral to Hospice    Acute respiratory failure with hypoxia ([place] [address] )    Relevant Orders    Ambulatory Referral to Hospice       Nervous and Auditory    Parkinson's disease [facility])    Relevant Orders    Ambulatory Referral to Hospice    Meningioma [facility])    Relevant Orders    Ambulatory Referral to Hospice    Dementia due to Parkinson's disease with behavioral disturbance [facility])    Relevant Orders    Ambulatory Referral to Hospice        discussion:  We had lengthy discussion regarding his decline in functional status over the last 2-3 years related to progression of Parkinson's disease, dementia, sleep disturbance, recurrent aspiration with underlying COPD  We discussed his goals of care and ultimately decided on referral for home hospice  Hospice benefit was discussed in detail and medications reviewed    Patient understands that Spiriva, atorvastatin, Exelon are not covered by hospice benefit    Reason for visit is T cm    Encounter provider Merary Arzola MD       Provider located at [address] [place] [address]      Recent Visits  Date Type Provider Dept   06/20/22 Telephone [address] [facility] recent visits within past 7 days and meeting all other requirements  Today's Visits  Date Type Provider Dept   06/22/22 Telemedicine Juan R Tan  [facility] today's visits and meeting all other requirements  Future Appointments  No visits were found meeting these conditions  Showing future appointments within next 150 days and meeting all other requirements       After connecting through [facility], the patient was identified by name and date of birth  Nadine Jones was informed that this is a telemedicine visit and that the visit is being conducted through [address] [place] [address] [facility] [employer] and patient was informed that this is a secure, HIPAA-compliant platform  He agrees to proceed     My office door was closed  No one else was in the room  He acknowledged consent and understanding of privacy and security of the video platform  The patient has agreed to participate and understands they can discontinue the visit at any time  Patient is aware this is a billable service  Subjective:     Patient ID: Nadine Jones is a 81 y o  male  Virtual visit was concerned corrected with patient, his 3 daughters, Baudilio Martinez and Lina Carlin as well of this is wife  He has been fairly stable since hospital discharge  They been paying attention to monitor his eating and help him with aspiration precautions  Upon hospital discharge I spoke with Lina Raegan about transition to hospice care and she had the opportunity to talk with family over the weekend prior to this visit      Review of Systems   Constitutional: Negative for appetite change, chills, diaphoresis, fatigue, fever and unexpected weight change  HENT: Negative for congestion, hearing loss and rhinorrhea  Eyes: Negative for visual disturbance  Respiratory: Positive for cough, choking and shortness of breath  Negative for chest tightness and wheezing  Cardiovascular: Negative for chest pain, palpitations and leg swelling     Gastrointestinal: Negative for abdominal pain and blood in stool  Endocrine: Negative for cold intolerance, heat intolerance, polydipsia and polyuria  Genitourinary: Negative for difficulty urinating, dysuria, frequency and urgency  Musculoskeletal: Positive for arthralgias and gait problem  Negative for myalgias  Skin: Negative for rash  Neurological: Negative for dizziness, weakness, light-headedness and headaches  Hematological: Does not bruise/bleed easily  Psychiatric/Behavioral: Positive for sleep disturbance  Negative for dysphoric mood  Objective: There were no vitals filed for this visit  Physical Exam  Constitutional:       Appearance: He is well-developed  HENT:      Head: Normocephalic and atraumatic  Pulmonary:      Effort: Pulmonary effort is normal    Neurological:      Mental Status: He is alert and oriented to person, place, and time  Psychiatric:         Behavior: Behavior normal  Behavior is cooperative  Thought Content: Thought content normal          Judgment: Judgment normal              Transitional Care Management Review:  Juanita Hernández is a 81 y o  male here for TCM follow up  During the TCM phone call patient stated:    TCM Call (since 5/22/2022)     Patient was hospitialized at  [facility]    Date of Admission  06/19/22    Date of discharge  06/20/22    Diagnosis  Acute respiratory failure with hypoxia    Disposition  Home; Home health services    Current Symptoms  --  Hx COPD-having trouble coughing up phlegm      TCM Call (since 5/22/2022)     Post hospital issues  None    Scheduled for follow up?   Yes  virtual    Did you obtain your prescribed medications  Yes    Do you need help managing your prescriptions or medications  No    Is transportation to your appointment needed  No    I have advised the patient to call PCP with any new or worsening symptoms  Cherylene Burnet, LPN    Are you recieving home care services  Yes    Types of home care services  Home PT    Interperter language line needed  No    Counseling  Family    Counseling topics  Importance of RX compliance; patient and family education          I spent 24 minutes with the patient during this visit  Jonathon Melgar MD      VIRTUAL VISIT [address] [facility] verbally agrees to participate in [facility]  Pt is aware that [facility] could be limited without vital signs or the ability to perform a full hands-on physical Radharuth Veronica understands he or the provider may request at any time to terminate the video visit and request the patient to seek care or treatment in person

## 2022-06-22 NOTE — CASE COMMUNICATION
Zach Martinez  2  1 36 is a 81 y  o  male who was brought to [facility] ED on 22 for aspiration after choking on a piece of bread  Pt is incontinent of bowel and bladder with an external male urinary catheter in place for urine management  Pt is an assist of 1 with walker, needs assistance with ADLs and IADLS  PMH includes Parkinson's disease, Parkinson's dementia, COPD, HTN, PVD, DM2 and frequent falls  WBCs have been trending above  normal limits  PPS 40   RLOC

## 2022-06-23 ENCOUNTER — TELEPHONE (OUTPATIENT)
Dept: INTERNAL MEDICINE CLINIC | Facility: CLINIC | Age: 87
End: 2022-06-23

## 2022-06-23 DIAGNOSIS — B44.81 ABPA (ALLERGIC BRONCHOPULMONARY ASPERGILLOSIS) (HCC): ICD-10-CM

## 2022-06-23 RX ORDER — PREDNISONE 10 MG/1
TABLET ORAL
Qty: 90 TABLET | Refills: 3 | Status: SHIPPED | OUTPATIENT
Start: 2022-06-23

## 2022-06-23 NOTE — TELEPHONE ENCOUNTER
Clarify prednisone; ordered 6/20     Directions say: 10mg daily for 90 days    OR    Go by steroid taper directions     Call Viky Armando with [facility]   [phone]

## 2022-06-24 ENCOUNTER — HOME CARE VISIT (OUTPATIENT)
Dept: HOME HEALTH SERVICES | Facility: HOME HEALTHCARE | Age: 87
End: 2022-06-24
Payer: MEDICARE

## 2022-06-24 VITALS
HEART RATE: 60 BPM | DIASTOLIC BLOOD PRESSURE: 78 MMHG | SYSTOLIC BLOOD PRESSURE: 116 MMHG | TEMPERATURE: 98 F | RESPIRATION RATE: 12 BRPM

## 2022-06-24 PROCEDURE — G0299 HHS/HOSPICE OF RN EA 15 MIN: HCPCS

## 2022-06-24 PROCEDURE — T2042 HOSPICE ROUTINE HOME CARE: HCPCS

## 2022-06-25 PROCEDURE — T2042 HOSPICE ROUTINE HOME CARE: HCPCS

## 2022-06-26 PROCEDURE — T2042 HOSPICE ROUTINE HOME CARE: HCPCS

## 2022-06-27 ENCOUNTER — HOME CARE VISIT (OUTPATIENT)
Dept: HOME HOSPICE | Facility: HOSPICE | Age: 87
End: 2022-06-27
Payer: MEDICARE

## 2022-06-27 PROCEDURE — T2042 HOSPICE ROUTINE HOME CARE: HCPCS

## 2022-06-27 PROCEDURE — 10330057 MEDICATION, GENERAL

## 2022-06-28 ENCOUNTER — HOME CARE VISIT (OUTPATIENT)
Dept: HOME HOSPICE | Facility: HOSPICE | Age: 87
End: 2022-06-28
Payer: MEDICARE

## 2022-06-28 ENCOUNTER — HOME CARE VISIT (OUTPATIENT)
Dept: HOME HEALTH SERVICES | Facility: HOME HEALTHCARE | Age: 87
End: 2022-06-28
Payer: MEDICARE

## 2022-06-28 PROCEDURE — T2042 HOSPICE ROUTINE HOME CARE: HCPCS

## 2022-06-29 PROCEDURE — T2042 HOSPICE ROUTINE HOME CARE: HCPCS

## 2022-06-30 PROCEDURE — T2042 HOSPICE ROUTINE HOME CARE: HCPCS

## 2022-07-01 ENCOUNTER — HOME CARE VISIT (OUTPATIENT)
Dept: HOME HEALTH SERVICES | Facility: HOME HEALTHCARE | Age: 87
End: 2022-07-01
Payer: MEDICARE

## 2022-07-01 VITALS
RESPIRATION RATE: 12 BRPM | TEMPERATURE: 97.8 F | HEART RATE: 70 BPM | SYSTOLIC BLOOD PRESSURE: 118 MMHG | DIASTOLIC BLOOD PRESSURE: 60 MMHG

## 2022-07-01 PROCEDURE — T2042 HOSPICE ROUTINE HOME CARE: HCPCS

## 2022-07-02 ENCOUNTER — HOME CARE VISIT (OUTPATIENT)
Dept: HOME HEALTH SERVICES | Facility: HOME HEALTHCARE | Age: 87
End: 2022-07-02
Payer: MEDICARE

## 2022-07-02 ENCOUNTER — HOME CARE VISIT (OUTPATIENT)
Dept: HOME HOSPICE | Facility: HOSPICE | Age: 87
End: 2022-07-02
Payer: MEDICARE

## 2022-07-02 PROCEDURE — T2042 HOSPICE ROUTINE HOME CARE: HCPCS

## 2022-07-03 PROCEDURE — T2042 HOSPICE ROUTINE HOME CARE: HCPCS

## 2022-07-04 PROCEDURE — T2042 HOSPICE ROUTINE HOME CARE: HCPCS

## 2022-07-05 PROCEDURE — T2042 HOSPICE ROUTINE HOME CARE: HCPCS

## 2022-07-06 ENCOUNTER — HOME CARE VISIT (OUTPATIENT)
Dept: HOME HEALTH SERVICES | Facility: HOME HEALTHCARE | Age: 87
End: 2022-07-06
Payer: MEDICARE

## 2022-07-06 PROCEDURE — G0156 HHCP-SVS OF AIDE,EA 15 MIN: HCPCS

## 2022-07-06 PROCEDURE — T2042 HOSPICE ROUTINE HOME CARE: HCPCS

## 2022-07-07 ENCOUNTER — HOME CARE VISIT (OUTPATIENT)
Dept: HOME HEALTH SERVICES | Facility: HOME HEALTHCARE | Age: 87
End: 2022-07-07
Payer: MEDICARE

## 2022-07-07 ENCOUNTER — HOME CARE VISIT (OUTPATIENT)
Dept: HOME HOSPICE | Facility: HOSPICE | Age: 87
End: 2022-07-07
Payer: MEDICARE

## 2022-07-07 PROCEDURE — G0299 HHS/HOSPICE OF RN EA 15 MIN: HCPCS

## 2022-07-07 PROCEDURE — G0156 HHCP-SVS OF AIDE,EA 15 MIN: HCPCS

## 2022-07-07 PROCEDURE — T2042 HOSPICE ROUTINE HOME CARE: HCPCS

## 2022-07-07 PROCEDURE — G0155 HHCP-SVS OF CSW,EA 15 MIN: HCPCS

## 2022-07-08 ENCOUNTER — HOME CARE VISIT (OUTPATIENT)
Dept: HOME HEALTH SERVICES | Facility: HOME HEALTHCARE | Age: 87
End: 2022-07-08
Payer: MEDICARE

## 2022-07-08 PROCEDURE — G0156 HHCP-SVS OF AIDE,EA 15 MIN: HCPCS

## 2022-07-08 PROCEDURE — T2042 HOSPICE ROUTINE HOME CARE: HCPCS

## 2022-07-08 PROCEDURE — 10330064 BRIEF, WINGS ADLT 2XLG GRN WAIST TO 69"

## 2022-07-09 DIAGNOSIS — L30.4 INTERTRIGO: ICD-10-CM

## 2022-07-09 DIAGNOSIS — J42 CHRONIC BRONCHITIS, UNSPECIFIED CHRONIC BRONCHITIS TYPE (HCC): ICD-10-CM

## 2022-07-09 PROCEDURE — T2042 HOSPICE ROUTINE HOME CARE: HCPCS

## 2022-07-10 ENCOUNTER — HOME CARE VISIT (OUTPATIENT)
Dept: HOME HEALTH SERVICES | Facility: HOME HEALTHCARE | Age: 87
End: 2022-07-10
Payer: MEDICARE

## 2022-07-10 PROCEDURE — G0156 HHCP-SVS OF AIDE,EA 15 MIN: HCPCS

## 2022-07-10 PROCEDURE — T2042 HOSPICE ROUTINE HOME CARE: HCPCS

## 2022-07-10 RX ORDER — NYSTATIN 100000 [USP'U]/G
POWDER TOPICAL
Qty: 30 G | Refills: 1 | Status: SHIPPED | OUTPATIENT
Start: 2022-07-10 | End: 2022-08-18

## 2022-07-11 VITALS
SYSTOLIC BLOOD PRESSURE: 122 MMHG | TEMPERATURE: 97.7 F | DIASTOLIC BLOOD PRESSURE: 80 MMHG | RESPIRATION RATE: 18 BRPM | HEART RATE: 80 BPM

## 2022-07-11 PROCEDURE — T2042 HOSPICE ROUTINE HOME CARE: HCPCS

## 2022-07-11 RX ORDER — ALBUTEROL SULFATE 2.5 MG/3ML
SOLUTION RESPIRATORY (INHALATION)
Qty: 300 ML | Refills: 1 | Status: SHIPPED | OUTPATIENT
Start: 2022-07-11 | End: 2022-09-02

## 2022-07-12 ENCOUNTER — HOME CARE VISIT (OUTPATIENT)
Dept: HOME HEALTH SERVICES | Facility: HOME HEALTHCARE | Age: 87
End: 2022-07-12
Payer: MEDICARE

## 2022-07-12 VITALS
DIASTOLIC BLOOD PRESSURE: 68 MMHG | TEMPERATURE: 97.7 F | HEART RATE: 76 BPM | SYSTOLIC BLOOD PRESSURE: 122 MMHG | RESPIRATION RATE: 16 BRPM

## 2022-07-12 PROCEDURE — T2042 HOSPICE ROUTINE HOME CARE: HCPCS

## 2022-07-12 PROCEDURE — G0299 HHS/HOSPICE OF RN EA 15 MIN: HCPCS

## 2022-07-12 PROCEDURE — G0156 HHCP-SVS OF AIDE,EA 15 MIN: HCPCS

## 2022-07-13 ENCOUNTER — HOME CARE VISIT (OUTPATIENT)
Dept: HOME HOSPICE | Facility: HOSPICE | Age: 87
End: 2022-07-13
Payer: MEDICARE

## 2022-07-13 PROCEDURE — T2042 HOSPICE ROUTINE HOME CARE: HCPCS

## 2022-07-14 ENCOUNTER — HOME CARE VISIT (OUTPATIENT)
Dept: HOME HEALTH SERVICES | Facility: HOME HEALTHCARE | Age: 87
End: 2022-07-14
Payer: MEDICARE

## 2022-07-14 PROCEDURE — G0156 HHCP-SVS OF AIDE,EA 15 MIN: HCPCS

## 2022-07-14 PROCEDURE — T2042 HOSPICE ROUTINE HOME CARE: HCPCS

## 2022-07-15 PROCEDURE — T2042 HOSPICE ROUTINE HOME CARE: HCPCS

## 2022-07-16 PROCEDURE — T2042 HOSPICE ROUTINE HOME CARE: HCPCS

## 2022-07-17 PROCEDURE — T2042 HOSPICE ROUTINE HOME CARE: HCPCS

## 2022-07-18 ENCOUNTER — RX RENEWAL (OUTPATIENT)
Age: 87
End: 2022-07-18

## 2022-07-18 PROCEDURE — T2042 HOSPICE ROUTINE HOME CARE: HCPCS

## 2022-07-19 ENCOUNTER — HOME CARE VISIT (OUTPATIENT)
Dept: HOME HEALTH SERVICES | Facility: HOME HEALTHCARE | Age: 87
End: 2022-07-19
Payer: MEDICARE

## 2022-07-19 PROCEDURE — T2042 HOSPICE ROUTINE HOME CARE: HCPCS

## 2022-07-19 PROCEDURE — G0156 HHCP-SVS OF AIDE,EA 15 MIN: HCPCS

## 2022-07-20 ENCOUNTER — HOME CARE VISIT (OUTPATIENT)
Dept: HOME HEALTH SERVICES | Facility: HOME HEALTHCARE | Age: 87
End: 2022-07-20
Payer: MEDICARE

## 2022-07-20 PROCEDURE — G0299 HHS/HOSPICE OF RN EA 15 MIN: HCPCS

## 2022-07-20 PROCEDURE — T2042 HOSPICE ROUTINE HOME CARE: HCPCS

## 2022-07-21 ENCOUNTER — HOME CARE VISIT (OUTPATIENT)
Dept: HOME HEALTH SERVICES | Facility: HOME HEALTHCARE | Age: 87
End: 2022-07-21
Payer: MEDICARE

## 2022-07-21 PROCEDURE — G0156 HHCP-SVS OF AIDE,EA 15 MIN: HCPCS

## 2022-07-21 PROCEDURE — T2042 HOSPICE ROUTINE HOME CARE: HCPCS

## 2022-07-22 PROCEDURE — T2042 HOSPICE ROUTINE HOME CARE: HCPCS

## 2022-07-23 PROCEDURE — T2042 HOSPICE ROUTINE HOME CARE: HCPCS

## 2022-07-24 VITALS
RESPIRATION RATE: 18 BRPM | SYSTOLIC BLOOD PRESSURE: 128 MMHG | HEART RATE: 80 BPM | TEMPERATURE: 97.9 F | DIASTOLIC BLOOD PRESSURE: 80 MMHG

## 2022-07-24 PROCEDURE — T2042 HOSPICE ROUTINE HOME CARE: HCPCS

## 2022-07-25 ENCOUNTER — HOME CARE VISIT (OUTPATIENT)
Dept: HOME HEALTH SERVICES | Facility: HOME HEALTHCARE | Age: 87
End: 2022-07-25
Payer: MEDICARE

## 2022-07-25 PROCEDURE — T2042 HOSPICE ROUTINE HOME CARE: HCPCS

## 2022-07-25 PROCEDURE — G0156 HHCP-SVS OF AIDE,EA 15 MIN: HCPCS

## 2022-07-26 PROCEDURE — T2042 HOSPICE ROUTINE HOME CARE: HCPCS

## 2022-07-27 ENCOUNTER — HOME CARE VISIT (OUTPATIENT)
Dept: HOME HEALTH SERVICES | Facility: HOME HEALTHCARE | Age: 87
End: 2022-07-27
Payer: MEDICARE

## 2022-07-27 PROCEDURE — 10330057 MEDICATION, GENERAL

## 2022-07-27 PROCEDURE — T2042 HOSPICE ROUTINE HOME CARE: HCPCS

## 2022-07-27 PROCEDURE — G0156 HHCP-SVS OF AIDE,EA 15 MIN: HCPCS

## 2022-07-28 ENCOUNTER — HOME CARE VISIT (OUTPATIENT)
Dept: HOME HEALTH SERVICES | Facility: HOME HEALTHCARE | Age: 87
End: 2022-07-28
Payer: MEDICARE

## 2022-07-28 VITALS
TEMPERATURE: 98 F | DIASTOLIC BLOOD PRESSURE: 68 MMHG | SYSTOLIC BLOOD PRESSURE: 122 MMHG | HEART RATE: 82 BPM | RESPIRATION RATE: 16 BRPM

## 2022-07-28 PROCEDURE — G0299 HHS/HOSPICE OF RN EA 15 MIN: HCPCS

## 2022-07-28 PROCEDURE — T2042 HOSPICE ROUTINE HOME CARE: HCPCS

## 2022-07-29 ENCOUNTER — HOME CARE VISIT (OUTPATIENT)
Dept: HOME HOSPICE | Facility: HOSPICE | Age: 87
End: 2022-07-29
Payer: MEDICARE

## 2022-07-29 PROCEDURE — T2042 HOSPICE ROUTINE HOME CARE: HCPCS

## 2022-07-30 PROCEDURE — T2042 HOSPICE ROUTINE HOME CARE: HCPCS

## 2022-07-31 ENCOUNTER — HOME CARE VISIT (OUTPATIENT)
Dept: HOME HEALTH SERVICES | Facility: HOME HEALTHCARE | Age: 87
End: 2022-07-31
Payer: MEDICARE

## 2022-07-31 PROCEDURE — T2042 HOSPICE ROUTINE HOME CARE: HCPCS

## 2022-07-31 PROCEDURE — G0156 HHCP-SVS OF AIDE,EA 15 MIN: HCPCS

## 2022-08-01 ENCOUNTER — APPOINTMENT (OUTPATIENT)
Dept: PULMONOLOGY | Facility: CLINIC | Age: 87
End: 2022-08-01

## 2022-08-01 VITALS — HEART RATE: 79 BPM | SYSTOLIC BLOOD PRESSURE: 130 MMHG | OXYGEN SATURATION: 98 % | DIASTOLIC BLOOD PRESSURE: 79 MMHG

## 2022-08-01 PROCEDURE — 95012 NITRIC OXIDE EXP GAS DETER: CPT

## 2022-08-01 PROCEDURE — 94010 BREATHING CAPACITY TEST: CPT

## 2022-08-01 PROCEDURE — ZZZZZ: CPT

## 2022-08-01 PROCEDURE — 94729 DIFFUSING CAPACITY: CPT

## 2022-08-01 PROCEDURE — 99214 OFFICE O/P EST MOD 30 MIN: CPT | Mod: 25

## 2022-08-01 PROCEDURE — 94726 PLETHYSMOGRAPHY LUNG VOLUMES: CPT

## 2022-08-01 PROCEDURE — 88738 HGB QUANT TRANSCUTANEOUS: CPT

## 2022-08-01 PROCEDURE — T2042 HOSPICE ROUTINE HOME CARE: HCPCS

## 2022-08-01 NOTE — ASSESSMENT
[FreeTextEntry1] : Continue Flonase nasal spray \par Continue Singulair\par Continue Advair HFA\par Albuterol HFA as needed\par Continue Mucinex or Robitussion as an expectorant.\par Return for pulmonary follow-up in 2 months

## 2022-08-01 NOTE — PROCEDURE
[FreeTextEntry1] : Pulmonary function test performed in my office today: Spirometry shows suggestive evidence of moderate restrictive defect; lung volume is within normal limits; resistance is within normal limits; diffusion is within normal limits.  SPO2 at rest on room air is 98%.

## 2022-08-02 PROCEDURE — T2042 HOSPICE ROUTINE HOME CARE: HCPCS

## 2022-08-03 ENCOUNTER — HOME CARE VISIT (OUTPATIENT)
Dept: HOME HEALTH SERVICES | Facility: HOME HEALTHCARE | Age: 87
End: 2022-08-03
Payer: MEDICARE

## 2022-08-03 ENCOUNTER — HOME CARE VISIT (OUTPATIENT)
Dept: HOME HOSPICE | Facility: HOSPICE | Age: 87
End: 2022-08-03
Payer: MEDICARE

## 2022-08-03 PROCEDURE — G0156 HHCP-SVS OF AIDE,EA 15 MIN: HCPCS

## 2022-08-03 PROCEDURE — T2042 HOSPICE ROUTINE HOME CARE: HCPCS

## 2022-08-03 PROCEDURE — G0299 HHS/HOSPICE OF RN EA 15 MIN: HCPCS

## 2022-08-04 PROCEDURE — T2042 HOSPICE ROUTINE HOME CARE: HCPCS

## 2022-08-05 PROCEDURE — T2042 HOSPICE ROUTINE HOME CARE: HCPCS

## 2022-08-06 PROCEDURE — T2042 HOSPICE ROUTINE HOME CARE: HCPCS

## 2022-08-07 LAB — POCT - HEMOGLOBIN (HGB), QUANTITATIVE, TRANSCUTANEOUS: 15.9

## 2022-08-07 PROCEDURE — T2042 HOSPICE ROUTINE HOME CARE: HCPCS

## 2022-08-08 PROCEDURE — T2042 HOSPICE ROUTINE HOME CARE: HCPCS

## 2022-08-09 ENCOUNTER — HOME CARE VISIT (OUTPATIENT)
Dept: HOME HOSPICE | Facility: HOSPICE | Age: 87
End: 2022-08-09
Payer: MEDICARE

## 2022-08-09 ENCOUNTER — HOME CARE VISIT (OUTPATIENT)
Dept: HOME HEALTH SERVICES | Facility: HOME HEALTHCARE | Age: 87
End: 2022-08-09
Payer: MEDICARE

## 2022-08-09 VITALS
DIASTOLIC BLOOD PRESSURE: 78 MMHG | SYSTOLIC BLOOD PRESSURE: 128 MMHG | HEART RATE: 70 BPM | RESPIRATION RATE: 18 BRPM | TEMPERATURE: 97.8 F

## 2022-08-09 PROCEDURE — T2042 HOSPICE ROUTINE HOME CARE: HCPCS

## 2022-08-09 PROCEDURE — G0156 HHCP-SVS OF AIDE,EA 15 MIN: HCPCS

## 2022-08-09 NOTE — CASE COMMUNICATION
Follow up voicemails to facilties already faxed respite information    Pending respite 8/24 - 8/29  Pending decisions from [address] [place] [address], [address], [address] [facility],[address], and Branden Mcgarry

## 2022-08-10 PROCEDURE — T2042 HOSPICE ROUTINE HOME CARE: HCPCS

## 2022-08-11 ENCOUNTER — HOME CARE VISIT (OUTPATIENT)
Dept: HOME HEALTH SERVICES | Facility: HOME HEALTHCARE | Age: 87
End: 2022-08-11
Payer: MEDICARE

## 2022-08-11 VITALS
DIASTOLIC BLOOD PRESSURE: 80 MMHG | HEART RATE: 80 BPM | TEMPERATURE: 97.8 F | RESPIRATION RATE: 18 BRPM | SYSTOLIC BLOOD PRESSURE: 112 MMHG

## 2022-08-11 PROCEDURE — T2042 HOSPICE ROUTINE HOME CARE: HCPCS

## 2022-08-11 PROCEDURE — G0299 HHS/HOSPICE OF RN EA 15 MIN: HCPCS

## 2022-08-11 PROCEDURE — G0156 HHCP-SVS OF AIDE,EA 15 MIN: HCPCS

## 2022-08-12 PROCEDURE — T2042 HOSPICE ROUTINE HOME CARE: HCPCS

## 2022-08-13 PROCEDURE — T2042 HOSPICE ROUTINE HOME CARE: HCPCS

## 2022-08-14 PROCEDURE — T2042 HOSPICE ROUTINE HOME CARE: HCPCS

## 2022-08-15 PROCEDURE — T2042 HOSPICE ROUTINE HOME CARE: HCPCS

## 2022-08-16 PROCEDURE — T2042 HOSPICE ROUTINE HOME CARE: HCPCS

## 2022-08-17 ENCOUNTER — HOME CARE VISIT (OUTPATIENT)
Dept: HOME HOSPICE | Facility: HOSPICE | Age: 87
End: 2022-08-17
Payer: MEDICARE

## 2022-08-17 ENCOUNTER — HOME CARE VISIT (OUTPATIENT)
Dept: HOME HEALTH SERVICES | Facility: HOME HEALTHCARE | Age: 87
End: 2022-08-17
Payer: MEDICARE

## 2022-08-17 PROCEDURE — T2042 HOSPICE ROUTINE HOME CARE: HCPCS

## 2022-08-17 PROCEDURE — G0156 HHCP-SVS OF AIDE,EA 15 MIN: HCPCS

## 2022-08-18 ENCOUNTER — HOME CARE VISIT (OUTPATIENT)
Dept: HOME HEALTH SERVICES | Facility: HOME HEALTHCARE | Age: 87
End: 2022-08-18
Payer: MEDICARE

## 2022-08-18 DIAGNOSIS — L30.4 INTERTRIGO: ICD-10-CM

## 2022-08-18 PROCEDURE — G0299 HHS/HOSPICE OF RN EA 15 MIN: HCPCS

## 2022-08-18 PROCEDURE — G0156 HHCP-SVS OF AIDE,EA 15 MIN: HCPCS

## 2022-08-18 PROCEDURE — T2042 HOSPICE ROUTINE HOME CARE: HCPCS

## 2022-08-18 RX ORDER — NYSTATIN 100000 [USP'U]/G
POWDER TOPICAL
Qty: 30 G | Refills: 1 | Status: SHIPPED | OUTPATIENT
Start: 2022-08-18 | End: 2022-09-22

## 2022-08-19 ENCOUNTER — HOME CARE VISIT (OUTPATIENT)
Dept: HOME HEALTH SERVICES | Facility: HOME HEALTHCARE | Age: 87
End: 2022-08-19
Payer: MEDICARE

## 2022-08-19 VITALS
SYSTOLIC BLOOD PRESSURE: 124 MMHG | HEART RATE: 84 BPM | TEMPERATURE: 97.7 F | DIASTOLIC BLOOD PRESSURE: 70 MMHG | RESPIRATION RATE: 18 BRPM

## 2022-08-19 DIAGNOSIS — Z23 NEED FOR COVID-19 VACCINE: Primary | ICD-10-CM

## 2022-08-19 DIAGNOSIS — Z51.5 HOSPICE CARE PATIENT: ICD-10-CM

## 2022-08-19 PROCEDURE — T2042 HOSPICE ROUTINE HOME CARE: HCPCS

## 2022-08-19 PROCEDURE — G0156 HHCP-SVS OF AIDE,EA 15 MIN: HCPCS

## 2022-08-19 NOTE — PROGRESS NOTES
8/19/2022 2:36 PM  [facility] home patient requests COVID booster prior to respite  Filled electronically via Epic as per [place] State Law  Requested Prescriptions     Signed Prescriptions Disp Refills    COVID-19 mRNA vaccine 30 mcg/0 3 mL SUSP 0 3 mL 0     Sig: Inject 0 3 mL into a muscle once for 1 dose HOSPICE PATIENT - please send to [facility]  Hospice RN will administer Vaccine  Jennifer Carrera, [address] [place] [address] [facility]  Hospice Answering Service: [phone]  You can find me on [employer]!

## 2022-08-20 DIAGNOSIS — G20 DEMENTIA DUE TO PARKINSON'S DISEASE WITH BEHAVIORAL DISTURBANCE (HCC): Primary | ICD-10-CM

## 2022-08-20 DIAGNOSIS — F02.818 DEMENTIA DUE TO PARKINSON'S DISEASE WITH BEHAVIORAL DISTURBANCE (HCC): Primary | ICD-10-CM

## 2022-08-20 PROCEDURE — T2042 HOSPICE ROUTINE HOME CARE: HCPCS

## 2022-08-20 RX ORDER — QUETIAPINE FUMARATE 50 MG/1
TABLET, FILM COATED ORAL
Qty: 90 TABLET | Refills: 5 | Status: SHIPPED | OUTPATIENT
Start: 2022-08-20

## 2022-08-21 PROCEDURE — T2042 HOSPICE ROUTINE HOME CARE: HCPCS

## 2022-08-22 ENCOUNTER — HOME CARE VISIT (OUTPATIENT)
Dept: HOME HEALTH SERVICES | Facility: HOME HEALTHCARE | Age: 87
End: 2022-08-22
Payer: MEDICARE

## 2022-08-22 ENCOUNTER — HOME CARE VISIT (OUTPATIENT)
Dept: HOME HOSPICE | Facility: HOSPICE | Age: 87
End: 2022-08-22
Payer: MEDICARE

## 2022-08-22 PROCEDURE — T2042 HOSPICE ROUTINE HOME CARE: HCPCS

## 2022-08-22 PROCEDURE — G0299 HHS/HOSPICE OF RN EA 15 MIN: HCPCS

## 2022-08-22 PROCEDURE — G0156 HHCP-SVS OF AIDE,EA 15 MIN: HCPCS

## 2022-08-22 PROCEDURE — 10330057 MEDICATION, GENERAL

## 2022-08-22 NOTE — CASE COMMUNICATION
Zach accepted to [facility] for respite beginning 8/24 and returning home 8/29  Transportation to be arranged

## 2022-08-23 ENCOUNTER — HOME CARE VISIT (OUTPATIENT)
Dept: HOME HEALTH SERVICES | Facility: HOME HEALTHCARE | Age: 87
End: 2022-08-23
Payer: MEDICARE

## 2022-08-23 PROCEDURE — G0156 HHCP-SVS OF AIDE,EA 15 MIN: HCPCS

## 2022-08-23 PROCEDURE — T2042 HOSPICE ROUTINE HOME CARE: HCPCS

## 2022-08-24 ENCOUNTER — HOME CARE VISIT (OUTPATIENT)
Dept: HOME HEALTH SERVICES | Facility: HOME HEALTHCARE | Age: 87
End: 2022-08-24
Payer: MEDICARE

## 2022-08-24 PROCEDURE — G0156 HHCP-SVS OF AIDE,EA 15 MIN: HCPCS

## 2022-08-24 PROCEDURE — 10330057 MEDICATION, GENERAL

## 2022-08-24 PROCEDURE — T2044 HOSPICE RESPITE CARE: HCPCS

## 2022-08-25 ENCOUNTER — HOME CARE VISIT (OUTPATIENT)
Dept: HOME HOSPICE | Facility: HOSPICE | Age: 87
End: 2022-08-25
Payer: MEDICARE

## 2022-08-25 PROCEDURE — T2044 HOSPICE RESPITE CARE: HCPCS

## 2022-08-26 ENCOUNTER — HOME CARE VISIT (OUTPATIENT)
Dept: HOME HOSPICE | Facility: HOSPICE | Age: 87
End: 2022-08-26
Payer: MEDICARE

## 2022-08-26 ENCOUNTER — HOME CARE VISIT (OUTPATIENT)
Dept: HOME HEALTH SERVICES | Facility: HOME HEALTHCARE | Age: 87
End: 2022-08-26
Payer: MEDICARE

## 2022-08-26 PROCEDURE — G0299 HHS/HOSPICE OF RN EA 15 MIN: HCPCS

## 2022-08-26 PROCEDURE — G0156 HHCP-SVS OF AIDE,EA 15 MIN: HCPCS

## 2022-08-26 PROCEDURE — T2044 HOSPICE RESPITE CARE: HCPCS

## 2022-08-27 PROCEDURE — T2044 HOSPICE RESPITE CARE: HCPCS

## 2022-08-28 PROCEDURE — T2044 HOSPICE RESPITE CARE: HCPCS

## 2022-08-29 VITALS
SYSTOLIC BLOOD PRESSURE: 120 MMHG | DIASTOLIC BLOOD PRESSURE: 78 MMHG | RESPIRATION RATE: 18 BRPM | HEART RATE: 72 BPM | TEMPERATURE: 97.9 F

## 2022-08-29 PROCEDURE — T2042 HOSPICE ROUTINE HOME CARE: HCPCS

## 2022-08-30 PROCEDURE — T2042 HOSPICE ROUTINE HOME CARE: HCPCS

## 2022-08-31 ENCOUNTER — HOME CARE VISIT (OUTPATIENT)
Dept: HOME HEALTH SERVICES | Facility: HOME HEALTHCARE | Age: 87
End: 2022-08-31
Payer: MEDICARE

## 2022-08-31 ENCOUNTER — HOME CARE VISIT (OUTPATIENT)
Dept: HOME HOSPICE | Facility: HOSPICE | Age: 87
End: 2022-08-31
Payer: MEDICARE

## 2022-08-31 PROCEDURE — G0155 HHCP-SVS OF CSW,EA 15 MIN: HCPCS

## 2022-08-31 PROCEDURE — G0156 HHCP-SVS OF AIDE,EA 15 MIN: HCPCS

## 2022-08-31 PROCEDURE — G0299 HHS/HOSPICE OF RN EA 15 MIN: HCPCS

## 2022-08-31 PROCEDURE — T2042 HOSPICE ROUTINE HOME CARE: HCPCS

## 2022-09-01 ENCOUNTER — HOME CARE VISIT (OUTPATIENT)
Dept: HOME HEALTH SERVICES | Facility: HOME HEALTHCARE | Age: 87
End: 2022-09-01
Payer: MEDICARE

## 2022-09-01 PROCEDURE — G0156 HHCP-SVS OF AIDE,EA 15 MIN: HCPCS

## 2022-09-01 PROCEDURE — T2042 HOSPICE ROUTINE HOME CARE: HCPCS

## 2022-09-02 ENCOUNTER — HOME CARE VISIT (OUTPATIENT)
Dept: HOME HEALTH SERVICES | Facility: HOME HEALTHCARE | Age: 87
End: 2022-09-02
Payer: MEDICARE

## 2022-09-02 DIAGNOSIS — J42 CHRONIC BRONCHITIS, UNSPECIFIED CHRONIC BRONCHITIS TYPE (HCC): ICD-10-CM

## 2022-09-02 PROCEDURE — G0299 HHS/HOSPICE OF RN EA 15 MIN: HCPCS

## 2022-09-02 PROCEDURE — T2042 HOSPICE ROUTINE HOME CARE: HCPCS

## 2022-09-02 PROCEDURE — G0156 HHCP-SVS OF AIDE,EA 15 MIN: HCPCS

## 2022-09-02 RX ORDER — ALBUTEROL SULFATE 2.5 MG/3ML
SOLUTION RESPIRATORY (INHALATION)
Qty: 300 ML | Refills: 1 | Status: SHIPPED | OUTPATIENT
Start: 2022-09-02

## 2022-09-03 ENCOUNTER — HOME CARE VISIT (OUTPATIENT)
Dept: HOME HOSPICE | Facility: HOSPICE | Age: 87
End: 2022-09-03
Payer: MEDICARE

## 2022-09-03 PROCEDURE — T2042 HOSPICE ROUTINE HOME CARE: HCPCS

## 2022-09-04 PROCEDURE — T2042 HOSPICE ROUTINE HOME CARE: HCPCS

## 2022-09-05 PROCEDURE — T2042 HOSPICE ROUTINE HOME CARE: HCPCS

## 2022-09-06 ENCOUNTER — HOME CARE VISIT (OUTPATIENT)
Dept: HOME HEALTH SERVICES | Facility: HOME HEALTHCARE | Age: 87
End: 2022-09-06
Payer: MEDICARE

## 2022-09-06 VITALS
RESPIRATION RATE: 18 BRPM | TEMPERATURE: 97.3 F | DIASTOLIC BLOOD PRESSURE: 76 MMHG | HEART RATE: 74 BPM | SYSTOLIC BLOOD PRESSURE: 122 MMHG

## 2022-09-06 VITALS
DIASTOLIC BLOOD PRESSURE: 68 MMHG | TEMPERATURE: 97.5 F | HEART RATE: 80 BPM | SYSTOLIC BLOOD PRESSURE: 128 MMHG | RESPIRATION RATE: 18 BRPM

## 2022-09-06 PROCEDURE — G0299 HHS/HOSPICE OF RN EA 15 MIN: HCPCS

## 2022-09-06 PROCEDURE — T2042 HOSPICE ROUTINE HOME CARE: HCPCS

## 2022-09-07 ENCOUNTER — HOME CARE VISIT (OUTPATIENT)
Dept: HOME HEALTH SERVICES | Facility: HOME HEALTHCARE | Age: 87
End: 2022-09-07
Payer: MEDICARE

## 2022-09-07 PROCEDURE — G0156 HHCP-SVS OF AIDE,EA 15 MIN: HCPCS

## 2022-09-07 PROCEDURE — T2042 HOSPICE ROUTINE HOME CARE: HCPCS

## 2022-09-07 PROCEDURE — 10330057 MEDICATION, GENERAL

## 2022-09-08 ENCOUNTER — HOME CARE VISIT (OUTPATIENT)
Dept: HOME HEALTH SERVICES | Facility: HOME HEALTHCARE | Age: 87
End: 2022-09-08
Payer: MEDICARE

## 2022-09-08 PROCEDURE — T2042 HOSPICE ROUTINE HOME CARE: HCPCS

## 2022-09-08 PROCEDURE — G0156 HHCP-SVS OF AIDE,EA 15 MIN: HCPCS

## 2022-09-09 PROCEDURE — T2042 HOSPICE ROUTINE HOME CARE: HCPCS

## 2022-09-10 PROCEDURE — T2042 HOSPICE ROUTINE HOME CARE: HCPCS

## 2022-09-11 PROCEDURE — T2042 HOSPICE ROUTINE HOME CARE: HCPCS

## 2022-09-12 PROCEDURE — T2042 HOSPICE ROUTINE HOME CARE: HCPCS

## 2022-09-13 ENCOUNTER — HOME CARE VISIT (OUTPATIENT)
Dept: HOME HEALTH SERVICES | Facility: HOME HEALTHCARE | Age: 87
End: 2022-09-13
Payer: MEDICARE

## 2022-09-13 PROCEDURE — T2042 HOSPICE ROUTINE HOME CARE: HCPCS

## 2022-09-13 PROCEDURE — G0156 HHCP-SVS OF AIDE,EA 15 MIN: HCPCS

## 2022-09-14 ENCOUNTER — HOME CARE VISIT (OUTPATIENT)
Dept: HOME HEALTH SERVICES | Facility: HOME HEALTHCARE | Age: 87
End: 2022-09-14
Payer: MEDICARE

## 2022-09-14 VITALS
DIASTOLIC BLOOD PRESSURE: 72 MMHG | TEMPERATURE: 97.5 F | HEART RATE: 78 BPM | RESPIRATION RATE: 18 BRPM | SYSTOLIC BLOOD PRESSURE: 112 MMHG | WEIGHT: 155 LBS | BODY MASS INDEX: 28.35 KG/M2

## 2022-09-14 PROCEDURE — G0299 HHS/HOSPICE OF RN EA 15 MIN: HCPCS

## 2022-09-14 PROCEDURE — T2042 HOSPICE ROUTINE HOME CARE: HCPCS

## 2022-09-15 ENCOUNTER — HOME CARE VISIT (OUTPATIENT)
Dept: HOME HEALTH SERVICES | Facility: HOME HEALTHCARE | Age: 87
End: 2022-09-15
Payer: MEDICARE

## 2022-09-15 PROCEDURE — G0156 HHCP-SVS OF AIDE,EA 15 MIN: HCPCS

## 2022-09-15 PROCEDURE — T2042 HOSPICE ROUTINE HOME CARE: HCPCS

## 2022-09-16 PROCEDURE — T2042 HOSPICE ROUTINE HOME CARE: HCPCS

## 2022-09-17 PROCEDURE — T2042 HOSPICE ROUTINE HOME CARE: HCPCS

## 2022-09-18 PROCEDURE — T2042 HOSPICE ROUTINE HOME CARE: HCPCS

## 2022-09-19 PROCEDURE — T2042 HOSPICE ROUTINE HOME CARE: HCPCS

## 2022-09-20 ENCOUNTER — HOME CARE VISIT (OUTPATIENT)
Dept: HOME HEALTH SERVICES | Facility: HOME HEALTHCARE | Age: 87
End: 2022-09-20
Payer: MEDICARE

## 2022-09-20 ENCOUNTER — HOME CARE VISIT (OUTPATIENT)
Dept: HOME HOSPICE | Facility: HOSPICE | Age: 87
End: 2022-09-20
Payer: MEDICARE

## 2022-09-20 VITALS
DIASTOLIC BLOOD PRESSURE: 68 MMHG | HEART RATE: 80 BPM | RESPIRATION RATE: 18 BRPM | SYSTOLIC BLOOD PRESSURE: 128 MMHG | TEMPERATURE: 97.3 F

## 2022-09-20 PROCEDURE — T2042 HOSPICE ROUTINE HOME CARE: HCPCS

## 2022-09-20 PROCEDURE — G0299 HHS/HOSPICE OF RN EA 15 MIN: HCPCS

## 2022-09-20 PROCEDURE — G0156 HHCP-SVS OF AIDE,EA 15 MIN: HCPCS

## 2022-09-21 ENCOUNTER — HOME CARE VISIT (OUTPATIENT)
Dept: HOME HEALTH SERVICES | Facility: HOME HEALTHCARE | Age: 87
End: 2022-09-21
Payer: MEDICARE

## 2022-09-21 PROCEDURE — T2042 HOSPICE ROUTINE HOME CARE: HCPCS

## 2022-09-21 PROCEDURE — G0156 HHCP-SVS OF AIDE,EA 15 MIN: HCPCS

## 2022-09-22 ENCOUNTER — HOME CARE VISIT (OUTPATIENT)
Dept: HOME HOSPICE | Facility: HOSPICE | Age: 87
End: 2022-09-22
Payer: MEDICARE

## 2022-09-22 ENCOUNTER — HOME CARE VISIT (OUTPATIENT)
Dept: HOME HEALTH SERVICES | Facility: HOME HEALTHCARE | Age: 87
End: 2022-09-22
Payer: MEDICARE

## 2022-09-22 DIAGNOSIS — L30.4 INTERTRIGO: ICD-10-CM

## 2022-09-22 PROCEDURE — G0156 HHCP-SVS OF AIDE,EA 15 MIN: HCPCS

## 2022-09-22 PROCEDURE — T2042 HOSPICE ROUTINE HOME CARE: HCPCS

## 2022-09-22 RX ORDER — NYSTATIN 100000 [USP'U]/G
POWDER TOPICAL
Qty: 30 G | Refills: 1 | Status: SHIPPED | OUTPATIENT
Start: 2022-09-22

## 2022-09-23 PROCEDURE — T2042 HOSPICE ROUTINE HOME CARE: HCPCS

## 2022-09-24 PROCEDURE — T2042 HOSPICE ROUTINE HOME CARE: HCPCS

## 2022-09-25 PROCEDURE — T2042 HOSPICE ROUTINE HOME CARE: HCPCS

## 2022-09-26 PROCEDURE — T2042 HOSPICE ROUTINE HOME CARE: HCPCS

## 2022-09-27 PROCEDURE — T2042 HOSPICE ROUTINE HOME CARE: HCPCS

## 2022-09-27 PROCEDURE — 10330057 MEDICATION, GENERAL

## 2022-09-28 ENCOUNTER — HOME CARE VISIT (OUTPATIENT)
Dept: HOME HEALTH SERVICES | Facility: HOME HEALTHCARE | Age: 87
End: 2022-09-28
Payer: MEDICARE

## 2022-09-28 PROCEDURE — T2042 HOSPICE ROUTINE HOME CARE: HCPCS

## 2022-09-28 PROCEDURE — G0156 HHCP-SVS OF AIDE,EA 15 MIN: HCPCS

## 2022-09-29 ENCOUNTER — HOME CARE VISIT (OUTPATIENT)
Dept: HOME HEALTH SERVICES | Facility: HOME HEALTHCARE | Age: 87
End: 2022-09-29
Payer: MEDICARE

## 2022-09-29 ENCOUNTER — HOME CARE VISIT (OUTPATIENT)
Dept: HOME HOSPICE | Facility: HOSPICE | Age: 87
End: 2022-09-29
Payer: MEDICARE

## 2022-09-29 PROCEDURE — T2042 HOSPICE ROUTINE HOME CARE: HCPCS

## 2022-09-29 PROCEDURE — G0156 HHCP-SVS OF AIDE,EA 15 MIN: HCPCS

## 2022-09-29 PROCEDURE — G0299 HHS/HOSPICE OF RN EA 15 MIN: HCPCS

## 2022-09-30 VITALS
DIASTOLIC BLOOD PRESSURE: 78 MMHG | TEMPERATURE: 98 F | HEART RATE: 78 BPM | SYSTOLIC BLOOD PRESSURE: 120 MMHG | RESPIRATION RATE: 18 BRPM

## 2022-09-30 PROCEDURE — 10330057 MEDICATION, GENERAL

## 2022-09-30 PROCEDURE — T2042 HOSPICE ROUTINE HOME CARE: HCPCS

## 2022-10-01 PROCEDURE — T2042 HOSPICE ROUTINE HOME CARE: HCPCS

## 2022-10-02 PROCEDURE — T2042 HOSPICE ROUTINE HOME CARE: HCPCS

## 2022-10-03 ENCOUNTER — APPOINTMENT (OUTPATIENT)
Dept: PULMONOLOGY | Facility: CLINIC | Age: 87
End: 2022-10-03

## 2022-10-03 ENCOUNTER — HOME CARE VISIT (OUTPATIENT)
Dept: HOME HEALTH SERVICES | Facility: HOME HEALTHCARE | Age: 87
End: 2022-10-03
Payer: MEDICARE

## 2022-10-03 VITALS
TEMPERATURE: 97.6 F | SYSTOLIC BLOOD PRESSURE: 144 MMHG | BODY MASS INDEX: 29.27 KG/M2 | OXYGEN SATURATION: 95 % | HEART RATE: 51 BPM | DIASTOLIC BLOOD PRESSURE: 72 MMHG | WEIGHT: 204 LBS

## 2022-10-03 DIAGNOSIS — Z23 ENCOUNTER FOR IMMUNIZATION: ICD-10-CM

## 2022-10-03 PROCEDURE — G0156 HHCP-SVS OF AIDE,EA 15 MIN: HCPCS

## 2022-10-03 PROCEDURE — 90662 IIV NO PRSV INCREASED AG IM: CPT

## 2022-10-03 PROCEDURE — 99214 OFFICE O/P EST MOD 30 MIN: CPT | Mod: 25

## 2022-10-03 PROCEDURE — T2042 HOSPICE ROUTINE HOME CARE: HCPCS

## 2022-10-03 PROCEDURE — G0008: CPT

## 2022-10-04 ENCOUNTER — HOME CARE VISIT (OUTPATIENT)
Dept: HOME HOSPICE | Facility: HOSPICE | Age: 87
End: 2022-10-04
Payer: MEDICARE

## 2022-10-04 ENCOUNTER — HOME CARE VISIT (OUTPATIENT)
Dept: HOME HEALTH SERVICES | Facility: HOME HEALTHCARE | Age: 87
End: 2022-10-04
Payer: MEDICARE

## 2022-10-04 VITALS
TEMPERATURE: 98 F | DIASTOLIC BLOOD PRESSURE: 78 MMHG | HEART RATE: 80 BPM | SYSTOLIC BLOOD PRESSURE: 122 MMHG | RESPIRATION RATE: 18 BRPM

## 2022-10-04 PROCEDURE — G0299 HHS/HOSPICE OF RN EA 15 MIN: HCPCS

## 2022-10-04 PROCEDURE — G0156 HHCP-SVS OF AIDE,EA 15 MIN: HCPCS

## 2022-10-04 PROCEDURE — G0155 HHCP-SVS OF CSW,EA 15 MIN: HCPCS

## 2022-10-04 PROCEDURE — T2042 HOSPICE ROUTINE HOME CARE: HCPCS

## 2022-10-05 PROCEDURE — T2042 HOSPICE ROUTINE HOME CARE: HCPCS

## 2022-10-06 PROCEDURE — T2042 HOSPICE ROUTINE HOME CARE: HCPCS

## 2022-10-07 PROCEDURE — T2042 HOSPICE ROUTINE HOME CARE: HCPCS

## 2022-10-08 PROCEDURE — T2042 HOSPICE ROUTINE HOME CARE: HCPCS

## 2022-10-09 PROCEDURE — T2042 HOSPICE ROUTINE HOME CARE: HCPCS

## 2022-10-10 ENCOUNTER — HOME CARE VISIT (OUTPATIENT)
Dept: HOME HEALTH SERVICES | Facility: HOME HEALTHCARE | Age: 87
End: 2022-10-10
Payer: MEDICARE

## 2022-10-10 VITALS
SYSTOLIC BLOOD PRESSURE: 122 MMHG | TEMPERATURE: 97.7 F | DIASTOLIC BLOOD PRESSURE: 80 MMHG | HEART RATE: 70 BPM | RESPIRATION RATE: 18 BRPM

## 2022-10-10 PROCEDURE — T2042 HOSPICE ROUTINE HOME CARE: HCPCS

## 2022-10-10 PROCEDURE — G0156 HHCP-SVS OF AIDE,EA 15 MIN: HCPCS

## 2022-10-10 PROCEDURE — G0299 HHS/HOSPICE OF RN EA 15 MIN: HCPCS

## 2022-10-10 PROCEDURE — 10330057 MEDICATION, GENERAL

## 2022-10-11 PROBLEM — J69.0 ASPIRATION PNEUMONIA (HCC): Status: RESOLVED | Noted: 2017-12-28 | Resolved: 2022-10-11

## 2022-10-11 PROCEDURE — 10330064 TAPE, RETENTION 2"X10YDS (1/BX24BX/CS)

## 2022-10-11 PROCEDURE — 10330064

## 2022-10-11 PROCEDURE — 10330064 SPONGE, GAUZE 8PLY N/S 4"X4" (200/PK 20P

## 2022-10-11 PROCEDURE — 10330064 DRESSING, XEROFORM STR 5"X9" (50/BX)

## 2022-10-11 PROCEDURE — T2042 HOSPICE ROUTINE HOME CARE: HCPCS

## 2022-10-12 ENCOUNTER — HOME CARE VISIT (OUTPATIENT)
Dept: HOME HEALTH SERVICES | Facility: HOME HEALTHCARE | Age: 87
End: 2022-10-12
Payer: MEDICARE

## 2022-10-12 PROBLEM — J18.9 COMMUNITY ACQUIRED PNEUMONIA OF RIGHT LOWER LOBE OF LUNG: Status: RESOLVED | Noted: 2020-10-26 | Resolved: 2022-10-12

## 2022-10-12 PROBLEM — R05.9 COUGH: Status: RESOLVED | Noted: 2021-11-11 | Resolved: 2022-10-12

## 2022-10-12 PROCEDURE — G0156 HHCP-SVS OF AIDE,EA 15 MIN: HCPCS

## 2022-10-12 PROCEDURE — 10330057 MEDICATION, GENERAL

## 2022-10-12 PROCEDURE — T2042 HOSPICE ROUTINE HOME CARE: HCPCS

## 2022-10-13 ENCOUNTER — HOME CARE VISIT (OUTPATIENT)
Dept: HOME HOSPICE | Facility: HOSPICE | Age: 87
End: 2022-10-13
Payer: MEDICARE

## 2022-10-13 PROCEDURE — T2042 HOSPICE ROUTINE HOME CARE: HCPCS

## 2022-10-14 PROCEDURE — T2042 HOSPICE ROUTINE HOME CARE: HCPCS

## 2022-10-15 PROCEDURE — T2042 HOSPICE ROUTINE HOME CARE: HCPCS

## 2022-10-16 PROCEDURE — T2042 HOSPICE ROUTINE HOME CARE: HCPCS

## 2022-10-17 ENCOUNTER — HOME CARE VISIT (OUTPATIENT)
Dept: HOME HEALTH SERVICES | Facility: HOME HEALTHCARE | Age: 87
End: 2022-10-17
Payer: MEDICARE

## 2022-10-17 PROCEDURE — T2042 HOSPICE ROUTINE HOME CARE: HCPCS

## 2022-10-17 PROCEDURE — 10330057 MEDICATION, GENERAL

## 2022-10-17 PROCEDURE — G0156 HHCP-SVS OF AIDE,EA 15 MIN: HCPCS

## 2022-10-18 ENCOUNTER — HOME CARE VISIT (OUTPATIENT)
Dept: HOME HEALTH SERVICES | Facility: HOME HEALTHCARE | Age: 87
End: 2022-10-18

## 2022-10-18 VITALS
SYSTOLIC BLOOD PRESSURE: 120 MMHG | RESPIRATION RATE: 20 BRPM | TEMPERATURE: 97.7 F | DIASTOLIC BLOOD PRESSURE: 78 MMHG | HEART RATE: 80 BPM

## 2022-10-18 PROCEDURE — T2042 HOSPICE ROUTINE HOME CARE: HCPCS

## 2022-10-19 ENCOUNTER — HOME CARE VISIT (OUTPATIENT)
Dept: HOME HEALTH SERVICES | Facility: HOME HEALTHCARE | Age: 87
End: 2022-10-19
Payer: MEDICARE

## 2022-10-19 PROCEDURE — G0156 HHCP-SVS OF AIDE,EA 15 MIN: HCPCS

## 2022-10-19 PROCEDURE — T2042 HOSPICE ROUTINE HOME CARE: HCPCS

## 2022-10-20 ENCOUNTER — HOME CARE VISIT (OUTPATIENT)
Dept: HOME HEALTH SERVICES | Facility: HOME HEALTHCARE | Age: 87
End: 2022-10-20
Payer: MEDICARE

## 2022-10-20 DIAGNOSIS — J42 CHRONIC BRONCHITIS, UNSPECIFIED CHRONIC BRONCHITIS TYPE (HCC): ICD-10-CM

## 2022-10-20 PROCEDURE — T2042 HOSPICE ROUTINE HOME CARE: HCPCS

## 2022-10-20 PROCEDURE — G0156 HHCP-SVS OF AIDE,EA 15 MIN: HCPCS

## 2022-10-21 PROCEDURE — T2042 HOSPICE ROUTINE HOME CARE: HCPCS

## 2022-10-21 RX ORDER — ALBUTEROL SULFATE 90 UG/1
AEROSOL, METERED RESPIRATORY (INHALATION)
Qty: 18 G | Refills: 11 | Status: SHIPPED | OUTPATIENT
Start: 2022-10-21

## 2022-10-22 PROCEDURE — T2042 HOSPICE ROUTINE HOME CARE: HCPCS

## 2022-10-23 PROCEDURE — T2042 HOSPICE ROUTINE HOME CARE: HCPCS

## 2022-10-24 ENCOUNTER — HOME CARE VISIT (OUTPATIENT)
Dept: HOME HEALTH SERVICES | Facility: HOME HEALTHCARE | Age: 87
End: 2022-10-24
Payer: MEDICARE

## 2022-10-24 ENCOUNTER — HOME CARE VISIT (OUTPATIENT)
Dept: HOME HOSPICE | Facility: HOSPICE | Age: 87
End: 2022-10-24
Payer: MEDICARE

## 2022-10-24 VITALS — RESPIRATION RATE: 40 BRPM | HEART RATE: 84 BPM | TEMPERATURE: 97.9 F

## 2022-10-24 PROCEDURE — G0299 HHS/HOSPICE OF RN EA 15 MIN: HCPCS

## 2022-10-24 PROCEDURE — T2042 HOSPICE ROUTINE HOME CARE: HCPCS

## 2022-10-24 PROCEDURE — G0156 HHCP-SVS OF AIDE,EA 15 MIN: HCPCS

## 2022-10-24 RX ADMIN — IPRATROPIUM BROMIDE AND ALBUTEROL SULFATE 3 ML: 2.5; .5 SOLUTION RESPIRATORY (INHALATION) at 13:30

## 2022-10-25 ENCOUNTER — HOME CARE VISIT (OUTPATIENT)
Dept: HOME HEALTH SERVICES | Facility: HOME HEALTHCARE | Age: 87
End: 2022-10-25
Payer: MEDICARE

## 2022-10-25 ENCOUNTER — HOME CARE VISIT (OUTPATIENT)
Dept: HOME HOSPICE | Facility: HOSPICE | Age: 87
End: 2022-10-25
Payer: MEDICARE

## 2022-10-25 PROCEDURE — G0156 HHCP-SVS OF AIDE,EA 15 MIN: HCPCS

## 2022-10-25 PROCEDURE — T2042 HOSPICE ROUTINE HOME CARE: HCPCS

## 2022-10-26 PROCEDURE — T2042 HOSPICE ROUTINE HOME CARE: HCPCS

## 2022-10-27 PROCEDURE — T2042 HOSPICE ROUTINE HOME CARE: HCPCS

## 2022-10-28 PROCEDURE — T2042 HOSPICE ROUTINE HOME CARE: HCPCS

## 2022-10-29 PROCEDURE — T2042 HOSPICE ROUTINE HOME CARE: HCPCS

## 2022-11-01 ENCOUNTER — HOME CARE VISIT (OUTPATIENT)
Dept: HOME HEALTH SERVICES | Facility: HOME HEALTHCARE | Age: 87
End: 2022-11-01

## 2022-11-02 VITALS
HEART RATE: 78 BPM | SYSTOLIC BLOOD PRESSURE: 128 MMHG | RESPIRATION RATE: 18 BRPM | DIASTOLIC BLOOD PRESSURE: 80 MMHG | TEMPERATURE: 97.7 F

## 2022-11-03 ENCOUNTER — HOME CARE VISIT (OUTPATIENT)
Dept: HOME HEALTH SERVICES | Facility: HOME HEALTHCARE | Age: 87
End: 2022-11-03

## 2022-11-07 ENCOUNTER — HOME CARE VISIT (OUTPATIENT)
Dept: HOME HEALTH SERVICES | Facility: HOME HEALTHCARE | Age: 87
End: 2022-11-07

## 2022-11-08 ENCOUNTER — HOME CARE VISIT (OUTPATIENT)
Dept: HOME HOSPICE | Facility: HOSPICE | Age: 87
End: 2022-11-08

## 2022-11-09 ENCOUNTER — HOME CARE VISIT (OUTPATIENT)
Dept: HOME HOSPICE | Facility: HOSPICE | Age: 87
End: 2022-11-09

## 2022-11-09 ENCOUNTER — HOME CARE VISIT (OUTPATIENT)
Dept: HOME HEALTH SERVICES | Facility: HOME HEALTHCARE | Age: 87
End: 2022-11-09

## 2022-11-09 VITALS
TEMPERATURE: 98 F | SYSTOLIC BLOOD PRESSURE: 120 MMHG | HEART RATE: 86 BPM | RESPIRATION RATE: 22 BRPM | DIASTOLIC BLOOD PRESSURE: 72 MMHG

## 2022-11-10 ENCOUNTER — HOME CARE VISIT (OUTPATIENT)
Dept: HOME HOSPICE | Facility: HOSPICE | Age: 87
End: 2022-11-10

## 2022-11-11 ENCOUNTER — HOME CARE VISIT (OUTPATIENT)
Dept: HOME HOSPICE | Facility: HOSPICE | Age: 87
End: 2022-11-11

## 2022-11-13 ENCOUNTER — HOME CARE VISIT (OUTPATIENT)
Dept: HOME HOSPICE | Facility: HOSPICE | Age: 87
End: 2022-11-13

## 2022-11-13 NOTE — HOSPICE
Pt had unwitnessed fall as per daughter Milly West  He was trying to go to bathroom  She found him on floor  She had her brother assist him into bed  Sn made prn vs to assess  Pt short of breath   pt sleeping in bed when sn arrived  Pt may have aspirated daughter states he was choking and coughing on food earlier in day   update to team   Laceration on left upper lip open to air and laceration of left knee open to air  no active bleeding  pt did not report pain to daughter

## 2022-11-14 ENCOUNTER — HOME CARE VISIT (OUTPATIENT)
Dept: HOME HEALTH SERVICES | Facility: HOME HEALTHCARE | Age: 87
End: 2022-11-14

## 2022-11-14 ENCOUNTER — HOME CARE VISIT (OUTPATIENT)
Dept: HOME HOSPICE | Facility: HOSPICE | Age: 87
End: 2022-11-14

## 2022-11-15 ENCOUNTER — HOME CARE VISIT (OUTPATIENT)
Dept: HOME HEALTH SERVICES | Facility: HOME HEALTHCARE | Age: 87
End: 2022-11-15

## 2022-11-15 VITALS
TEMPERATURE: 96.9 F | BODY MASS INDEX: 33.84 KG/M2 | WEIGHT: 185 LBS | SYSTOLIC BLOOD PRESSURE: 138 MMHG | RESPIRATION RATE: 28 BRPM | HEART RATE: 64 BPM | DIASTOLIC BLOOD PRESSURE: 60 MMHG

## 2022-11-16 ENCOUNTER — HOME CARE VISIT (OUTPATIENT)
Dept: HOME HOSPICE | Facility: HOSPICE | Age: 87
End: 2022-11-16

## 2022-11-16 ENCOUNTER — HOME CARE VISIT (OUTPATIENT)
Dept: HOME HEALTH SERVICES | Facility: HOME HEALTHCARE | Age: 87
End: 2022-11-16

## 2022-11-17 ENCOUNTER — HOME CARE VISIT (OUTPATIENT)
Dept: HOME HEALTH SERVICES | Facility: HOME HEALTHCARE | Age: 87
End: 2022-11-17

## 2022-11-17 ENCOUNTER — HOME CARE VISIT (OUTPATIENT)
Dept: HOME HOSPICE | Facility: HOSPICE | Age: 87
End: 2022-11-17

## 2022-11-17 VITALS
RESPIRATION RATE: 18 BRPM | DIASTOLIC BLOOD PRESSURE: 62 MMHG | SYSTOLIC BLOOD PRESSURE: 112 MMHG | HEART RATE: 74 BPM | TEMPERATURE: 97.5 F

## 2022-11-24 NOTE — HOSPICE
Medication name: Morphine   Medication count: 25ml  Medication name: Lorazepam   Medication count:28 tabs      disposed in mary alice litter    Name of witness: Abraham Nugent    Name and relationship of Roxene Antes, daughter

## 2022-11-27 VITALS
TEMPERATURE: 97.5 F | RESPIRATION RATE: 16 BRPM | DIASTOLIC BLOOD PRESSURE: 62 MMHG | HEART RATE: 70 BPM | SYSTOLIC BLOOD PRESSURE: 108 MMHG

## 2022-11-28 ENCOUNTER — TELEPHONE (OUTPATIENT)
Dept: INTERNAL MEDICINE CLINIC | Facility: CLINIC | Age: 87
End: 2022-11-28

## 2022-11-28 NOTE — TELEPHONE ENCOUNTER
Rosio Stanton from [address] [place] [address] regarding patient who passed away on the 24th Nov  to Mr Noemy Chen who had passed away on the 24th  Patient has not been put up on EDRSs  Rosio Stanton not certain if  Doctor Ravi is aware or not     If he is, if he requests Dr Falguni Davies signature on his EDRS,  If a medical worksheet is needed, pls call   [phone]

## 2022-11-29 ENCOUNTER — TELEPHONE (OUTPATIENT)
Dept: INTERNAL MEDICINE CLINIC | Facility: CLINIC | Age: 87
End: 2022-11-29

## 2022-11-29 NOTE — TELEPHONE ENCOUNTER
Needs Dr Rodrigues to sign the death certificate today  They would like to cremate him today   Sho Kendal #[id number]  Date of death 11/24 @ 6:49 am

## 2022-11-30 ENCOUNTER — HOME CARE VISIT (OUTPATIENT)
Dept: HOME HOSPICE | Facility: HOSPICE | Age: 87
End: 2022-11-30

## 2022-12-12 ENCOUNTER — APPOINTMENT (OUTPATIENT)
Dept: PULMONOLOGY | Facility: CLINIC | Age: 87
End: 2022-12-12

## 2022-12-12 VITALS
BODY MASS INDEX: 29.92 KG/M2 | HEIGHT: 70 IN | TEMPERATURE: 98 F | WEIGHT: 209 LBS | HEART RATE: 50 BPM | OXYGEN SATURATION: 98 % | SYSTOLIC BLOOD PRESSURE: 151 MMHG | RESPIRATION RATE: 15 BRPM | DIASTOLIC BLOOD PRESSURE: 72 MMHG

## 2022-12-12 LAB — POCT - HEMOGLOBIN (HGB), QUANTITATIVE, TRANSCUTANEOUS: 15.2

## 2022-12-12 PROCEDURE — ZZZZZ: CPT

## 2022-12-12 PROCEDURE — 95012 NITRIC OXIDE EXP GAS DETER: CPT

## 2022-12-12 PROCEDURE — 94010 BREATHING CAPACITY TEST: CPT

## 2022-12-12 PROCEDURE — 94729 DIFFUSING CAPACITY: CPT

## 2022-12-12 PROCEDURE — 99214 OFFICE O/P EST MOD 30 MIN: CPT | Mod: 25

## 2022-12-12 PROCEDURE — 88738 HGB QUANT TRANSCUTANEOUS: CPT

## 2022-12-12 PROCEDURE — 94727 GAS DIL/WSHOT DETER LNG VOL: CPT

## 2022-12-12 NOTE — PROCEDURE
[FreeTextEntry1] : Pulmonary function test performed in my office today: Spirometry shows suggestive evidence of mild restrictive defect; lung volumes shows mild restrictive defect; diffusion shows mild impairment.\par \par \par  Exhaled Nitric Oxide             Final\par \par No Documents Attached\par \par \par   Test   Result   Flag Reference Goal Last Verified \par   Exhaled Nitric Oxide 16      REQUIRED \par \par  Ordered by: BHUPINDER CABAN       Collected/Examined: [id number] 09:38AM       \par Verification Required       Stage: Final       \par  Performed at: Other       Performed by: BHUPINDER CABAN       Resulted: [id number] 09:38AM       Last Updated: [id number] 09:40AM       \par

## 2022-12-12 NOTE — ASSESSMENT
[FreeTextEntry1] : Continue Flonase nasal spray for postnasal drip\par Continue Singulair\par Continue Advair HFA for asthma\par Albuterol HFA as needed\par Continue Mucinex or Robitussion as an expectorant.\par Return for pulmonary follow-up and pulmonary function tests in 2 months

## 2023-02-13 ENCOUNTER — APPOINTMENT (OUTPATIENT)
Dept: PULMONOLOGY | Facility: CLINIC | Age: 88
End: 2023-02-13
Payer: MEDICARE

## 2023-02-13 VITALS
BODY MASS INDEX: 30.21 KG/M2 | RESPIRATION RATE: 15 BRPM | TEMPERATURE: 98.1 F | HEIGHT: 70 IN | HEART RATE: 50 BPM | SYSTOLIC BLOOD PRESSURE: 165 MMHG | WEIGHT: 211 LBS | OXYGEN SATURATION: 96 % | DIASTOLIC BLOOD PRESSURE: 67 MMHG

## 2023-02-13 PROCEDURE — 94010 BREATHING CAPACITY TEST: CPT

## 2023-02-13 PROCEDURE — 99214 OFFICE O/P EST MOD 30 MIN: CPT | Mod: 25

## 2023-02-13 PROCEDURE — 94729 DIFFUSING CAPACITY: CPT

## 2023-02-13 PROCEDURE — ZZZZZ: CPT

## 2023-02-13 PROCEDURE — 94726 PLETHYSMOGRAPHY LUNG VOLUMES: CPT

## 2023-02-13 PROCEDURE — 95012 NITRIC OXIDE EXP GAS DETER: CPT

## 2023-02-13 NOTE — PROCEDURE
[FreeTextEntry1] : Pulmonary function test performed in my office today: Spirometry shows suggestive evidence of moderate restrictive defect; lung volumes shows mild restrictive defect; resistance: Within normal limits; diffusion shows mild impairment.\par _______\par \par  Exhaled Nitric Oxide             Final\par \par No Documents Attached\par \par \par   Test   Result   Flag Reference Goal Last Verified \par   Exhaled Nitric Oxide 16      REQUIRED \par \par  Ordered by: BHUPINDER CABAN       Collected/Examined: [id number] 09:55AM       \par Verification Required       Stage: Final       \par  Performed at: Other       Performed by: ARTHUR NATION       Resulted: [id number] 09:55AM       Last Updated: [id number] 09:57AM       \par

## 2023-02-13 NOTE — ASSESSMENT
[FreeTextEntry1] : Start Zpak.\par Continue Flonase nasal spray for postnasal drip\par Continue Singulair\par Continue Advair HFA for asthma\par Albuterol HFA as needed\par Continue Mucinex or Robitussion as an expectorant.\par Return for pulmonary follow-up and pulmonary function tests in 3 months

## 2023-02-17 ENCOUNTER — RX RENEWAL (OUTPATIENT)
Age: 88
End: 2023-02-17

## 2023-03-17 ENCOUNTER — RX RENEWAL (OUTPATIENT)
Age: 88
End: 2023-03-17

## 2023-05-15 ENCOUNTER — APPOINTMENT (OUTPATIENT)
Dept: PULMONOLOGY | Facility: CLINIC | Age: 88
End: 2023-05-15
Payer: MEDICARE

## 2023-05-15 VITALS
HEART RATE: 45 BPM | WEIGHT: 218 LBS | TEMPERATURE: 97.2 F | DIASTOLIC BLOOD PRESSURE: 63 MMHG | HEIGHT: 70 IN | BODY MASS INDEX: 31.21 KG/M2 | SYSTOLIC BLOOD PRESSURE: 146 MMHG | RESPIRATION RATE: 15 BRPM | OXYGEN SATURATION: 97 %

## 2023-05-15 LAB — POCT - HEMOGLOBIN (HGB), QUANTITATIVE, TRANSCUTANEOUS: 12.8

## 2023-05-15 PROCEDURE — 94729 DIFFUSING CAPACITY: CPT

## 2023-05-15 PROCEDURE — 88738 HGB QUANT TRANSCUTANEOUS: CPT

## 2023-05-15 PROCEDURE — ZZZZZ: CPT

## 2023-05-15 PROCEDURE — 99214 OFFICE O/P EST MOD 30 MIN: CPT | Mod: 25

## 2023-05-15 PROCEDURE — 95012 NITRIC OXIDE EXP GAS DETER: CPT

## 2023-05-15 PROCEDURE — 94726 PLETHYSMOGRAPHY LUNG VOLUMES: CPT

## 2023-05-15 PROCEDURE — 94010 BREATHING CAPACITY TEST: CPT

## 2023-05-15 NOTE — PROCEDURE
[FreeTextEntry1] : Pulmonary function test performed in my office today: Spirometry shows moderate obstructive airway disease t; lung volumes shows mild restrictive defect; resistance: Within normal limits; diffusion: Within normal limits.\par _______\par \par  \par  Exhaled Nitric Oxide             Final\par \par No Documents Attached\par \par \par   Test   Result   Flag Reference Goal Last Verified \par   Exhaled Nitric Oxide 17      REQUIRED \par \par  Ordered by: BHUPINDER CABAN       Collected/Examined: [id number] 08:36AM       \par Verification Required       Stage: Final       \par  Performed at: Other       Performed by: ARTHUR NATION       Resulted: [id number] 08:35AM       Last Updated: 15May2023 08:37AM       \par

## 2023-05-15 NOTE — ASSESSMENT
[FreeTextEntry1] : Continue Flonase nasal spray for postnasal drip\par Continue Singulair\par Continue Advair HFA for asthma\par Albuterol HFA as needed\par Continue Mucinex or Robitussion as an expectorant.\par Return for pulmonary follow-up and pulmonary function tests in 3 months

## 2023-08-14 ENCOUNTER — APPOINTMENT (OUTPATIENT)
Dept: PULMONOLOGY | Facility: CLINIC | Age: 88
End: 2023-08-14
Payer: MEDICARE

## 2023-08-14 VITALS — SYSTOLIC BLOOD PRESSURE: 145 MMHG | DIASTOLIC BLOOD PRESSURE: 76 MMHG | HEART RATE: 68 BPM | OXYGEN SATURATION: 97 %

## 2023-08-14 DIAGNOSIS — I10 ESSENTIAL (PRIMARY) HYPERTENSION: ICD-10-CM

## 2023-08-14 PROCEDURE — 94729 DIFFUSING CAPACITY: CPT

## 2023-08-14 PROCEDURE — 94726 PLETHYSMOGRAPHY LUNG VOLUMES: CPT

## 2023-08-14 PROCEDURE — 94010 BREATHING CAPACITY TEST: CPT

## 2023-08-14 PROCEDURE — 95012 NITRIC OXIDE EXP GAS DETER: CPT

## 2023-08-14 PROCEDURE — 99214 OFFICE O/P EST MOD 30 MIN: CPT | Mod: 25

## 2023-08-14 RX ORDER — FLUTICASONE PROPIONATE 50 UG/1
50 SPRAY, METERED NASAL
Qty: 3 | Refills: 3 | Status: ACTIVE | COMMUNITY
Start: 2022-03-07 | End: 1900-01-01

## 2023-08-14 RX ORDER — AZITHROMYCIN 250 MG/1
250 TABLET, FILM COATED ORAL
Qty: 1 | Refills: 0 | Status: COMPLETED | COMMUNITY
Start: 2023-02-13 | End: 2023-08-14

## 2023-08-14 RX ORDER — MONTELUKAST 10 MG/1
10 TABLET, FILM COATED ORAL
Qty: 90 | Refills: 3 | Status: ACTIVE | COMMUNITY
Start: 2022-04-04 | End: 1900-01-01

## 2023-08-14 NOTE — PROCEDURE
[FreeTextEntry1] : Pulmonary function test performed in my office today: Spirometry shows moderate obstructive airway disease; lung volumes shows mild impairment; resistance is increased; diffusion shows mild impairment.    Exhaled Nitric Oxide             Final  No Documents Attached    	Test  	Result  	Flag	Reference	Goal	Last Verified  	Exhaled Nitric Oxide	21	 	 		REQUIRED  Ordered by: BHUPINDER CABAN       Collected/Examined: [id number] 08:33AM       Verification Required       Stage: Final       Performed at: Other       Performed by: ARTHUR NATION       Resulted: [id number] 08:32AM       Last Updated: [id number] 08:34AM

## 2023-08-14 NOTE — ASSESSMENT
[FreeTextEntry1] : Continue Flonase nasal spray for postnasal drip Continue Singulair Continue Advair HFA for asthma Albuterol HFA as needed Continue Mucinex or Robitussion as an expectorant. Return for pulmonary follow-up and pulmonary function tests in 2 months

## 2023-08-14 NOTE — HISTORY OF PRESENT ILLNESS
[TextBox_4] : F/u asthma   Overall feeling well; no respiratory complaints reported at this time   Continues to have phlegm in the throat, despite use of Flonase

## 2023-08-28 RX ORDER — FLUTICASONE PROPIONATE AND SALMETEROL XINAFOATE 115; 21 UG/1; UG/1
115-21 AEROSOL, METERED RESPIRATORY (INHALATION)
Qty: 3 | Refills: 3 | Status: ACTIVE | COMMUNITY
Start: 2022-03-20 | End: 1900-01-01

## 2023-10-16 ENCOUNTER — APPOINTMENT (OUTPATIENT)
Dept: PULMONOLOGY | Facility: CLINIC | Age: 88
End: 2023-10-16
Payer: MEDICARE

## 2023-10-16 VITALS
HEART RATE: 56 BPM | TEMPERATURE: 96.7 F | DIASTOLIC BLOOD PRESSURE: 75 MMHG | WEIGHT: 218 LBS | HEIGHT: 70 IN | BODY MASS INDEX: 31.21 KG/M2 | OXYGEN SATURATION: 95 % | SYSTOLIC BLOOD PRESSURE: 144 MMHG

## 2023-10-16 DIAGNOSIS — R05.9 COUGH, UNSPECIFIED: ICD-10-CM

## 2023-10-16 DIAGNOSIS — J45.909 UNSPECIFIED ASTHMA, UNCOMPLICATED: ICD-10-CM

## 2023-10-16 DIAGNOSIS — R06.00 DYSPNEA, UNSPECIFIED: ICD-10-CM

## 2023-10-16 DIAGNOSIS — R09.82 POSTNASAL DRIP: ICD-10-CM

## 2023-10-16 PROCEDURE — ZZZZZ: CPT

## 2023-10-16 PROCEDURE — 94010 BREATHING CAPACITY TEST: CPT

## 2023-10-16 PROCEDURE — 94726 PLETHYSMOGRAPHY LUNG VOLUMES: CPT

## 2023-10-16 PROCEDURE — 95012 NITRIC OXIDE EXP GAS DETER: CPT

## 2023-10-16 PROCEDURE — 94729 DIFFUSING CAPACITY: CPT

## 2023-10-16 PROCEDURE — 99214 OFFICE O/P EST MOD 30 MIN: CPT | Mod: 25

## 2023-10-16 PROCEDURE — 88738 HGB QUANT TRANSCUTANEOUS: CPT

## 2023-10-17 LAB — HEMOGLOBIN: 15.1

## 2023-12-02 ENCOUNTER — NON-APPOINTMENT (OUTPATIENT)
Age: 88
End: 2023-12-02

## 2023-12-04 ENCOUNTER — APPOINTMENT (OUTPATIENT)
Dept: PULMONOLOGY | Facility: CLINIC | Age: 88
End: 2023-12-04

## 2024-06-24 NOTE — UTILIZATION REVIEW
Initial Clinical Review    Admission: Date/Time/Statement:   Admission Orders (From admission, onward)     Ordered        03/28/22 1655  Inpatient Admission  Once                      Orders Placed This Encounter   Procedures    Inpatient Admission     Standing Status:   Standing     Number of Occurrences:   1     Order Specific Question:   Level of Care     Answer:   Med Surg [16]     Order Specific Question:   Estimated length of stay     Answer:   More than 2 Midnights     Order Specific Question:   Certification     Answer:   I certify that inpatient services are medically necessary for this patient for a duration of greater than two midnights  See H&P and MD Progress Notes for additional information about the patient's course of treatment  ED Arrival Information     Expected Arrival Acuity    - 3/28/2022 13:23 Emergent         Means of arrival Escorted by Service Admission type    [facility] Member General Medicine Emergency         Arrival complaint    CHEST PAIN        Chief Complaint   Patient presents with    Chest Pain     pt started with "bad chest pains" after breakfast  Family reports tremors last night but "he didnt want to go to the hospital last night " reports pain in chest at this time, unable to describe  reports "sweating last night, and today his skin is all red with pins feeling on both legs "       Initial Presentation: 81 y o  male to ED from home, PMHX parkinsons w/ CP   Struggling to swallow while eating   + cough prod  In ED found to have elevated wbc and infiltrates on CXR , likely aspiration as source  Admitted IP status for pneumonia plan for iv rocephin , cx pending , mucinex , speech therapy consulted and cont IVF   Emphysema cont bronchodilators , prednisone , prn xoponex  HTN monitor  PE: rhonchi , disoreinted    Date: 3/29  Day 2: cont w/ coughing and some CP   Tolerating nectar thick liquids   Cont IVF and IV abx for pneumonia  + scattered rhonchi    C/o generalized weakness  Condom catheter in place  ED Triage Vitals   Temperature Pulse Respirations Blood Pressure SpO2   03/28/22 1342 03/28/22 1342 03/28/22 1342 03/28/22 1342 03/28/22 1342   99 1 °F (37 3 °C) 86 18 131/62 95 %      Temp src Heart Rate Source Patient Position - Orthostatic VS BP Location FiO2 (%)   -- 03/28/22 1500 03/28/22 1500 03/28/22 1500 --    Monitor Sitting Right arm       Pain Score       03/28/22 1342       10 - Worst Possible Pain          Wt Readings from Last 1 Encounters:   03/28/22 83 kg (182 lb 15 7 oz)     Additional Vital Signs:   03/29/22 07:49:16 97 9 °F (36 6 °C) 62 -- 131/56 81 94 % -- --   03/29/22 0556 -- 70 21 -- -- 94 % -- --   03/28/22 22:10:03 -- 64 -- 106/61 76 92 % -- --   03/28/22 2000 -- -- -- -- -- 93 % None (Room air) --   03/28/22 17:35:38 97 3 °F (36 3 °C) Abnormal  70 -- 131/60 84 94 % -- --   03/28/22 1630 -- 67 18 126/76 97 92 % None (Room air) Sitting   03/28/22 1600 -- 68 16 123/60 86 97 % None (Room air) Sitting   03/28/22 1500 -- 70 18 101/55 76 93 % None (Room air) Sitting       Pertinent Labs/Diagnostic Test Results:   3/28 EKG NSR incomplete RBBB  XR chest 2 views   Final Result by Jacob Lai MD (03/29 0749)      Minimal left basilar consolidation may represent subsegmental atelectasis, pneumonia, or aspiration  Suspect small left pleural effusion  This study demonstrates a significant  finding and was documented as such in [facility] for liaison and referring practitioner notification               Workstation performed: [id number]           Results from last 7 days   Lab Units 03/28/22  1501   SARS-COV-2  Negative     Results from last 7 days   Lab Units 03/29/22  0516 03/28/22  1421   WBC Thousand/uL 15 78* 22 89*   HEMOGLOBIN g/dL 13 5 14 3   HEMATOCRIT % 41 4 44 8   PLATELETS Thousands/uL 175 193   NEUTROS ABS Thousands/µL  --  20 55*       Results from last 7 days   Lab Units 03/29/22  0516 03/28/22  1501   SODIUM mmol/L 137 136 POTASSIUM mmol/L 3 8 4 2   CHLORIDE mmol/L 104 104   CO2 mmol/L 25 23   ANION GAP mmol/L 8 9   BUN mg/dL 38* 35*   CREATININE mg/dL 1 60* 1 82*   EGFR ml/min/1 73sq m 38 32   CALCIUM mg/dL 9 0 8 9     Results from last 7 days   Lab Units 03/28/22  1501   AST U/L 21   ALT U/L 12   ALK PHOS U/L 76   TOTAL PROTEIN g/dL 6 5   ALBUMIN g/dL 2 9*   TOTAL BILIRUBIN mg/dL 0 50     Results from last 7 days   Lab Units 03/29/22  0745 03/28/22  2107 03/28/22  1844   POC GLUCOSE mg/dl 99 151* 203*     Results from last 7 days   Lab Units 03/29/22  0516 03/28/22  1501   GLUCOSE RANDOM mg/dL 110 227*       Results from last 7 days   Lab Units 03/28/22  1830 03/28/22  1623 03/28/22  1421   HS TNI 0HR ng/L  --   --  28   HS TNI 2HR ng/L  --  21  --    HSTNI D2 ng/L  --  -7  --    HS TNI 4HR ng/L 21  --   --    HSTNI D4 ng/L -7  --   --      Results from last 7 days   Lab Units 03/28/22  1421   PROTIME seconds 14 9*   INR  1 22*     Results from last 7 days   Lab Units 03/29/22  0516 03/28/22  1830   PROCALCITONIN ng/ml 18 49* 19 10*     Results from last 7 days   Lab Units 03/28/22  1501   LACTIC ACID mmol/L 2 0       Results from last 7 days   Lab Units 03/28/22  1501   INFLUENZA A PCR  Negative   INFLUENZA B PCR  Negative   RSV PCR  Negative     Results from last 7 days   Lab Units 03/28/22  1501   BLOOD CULTURE  Received in Microbiology Lab  Culture in Progress  Received in Microbiology Lab  Culture in Progress       ED Treatment:   Medication Administration from 03/28/2022 1323 to 03/28/2022 1733       Date/Time Order Dose Route Action     03/28/2022 1700 carbidopa-levodopa (SINEMET)  mg per tablet 1 tablet 1 tablet Oral Given     03/28/2022 1715 heparin (porcine) subcutaneous injection 5,000 Units 5,000 Units Subcutaneous Given        Past Medical History:   Diagnosis Date    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) (HCC)     Allergic rhinitis     last assessed [id number]    Aortic regurgitation     Arm Quality 226: Preventive Care And Screening: Tobacco Use: Screening And Cessation Intervention: Patient screened for tobacco use and is an ex/non-smoker laceration     Asthma     Bronchitis, chronic obstructive, with exacerbation ([place] [address] )     last assessed 12Jun2015    Candidal intertrigo     Chronic obstructive lung disease (HCC)     last assessed [id number]    COPD [facility]chronic obstructive pulmonary disease) ([facility]     Coronary artery disease     carotid stents    Cough     CVA (cerebral vascular accident) ([place] [address] )     Dermatitis     Diabetes mellitus type 2, uncomplicated ([place] [address] )     controlled    Dysthymic disorder     Fatigue     Hyperlipidemia     Hypertension     Neurologic gait dysfunction     Parkinson's disease ([place] [address] )     Pneumonia     PVD (peripheral vascular disease) ([place] [address] )     Seborrheic keratosis     TIA (transient ischemic attack)     Tinea corporis     Tinea pedis of both feet     Tremor      Present on Admission:   Pneumonia   Parkinson's disease ([place] [address] )   Type 2 diabetes mellitus ([place] [address] )   Essential hypertension      Admitting Diagnosis: Chest pain [R07 9]  Pneumonia [J18 9]  Age/Sex: 81 y o  male  Admission Orders:  Scheduled Medications:  atorvastatin, 10 mg, Oral, Daily  carbidopa-levodopa, 1 tablet, Oral, TID  cefTRIAXone, 1,000 mg, Intravenous, Q24H  famotidine, 20 mg, Oral, BID  finasteride, 5 mg, Oral, QAM  fluticasone-vilanterol, 1 puff, Inhalation, Daily  guaiFENesin, 600 mg, Oral, Q12H ARLEN  heparin (porcine), 5,000 Units, Subcutaneous, Q8H ARLEN  insulin lispro, 1-5 Units, Subcutaneous, TID AC  melatonin, 3 mg, Oral, HS  montelukast, 10 mg, Oral, HS  pramipexole, 0 5 mg, Oral, TID  predniSONE, 10 mg, Oral, Daily  rivastigmine, 4 5 mg, Oral, BID  umeclidinium bromide, 1 puff, Inhalation, Daily      Continuous IV Infusions:     PRN Meds:  acetaminophen, 650 mg, Oral, Q6H PRN  levalbuterol, 0 63 mg, Nebulization, Q8H PRN  OLANZapine, 2 5 mg, Oral, Q6H PRN  ondansetron, 4 mg, Intravenous, Q6H PRN    Fingerstick ac and hs   I&O   Aspiration precautions  Speech OT PT eval     IP CONSULT TO CASE MANAGEMENT    Network Utilization Review Department  ATTENTION: Please call with any questions or concerns to [phone] and carefully listen to the prompts so that you are directed to the right person  All voicemails are confidential   An Reyes all requests for admission clinical reviews, approved or denied determinations and any other requests to dedicated fax number below belonging to the campus where the patient is receiving treatment   List of dedicated fax numbers for the Facilities:  [address] DENIALS (Administrative/Medical Necessity) [phone]   [address] (Maternity/NICU/Pediatrics) [phone]   [address]  [address] [facility] [place] [address] [id number] [place] [address] [address] [facility] [place] [address] [phone] Detail Level: Detailed

## 2024-09-18 ENCOUNTER — RX RENEWAL (OUTPATIENT)
Age: 89
End: 2024-09-18

## 2025-07-23 ENCOUNTER — RX RENEWAL (OUTPATIENT)
Age: 89
End: 2025-07-23

## 2025-07-28 NOTE — TELEPHONE ENCOUNTER
Patient requesting new order for one touch delica glucometer. Order pended.    Patient's other Daughter Fercho Ramirez called- The 2 daughter spoke and there's some confusion and worry  They want to know what is going on with the labs  Please call Wilber with the message ASAP  She's on the communication consent from Cardiology       #[phone]